# Patient Record
Sex: MALE | Race: BLACK OR AFRICAN AMERICAN | Employment: UNEMPLOYED | URBAN - METROPOLITAN AREA
[De-identification: names, ages, dates, MRNs, and addresses within clinical notes are randomized per-mention and may not be internally consistent; named-entity substitution may affect disease eponyms.]

---

## 2017-04-19 ENCOUNTER — HOSPITAL ENCOUNTER (EMERGENCY)
Age: 52
Discharge: OTHER INSTITUTION | End: 2017-04-19
Attending: EMERGENCY MEDICINE
Payer: SELF-PAY

## 2017-04-19 VITALS
DIASTOLIC BLOOD PRESSURE: 89 MMHG | BODY MASS INDEX: 23.26 KG/M2 | RESPIRATION RATE: 16 BRPM | HEART RATE: 96 BPM | SYSTOLIC BLOOD PRESSURE: 136 MMHG | WEIGHT: 171.7 LBS | OXYGEN SATURATION: 96 % | TEMPERATURE: 98.3 F | HEIGHT: 72 IN

## 2017-04-19 DIAGNOSIS — N19 UREMIA: ICD-10-CM

## 2017-04-19 DIAGNOSIS — N18.6 ESRD (END STAGE RENAL DISEASE) (HCC): Primary | ICD-10-CM

## 2017-04-19 DIAGNOSIS — D64.9 ANEMIA, UNSPECIFIED TYPE: ICD-10-CM

## 2017-04-19 LAB
ALBUMIN SERPL BCP-MCNC: 2.7 G/DL (ref 3.5–5)
ALBUMIN/GLOB SERPL: 0.6 {RATIO} (ref 1.1–2.2)
ALP SERPL-CCNC: 82 U/L (ref 45–117)
ALT SERPL-CCNC: 24 U/L (ref 12–78)
ANION GAP BLD CALC-SCNC: 19 MMOL/L (ref 5–15)
ANION GAP BLD CALC-SCNC: 23 MMOL/L (ref 5–15)
AST SERPL W P-5'-P-CCNC: 24 U/L (ref 15–37)
BASOPHILS # BLD AUTO: 0 K/UL (ref 0–0.1)
BASOPHILS # BLD: 0 % (ref 0–1)
BILIRUB SERPL-MCNC: 0.6 MG/DL (ref 0.2–1)
BUN BLD-MCNC: >140 MG/DL (ref 9–20)
BUN SERPL-MCNC: 163 MG/DL (ref 6–20)
BUN/CREAT SERPL: 8 (ref 12–20)
CA-I BLD-MCNC: 0.93 MMOL/L (ref 1.12–1.32)
CALCIUM SERPL-MCNC: 6.7 MG/DL (ref 8.5–10.1)
CHLORIDE BLD-SCNC: 103 MMOL/L (ref 98–107)
CHLORIDE SERPL-SCNC: 103 MMOL/L (ref 97–108)
CO2 BLD-SCNC: 17 MMOL/L (ref 21–32)
CO2 SERPL-SCNC: 18 MMOL/L (ref 21–32)
CREAT BLD-MCNC: >20 MG/DL (ref 0.6–1.3)
CREAT SERPL-MCNC: 19.69 MG/DL (ref 0.7–1.3)
EOSINOPHIL # BLD: 0.3 K/UL (ref 0–0.4)
EOSINOPHIL NFR BLD: 3 % (ref 0–7)
ERYTHROCYTE [DISTWIDTH] IN BLOOD BY AUTOMATED COUNT: 13.2 % (ref 11.5–14.5)
GLOBULIN SER CALC-MCNC: 4.2 G/DL (ref 2–4)
GLUCOSE BLD-MCNC: 95 MG/DL (ref 65–100)
GLUCOSE SERPL-MCNC: 102 MG/DL (ref 65–100)
HCT VFR BLD AUTO: 23.4 % (ref 36.6–50.3)
HCT VFR BLD CALC: 26 % (ref 36.6–50.3)
HGB BLD-MCNC: 7.7 G/DL (ref 12.1–17)
HGB BLD-MCNC: 8.8 GM/DL (ref 12.1–17)
LIPASE SERPL-CCNC: 202 U/L (ref 73–393)
LYMPHOCYTES # BLD AUTO: 11 % (ref 12–49)
LYMPHOCYTES # BLD: 1.1 K/UL (ref 0.8–3.5)
MCH RBC QN AUTO: 30.7 PG (ref 26–34)
MCHC RBC AUTO-ENTMCNC: 32.9 G/DL (ref 30–36.5)
MCV RBC AUTO: 93.2 FL (ref 80–99)
MONOCYTES # BLD: 0.8 K/UL (ref 0–1)
MONOCYTES NFR BLD AUTO: 8 % (ref 5–13)
NEUTS SEG # BLD: 7.8 K/UL (ref 1.8–8)
NEUTS SEG NFR BLD AUTO: 78 % (ref 32–75)
PLATELET # BLD AUTO: 175 K/UL (ref 150–400)
POTASSIUM BLD-SCNC: 4.4 MMOL/L (ref 3.5–5.1)
POTASSIUM SERPL-SCNC: 4.5 MMOL/L (ref 3.5–5.1)
PROT SERPL-MCNC: 6.9 G/DL (ref 6.4–8.2)
RBC # BLD AUTO: 2.51 M/UL (ref 4.1–5.7)
SERVICE CMNT-IMP: ABNORMAL
SODIUM BLD-SCNC: 138 MMOL/L (ref 136–145)
SODIUM SERPL-SCNC: 140 MMOL/L (ref 136–145)
TROPONIN I SERPL-MCNC: 0.22 NG/ML
WBC # BLD AUTO: 10 K/UL (ref 4.1–11.1)

## 2017-04-19 PROCEDURE — 77030029684 HC NEB SM VOL KT MONA -A

## 2017-04-19 PROCEDURE — 80053 COMPREHEN METABOLIC PANEL: CPT | Performed by: EMERGENCY MEDICINE

## 2017-04-19 PROCEDURE — 84484 ASSAY OF TROPONIN QUANT: CPT | Performed by: EMERGENCY MEDICINE

## 2017-04-19 PROCEDURE — 93005 ELECTROCARDIOGRAM TRACING: CPT

## 2017-04-19 PROCEDURE — 83690 ASSAY OF LIPASE: CPT | Performed by: EMERGENCY MEDICINE

## 2017-04-19 PROCEDURE — 80047 BASIC METABLC PNL IONIZED CA: CPT

## 2017-04-19 PROCEDURE — 36415 COLL VENOUS BLD VENIPUNCTURE: CPT | Performed by: EMERGENCY MEDICINE

## 2017-04-19 PROCEDURE — 96374 THER/PROPH/DIAG INJ IV PUSH: CPT

## 2017-04-19 PROCEDURE — 74011250636 HC RX REV CODE- 250/636: Performed by: EMERGENCY MEDICINE

## 2017-04-19 PROCEDURE — 85025 COMPLETE CBC W/AUTO DIFF WBC: CPT | Performed by: EMERGENCY MEDICINE

## 2017-04-19 PROCEDURE — 99285 EMERGENCY DEPT VISIT HI MDM: CPT

## 2017-04-19 PROCEDURE — 96375 TX/PRO/DX INJ NEW DRUG ADDON: CPT

## 2017-04-19 RX ORDER — BISMUTH SUBSALICYLATE 262 MG
1 TABLET,CHEWABLE ORAL DAILY
COMMUNITY

## 2017-04-19 RX ORDER — MORPHINE SULFATE 2 MG/ML
2 INJECTION, SOLUTION INTRAMUSCULAR; INTRAVENOUS
Status: COMPLETED | OUTPATIENT
Start: 2017-04-19 | End: 2017-04-19

## 2017-04-19 RX ORDER — FUROSEMIDE 80 MG/1
80 TABLET ORAL DAILY
Status: ON HOLD | COMMUNITY
End: 2017-05-09

## 2017-04-19 RX ORDER — PANTOPRAZOLE SODIUM 40 MG/1
40 TABLET, DELAYED RELEASE ORAL DAILY
Status: ON HOLD | COMMUNITY
End: 2021-01-01 | Stop reason: SDUPTHER

## 2017-04-19 RX ORDER — CLONIDINE HYDROCHLORIDE 0.3 MG/1
0.3 TABLET ORAL 2 TIMES DAILY
COMMUNITY
End: 2017-11-28 | Stop reason: SDUPTHER

## 2017-04-19 RX ORDER — ONDANSETRON 2 MG/ML
4 INJECTION INTRAMUSCULAR; INTRAVENOUS
Status: COMPLETED | OUTPATIENT
Start: 2017-04-19 | End: 2017-04-19

## 2017-04-19 RX ORDER — SODIUM CHLORIDE 0.9 % (FLUSH) 0.9 %
5-10 SYRINGE (ML) INJECTION AS NEEDED
Status: DISCONTINUED | OUTPATIENT
Start: 2017-04-19 | End: 2017-04-19 | Stop reason: HOSPADM

## 2017-04-19 RX ORDER — RANITIDINE 150 MG/1
150 TABLET, FILM COATED ORAL 2 TIMES DAILY
Status: ON HOLD | COMMUNITY
End: 2017-05-09

## 2017-04-19 RX ORDER — AMLODIPINE BESYLATE 10 MG/1
10 TABLET ORAL DAILY
COMMUNITY
End: 2017-11-28 | Stop reason: SDUPTHER

## 2017-04-19 RX ORDER — ALLOPURINOL 100 MG/1
TABLET ORAL DAILY
COMMUNITY
End: 2017-11-28 | Stop reason: ALTCHOICE

## 2017-04-19 RX ORDER — FUROSEMIDE 40 MG/1
40 TABLET ORAL
Status: ON HOLD | COMMUNITY
End: 2017-05-09

## 2017-04-19 RX ORDER — HYDRALAZINE HYDROCHLORIDE 50 MG/1
50 TABLET, FILM COATED ORAL 3 TIMES DAILY
COMMUNITY
End: 2017-07-16

## 2017-04-19 RX ORDER — SEVELAMER CARBONATE 800 MG/1
800 TABLET, FILM COATED ORAL 3 TIMES DAILY
COMMUNITY
End: 2017-11-28 | Stop reason: SDUPTHER

## 2017-04-19 RX ORDER — TERAZOSIN 2 MG/1
2 CAPSULE ORAL
Status: ON HOLD | COMMUNITY
End: 2017-05-09

## 2017-04-19 RX ORDER — SODIUM CHLORIDE 0.9 % (FLUSH) 0.9 %
5-10 SYRINGE (ML) INJECTION EVERY 8 HOURS
Status: DISCONTINUED | OUTPATIENT
Start: 2017-04-19 | End: 2017-04-19 | Stop reason: HOSPADM

## 2017-04-19 RX ORDER — GABAPENTIN 100 MG/1
100 CAPSULE ORAL DAILY
COMMUNITY

## 2017-04-19 RX ORDER — PARICALCITOL 1 UG/1
1 CAPSULE, LIQUID FILLED ORAL DAILY
Status: ON HOLD | COMMUNITY
End: 2021-01-01 | Stop reason: SDUPTHER

## 2017-04-19 RX ORDER — PROPRANOLOL HYDROCHLORIDE 20 MG/1
20 TABLET ORAL 3 TIMES DAILY
COMMUNITY
End: 2017-05-13

## 2017-04-19 RX ORDER — SODIUM BICARBONATE 650 MG/1
TABLET ORAL 4 TIMES DAILY
COMMUNITY
End: 2017-05-13

## 2017-04-19 RX ADMIN — Medication 2 MG: at 08:53

## 2017-04-19 RX ADMIN — ONDANSETRON HYDROCHLORIDE 4 MG: 2 INJECTION, SOLUTION INTRAMUSCULAR; INTRAVENOUS at 08:53

## 2017-04-19 NOTE — ED NOTES
Patient requesting something to eat/drink. Nursing staff has not provided any beverages due to patient complaining of coffee ground emesis upon arrival to ED. Nursing and physician have reviewed indications for NPO status with patient.

## 2017-04-19 NOTE — ED PROVIDER NOTES
HPI Comments:   Missy Samuel is a 47 y.o. male with a hx of stage III CKD and HTN presenting to the ED C/O nausea/vomiting which started a few days ago. Associated symptoms include right sided abd pain. Pt reports he was recently released from jail and had a fistula placed for HD before he was released but was not given any nephrology f/u when he left. He is also c/o acute on chronic lower back pain. Patient denies CP or any other symptoms or complaints. There are no other complaints, changes or physical findings at this time. Written by ADEOLA Thomas, as dictated by [employer] Lakesha Reed MD      The history is provided by the patient. No  was used. Past Medical History:   Diagnosis Date    Chronic kidney disease     Hypertension        Past Surgical History:   Procedure Laterality Date    HX VASCULAR ACCESS Left     LUE AV fistula         History reviewed. No pertinent family history. Social History     Social History    Marital status: SINGLE     Spouse name: N/A    Number of children: N/A    Years of education: N/A     Occupational History    Not on file. Social History Main Topics    Smoking status: Current Every Day Smoker     Packs/day: 1.00    Smokeless tobacco: Not on file    Alcohol use 0.0 oz/week     1 - 2 Cans of beer per week    Drug use: Yes     Special: Heroin      Comment: Former    Sexual activity: Not on file     Other Topics Concern    Not on file     Social History Narrative         ALLERGIES: Motrin [ibuprofen] and Tylenol [acetaminophen]    Review of Systems   Constitutional: Negative. Negative for appetite change, chills, fatigue and fever. HENT: Negative. Negative for congestion, rhinorrhea, sinus pressure and sore throat. Eyes: Negative. Respiratory: Negative. Negative for cough, choking, chest tightness, shortness of breath and wheezing. Cardiovascular: Negative.   Negative for chest pain, palpitations and leg swelling. Gastrointestinal: Positive for abdominal pain (right sided), nausea and vomiting. Negative for constipation and diarrhea. Endocrine: Negative. Genitourinary: Negative. Negative for difficulty urinating, dysuria, flank pain and urgency. Musculoskeletal: Positive for back pain (lower). Skin: Negative. Neurological: Negative. Negative for dizziness, speech difficulty, weakness, light-headedness, numbness and headaches. Psychiatric/Behavioral: Negative. All other systems reviewed and are negative.       Vitals:    04/19/17 0830 04/19/17 0835 04/19/17 0835 04/19/17 0850   BP: 163/89  136/89    Pulse: 97  95 96   Resp: 8   16   Temp: 98.2 °F (36.8 °C)  98.3 °F (36.8 °C)    SpO2:  96% 96%    Weight:       Height:                Physical Exam     Physical Examination: General appearance - WDWN, in no apparent distress  Head - NC/AT  Eyes - pupils equal, round  and reactive, extraocular eye movements intact, conj/sclera clear, anicteric  Mouth - mucous membranes moist, pharynx normal without lesions  Nose/Ears - nares clear, Tms & canals clear  Neck - supple, no significant adenopathy, trachea midline, no crepitus, c spine diffusely non-tender, no step offs  Chest - Normal respiratory effort, clear to auscultation bilaterally, no wheezes/rales/rhonchi  Heart - normal rate and regular rhythm, S1 and S2 normal, 2/6 systolic murmur, no gallops or rubs  Abdomen - soft, tender to right abd, nondistended, nabs, no masses, guarding, rebound or rigidity  Neurological - alert, oriented, normal speech, cranial nerves intact, no focal motor findings, motor & sensory diffusely intact, normal gait  Extremities/MS - peripheral pulses normal, no pedal edema, all joints atraumatic, FROM, non-tender, no gross deformities, spine diffusely non-tender, +thrill to LUE fistula  Skin - normal coloration and turgor, no rashes, no lesions or lacerations     MDM  Number of Diagnoses or Management Options  Anemia, unspecified type:   ESRD (end stage renal disease) ([facility] [address]):   Uremia:   Diagnosis management comments: DDx: renal disease, electrolyte abnormality, UTI, chronic pain       Amount and/or Complexity of Data Reviewed  Clinical lab tests: ordered and reviewed  Tests in the medicine section of CPT®: ordered and reviewed  Discuss the patient with other providers: yes (Emergency Medicine)  Independent visualization of images, tracings, or specimens: yes    Patient Progress  Patient progress: stable    Procedures    EKG interpretation: (Preliminary)  7:46 AM  Rhythm: normal sinus rhythm; and regular . Rate (approx.): 92 bpm; Axis: normal; AR interval: normal; QRS interval: normal ; ST/T wave: normal.  Written by Gwendolyn Malone ED Scribe, as dictated by Jaleel Sapp. Jeremias Ronquillo MD     PROGRESS NOTE:   8:46 AM  Pt requested transfer to [facility] for prior relationship. In addition we do not have vascular surgery here and all vascular and nephrology care has been at [facility] prior to today. Written by Mikael Oneill ED Scribe, as dictated by Jaleel Ronquillo MD.     CONSULT NOTE:   8:50 AM  Krystina Ronquillo MD spoke with Rick Vela MD   Specialty: Emergency Medicine  Discussed pt's hx, disposition, and available diagnostic and imaging results over the telephone. Reviewed care plans. Consulting physician agrees with plans as outlined. Dr. Yaquelin Bradford agrees to accept the pt for transfer to [facility]'s ED. Written by Mikael Oneill ED Scribe, as dictated by Jaleel Ronquillo MD.     LABORATORY TESTS:  Recent Results (from the past 12 hour(s))   CBC WITH AUTOMATED DIFF    Collection Time: 04/19/17  8:17 AM   Result Value Ref Range    WBC 10.0 4.1 - 11.1 K/uL    RBC 2.51 (L) 4.10 - 5.70 M/uL    HGB 7.7 (L) 12.1 - 17.0 g/dL    HCT 23.4 (L) 36.6 - 50.3 %    MCV 93.2 80.0 - 99.0 FL    MCH 30.7 26.0 - 34.0 PG    MCHC 32.9 30.0 - 36.5 g/dL    RDW 13.2 11.5 - 14.5 %    PLATELET 558 367 - 009 K/uL    NEUTROPHILS 78 (H) 32 - 75 %    LYMPHOCYTES 11 (L) 12 - 49 %    MONOCYTES 8 5 - 13 %    EOSINOPHILS 3 0 - 7 %    BASOPHILS 0 0 - 1 %    ABS. NEUTROPHILS 7.8 1.8 - 8.0 K/UL    ABS. LYMPHOCYTES 1.1 0.8 - 3.5 K/UL    ABS. MONOCYTES 0.8 0.0 - 1.0 K/UL    ABS. EOSINOPHILS 0.3 0.0 - 0.4 K/UL    ABS. BASOPHILS 0.0 0.0 - 0.1 K/UL   METABOLIC PANEL, COMPREHENSIVE    Collection Time: 04/19/17  8:17 AM   Result Value Ref Range    Sodium 140 136 - 145 mmol/L    Potassium 4.5 3.5 - 5.1 mmol/L    Chloride 103 97 - 108 mmol/L    CO2 18 (L) 21 - 32 mmol/L    Anion gap 19 (H) 5 - 15 mmol/L    Glucose 102 (H) 65 - 100 mg/dL     (H) 6 - 20 MG/DL    Creatinine 19.69 (H) 0.70 - 1.30 MG/DL    BUN/Creatinine ratio 8 (L) 12 - 20      GFR est AA 3 (L) >60 ml/min/1.73m2    GFR est non-AA 3 (L) >60 ml/min/1.73m2    Calcium 6.7 (L) 8.5 - 10.1 MG/DL    Bilirubin, total 0.6 0.2 - 1.0 MG/DL    ALT (SGPT) 24 12 - 78 U/L    AST (SGOT) 24 15 - 37 U/L    Alk.  phosphatase 82 45 - 117 U/L    Protein, total 6.9 6.4 - 8.2 g/dL    Albumin 2.7 (L) 3.5 - 5.0 g/dL    Globulin 4.2 (H) 2.0 - 4.0 g/dL    A-G Ratio 0.6 (L) 1.1 - 2.2     LIPASE    Collection Time: 04/19/17  8:17 AM   Result Value Ref Range    Lipase 202 73 - 393 U/L   TROPONIN I    Collection Time: 04/19/17  8:17 AM   Result Value Ref Range    Troponin-I, Qt. 0.22 (H) <0.05 ng/mL   POC CHEM8    Collection Time: 04/19/17  8:20 AM   Result Value Ref Range    Calcium, ionized (POC) 0.93 (L) 1.12 - 1.32 MMOL/L    Sodium (POC) 138 136 - 145 MMOL/L    Potassium (POC) 4.4 3.5 - 5.1 MMOL/L    Chloride (POC) 103 98 - 107 MMOL/L    CO2 (POC) 17 (L) 21 - 32 MMOL/L    Anion gap (POC) 23 (H) 5 - 15 mmol/L    Glucose (POC) 95 65 - 100 MG/DL    BUN (POC) >140 (H) 9 - 20 MG/DL    Creatinine (POC) >20.0 (H) 0.6 - 1.3 MG/DL    GFR-AA (POC) Cannot be calulated >60 ml/min/1.73m2    GFR, non-AA (POC) Cannot be calulated >60 ml/min/1.73m2    Hemoglobin (POC) 8.8 (L) 12.1 - 17.0 GM/DL    Hematocrit (POC) 26 (L) 36.6 - 50.3 % Comment Comment Not Indicated. MEDICATIONS GIVEN:  Medications   sodium chloride (NS) flush 5-10 mL (not administered)   sodium chloride (NS) flush 5-10 mL (not administered)   morphine injection 2 mg (2 mg IntraVENous Given 4/19/17 0853)   ondansetron (ZOFRAN) injection 4 mg (4 mg IntraVENous Given 4/19/17 0853)       IMPRESSION:  1. ESRD (end stage renal disease) ([facility] [address]    2. Uremia    3. Anemia, unspecified type        PLAN: Transfer to [facility] ED    TRANSFER PROGRESS NOTE:  8:50 AM  Discussed impending transfer with Patient and/or family. Pt and/or family instructed that Pt would be transferred to Smita Cunningham MD [facility] ED). Discussed reasoning for transfer and future treatment plan. Family and Pt understands and agrees with care plan. Written by Jordan Mccann, ED Scribe, as dictated by Ania Galeano [facility]: This note is prepared by Merlin Eaves, acting as Scribe for Ania Jarvis. Simón Galeano, [address] [place] [address]. Simón Galeano MD: The scribe's documentation has been prepared under my direction and personally reviewed by me in its entirety. I confirm that the note above accurately reflects all work, treatment, procedures, and medical decision making performed by me.

## 2017-04-19 NOTE — ED TRIAGE NOTES
Patient recently released from group home on April 10th and presents with multiple complaints. Primary c/o left, lower back pain for about three days. Patient admits chronic pain problems. Takes GBP and Ultram for chronic pain. Also reports RUQ abdominal pain x3 days. Reports nausea/vomiting and describes coffee ground emesis. Denies recent ETOH use. Patient has AV fistula in E that is maturing. Has not started HD yet.

## 2017-04-19 NOTE — ED NOTES
TRANSFER - OUT REPORT:    Verbal report given to Yusef Lira RN on Donne Cockayne  being transferred to [facility] Emergency Department for change in patient condition(transfer to [facility] for higher level of care)       Report consisted of patients Situation, Background, Assessment and   Recommendations(SBAR). Information from the following report(s) SBAR and ED Summary was reviewed with the receiving nurse. Lines:   Peripheral IV 04/19/17 Right Hand (Active)   Site Assessment Clean, dry, & intact 4/19/2017  8:32 AM   Phlebitis Assessment 0 4/19/2017  8:32 AM   Infiltration Assessment 0 4/19/2017  8:32 AM   Dressing Status Clean, dry, & intact 4/19/2017  8:32 AM   Dressing Type Transparent 4/19/2017  8:32 AM   Hub Color/Line Status Blue 4/19/2017  8:32 AM        Opportunity for questions and clarification was provided.       Patient transported with:   Monitor, Gildardo

## 2017-04-20 LAB
ATRIAL RATE: 92 BPM
CALCULATED P AXIS, ECG09: 49 DEGREES
CALCULATED R AXIS, ECG10: -2 DEGREES
CALCULATED T AXIS, ECG11: 51 DEGREES
DIAGNOSIS, 93000: NORMAL
P-R INTERVAL, ECG05: 156 MS
Q-T INTERVAL, ECG07: 368 MS
QRS DURATION, ECG06: 84 MS
QTC CALCULATION (BEZET), ECG08: 455 MS
VENTRICULAR RATE, ECG03: 92 BPM

## 2017-05-06 ENCOUNTER — APPOINTMENT (OUTPATIENT)
Dept: GENERAL RADIOLOGY | Age: 52
End: 2017-05-06
Attending: EMERGENCY MEDICINE
Payer: MEDICAID

## 2017-05-06 ENCOUNTER — HOSPITAL ENCOUNTER (EMERGENCY)
Age: 52
Discharge: HOME | End: 2017-05-06
Attending: EMERGENCY MEDICINE
Payer: MEDICAID

## 2017-05-06 ENCOUNTER — HOSPITAL ENCOUNTER (INPATIENT)
Age: 52
LOS: 7 days | Discharge: HOME | DRG: 133 | End: 2017-05-13
Attending: EMERGENCY MEDICINE | Admitting: HOSPITALIST
Payer: MEDICAID

## 2017-05-06 ENCOUNTER — APPOINTMENT (OUTPATIENT)
Dept: CT IMAGING | Age: 52
DRG: 133 | End: 2017-05-06
Attending: EMERGENCY MEDICINE
Payer: MEDICAID

## 2017-05-06 VITALS
TEMPERATURE: 97.9 F | OXYGEN SATURATION: 87 % | DIASTOLIC BLOOD PRESSURE: 105 MMHG | RESPIRATION RATE: 26 BRPM | SYSTOLIC BLOOD PRESSURE: 180 MMHG | WEIGHT: 185 LBS | BODY MASS INDEX: 25.09 KG/M2 | HEART RATE: 96 BPM

## 2017-05-06 DIAGNOSIS — I21.4 NSTEMI (NON-ST ELEVATED MYOCARDIAL INFARCTION) (HCC): ICD-10-CM

## 2017-05-06 DIAGNOSIS — J81.0 ACUTE PULMONARY EDEMA (HCC): ICD-10-CM

## 2017-05-06 DIAGNOSIS — N18.6 ESRD ON HEMODIALYSIS (HCC): ICD-10-CM

## 2017-05-06 DIAGNOSIS — Z99.2 ESRD ON HEMODIALYSIS (HCC): ICD-10-CM

## 2017-05-06 DIAGNOSIS — J81.0 ACUTE PULMONARY EDEMA (HCC): Primary | ICD-10-CM

## 2017-05-06 DIAGNOSIS — J96.01 ACUTE RESPIRATORY FAILURE WITH HYPOXIA (HCC): Primary | ICD-10-CM

## 2017-05-06 PROBLEM — J81.1 PULMONARY EDEMA: Status: ACTIVE | Noted: 2017-05-06

## 2017-05-06 LAB
ANION GAP BLD CALC-SCNC: 14 MMOL/L (ref 5–15)
APPEARANCE UR: ABNORMAL
APTT PPP: 29.4 SEC (ref 22.1–32.5)
ARTERIAL PATENCY WRIST A: YES
ATRIAL RATE: 92 BPM
BACTERIA URNS QL MICRO: NEGATIVE /HPF
BASE DEFICIT BLDA-SCNC: 0.5 MMOL/L
BASOPHILS # BLD AUTO: 0.1 K/UL (ref 0–0.1)
BASOPHILS # BLD: 1 % (ref 0–1)
BDY SITE: ABNORMAL
BILIRUB UR QL: NEGATIVE
BREATHS.SPONTANEOUS ON VENT: 18
BUN SERPL-MCNC: 26 MG/DL (ref 6–20)
BUN/CREAT SERPL: 4 (ref 12–20)
CALCIUM SERPL-MCNC: 7.7 MG/DL (ref 8.5–10.1)
CALCULATED P AXIS, ECG09: 46 DEGREES
CALCULATED R AXIS, ECG10: 36 DEGREES
CALCULATED T AXIS, ECG11: 73 DEGREES
CHLORIDE SERPL-SCNC: 100 MMOL/L (ref 97–108)
CK MB CFR SERPL CALC: 0.6 % (ref 0–2.5)
CK MB SERPL-MCNC: 2.8 NG/ML (ref 5–25)
CK SERPL-CCNC: 498 U/L (ref 39–308)
CO2 SERPL-SCNC: 24 MMOL/L (ref 21–32)
COLOR UR: ABNORMAL
CREAT SERPL-MCNC: 6.69 MG/DL (ref 0.7–1.3)
DIAGNOSIS, 93000: NORMAL
EOSINOPHIL # BLD: 0.5 K/UL (ref 0–0.4)
EOSINOPHIL NFR BLD: 4 % (ref 0–7)
EPAP/CPAP/PEEP, PAPEEP: 5
EPITH CASTS URNS QL MICRO: ABNORMAL /LPF
ERYTHROCYTE [DISTWIDTH] IN BLOOD BY AUTOMATED COUNT: 16 % (ref 11.5–14.5)
FIO2 ON VENT: 50 %
GAS FLOW.O2 SETTING OXYMISER: 4 L/MIN
GLUCOSE SERPL-MCNC: 179 MG/DL (ref 65–100)
GLUCOSE UR STRIP.AUTO-MCNC: NEGATIVE MG/DL
HCO3 BLDA-SCNC: 24 MMOL/L (ref 22–26)
HCT VFR BLD AUTO: 28.5 % (ref 36.6–50.3)
HGB BLD-MCNC: 8.7 G/DL (ref 12.1–17)
HGB UR QL STRIP: ABNORMAL
INR PPP: 1.1 (ref 0.9–1.1)
IPAP/PIP, IPAPIP: 14
KETONES UR QL STRIP.AUTO: NEGATIVE MG/DL
LEUKOCYTE ESTERASE UR QL STRIP.AUTO: NEGATIVE
LYMPHOCYTES # BLD AUTO: 21 % (ref 12–49)
LYMPHOCYTES # BLD: 2.9 K/UL (ref 0.8–3.5)
MCH RBC QN AUTO: 31.2 PG (ref 26–34)
MCHC RBC AUTO-ENTMCNC: 30.5 G/DL (ref 30–36.5)
MCV RBC AUTO: 102.2 FL (ref 80–99)
MONOCYTES # BLD: 1.5 K/UL (ref 0–1)
MONOCYTES NFR BLD AUTO: 11 % (ref 5–13)
NEUTS SEG # BLD: 8.7 K/UL (ref 1.8–8)
NEUTS SEG NFR BLD AUTO: 63 % (ref 32–75)
NITRITE UR QL STRIP.AUTO: NEGATIVE
P-R INTERVAL, ECG05: 156 MS
PCO2 BLDA: 38 MMHG (ref 35–45)
PH BLDA: 7.41 [PH] (ref 7.35–7.45)
PH UR STRIP: 7.5 [PH] (ref 5–8)
PLATELET # BLD AUTO: 333 K/UL (ref 150–400)
PO2 BLDA: 63 MMHG (ref 80–100)
POTASSIUM SERPL-SCNC: 3.5 MMOL/L (ref 3.5–5.1)
PROT UR STRIP-MCNC: >300 MG/DL
PROTHROMBIN TIME: 11.4 SEC (ref 9–11.1)
Q-T INTERVAL, ECG07: 388 MS
QRS DURATION, ECG06: 84 MS
QTC CALCULATION (BEZET), ECG08: 479 MS
RBC # BLD AUTO: 2.79 M/UL (ref 4.1–5.7)
RBC #/AREA URNS HPF: ABNORMAL /HPF (ref 0–5)
SAO2 % BLD: 93 % (ref 92–97)
SAO2% DEVICE SAO2% SENSOR NAME: ABNORMAL
SODIUM SERPL-SCNC: 138 MMOL/L (ref 136–145)
SP GR UR REFRACTOMETRY: 1.01 (ref 1–1.03)
SPECIMEN SITE: ABNORMAL
SPERM URNS QL MICRO: PRESENT
THERAPEUTIC RANGE,PTTT: NORMAL SECS (ref 58–77)
TROPONIN I SERPL-MCNC: 1.04 NG/ML
TROPONIN I SERPL-MCNC: 2.93 NG/ML
TROPONIN I SERPL-MCNC: 5.53 NG/ML
UA: UC IF INDICATED,UAUC: ABNORMAL
UROBILINOGEN UR QL STRIP.AUTO: 0.2 EU/DL (ref 0.2–1)
VENTRICULAR RATE, ECG03: 92 BPM
WBC # BLD AUTO: 13.8 K/UL (ref 4.1–11.1)
WBC URNS QL MICRO: ABNORMAL /HPF (ref 0–4)

## 2017-05-06 PROCEDURE — 81001 URINALYSIS AUTO W/SCOPE: CPT | Performed by: HOSPITALIST

## 2017-05-06 PROCEDURE — 74011250636 HC RX REV CODE- 250/636: Performed by: HOSPITALIST

## 2017-05-06 PROCEDURE — 84484 ASSAY OF TROPONIN QUANT: CPT | Performed by: EMERGENCY MEDICINE

## 2017-05-06 PROCEDURE — 93005 ELECTROCARDIOGRAM TRACING: CPT

## 2017-05-06 PROCEDURE — 36415 COLL VENOUS BLD VENIPUNCTURE: CPT | Performed by: EMERGENCY MEDICINE

## 2017-05-06 PROCEDURE — 74011250637 HC RX REV CODE- 250/637: Performed by: EMERGENCY MEDICINE

## 2017-05-06 PROCEDURE — 74011636320 HC RX REV CODE- 636/320: Performed by: EMERGENCY MEDICINE

## 2017-05-06 PROCEDURE — 87040 BLOOD CULTURE FOR BACTERIA: CPT | Performed by: INTERNAL MEDICINE

## 2017-05-06 PROCEDURE — 71010 XR CHEST PORT: CPT

## 2017-05-06 PROCEDURE — 87086 URINE CULTURE/COLONY COUNT: CPT | Performed by: HOSPITALIST

## 2017-05-06 PROCEDURE — 90935 HEMODIALYSIS ONE EVALUATION: CPT

## 2017-05-06 PROCEDURE — 82550 ASSAY OF CK (CPK): CPT | Performed by: EMERGENCY MEDICINE

## 2017-05-06 PROCEDURE — 82803 BLOOD GASES ANY COMBINATION: CPT | Performed by: EMERGENCY MEDICINE

## 2017-05-06 PROCEDURE — 71275 CT ANGIOGRAPHY CHEST: CPT

## 2017-05-06 PROCEDURE — 74011000250 HC RX REV CODE- 250: Performed by: EMERGENCY MEDICINE

## 2017-05-06 PROCEDURE — 74011250637 HC RX REV CODE- 250/637: Performed by: HOSPITALIST

## 2017-05-06 PROCEDURE — 85025 COMPLETE CBC W/AUTO DIFF WBC: CPT | Performed by: EMERGENCY MEDICINE

## 2017-05-06 PROCEDURE — 99285 EMERGENCY DEPT VISIT HI MDM: CPT

## 2017-05-06 PROCEDURE — 94640 AIRWAY INHALATION TREATMENT: CPT

## 2017-05-06 PROCEDURE — 36415 COLL VENOUS BLD VENIPUNCTURE: CPT | Performed by: HOSPITALIST

## 2017-05-06 PROCEDURE — 77030012879 HC MSK CPAP FLL FAC PHIL -B

## 2017-05-06 PROCEDURE — 65660000000 HC RM CCU STEPDOWN

## 2017-05-06 PROCEDURE — 80048 BASIC METABOLIC PNL TOTAL CA: CPT | Performed by: EMERGENCY MEDICINE

## 2017-05-06 PROCEDURE — 96374 THER/PROPH/DIAG INJ IV PUSH: CPT

## 2017-05-06 PROCEDURE — 77030013140 HC MSK NEB VYRM -A

## 2017-05-06 PROCEDURE — 94761 N-INVAS EAR/PLS OXIMETRY MLT: CPT

## 2017-05-06 PROCEDURE — 94660 CPAP INITIATION&MGMT: CPT

## 2017-05-06 PROCEDURE — 36600 WITHDRAWAL OF ARTERIAL BLOOD: CPT | Performed by: EMERGENCY MEDICINE

## 2017-05-06 PROCEDURE — 87340 HEPATITIS B SURFACE AG IA: CPT | Performed by: INTERNAL MEDICINE

## 2017-05-06 PROCEDURE — 74011250636 HC RX REV CODE- 250/636: Performed by: EMERGENCY MEDICINE

## 2017-05-06 PROCEDURE — 85610 PROTHROMBIN TIME: CPT | Performed by: EMERGENCY MEDICINE

## 2017-05-06 PROCEDURE — 77030029684 HC NEB SM VOL KT MONA -A

## 2017-05-06 PROCEDURE — 85730 THROMBOPLASTIN TIME PARTIAL: CPT | Performed by: EMERGENCY MEDICINE

## 2017-05-06 PROCEDURE — 74011250636 HC RX REV CODE- 250/636: Performed by: INTERNAL MEDICINE

## 2017-05-06 PROCEDURE — 5A1D60Z PERFORMANCE OF URINARY FILTRATION, MULTIPLE: ICD-10-PCS | Performed by: HOSPITALIST

## 2017-05-06 RX ORDER — DOXERCALCIFEROL 0.5 UG/1
0.5 CAPSULE ORAL DAILY
Status: DISCONTINUED | OUTPATIENT
Start: 2017-05-07 | End: 2017-05-13 | Stop reason: HOSPADM

## 2017-05-06 RX ORDER — FUROSEMIDE 40 MG/1
40 TABLET ORAL
Status: DISCONTINUED | OUTPATIENT
Start: 2017-05-08 | End: 2017-05-06

## 2017-05-06 RX ORDER — MORPHINE SULFATE 4 MG/ML
3 INJECTION, SOLUTION INTRAMUSCULAR; INTRAVENOUS
Status: DISCONTINUED | OUTPATIENT
Start: 2017-05-06 | End: 2017-05-07

## 2017-05-06 RX ORDER — GABAPENTIN 100 MG/1
100 CAPSULE ORAL 2 TIMES DAILY
Status: DISCONTINUED | OUTPATIENT
Start: 2017-05-06 | End: 2017-05-13 | Stop reason: HOSPADM

## 2017-05-06 RX ORDER — ONDANSETRON 2 MG/ML
4 INJECTION INTRAMUSCULAR; INTRAVENOUS
Status: DISCONTINUED | OUTPATIENT
Start: 2017-05-06 | End: 2017-05-13 | Stop reason: HOSPADM

## 2017-05-06 RX ORDER — SEVELAMER HYDROCHLORIDE 800 MG/1
800 TABLET, FILM COATED ORAL 3 TIMES DAILY
Status: DISCONTINUED | OUTPATIENT
Start: 2017-05-06 | End: 2017-05-13 | Stop reason: HOSPADM

## 2017-05-06 RX ORDER — SODIUM CHLORIDE 0.9 % (FLUSH) 0.9 %
10 SYRINGE (ML) INJECTION
Status: COMPLETED | OUTPATIENT
Start: 2017-05-06 | End: 2017-05-06

## 2017-05-06 RX ORDER — CLONIDINE HYDROCHLORIDE 0.1 MG/1
0.3 TABLET ORAL 2 TIMES DAILY
Status: DISCONTINUED | OUTPATIENT
Start: 2017-05-06 | End: 2017-05-13 | Stop reason: HOSPADM

## 2017-05-06 RX ORDER — SODIUM CHLORIDE 0.9 % (FLUSH) 0.9 %
5-10 SYRINGE (ML) INJECTION EVERY 8 HOURS
Status: DISCONTINUED | OUTPATIENT
Start: 2017-05-06 | End: 2017-05-13 | Stop reason: HOSPADM

## 2017-05-06 RX ORDER — SODIUM CHLORIDE 9 MG/ML
50 INJECTION, SOLUTION INTRAVENOUS
Status: COMPLETED | OUTPATIENT
Start: 2017-05-06 | End: 2017-05-06

## 2017-05-06 RX ORDER — PANTOPRAZOLE SODIUM 40 MG/1
40 TABLET, DELAYED RELEASE ORAL DAILY
Status: DISCONTINUED | OUTPATIENT
Start: 2017-05-06 | End: 2017-05-13 | Stop reason: HOSPADM

## 2017-05-06 RX ORDER — FUROSEMIDE 80 MG/1
80 TABLET ORAL DAILY
Status: DISCONTINUED | OUTPATIENT
Start: 2017-05-06 | End: 2017-05-13 | Stop reason: HOSPADM

## 2017-05-06 RX ORDER — HYDRALAZINE HYDROCHLORIDE 20 MG/ML
10 INJECTION INTRAMUSCULAR; INTRAVENOUS
Status: DISCONTINUED | OUTPATIENT
Start: 2017-05-06 | End: 2017-05-06

## 2017-05-06 RX ORDER — ALBUTEROL SULFATE 0.83 MG/ML
2.5 SOLUTION RESPIRATORY (INHALATION)
Status: COMPLETED | OUTPATIENT
Start: 2017-05-06 | End: 2017-05-06

## 2017-05-06 RX ORDER — THERA TABS 400 MCG
1 TAB ORAL DAILY
Status: DISCONTINUED | OUTPATIENT
Start: 2017-05-06 | End: 2017-05-13 | Stop reason: HOSPADM

## 2017-05-06 RX ORDER — MORPHINE SULFATE 2 MG/ML
1 INJECTION, SOLUTION INTRAMUSCULAR; INTRAVENOUS ONCE
Status: COMPLETED | OUTPATIENT
Start: 2017-05-07 | End: 2017-05-06

## 2017-05-06 RX ORDER — HEPARIN SODIUM 5000 [USP'U]/ML
5000 INJECTION, SOLUTION INTRAVENOUS; SUBCUTANEOUS EVERY 8 HOURS
Status: DISCONTINUED | OUTPATIENT
Start: 2017-05-06 | End: 2017-05-13 | Stop reason: HOSPADM

## 2017-05-06 RX ORDER — SODIUM BICARBONATE 650 MG/1
650 TABLET ORAL 3 TIMES DAILY
Status: DISCONTINUED | OUTPATIENT
Start: 2017-05-06 | End: 2017-05-06

## 2017-05-06 RX ORDER — HYDRALAZINE HYDROCHLORIDE 20 MG/ML
20 INJECTION INTRAMUSCULAR; INTRAVENOUS
Status: DISCONTINUED | OUTPATIENT
Start: 2017-05-06 | End: 2017-05-13 | Stop reason: HOSPADM

## 2017-05-06 RX ORDER — DEXAMETHASONE SODIUM PHOSPHATE 10 MG/ML
20 INJECTION INTRAMUSCULAR; INTRAVENOUS ONCE
Status: DISCONTINUED | OUTPATIENT
Start: 2017-05-06 | End: 2017-05-06

## 2017-05-06 RX ORDER — AMLODIPINE BESYLATE 5 MG/1
10 TABLET ORAL DAILY
Status: DISCONTINUED | OUTPATIENT
Start: 2017-05-06 | End: 2017-05-13 | Stop reason: HOSPADM

## 2017-05-06 RX ORDER — IBUPROFEN 200 MG
1 TABLET ORAL EVERY 24 HOURS
Status: DISCONTINUED | OUTPATIENT
Start: 2017-05-06 | End: 2017-05-13 | Stop reason: HOSPADM

## 2017-05-06 RX ORDER — FUROSEMIDE 10 MG/ML
80 INJECTION INTRAMUSCULAR; INTRAVENOUS ONCE
Status: COMPLETED | OUTPATIENT
Start: 2017-05-06 | End: 2017-05-06

## 2017-05-06 RX ORDER — PROPRANOLOL HYDROCHLORIDE 10 MG/1
20 TABLET ORAL 3 TIMES DAILY
Status: DISCONTINUED | OUTPATIENT
Start: 2017-05-06 | End: 2017-05-11

## 2017-05-06 RX ORDER — HYDRALAZINE HYDROCHLORIDE 50 MG/1
50 TABLET, FILM COATED ORAL 3 TIMES DAILY
Status: DISCONTINUED | OUTPATIENT
Start: 2017-05-06 | End: 2017-05-13 | Stop reason: HOSPADM

## 2017-05-06 RX ORDER — SODIUM CHLORIDE 0.9 % (FLUSH) 0.9 %
5-10 SYRINGE (ML) INJECTION AS NEEDED
Status: DISCONTINUED | OUTPATIENT
Start: 2017-05-06 | End: 2017-05-13 | Stop reason: HOSPADM

## 2017-05-06 RX ORDER — TRAMADOL HYDROCHLORIDE 50 MG/1
50 TABLET ORAL
Status: DISCONTINUED | OUTPATIENT
Start: 2017-05-06 | End: 2017-05-12

## 2017-05-06 RX ORDER — ALLOPURINOL 100 MG/1
100 TABLET ORAL DAILY
Status: DISCONTINUED | OUTPATIENT
Start: 2017-05-06 | End: 2017-05-13 | Stop reason: HOSPADM

## 2017-05-06 RX ORDER — B-COMPLEX WITH VITAMIN C
1 TABLET ORAL DAILY
Status: DISCONTINUED | OUTPATIENT
Start: 2017-05-06 | End: 2017-05-13 | Stop reason: HOSPADM

## 2017-05-06 RX ORDER — DEXAMETHASONE SODIUM PHOSPHATE 100 MG/10ML
20 INJECTION INTRAMUSCULAR; INTRAVENOUS
Status: COMPLETED | OUTPATIENT
Start: 2017-05-06 | End: 2017-05-06

## 2017-05-06 RX ORDER — TERAZOSIN 1 MG/1
2 CAPSULE ORAL
Status: DISCONTINUED | OUTPATIENT
Start: 2017-05-06 | End: 2017-05-13 | Stop reason: HOSPADM

## 2017-05-06 RX ORDER — IPRATROPIUM BROMIDE AND ALBUTEROL SULFATE 2.5; .5 MG/3ML; MG/3ML
3 SOLUTION RESPIRATORY (INHALATION)
Status: DISCONTINUED | OUTPATIENT
Start: 2017-05-06 | End: 2017-05-06 | Stop reason: HOSPADM

## 2017-05-06 RX ADMIN — PANTOPRAZOLE SODIUM 40 MG: 40 TABLET, DELAYED RELEASE ORAL at 12:13

## 2017-05-06 RX ADMIN — RENAGEL 800 MG: 800 TABLET ORAL at 21:05

## 2017-05-06 RX ADMIN — HYDRALAZINE HYDROCHLORIDE 50 MG: 50 TABLET ORAL at 12:12

## 2017-05-06 RX ADMIN — PROPRANOLOL HYDROCHLORIDE 20 MG: 10 TABLET ORAL at 21:05

## 2017-05-06 RX ADMIN — TERAZOSIN HYDROCHLORIDE 2 MG: 1 CAPSULE ORAL at 21:05

## 2017-05-06 RX ADMIN — Medication 10 ML: at 21:05

## 2017-05-06 RX ADMIN — GABAPENTIN 100 MG: 100 CAPSULE ORAL at 12:13

## 2017-05-06 RX ADMIN — CLONIDINE HYDROCHLORIDE 0.3 MG: 0.1 TABLET ORAL at 12:14

## 2017-05-06 RX ADMIN — MORPHINE SULFATE 3 MG: 4 INJECTION, SOLUTION INTRAMUSCULAR; INTRAVENOUS at 12:03

## 2017-05-06 RX ADMIN — HYDRALAZINE HYDROCHLORIDE 50 MG: 50 TABLET ORAL at 21:05

## 2017-05-06 RX ADMIN — SODIUM CHLORIDE 50 ML/HR: 900 INJECTION, SOLUTION INTRAVENOUS at 04:06

## 2017-05-06 RX ADMIN — HYDRALAZINE HYDROCHLORIDE 10 MG: 20 INJECTION INTRAMUSCULAR; INTRAVENOUS at 07:28

## 2017-05-06 RX ADMIN — FUROSEMIDE 80 MG: 10 INJECTION, SOLUTION INTRAMUSCULAR; INTRAVENOUS at 05:33

## 2017-05-06 RX ADMIN — HEPARIN SODIUM 5000 UNITS: 5000 INJECTION, SOLUTION INTRAVENOUS; SUBCUTANEOUS at 16:56

## 2017-05-06 RX ADMIN — RENAGEL 800 MG: 800 TABLET ORAL at 16:57

## 2017-05-06 RX ADMIN — Medication 1 MG: at 23:59

## 2017-05-06 RX ADMIN — PROPRANOLOL HYDROCHLORIDE 20 MG: 10 TABLET ORAL at 16:56

## 2017-05-06 RX ADMIN — MORPHINE SULFATE 3 MG: 4 INJECTION, SOLUTION INTRAMUSCULAR; INTRAVENOUS at 19:50

## 2017-05-06 RX ADMIN — IOPAMIDOL 100 ML: 755 INJECTION, SOLUTION INTRAVENOUS at 04:06

## 2017-05-06 RX ADMIN — AMLODIPINE BESYLATE 10 MG: 5 TABLET ORAL at 12:13

## 2017-05-06 RX ADMIN — TRAMADOL HYDROCHLORIDE 50 MG: 50 TABLET, FILM COATED ORAL at 23:10

## 2017-05-06 RX ADMIN — FUROSEMIDE 80 MG: 80 TABLET ORAL at 12:13

## 2017-05-06 RX ADMIN — HYDRALAZINE HYDROCHLORIDE 50 MG: 50 TABLET ORAL at 16:57

## 2017-05-06 RX ADMIN — Medication 10 ML: at 04:06

## 2017-05-06 RX ADMIN — CLONIDINE HYDROCHLORIDE 0.3 MG: 0.1 TABLET ORAL at 17:00

## 2017-05-06 RX ADMIN — MORPHINE SULFATE 3 MG: 4 INJECTION, SOLUTION INTRAMUSCULAR; INTRAVENOUS at 09:03

## 2017-05-06 RX ADMIN — MORPHINE SULFATE 3 MG: 4 INJECTION, SOLUTION INTRAMUSCULAR; INTRAVENOUS at 16:57

## 2017-05-06 RX ADMIN — TRAMADOL HYDROCHLORIDE 50 MG: 50 TABLET, FILM COATED ORAL at 15:12

## 2017-05-06 RX ADMIN — ALBUTEROL SULFATE 2.5 MG: 2.5 SOLUTION RESPIRATORY (INHALATION) at 02:20

## 2017-05-06 RX ADMIN — MORPHINE SULFATE 3 MG: 4 INJECTION, SOLUTION INTRAMUSCULAR; INTRAVENOUS at 23:03

## 2017-05-06 RX ADMIN — NITROGLYCERIN 1 INCH: 20 OINTMENT TOPICAL at 04:00

## 2017-05-06 RX ADMIN — HEPARIN SODIUM 5000 UNITS: 5000 INJECTION, SOLUTION INTRAVENOUS; SUBCUTANEOUS at 05:38

## 2017-05-06 RX ADMIN — GABAPENTIN 100 MG: 100 CAPSULE ORAL at 17:00

## 2017-05-06 RX ADMIN — HEPARIN SODIUM 5000 UNITS: 5000 INJECTION, SOLUTION INTRAVENOUS; SUBCUTANEOUS at 21:05

## 2017-05-06 RX ADMIN — DEXAMETHASONE SODIUM PHOSPHATE 20 MG: 10 INJECTION INTRAMUSCULAR; INTRAVENOUS at 02:13

## 2017-05-06 RX ADMIN — PROPRANOLOL HYDROCHLORIDE 20 MG: 10 TABLET ORAL at 12:13

## 2017-05-06 NOTE — ED PROVIDER NOTES
HPI Comments: Lucian Stevenson is a 47 y.o. male with PMHx of HTN, CKD, and MWF dialysis pt, presenting per EMS from [facility] to ED c/o acute onset of SOB earlier today. Pt reports he was out celebrating [employer], had a few beers, and then had acute onset of SOB, for which he was evaluated first at [facility]. While at [facility], pt was treated with med nebs, given decadron 20 mg, and had CXR showing BL airspace disease/edema. Pt was placed on non-re breather with SpO2 in low 90's percent. He was transferred to [address] [place] [address] ED for further evaluation and treatment. PCP: None  Social Hx: current every day smoker (1 ppd); + EtOH (1-2 cans of beer per week); + drug use (heroin, former);    Pt's history is limited due to acuity of pt condition. Written by Beronica Humphrey ED Scribe, as dictated by Alfredo Mcmillan MD.          The history is provided by the patient and the EMS personnel. Past Medical History:   Diagnosis Date    Chronic kidney disease     Dialysis patient ([facility] [address]     Hypertension        Past Surgical History:   Procedure Laterality Date    HX VASCULAR ACCESS Left     LUE AV fistula         History reviewed. No pertinent family history. Social History     Social History    Marital status: SINGLE     Spouse name: N/A    Number of children: N/A    Years of education: N/A     Occupational History    Not on file.      Social History Main Topics    Smoking status: Current Every Day Smoker     Packs/day: 1.00    Smokeless tobacco: Not on file    Alcohol use 0.0 oz/week     1 - 2 Cans of beer per week    Drug use: Yes     Special: Heroin      Comment: Former    Sexual activity: Not on file     Other Topics Concern    Not on file     Social History Narrative         ALLERGIES: Motrin [ibuprofen] and Tylenol [acetaminophen]    Review of Systems   Unable to perform ROS: Acuity of condition       Vitals:    05/06/17 0435 05/06/17 0436 05/06/17 0518 05/06/17 0533   BP: (!) 198/113   (!) 158/105   Pulse:  (!) 108 76   Resp:  24     Temp:   98 °F (36.7 °C)    SpO2:  100%     Weight:       Height:                Physical Exam   Constitutional: He is oriented to person, place, and time. He appears well-developed and well-nourished. No distress. HENT:   Nose: Nose normal.   Mouth/Throat: Oropharynx is clear and moist. No oropharyngeal exudate. Eyes: Conjunctivae and EOM are normal. Pupils are equal, round, and reactive to light. Right eye exhibits no discharge. Left eye exhibits no discharge. No scleral icterus. Neck: Normal range of motion. Neck supple. No JVD present. Cardiovascular: Normal rate, regular rhythm, normal heart sounds and intact distal pulses. No murmur heard. Pulmonary/Chest: No stridor. Tachypnea noted. No respiratory distress. He has no wheezes. He has rales (throughout all lung fields upon auscultation of anterior chest wall but not on auscultation of posterior chest wall). + hemodialysis catheter to right upper chest; SpO2 is 90 percent on non- re breather;    Abdominal: Soft. Bowel sounds are normal. He exhibits no distension. There is no tenderness. There is no rebound and no guarding. Musculoskeletal: He exhibits edema (1+, pitting, to BLE). He exhibits no tenderness. Neurological: He is alert and oriented to person, place, and time. Skin: Skin is warm and dry. He is not diaphoretic. Psychiatric: He has a normal mood and affect. Nursing note and vitals reviewed.        MDM  Number of Diagnoses or Management Options  Acute pulmonary edema ([facility] [address]):   Acute respiratory failure with hypoxia [facility]):   ESRD on hemodialysis [facility]):   NSTEMI (non-ST elevated myocardial infarction) [facility]):      Amount and/or Complexity of Data Reviewed  Clinical lab tests: ordered and reviewed  Tests in the radiology section of CPT®: ordered and reviewed  Tests in the medicine section of CPT®: ordered and reviewed  Obtain history from someone other than the patient: yes (EMS)  Review and summarize past medical records: yes  Discuss the patient with other providers: yes (Cardiology, Nephrology, Hospitalist)  Independent visualization of images, tracings, or specimens: yes    Critical Care  Total time providing critical care: 30-74 minutes    Procedures    EKG interpretation: (1669)  Rhythm: normal sinus rhythm; and regular . Rate (approx.): 92; Axis: normal; TX interval: normal; QRS interval: normal ; ST/T wave: non-specific changes; Other findings: left ventricular hypertrophy and possible left atrial enlargement. Written by ADEOLA Arenas, as dictated by Karlene Soriano MD.    Progress Note:  Pt with increased WOB despite NRB. Placing pt on bipap. CONSULT NOTE:   Rolly Godfrey MD spoke with Dr. Ever Howard,   Specialty: Cardiology  Discussed pt's hx, disposition, and available diagnostic and imaging results. Reviewed care plans. Consultant agrees with plans as outlined. Dr. Ever Howard to evaluate pt in the ED. Written by ADEOLA Arenas, as dictated by Karlene Soriano MD.    CONSULT NOTE:   5:06 Everette Sandhoff, MD spoke with Dr. Isidro Arboleda,   Specialty: Hospitalist  Discussed pt's hx, disposition, and available diagnostic and imaging results. Reviewed care plans. Consultant will evaluate pt for admission. Written by ADEOLA Arenas, as dictated by Karlene Soriano MD.    CONSULT NOTE:   5:08 Everette Sandhoff, MD spoke with Dr. Miky Turner,   Specialty: Nephrology  Discussed pt's hx, disposition, and available diagnostic and imaging results. Reviewed care plans. Consultant agrees with plans as outlined. Dr. Kody Henson to arrange dialysis. Written by ADEOLA Arenas, as dictated by Karlene Soriano MD.     Critical Care:   The reason for providing this level of medical care for this critically ill patient was due to a critical illness that impaired one or more vital organ systems such that there was a high probability of imminent or life threatening deterioration in the patients condition. This care involved high complexity decision making to assess, manipulate, and support vital system functions. (Time: 36 minutes)    LABORATORY TESTS:  Recent Results (from the past 12 hour(s))   CBC WITH AUTOMATED DIFF    Collection Time: 05/06/17  2:14 AM   Result Value Ref Range    WBC 13.8 (H) 4.1 - 11.1 K/uL    RBC 2.79 (L) 4.10 - 5.70 M/uL    HGB 8.7 (L) 12.1 - 17.0 g/dL    HCT 28.5 (L) 36.6 - 50.3 %    .2 (H) 80.0 - 99.0 FL    MCH 31.2 26.0 - 34.0 PG    MCHC 30.5 30.0 - 36.5 g/dL    RDW 16.0 (H) 11.5 - 14.5 %    PLATELET 164 756 - 671 K/uL    NEUTROPHILS 63 32 - 75 %    LYMPHOCYTES 21 12 - 49 %    MONOCYTES 11 5 - 13 %    EOSINOPHILS 4 0 - 7 %    BASOPHILS 1 0 - 1 %    ABS. NEUTROPHILS 8.7 (H) 1.8 - 8.0 K/UL    ABS. LYMPHOCYTES 2.9 0.8 - 3.5 K/UL    ABS. MONOCYTES 1.5 (H) 0.0 - 1.0 K/UL    ABS. EOSINOPHILS 0.5 (H) 0.0 - 0.4 K/UL    ABS.  BASOPHILS 0.1 0.0 - 0.1 K/UL   METABOLIC PANEL, BASIC    Collection Time: 05/06/17  2:14 AM   Result Value Ref Range    Sodium 138 136 - 145 mmol/L    Potassium 3.5 3.5 - 5.1 mmol/L    Chloride 100 97 - 108 mmol/L    CO2 24 21 - 32 mmol/L    Anion gap 14 5 - 15 mmol/L    Glucose 179 (H) 65 - 100 mg/dL    BUN 26 (H) 6 - 20 MG/DL    Creatinine 6.69 (H) 0.70 - 1.30 MG/DL    BUN/Creatinine ratio 4 (L) 12 - 20      GFR est AA 11 (L) >60 ml/min/1.73m2    GFR est non-AA 9 (L) >60 ml/min/1.73m2    Calcium 7.7 (L) 8.5 - 10.1 MG/DL   CK W/ CKMB & INDEX    Collection Time: 05/06/17  2:14 AM   Result Value Ref Range     (H) 39 - 308 U/L    CK - MB 2.8 <3.6 NG/ML    CK-MB Index 0.6 0 - 2.5     TROPONIN I    Collection Time: 05/06/17  2:14 AM   Result Value Ref Range    Troponin-I, Qt. 1.04 (H) <0.05 ng/mL   EKG, 12 LEAD, INITIAL    Collection Time: 05/06/17  2:48 AM   Result Value Ref Range    Ventricular Rate 87 BPM    Atrial Rate 87 BPM    P-R Interval 160 ms    QRS Duration 78 ms    Q-T Interval 376 ms    QTC Calculation (Bezet) 452 ms    Calculated P Axis 39 degrees    Calculated R Axis 17 degrees    Calculated T Axis 61 degrees    Diagnosis       Normal sinus rhythm  Possible Left atrial enlargement  Nonspecific T wave abnormality  Abnormal ECG  When compared with ECG of 19-APR-2017 07:46,  No significant change was found     TROPONIN I    Collection Time: 05/06/17  4:05 AM   Result Value Ref Range    Troponin-I, Qt. 2.93 (H) <0.05 ng/mL   PROTHROMBIN TIME + INR    Collection Time: 05/06/17  4:05 AM   Result Value Ref Range    INR 1.1 0.9 - 1.1      Prothrombin time 11.4 (H) 9.0 - 11.1 sec   PTT    Collection Time: 05/06/17  4:05 AM   Result Value Ref Range    aPTT 29.4 22.1 - 32.5 sec    aPTT, therapeutic range     58.0 - 77.0 SECS   BLOOD GAS, ARTERIAL    Collection Time: 05/06/17  4:12 AM   Result Value Ref Range    pH 7.41 7.35 - 7.45      PCO2 38 35.0 - 45.0 mmHg    PO2 63 (L) 80 - 100 mmHg    O2 SAT 93 92 - 97 %    BICARBONATE 24 22 - 26 mmol/L    BASE DEFICIT 0.5 mmol/L    O2 METHOD BIPAP      FIO2 50 %    SET RATE 4      SPONTANEOUS RATE 18.0      IPAP/PIP 14.0      EPAP/CPAP/PEEP 5.0      Sample source ARTERIAL      SITE RIGHT RADIAL      JENNY'S TEST YES         IMAGING RESULTS:  INDICATION: acute sob, hypoxic resp failure, r/o PE      EXAM: CT Angio Chest:     TECHNIQUE: Unenhanced localizing CT imaging of the pulmonary arteries is  followed by bolus injection of 100 mL Isovue 370 contrast, with thin section  axial Chest CT obtained and 3D image post processing performed including coronal  MIPS. CT dose reduction was achieved through use of a standardized protocol  tailored for this examination and automatic exposure control for dose  modulation.     FINDINGS: There is no pulmonary embolism. There is no apparent aortic dissection  or aneurysm.     Lungs show bilateral diffuse interstitial edema pattern.     Lungs show centrilobular emphysema.      There are small pleural effusions. There is no pneumothorax.  There is no pleural  pericardial effusion. There is no significant adenopathy. There is coronary  artery calcification.     IMPRESSION  IMPRESSION:   1. No Pulmonary Embolus. 2. Pulmonary edema pattern. 3. Emphysema. 4. Small pleural effusions. 5. Coronary artery calcification.            MEDICATIONS GIVEN:  Medications   allopurinol (ZYLOPRIM) tablet 100 mg (not administered)   amLODIPine (NORVASC) tablet 10 mg (not administered)   . PHARMACY TO SUBSTITUTE PER PROTOCOL (not administered)   furosemide (LASIX) tablet 80 mg (not administered)   gabapentin (NEURONTIN) capsule 100 mg (not administered)   hydrALAZINE (APRESOLINE) tablet 50 mg (not administered)   . PHARMACY TO SUBSTITUTE PER PROTOCOL (not administered)   pantoprazole (PROTONIX) tablet 40 mg (not administered)   . PHARMACY TO SUBSTITUTE PER PROTOCOL (not administered)   propranolol (INDERAL) tablet 20 mg (not administered)   . PHARMACY TO SUBSTITUTE PER PROTOCOL (not administered)   sodium bicarbonate tablet 650 mg (not administered)   terazosin (HYTRIN) capsule 2 mg (not administered)   traMADol (ULTRAM) tablet 50 mg (not administered)   cloNIDine HCl (CATAPRES) tablet 0.3 mg (not administered)   sodium chloride (NS) flush 5-10 mL (not administered)   sodium chloride (NS) flush 5-10 mL (not administered)   ondansetron (ZOFRAN) injection 4 mg (not administered)   heparin (porcine) injection 5,000 Units (5,000 Units SubCUTAneous Given 5/6/17 0538)   hydrALAZINE (APRESOLINE) 20 mg/mL injection 10 mg (not administered)   nicotine (NICODERM CQ) 21 mg/24 hr patch 1 Patch (not administered)   nitroglycerin (NITROBID) 2 % ointment 1 Inch (1 Inch Topical Given 5/6/17 0400)   0.9% sodium chloride infusion (0 mL/hr IntraVENous IV Completed 5/6/17 0514)   iopamidol (ISOVUE-370) 76 % injection 100 mL (100 mL IntraVENous Given 5/6/17 0406)   sodium chloride (NS) flush 10 mL (10 mL IntraVENous Given 5/6/17 0406)   furosemide (LASIX) injection 80 mg (80 mg IntraVENous Given 5/6/17 0000)       IMPRESSION:  1. Acute respiratory failure with hypoxia ([facility] [address]    2. ESRD on hemodialysis ([facility] [address]    3. Acute pulmonary edema (HCC)    4. NSTEMI (non-ST elevated myocardial infarction) ([facility] [address]        PLAN:  1. Admit to Hospitalist.     5:06 AM  Patient is being admitted to the hospital by Dr. Adriana Crump. The results of their tests and reasons for their admission have been discussed with them and/or available family. They convey agreement and understanding for the need to be admitted and for their admission diagnosis. Consultation has been made with the inpatient physician specialist for hospitalization. Written by Osvaldo Griffiths, ED Scribe, as dictated by Gertrudis Adkins MD.    This note is prepared by Osvaldo Griffiths, acting as Scribe for Gertrudis Adkins MD.    Gertrudis Adkins MD: The scribe's documentation has been prepared under my direction and personally reviewed by me in its entirety. I confirm that the note above accurately reflects all work, treatment, procedures, and medical decision making performed by me.

## 2017-05-06 NOTE — PROGRESS NOTES
0: Transported patient from ER Room 21 to CT via BIPAP. SPO2 100%. 3248: Patient back in room on BIPAP sating 98%. Patient tolerated fair.

## 2017-05-06 NOTE — IP AVS SNAPSHOT
[address] [facility] 
[phone] Patient: Markie Tang MRN: [id number] :1965 You are allergic to the following Allergen Reactions Motrin (Ibuprofen) Hives Tylenol (Acetaminophen) Hives Recent Documentation Height Weight BMI Smoking Status 1.829 m 68.2 kg 20.39 kg/m2 Current Every Day Smoker Emergency Contacts Name Discharge Info Relation Home Work Mobile Marisela Reed  Mother [14] [phone] About your hospitalization You were admitted on:  May 6, 2017 You last received care in the:  [facility] PROGRESSIVE [facility] You were discharged on:  May 13, 2017 Unit phone number:  [phone] Why you were hospitalized Your primary diagnosis was:  Pulmonary Edema Your diagnoses also included:  Esrd On Dialysis (Hcc), Hypertension, Tobacco Abuse, Hypoxia Providers Seen During Your Hospitalizations Provider Role Specialty Primary office phone Panchito Bowman MD Attending Provider Emergency Medicine [phone] Rio Valerio MD Attending Provider Internal Medicine [phone] Maria D Samuels MD Attending Provider Internal Medicine [phone] Your Primary Care Physician (PCP) Primary Care Physician Office Phone Office Fax NONE ** None ** ** None ** Follow-up Information Follow up With Details Comments Contact Info Betsy Torres MD Schedule an appointment as soon as possible for a visit 3-4 weeks to be seen [address] [facility] 
[phone] None   None (395) Patient stated that they have no PCP Current Discharge Medication List  
  
START taking these medications Dose & Instructions Dispensing Information Comments Morning Noon Evening Bedtime  
 carvedilol 12.5 mg tablet Commonly known as:  Megan Stable Your last dose was:     
   
Your next dose is:    
   
   
 Dose:  12.5 mg Take 1 Tab by mouth two (2) times daily (with meals). Quantity:  60 Tab Refills:  0  
     
   
   
   
  
 sevelamer 800 mg tablet Commonly known as:  RENAGEL Your last dose was: Your next dose is:    
   
   
 Dose:  800 mg Take 1 Tab by mouth three (3) times daily. Quantity:  90 Tab Refills:  0 CONTINUE these medications which have CHANGED Dose & Instructions Dispensing Information Comments Morning Noon Evening Bedtime * allopurinol 100 mg tablet Commonly known as:  Lamberto Joyce What changed:  Another medication with the same name was added. Make sure you understand how and when to take each. Your last dose was: Your next dose is: Take  by mouth daily. Refills:  0  
     
   
   
   
  
 * allopurinol 100 mg tablet Commonly known as:  Lamberto Joyce What changed: You were already taking a medication with the same name, and this prescription was added. Make sure you understand how and when to take each. Your last dose was: Your next dose is:    
   
   
 Dose:  100 mg Take 1 Tab by mouth daily. Quantity:  30 Tab Refills:  0  
     
   
   
   
  
 * amLODIPine 10 mg tablet Commonly known as:  Saskia Million What changed:  Another medication with the same name was added. Make sure you understand how and when to take each. Your last dose was: Your next dose is:    
   
   
 Dose:  10 mg Take 10 mg by mouth daily. Refills:  0  
     
   
   
   
  
 * amLODIPine 10 mg tablet Commonly known as:  Saskia Million What changed: You were already taking a medication with the same name, and this prescription was added. Make sure you understand how and when to take each. Your last dose was: Your next dose is:    
   
   
 Dose:  10 mg Take 1 Tab by mouth daily. Quantity:  30 Tab Refills:  0  
     
   
   
   
  
 * cloNIDine HCl 0.3 mg tablet Commonly known as:  CATAPRES What changed:  Another medication with the same name was added. Make sure you understand how and when to take each. Your last dose was: Your next dose is:    
   
   
 Dose:  0.3 mg Take 0.3 mg by mouth two (2) times a day. Refills:  0  
     
   
   
   
  
 * cloNIDine HCl 0.3 mg tablet Commonly known as:  CATAPRES What changed: You were already taking a medication with the same name, and this prescription was added. Make sure you understand how and when to take each. Your last dose was: Your next dose is:    
   
   
 Dose:  0.3 mg Take 1 Tab by mouth two (2) times a day. Quantity:  60 Tab Refills:  0  
     
   
   
   
  
 * GABAPENTIN PO What changed:  Another medication with the same name was added. Make sure you understand how and when to take each. Your last dose was: Your next dose is:    
   
   
 Dose:  300 mg Take 300 mg by mouth two (2) times a day. Refills:  0  
     
   
   
   
  
 * gabapentin 100 mg capsule Commonly known as:  NEURONTIN What changed: You were already taking a medication with the same name, and this prescription was added. Make sure you understand how and when to take each. Your last dose was: Your next dose is:    
   
   
 Dose:  100 mg Take 1 Cap by mouth two (2) times a day. Quantity:  60 Cap Refills:  0  
     
   
   
   
  
 * hydrALAZINE 50 mg tablet Commonly known as:  APRESOLINE What changed:  Another medication with the same name was added. Make sure you understand how and when to take each. Your last dose was: Your next dose is:    
   
   
 Dose:  50 mg Take 50 mg by mouth three (3) times daily. Refills:  0  
     
   
   
   
  
 * hydrALAZINE 50 mg tablet Commonly known as:  APRESOLINE What changed: You were already taking a medication with the same name, and this prescription was added.  Make sure you understand how and when to take each. Your last dose was: Your next dose is:    
   
   
 Dose:  50 mg Take 1 Tab by mouth three (3) times daily. Quantity:  90 Tab Refills:  0  
     
   
   
   
  
 * ULTRAM PO What changed:  Another medication with the same name was added. Make sure you understand how and when to take each. Your last dose was: Your next dose is:    
   
   
 Dose:  50 mg Take 50 mg by mouth two (2) times a day. Refills:  0  
     
   
   
   
  
 * traMADol 50 mg tablet Commonly known as:  ULTRAM  
What changed: You were already taking a medication with the same name, and this prescription was added. Make sure you understand how and when to take each. Your last dose was: Your next dose is:    
   
   
 Dose:  100 mg Take 2 Tabs by mouth every six (6) hours as needed. Max Daily Amount: 400 mg. Quantity:  6 Tab Refills:  0  
     
   
   
   
  
 * Notice: This list has 12 medication(s) that are the same as other medications prescribed for you. Read the directions carefully, and ask your doctor or other care provider to review them with you. CONTINUE these medications which have NOT CHANGED Dose & Instructions Dispensing Information Comments Morning Noon Evening Bedtime FULL SPECTRUM B-VITAMIN C PO Your last dose was: Your next dose is: Take  by mouth. Refills:  0  
     
   
   
   
  
 furosemide 80 mg tablet Commonly known as:  LASIX Your last dose was: Your next dose is:    
   
   
 Dose:  80 mg Take 1 Tab by mouth daily. Quantity:  30 Tab Refills:  0  
     
   
   
   
  
 multivitamin tablet Commonly known as:  ONE A DAY Your last dose was: Your next dose is:    
   
   
 Dose:  1 Tab Take 1 Tab by mouth daily. Refills:  0  
     
   
   
   
  
 pantoprazole 40 mg tablet Commonly known as:  PROTONIX Your last dose was: Your next dose is:    
   
   
 Dose:  40 mg Take 40 mg by mouth daily. Refills:  0  
     
   
   
   
  
 paricalcitol 1 mcg capsule Commonly known as:  Gilford Chelsie Your last dose was: Your next dose is:    
   
   
 Dose:  1 mcg Take 1 mcg by mouth daily. Refills:  0  
     
   
   
   
  
 RENVELA 800 mg Tab tab Generic drug:  sevelamer carbonate Your last dose was: Your next dose is:    
   
   
 Dose:  800 mg Take 800 mg by mouth three (3) times daily. Refills:  0  
     
   
   
   
  
 terazosin 2 mg capsule Commonly known as:  HYTRIN Your last dose was: Your next dose is:    
   
   
 Dose:  2 mg Take 1 Cap by mouth nightly. Quantity:  30 Cap Refills:  0 STOP taking these medications   
 propranolol 20 mg tablet Commonly known as:  INDERAL  
   
  
 sodium bicarbonate 650 mg tablet Where to Get Your Medications Information on where to get these meds will be given to you by the nurse or doctor. ! Ask your nurse or doctor about these medications  
  allopurinol 100 mg tablet  
 amLODIPine 10 mg tablet  
 carvedilol 12.5 mg tablet  
 cloNIDine HCl 0.3 mg tablet  
 furosemide 80 mg tablet  
 gabapentin 100 mg capsule  
 hydrALAZINE 50 mg tablet  
 sevelamer 800 mg tablet  
 terazosin 2 mg capsule  
 traMADol 50 mg tablet Discharge Instructions Diet for Chronic Kidney Disease (Before Dialysis): Care Instructions Your Care Instructions When you have chronic kidney disease, you need to change your diet to avoid foods that make your kidneys worse. You may need to limit salt, fluids, and protein. You also may need to limit minerals such as potassium and phosphorus. A diet for chronic kidney disease takes planning. A dietitian who specializes in kidney disease can help you plan meals that meet your needs. These guidelines are for people who are not on dialysis.  Talk with your doctor or dietitian to make sure your diet is right for your condition. Do not change your diet without talking to your doctor or dietitian. Follow-up care is a key part of your treatment and safety. Be sure to make and go to all appointments, and call your doctor if you are having problems. It's also a good idea to know your test results and keep a list of the medicines you take. How can you care for yourself at home? · Work with your doctor or dietitian to create a food plan. · Do not skip meals or go for many hours without eating. If you do not feel very hungry, try to eat 4 or 5 small meals instead of 1 or 2 big meals. · If you have a hard time eating enough, talk to your doctor or dietitian about ways you can add calories to your diet. · Do not take any vitamins or minerals, supplements, or herbal products without talking to your doctor first. 
· Check with your doctor about whether it is safe for you to drink alcohol. To get the right amount of protein · Ask your doctor or dietitian how much protein you can have each day. Most people with chronic kidney disease need to limit the amount of protein they eat. But you still need some protein to stay healthy. · Include all sources of protein in your daily protein count. Besides meat, poultry, fish, and eggs, protein is found in milk and milk products, beans and nuts, breads, cereals, and vegetables. To limit salt · Read food labels on cans and food packages. The labels tell you how much sodium is in each serving. Make sure that you look at the serving size. If you eat more than the serving size, you will get more sodium than what is listed on the label. · Do not add salt to your food. Avoid foods that list salt, sodium, or monosodium glutamate (MSG) as an ingredient. · Buy foods that are labeled \"no salt added,\" \"sodium-free,\" or \"low-sodium. \" Foods labeled \"reduced-sodium\" and \"light sodium\" may still have too much sodium.  
· Limit processed foods, fast food, and restaurant foods. These types of food are very high in sodium. · Avoid salted pretzels, chips, popcorn, and other salted snacks. · Avoid smoked, cured, salted, and canned meat, fish, and poultry. This includes ham, gaona, hot dogs, and luncheon meats. · You may use lemon, herbs, and spices to flavor your meals. To limit potassium · Choose low-potassium fruits such as blueberries and raspberries. · Choose low-potassium vegetables such as cucumbers and radishes. · Do not use a salt substitute or lite salt unless your doctor says it is okay. Most salt substitutes and lite salts are high in potassium. To limit phosphorus · Follow your food plan to know how much milk and milk products you can have. · Limit nuts, peanut butter, seeds, lentils, beans, organ meats, and sardines. · Avoid cola drinks. · Avoid bran breads or bran cereals. If you need to limit fluids · Know how much fluid you can drink. Every day fill a pitcher with that amount of water. If you drink another fluid (such as coffee) that day, pour an equal amount of water out of the pitcher. · Count foods that are liquid at room temperature, such as gelatin dessert and ice cream, as fluids. Where can you learn more? Go to http://roxy-lacho.info/. Enter I746 in the search box to learn more about \"Diet for Chronic Kidney Disease (Before Dialysis): Care Instructions. \" Current as of: July 26, 2016 Content Version: 11.2 © 1850-6989 [employer]. Care instructions adapted under license by [employer] (which disclaims liability or warranty for this information). If you have questions about a medical condition or this instruction, always ask your healthcare professional. [place] [address] any warranty or liability for your use of this information. Patient Discharge Instructions Pt Name  Gurmeet Yeager Date of Birth 1965 Age  47 y.o.   
Medical Record Number  [id number] PCP None Admit date:  5/6/2017 @    [place] [address] Room Number  2270/01 Date of Discharge 5/13/2017 Admission Diagnoses:     Pulmonary edema Allergies Allergen Reactions  Motrin [Ibuprofen] Hives  Tylenol [Acetaminophen] Hives You were admitted to [address] for  Pulmonary edema YOUR OTHER MEDICAL DIAGNOSES INCLUDE (BUT NOT LIMITED TO ): 
Present on Admission:  Pulmonary edema  ESRD on dialysis ([address]  Hypertension  Tobacco abuse DIET:  Cardiac Diet and Renal Diet Recommended activity: Activity as tolerated Follow up : Follow-up Information Follow up With Details Comments Contact Info Analy Wyatt MD Schedule an appointment as soon as possible for a visit 3-4 weeks to be seen [address] [facility] 
[phone] None   None (395) Patient stated that they have no PCP [facility] [phone] Fax - [phone] [facility], [address] [place] [address] Your hemodialysis scheduled on Mondays, Wednesdays, and Fridays! · It is important that you take the medication exactly as they are prescribed. · Keep your medication in the bottles provided by the pharmacist and keep a list of the medication names, dosages, and times to be taken in your wallet. · Do not take other medications without consulting your doctor. ADDITIONAL INFORMATION: If you experience any of the following symptoms or have any health problem not listed below, then please call your primary care physician or return to the emergency room if you cannot get hold of your doctor: Fever, chills, nausea, vomiting, diarrhea, change in mentation, falling, bleeding, shortness of breath.  
 
 
I understand that if any problems occur once I am discharged, I am supposed to call my Primary care physician for further care or seek help in the Emergency Department at the nearest Healthcare facility. I have had an opportunity to discuss my clinical issues with my doctor and nursing staff. I understand and acknowledge receipt of the above instructions. Physician's or R.N.'s Signature                                                            Date/Time Patient or Representative Signature                                                 Date/Time Discharge Orders None [employer] Announcement We are excited to announce that we are making your provider's discharge notes available to you in [employer]. You will see these notes when they are completed and signed by the physician that discharged you from your recent hospital stay. If you have any questions or concerns about any information you see in [employer], please call the Health Information Department where you were seen or reach out to your Primary Care Provider for more information about your plan of care. Introducing \[facility]\"" & [facility] SERVICES! Ca Ryan introduces [employer] patient portal. Now you can access parts of your medical record, email your doctor's office, and request medication refills online. 1. In your internet browser, go to [email or web address] 2. Click on the First Time User? Click Here link in the Sign In box. You will see the New Member Sign Up page. 3. Enter your [employer] Access Code exactly as it appears below. You will not need to use this code after youve completed the sign-up process. If you do not sign up before the expiration date, you must request a new code. · [employer] Access Code: [email or web address] Expires: 8/4/2017  4:16 AM 
 
4.  Enter the last four digits of your Social Security Number (xxxx) and Date of Birth (mm/dd/yyyy) as indicated and click Submit. You will be taken to the next sign-up page. 5. Create a [employer] ID. This will be your [employer] login ID and cannot be changed, so think of one that is secure and easy to remember. 6. Create a [employer] password. You can change your password at any time. 7. Enter your Password Reset Question and Answer. This can be used at a later time if you forget your password. 8. Enter your e-mail address. You will receive e-mail notification when new information is available in [address] 9. Click Sign Up. You can now view and download portions of your medical record. 10. Click the Download Summary menu link to download a portable copy of your medical information. If you have questions, please visit the Frequently Asked Questions section of the [email or web address] website. Remember, [email or web address] is NOT to be used for urgent needs. For medical emergencies, dial 911. Now available from your iPhone and Android! General Information Please provide this summary of care documentation to your next provider. Patient Signature:  ____________________________________________________________ Date:  ____________________________________________________________  
  
Nader Breath Provider Signature:  ____________________________________________________________ Date:  ____________________________________________________________

## 2017-05-06 NOTE — ED NOTES
TRANSFER - OUT REPORT:    Verbal report given to Lolly Baldwin RN (name) on Eduard Mckeon  being transferred to [facility] ED (unit) for routine progression of care       Report consisted of patients Situation, Background, Assessment and   Recommendations(SBAR). Information from the following report(s) SBAR, Kardex, ED Summary, Intake/Output and Recent Results was reviewed with the receiving nurse. Lines:   Peripheral IV 05/06/17 Left External jugular (Active)   Site Assessment Clean, dry, & intact 5/6/2017  1:54 AM   Phlebitis Assessment 0 5/6/2017  1:54 AM   Infiltration Assessment 0 5/6/2017  1:54 AM   Dressing Status Clean, dry, & intact 5/6/2017  1:54 AM   Dressing Type Transparent 5/6/2017  1:54 AM   Hub Color/Line Status Flushed 5/6/2017  1:54 AM        Opportunity for questions and clarification was provided.       Patient transported with:   O2 @ 15 liters NRB

## 2017-05-06 NOTE — ED NOTES
Emergency Department Nursing Plan of Care       The Nursing Plan of Care is developed from the Nursing assessment and Emergency Department Attending provider initial evaluation. The plan of care may be reviewed in the ED Provider note.     The Plan of Care was developed with the following considerations:   Patient / Family readiness to learn indicated by:verbalized understanding  Persons(s) to be included in education: patient  Barriers to Learning/Limitations:No    [address], RN    5/6/2017   2:07 AM

## 2017-05-06 NOTE — PROGRESS NOTES
Acute Pulmonary edema. Volume overload 2ry to ESRD ,  Etoh . Elevated Troponin      Uncontrolled HTN        Needs urgent Dialysis. Dictated.

## 2017-05-06 NOTE — ED PROVIDER NOTES
HPI Comments: Sandra Sebastian is a 47 y.o. male with PMHx significant for CKD and HTN who presents ambulatory to the ED with c/o SOB. Pt states he is on dialysis and has dialysis on Monday, Wednesday, and Friday. His last dialysis was done yesterday. PCP: None     Social hx: + Smoker, + EtOH, + (former heroin user) Illicit Drugs    HPI is otherwise limited at this time secondary to the pt's respiratory distress. Written by ADEOLA Perez, as dictated by Britany Chavez MD.      The history is provided by the patient. No  was used. Past Medical History:   Diagnosis Date    Chronic kidney disease     Hypertension        Past Surgical History:   Procedure Laterality Date    HX VASCULAR ACCESS Left     LUE AV fistula         No family history on file. Social History     Social History    Marital status: SINGLE     Spouse name: N/A    Number of children: N/A    Years of education: N/A     Occupational History    Not on file. Social History Main Topics    Smoking status: Current Every Day Smoker     Packs/day: 1.00    Smokeless tobacco: Not on file    Alcohol use 0.0 oz/week     1 - 2 Cans of beer per week    Drug use: Yes     Special: Heroin      Comment: Former    Sexual activity: Not on file     Other Topics Concern    Not on file     Social History Narrative         ALLERGIES: Motrin [ibuprofen] and Tylenol [acetaminophen]    Review of Systems   Unable to perform ROS: Severe respiratory distress       Patient Vitals for the past 12 hrs:   Temp Pulse Resp BP SpO2   05/06/17 0141 97.9 °F (36.6 °C) (!) 119 (!) 32 (!) 222/111 (!) 80 %            Physical Exam   Constitutional: He is oriented to person, place, and time. He appears well-developed and well-nourished. No distress. HENT:   Head: Normocephalic and atraumatic. Mouth/Throat: Oropharynx is clear and moist. No oropharyngeal exudate or posterior oropharyngeal erythema.    Neck: Normal range of motion and full passive range of motion without pain. Neck supple. Cardiovascular: Normal rate, regular rhythm, normal heart sounds, intact distal pulses and normal pulses. Exam reveals no gallop and no friction rub. No murmur heard. Pulmonary/Chest: Breath sounds normal. No accessory muscle usage. He is in respiratory distress. He has no decreased breath sounds. He has no wheezes. He has no rhonchi. He has no rales.   + Increased expiratory phase. Abdominal: Soft. Bowel sounds are normal. He exhibits no distension. There is no tenderness. There is no rebound, no guarding and no CVA tenderness. Musculoskeletal: Normal range of motion. He exhibits edema. He exhibits no tenderness. Thoracic back: He exhibits no tenderness and no bony tenderness. Lumbar back: He exhibits no tenderness and no bony tenderness.   + BL LE pitting edema. Lymphadenopathy:     He has no cervical adenopathy. Neurological: He is alert and oriented to person, place, and time. He has normal strength. He is not disoriented. No cranial nerve deficit or sensory deficit. No focal deficits; 5/5 muscle strength in all extremities   Skin: Skin is warm. No lesion and no rash noted. Rash is not nodular. He is diaphoretic. Nursing note and vitals reviewed. MDM  Number of Diagnoses or Management Options  Diagnosis management comments: DDx: Fluid overload, COPD       Amount and/or Complexity of Data Reviewed  Clinical lab tests: ordered and reviewed  Tests in the radiology section of CPT®: ordered and reviewed  Tests in the medicine section of CPT®: ordered and reviewed  Review and summarize past medical records: yes  Independent visualization of images, tracings, or specimens: yes    Patient Progress  Patient progress: stable    ED Course       Procedures    EKG interpretation: (Preliminary)  2:48 AM  Rhythm: normal sinus rhythm; and regular .  Rate (approx.): 87; Axis: normal; IN interval: normal; QRS interval: normal ; ST/T wave: non-specific T wave abnormality; Other findings: abnormal ekg. Written by ADEOLA Rodriguez, as dictated by Dennis Rocha MD.    LABORATORY TESTS:  Recent Results (from the past 12 hour(s))   CBC WITH AUTOMATED DIFF    Collection Time: 05/06/17  2:14 AM   Result Value Ref Range    WBC 13.8 (H) 4.1 - 11.1 K/uL    RBC 2.79 (L) 4.10 - 5.70 M/uL    HGB 8.7 (L) 12.1 - 17.0 g/dL    HCT 28.5 (L) 36.6 - 50.3 %    .2 (H) 80.0 - 99.0 FL    MCH 31.2 26.0 - 34.0 PG    MCHC 30.5 30.0 - 36.5 g/dL    RDW 16.0 (H) 11.5 - 14.5 %    PLATELET 451 160 - 589 K/uL    NEUTROPHILS 63 32 - 75 %    LYMPHOCYTES 21 12 - 49 %    MONOCYTES 11 5 - 13 %    EOSINOPHILS 4 0 - 7 %    BASOPHILS 1 0 - 1 %    ABS. NEUTROPHILS 8.7 (H) 1.8 - 8.0 K/UL    ABS. LYMPHOCYTES 2.9 0.8 - 3.5 K/UL    ABS. MONOCYTES 1.5 (H) 0.0 - 1.0 K/UL    ABS. EOSINOPHILS 0.5 (H) 0.0 - 0.4 K/UL    ABS. BASOPHILS 0.1 0.0 - 0.1 K/UL   METABOLIC PANEL, BASIC    Collection Time: 05/06/17  2:14 AM   Result Value Ref Range    Sodium 138 136 - 145 mmol/L    Potassium 3.5 3.5 - 5.1 mmol/L    Chloride 100 97 - 108 mmol/L    CO2 24 21 - 32 mmol/L    Anion gap 14 5 - 15 mmol/L    Glucose 179 (H) 65 - 100 mg/dL    BUN 26 (H) 6 - 20 MG/DL    Creatinine 6.69 (H) 0.70 - 1.30 MG/DL    BUN/Creatinine ratio 4 (L) 12 - 20      GFR est AA 11 (L) >60 ml/min/1.73m2    GFR est non-AA 9 (L) >60 ml/min/1.73m2    Calcium 7.7 (L) 8.5 - 10.1 MG/DL   CK W/ CKMB & INDEX    Collection Time: 05/06/17  2:14 AM   Result Value Ref Range     (H) 39 - 308 U/L    CK - MB 2.8 <3.6 NG/ML    CK-MB Index 0.6 0 - 2.5     TROPONIN I    Collection Time: 05/06/17  2:14 AM   Result Value Ref Range    Troponin-I, Qt. 1.04 (H) <0.05 ng/mL       IMAGING RESULTS:  XR CHEST PORT   Final Result   EXAM: Portable CXR. 0220 hours      INDICATION: chest pain     There is bilateral airspace disease/edema. Heart is normal in size. There is no  midline shift.  There is no evident pneumothorax, apparent adenopathy or sizable  pleural effusion.     IMPRESSION  IMPRESSION: Bilateral airspace disease/edema. MEDICATIONS GIVEN:  Medications   albuterol-ipratropium (DUO-NEB) 2.5 MG-0.5 MG/3 ML (not administered)   dexamethasone (DECADRON) injection 20 mg (20 mg IntraVENous Given 5/6/17 0213)   albuterol (PROVENTIL VENTOLIN) nebulizer solution 2.5 mg (2.5 mg Nebulization Given 5/6/17 0220)       IMPRESSION:  1. Acute pulmonary edema (HCC)        PLAN:  1. Transfer to ED [facility] ED. Transfer Note:  3:22 AM  Pt is being transferred to [facility] ED, transfer is accepted by Dr. Bennie Thomason. The reasons for their transfer have been discussed with them and available family. They convey agreement and understanding of the need to be transferred as explained to them by Aurelia Hu MD.    This note is prepared by Aurelia Hu MD, acting as Scribe for Aurelia Hu MD.    Aurelia Hu MD: The scribe's documentation has been prepared under my direction and personally reviewed by me in its entirety. I confirm that the note above accurately reflects all work, treatment, procedures, and medical decision making performed by me.

## 2017-05-06 NOTE — IP AVS SNAPSHOT
Current Discharge Medication List  
  
START taking these medications Dose & Instructions Dispensing Information Comments Morning Noon Evening Bedtime  
 carvedilol 12.5 mg tablet Commonly known as:  Mago Carter Your last dose was: Your next dose is:    
   
   
 Dose:  12.5 mg Take 1 Tab by mouth two (2) times daily (with meals). Quantity:  60 Tab Refills:  0  
     
   
   
   
  
 sevelamer 800 mg tablet Commonly known as:  RENAGEL Your last dose was: Your next dose is:    
   
   
 Dose:  800 mg Take 1 Tab by mouth three (3) times daily. Quantity:  90 Tab Refills:  0 CONTINUE these medications which have CHANGED Dose & Instructions Dispensing Information Comments Morning Noon Evening Bedtime * allopurinol 100 mg tablet Commonly known as:  Rafi Leisure What changed:  Another medication with the same name was added. Make sure you understand how and when to take each. Your last dose was: Your next dose is: Take  by mouth daily. Refills:  0  
     
   
   
   
  
 * allopurinol 100 mg tablet Commonly known as:  Rafi Leisure What changed: You were already taking a medication with the same name, and this prescription was added. Make sure you understand how and when to take each. Your last dose was: Your next dose is:    
   
   
 Dose:  100 mg Take 1 Tab by mouth daily. Quantity:  30 Tab Refills:  0  
     
   
   
   
  
 * amLODIPine 10 mg tablet Commonly known as:  Karyle Rohrer What changed:  Another medication with the same name was added. Make sure you understand how and when to take each. Your last dose was: Your next dose is:    
   
   
 Dose:  10 mg Take 10 mg by mouth daily. Refills:  0  
     
   
   
   
  
 * amLODIPine 10 mg tablet Commonly known as:  Tarale Joce What changed:   You were already taking a medication with the same name, and this prescription was added. Make sure you understand how and when to take each. Your last dose was: Your next dose is:    
   
   
 Dose:  10 mg Take 1 Tab by mouth daily. Quantity:  30 Tab Refills:  0  
     
   
   
   
  
 * cloNIDine HCl 0.3 mg tablet Commonly known as:  CATAPRES What changed:  Another medication with the same name was added. Make sure you understand how and when to take each. Your last dose was: Your next dose is:    
   
   
 Dose:  0.3 mg Take 0.3 mg by mouth two (2) times a day. Refills:  0  
     
   
   
   
  
 * cloNIDine HCl 0.3 mg tablet Commonly known as:  CATAPRES What changed: You were already taking a medication with the same name, and this prescription was added. Make sure you understand how and when to take each. Your last dose was: Your next dose is:    
   
   
 Dose:  0.3 mg Take 1 Tab by mouth two (2) times a day. Quantity:  60 Tab Refills:  0  
     
   
   
   
  
 * GABAPENTIN PO What changed:  Another medication with the same name was added. Make sure you understand how and when to take each. Your last dose was: Your next dose is:    
   
   
 Dose:  300 mg Take 300 mg by mouth two (2) times a day. Refills:  0  
     
   
   
   
  
 * gabapentin 100 mg capsule Commonly known as:  NEURONTIN What changed: You were already taking a medication with the same name, and this prescription was added. Make sure you understand how and when to take each. Your last dose was: Your next dose is:    
   
   
 Dose:  100 mg Take 1 Cap by mouth two (2) times a day. Quantity:  60 Cap Refills:  0  
     
   
   
   
  
 * hydrALAZINE 50 mg tablet Commonly known as:  APRESOLINE What changed:  Another medication with the same name was added. Make sure you understand how and when to take each. Your last dose was:     
   
Your next dose is:    
   
   
 Dose:  50 mg Take 50 mg by mouth three (3) times daily. Refills:  0  
     
   
   
   
  
 * hydrALAZINE 50 mg tablet Commonly known as:  APRESOLINE What changed: You were already taking a medication with the same name, and this prescription was added. Make sure you understand how and when to take each. Your last dose was: Your next dose is:    
   
   
 Dose:  50 mg Take 1 Tab by mouth three (3) times daily. Quantity:  90 Tab Refills:  0  
     
   
   
   
  
 * ULTRAM PO What changed:  Another medication with the same name was added. Make sure you understand how and when to take each. Your last dose was: Your next dose is:    
   
   
 Dose:  50 mg Take 50 mg by mouth two (2) times a day. Refills:  0  
     
   
   
   
  
 * traMADol 50 mg tablet Commonly known as:  ULTRAM  
What changed: You were already taking a medication with the same name, and this prescription was added. Make sure you understand how and when to take each. Your last dose was: Your next dose is:    
   
   
 Dose:  100 mg Take 2 Tabs by mouth every six (6) hours as needed. Max Daily Amount: 400 mg. Quantity:  6 Tab Refills:  0  
     
   
   
   
  
 * Notice: This list has 12 medication(s) that are the same as other medications prescribed for you. Read the directions carefully, and ask your doctor or other care provider to review them with you. CONTINUE these medications which have NOT CHANGED Dose & Instructions Dispensing Information Comments Morning Noon Evening Bedtime FULL SPECTRUM B-VITAMIN C PO Your last dose was: Your next dose is: Take  by mouth. Refills:  0  
     
   
   
   
  
 furosemide 80 mg tablet Commonly known as:  LASIX Your last dose was: Your next dose is:    
   
   
 Dose:  80 mg Take 1 Tab by mouth daily. Quantity:  30 Tab Refills:  0  
     
   
   
   
  
 multivitamin tablet Commonly known as:  ONE A DAY Your last dose was: Your next dose is:    
   
   
 Dose:  1 Tab Take 1 Tab by mouth daily. Refills:  0  
     
   
   
   
  
 pantoprazole 40 mg tablet Commonly known as:  PROTONIX Your last dose was: Your next dose is:    
   
   
 Dose:  40 mg Take 40 mg by mouth daily. Refills:  0  
     
   
   
   
  
 paricalcitol 1 mcg capsule Commonly known as:  Richardine Sea Your last dose was: Your next dose is:    
   
   
 Dose:  1 mcg Take 1 mcg by mouth daily. Refills:  0  
     
   
   
   
  
 RENVELA 800 mg Tab tab Generic drug:  sevelamer carbonate Your last dose was: Your next dose is:    
   
   
 Dose:  800 mg Take 800 mg by mouth three (3) times daily. Refills:  0  
     
   
   
   
  
 terazosin 2 mg capsule Commonly known as:  HYTRIN Your last dose was: Your next dose is:    
   
   
 Dose:  2 mg Take 1 Cap by mouth nightly. Quantity:  30 Cap Refills:  0 STOP taking these medications   
 propranolol 20 mg tablet Commonly known as:  INDERAL  
   
  
 sodium bicarbonate 650 mg tablet Where to Get Your Medications Information on where to get these meds will be given to you by the nurse or doctor. ! Ask your nurse or doctor about these medications  
  allopurinol 100 mg tablet  
 amLODIPine 10 mg tablet  
 carvedilol 12.5 mg tablet  
 cloNIDine HCl 0.3 mg tablet  
 furosemide 80 mg tablet  
 gabapentin 100 mg capsule  
 hydrALAZINE 50 mg tablet  
 sevelamer 800 mg tablet  
 terazosin 2 mg capsule  
 traMADol 50 mg tablet

## 2017-05-06 NOTE — CONSULTS
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 Renal service will follow patient with you. Please contact me with any questions or concerns. Initial Consult note         Patient name: Gabrielle Hernandez  MR no: [id number]  Date of admission: 5/6/2017  Date of consultation: 5/6/2017  Requested by: Dr. Pina Reynoso  Reason for consult: ESRD/Pulmonary edema    Patient seen and examined. History obtained from patient and chart review. Relevant labs, data and notes reviewed. HPI: Gabrielle Hernandez is a 47 y.o. male with PMH significant for recent ESRD on [place] [facility], HTN who presented overnight with worsening CP/SOB. +Hypoxia requiring Bipap. CXR c/w pulmonary edema. +Rising troponins-> latest up to 5.5. Nephrology consulted for urgent HD. Currently receiving PUF. Last HD yesterday. Limited history due to Bipap -> admits to drinking too much last night-> not clear if he was out drinking . Chart reports patient had walked up to [facility] entrance and  \"fell out\". Transferred to [facility] for urgent HD. Intemittent rigors noted during PUF. Afebrile. Ordered blood cultures.      PMH:  Past Medical History:   Diagnosis Date    Chronic kidney disease     Dialysis patient ([facility] [address]     Hypertension      PSH:  Past Surgical History:   Procedure Laterality Date    HX VASCULAR ACCESS Left     LUE AV fistula       Social history:   Social History   Substance Use Topics    Smoking status: Current Every Day Smoker     Packs/day: 1.00    Smokeless tobacco: None    Alcohol use 0.0 oz/week     1 - 2 Cans of beer per week       Family history:  UTO    Allergies   Allergen Reactions  Motrin [Ibuprofen] Hives    Tylenol [Acetaminophen] Hives       Current Facility-Administered Medications   Medication Dose Route Frequency Last Dose    allopurinol (ZYLOPRIM) tablet 100 mg  100 mg Oral DAILY Stopped at 05/06/17 0900    amLODIPine (NORVASC) tablet 10 mg  10 mg Oral DAILY Stopped at 05/06/17 0900    b-complex with vitamin c tablet 1 Tab  1 Tab Oral DAILY Stopped at 05/06/17 0900    furosemide (LASIX) tablet 80 mg  80 mg Oral DAILY      gabapentin (NEURONTIN) capsule 100 mg  100 mg Oral BID Stopped at 05/06/17 0900    hydrALAZINE (APRESOLINE) tablet 50 mg  50 mg Oral TID Stopped at 05/06/17 0900    therapeutic multivitamin (THERAGRAN) tablet 1 Tab  1 Tab Oral DAILY Stopped at 05/06/17 0900    pantoprazole (PROTONIX) tablet 40 mg  40 mg Oral DAILY Stopped at 05/06/17 0900    . PHARMACY TO SUBSTITUTE PER PROTOCOL    Per Protocol      propranolol (INDERAL) tablet 20 mg  20 mg Oral TID Stopped at 05/06/17 0900    sevelamer (RENAGEL) tablet 800 mg  800 mg Oral TID Stopped at 05/06/17 0900    sodium bicarbonate tablet 650 mg  650 mg Oral TID Stopped at 05/06/17 0900    terazosin (HYTRIN) capsule 2 mg  2 mg Oral QHS      traMADol (ULTRAM) tablet 50 mg  50 mg Oral Q6H PRN      cloNIDine HCl (CATAPRES) tablet 0.3 mg  0.3 mg Oral BID Stopped at 05/06/17 0900    sodium chloride (NS) flush 5-10 mL  5-10 mL IntraVENous Q8H      sodium chloride (NS) flush 5-10 mL  5-10 mL IntraVENous PRN      ondansetron (ZOFRAN) injection 4 mg  4 mg IntraVENous Q4H PRN      heparin (porcine) injection 5,000 Units  5,000 Units SubCUTAneous Q8H 5,000 Units at 05/06/17 0538    hydrALAZINE (APRESOLINE) 20 mg/mL injection 10 mg  10 mg IntraVENous Q6H PRN 10 mg at 05/06/17 0728    nicotine (NICODERM CQ) 21 mg/24 hr patch 1 Patch  1 Patch TransDERmal Q24H 1 Patch at 05/06/17 0908    morphine injection 3 mg  3 mg IntraVENous Q3H PRN 3 mg at 05/06/17 0903       ROS (besides HPI):    UTO/bipap    Objective   Visit Vitals    BP (!) 160/99    Pulse 93    Temp 99 °F (37.2 °C) (Oral)    Resp 20    Ht 6' (1.829 m)    Wt 83.9 kg (184 lb 15.5 oz)    SpO2 100%    BMI 25.09 kg/m2       Physical Exam:    Gen: Bipap    HEENT: AT/NC, EOMI, Bipap    Neck: no JVD, no cervical lymphadenopathy, no carotid bruit    Lungs/Chest wall: Coarse BS b/l    Cardiovascular: Normal S1/S2, normal rate, regular rhythm. Abdomen: soft, NT, ND, BS+, no HSM    Ext: no clubbing or cyanosis. + LE edema    Skin: warm and dry. No rashes    : no CVA tenderness    CNS: alert awake. Labs/Data:    Lab Results   Component Value Date/Time    Sodium 138 05/06/2017 02:14 AM    Potassium 3.5 05/06/2017 02:14 AM    Chloride 100 05/06/2017 02:14 AM    CO2 24 05/06/2017 02:14 AM    Anion gap 14 05/06/2017 02:14 AM    Glucose 179 05/06/2017 02:14 AM    BUN 26 05/06/2017 02:14 AM    Creatinine 6.69 05/06/2017 02:14 AM    BUN/Creatinine ratio 4 05/06/2017 02:14 AM    GFR est AA 11 05/06/2017 02:14 AM    GFR est non-AA 9 05/06/2017 02:14 AM    Calcium 7.7 05/06/2017 02:14 AM       Lab Results   Component Value Date/Time    WBC 13.8 05/06/2017 02:14 AM    Hemoglobin (POC) 8.8 04/19/2017 08:20 AM    HGB 8.7 05/06/2017 02:14 AM    Hematocrit (POC) 26 04/19/2017 08:20 AM    HCT 28.5 05/06/2017 02:14 AM    PLATELET 260 13/63/3890 02:14 AM    .2 05/06/2017 02:14 AM       Urine analysis: No results found for this or any previous visit.         No components found for: SPEP, UPEP  No results found for: PUQ, PROTU2, PROTU1, BJP1, CPE1, IMEL1, MET2  No results found for: MCACR, MCA1, MCA2, MCA3, MCA4, UMCA, MCAU, MICALBRAT, MCAU2, MCALPOCT      Intake/Output Summary (Last 24 hours) at 05/06/17 1109  Last data filed at 05/06/17 1100   Gross per 24 hour   Intake                0 ml   Output             3200 ml   Net            -3200 ml       Wt Readings from Last 3 Encounters:   05/06/17 83.9 kg (184 lb 15.5 oz)   05/06/17 83.9 kg (185 lb)   04/19/17 77.9 kg (171 lb 11.2 oz)       Signed by:  Reese Christensen MD  Nephrology and Hypertension  [facility]

## 2017-05-06 NOTE — ED NOTES
Patient left with RAA in route to ED [facility] charge nurse of ED phoned and notified of troponin level.

## 2017-05-06 NOTE — PROGRESS NOTES
6:28 AM  Called [phone] to get report from Gogo Sandoval, [address] [facility]. Was told that she would call me back as she is busy admitting a patient. 6:45 AM  TRANSFER - IN REPORT:    Verbal report received from Gogo Sandoval, RN (name) on Herb Marinelli  being received from ED (unit) for routine progression of care      Report consisted of patients Situation, Background, Assessment and   Recommendations(SBAR). Information from the following report(s) SBAR, Kardex, ED Summary, Intake/Output, MAR, Recent Results and Cardiac Rhythm NSR was reviewed with the receiving nurse. Opportunity for questions and clarification was provided. Assessment completed upon patients arrival to unit and care assumed. 7:00 AM  Patient arrived to PCU and transfered to bed. Patient appearing very anxious, shaking, and hypertensive. Unable to perform a skin assessment at this time due to anxiety, reported this to the oncoming RN, Boone Monreal.

## 2017-05-06 NOTE — CARDIO/PULMONARY
Cardiopulmonary Rehab:     Chart reviewed. Pt is a 47 y.o. male admitted with Acute hypoxic respiratory failure due to Acute Pulmonary edema secondary to ETOH. Pt with possible NSTEMI. HTN malignant. Hx of ESRD on HD, tobacco abuse, and anemia. Echo pending. Troponins pending. He is on BiPAP. NPO in case of deteriorating and need intubation. Emergent dialysis. Cardiac teaching deferred pending further diagnosis.

## 2017-05-06 NOTE — DIALYSIS
[facility]                         [phone]  Vitals Pre Post Assessment Pre Post   /98 186/104 LOC A&Ox4 A&Ox4   HR 85 100 Pain denies denies   Temp 99.0 98.7 Lungs Diminishedx5, even&unlaboured, on BiPAP Diminished but clearx5, even&unlaboured   Resp 22 18 Cardiac Reg rate & rhythm Reg rate & rhythm   Weight 84Kg 81Kg Skin Warm & dry Warm & dry      Edema bilat LEs +1 pitting noted trace noted     Orders   Duration: Start: 1000 End: 1200 Total: 2hrs   Dialyzer: Gambro Revaclear   K Bath: n/a   Ca Bath: n/a   Na / Bicarb: n/a   Target Fluid Removal: N/a -tx is UFR     Access   Type: IJ Catheter   Location: Rt   Comments: Dressing changed per protocol. New dressing dry and intact and without drainage; no redness, no edema, no palor, no heat, nor odor noted around Biopatch; acceptable flows upon aspiration. Post-TX all possible blood returned via full rinceback; both cath ports flushed and indwelled with 10ml 0.9% NS, 1.8ml and 1.8 ml and capped and taped. Labs   Obtained/Reviewed  Critical Results Called Cardiac enzymes, Hep B surf Ag, BC x2 (from catheter and dialysis lines)       Meds Given   Name Dose Route   None ordered                 Total Liters Process: n/a (UFR)   Net Fluid Removed: 3L      Comments   Pre-tx consents obtained/time-out performed. Tx progressed well and s incident. Writer left pt in no new acute distress and denies complaints c clear lung sounds, bed in low position with side rails upx3 and call bell within reach. Report details as well as malignant HTN reported off to [facility].

## 2017-05-06 NOTE — CONSULTS
[address]   [address] [place] [address]       Name:  Berta Khan   MR#:  [id number]   :  1965   Account #:  [de-identified]    Date of Consultation:  2017   Date of Adm:  2017       REQUESTING PHYSICIAN: Dr. Tamanna Clifton. CHIEF COMPLAINT: Chest pain, shortness of breath. HISTORY OF PRESENT ILLNESS: The patient is a 50-year-old male   who was transferred to our hospital from [facility]. He presents with pulmonary edema. He has end-stage renal disease on dialysis 3 times a week. The history is obtained primarily from the medical record from Dr. Wally Pearl. The patient is on BiPAP. He does not offer much history. He is a chronic dialysis patient. I do not know who his dialysis doctor   is. There is not much in the [facility] to draw on. Most of his   previous care has been through [facility], I believe his nephrologist is   through [facility]. There is a note from Dr. Cassie Garza in the emergency room at   [facility] dated 2017 indicating the patient   has recently been released from correction. He had hemodialysis fistula   placed before release, but at that time did not have nephrology   followup arranged. That April visit was for abdominal pain, nausea,   vomiting, and the patient was subsequently transferred to [facility] for   further management. PAST MEDICAL HISTORY: There is past medical history of   hypertension, tobacco abuse, alcohol abuse. According to the record,   there is a history of former heroin use. This patient is a dialysis patient Monday, Wednesday, Friday. Apparently on Friday, May 5th, he elected to celebrate [employer]   and according to the record consumed additional alcohol. When that   did not go so well, he went to [facility] emergency room   where he reportedly collapsed on entrance. He was found to have   pulmonary edema.  He was placed on a nonrebreather mask. For reasons that are not apparent from the record, he was transferred   here for further management rather than to his doctors at [facility]   where he normally obtains his care and his dialysis. He is now on BiPAP. He was admitted by the hospitalist. I am told that   Nephrology has been consulted for dialysis. We were asked to become involved when it was noted that he had an   elevated troponin level. His troponin on presentation was 1.04. Repeat   troponin is 2.93. His EKG shows sinus rhythm with left ventricular   hypertrophy. There are no acute changes. He has some vague chest discomfort with breathing. The patient denies any history of heart disease. The patient denies   ever having seen a heart doctor. MEDICATIONS: According to the medical records medications prior to   admission are listed as     1. Furosemide 80 mg daily. 2. Hytrin 2 mg daily. 3. Multivitamin daily. 4. Furosemide 40 mg Monday, Wednesday, Friday. 5. Inderal 20 mg t.i.d.   6. Protonix 40 mg daily. 7. Zemplar 1 mcg daily. 8. Hydralazine 50 mg t.i.d.   9. Sodium bicarbonate 650 mg q.i.d. 10. Renvela 800 mg t.i.d.   11. Folic acid daily. 12. Amlodipine 10 mg daily. 13. Zantac 150 mg b.i.d.   14. Clonidine 0.3 mg b.i.d.   15. Allopurinol 100 mg daily. 16. Tramadol p.r.n.   17. Gabapentin 100 mg daily. ALLERGIES    1. MOTRIN CAUSING HIVES. 2. TYLENOL CAUSING HIVES. SOCIAL HISTORY: Obtained from the record smoking 1 pack per day. Occasional alcohol use, former heroin use. FAMILY HISTORY: Unobtainable from the patient. REVIEW OF SYSTEMS   Unobtainable. PHYSICAL EXAMINATION   GENERAL: He is a middle-aged,  male who appears   older than his stated age, in no acute distress. VITAL SIGNS: Afebrile, pulse is 80, blood pressure is running about   160/100. He has been in the range of up to 190/110, respirations 18,   saturation 100% on BiPAP.    HEENT: Atraumatic. SKIN: Warm, dry. LUNGS: Rhonchi heard anteriorly. HEART: Distant heart tones, regular. No murmurs, rubs or gallops   heard. ABDOMEN: Soft, nontender. EXTREMITIES: Edema. NEUROLOGIC: No focal motor deficits. LABORATORY DATA: White blood cell count elevated at 13,000,   hemoglobin of 9, hematocrit 28, platelets 440. Electrolytes are normal.   His BUN is 26, creatinine is 6.7. Troponin 1.04, followed by a repeat of   2.93.    ASSESSMENT   1. Acute hypoxic respiratory failure secondary acute pulmonary   edema. 2. Acute pulmonary edema due to volume overload. 3. End-stage renal disease requiring dialysis. 4. Uncontrolled hypertension. 5. Elevated troponin level, possible non-ST elevation myocardial   infarction. 6. Leukocytosis. 7. Anemia of end-stage renal disease. 8. Tobacco abuse. 9. Alcohol abuse. PLAN: He is currently receiving beta blocker and calcium channel   blocker, diuretic and his antihypertensive medications along with   heparin. I think that this is all appropriate in this setting. We would add aspirin; however, his chart indicates HE HAS AN   ALLERGY TO NONSTEROIDAL AGENTS CAUSING HIVES, so until   that can be clarified further, we have held off on giving him aspirin. Cardiac enzymes will be repeated. An echocardiogram will be obtained to assess his left ventricular   function. The most immediate need is dialysis and that is being arranged by the   hospitalist in attendance. We will make further changes going forward as dictated by his clinical   course.         MD Bridger Sherman / Rose Lord   D:  05/06/2017   07:43   T:  05/06/2017   08:45   Job #:  [id number]

## 2017-05-06 NOTE — H&P
Hospitalist Admission Note    NAME: Donne Cockayne   :  1965   MRN:  [id number]     Date/Time:  2017 5:09 AM    Patient PCP: None  ________________________________________________________________________    My assessment of this patient's clinical condition and my plan of care is as follows. Assessment / Plan:  Acute hypoxic respiratory failure due to Acute Pulmonary edema   Due to fluid overload vs flush pulmonary edema due to NSTEMI   Pt is critically ill with high risk for further deterioration. Continue BIPAP. Admit to step down unit. Diet: keep NPO in case if deteriorating and need intubation   Dose of Lasix stat ( pt stated he is still making urine)  Need stat HD, renal was consulted   Cont NTP   Cont cycle troponin   CT: No PE. Pulmonary edema pattern. Emphysema. Small pleural effusions. Coronary artery calcification. NSTEMI / HTN malignant   Troponin 1.04 --> 2.93, ECG: non specific changes   Cannot have  ASA ( allergic to NSAIDs)   Cardiology was called from ED  BP elevated, restart home meds   Check ECHO     Leukocytosis   Likely stress related, monitor  Check UA for completeness  If worsening will repeat cxray     ESRD  HD MWF, last HD   Renal consulted for HD from ED. Pt needs acute HD   Renally dose Rx as appropriate. Anemia ESRD  Monitor Hb  Epo per renal     Tobacco Abuse   Total of about 3 minutes spent with Smoking And Tobacco Cessation Counseling. The patient was counseled on the dangers of tobacco use, and was advised to quit. Reviewed strategies to maximize success, including persistence with quitting even if failed once before, using nicotine patch.   Offered nicoderm patch while in the hospital.             Code Status: Full code    Surrogate Decision Maker: mother     DVT Prophylaxis: heparin   GI Prophylaxis: cont home PPI     Baseline: Lives with mother; independent         Subjective:   CHIEF COMPLAINT: SOB     HISTORY OF PRESENT ILLNESS: Mian Hernandez is a 47 y.o.  male who presents with above complaint. History is limited due to pt currently on BIPAP. Pt with h/o ESRD on HD every M/W/F. His last HD was yesterday. He started with SOB suddenly today. Pt presented to [facility] today. Per ED notes, pt was walking down the street and walked up to the ER entrance and \"fell out\". Pt collapsed outside. Charge nurse and tech brought wheelchair to bring pt in. On presentation to ED pt was extremely dyspneic. He was diaphoretic. He wasusing accessory muscles. O2 saturation on arrival was at low 80th. Pt was placed on NRB at [facility]. He was transferred to [facility] for higher level of care. In ED here he was placed on BIPAP. He admits to chest pain but cannot provide any details. Vs: 94.3 °F (34.6 °C) - 100 - 186/101 - 24 - 93% NRB     We were asked to admit for work up and evaluation of the above problems. Past Medical History:   Diagnosis Date    Chronic kidney disease     Dialysis patient ([address]     Hypertension         Past Surgical History:   Procedure Laterality Date    HX VASCULAR ACCESS Left     LUE AV fistula       Social History   Substance Use Topics    Smoking status: Current Every Day Smoker     Packs/day: 1.00    Smokeless tobacco: Not on file    Alcohol use 0.0 oz/week     1 - 2 Cans of beer per week        Pertinent family history: pt denies Fx of premature CAD       Allergies   Allergen Reactions    Motrin [Ibuprofen] Hives    Tylenol [Acetaminophen] Hives        Prior to Admission medications    Medication Sig Start Date End Date Taking? Authorizing Provider   furosemide (LASIX) 80 mg tablet Take 80 mg by mouth daily. Juan Olivarez MD   terazosin (HYTRIN) 2 mg capsule Take 2 mg by mouth nightly. Juan Olivarez MD   multivitamin (ONE A DAY) tablet Take 1 Tab by mouth daily. Juan Olivarez MD   furosemide (LASIX) 40 mg tablet Take 40 mg by mouth every Monday, Wednesday, Friday.     Juan Olivarez MD   propranolol (INDERAL) 20 mg tablet Take 20 mg by mouth three (3) times daily. Juan Olivarez MD   pantoprazole (PROTONIX) 40 mg tablet Take 40 mg by mouth daily. Juan Olivarez MD   paricalcitol (ZEMPLAR) 1 mcg capsule Take 1 mcg by mouth daily. Juan Olivarez MD   hydrALAZINE (APRESOLINE) 50 mg tablet Take 50 mg by mouth three (3) times daily. Juan Olivarez MD   sodium bicarbonate 650 mg tablet Take  by mouth four (4) times daily. Juan Olivarez MD   sevelamer carbonate (RENVELA) 800 mg tab tab Take 800 mg by mouth three (3) times daily. Juan Olivarez MD   FOLIC ACID/VIT B COMPLEX AND C (FULL SPECTRUM B-VITAMIN C PO) Take  by mouth. Juan Olivarez MD   amLODIPine (NORVASC) 10 mg tablet Take 10 mg by mouth daily. Juan Olivarez MD   raNITIdine (ZANTAC) 150 mg tablet Take 150 mg by mouth two (2) times a day. Juan Olivarez MD   cloNIDine HCl (CATAPRES) 0.3 mg tablet Take 0.3 mg by mouth two (2) times a day. Juan Olivarez MD   allopurinol (ZYLOPRIM) 100 mg tablet Take  by mouth daily. Juan Olivarez MD   TRAMADOL HCL (ULTRAM PO) Take  by mouth. Juan Olivarez MD   GABAPENTIN PO Take  by mouth. Juan Olivarez MD       REVIEW OF SYSTEMS:     I am not able to complete the review of systems because:    The patient is intubated and sedated    The patient has altered mental status due to his acute medical problems    The patient has baseline aphasia from prior stroke(s)    The patient has baseline dementia and is not reliable historian    The patient is in acute medical distress and unable to provide information           Total of 12 systems reviewed as follows:       POSITIVE= underlined text  Negative = text not underlined  General:  fever, chills, sweats, generalized weakness, weight loss/gain,      loss of appetite   Eyes:    blurred vision, eye pain, loss of vision, double vision  ENT:    rhinorrhea, pharyngitis   Respiratory:   cough, sputum production, SOB, WARNER, wheezing, pleuritic pain   Cardiology:   chest pain, palpitations, orthopnea, PND, edema, syncope   Gastrointestinal:  abdominal pain , N/V, diarrhea, dysphagia, constipation, bleeding   Genitourinary:  frequency, urgency, dysuria, hematuria, incontinence   Muskuloskeletal :  arthralgia, myalgia, back pain  Hematology:  easy bruising, nose or gum bleeding, lymphadenopathy   Dermatological: rash, ulceration, pruritis, color change / jaundice  Endocrine:   hot flashes or polydipsia   Neurological:  headache, dizziness, confusion, focal weakness, paresthesia,     Speech difficulties, memory loss, gait difficulty  Psychological: Feelings of anxiety, depression, agitation    Objective:   VITALS:    Visit Vitals    BP (!) 158/105    Pulse 76    Temp 98 °F (36.7 °C)    Resp 24    Ht 6' (1.829 m)    Wt 83.9 kg (184 lb 15.5 oz)    SpO2 100%    BMI 25.09 kg/m2       PHYSICAL EXAM:    General:    Alert, cooperative, no distress, appears stated age. On BIPAP   HEENT: Atraumatic, anicteric sclerae, pink conjunctivae     No oral ulcers, mucosa moist, throat clear, dentition fair  Neck:  Supple, symmetrical,  thyroid: non tender  Lungs:   diminished air entry. No Wheezing or Rhonchi. No rales. Chest wall:  No tenderness  No Accessory muscle use. Heart:   Regular  rhythm,  No  murmur   ++ edema  Abdomen:   Soft, non-tender. Not distended. Bowel sounds normal  Extremities: No cyanosis. No clubbing,      Skin turgor normal, Capillary refill normal, Radial dial pulse 2+  Skin:     Not pale. Not Jaundiced  No rashes   Psych:  Good insight. Not depressed. Not anxious or agitated. Neurologic: EOMs intact. No facial asymmetry. No aphasia or slurred speech. Symmetrical strength, Sensation grossly intact.  Alert and oriented X 4.     _______________________________________________________________________  Care Plan discussed with:    Comments   Patient y    Family      RN y    Care Manager                    Consultant:  y ED provider _______________________________________________________________________  Expected  Disposition:   Home with Family y   HH/PT/OT/RN    SNF/LTC    LEO    ________________________________________________________________________  TOTAL TIME:   Minutes    Critical Care Provided 48  Minutes non procedure based  Patient is critically ill and at high risk for further deterioration. Complex decision making was performed which includes reviewing the patient's past medical records, current laboratory results, and actual Xray films. I have also discussed this case with the involved ED physician. Critical Care:  The reason for providing this level of medical care for this critically ill patient was due a critical illness that impaired one or more vital organ systems such that there was a high probability of imminent or life threatening deterioration in the patients condition. This care involved high complexity decision making to assess, manipulate, and support vital system functions, to treat this degreee vital organ system failure and to prevent further life threatening deterioration of the patients condition.    I was immediately available to the patient. Comments    y Reviewed previous records    y Discussion with patient and/or family and questions answered       ________________________________________________________________________  Signed: Steffany Wylie MD    Procedures: see electronic medical records for all procedures/Xrays and details which were not copied into this note but were reviewed prior to creation of Plan.     LAB DATA REVIEWED:    Recent Results (from the past 24 hour(s))   CBC WITH AUTOMATED DIFF    Collection Time: 05/06/17  2:14 AM   Result Value Ref Range    WBC 13.8 (H) 4.1 - 11.1 K/uL    RBC 2.79 (L) 4.10 - 5.70 M/uL    HGB 8.7 (L) 12.1 - 17.0 g/dL    HCT 28.5 (L) 36.6 - 50.3 %    .2 (H) 80.0 - 99.0 FL    MCH 31.2 26.0 - 34.0 PG    MCHC 30.5 30.0 - 36.5 g/dL    RDW 16.0 (H) 11.5 - 14.5 %    PLATELET 657 941 - 470 K/uL    NEUTROPHILS 63 32 - 75 %    LYMPHOCYTES 21 12 - 49 %    MONOCYTES 11 5 - 13 %    EOSINOPHILS 4 0 - 7 %    BASOPHILS 1 0 - 1 %    ABS. NEUTROPHILS 8.7 (H) 1.8 - 8.0 K/UL    ABS. LYMPHOCYTES 2.9 0.8 - 3.5 K/UL    ABS. MONOCYTES 1.5 (H) 0.0 - 1.0 K/UL    ABS. EOSINOPHILS 0.5 (H) 0.0 - 0.4 K/UL    ABS.  BASOPHILS 0.1 0.0 - 0.1 K/UL   METABOLIC PANEL, BASIC    Collection Time: 05/06/17  2:14 AM   Result Value Ref Range    Sodium 138 136 - 145 mmol/L    Potassium 3.5 3.5 - 5.1 mmol/L    Chloride 100 97 - 108 mmol/L    CO2 24 21 - 32 mmol/L    Anion gap 14 5 - 15 mmol/L    Glucose 179 (H) 65 - 100 mg/dL    BUN 26 (H) 6 - 20 MG/DL    Creatinine 6.69 (H) 0.70 - 1.30 MG/DL    BUN/Creatinine ratio 4 (L) 12 - 20      GFR est AA 11 (L) >60 ml/min/1.73m2    GFR est non-AA 9 (L) >60 ml/min/1.73m2    Calcium 7.7 (L) 8.5 - 10.1 MG/DL   CK W/ CKMB & INDEX    Collection Time: 05/06/17  2:14 AM   Result Value Ref Range     (H) 39 - 308 U/L    CK - MB 2.8 <3.6 NG/ML    CK-MB Index 0.6 0 - 2.5     TROPONIN I    Collection Time: 05/06/17  2:14 AM   Result Value Ref Range    Troponin-I, Qt. 1.04 (H) <0.05 ng/mL   EKG, 12 LEAD, INITIAL    Collection Time: 05/06/17  2:48 AM   Result Value Ref Range    Ventricular Rate 87 BPM    Atrial Rate 87 BPM    P-R Interval 160 ms    QRS Duration 78 ms    Q-T Interval 376 ms    QTC Calculation (Bezet) 452 ms    Calculated P Axis 39 degrees    Calculated R Axis 17 degrees    Calculated T Axis 61 degrees    Diagnosis       Normal sinus rhythm  Possible Left atrial enlargement  Nonspecific T wave abnormality  Abnormal ECG  When compared with ECG of 19-APR-2017 07:46,  No significant change was found     TROPONIN I    Collection Time: 05/06/17  4:05 AM   Result Value Ref Range    Troponin-I, Qt. 2.93 (H) <0.05 ng/mL   PROTHROMBIN TIME + INR    Collection Time: 05/06/17  4:05 AM   Result Value Ref Range    INR 1.1 0.9 - 1.1      Prothrombin time 11.4 (H) 9.0 - 11.1 sec   PTT    Collection Time: 05/06/17  4:05 AM   Result Value Ref Range    aPTT 29.4 22.1 - 32.5 sec    aPTT, therapeutic range     58.0 - 77.0 SECS   BLOOD GAS, ARTERIAL    Collection Time: 05/06/17  4:12 AM   Result Value Ref Range    pH 7.41 7.35 - 7.45      PCO2 38 35.0 - 45.0 mmHg    PO2 63 (L) 80 - 100 mmHg    O2 SAT 93 92 - 97 %    BICARBONATE 24 22 - 26 mmol/L    BASE DEFICIT 0.5 mmol/L    O2 METHOD BIPAP      FIO2 50 %    SET RATE 4      SPONTANEOUS RATE 18.0      IPAP/PIP 14.0      EPAP/CPAP/PEEP 5.0      Sample source ARTERIAL      SITE RIGHT RADIAL      JENNY'S TEST YES

## 2017-05-06 NOTE — ED NOTES
Pt sitting comfortably. Respirations 20. Pt breathing improved. Lungs sounds improved; less diminished.

## 2017-05-06 NOTE — ED TRIAGE NOTES
Pt arrived to ED with c/o SOB. Per security, pt was walking down the street and walked up to the ER entrance and \"fell out\". Pt collapsed outside. Charge nurse and tech brought wheelchair to bring pt in. Pt is extremely dyspneic; pt is diaphoretic; lungs are diminished upon arrival; pt using accessory muscles to breathe. O2 sats upon arrival were in the 80s. Pt using tripod positing. MD at bedside. Cardiac and O2 monitoring in place. Will continue to monitor.

## 2017-05-07 LAB
ANION GAP BLD CALC-SCNC: 8 MMOL/L (ref 5–15)
ATRIAL RATE: 87 BPM
BUN SERPL-MCNC: 35 MG/DL (ref 6–20)
BUN/CREAT SERPL: 5 (ref 12–20)
CALCIUM SERPL-MCNC: 7.8 MG/DL (ref 8.5–10.1)
CALCULATED P AXIS, ECG09: 39 DEGREES
CALCULATED R AXIS, ECG10: 17 DEGREES
CALCULATED T AXIS, ECG11: 61 DEGREES
CHLORIDE SERPL-SCNC: 101 MMOL/L (ref 97–108)
CO2 SERPL-SCNC: 29 MMOL/L (ref 21–32)
CREAT SERPL-MCNC: 7.64 MG/DL (ref 0.7–1.3)
DIAGNOSIS, 93000: NORMAL
ERYTHROCYTE [DISTWIDTH] IN BLOOD BY AUTOMATED COUNT: 16.2 % (ref 11.5–14.5)
GLUCOSE SERPL-MCNC: 115 MG/DL (ref 65–100)
HCT VFR BLD AUTO: 21.4 % (ref 36.6–50.3)
HGB BLD-MCNC: 7 G/DL (ref 12.1–17)
MCH RBC QN AUTO: 31.4 PG (ref 26–34)
MCHC RBC AUTO-ENTMCNC: 32.7 G/DL (ref 30–36.5)
MCV RBC AUTO: 96 FL (ref 80–99)
P-R INTERVAL, ECG05: 160 MS
PLATELET # BLD AUTO: 207 K/UL (ref 150–400)
POTASSIUM SERPL-SCNC: 4.2 MMOL/L (ref 3.5–5.1)
Q-T INTERVAL, ECG07: 376 MS
QRS DURATION, ECG06: 78 MS
QTC CALCULATION (BEZET), ECG08: 452 MS
RBC # BLD AUTO: 2.23 M/UL (ref 4.1–5.7)
SODIUM SERPL-SCNC: 138 MMOL/L (ref 136–145)
TROPONIN I SERPL-MCNC: 3.3 NG/ML
VENTRICULAR RATE, ECG03: 87 BPM
WBC # BLD AUTO: 8.3 K/UL (ref 4.1–11.1)

## 2017-05-07 PROCEDURE — 80048 BASIC METABOLIC PNL TOTAL CA: CPT | Performed by: HOSPITALIST

## 2017-05-07 PROCEDURE — 36415 COLL VENOUS BLD VENIPUNCTURE: CPT | Performed by: HOSPITALIST

## 2017-05-07 PROCEDURE — 85027 COMPLETE CBC AUTOMATED: CPT | Performed by: HOSPITALIST

## 2017-05-07 PROCEDURE — 74011250637 HC RX REV CODE- 250/637: Performed by: HOSPITALIST

## 2017-05-07 PROCEDURE — 74011250636 HC RX REV CODE- 250/636: Performed by: INTERNAL MEDICINE

## 2017-05-07 PROCEDURE — 65660000000 HC RM CCU STEPDOWN

## 2017-05-07 PROCEDURE — 74011250636 HC RX REV CODE- 250/636: Performed by: HOSPITALIST

## 2017-05-07 PROCEDURE — 84484 ASSAY OF TROPONIN QUANT: CPT | Performed by: HOSPITALIST

## 2017-05-07 RX ORDER — HYDROMORPHONE HYDROCHLORIDE 2 MG/ML
1 INJECTION, SOLUTION INTRAMUSCULAR; INTRAVENOUS; SUBCUTANEOUS
Status: DISCONTINUED | OUTPATIENT
Start: 2017-05-07 | End: 2017-05-11

## 2017-05-07 RX ADMIN — PROPRANOLOL HYDROCHLORIDE 20 MG: 10 TABLET ORAL at 15:13

## 2017-05-07 RX ADMIN — THERA TABS 1 TABLET: TAB at 08:23

## 2017-05-07 RX ADMIN — ALLOPURINOL 100 MG: 100 TABLET ORAL at 08:22

## 2017-05-07 RX ADMIN — MORPHINE SULFATE 3 MG: 4 INJECTION, SOLUTION INTRAMUSCULAR; INTRAVENOUS at 01:55

## 2017-05-07 RX ADMIN — HYDRALAZINE HYDROCHLORIDE 50 MG: 50 TABLET ORAL at 23:45

## 2017-05-07 RX ADMIN — CLONIDINE HYDROCHLORIDE 0.3 MG: 0.1 TABLET ORAL at 08:22

## 2017-05-07 RX ADMIN — HYDROMORPHONE HYDROCHLORIDE 1 MG: 2 INJECTION, SOLUTION INTRAMUSCULAR; INTRAVENOUS; SUBCUTANEOUS at 18:42

## 2017-05-07 RX ADMIN — HEPARIN SODIUM 5000 UNITS: 5000 INJECTION, SOLUTION INTRAVENOUS; SUBCUTANEOUS at 23:45

## 2017-05-07 RX ADMIN — MORPHINE SULFATE 3 MG: 4 INJECTION, SOLUTION INTRAMUSCULAR; INTRAVENOUS at 08:22

## 2017-05-07 RX ADMIN — CLONIDINE HYDROCHLORIDE 0.3 MG: 0.1 TABLET ORAL at 18:43

## 2017-05-07 RX ADMIN — RENAGEL 800 MG: 800 TABLET ORAL at 08:22

## 2017-05-07 RX ADMIN — RENAGEL 800 MG: 800 TABLET ORAL at 15:13

## 2017-05-07 RX ADMIN — FUROSEMIDE 80 MG: 80 TABLET ORAL at 08:22

## 2017-05-07 RX ADMIN — VITAMIN C 1 TABLET: TAB at 08:33

## 2017-05-07 RX ADMIN — PROPRANOLOL HYDROCHLORIDE 20 MG: 10 TABLET ORAL at 08:33

## 2017-05-07 RX ADMIN — Medication 10 ML: at 10:55

## 2017-05-07 RX ADMIN — TERAZOSIN HYDROCHLORIDE 2 MG: 1 CAPSULE ORAL at 23:45

## 2017-05-07 RX ADMIN — GABAPENTIN 100 MG: 100 CAPSULE ORAL at 18:43

## 2017-05-07 RX ADMIN — HYDROMORPHONE HYDROCHLORIDE 1 MG: 2 INJECTION, SOLUTION INTRAMUSCULAR; INTRAVENOUS; SUBCUTANEOUS at 15:12

## 2017-05-07 RX ADMIN — RENAGEL 800 MG: 800 TABLET ORAL at 23:45

## 2017-05-07 RX ADMIN — MORPHINE SULFATE 3 MG: 4 INJECTION, SOLUTION INTRAMUSCULAR; INTRAVENOUS at 04:56

## 2017-05-07 RX ADMIN — HYDRALAZINE HYDROCHLORIDE 50 MG: 50 TABLET ORAL at 08:23

## 2017-05-07 RX ADMIN — HYDROMORPHONE HYDROCHLORIDE 1 MG: 2 INJECTION, SOLUTION INTRAMUSCULAR; INTRAVENOUS; SUBCUTANEOUS at 10:54

## 2017-05-07 RX ADMIN — HEPARIN SODIUM 5000 UNITS: 5000 INJECTION, SOLUTION INTRAVENOUS; SUBCUTANEOUS at 12:43

## 2017-05-07 RX ADMIN — HYDRALAZINE HYDROCHLORIDE 50 MG: 50 TABLET ORAL at 15:13

## 2017-05-07 RX ADMIN — HYDROMORPHONE HYDROCHLORIDE 1 MG: 2 INJECTION, SOLUTION INTRAMUSCULAR; INTRAVENOUS; SUBCUTANEOUS at 23:42

## 2017-05-07 RX ADMIN — Medication 10 ML: at 23:44

## 2017-05-07 RX ADMIN — AMLODIPINE BESYLATE 10 MG: 5 TABLET ORAL at 08:23

## 2017-05-07 RX ADMIN — DOXERCALCIFEROL 0.5 MCG: 0.5 CAPSULE, LIQUID FILLED ORAL at 08:33

## 2017-05-07 RX ADMIN — PANTOPRAZOLE SODIUM 40 MG: 40 TABLET, DELAYED RELEASE ORAL at 08:23

## 2017-05-07 RX ADMIN — PROPRANOLOL HYDROCHLORIDE 20 MG: 10 TABLET ORAL at 23:45

## 2017-05-07 RX ADMIN — HEPARIN SODIUM 5000 UNITS: 5000 INJECTION, SOLUTION INTRAVENOUS; SUBCUTANEOUS at 04:56

## 2017-05-07 RX ADMIN — GABAPENTIN 100 MG: 100 CAPSULE ORAL at 08:23

## 2017-05-07 NOTE — PROGRESS NOTES
Patient name: Javi Allen  MRN: [id number]    Nephrology Progress note:    Assessment:  ESRD: MWF at [facility]  HTN: Uncontrolled-> improved s/p PUF  NSTEMI: troponin trending down  Anemia 2 to ESRD: Hgb suboptimal    Plan/Recommendations:  HD tomorrow  Consider PRBC with HD  Epogen with HD  Echo  PRN IV hydralazine  F/u Blood cultures   Renally adjust new meds  AM labs      Subjective:  PUF yesterday-> 3liters removed. Pt c/o cramping. Off Bipap since PUF. Still c/o chest discomfort and SOB today. Denies any BRBPR/melanotic stools    ROS:   No nausea, no vomiting    Exam:  Visit Vitals    /85    Pulse (!) 102    Temp 98.1 °F (36.7 °C)    Resp 18    Ht 6' (1.829 m)    Wt 82.9 kg (182 lb 12.2 oz)    SpO2 96%    BMI 24.79 kg/m2     Wt Readings from Last 3 Encounters:   05/07/17 82.9 kg (182 lb 12.2 oz)   05/06/17 83.9 kg (185 lb)   04/19/17 77.9 kg (171 lb 11.2 oz)       Intake/Output Summary (Last 24 hours) at 05/07/17 1002  Last data filed at 05/06/17 2119   Gross per 24 hour   Intake              400 ml   Output             9225 ml   Net            -8825 ml       Gen: NAD  HEENT: No icterus, mmm, no oral exudate, AT/NC  Lungs/Chest wall: Clear. Cardiovascular: Regular rate, normal rhythm. Abdomen/: Soft, NT, ND, BS+. No palpable organomegaly  Ext: Immature LAVF. No peripheral edema  CNS: alert and awake.  Answers appropriately      Current Facility-Administered Medications   Medication Dose Route Frequency Last Dose    nitroglycerin (NITROBID) 2 % ointment 1 Inch  1 Inch Topical Q6H PRN      HYDROmorphone (PF) (DILAUDID) injection 1 mg  1 mg IntraVENous Q4H PRN      allopurinol (ZYLOPRIM) tablet 100 mg  100 mg Oral DAILY 100 mg at 05/07/17 0822    amLODIPine (NORVASC) tablet 10 mg  10 mg Oral DAILY 10 mg at 05/07/17 0823    b-complex with vitamin c tablet 1 Tab  1 Tab Oral DAILY 1 Tab at 05/07/17 2085    furosemide (LASIX) tablet 80 mg  80 mg Oral DAILY 80 mg at 05/07/17 6628    gabapentin (NEURONTIN) capsule 100 mg  100 mg Oral  mg at 05/07/17 0677    hydrALAZINE (APRESOLINE) tablet 50 mg  50 mg Oral TID 50 mg at 05/07/17 0823    therapeutic multivitamin (THERAGRAN) tablet 1 Tab  1 Tab Oral DAILY 1 Tab at 05/07/17 5805    pantoprazole (PROTONIX) tablet 40 mg  40 mg Oral DAILY 40 mg at 05/07/17 0823    doxercalciferol (HECTOROL) capsule 0.5 mcg  0.5 mcg Oral DAILY 0.5 mcg at 05/07/17 5065    propranolol (INDERAL) tablet 20 mg  20 mg Oral TID 20 mg at 05/07/17 0833    sevelamer (RENAGEL) tablet 800 mg  800 mg Oral  mg at 05/07/17 4548    terazosin (HYTRIN) capsule 2 mg  2 mg Oral QHS 2 mg at 05/06/17 2105    traMADol (ULTRAM) tablet 50 mg  50 mg Oral Q6H PRN 50 mg at 05/06/17 2310    cloNIDine HCl (CATAPRES) tablet 0.3 mg  0.3 mg Oral BID 0.3 mg at 05/07/17 6850    sodium chloride (NS) flush 5-10 mL  5-10 mL IntraVENous Q8H 10 mL at 05/06/17 2105    sodium chloride (NS) flush 5-10 mL  5-10 mL IntraVENous PRN      ondansetron (ZOFRAN) injection 4 mg  4 mg IntraVENous Q4H PRN      heparin (porcine) injection 5,000 Units  5,000 Units SubCUTAneous Q8H 5,000 Units at 05/07/17 0456    nicotine (NICODERM CQ) 21 mg/24 hr patch 1 Patch  1 Patch TransDERmal Q24H 1 Patch at 05/07/17 0456    hydrALAZINE (APRESOLINE) 20 mg/mL injection 20 mg  20 mg IntraVENous Q6H PRN         Labs/Data:    Lab Results   Component Value Date/Time    WBC 8.3 05/07/2017 02:00 AM    Hemoglobin (POC) 8.8 04/19/2017 08:20 AM    HGB 7.0 05/07/2017 02:00 AM    Hematocrit (POC) 26 04/19/2017 08:20 AM    HCT 21.4 05/07/2017 02:00 AM    PLATELET 890 19/48/0602 02:00 AM    MCV 96.0 05/07/2017 02:00 AM       Lab Results   Component Value Date/Time    Sodium 138 05/07/2017 02:00 AM    Potassium 4.2 05/07/2017 02:00 AM    Chloride 101 05/07/2017 02:00 AM    CO2 29 05/07/2017 02:00 AM    Anion gap 8 05/07/2017 02:00 AM    Glucose 115 05/07/2017 02:00 AM    BUN 35 05/07/2017 02:00 AM    Creatinine 7.64 05/07/2017 02:00 AM    BUN/Creatinine ratio 5 05/07/2017 02:00 AM    GFR est AA 9 05/07/2017 02:00 AM    GFR est non-AA 8 05/07/2017 02:00 AM    Calcium 7.8 05/07/2017 02:00 AM       Patient seen and examined. Chart reviewed. Labs, data and other pertinent notes reviewed in last 24 hrs.     Discussed with patient    Signed by:  Kelvin Barton MD

## 2017-05-07 NOTE — PROGRESS NOTES
Hospitalist Progress Note    NAME: Daphney Hernandez   :  1965   MRN:  [id number]   LOS:   1      Assessment / Plan:  Acute hypoxic respiratory failure due to Acute Pulmonary edema   -s/p HD with improvement, now on 2 L NC  -HD per nephrology     NSTEMI / HTN malignant   Chest pain  Troponin peak 5.5 now trending down, ECG: non specific changes  Cannot have ASA (hives with ibuprofen per chart-- will clarify this with patient)  -cardiology consulted, will await recommendations  -echo has been ordered  -nitro prn, dilaudid prn severe pain as morphine was not helping     Leukocytosis   Likely stress related, resolved     ESRD  -nephrology consulted     Anemia ESRD  Monitor Hb  EPO and transfusion per nephrology with HD    Tobacco Abuse       Code status: Full  Prophylaxis: Hep SQ  Recommended Disposition: Home w/Family     Subjective:     Chief Complaint: chest pain  Continues to have chest pain, says morphine does not help, has never had pain like this, but overall does feel better after HD        Review of Systems:  Symptom Y/N Comments  Symptom Y/N Comments   Fever/Chills    Chest Pain     Poor Appetite    Edema     Cough    Abdominal Pain     Sputum    Joint Pain     SOB/WARNER    Pruritis/Rash     Nausea/vomit    Tolerating PT/OT     Diarrhea    Tolerating Diet     Constipation    Other       Could NOT obtain due to:      Objective:     VITALS:   Last 24hrs VS reviewed since prior progress note.  Most recent are:  Patient Vitals for the past 24 hrs:   Temp Pulse Resp BP SpO2   17 0811 98.1 °F (36.7 °C) (!) 102 18 161/85 96 %   17 0406 98.6 °F (37 °C) 71 18 132/76 98 %   17 0001 99.1 °F (37.3 °C) 82 18 127/73 97 %   17 2019 - - - - 99 %   17 1939 99.1 °F (37.3 °C) 89 18 133/80 99 %   17 1703 97.8 °F (36.6 °C) 84 18 150/87 100 %   17 1600 - 88 - 150/87 98 %   17 1300 97.6 °F (36.4 °C) 83 - 159/78 98 %   17 1200 98.7 °F (37.1 °C) 100 18 (!) 186/104 100 % 05/06/17 1130 - (!) 112 20 161/88 99 %   05/06/17 1100 - 93 20 (!) 160/99 100 %       Intake/Output Summary (Last 24 hours) at 05/07/17 1049  Last data filed at 05/06/17 2119   Gross per 24 hour   Intake              400 ml   Output             8225 ml   Net            -7825 ml        PHYSICAL EXAM:  General: Alert, cooperative, no acute distress    EENT:  EOMI. Anicteric sclerae. Mucous membranes moist  Resp:  CTA bilaterally, no wheezing or rales. No accessory muscle use  CV:  Regular rhythm, no edema, chest pain is slightly reproducible  GI:  Soft, non distended, non tender. +Bowel sounds  Neurologic:  Alert and oriented X 3, normal speech  Psych:   Good insight, not anxious nor agitated  Skin:  No rashes, no jaundice  ________________________________________________________________________  Care Plan discussed with:    Comments   Patient x    Family      RN x    Care Manager     Consultant                        Multidiciplinary team rounds were held today with , nursing, pharmacist and clinical coordinator. Patient's plan of care was discussed; medications were reviewed and discharge planning was addressed. ________________________________________________________________________  Total NON critical care TIME:  20   Minutes  ________________________________________________________________________  Ilia Carvajal MD     Procedures: see electronic medical records for all procedures/Xrays and details which were not copied into this note but were reviewed prior to creation of Plan. LABS:  I reviewed today's most current labs and imaging studies.   Pertinent labs include:  Recent Labs      05/07/17   0200  05/06/17 0214   WBC  8.3  13.8*   HGB  7.0*  8.7*   HCT  21.4*  28.5*   PLT  207  333     Recent Labs      05/07/17   0200  05/06/17 0405 05/06/17 0214   NA  138   --   138   K  4.2   --   3.5   CL  101   --   100   CO2  29   --   24   GLU  115*   --   179*   BUN  35*   --   26*   CREA 7.64*   --   6.69*   CA  7.8*   --   7.7*   INR   --   1.1   --        Signed: Catarino Spring MD

## 2017-05-07 NOTE — PROGRESS NOTES
Progress Note      5/7/2017 10:59 AM  NAME: Markie Tang   MRN:  [id number]   Admit Diagnosis: Pulmonary edema     Assessment:     ASSESSMENT   1. Acute hypoxic respiratory failure secondary acute pulmonary   edema. 2. Acute pulmonary edema due to volume overload. 3. End-stage renal disease requiring dialysis. 4. Uncontrolled hypertension. 5. Elevated troponin level, possible non-ST elevation myocardial   infarction. 6. Leukocytosis. 7. Anemia of end-stage renal disease. 8. Tobacco abuse. 9. Alcohol abuse. Significant volume off yesterday. Doing much better. He is on room air. Echo pending. Plan:     Needs to be up and moving. [x]        High complexity decision making was performed    Subjective: Markie Tang denies chest pain, dyspnea. Discussed with RN events overnight. Patient Active Problem List   Diagnosis Code    Pulmonary edema J81.1       Review of Systems:    Symptom Y/N Comments  Symptom Y/N Comments   Fever/Chills N   Chest Pain N    Poor Appetite N   Edema N    Cough N   Abdominal Pain N    Sputum N   Joint Pain N    SOB/WARNER N   Pruritis/Rash N    Nausea/vomit N   Tolerating PT/OT Y    Diarrhea N   Tolerating Diet Y    Constipation N   Other       Could NOT obtain due to:      Objective:      Physical Exam:    Last 24hrs VS reviewed since prior progress note. Most recent are:    Visit Vitals    /85    Pulse (!) 102    Temp 98.1 °F (36.7 °C)    Resp 18    Ht 6' (1.829 m)    Wt 82.9 kg (182 lb 12.2 oz)    SpO2 96%    BMI 24.79 kg/m2       Intake/Output Summary (Last 24 hours) at 05/07/17 1059  Last data filed at 05/06/17 2119   Gross per 24 hour   Intake              400 ml   Output             8225 ml   Net            -7825 ml        General Appearance: Well developed, well nourished, alert & oriented x 3,    no acute distress. Ears/Nose/Mouth/Throat: Hearing grossly normal.  Neck: Supple.   Chest: Lungs clear to auscultation bilaterally. Cardiovascular: Regular rate and rhythm, S1S2 normal, no murmur. Abdomen: Soft, non-tender, bowel sounds are active. Extremities: No edema bilaterally. Skin: Warm and dry.     PMH/SH reviewed - no change compared to H&P    Data Review    Telemetry: normal sinus rhythm     Lab Data Personally Reviewed:    Recent Labs      05/07/17   0200  05/06/17   0214   WBC  8.3  13.8*   HGB  7.0*  8.7*   HCT  21.4*  28.5*   PLT  207  333   LABRCNT(INR:3,PTP:3,APTT:3,)  Recent Labs      05/07/17   0200  05/06/17   0214   NA  138  138   K  4.2  3.5   CL  101  100   CO2  29  24   BUN  35*  26*   CREA  7.64*  6.69*   GLU  115*  179*   CA  7.8*  7.7*   LABRCNT(CPK:3,CpKMB:3,ckndx:3,troiq:3)No results found for: CHOL, CHOLX, CHLST, CHOLV, HDL, LDL, DLDL, LDLC, DLDLP, TGL, TGLX, TRIGL, TRIGP, CHHD, CHHDXLABRCNT(sgot:3,gpt:3,ap:3,tbiL:3,TP:3,ALB:3,GLOB:3,ggt:3,aml:3,amyp:3,lpse:3,hlpse:3)  Recent Labs      05/06/17   0412   PH  7.41   PCO2  38   PO2  63*     No results found for: CHOL, CHOLX, CHLST, CHOLV, HDL, LDL, DLDL, LDLC, DLDLP, TGL, TGLX, TRIGL, TRIGP, CHHD, CHHDXMEDTABLEHoward Quiñonez MD  Recent Labs      05/06/17   0412   PH  7.41   PCO2  38   PO2  63*       Medications Personally Reviewed:    Current Facility-Administered Medications   Medication Dose Route Frequency    nitroglycerin (NITROBID) 2 % ointment 1 Inch  1 Inch Topical Q6H PRN    HYDROmorphone (PF) (DILAUDID) injection 1 mg  1 mg IntraVENous Q4H PRN    [START ON 5/8/2017] epoetin cristian (EPOGEN;PROCRIT) injection 10,000 Units  10,000 Units SubCUTAneous DIALYSIS MON, WED & FRI    allopurinol (ZYLOPRIM) tablet 100 mg  100 mg Oral DAILY    amLODIPine (NORVASC) tablet 10 mg  10 mg Oral DAILY    b-complex with vitamin c tablet 1 Tab  1 Tab Oral DAILY    furosemide (LASIX) tablet 80 mg  80 mg Oral DAILY    gabapentin (NEURONTIN) capsule 100 mg  100 mg Oral BID    hydrALAZINE (APRESOLINE) tablet 50 mg  50 mg Oral TID    therapeutic multivitamin (THERAGRAN) tablet 1 Tab  1 Tab Oral DAILY    pantoprazole (PROTONIX) tablet 40 mg  40 mg Oral DAILY    doxercalciferol (HECTOROL) capsule 0.5 mcg  0.5 mcg Oral DAILY    propranolol (INDERAL) tablet 20 mg  20 mg Oral TID    sevelamer (RENAGEL) tablet 800 mg  800 mg Oral TID    terazosin (HYTRIN) capsule 2 mg  2 mg Oral QHS    traMADol (ULTRAM) tablet 50 mg  50 mg Oral Q6H PRN    cloNIDine HCl (CATAPRES) tablet 0.3 mg  0.3 mg Oral BID    sodium chloride (NS) flush 5-10 mL  5-10 mL IntraVENous Q8H    sodium chloride (NS) flush 5-10 mL  5-10 mL IntraVENous PRN    ondansetron (ZOFRAN) injection 4 mg  4 mg IntraVENous Q4H PRN    heparin (porcine) injection 5,000 Units  5,000 Units SubCUTAneous Q8H    nicotine (NICODERM CQ) 21 mg/24 hr patch 1 Patch  1 Patch TransDERmal Q24H    hydrALAZINE (APRESOLINE) 20 mg/mL injection 20 mg  20 mg IntraVENous Q6H PRN         Ai Ramos MD

## 2017-05-08 LAB
ALBUMIN SERPL BCP-MCNC: 2 G/DL (ref 3.5–5)
ALBUMIN/GLOB SERPL: 0.5 {RATIO} (ref 1.1–2.2)
ALP SERPL-CCNC: 96 U/L (ref 45–117)
ALT SERPL-CCNC: 19 U/L (ref 12–78)
ANION GAP BLD CALC-SCNC: 11 MMOL/L (ref 5–15)
AST SERPL W P-5'-P-CCNC: 19 U/L (ref 15–37)
BACTERIA SPEC CULT: NORMAL
BILIRUB SERPL-MCNC: 0.3 MG/DL (ref 0.2–1)
BUN SERPL-MCNC: 53 MG/DL (ref 6–20)
BUN/CREAT SERPL: 5 (ref 12–20)
CALCIUM SERPL-MCNC: 7.9 MG/DL (ref 8.5–10.1)
CC UR VC: NORMAL
CHLORIDE SERPL-SCNC: 100 MMOL/L (ref 97–108)
CO2 SERPL-SCNC: 26 MMOL/L (ref 21–32)
CREAT SERPL-MCNC: 10.5 MG/DL (ref 0.7–1.3)
ERYTHROCYTE [DISTWIDTH] IN BLOOD BY AUTOMATED COUNT: 16.3 % (ref 11.5–14.5)
GLOBULIN SER CALC-MCNC: 3.9 G/DL (ref 2–4)
GLUCOSE SERPL-MCNC: 111 MG/DL (ref 65–100)
HBV SURFACE AG SER QL: 0.21 INDEX
HBV SURFACE AG SER QL: NEGATIVE
HCT VFR BLD AUTO: 23.7 % (ref 36.6–50.3)
HGB BLD-MCNC: 7.7 G/DL (ref 12.1–17)
MAGNESIUM SERPL-MCNC: 1.9 MG/DL (ref 1.6–2.4)
MCH RBC QN AUTO: 31.3 PG (ref 26–34)
MCHC RBC AUTO-ENTMCNC: 32.5 G/DL (ref 30–36.5)
MCV RBC AUTO: 96.3 FL (ref 80–99)
PHOSPHATE SERPL-MCNC: 4.4 MG/DL (ref 2.6–4.7)
PLATELET # BLD AUTO: 216 K/UL (ref 150–400)
POTASSIUM SERPL-SCNC: 4.3 MMOL/L (ref 3.5–5.1)
PROT SERPL-MCNC: 5.9 G/DL (ref 6.4–8.2)
RBC # BLD AUTO: 2.46 M/UL (ref 4.1–5.7)
SERVICE CMNT-IMP: NORMAL
SODIUM SERPL-SCNC: 137 MMOL/L (ref 136–145)
WBC # BLD AUTO: 7.9 K/UL (ref 4.1–11.1)

## 2017-05-08 PROCEDURE — 83735 ASSAY OF MAGNESIUM: CPT | Performed by: INTERNAL MEDICINE

## 2017-05-08 PROCEDURE — 74011250637 HC RX REV CODE- 250/637: Performed by: INTERNAL MEDICINE

## 2017-05-08 PROCEDURE — 65660000000 HC RM CCU STEPDOWN

## 2017-05-08 PROCEDURE — 80053 COMPREHEN METABOLIC PANEL: CPT | Performed by: INTERNAL MEDICINE

## 2017-05-08 PROCEDURE — 93306 TTE W/DOPPLER COMPLETE: CPT

## 2017-05-08 PROCEDURE — 74011250637 HC RX REV CODE- 250/637: Performed by: HOSPITALIST

## 2017-05-08 PROCEDURE — 74011250636 HC RX REV CODE- 250/636: Performed by: INTERNAL MEDICINE

## 2017-05-08 PROCEDURE — 85027 COMPLETE CBC AUTOMATED: CPT | Performed by: INTERNAL MEDICINE

## 2017-05-08 PROCEDURE — 74011250636 HC RX REV CODE- 250/636: Performed by: HOSPITALIST

## 2017-05-08 PROCEDURE — 36415 COLL VENOUS BLD VENIPUNCTURE: CPT | Performed by: INTERNAL MEDICINE

## 2017-05-08 PROCEDURE — 84100 ASSAY OF PHOSPHORUS: CPT | Performed by: INTERNAL MEDICINE

## 2017-05-08 RX ORDER — CALCIUM CARBONATE 200(500)MG
200 TABLET,CHEWABLE ORAL
Status: DISCONTINUED | OUTPATIENT
Start: 2017-05-08 | End: 2017-05-13 | Stop reason: HOSPADM

## 2017-05-08 RX ADMIN — GABAPENTIN 100 MG: 100 CAPSULE ORAL at 17:30

## 2017-05-08 RX ADMIN — Medication 10 ML: at 23:07

## 2017-05-08 RX ADMIN — HYDRALAZINE HYDROCHLORIDE 50 MG: 50 TABLET ORAL at 17:30

## 2017-05-08 RX ADMIN — HEPARIN SODIUM 5000 UNITS: 5000 INJECTION, SOLUTION INTRAVENOUS; SUBCUTANEOUS at 13:36

## 2017-05-08 RX ADMIN — PROPRANOLOL HYDROCHLORIDE 20 MG: 10 TABLET ORAL at 17:30

## 2017-05-08 RX ADMIN — HYDRALAZINE HYDROCHLORIDE 50 MG: 50 TABLET ORAL at 23:04

## 2017-05-08 RX ADMIN — TERAZOSIN HYDROCHLORIDE 2 MG: 1 CAPSULE ORAL at 23:04

## 2017-05-08 RX ADMIN — AMLODIPINE BESYLATE 10 MG: 5 TABLET ORAL at 13:37

## 2017-05-08 RX ADMIN — RENAGEL 800 MG: 800 TABLET ORAL at 17:30

## 2017-05-08 RX ADMIN — Medication 10 ML: at 07:07

## 2017-05-08 RX ADMIN — HYDROMORPHONE HYDROCHLORIDE 1 MG: 2 INJECTION, SOLUTION INTRAMUSCULAR; INTRAVENOUS; SUBCUTANEOUS at 23:03

## 2017-05-08 RX ADMIN — GABAPENTIN 100 MG: 100 CAPSULE ORAL at 08:22

## 2017-05-08 RX ADMIN — FUROSEMIDE 80 MG: 80 TABLET ORAL at 13:37

## 2017-05-08 RX ADMIN — HEPARIN SODIUM 5000 UNITS: 5000 INJECTION, SOLUTION INTRAVENOUS; SUBCUTANEOUS at 07:05

## 2017-05-08 RX ADMIN — PANTOPRAZOLE SODIUM 40 MG: 40 TABLET, DELAYED RELEASE ORAL at 08:22

## 2017-05-08 RX ADMIN — RENAGEL 800 MG: 800 TABLET ORAL at 08:22

## 2017-05-08 RX ADMIN — HYDROMORPHONE HYDROCHLORIDE 1 MG: 2 INJECTION, SOLUTION INTRAMUSCULAR; INTRAVENOUS; SUBCUTANEOUS at 03:53

## 2017-05-08 RX ADMIN — Medication 10 ML: at 13:37

## 2017-05-08 RX ADMIN — CLONIDINE HYDROCHLORIDE 0.3 MG: 0.1 TABLET ORAL at 13:36

## 2017-05-08 RX ADMIN — PROPRANOLOL HYDROCHLORIDE 20 MG: 10 TABLET ORAL at 23:05

## 2017-05-08 RX ADMIN — ERYTHROPOIETIN 10000 UNITS: 10000 INJECTION, SOLUTION INTRAVENOUS; SUBCUTANEOUS at 23:05

## 2017-05-08 RX ADMIN — RENAGEL 800 MG: 800 TABLET ORAL at 23:04

## 2017-05-08 RX ADMIN — THERA TABS 1 TABLET: TAB at 08:22

## 2017-05-08 RX ADMIN — Medication 10 ML: at 08:25

## 2017-05-08 RX ADMIN — CLONIDINE HYDROCHLORIDE 0.3 MG: 0.1 TABLET ORAL at 23:04

## 2017-05-08 RX ADMIN — DOXERCALCIFEROL 0.5 MCG: 0.5 CAPSULE, LIQUID FILLED ORAL at 13:37

## 2017-05-08 RX ADMIN — HEPARIN SODIUM 5000 UNITS: 5000 INJECTION, SOLUTION INTRAVENOUS; SUBCUTANEOUS at 23:05

## 2017-05-08 RX ADMIN — HYDROMORPHONE HYDROCHLORIDE 1 MG: 2 INJECTION, SOLUTION INTRAMUSCULAR; INTRAVENOUS; SUBCUTANEOUS at 08:22

## 2017-05-08 RX ADMIN — HYDROMORPHONE HYDROCHLORIDE 1 MG: 2 INJECTION, SOLUTION INTRAMUSCULAR; INTRAVENOUS; SUBCUTANEOUS at 13:36

## 2017-05-08 RX ADMIN — HYDROMORPHONE HYDROCHLORIDE 1 MG: 2 INJECTION, SOLUTION INTRAMUSCULAR; INTRAVENOUS; SUBCUTANEOUS at 17:35

## 2017-05-08 RX ADMIN — CALCIUM CARBONATE (ANTACID) CHEW TAB 500 MG 200 MG: 500 CHEW TAB at 14:56

## 2017-05-08 RX ADMIN — ALLOPURINOL 100 MG: 100 TABLET ORAL at 08:22

## 2017-05-08 RX ADMIN — VITAMIN C 1 TABLET: TAB at 13:37

## 2017-05-08 NOTE — PROGRESS NOTES
Patient name: Shaunna Vargas  MRN: [id number]    Nephrology Progress note:    Assessment:  ESRD: MWF at [facility]  HTN: Uncontrolled-> improved s/p PUF  NSTEMI: troponin trending down  Anemia 2 to ESRD: Hgb suboptimal    Plan/Recommendations:  HD today- using R TDC; 400 QB, 3K, 2.5 Ca. UF of 3 Kg; tolerating so far  Epogen with HD  PRN IV hydralazine  F/u Blood cultures -negative so far    Subjective:  Seen on HD. Feels better. Tolerating HD. No acute c/o    ROS:   No nausea, no vomiting    Exam:  Visit Vitals    BP (!) 164/91    Pulse 79    Temp 98.4 °F (36.9 °C)    Resp 18    Ht 6' (1.829 m)    Wt 74.6 kg (164 lb 6.4 oz)    SpO2 95%    BMI 22.3 kg/m2     Wt Readings from Last 3 Encounters:   05/08/17 74.6 kg (164 lb 6.4 oz)   05/06/17 83.9 kg (185 lb)   04/19/17 77.9 kg (171 lb 11.2 oz)       Intake/Output Summary (Last 24 hours) at 05/08/17 1059  Last data filed at 05/08/17 0600   Gross per 24 hour   Intake              720 ml   Output              750 ml   Net              -30 ml       Gen: NAD  HEENT: No icterus, mmm, no oral exudate, AT/NC  Lungs/Chest wall: Clear. R TDC intact  Cardiovascular: Regular rate, normal rhythm. Ext: Immature LAVF. No peripheral edema  CNS: resting.      Current Facility-Administered Medications   Medication Dose Route Frequency Last Dose    nitroglycerin (NITROBID) 2 % ointment 1 Inch  1 Inch Topical Q6H PRN      HYDROmorphone (PF) (DILAUDID) injection 1 mg  1 mg IntraVENous Q4H PRN 1 mg at 05/08/17 0822    epoetin cristian (EPOGEN;PROCRIT) injection 10,000 Units  10,000 Units SubCUTAneous DIALYSIS MON, WED & FRI      allopurinol (ZYLOPRIM) tablet 100 mg  100 mg Oral DAILY 100 mg at 05/08/17 0822    amLODIPine (NORVASC) tablet 10 mg  10 mg Oral DAILY 10 mg at 05/07/17 0823    b-complex with vitamin c tablet 1 Tab  1 Tab Oral DAILY 1 Tab at 05/07/17 4645    furosemide (LASIX) tablet 80 mg  80 mg Oral DAILY 80 mg at 05/07/17 9364    gabapentin (NEURONTIN) capsule 100 mg  100 mg Oral  mg at 05/08/17 8987    hydrALAZINE (APRESOLINE) tablet 50 mg  50 mg Oral TID 50 mg at 05/07/17 2345    therapeutic multivitamin (THERAGRAN) tablet 1 Tab  1 Tab Oral DAILY 1 Tab at 05/08/17 1040    pantoprazole (PROTONIX) tablet 40 mg  40 mg Oral DAILY 40 mg at 05/08/17 8449    doxercalciferol (HECTOROL) capsule 0.5 mcg  0.5 mcg Oral DAILY 0.5 mcg at 05/07/17 2631    propranolol (INDERAL) tablet 20 mg  20 mg Oral TID 20 mg at 05/07/17 2345    sevelamer (RENAGEL) tablet 800 mg  800 mg Oral  mg at 05/08/17 3489    terazosin (HYTRIN) capsule 2 mg  2 mg Oral QHS 2 mg at 05/07/17 2345    traMADol (ULTRAM) tablet 50 mg  50 mg Oral Q6H PRN 50 mg at 05/06/17 2310    cloNIDine HCl (CATAPRES) tablet 0.3 mg  0.3 mg Oral BID 0.3 mg at 05/07/17 1843    sodium chloride (NS) flush 5-10 mL  5-10 mL IntraVENous Q8H 10 mL at 05/08/17 0707    sodium chloride (NS) flush 5-10 mL  5-10 mL IntraVENous PRN 10 mL at 05/08/17 0825    ondansetron (ZOFRAN) injection 4 mg  4 mg IntraVENous Q4H PRN      heparin (porcine) injection 5,000 Units  5,000 Units SubCUTAneous Q8H 5,000 Units at 05/08/17 0705    nicotine (NICODERM CQ) 21 mg/24 hr patch 1 Patch  1 Patch TransDERmal Q24H 1 Patch at 05/08/17 0703    hydrALAZINE (APRESOLINE) 20 mg/mL injection 20 mg  20 mg IntraVENous Q6H PRN         Labs/Data:    Lab Results   Component Value Date/Time    WBC 7.9 05/08/2017 02:47 AM    Hemoglobin (POC) 8.8 04/19/2017 08:20 AM    HGB 7.7 05/08/2017 02:47 AM    Hematocrit (POC) 26 04/19/2017 08:20 AM    HCT 23.7 05/08/2017 02:47 AM    PLATELET 158 54/24/4570 02:47 AM    MCV 96.3 05/08/2017 02:47 AM       Lab Results   Component Value Date/Time    Sodium 137 05/08/2017 02:47 AM    Potassium 4.3 05/08/2017 02:47 AM    Chloride 100 05/08/2017 02:47 AM    CO2 26 05/08/2017 02:47 AM    Anion gap 11 05/08/2017 02:47 AM    Glucose 111 05/08/2017 02:47 AM    BUN 53 05/08/2017 02:47 AM    Creatinine 10.50 05/08/2017 02:47 AM    BUN/Creatinine ratio 5 05/08/2017 02:47 AM    GFR est AA 6 05/08/2017 02:47 AM    GFR est non-AA 5 05/08/2017 02:47 AM    Calcium 7.9 05/08/2017 02:47 AM       Patient seen and examined. Chart reviewed. Labs, data and other pertinent notes reviewed in last 24 hrs.     Discussed with patient/HD RN

## 2017-05-08 NOTE — PROGRESS NOTES
Bedside and Verbal shift change report given to [facility] (oncoming nurse) by Denisse Hancock (offgoing nurse). Report included the following information SBAR, Kardex, Intake/Output, MAR and Recent Results.

## 2017-05-08 NOTE — PROGRESS NOTES
PCU SHIFT NURSING NOTE      Bedside and Verbal shift change report given to Wesley Renee RN (oncoming nurse) by Delisa Lamb RN (offgoing nurse). Report included the following information SBAR, Kardex, Intake/Output, MAR, Recent Results and Cardiac Rhythm NSR-ST. Shift Summary: Pt had a lot of pain that prevented him from resting well. Admission Date 5/6/2017   Admission Diagnosis Pulmonary edema   Consults IP CONSULT TO CARDIOLOGY  IP CONSULT TO NEPHROLOGY        Consults   []PT   []OT   []Speech   []Case Management      [] Palliative      Cardiac Monitoring Order   [x]Yes   []No     IV drips   []Yes    Drip:                            Dose:  Drip:                            Dose:  Drip:                            Dose:   [x]No     GI Prophylaxis   []Yes   []No         DVT Prophylaxis   SCDs:             Jc stockings:         [x] Medication   []Contraindicated   []None      Activity Level Activity Level: Bed Rest     Activity Assistance: Partial (one person)   Purposeful Rounding every 1-2 hour? [x]Yes   Ronquillo Score  Total Score: 3   Bed Alarm (If score 3 or >)   []Yes   [] Refused (See signed refusal form in chart)   Jovan Score  Jovan Score: 18   Jovan Score (if score 14 or less)   []PMT consult   []Wound Care consult      []Specialty bed   [] Nutrition consult          Needs prior to discharge:   Home O2 required:    []Yes   []No    If yes, how much O2 required?     Other:    Last Bowel Movement: Last Bowel Movement Date: 05/06/17      Influenza Vaccine Received Flu Vaccine for Current Season (usually Sept-March): Not Flu Season        Pneumonia Vaccine           Diet Active Orders   Diet    DIET CARDIAC Regular      LDAs               Peripheral IV 05/06/17 Left External jugular (Active)   Site Assessment Clean, dry, & intact 5/7/2017 10:00 PM   Phlebitis Assessment 0 5/7/2017 10:00 PM   Infiltration Assessment 0 5/7/2017 10:00 PM   Dressing Status Clean, dry, & intact 5/7/2017 10:00 PM   Dressing Type Tape;Transparent 5/7/2017 10:00 PM   Hub Color/Line Status Blue;Capped 5/7/2017 10:00 PM                      Urinary Catheter      Intake & Output   Date 05/07/17 0700 - 05/08/17 0659 05/08/17 0700 - 05/09/17 0659   Shift 7719-6302 6583-5670 24 Hour Total 1165-1855 9055-5129 24 Hour Total   I  N  T  A  K  E   P.O. 960  960         P. O. 960  960       Shift Total  (mL/kg) 960  (11.6)  960  (11.6)      O  U  T  P  U  T   Urine  (mL/kg/hr) 400  (0.4) 350  (0.4) 750  (0.4)         Urine Voided 400 350 750       Shift Total  (mL/kg) 400  (4.8) 350  (4.2) 750  (9)       -350 210      Weight (kg) 82.9 82.9 82.9 74.6 74.6 74.6         Readmission Risk Assessment Tool Score Low Risk            10       Total Score        4 More than 1 Admission in calendar year    4 Patient Insurance is Medicare, Medicaid or Self Pay    2 Charlson Comorbidity Score        Criteria that do not apply:    Relationship with PCP    Patient Living Status    Patient Length of Stay > 5       Expected Length of Stay - - -   Actual Length of Stay 2

## 2017-05-08 NOTE — PROGRESS NOTES
Progress Note      5/8/2017 10:59 AM  NAME: Skylar Yepez   MRN:  [id number]   Admit Diagnosis: Pulmonary edema     Assessment:     ASSESSMENT   1. Acute hypoxic respiratory failure secondary acute pulmonary   edema. Improved p Dialysis. 2. Acute pulmonary edema due to volume overload. Improved p Dialysis. 3. End-stage renal disease requiring dialysis. 4. Uncontrolled hypertension. 5. Elevated troponin level, possible non-ST elevation myocardial   infarction. 6. Leukocytosis. 7. Anemia of end-stage renal disease. 8. Tobacco abuse. 9. Alcohol abuse. Significant volume off yesterday. Doing much better. He is on room air. Echo pending. Plan:     Dialysis today. Echo pending. Plan for f/u with his doctors at [address] [place] [address]. [x]        High complexity decision making was performed    Subjective: Skylar Yepez denies chest pain, dyspnea. Discussed with RN events overnight. Patient Active Problem List   Diagnosis Code    Pulmonary edema J81.1       Review of Systems:    Symptom Y/N Comments  Symptom Y/N Comments   Fever/Chills N   Chest Pain N    Poor Appetite N   Edema N    Cough N   Abdominal Pain N    Sputum N   Joint Pain N    SOB/WARNER N   Pruritis/Rash N    Nausea/vomit N   Tolerating PT/OT Y    Diarrhea N   Tolerating Diet Y    Constipation N   Other       Could NOT obtain due to:      Objective:      Physical Exam:    Last 24hrs VS reviewed since prior progress note.  Most recent are:    Visit Vitals    /87 (BP 1 Location: Right arm, BP Patient Position: At rest)    Pulse 71    Temp 98.3 °F (36.8 °C)    Resp 18    Ht 6' (1.829 m)    Wt 74.6 kg (164 lb 6.4 oz)    SpO2 98%    BMI 22.3 kg/m2       Intake/Output Summary (Last 24 hours) at 05/08/17 1436  Last data filed at 05/08/17 0600   Gross per 24 hour   Intake              480 ml   Output              750 ml   Net             -270 ml        General Appearance: Well developed, well nourished, alert & oriented x 3,    no acute distress. Ears/Nose/Mouth/Throat: Hearing grossly normal.  Neck: Supple. Chest: Lungs clear to auscultation bilaterally. Cardiovascular: Regular rate and rhythm, S1S2 normal, no murmur. Abdomen: Soft, non-tender, bowel sounds are active. Extremities: No edema bilaterally. Skin: Warm and dry.     PMH/SH reviewed - no change compared to H&P    Data Review    Telemetry: normal sinus rhythm     Lab Data Personally Reviewed:    Recent Labs      05/08/17   0247  05/07/17   0200   WBC  7.9  8.3   HGB  7.7*  7.0*   HCT  23.7*  21.4*   PLT  216  207   LABRCNT(INR:3,PTP:3,APTT:3,)  Recent Labs      05/08/17 0247 05/07/17   0200  05/06/17   0214   NA  137  138  138   K  4.3  4.2  3.5   CL  100  101  100   CO2  26  29  24   BUN  53*  35*  26*   CREA  10.50*  7.64*  6.69*   GLU  111*  115*  179*   CA  7.9*  7.8*  7.7*   MG  1.9   --    --    LABRCNT(CPK:3,CpKMB:3,ckndx:3,troiq:3)No results found for: CHOL, CHOLX, CHLST, CHOLV, HDL, LDL, DLDL, LDLC, DLDLP, TGL, TGLX, TRIGL, TRIGP, CHHD, CHHDXLABRCNT(sgot:3,gpt:3,ap:3,tbiL:3,TP:3,ALB:3,GLOB:3,ggt:3,aml:3,amyp:3,lpse:3,hlpse:3)  Recent Labs      05/06/17   0412   PH  7.41   PCO2  38   PO2  63*     No results found for: CHOL, CHOLX, CHLST, CHOLV, HDL, LDL, DLDL, LDLC, DLDLP, TGL, TGLX, TRIGL, TRIGP, CHHD, CHHDXMEDTABLEHoward Henderson MD  Recent Labs      05/06/17   0412   PH  7.41   PCO2  38   PO2  63*       Medications Personally Reviewed:    Current Facility-Administered Medications   Medication Dose Route Frequency    nitroglycerin (NITROBID) 2 % ointment 1 Inch  1 Inch Topical Q6H PRN    HYDROmorphone (PF) (DILAUDID) injection 1 mg  1 mg IntraVENous Q4H PRN    epoetin cristian (EPOGEN;PROCRIT) injection 10,000 Units  10,000 Units SubCUTAneous DIALYSIS MON, WED & FRI    allopurinol (ZYLOPRIM) tablet 100 mg  100 mg Oral DAILY    amLODIPine (NORVASC) tablet 10 mg  10 mg Oral DAILY    b-complex with vitamin c tablet 1 Tab  1 Tab Oral DAILY    furosemide (LASIX) tablet 80 mg  80 mg Oral DAILY    gabapentin (NEURONTIN) capsule 100 mg  100 mg Oral BID    hydrALAZINE (APRESOLINE) tablet 50 mg  50 mg Oral TID    therapeutic multivitamin (THERAGRAN) tablet 1 Tab  1 Tab Oral DAILY    pantoprazole (PROTONIX) tablet 40 mg  40 mg Oral DAILY    doxercalciferol (HECTOROL) capsule 0.5 mcg  0.5 mcg Oral DAILY    propranolol (INDERAL) tablet 20 mg  20 mg Oral TID    sevelamer (RENAGEL) tablet 800 mg  800 mg Oral TID    terazosin (HYTRIN) capsule 2 mg  2 mg Oral QHS    traMADol (ULTRAM) tablet 50 mg  50 mg Oral Q6H PRN    cloNIDine HCl (CATAPRES) tablet 0.3 mg  0.3 mg Oral BID    sodium chloride (NS) flush 5-10 mL  5-10 mL IntraVENous Q8H    sodium chloride (NS) flush 5-10 mL  5-10 mL IntraVENous PRN    ondansetron (ZOFRAN) injection 4 mg  4 mg IntraVENous Q4H PRN    heparin (porcine) injection 5,000 Units  5,000 Units SubCUTAneous Q8H    nicotine (NICODERM CQ) 21 mg/24 hr patch 1 Patch  1 Patch TransDERmal Q24H    hydrALAZINE (APRESOLINE) 20 mg/mL injection 20 mg  20 mg IntraVENous Q6H PRN         Franky Santiago MD

## 2017-05-08 NOTE — CARDIO/PULMONARY
C/P rehab note- chart reviewed for in basket referral. Adm with acute onset of SOB earlier today. HX includes HTN,CKD MWF dialysis. Current everyday smoker. Echo pending. Was on Bipap,now on NC O2 2lpm.    Will follow for appropriate teaching.

## 2017-05-08 NOTE — CDMP QUERY
The diagnosis of malignant HTN has been documented for this patient. In ICD-10, malignant HTN is an unspecified term. Based on your medical judgement, could your documentation be further specified as:     =>Hypertensive urgency  =>Hypertensive crisis  =>Hypertensive emergency  =>Pulmonary hypertension  =>Hypertensive heart disease with ESRD    =>Other Explanation of clinical findings  =>Unable to Determine (no explanation of clinical findings)     The medical record reflects the following clinical findings, treatment, and risk factors:     Risk Factors: 94.-186/101-24-93% on NRB on admit., repeat /105, pmh: ESRD, HTN  Tobacco/ETOH abuse  Clinical Indicators: CXR shows acute pulmonary edema, Trop maxed-5.3  Treatment: on HD 3x/wk required urgent ultrafiltration dialysis, Lasix, ICU, bipap, cardiac/renal consults ordered, hypoxia     Please clarify and document your clinical opinion in the progress notes and discharge summary including the definitive and/or presumptive diagnosis, (suspected or probable), related to the above clinical findings. Please include clinical findings supporting your diagnosis. REFERENCE:  -----------------------------------------------  Hypertensive Urgency: SBP > 180 or DBP > 120 in the absence of progressive target organ dysfunction     Hypertensive Crisis: BP elevates rapidly and severely enough to potentially cause organ damage (e.g. severe HA, SOB, nosebleed, severe anxiety, nausea/vomiting, etc.)    Hypertensive Emergency: SBP >180 or DBP > 120 with associated organ dysfunction (e.g. CVA, LOC, memory loss, MI, CLAYTON, aortic dissection, angina, pulmonary edema, encephalopathy, retinopathy, HELLP, etc.). Thank you!   Zhen Beck, [facility], . [address]

## 2017-05-08 NOTE — PROGRESS NOTES
Hospitalist Progress Note    NAME: Susan Siddiqui   :  1965   MRN:  [id number]   LOS:   2      Assessment / Plan:  Acute hypoxic respiratory failure due to Acute Pulmonary edema   -s/p HD with improvement, now on 2 L NC  -plan for HD again today     NSTEMI / Hypertensive urgency  Chest pain, SOB - ongoing  Troponin peak 5.5 now trending down, no significant EKG changes  -still think he is overloaded and symptoms would benefit from more fluid removal  -cannot have ASA (hives with ibuprofen per patient- patient has never has aspirin -- may need ?desensitization if aspirin needed)  -cardiology consulted  -echo has been ordered  -nitro prn, dilaudid prn severe pain as morphine was not helping     Leukocytosis   Likely stress related, resolved     ESRD  -nephrology consulted     Anemia ESRD  Monitor Hb  EPO and transfusion per nephrology with HD    Tobacco Abuse       Code status: Full  Prophylaxis: Hep SQ  Recommended Disposition: Home w/Family     Subjective:     Chief Complaint: chest pain  Continues to feel short of breath especially with exertion, dilaudid helpign with chest pain    Review of Systems:  Symptom Y/N Comments  Symptom Y/N Comments   Fever/Chills    Chest Pain     Poor Appetite    Edema     Cough    Abdominal Pain     Sputum    Joint Pain     SOB/WARNER    Pruritis/Rash     Nausea/vomit    Tolerating PT/OT     Diarrhea    Tolerating Diet     Constipation    Other       Could NOT obtain due to:      Objective:     VITALS:   Last 24hrs VS reviewed since prior progress note.  Most recent are:  Patient Vitals for the past 24 hrs:   Temp Pulse Resp BP SpO2   17 0819 98.4 °F (36.9 °C) 79 18 (!) 164/91 95 %   17 0400 98.2 °F (36.8 °C) 71 - 141/83 95 %   17 0000 98.2 °F (36.8 °C) 71 18 142/87 96 %   17 -  - 128/78 96 %   17 1843 - 76 16 158/86 97 %   17 1517 98.8 °F (37.1 °C) 77 16 120/67 98 %   17 1245 98 °F (36.7 °C) 77 16 148/87 96 %       Intake/Output Summary (Last 24 hours) at 05/08/17 1102  Last data filed at 05/08/17 0600   Gross per 24 hour   Intake              720 ml   Output              750 ml   Net              -30 ml        PHYSICAL EXAM:  General: Alert, cooperative, no acute distress    EENT:  EOMI. Anicteric sclerae. Mucous membranes moist  Resp:  CTA bilaterally, no wheezing or rales. No accessory muscle use  CV:  Regular rhythm, no edema, chest pain is slightly reproducible  GI:  Soft, non distended, non tender. +Bowel sounds  Neurologic:  Alert and oriented X 3, normal speech  Psych:   Good insight, not anxious nor agitated  Skin:  No rashes, no jaundice  ________________________________________________________________________  Care Plan discussed with:    Comments   Patient x    Family      RN     Care Manager     Consultant                        Multidiciplinary team rounds were held today with , nursing, pharmacist and clinical coordinator. Patient's plan of care was discussed; medications were reviewed and discharge planning was addressed. ________________________________________________________________________  Total NON critical care TIME:  20   Minutes  ________________________________________________________________________  Roselyn Rosenberg MD     Procedures: see electronic medical records for all procedures/Xrays and details which were not copied into this note but were reviewed prior to creation of Plan. LABS:  I reviewed today's most current labs and imaging studies.   Pertinent labs include:  Recent Labs      05/08/17   0247  05/07/17   0200  05/06/17   0214   WBC  7.9  8.3  13.8*   HGB  7.7*  7.0*  8.7*   HCT  23.7*  21.4*  28.5*   PLT  216  207  333     Recent Labs      05/08/17   0247  05/07/17   0200  05/06/17   0405  05/06/17   0214   NA  137  138   --   138   K  4.3  4.2   --   3.5   CL  100  101   --   100   CO2  26  29   --   24   GLU  111*  115*   --   179*   BUN  53*  35*   --   26*   CREA  10.50*  7.64* --   6.69*   CA  7.9*  7.8*   --   7.7*   MG  1.9   --    --    --    PHOS  4.4   --    --    --    ALB  2.0*   --    --    --    TBILI  0.3   --    --    --    SGOT  19   --    --    --    ALT  19   --    --    --    INR   --    --   1.1   --        Signed: Kasi Swann MD

## 2017-05-09 PROCEDURE — 74011250637 HC RX REV CODE- 250/637: Performed by: INTERNAL MEDICINE

## 2017-05-09 PROCEDURE — 74011250636 HC RX REV CODE- 250/636: Performed by: HOSPITALIST

## 2017-05-09 PROCEDURE — 65660000000 HC RM CCU STEPDOWN

## 2017-05-09 PROCEDURE — 74011250637 HC RX REV CODE- 250/637: Performed by: HOSPITALIST

## 2017-05-09 PROCEDURE — 74011250636 HC RX REV CODE- 250/636: Performed by: INTERNAL MEDICINE

## 2017-05-09 RX ORDER — HYDROMORPHONE HYDROCHLORIDE 2 MG/ML
1 INJECTION, SOLUTION INTRAMUSCULAR; INTRAVENOUS; SUBCUTANEOUS
Status: DISCONTINUED | OUTPATIENT
Start: 2017-05-09 | End: 2017-05-11

## 2017-05-09 RX ADMIN — HYDROMORPHONE HYDROCHLORIDE 1 MG: 2 INJECTION INTRAMUSCULAR; INTRAVENOUS; SUBCUTANEOUS at 09:21

## 2017-05-09 RX ADMIN — HEPARIN SODIUM 5000 UNITS: 5000 INJECTION, SOLUTION INTRAVENOUS; SUBCUTANEOUS at 06:38

## 2017-05-09 RX ADMIN — CLONIDINE HYDROCHLORIDE 0.3 MG: 0.1 TABLET ORAL at 21:42

## 2017-05-09 RX ADMIN — ALLOPURINOL 100 MG: 100 TABLET ORAL at 08:32

## 2017-05-09 RX ADMIN — HYDROMORPHONE HYDROCHLORIDE 1 MG: 2 INJECTION INTRAMUSCULAR; INTRAVENOUS; SUBCUTANEOUS at 17:41

## 2017-05-09 RX ADMIN — HYDRALAZINE HYDROCHLORIDE 50 MG: 50 TABLET ORAL at 21:43

## 2017-05-09 RX ADMIN — GABAPENTIN 100 MG: 100 CAPSULE ORAL at 17:41

## 2017-05-09 RX ADMIN — AMLODIPINE BESYLATE 10 MG: 5 TABLET ORAL at 08:32

## 2017-05-09 RX ADMIN — RENAGEL 800 MG: 800 TABLET ORAL at 08:32

## 2017-05-09 RX ADMIN — RENAGEL 800 MG: 800 TABLET ORAL at 16:03

## 2017-05-09 RX ADMIN — RENAGEL 800 MG: 800 TABLET ORAL at 21:43

## 2017-05-09 RX ADMIN — CLONIDINE HYDROCHLORIDE 0.3 MG: 0.1 TABLET ORAL at 08:33

## 2017-05-09 RX ADMIN — HYDRALAZINE HYDROCHLORIDE 50 MG: 50 TABLET ORAL at 16:04

## 2017-05-09 RX ADMIN — PANTOPRAZOLE SODIUM 40 MG: 40 TABLET, DELAYED RELEASE ORAL at 08:32

## 2017-05-09 RX ADMIN — HYDRALAZINE HYDROCHLORIDE 50 MG: 50 TABLET ORAL at 08:32

## 2017-05-09 RX ADMIN — PROPRANOLOL HYDROCHLORIDE 20 MG: 10 TABLET ORAL at 16:03

## 2017-05-09 RX ADMIN — Medication 10 ML: at 21:44

## 2017-05-09 RX ADMIN — HYDROMORPHONE HYDROCHLORIDE 1 MG: 2 INJECTION INTRAMUSCULAR; INTRAVENOUS; SUBCUTANEOUS at 13:25

## 2017-05-09 RX ADMIN — TERAZOSIN HYDROCHLORIDE 2 MG: 1 CAPSULE ORAL at 21:43

## 2017-05-09 RX ADMIN — PROPRANOLOL HYDROCHLORIDE 20 MG: 10 TABLET ORAL at 21:43

## 2017-05-09 RX ADMIN — HEPARIN SODIUM 5000 UNITS: 5000 INJECTION, SOLUTION INTRAVENOUS; SUBCUTANEOUS at 21:43

## 2017-05-09 RX ADMIN — DOXERCALCIFEROL 0.5 MCG: 0.5 CAPSULE, LIQUID FILLED ORAL at 10:18

## 2017-05-09 RX ADMIN — VITAMIN C 1 TABLET: TAB at 09:21

## 2017-05-09 RX ADMIN — CALCIUM CARBONATE (ANTACID) CHEW TAB 500 MG 200 MG: 500 CHEW TAB at 16:03

## 2017-05-09 RX ADMIN — HYDROMORPHONE HYDROCHLORIDE 1 MG: 2 INJECTION INTRAMUSCULAR; INTRAVENOUS; SUBCUTANEOUS at 05:08

## 2017-05-09 RX ADMIN — FUROSEMIDE 80 MG: 80 TABLET ORAL at 08:32

## 2017-05-09 RX ADMIN — CALCIUM CARBONATE (ANTACID) CHEW TAB 500 MG 200 MG: 500 CHEW TAB at 22:59

## 2017-05-09 RX ADMIN — THERA TABS 1 TABLET: TAB at 08:32

## 2017-05-09 RX ADMIN — HYDROMORPHONE HYDROCHLORIDE 1 MG: 2 INJECTION INTRAMUSCULAR; INTRAVENOUS; SUBCUTANEOUS at 21:43

## 2017-05-09 RX ADMIN — HEPARIN SODIUM 5000 UNITS: 5000 INJECTION, SOLUTION INTRAVENOUS; SUBCUTANEOUS at 13:25

## 2017-05-09 RX ADMIN — PROPRANOLOL HYDROCHLORIDE 20 MG: 10 TABLET ORAL at 08:32

## 2017-05-09 RX ADMIN — Medication 10 ML: at 06:00

## 2017-05-09 RX ADMIN — GABAPENTIN 100 MG: 100 CAPSULE ORAL at 08:32

## 2017-05-09 NOTE — PROGRESS NOTES
Cm met with patient for initial assessment. Pt name and  confirmed as patient identifiers. Demographics confirmed. Emergency contact updated. Emergency contact should be  [facility] [phone]. Patient has asked that [place] [place] be removed. Patient admitted 2017 for Acute respiratory failure, Nstemi. Patient lives with parents. HD MWF at [facility]. Patient states he has been receiving dialysis for approximately 2 months. Patient reports being independent with ADL's. Transportation to/from appointments if provided by patient's stepfather. [facility]    Patient does not have a PCP at this time.      [address] [facility]  Ext [phone]

## 2017-05-09 NOTE — PROGRESS NOTES
[facility]  [phone]  Fax - [phone] PADMINI Capellan, [address]    Confirmed with Shonna Sorensen RN - MWF HD 1st shift  Confirmed with TOO Jenkins Form 27/28 (Medicare Entitlement Form) completed 4/19/2017. Turn around time is 4 months from date submitted for Medicare assignment. If patient did not pay into the system, he should qualify for Medicaid. [facility] at [facility] (Disability Assistance) Gilbert Covarrubias will follow patient for disability application. Patient is a new dialysis patient at [facility]. CM will provide [facility] application to patient and inform [facility].     Lennie Lynn RN CM  Ext [phone]

## 2017-05-09 NOTE — PROGRESS NOTES
Hospitalist Progress Note    NAME: Jessica Puentes   :  1965   MRN:  [id number]   LOS:   3      Assessment / Plan:  Acute hypoxic respiratory failure   due to Acute Pulmonary Edema in settings of ESRD and NSTEMI  S/p Daily HD  Appreciate nephrology help    CP/NSTEMI POA  No plan for intervention per cardiology due to ASA allergy  Cardiology recommended him to followup his cardiology as [facility] as an OP  CTA negative for PE  2D echo with normal EF    Hypertensive Urgency POA  Due to volume overload  BP better with HD    Leukocytosis   Likely stress related, resolved     Anemia ESRD  Monitor Hb  EPO and transfusion per nephrology with HD    Tobacco Abuse     Code status: Full  Prophylaxis: Hep SQ  Recommended Disposition: Home w/Family     Subjective: Pt seen and examined at bedside. Continues to have chest pain. NAD. Overnight events d/w RN     Chief Complaint: f/u \"chest pain\"      Review of Systems:  Symptom Y/N Comments  Symptom Y/N Comments   Fever/Chills n   Chest Pain y    Poor Appetite    Edema     Cough n   Abdominal Pain n    Sputum    Joint Pain     SOB/WARNER n   Pruritis/Rash     Nausea/vomit    Tolerating PT/OT     Diarrhea    Tolerating Diet y    Constipation    Other       Could NOT obtain due to:      Objective:     VITALS:   Last 24hrs VS reviewed since prior progress note.  Most recent are:  Patient Vitals for the past 24 hrs:   Temp Pulse Resp BP SpO2   17 1225 98.5 °F (36.9 °C) 72 16 120/66 94 %   17 0715 98.3 °F (36.8 °C) 73 16 (!) 146/92 96 %   17 0307 98.7 °F (37.1 °C) 90 18 148/86 97 %   17 2303 99 °F (37.2 °C) 92 20 148/82 95 %   17 1920 99.2 °F (37.3 °C) 85 20 150/78 97 %   17 1610 98.5 °F (36.9 °C) 92 20 (!) 175/91 99 %       Intake/Output Summary (Last 24 hours) at 17 1412  Last data filed at 17 0644   Gross per 24 hour   Intake                0 ml   Output              250 ml   Net             -250 ml        PHYSICAL EXAM:  General: Alert, cooperative, no acute distress    EENT:  EOMI. Anicteric sclerae. Mucous membranes moist  Resp:  CTA bilaterally, no wheezing or rales. No accessory muscle use  CV:  Regular rhythm, no edema, chest pain is slightly reproducible  GI:  Soft, non distended, non tender. +Bowel sounds  Neurologic:  Alert and oriented X 3, normal speech  Psych:   Good insight, not anxious nor agitated  Skin:  No rashes, no jaundice  ________________________________________________________________________  Care Plan discussed with:    Comments   Patient x    Family      RN x    Care Manager     Consultant                        Multidiciplinary team rounds were held today with , nursing, pharmacist and clinical coordinator. Patient's plan of care was discussed; medications were reviewed and discharge planning was addressed. ________________________________________________________________________  Total NON critical care TIME:  35  Minutes    Total CRITICAL CARE TIME Spent: Minutes non procedure based         Comments   >50% of visit spent in counseling and coordination of care x Chart review    ________________________________________________________________________    ________________________________________________________________________  Fritz Stockton MD     Procedures: see electronic medical records for all procedures/Xrays and details which were not copied into this note but were reviewed prior to creation of Plan. LABS:  I reviewed today's most current labs and imaging studies.   Pertinent labs include:  Recent Labs      05/08/17 0247 05/07/17   0200   WBC  7.9  8.3   HGB  7.7*  7.0*   HCT  23.7*  21.4*   PLT  216  207     Recent Labs      05/08/17 0247 05/07/17   0200   NA  137  138   K  4.3  4.2   CL  100  101   CO2  26  29   GLU  111*  115*   BUN  53*  35*   CREA  10.50*  7.64*   CA  7.9*  7.8*   MG  1.9   --    PHOS  4.4   --    ALB  2.0*   --    TBILI  0.3   --    SGOT  19   --    ALT  19   -- Signed: Barry Mustafa MD

## 2017-05-09 NOTE — PROGRESS NOTES
Patient name: Missy Samuel  MRN: [id number]    Nephrology Progress note:    Assessment:  ESRD: MWF at [facility]  HTN: Uncontrolled-> improved s/p PUF  NSTEMI: troponin trending down  Anemia 2 to ESRD: Hgb suboptimal  Fluid overload- resolved    Plan/Recommendations:  Next HD tomm if pt still here  Epogen  BP improved with UF    Subjective:  Feels OK. Could not sleep well last night    ROS:   No nausea, no vomiting, cp   Improved sob    Exam:  Visit Vitals    BP (!) 146/92 (BP 1 Location: Right arm, BP Patient Position: At rest)    Pulse 73    Temp 98.3 °F (36.8 °C)    Resp 16    Ht 6' (1.829 m)    Wt 74.6 kg (164 lb 6.4 oz)    SpO2 96%    BMI 22.3 kg/m2     Wt Readings from Last 3 Encounters:   05/08/17 74.6 kg (164 lb 6.4 oz)   05/06/17 83.9 kg (185 lb)   04/19/17 77.9 kg (171 lb 11.2 oz)       Intake/Output Summary (Last 24 hours) at 05/09/17 0937  Last data filed at 05/09/17 0644   Gross per 24 hour   Intake                0 ml   Output              250 ml   Net             -250 ml       Gen: NAD  HEENT: No icterus, mmm, no oral exudate, AT/NC  Lungs/Chest wall: Clear. R TDC intact  Cardiovascular: Regular rate, normal rhythm. Ext: Immature LAVF.  No peripheral edema  CNS: alert awake    Current Facility-Administered Medications   Medication Dose Route Frequency Last Dose    HYDROmorphone (DILAUDID) injection 1 mg  1 mg IntraVENous Q4H PRN 1 mg at 05/09/17 0921    calcium carbonate (TUMS) chewable tablet 200 mg [elemental]  200 mg Oral TID  mg at 05/08/17 4462    nitroglycerin (NITROBID) 2 % ointment 1 Inch  1 Inch Topical Q6H PRN      HYDROmorphone (PF) (DILAUDID) injection 1 mg  1 mg IntraVENous Q4H PRN 1 mg at 05/08/17 2303    epoetin cristian (EPOGEN;PROCRIT) injection 10,000 Units  10,000 Units SubCUTAneous DIALYSIS MON, WED & FRI 10,000 Units at 05/08/17 2305    allopurinol (ZYLOPRIM) tablet 100 mg  100 mg Oral DAILY 100 mg at 05/09/17 0832    amLODIPine (NORVASC) tablet 10 mg  10 mg Oral DAILY 10 mg at 05/09/17 0832    b-complex with vitamin c tablet 1 Tab  1 Tab Oral DAILY 1 Tab at 05/09/17 0885    furosemide (LASIX) tablet 80 mg  80 mg Oral DAILY 80 mg at 05/09/17 0832    gabapentin (NEURONTIN) capsule 100 mg  100 mg Oral  mg at 05/09/17 9313    hydrALAZINE (APRESOLINE) tablet 50 mg  50 mg Oral TID 50 mg at 05/09/17 0832    therapeutic multivitamin (THERAGRAN) tablet 1 Tab  1 Tab Oral DAILY 1 Tab at 05/09/17 0204    pantoprazole (PROTONIX) tablet 40 mg  40 mg Oral DAILY 40 mg at 05/09/17 6679    doxercalciferol (HECTOROL) capsule 0.5 mcg  0.5 mcg Oral DAILY 0.5 mcg at 05/08/17 1337    propranolol (INDERAL) tablet 20 mg  20 mg Oral TID 20 mg at 05/09/17 0832    sevelamer (RENAGEL) tablet 800 mg  800 mg Oral  mg at 05/09/17 8337    terazosin (HYTRIN) capsule 2 mg  2 mg Oral QHS 2 mg at 05/08/17 2304    traMADol (ULTRAM) tablet 50 mg  50 mg Oral Q6H PRN 50 mg at 05/06/17 2310    cloNIDine HCl (CATAPRES) tablet 0.3 mg  0.3 mg Oral BID 0.3 mg at 05/09/17 8281    sodium chloride (NS) flush 5-10 mL  5-10 mL IntraVENous Q8H 10 mL at 05/09/17 0600    sodium chloride (NS) flush 5-10 mL  5-10 mL IntraVENous PRN 10 mL at 05/08/17 0825    ondansetron (ZOFRAN) injection 4 mg  4 mg IntraVENous Q4H PRN      heparin (porcine) injection 5,000 Units  5,000 Units SubCUTAneous Q8H 5,000 Units at 05/09/17 0638    nicotine (NICODERM CQ) 21 mg/24 hr patch 1 Patch  1 Patch TransDERmal Q24H 1 Patch at 05/09/17 0507    hydrALAZINE (APRESOLINE) 20 mg/mL injection 20 mg  20 mg IntraVENous Q6H PRN         Labs/Data:    Lab Results   Component Value Date/Time    WBC 7.9 05/08/2017 02:47 AM    Hemoglobin (POC) 8.8 04/19/2017 08:20 AM    HGB 7.7 05/08/2017 02:47 AM    Hematocrit (POC) 26 04/19/2017 08:20 AM    HCT 23.7 05/08/2017 02:47 AM    PLATELET 372 92/89/1792 02:47 AM    MCV 96.3 05/08/2017 02:47 AM       Lab Results   Component Value Date/Time    Sodium 137 05/08/2017 02:47 AM    Potassium 4.3 05/08/2017 02:47 AM    Chloride 100 05/08/2017 02:47 AM    CO2 26 05/08/2017 02:47 AM    Anion gap 11 05/08/2017 02:47 AM    Glucose 111 05/08/2017 02:47 AM    BUN 53 05/08/2017 02:47 AM    Creatinine 10.50 05/08/2017 02:47 AM    BUN/Creatinine ratio 5 05/08/2017 02:47 AM    GFR est AA 6 05/08/2017 02:47 AM    GFR est non-AA 5 05/08/2017 02:47 AM    Calcium 7.9 05/08/2017 02:47 AM       Patient seen and examined. Chart reviewed. Labs, data and other pertinent notes reviewed in last 24 hrs.     Discussed with patient/RN

## 2017-05-09 NOTE — PROGRESS NOTES
Pharmacy Medication Reconciliation     The patient was interviewed regarding current PTA medication list, use and drug allergies;  no one was present in room and obtained permission from patient to discuss drug regimen with visitor(s) present. The patient was questioned regarding use of any other inhalers, topical products, over the counter medications, herbal medications, vitamin products or ophthalmic/nasal/otic medication use. Recommendations/Findings: The following amendments were made to the patient's active medication list on file at [facility]:   1) Additions: none    2) Deletions: terazosin, ranitidine, furosemide 40 and 80 mg    3) Changes: gabapentin, tramadol    Per patient, he was released from assisted on April 10th and he was given a small amount of medication and he run out of Allopurinol, amlodipine, clonidine, hydralazine and tramadol. He asked if we can give him a prescription upon discharge and informed him to discuss that with the hospitalist.     Clarified PTA med list with the patient, who was not 100% sure about his PTA med list.    PTA medication list was corrected to the following:       Prior to Admission Medications   Prescriptions Last Dose Informant Patient Reported? Taking? FOLIC ACID/VIT B COMPLEX AND C (FULL SPECTRUM B-VITAMIN C PO)   Yes No   Sig: Take  by mouth. GABAPENTIN PO   Yes No   Sig: Take 300 mg by mouth two (2) times a day. TRAMADOL HCL (ULTRAM PO)   Yes No   Sig: Take 50 mg by mouth two (2) times a day. allopurinol (ZYLOPRIM) 100 mg tablet   Yes No   Sig: Take  by mouth daily. amLODIPine (NORVASC) 10 mg tablet   Yes No   Sig: Take 10 mg by mouth daily. cloNIDine HCl (CATAPRES) 0.3 mg tablet   Yes No   Sig: Take 0.3 mg by mouth two (2) times a day. hydrALAZINE (APRESOLINE) 50 mg tablet   Yes No   Sig: Take 50 mg by mouth three (3) times daily. multivitamin (ONE A DAY) tablet   Yes No   Sig: Take 1 Tab by mouth daily.    pantoprazole (PROTONIX) 40 mg tablet Yes No   Sig: Take 40 mg by mouth daily. paricalcitol (ZEMPLAR) 1 mcg capsule   Yes No   Sig: Take 1 mcg by mouth daily. propranolol (INDERAL) 20 mg tablet   Yes No   Sig: Take 20 mg by mouth three (3) times daily. sevelamer carbonate (RENVELA) 800 mg tab tab   Yes No   Sig: Take 800 mg by mouth three (3) times daily. sodium bicarbonate 650 mg tablet   Yes No   Sig: Take  by mouth four (4) times daily.       Facility-Administered Medications: None          Thank you,  Parry Dance, PHARMD

## 2017-05-09 NOTE — PROGRESS NOTES
Progress Note      5/9/2017 10:59 AM  NAME: Mayank Curry   MRN:  [id number]   Admit Diagnosis: Pulmonary edema     Assessment:     ASSESSMENT   1. Acute hypoxic respiratory failure secondary acute pulmonary   edema. Improved p Dialysis. 2. Acute pulmonary edema due to volume overload. Improved p Dialysis. 3. End-stage renal disease requiring dialysis. 4. Uncontrolled hypertension. 5. Elevated troponin level, possible non-ST elevation myocardial   infarction. 6. Leukocytosis. 7. Anemia of end-stage renal disease. 8. Tobacco abuse. 9. Alcohol abuse. Significant volume off yesterday. Doing much better. He is on room air. Echo -  Normal LV fx. Normal heart by echo. Plan:       Ok to discharge from my standpoint. Plan for f/u with his doctors at [facility]. [x]        High complexity decision making was performed    Subjective: Mayank Curry denies chest pain, dyspnea. Discussed with RN events overnight. Patient Active Problem List   Diagnosis Code    Pulmonary edema J81.1       Review of Systems:    Symptom Y/N Comments  Symptom Y/N Comments   Fever/Chills N   Chest Pain N    Poor Appetite N   Edema N    Cough N   Abdominal Pain N    Sputum N   Joint Pain N    SOB/WARNER N   Pruritis/Rash N    Nausea/vomit N   Tolerating PT/OT Y    Diarrhea N   Tolerating Diet Y    Constipation N   Other       Could NOT obtain due to:      Objective:      Physical Exam:    Last 24hrs VS reviewed since prior progress note.  Most recent are:    Visit Vitals    /66    Pulse 72    Temp 98.5 °F (36.9 °C)    Resp 16    Ht 6' (1.829 m)    Wt 74.6 kg (164 lb 6.4 oz)    SpO2 94%    BMI 22.3 kg/m2       Intake/Output Summary (Last 24 hours) at 05/09/17 1235  Last data filed at 05/09/17 0644   Gross per 24 hour   Intake                0 ml   Output              250 ml   Net             -250 ml        General Appearance: Well developed, well nourished, alert & oriented x 3,    no acute distress. Ears/Nose/Mouth/Throat: Hearing grossly normal.  Neck: Supple. Chest: Lungs clear to auscultation bilaterally. Cardiovascular: Regular rate and rhythm, S1S2 normal, no murmur. Abdomen: Soft, non-tender, bowel sounds are active. Extremities: No edema bilaterally. Skin: Warm and dry. PMH/SH reviewed - no change compared to H&P    Data Review    Telemetry: normal sinus rhythm     Lab Data Personally Reviewed:    Recent Labs      05/08/17   0247  05/07/17   0200   WBC  7.9  8.3   HGB  7.7*  7.0*   HCT  23.7*  21.4*   PLT  216  207   LABRCNT(INR:3,PTP:3,APTT:3,)  Recent Labs      05/08/17 0247 05/07/17   0200   NA  137  138   K  4.3  4.2   CL  100  101   CO2  26  29   BUN  53*  35*   CREA  10.50*  7.64*   GLU  111*  115*   CA  7.9*  7.8*   MG  1.9   --    LABRCNT(CPK:3,CpKMB:3,ckndx:3,troiq:3)No results found for: CHOL, CHOLX, CHLST, CHOLV, HDL, LDL, DLDL, LDLC, DLDLP, TGL, TGLX, TRIGL, TRIGP, CHHD, CHHDXLABRCNT(sgot:3,gpt:3,ap:3,tbiL:3,TP:3,ALB:3,GLOB:3,ggt:3,aml:3,amyp:3,lpse:3,hlpse:3)  No results for input(s): PH, PCO2, PO2 in the last 72 hours. No results found for: CHOL, CHOLX, CHLST, CHOLV, HDL, LDL, DLDL, LDLC, DLDLP, TGL, TGLX, TRIGL, TRIGP, CHHD, CHHDXMEDTABLETimlorraine Jones MD  No results for input(s): PH, PCO2, PO2 in the last 72 hours.     Medications Personally Reviewed:    Current Facility-Administered Medications   Medication Dose Route Frequency    HYDROmorphone (DILAUDID) injection 1 mg  1 mg IntraVENous Q4H PRN    calcium carbonate (TUMS) chewable tablet 200 mg [elemental]  200 mg Oral TID PRN    nitroglycerin (NITROBID) 2 % ointment 1 Inch  1 Inch Topical Q6H PRN    HYDROmorphone (PF) (DILAUDID) injection 1 mg  1 mg IntraVENous Q4H PRN    epoetin cristian (EPOGEN;PROCRIT) injection 10,000 Units  10,000 Units SubCUTAneous DIALYSIS MON, WED & FRI    allopurinol (ZYLOPRIM) tablet 100 mg  100 mg Oral DAILY    amLODIPine (NORVASC) tablet 10 mg  10 mg Oral DAILY    b-complex with vitamin c tablet 1 Tab  1 Tab Oral DAILY    furosemide (LASIX) tablet 80 mg  80 mg Oral DAILY    gabapentin (NEURONTIN) capsule 100 mg  100 mg Oral BID    hydrALAZINE (APRESOLINE) tablet 50 mg  50 mg Oral TID    therapeutic multivitamin (THERAGRAN) tablet 1 Tab  1 Tab Oral DAILY    pantoprazole (PROTONIX) tablet 40 mg  40 mg Oral DAILY    doxercalciferol (HECTOROL) capsule 0.5 mcg  0.5 mcg Oral DAILY    propranolol (INDERAL) tablet 20 mg  20 mg Oral TID    sevelamer (RENAGEL) tablet 800 mg  800 mg Oral TID    terazosin (HYTRIN) capsule 2 mg  2 mg Oral QHS    traMADol (ULTRAM) tablet 50 mg  50 mg Oral Q6H PRN    cloNIDine HCl (CATAPRES) tablet 0.3 mg  0.3 mg Oral BID    sodium chloride (NS) flush 5-10 mL  5-10 mL IntraVENous Q8H    sodium chloride (NS) flush 5-10 mL  5-10 mL IntraVENous PRN    ondansetron (ZOFRAN) injection 4 mg  4 mg IntraVENous Q4H PRN    heparin (porcine) injection 5,000 Units  5,000 Units SubCUTAneous Q8H    nicotine (NICODERM CQ) 21 mg/24 hr patch 1 Patch  1 Patch TransDERmal Q24H    hydrALAZINE (APRESOLINE) 20 mg/mL injection 20 mg  20 mg IntraVENous Q6H PRN         Segundo Kimble MD

## 2017-05-09 NOTE — PROGRESS NOTES
[address] [place] [address] Aid application given to patient.     2830 Dayna Solano RN CM  Ext [phone]

## 2017-05-10 ENCOUNTER — APPOINTMENT (OUTPATIENT)
Dept: NUCLEAR MEDICINE | Age: 52
DRG: 133 | End: 2017-05-10
Attending: INTERNAL MEDICINE
Payer: MEDICAID

## 2017-05-10 LAB
ANION GAP BLD CALC-SCNC: 13 MMOL/L (ref 5–15)
ATRIAL RATE: 84 BPM
BASOPHILS # BLD AUTO: 0 K/UL (ref 0–0.1)
BASOPHILS # BLD: 1 % (ref 0–1)
BUN SERPL-MCNC: 51 MG/DL (ref 6–20)
BUN/CREAT SERPL: 5 (ref 12–20)
CALCIUM SERPL-MCNC: 7.9 MG/DL (ref 8.5–10.1)
CALCULATED P AXIS, ECG09: 52 DEGREES
CALCULATED R AXIS, ECG10: 11 DEGREES
CALCULATED T AXIS, ECG11: 122 DEGREES
CHLORIDE SERPL-SCNC: 99 MMOL/L (ref 97–108)
CK MB CFR SERPL CALC: 1 % (ref 0–2.5)
CK MB SERPL-MCNC: 1.3 NG/ML (ref 5–25)
CK SERPL-CCNC: 126 U/L (ref 39–308)
CO2 SERPL-SCNC: 24 MMOL/L (ref 21–32)
CREAT SERPL-MCNC: 10.7 MG/DL (ref 0.7–1.3)
DIAGNOSIS, 93000: NORMAL
EOSINOPHIL # BLD: 0.3 K/UL (ref 0–0.4)
EOSINOPHIL NFR BLD: 5 % (ref 0–7)
ERYTHROCYTE [DISTWIDTH] IN BLOOD BY AUTOMATED COUNT: 15.5 % (ref 11.5–14.5)
GLUCOSE SERPL-MCNC: 97 MG/DL (ref 65–100)
HCT VFR BLD AUTO: 24.5 % (ref 36.6–50.3)
HGB BLD-MCNC: 8.2 G/DL (ref 12.1–17)
LYMPHOCYTES # BLD AUTO: 25 % (ref 12–49)
LYMPHOCYTES # BLD: 1.6 K/UL (ref 0.8–3.5)
MCH RBC QN AUTO: 32.4 PG (ref 26–34)
MCHC RBC AUTO-ENTMCNC: 33.5 G/DL (ref 30–36.5)
MCV RBC AUTO: 96.8 FL (ref 80–99)
MONOCYTES # BLD: 0.6 K/UL (ref 0–1)
MONOCYTES NFR BLD AUTO: 9 % (ref 5–13)
NEUTS SEG # BLD: 4 K/UL (ref 1.8–8)
NEUTS SEG NFR BLD AUTO: 60 % (ref 32–75)
P-R INTERVAL, ECG05: 180 MS
PLATELET # BLD AUTO: 227 K/UL (ref 150–400)
POTASSIUM SERPL-SCNC: 4.1 MMOL/L (ref 3.5–5.1)
Q-T INTERVAL, ECG07: 408 MS
QRS DURATION, ECG06: 84 MS
QTC CALCULATION (BEZET), ECG08: 482 MS
RBC # BLD AUTO: 2.53 M/UL (ref 4.1–5.7)
SODIUM SERPL-SCNC: 136 MMOL/L (ref 136–145)
TROPONIN I SERPL-MCNC: 1.38 NG/ML
VENTRICULAR RATE, ECG03: 84 BPM
WBC # BLD AUTO: 6.6 K/UL (ref 4.1–11.1)

## 2017-05-10 PROCEDURE — 93005 ELECTROCARDIOGRAM TRACING: CPT

## 2017-05-10 PROCEDURE — 36415 COLL VENOUS BLD VENIPUNCTURE: CPT | Performed by: INTERNAL MEDICINE

## 2017-05-10 PROCEDURE — 84484 ASSAY OF TROPONIN QUANT: CPT | Performed by: INTERNAL MEDICINE

## 2017-05-10 PROCEDURE — A9500 TC99M SESTAMIBI: HCPCS

## 2017-05-10 PROCEDURE — 65660000000 HC RM CCU STEPDOWN

## 2017-05-10 PROCEDURE — 74011250636 HC RX REV CODE- 250/636: Performed by: INTERNAL MEDICINE

## 2017-05-10 PROCEDURE — 74011250636 HC RX REV CODE- 250/636: Performed by: HOSPITALIST

## 2017-05-10 PROCEDURE — 93017 CV STRESS TEST TRACING ONLY: CPT

## 2017-05-10 PROCEDURE — 90935 HEMODIALYSIS ONE EVALUATION: CPT

## 2017-05-10 PROCEDURE — 74011250636 HC RX REV CODE- 250/636

## 2017-05-10 PROCEDURE — 74011250637 HC RX REV CODE- 250/637: Performed by: HOSPITALIST

## 2017-05-10 PROCEDURE — 85025 COMPLETE CBC W/AUTO DIFF WBC: CPT | Performed by: INTERNAL MEDICINE

## 2017-05-10 PROCEDURE — 82550 ASSAY OF CK (CPK): CPT | Performed by: INTERNAL MEDICINE

## 2017-05-10 PROCEDURE — 80048 BASIC METABOLIC PNL TOTAL CA: CPT | Performed by: INTERNAL MEDICINE

## 2017-05-10 RX ORDER — AMINOPHYLLINE 25 MG/ML
125 INJECTION, SOLUTION INTRAVENOUS ONCE
Status: COMPLETED | OUTPATIENT
Start: 2017-05-10 | End: 2017-05-10

## 2017-05-10 RX ORDER — SODIUM CHLORIDE 0.9 % (FLUSH) 0.9 %
SYRINGE (ML) INJECTION
Status: COMPLETED
Start: 2017-05-10 | End: 2017-05-10

## 2017-05-10 RX ORDER — ALBUMIN HUMAN 250 G/1000ML
12.5 SOLUTION INTRAVENOUS
Status: DISCONTINUED | OUTPATIENT
Start: 2017-05-10 | End: 2017-05-13 | Stop reason: HOSPADM

## 2017-05-10 RX ADMIN — HYDROMORPHONE HYDROCHLORIDE 1 MG: 2 INJECTION INTRAMUSCULAR; INTRAVENOUS; SUBCUTANEOUS at 04:37

## 2017-05-10 RX ADMIN — FUROSEMIDE 80 MG: 80 TABLET ORAL at 10:25

## 2017-05-10 RX ADMIN — HYDRALAZINE HYDROCHLORIDE 50 MG: 50 TABLET ORAL at 10:25

## 2017-05-10 RX ADMIN — ERYTHROPOIETIN 10000 UNITS: 10000 INJECTION, SOLUTION INTRAVENOUS; SUBCUTANEOUS at 10:29

## 2017-05-10 RX ADMIN — PROPRANOLOL HYDROCHLORIDE 20 MG: 10 TABLET ORAL at 10:25

## 2017-05-10 RX ADMIN — HEPARIN SODIUM 5000 UNITS: 5000 INJECTION, SOLUTION INTRAVENOUS; SUBCUTANEOUS at 22:18

## 2017-05-10 RX ADMIN — HYDROMORPHONE HYDROCHLORIDE 1 MG: 2 INJECTION INTRAMUSCULAR; INTRAVENOUS; SUBCUTANEOUS at 22:13

## 2017-05-10 RX ADMIN — HEPARIN SODIUM 5000 UNITS: 5000 INJECTION, SOLUTION INTRAVENOUS; SUBCUTANEOUS at 05:33

## 2017-05-10 RX ADMIN — HYDRALAZINE HYDROCHLORIDE 50 MG: 50 TABLET ORAL at 22:20

## 2017-05-10 RX ADMIN — Medication 10 ML: at 18:18

## 2017-05-10 RX ADMIN — Medication 10 ML: at 22:22

## 2017-05-10 RX ADMIN — ERYTHROPOIETIN 10000 UNITS: 10000 INJECTION, SOLUTION INTRAVENOUS; SUBCUTANEOUS at 22:31

## 2017-05-10 RX ADMIN — TERAZOSIN HYDROCHLORIDE 2 MG: 1 CAPSULE ORAL at 22:22

## 2017-05-10 RX ADMIN — CLONIDINE HYDROCHLORIDE 0.3 MG: 0.1 TABLET ORAL at 10:25

## 2017-05-10 RX ADMIN — RENAGEL 800 MG: 800 TABLET ORAL at 15:36

## 2017-05-10 RX ADMIN — Medication 10 ML: at 14:42

## 2017-05-10 RX ADMIN — ALLOPURINOL 100 MG: 100 TABLET ORAL at 10:25

## 2017-05-10 RX ADMIN — RENAGEL 800 MG: 800 TABLET ORAL at 10:26

## 2017-05-10 RX ADMIN — GABAPENTIN 100 MG: 100 CAPSULE ORAL at 18:05

## 2017-05-10 RX ADMIN — PROPRANOLOL HYDROCHLORIDE 20 MG: 10 TABLET ORAL at 22:20

## 2017-05-10 RX ADMIN — PANTOPRAZOLE SODIUM 40 MG: 40 TABLET, DELAYED RELEASE ORAL at 10:26

## 2017-05-10 RX ADMIN — RENAGEL 800 MG: 800 TABLET ORAL at 22:21

## 2017-05-10 RX ADMIN — GABAPENTIN 100 MG: 100 CAPSULE ORAL at 10:26

## 2017-05-10 RX ADMIN — THERA TABS 1 TABLET: TAB at 10:25

## 2017-05-10 RX ADMIN — AMINOPHYLLINE 125 MG: 25 INJECTION, SOLUTION INTRAVENOUS at 13:21

## 2017-05-10 RX ADMIN — AMLODIPINE BESYLATE 10 MG: 5 TABLET ORAL at 10:25

## 2017-05-10 RX ADMIN — VITAMIN C 1 TABLET: TAB at 11:41

## 2017-05-10 RX ADMIN — PROPRANOLOL HYDROCHLORIDE 20 MG: 10 TABLET ORAL at 15:36

## 2017-05-10 RX ADMIN — HYDROMORPHONE HYDROCHLORIDE 1 MG: 2 INJECTION INTRAMUSCULAR; INTRAVENOUS; SUBCUTANEOUS at 18:15

## 2017-05-10 RX ADMIN — HEPARIN SODIUM 5000 UNITS: 5000 INJECTION, SOLUTION INTRAVENOUS; SUBCUTANEOUS at 14:41

## 2017-05-10 RX ADMIN — HYDROMORPHONE HYDROCHLORIDE 1 MG: 2 INJECTION INTRAMUSCULAR; INTRAVENOUS; SUBCUTANEOUS at 14:42

## 2017-05-10 RX ADMIN — Medication 10 ML: at 13:07

## 2017-05-10 RX ADMIN — HYDRALAZINE HYDROCHLORIDE 50 MG: 50 TABLET ORAL at 15:36

## 2017-05-10 RX ADMIN — HYDROMORPHONE HYDROCHLORIDE 1 MG: 2 INJECTION INTRAMUSCULAR; INTRAVENOUS; SUBCUTANEOUS at 10:27

## 2017-05-10 RX ADMIN — DOXERCALCIFEROL 0.5 MCG: 0.5 CAPSULE, LIQUID FILLED ORAL at 11:41

## 2017-05-10 RX ADMIN — CLONIDINE HYDROCHLORIDE 0.3 MG: 0.1 TABLET ORAL at 22:17

## 2017-05-10 RX ADMIN — REGADENOSON 0.4 MG: 0.08 INJECTION, SOLUTION INTRAVENOUS at 13:07

## 2017-05-10 NOTE — ROUTINE PROCESS
Bedside, Verbal and Written shift change report given to Jose Alfredo Gonzalez RN  (oncoming nurse) by Berta Jiang RN  (offgoing nurse). Report included the following information SBAR, Kardex, MAR and Recent Results.

## 2017-05-10 NOTE — PROCEDURES
[facility]  [phone]    Vitals   Pre   Post   Assessment   Pre   Post     Temp  Temp: 98.6 °F (37 °C) (05/10/17 0713)  98.6 LOC  Alert and oriented x4 same   HR   Pulse (Heart Rate): 96 (05/10/17 0713) 99 Lungs   Clear on room air  same   B/P   BP: 158/84 (05/10/17 0713) 162/88 Cardiac   wnl  NSR on telemetry  same   Resp   Resp Rate: 20 (05/10/17 0713) 18 Skin   intact  same   Pain level  pt states he has constant chest tightness 8/10 but showing no signs of pain at this time Same. Primary nurse aware Edema  None noted     same   Orders:    Duration:   Start:    4693 End:   1005 Total:   3hrs   Dialyzer:   Dialyzer/Set Up Inspection: Kayy Portillo (05/10/17 0713)   Teresa Garcia Bath:   Dialysate K (mEq/L): 3 (05/10/17 0713)   Ca Bath:   Dialysate CA (mEq/L): 2.5 (05/10/17 0713)   Na/Bicarb:   Dialysate NA (mEq/L): 140 (05/10/17 0713)   Target Fluid Removal:   Goal/Amount of Fluid to Remove (mL): 3000 mL (05/10/17 0713)   Access     Type & Location:   Rt subclavian CVC, ports cleaned with alcavis and blood aspirated without any issues. Lt arm fistula present but not in use, had issues cannulating lt arm fistula.  Pt states he had access placed over a year ago   Labs     Obtained/Reviewed   Critical Results Called     HGB   Date Value Ref Range Status   05/10/2017 8.2 (L) 12.1 - 17.0 g/dL Final     K-    Potassium   Date Value Ref Range Status   05/10/2017 4.1 3.5 - 5.1 mmol/L Final     Ca-   Calcium   Date Value Ref Range Status   05/10/2017 7.9 (L) 8.5 - 10.1 MG/DL Final     Bun-   BUN   Date Value Ref Range Status   05/10/2017 51 (H) 6 - 20 MG/DL Final     Creat-   Creatinine   Date Value Ref Range Status   05/10/2017 10.70 (H) 0.70 - 1.30 MG/DL Final        Medications/ Blood Products Given     Name   Dose   Route and Time     Epogen [id number] Med not available to give so primary nurse reminded to give             Blood Volume Processed (BVP):   73.4 Net Fluid   Removed:  3.2kg   Comments Time Out Done: 0700  Primary Nurse Rpt Pre:Kamini  Primary Nurse Rpt Post:Bianka  Pt Education:pain medication during treatment  Care Plan:acute renal failure  Tx Summary:  2058 treatment started  1005Dialysis completed and blood rinsed back. New caps placed on each port and  report given to nurse. Nurse also reminded to given epogen dose since medication was not available to give during treatment  Admiting Diagnosis: Respiratory failure, NSTEMI  Pt's previous clinic- [facility]  Consent signed - Informed Consent Verified: Yes (05/10/17 2574)  [facility] Consent - on file  Hepatitis Status- Neg as of 5/6/17  Machine #- Machine Number: [id number] (05/10/17 3799)  Telemetry status-NSR  Pre-dialysis wt. - Pre-Dialysis Weight: 73 kg (160 lb 15 oz) (05/10/17 8655)

## 2017-05-10 NOTE — PROGRESS NOTES
NUC MED:  Cardiac study in progress. Pt for stress images at 2:45pm. Please check with Physician regarding pt diet.

## 2017-05-10 NOTE — PROGRESS NOTES
Progress Note      5/10/2017 10:59 AM  NAME: Sandra Sebastian   MRN:  [id number]   Admit Diagnosis: Pulmonary edema     Assessment:     ASSESSMENT   1. Acute hypoxic respiratory failure secondary acute pulmonary   edema. Improved p Dialysis. 2. Acute pulmonary edema due to volume overload. Improved p Dialysis. 3. End-stage renal disease requiring dialysis. 4. Uncontrolled hypertension. 5. Elevated troponin level, possible non-ST elevation myocardial   infarction. 6. Leukocytosis. 7. Anemia of end-stage renal disease. 8. Tobacco abuse. 9. Alcohol abuse. Significant volume off yesterday. Doing much better. He is on room air. Echo -  Normal LV fx. Normal heart by echo. 5/10  Had dialysis this AM.   Notes chest pain present since admission. Anterior chest .   Continuous for last 4 days. Not affected by position . Not affected by activity. Present at rest and when sitting. Patient has not been walking in the rosario for some reason. Hx of heroin use in past.  Not sure of current status. No drug screen on admission. Elevated Troponin on admission with acute volume overload and acute respiratory distress due to volume. Improved with dialysis. Was on BIPAP on admission, due to severe respiratory distress. Normal / vigorous LV fx. No hx of heart disease. Plan: Will do stress nuclear to assess for ischemia    Plan for f/u with his doctors at [address] [place] [address]. [x]        High complexity decision making was performed    Subjective: Sandra Sebastian denies chest pain, dyspnea. Discussed with RN events overnight.      Patient Active Problem List   Diagnosis Code    Pulmonary edema J81.1       Review of Systems:    Symptom Y/N Comments  Symptom Y/N Comments   Fever/Chills N   Chest Pain N    Poor Appetite N   Edema N    Cough N   Abdominal Pain N    Sputum N   Joint Pain N    SOB/WARNER N   Pruritis/Rash N    Nausea/vomit N   Tolerating PT/OT Y Diarrhea N   Tolerating Diet Y    Constipation N   Other       Could NOT obtain due to:      Objective:      Physical Exam:    Last 24hrs VS reviewed since prior progress note. Most recent are:    Visit Vitals    /88    Pulse 89    Temp 98.6 °F (37 °C)    Resp 20    Ht 6' (1.829 m)    Wt 73 kg (160 lb 15 oz)    SpO2 97%    BMI 21.83 kg/m2       Intake/Output Summary (Last 24 hours) at 05/10/17 1027  Last data filed at 05/10/17 1009   Gross per 24 hour   Intake                0 ml   Output             3650 ml   Net            -3650 ml        General Appearance: Well developed, well nourished, alert & oriented x 3,    no acute distress. Ears/Nose/Mouth/Throat: Hearing grossly normal.  Neck: Supple. Chest: Lungs clear to auscultation bilaterally. Cardiovascular: Regular rate and rhythm, S1S2 normal, no murmur. Abdomen: Soft, non-tender, bowel sounds are active. Extremities: No edema bilaterally. Skin: Warm and dry. PMH/SH reviewed - no change compared to H&P    Data Review    Telemetry: normal sinus rhythm     Lab Data Personally Reviewed:    Recent Labs      05/10/17   0431  05/08/17   0247   WBC  6.6  7.9   HGB  8.2*  7.7*   HCT  24.5*  23.7*   PLT  227  216   LABRCNT(INR:3,PTP:3,APTT:3,)  Recent Labs      05/10/17   0431  05/08/17   0247   NA  136  137   K  4.1  4.3   CL  99  100   CO2  24  26   BUN  51*  53*   CREA  10.70*  10.50*   GLU  97  111*   CA  7.9*  7.9*   MG   --   1.9   LABRCNT(CPK:3,CpKMB:3,ckndx:3,troiq:3)No results found for: CHOL, CHOLX, CHLST, CHOLV, HDL, LDL, DLDL, LDLC, DLDLP, TGL, TGLX, TRIGL, TRIGP, CHHD, CHHDXLABRCNT(sgot:3,gpt:3,ap:3,tbiL:3,TP:3,ALB:3,GLOB:3,ggt:3,aml:3,amyp:3,lpse:3,hlpse:3)  No results for input(s): PH, PCO2, PO2 in the last 72 hours.   No results found for: CHOL, CHOLX, CHLST, CHOLV, HDL, LDL, DLDL, LDLC, DLDLP, TGL, TGLX, TRIGL, TRIGP, CHHD, CHHDXMEDTABLETimothy Mishel Redd MD  No results for input(s): PH, PCO2, PO2 in the last 72 hours.    Medications Personally Reviewed:    Current Facility-Administered Medications   Medication Dose Route Frequency    albumin human 25% (BUMINATE) solution 12.5 g  12.5 g IntraVENous DIALYSIS PRN    HYDROmorphone (DILAUDID) injection 1 mg  1 mg IntraVENous Q4H PRN    calcium carbonate (TUMS) chewable tablet 200 mg [elemental]  200 mg Oral TID PRN    nitroglycerin (NITROBID) 2 % ointment 1 Inch  1 Inch Topical Q6H PRN    HYDROmorphone (PF) (DILAUDID) injection 1 mg  1 mg IntraVENous Q4H PRN    epoetin cristian (EPOGEN;PROCRIT) injection 10,000 Units  10,000 Units SubCUTAneous DIALYSIS MON, WED & FRI    allopurinol (ZYLOPRIM) tablet 100 mg  100 mg Oral DAILY    amLODIPine (NORVASC) tablet 10 mg  10 mg Oral DAILY    b-complex with vitamin c tablet 1 Tab  1 Tab Oral DAILY    furosemide (LASIX) tablet 80 mg  80 mg Oral DAILY    gabapentin (NEURONTIN) capsule 100 mg  100 mg Oral BID    hydrALAZINE (APRESOLINE) tablet 50 mg  50 mg Oral TID    therapeutic multivitamin (THERAGRAN) tablet 1 Tab  1 Tab Oral DAILY    pantoprazole (PROTONIX) tablet 40 mg  40 mg Oral DAILY    doxercalciferol (HECTOROL) capsule 0.5 mcg  0.5 mcg Oral DAILY    propranolol (INDERAL) tablet 20 mg  20 mg Oral TID    sevelamer (RENAGEL) tablet 800 mg  800 mg Oral TID    terazosin (HYTRIN) capsule 2 mg  2 mg Oral QHS    traMADol (ULTRAM) tablet 50 mg  50 mg Oral Q6H PRN    cloNIDine HCl (CATAPRES) tablet 0.3 mg  0.3 mg Oral BID    sodium chloride (NS) flush 5-10 mL  5-10 mL IntraVENous Q8H    sodium chloride (NS) flush 5-10 mL  5-10 mL IntraVENous PRN    ondansetron (ZOFRAN) injection 4 mg  4 mg IntraVENous Q4H PRN    heparin (porcine) injection 5,000 Units  5,000 Units SubCUTAneous Q8H    nicotine (NICODERM CQ) 21 mg/24 hr patch 1 Patch  1 Patch TransDERmal Q24H    hydrALAZINE (APRESOLINE) 20 mg/mL injection 20 mg  20 mg IntraVENous Q6H PRN         Piter Stoll MD

## 2017-05-10 NOTE — PROGRESS NOTES
Patient name: Jessica Puentes  MRN: [id number]    Nephrology Progress note:    Assessment:  ESRD: MWF at [facility]; seen on HD. Tolerating well. Revaclear dialyzer, 400 QB, using R TDC  3K/2.5 Ca. UF of 3 to 3.2 Kg as tolerated  HTN: Uncontrolled-> improved overall  NSTEMI: troponin trending down; for stress today  Anemia 2 to ESRD: Hgb suboptimal  Fluid overload- resolved; he is off O2; he reports sob, but clear lungs, no O2 and no distress  Doing around 3 Kg UF today with HD    Plan/Recommendations:  HD today as above. Next on Friday if pt still here  Ct current BP meds  Ct Renagel    Subjective:  Seen on HD. Tolerating well. \"I don't have transportation so I can't go home\"  Asking HD RN to give fluid flushes every so often on HD- but then c/o sob  Not on O2. No distress    ROS:   No nausea, no vomiting, cp   +sob    Exam:  Visit Vitals    /88    Pulse 89    Temp 98.6 °F (37 °C)    Resp 20    Ht 6' (1.829 m)    Wt 73 kg (160 lb 15 oz)    SpO2 97%    BMI 21.83 kg/m2     Wt Readings from Last 3 Encounters:   05/10/17 73 kg (160 lb 15 oz)   05/06/17 83.9 kg (185 lb)   04/19/17 77.9 kg (171 lb 11.2 oz)       Intake/Output Summary (Last 24 hours) at 05/10/17 1113  Last data filed at 05/10/17 1009   Gross per 24 hour   Intake                0 ml   Output             3650 ml   Net            -3650 ml       Gen: NAD  Lungs/Chest wall: Clear. R TDC intact  Cardiovascular: Regular rate, normal rhythm. Ext: Immature LAVF.  No peripheral edema  CNS: alert awake    Current Facility-Administered Medications   Medication Dose Route Frequency Last Dose    albumin human 25% (BUMINATE) solution 12.5 g  12.5 g IntraVENous DIALYSIS PRN      HYDROmorphone (DILAUDID) injection 1 mg  1 mg IntraVENous Q4H PRN 1 mg at 05/10/17 1027    calcium carbonate (TUMS) chewable tablet 200 mg [elemental]  200 mg Oral TID  mg at 05/09/17 2259    nitroglycerin (NITROBID) 2 % ointment 1 Inch  1 Inch Topical Q6H PRN      HYDROmorphone (PF) (DILAUDID) injection 1 mg  1 mg IntraVENous Q4H PRN 1 mg at 05/08/17 2303    epoetin cristian (EPOGEN;PROCRIT) injection 10,000 Units  10,000 Units SubCUTAneous DIALYSIS MON, WED & FRI 10,000 Units at 05/10/17 1029    allopurinol (ZYLOPRIM) tablet 100 mg  100 mg Oral DAILY 100 mg at 05/10/17 1025    amLODIPine (NORVASC) tablet 10 mg  10 mg Oral DAILY 10 mg at 05/10/17 1025    b-complex with vitamin c tablet 1 Tab  1 Tab Oral DAILY 1 Tab at 05/09/17 0921    furosemide (LASIX) tablet 80 mg  80 mg Oral DAILY 80 mg at 05/10/17 1025    gabapentin (NEURONTIN) capsule 100 mg  100 mg Oral  mg at 05/10/17 1026    hydrALAZINE (APRESOLINE) tablet 50 mg  50 mg Oral TID 50 mg at 05/10/17 1025    therapeutic multivitamin (THERAGRAN) tablet 1 Tab  1 Tab Oral DAILY 1 Tab at 05/10/17 1025    pantoprazole (PROTONIX) tablet 40 mg  40 mg Oral DAILY 40 mg at 05/10/17 1026    doxercalciferol (HECTOROL) capsule 0.5 mcg  0.5 mcg Oral DAILY 0.5 mcg at 05/09/17 1018    propranolol (INDERAL) tablet 20 mg  20 mg Oral TID 20 mg at 05/10/17 1025    sevelamer (RENAGEL) tablet 800 mg  800 mg Oral  mg at 05/10/17 1026    terazosin (HYTRIN) capsule 2 mg  2 mg Oral QHS 2 mg at 05/09/17 2143    traMADol (ULTRAM) tablet 50 mg  50 mg Oral Q6H PRN 50 mg at 05/06/17 2310    cloNIDine HCl (CATAPRES) tablet 0.3 mg  0.3 mg Oral BID 0.3 mg at 05/10/17 1025    sodium chloride (NS) flush 5-10 mL  5-10 mL IntraVENous Q8H 10 mL at 05/09/17 2144    sodium chloride (NS) flush 5-10 mL 5-10 mL IntraVENous PRN 10 mL at 05/08/17 0825    ondansetron (ZOFRAN) injection 4 mg  4 mg IntraVENous Q4H PRN      heparin (porcine) injection 5,000 Units  5,000 Units SubCUTAneous Q8H 5,000 Units at 05/10/17 0533    nicotine (NICODERM CQ) 21 mg/24 hr patch 1 Patch  1 Patch TransDERmal Q24H 1 Patch at 05/10/17 0534    hydrALAZINE (APRESOLINE) 20 mg/mL injection 20 mg  20 mg IntraVENous Q6H PRN         Labs/Data:    Lab Results   Component Value Date/Time    WBC 6.6 05/10/2017 04:31 AM    Hemoglobin (POC) 8.8 04/19/2017 08:20 AM    HGB 8.2 05/10/2017 04:31 AM    Hematocrit (POC) 26 04/19/2017 08:20 AM    HCT 24.5 05/10/2017 04:31 AM    PLATELET 667 95/53/6582 04:31 AM    MCV 96.8 05/10/2017 04:31 AM       Lab Results   Component Value Date/Time    Sodium 136 05/10/2017 04:31 AM    Potassium 4.1 05/10/2017 04:31 AM    Chloride 99 05/10/2017 04:31 AM    CO2 24 05/10/2017 04:31 AM    Anion gap 13 05/10/2017 04:31 AM    Glucose 97 05/10/2017 04:31 AM    BUN 51 05/10/2017 04:31 AM    Creatinine 10.70 05/10/2017 04:31 AM    BUN/Creatinine ratio 5 05/10/2017 04:31 AM    GFR est AA 6 05/10/2017 04:31 AM    GFR est non-AA 5 05/10/2017 04:31 AM    Calcium 7.9 05/10/2017 04:31 AM       Patient seen and examined. Chart reviewed. Labs, data and other pertinent notes reviewed in last 24 hrs.     Discussed with patient/HD RN

## 2017-05-10 NOTE — PROGRESS NOTES
Hospitalist Progress Note    NAME: Mayank Curry   :  1965   MRN:  [id number]   LOS:   4      Assessment / Plan:  Acute hypoxic respiratory failure   due to Acute Pulmonary Edema in settings of ESRD and NSTEMI  S/p Daily HD, now back to his regular schedule  Off O2  Appreciate nephrology help    CP/NSTEMI POA - Troponin Peaked to 5.53  Pt continue to have chest pains despite fluid removal. I discussed the issue with cardiology Dr Lanny Tejeda who will assess the situation  Not on ASA at this time due to NSAID allergy  CTA negative for PE  2D echo with normal EF    Hypertensive Urgency POA  Due to volume overload  BP better with HD    Leukocytosis   Likely stress related, resolved     Anemia ESRD  Monitor Hb  EPO and transfusion per nephrology with HD    Tobacco Abuse     Code status: Full  Prophylaxis: Hep SQ  Recommended Disposition: Home w/Family     Subjective: Pt seen and examined at bedside. Continues to have chest pain. NAD. Overnight events d/w RN     Chief Complaint: f/u \"chest pain\"      Review of Systems:  Symptom Y/N Comments  Symptom Y/N Comments   Fever/Chills n   Chest Pain y    Poor Appetite    Edema     Cough n   Abdominal Pain n    Sputum    Joint Pain     SOB/WARNER n   Pruritis/Rash     Nausea/vomit    Tolerating PT/OT     Diarrhea    Tolerating Diet y    Constipation    Other       Could NOT obtain due to:      Objective:     VITALS:   Last 24hrs VS reviewed since prior progress note.  Most recent are:  Patient Vitals for the past 24 hrs:   Temp Pulse Resp BP SpO2   05/10/17 0830 - 88 - (!) 161/94 -   05/10/17 0800 - 77 - 140/74 -   05/10/17 0730 - 82 20 144/85 -   05/10/17 0713 98.6 °F (37 °C) 96 20 158/84 -   05/10/17 0432 98.1 °F (36.7 °C) 81 20 146/86 97 %   17 2331 99 °F (37.2 °C) 88 20 156/90 92 %   17 2114 98.6 °F (37 °C) 86 20 149/85 95 %   17 1547 98.1 °F (36.7 °C) 80 18 122/71 97 %   17 1225 98.5 °F (36.9 °C) 72 16 120/66 94 %       Intake/Output Summary (Last 24 hours) at 05/10/17 2621  Last data filed at 05/09/17 1534   Gross per 24 hour   Intake                0 ml   Output              450 ml   Net             -450 ml        PHYSICAL EXAM:  General: Alert, cooperative, no acute distress    EENT:  EOMI. Anicteric sclerae. Mucous membranes moist  Resp:  CTA bilaterally, no wheezing or rales. No accessory muscle use  CV:  Regular rhythm, no edema, chest pain is slightly reproducible  GI:  Soft, non distended, non tender. +Bowel sounds  Neurologic:  Alert and oriented X 3, normal speech  Psych:   Good insight, not anxious nor agitated  Skin:  No rashes, no jaundice  ________________________________________________________________________  Care Plan discussed with:    Comments   Patient x    Family      RN x    Care Manager     Consultant  x Cardiology Dr Virgene Kehr team rounds were held today with , nursing, pharmacist and clinical coordinator. Patient's plan of care was discussed; medications were reviewed and discharge planning was addressed. ________________________________________________________________________  Total NON critical care TIME:  35  Minutes    Total CRITICAL CARE TIME Spent: Minutes non procedure based         Comments   >50% of visit spent in counseling and coordination of care x Discussion with cardiology   ________________________________________________________________________    ________________________________________________________________________  Benji Kelley MD     Procedures: see electronic medical records for all procedures/Xrays and details which were not copied into this note but were reviewed prior to creation of Plan. LABS:  I reviewed today's most current labs and imaging studies.   Pertinent labs include:  Recent Labs      05/10/17   0431  05/08/17   0247   WBC  6.6  7.9   HGB  8.2*  7.7*   HCT  24.5*  23.7*   PLT  227  216     Recent Labs      05/10/17   0431  05/08/17   0247 NA  136  137   K  4.1  4.3   CL  99  100   CO2  24  26   GLU  97  111*   BUN  51*  53*   CREA  10.70*  10.50*   CA  7.9*  7.9*   MG   --   1.9   PHOS   --   4.4   ALB   --   2.0*   TBILI   --   0.3   SGOT   --   19   ALT   --   19       Signed: Barry Mustafa MD

## 2017-05-11 ENCOUNTER — APPOINTMENT (OUTPATIENT)
Dept: GENERAL RADIOLOGY | Age: 52
DRG: 133 | End: 2017-05-11
Attending: INTERNAL MEDICINE
Payer: MEDICAID

## 2017-05-11 LAB
AMPHET UR QL SCN: NEGATIVE
ATRIAL RATE: 85 BPM
ATTENDING PHYSICIAN, CST07: NORMAL
BARBITURATES UR QL SCN: NEGATIVE
BENZODIAZ UR QL: NEGATIVE
CALCULATED P AXIS, ECG09: 44 DEGREES
CALCULATED R AXIS, ECG10: 9 DEGREES
CALCULATED T AXIS, ECG11: 65 DEGREES
CANNABINOIDS UR QL SCN: NEGATIVE
COCAINE UR QL SCN: NEGATIVE
DIAGNOSIS, 93000: NORMAL
DIAGNOSIS, 93000: NORMAL
DRUG SCRN COMMENT,DRGCM: ABNORMAL
DUKE TM SCORE RESULT, CST14: NORMAL
DUKE TREADMILL SCORE, CST13: 5456
ECG INTERP BEFORE EX, CST11: NORMAL
ECG INTERP DURING EX, CST12: NORMAL
FUNCTIONAL CAPACITY, CST17: NORMAL
KNOWN CARDIAC CONDITION, CST08: NORMAL
MAX. DIASTOLIC BP, CST04: 96 MMHG
MAX. HEART RATE, CST05: 96 BPM
MAX. SYSTOLIC BP, CST03: 172 MMHG
METHADONE UR QL: NEGATIVE
OPIATES UR QL: POSITIVE
OVERALL BP RESPONSE TO EXERCISE, CST16: NORMAL
OVERALL HR RESPONSE TO EXERCISE, CST15: NORMAL
P-R INTERVAL, ECG05: 166 MS
PCP UR QL: NEGATIVE
PEAK EX METS, CST10: 1 METS
PROTOCOL NAME, CST01: NORMAL
Q-T INTERVAL, ECG07: 378 MS
QRS DURATION, ECG06: 84 MS
QTC CALCULATION (BEZET), ECG08: 449 MS
TEST INDICATION, CST09: NORMAL
VENTRICULAR RATE, ECG03: 85 BPM

## 2017-05-11 PROCEDURE — 80307 DRUG TEST PRSMV CHEM ANLYZR: CPT | Performed by: INTERNAL MEDICINE

## 2017-05-11 PROCEDURE — 90935 HEMODIALYSIS ONE EVALUATION: CPT

## 2017-05-11 PROCEDURE — 74011250636 HC RX REV CODE- 250/636: Performed by: HOSPITALIST

## 2017-05-11 PROCEDURE — 74011250637 HC RX REV CODE- 250/637: Performed by: HOSPITALIST

## 2017-05-11 PROCEDURE — 71010 XR CHEST PORT: CPT

## 2017-05-11 PROCEDURE — 74011250636 HC RX REV CODE- 250/636: Performed by: INTERNAL MEDICINE

## 2017-05-11 PROCEDURE — 65660000000 HC RM CCU STEPDOWN

## 2017-05-11 PROCEDURE — 74011250637 HC RX REV CODE- 250/637: Performed by: INTERNAL MEDICINE

## 2017-05-11 PROCEDURE — 93005 ELECTROCARDIOGRAM TRACING: CPT

## 2017-05-11 RX ORDER — HYDROMORPHONE HYDROCHLORIDE 1 MG/ML
1 INJECTION, SOLUTION INTRAMUSCULAR; INTRAVENOUS; SUBCUTANEOUS
Status: DISCONTINUED | OUTPATIENT
Start: 2017-05-11 | End: 2017-05-12

## 2017-05-11 RX ORDER — METOPROLOL TARTRATE 50 MG/1
50 TABLET ORAL 2 TIMES DAILY
Status: DISCONTINUED | OUTPATIENT
Start: 2017-05-11 | End: 2017-05-11

## 2017-05-11 RX ORDER — CARVEDILOL 12.5 MG/1
12.5 TABLET ORAL 2 TIMES DAILY WITH MEALS
Status: DISCONTINUED | OUTPATIENT
Start: 2017-05-11 | End: 2017-05-13 | Stop reason: HOSPADM

## 2017-05-11 RX ADMIN — DOXERCALCIFEROL 0.5 MCG: 0.5 CAPSULE, LIQUID FILLED ORAL at 12:52

## 2017-05-11 RX ADMIN — FUROSEMIDE 80 MG: 80 TABLET ORAL at 12:45

## 2017-05-11 RX ADMIN — VITAMIN C 1 TABLET: TAB at 12:52

## 2017-05-11 RX ADMIN — HYDROMORPHONE HYDROCHLORIDE 1 MG: 1 INJECTION, SOLUTION INTRAMUSCULAR; INTRAVENOUS; SUBCUTANEOUS at 17:14

## 2017-05-11 RX ADMIN — RENAGEL 800 MG: 800 TABLET ORAL at 15:33

## 2017-05-11 RX ADMIN — GABAPENTIN 100 MG: 100 CAPSULE ORAL at 17:14

## 2017-05-11 RX ADMIN — HYDROMORPHONE HYDROCHLORIDE 1 MG: 2 INJECTION INTRAMUSCULAR; INTRAVENOUS; SUBCUTANEOUS at 02:51

## 2017-05-11 RX ADMIN — CLONIDINE HYDROCHLORIDE 0.3 MG: 0.1 TABLET ORAL at 21:19

## 2017-05-11 RX ADMIN — TERAZOSIN HYDROCHLORIDE 2 MG: 1 CAPSULE ORAL at 21:20

## 2017-05-11 RX ADMIN — Medication 10 ML: at 02:51

## 2017-05-11 RX ADMIN — HYDRALAZINE HYDROCHLORIDE 50 MG: 50 TABLET ORAL at 21:20

## 2017-05-11 RX ADMIN — GABAPENTIN 100 MG: 100 CAPSULE ORAL at 12:45

## 2017-05-11 RX ADMIN — RENAGEL 800 MG: 800 TABLET ORAL at 21:20

## 2017-05-11 RX ADMIN — CLONIDINE HYDROCHLORIDE 0.3 MG: 0.1 TABLET ORAL at 12:45

## 2017-05-11 RX ADMIN — Medication 10 ML: at 21:20

## 2017-05-11 RX ADMIN — THERA TABS 1 TABLET: TAB at 12:45

## 2017-05-11 RX ADMIN — HEPARIN SODIUM 5000 UNITS: 5000 INJECTION, SOLUTION INTRAVENOUS; SUBCUTANEOUS at 21:20

## 2017-05-11 RX ADMIN — Medication 10 ML: at 06:01

## 2017-05-11 RX ADMIN — HEPARIN SODIUM 5000 UNITS: 5000 INJECTION, SOLUTION INTRAVENOUS; SUBCUTANEOUS at 05:57

## 2017-05-11 RX ADMIN — HYDRALAZINE HYDROCHLORIDE 50 MG: 50 TABLET ORAL at 12:45

## 2017-05-11 RX ADMIN — HYDROMORPHONE HYDROCHLORIDE 1 MG: 1 INJECTION, SOLUTION INTRAMUSCULAR; INTRAVENOUS; SUBCUTANEOUS at 12:45

## 2017-05-11 RX ADMIN — CARVEDILOL 12.5 MG: 12.5 TABLET, FILM COATED ORAL at 17:14

## 2017-05-11 RX ADMIN — HYDROMORPHONE HYDROCHLORIDE 1 MG: 1 INJECTION, SOLUTION INTRAMUSCULAR; INTRAVENOUS; SUBCUTANEOUS at 06:21

## 2017-05-11 RX ADMIN — NITROGLYCERIN 1 INCH: 20 OINTMENT TOPICAL at 06:57

## 2017-05-11 RX ADMIN — ALLOPURINOL 100 MG: 100 TABLET ORAL at 12:45

## 2017-05-11 RX ADMIN — HEPARIN SODIUM 5000 UNITS: 5000 INJECTION, SOLUTION INTRAVENOUS; SUBCUTANEOUS at 12:55

## 2017-05-11 RX ADMIN — AMLODIPINE BESYLATE 10 MG: 5 TABLET ORAL at 12:45

## 2017-05-11 RX ADMIN — TRAMADOL HYDROCHLORIDE 50 MG: 50 TABLET, FILM COATED ORAL at 22:36

## 2017-05-11 RX ADMIN — HYDROMORPHONE HYDROCHLORIDE 1 MG: 1 INJECTION, SOLUTION INTRAMUSCULAR; INTRAVENOUS; SUBCUTANEOUS at 21:20

## 2017-05-11 RX ADMIN — Medication 10 ML: at 15:34

## 2017-05-11 RX ADMIN — PANTOPRAZOLE SODIUM 40 MG: 40 TABLET, DELAYED RELEASE ORAL at 12:45

## 2017-05-11 NOTE — PROGRESS NOTES
Dr Dario ornelas, returned call at 0710 informed of patients current VS, c/o SOB and diaphoretic state. Stat portable chest xray ordered, radiology notified. Instructed to notify Nephrologist Dr Tae Simmons at this time, answering service notified Dr Tae ornelas. Pt informed that Dr Dario Argueta aware.

## 2017-05-11 NOTE — PROCEDURES
[facility] Acutes  [phone]    Vitals   Pre   Post   Assessment   Pre   Post     Temp  Temp: 97 °F (36.1 °C) (05/11/17 1003)  96.8, axilla LOC  A&OX4 A&Ox4   HR   Pulse (Heart Rate): 85 (05/11/17 1003) 71 Lungs   Dim bases clear   B/P   BP: (!) 175/98 (05/11/17 1003) 134/78 Cardiac   Tele, NSR- SB same   Resp   Resp Rate: 21 (05/11/17 1003) 16 Skin   Multiple tattoos; CDI same   Pain level  Pain Intensity 1: 0 (05/11/17 0733) 0 Edema  none     none   Orders:    Duration:   Start:    1003 End:    4771 Total:   2hr   Dialyzer:   Dialyzer/Set Up Inspection: Revaclear (05/11/17 1003)   K Bath:   Dialysate K (mEq/L):  (N/A) (05/11/17 1003)   Ca Bath:   Dialysate CA (mEq/L):  (N/A) (05/11/17 1003)   Na/Bicarb:   Dialysate NA (mEq/L):  (N/A) (05/11/17 1003)   Target Fluid Removal:   Goal/Amount of Fluid to Remove (mL): 3000 mL (05/11/17 1003)   Access     Type & Location:   RIJ permcath: No s/s of infection. Dressing CDI. Both lumens flush/ aspirate well.  Running well at  (Pt worried about clotting)   Labs     Obtained/Reviewed   Critical Results Called   Date when labs were drawn-  Hgb-    HGB   Date Value Ref Range Status   05/10/2017 8.2 (L) 12.1 - 17.0 g/dL Final     K-    Potassium   Date Value Ref Range Status   05/10/2017 4.1 3.5 - 5.1 mmol/L Final     Ca-   Calcium   Date Value Ref Range Status   05/10/2017 7.9 (L) 8.5 - 10.1 MG/DL Final     Bun-   BUN   Date Value Ref Range Status   05/10/2017 51 (H) 6 - 20 MG/DL Final     Creat-   Creatinine   Date Value Ref Range Status   05/10/2017 10.70 (H) 0.70 - 1.30 MG/DL Final        Medications/ Blood Products Given     Name   Dose   Route and Time     none                Blood Volume Processed (BVP):    0, UF only Net Fluid   Removed:  2600ml   Comments   Time Out Done: 1000  Primary Nurse Rpt Pre: Esha Thomas RN  Primary Nurse Rpt Post: Esha Thomas RN  Pt Education: S/S of removing fluid too quickly  Care Plan: ongoing  Tx Summary:  Pt arrived to HD suite A&Ox4 with Non-rebreather & request we \"hurry up & remove fluid\". Pt connected to telemetry & BP monitoring. Pt states he clots machine easily & would like us to add flushes his goal.  1003: Both lumens of permcath disinfected with Alcavis per policy. Each lumen aspirated for blood return and flushed with Normal Saline per policy. Dialysis initiated. VSS with HTN. 1030: 100ml NS lfush given for clot prevention. VSS.   1100: 100ml NS lfush given for clot prevention. VSS.   1125: Pt c/o nausea. UF off & goal decreased. No BP drop, Pt laid back in bed. Pt removed O2 mask. Still sat in high 90's.  1130: Pt feeling better. UF back on at lower goal. Pt resting quielty with O2 back on.   1205: Tx ended. VSS. Central line catheter flushed with normal saline per policy. Ports disinfected with Alcavis per policy and lines disconnected and capped using aseptic technique. See LDA docflowsheet for dressing information. SBAR given to Rohit Mario RN including VS & procedure summary. Admiting Diagnosis: Respiratory Distress/ N-STEMI  Pt's previous clinic- [facility]  Consent signed - Informed Consent Verified: Yes (05/11/17 1003)  [facility] Consent - yes  Hepatitis Status- HBsAg Neg 5/6/17  Machine #- Machine Number: [id number] (05/11/17 1003)  Telemetry status- Tele, ST- NSR- SB  Pre-dialysis wt. - Pre-Dialysis Weight: 73 kg (160 lb 15 oz) (05/10/17 0715)

## 2017-05-11 NOTE — PROGRESS NOTES
Hospitalist Progress Note    NAME: Mirella Michaels   :  1965   MRN:  [id number]   LOS:   5      Assessment / Plan:  Acute hypoxic respiratory failure   due to Acute Pulmonary Edema in settings of ESRD   Now back on Ventimask   CXR today again with Pulmonary edema  I spoke to Dr Janett Dover to get him dialysed  Unusual why he went into flash pulmonary edema again despite being in the hospital and getting frequent HD (yesterday pulled out again 3L)  Check UDS  Nephrology and cardiology is on the case  Will repeat CXR in couple of day    CP/elevated Troponin - Troponin Peaked to 5.53  Cardiology is on the case  Have continuous chest pain since admission, ? Musculoskeletal   Stress test negative  Suspect Fluid Overload with ESRD per cardiology  2D echo with normal ED  CTA negative for PE  Unable to give ASA due to NSAID allergy  Check UDS as above    Hypertensive Urgency POA  Due to volume overload  BP better with HD    Leukocytosis   Likely stress related, resolved     Anemia ESRD  Monitor Hb  EPO and transfusion per nephrology with HD    Tobacco Abuse     Code status: Full  Prophylaxis: Hep SQ  Recommended Disposition: Home w/Family     Subjective: Pt seen and examined at bedside. Short of breath. Respiratory distress. Overnight events d/w RN     Chief Complaint: f/u \"chest pain\"      Review of Systems:  Symptom Y/N Comments  Symptom Y/N Comments   Fever/Chills n   Chest Pain y    Poor Appetite    Edema     Cough n   Abdominal Pain n    Sputum    Joint Pain     SOB/WARNER y   Pruritis/Rash     Nausea/vomit    Tolerating PT/OT     Diarrhea    Tolerating Diet y    Constipation    Other       Could NOT obtain due to:      Objective:     VITALS:   Last 24hrs VS reviewed since prior progress note.  Most recent are:  Patient Vitals for the past 24 hrs:   Temp Pulse Resp BP SpO2   17 0733 - (!) 106 20 (!) 193/110 96 %   17 0730 - (!) 108 - (!) 193/110 97 %   17 0719 - (!) 101 - - 95 %   17 0715 - (!) 105 - (!) 222/132 95 %   05/11/17 0700 - (!) 113 - (!) 237/128 100 %   05/11/17 0657 - (!) 111 - (!) 216/123 -   05/11/17 0656 - (!) 107 - (!) 216/123 100 %   05/11/17 0650 - (!) 104 - (!) 217/116 94 %   05/11/17 0627 - 92 - - 90 %   05/11/17 0558 98.2 °F (36.8 °C) 78 20 (!) 153/92 95 %   05/10/17 2258 98.9 °F (37.2 °C) 81 20 (!) 151/91 97 %   05/10/17 2222 - 86 - 145/82 -   05/10/17 2220 - 86 - 145/82 -   05/10/17 2217 - 86 - 145/82 -   05/10/17 2037 98.2 °F (36.8 °C) 78 16 152/86 100 %   05/10/17 1651 98 °F (36.7 °C) 79 16 130/72 99 %   05/10/17 1530 97.9 °F (36.6 °C) 88 16 147/78 100 %   05/10/17 1141 98.3 °F (36.8 °C) 78 16 145/80 96 %   05/10/17 1009 98.6 °F (37 °C) 89 20 162/88 -   05/10/17 0930 - 85 20 (!) 173/92 -   05/10/17 0900 - 80 - 160/84 -   05/10/17 0830 - 88 - (!) 161/94 -       Intake/Output Summary (Last 24 hours) at 05/11/17 0815  Last data filed at 05/10/17 2300   Gross per 24 hour   Intake              480 ml   Output             3200 ml   Net            -2720 ml        PHYSICAL EXAM:  General: Alert, cooperative, respiratory distress    EENT:  EOMI. Anicteric sclerae. Mucous membranes moist  Resp:  B/l rales, accessory muscle use  CV:  Regular rhythm, no edema, chest pain is slightly reproducible  GI:  Soft, non distended, non tender. +Bowel sounds  Neurologic:  Alert and oriented X 3, normal speech  Psych:   Good insight, not anxious nor agitated  Skin:  No rashes, no jaundice  ________________________________________________________________________  Care Plan discussed with:    Comments   Patient x    Family      RN x    Care Manager     Consultant  x Nephrology Dr Cliff Lutz team rounds were held today with , nursing, pharmacist and clinical coordinator. Patient's plan of care was discussed; medications were reviewed and discharge planning was addressed. ________________________________________________________________________  Total NON critical care TIME:  35  Minutes    Total CRITICAL CARE TIME Spent: Minutes non procedure based         Comments   >50% of visit spent in counseling and coordination of care x Discussion with cardiology   ________________________________________________________________________    ________________________________________________________________________  Gil Boeck, MD     Procedures: see electronic medical records for all procedures/Xrays and details which were not copied into this note but were reviewed prior to creation of Plan. LABS:  I reviewed today's most current labs and imaging studies.   Pertinent labs include:  Recent Labs      05/10/17   0431   WBC  6.6   HGB  8.2*   HCT  24.5*   PLT  227     Recent Labs      05/10/17   0431   NA  136   K  4.1   CL  99   CO2  24   GLU  97   BUN  51*   CREA  10.70*   CA  7.9*       Signed: Gil Boeck, MD

## 2017-05-11 NOTE — PROGRESS NOTES
Patient name: Soraya Benavides  MRN: [id number]    Nephrology Progress note:    Assessment:  ESRD: MWF at [facility]; seen during UF. Using R TDC, 450 QB to prevent clotting and UF of 3 Kg. Tolerating well. HTN: Uncontrolled  NSTEMI: troponin trending down  Anemia 2 to ESRD: Hgb suboptimal  Fluid overload- resolved    Pt got sob/hypoxic this am. Repeat CXR showed pulm edema. ?reason for recurrence of pulm edema. His BP shot up too. He had HD yesterday. Was off of O2 with clear lungs and we got 3 Kg UF. His Echo shows nl LV fxn and his stress test neg for ischemia. I arranged emergent Pure UF for volume removal.   Would be better off of Coreg than Metoprolol. Plan/Recommendations:  UF today as above.  Should help with work of breathing  HD tomm per schedule and pull more UF- 3-4 Kg as shady  D/C Metoprolol  Start Coreg 12.5 mg BID with hold parameters  Ct EPO  Ct Renagel    Subjective:  Got SOB this am. Needing O2  Arranged and seen during UF    ROS:   No nausea, no vomiting, cp   +sob    Exam:  Visit Vitals    /78    Pulse 71    Temp 97 °F (36.1 °C) (Oral)    Resp 16    Ht 6' (1.829 m)    Wt 73 kg (160 lb 15 oz)    SpO2 99%    BMI 21.83 kg/m2     Wt Readings from Last 3 Encounters:   05/11/17 73 kg (160 lb 15 oz)   05/06/17 83.9 kg (185 lb)   04/19/17 77.9 kg (171 lb 11.2 oz)       Intake/Output Summary (Last 24 hours) at 05/11/17 1223  Last data filed at 05/11/17 1205   Gross per 24 hour   Intake              480 ml   Output             2600 ml   Net            -2120 ml Gen: on NRB mask during UF. Not tachypneic  Lungs/Chest wall: dec BS. R TDC intact  Cardiovascular: Regular rate, normal rhythm. Ext: Immature LAVF.  No peripheral edema  CNS: alert awake    Current Facility-Administered Medications   Medication Dose Route Frequency Last Dose    HYDROmorphone (PF) (DILAUDID) injection 1 mg  1 mg IntraVENous Q4H PRN 1 mg at 05/11/17 0621    carvedilol (COREG) tablet 12.5 mg  12.5 mg Oral BID WITH MEALS      albumin human 25% (BUMINATE) solution 12.5 g  12.5 g IntraVENous DIALYSIS PRN      calcium carbonate (TUMS) chewable tablet 200 mg [elemental]  200 mg Oral TID  mg at 05/09/17 2259    nitroglycerin (NITROBID) 2 % ointment 1 Inch  1 Inch Topical Q6H PRN 1 Inch at 05/11/17 0657    epoetin cristian (EPOGEN;PROCRIT) injection 10,000 Units  10,000 Units SubCUTAneous DIALYSIS MON, WED & FRI 10,000 Units at 05/10/17 2231    allopurinol (ZYLOPRIM) tablet 100 mg  100 mg Oral DAILY 100 mg at 05/10/17 1025    amLODIPine (NORVASC) tablet 10 mg  10 mg Oral DAILY 10 mg at 05/10/17 1025    b-complex with vitamin c tablet 1 Tab  1 Tab Oral DAILY 1 Tab at 05/10/17 1141    furosemide (LASIX) tablet 80 mg  80 mg Oral DAILY 80 mg at 05/10/17 1025    gabapentin (NEURONTIN) capsule 100 mg  100 mg Oral  mg at 05/10/17 1805    hydrALAZINE (APRESOLINE) tablet 50 mg  50 mg Oral TID 50 mg at 05/10/17 2220    therapeutic multivitamin (THERAGRAN) tablet 1 Tab  1 Tab Oral DAILY 1 Tab at 05/10/17 1025    pantoprazole (PROTONIX) tablet 40 mg  40 mg Oral DAILY 40 mg at 05/10/17 1026    doxercalciferol (HECTOROL) capsule 0.5 mcg  0.5 mcg Oral DAILY 0.5 mcg at 05/10/17 1141    sevelamer (RENAGEL) tablet 800 mg  800 mg Oral  mg at 05/10/17 2221    terazosin (HYTRIN) capsule 2 mg  2 mg Oral QHS 2 mg at 05/10/17 2222    traMADol (ULTRAM) tablet 50 mg  50 mg Oral Q6H PRN 50 mg at 05/06/17 2310    cloNIDine HCl (CATAPRES) tablet 0.3 mg  0.3 mg Oral BID 0.3 mg at 05/10/17 2217  sodium chloride (NS) flush 5-10 mL  5-10 mL IntraVENous Q8H 10 mL at 05/11/17 0601    sodium chloride (NS) flush 5-10 mL  5-10 mL IntraVENous PRN 10 mL at 05/11/17 0251    ondansetron (ZOFRAN) injection 4 mg  4 mg IntraVENous Q4H PRN      heparin (porcine) injection 5,000 Units  5,000 Units SubCUTAneous Q8H 5,000 Units at 05/11/17 0557    nicotine (NICODERM CQ) 21 mg/24 hr patch 1 Patch  1 Patch TransDERmal Q24H 1 Patch at 05/11/17 0556    hydrALAZINE (APRESOLINE) 20 mg/mL injection 20 mg  20 mg IntraVENous Q6H PRN         Labs/Data:    Lab Results   Component Value Date/Time    WBC 6.6 05/10/2017 04:31 AM    Hemoglobin (POC) 8.8 04/19/2017 08:20 AM    HGB 8.2 05/10/2017 04:31 AM    Hematocrit (POC) 26 04/19/2017 08:20 AM    HCT 24.5 05/10/2017 04:31 AM    PLATELET 959 64/94/4422 04:31 AM    MCV 96.8 05/10/2017 04:31 AM       Lab Results   Component Value Date/Time    Sodium 136 05/10/2017 04:31 AM    Potassium 4.1 05/10/2017 04:31 AM    Chloride 99 05/10/2017 04:31 AM    CO2 24 05/10/2017 04:31 AM    Anion gap 13 05/10/2017 04:31 AM    Glucose 97 05/10/2017 04:31 AM    BUN 51 05/10/2017 04:31 AM    Creatinine 10.70 05/10/2017 04:31 AM    BUN/Creatinine ratio 5 05/10/2017 04:31 AM    GFR est AA 6 05/10/2017 04:31 AM    GFR est non-AA 5 05/10/2017 04:31 AM    Calcium 7.9 05/10/2017 04:31 AM       Patient seen and examined. Chart reviewed. Labs, data and other pertinent notes reviewed in last 24 hrs.     Discussed with patient/HD RN, Floor RN and Dr. Erickson Jones

## 2017-05-11 NOTE — PROGRESS NOTES
Progress Note      5/11/2017 10:59 AM  NAME: Mirella Michaels   MRN:  [id number]   Admit Diagnosis: Pulmonary edema     Assessment:     ASSESSMENT   1. Acute hypoxic respiratory failure secondary acute pulmonary   edema. Improved p Dialysis. 2. Acute pulmonary edema due to volume overload. Improved p Dialysis. 3. End-stage renal disease requiring dialysis. 4. Uncontrolled hypertension. 5. Elevated troponin level, possible non-ST elevation myocardial   infarction. 6. Leukocytosis. 7. Anemia of end-stage renal disease. 8. Tobacco abuse. 9. Alcohol abuse. Significant volume off yesterday. Doing much better. He is on room air. Echo -  Normal LV fx. Normal heart by echo. 5/10  Had dialysis this AM.   Notes chest pain present since admission. Anterior chest .   Continuous for last 4 days. Not affected by position . Not affected by activity. Present at rest and when sitting. Patient has not been walking in the rosario for some reason. Hx of heroin use in past.  Not sure of current status. No drug screen on admission. Elevated Troponin on admission with acute volume overload and acute respiratory distress due to volume. Improved with dialysis. Was on BIPAP on admission, due to severe respiratory distress. Normal / vigorous LV fx. No hx of heart disease. 5/11 stress nuclear normal.   No evidence of ischemia or infarction. This was an Adenosine test.   Pt refused to try to walk . But no perfusion abnormalities seen    Became acutely SOB overnight with significant increase in BP and now with recurrent Pulmonary edema. Pt feels he needs dialysis to remove fluid . He is on O2  Mask. O2 Sat is 97% at this time. He is in NSR   Will repeat EKG         Plan:           Plan for f/u with his doctors at [address] [place] [address]. [x]        High complexity decision making was performed    Subjective: [address] with chest pain.    Acutely SOB this AM  Discussed with RN events overnight. Patient Active Problem List   Diagnosis Code    Pulmonary edema J81.1       Review of Systems:    Symptom Y/N Comments  Symptom Y/N Comments   Fever/Chills N   Chest Pain N    Poor Appetite N   Edema N    Cough N   Abdominal Pain N    Sputum N   Joint Pain N    SOB/WARNER N   Pruritis/Rash N    Nausea/vomit N   Tolerating PT/OT Y    Diarrhea N   Tolerating Diet Y    Constipation N   Other       Could NOT obtain due to:      Objective:      Physical Exam:    Last 24hrs VS reviewed since prior progress note. Most recent are:    Visit Vitals    BP (!) 193/110 (BP 1 Location: Right arm, BP Patient Position: At rest)    Pulse (!) 106    Temp 98.2 °F (36.8 °C)    Resp 20    Ht 6' (1.829 m)    Wt 73 kg (160 lb 15 oz)    SpO2 96%    BMI 21.83 kg/m2       Intake/Output Summary (Last 24 hours) at 05/11/17 0810  Last data filed at 05/10/17 2300   Gross per 24 hour   Intake              480 ml   Output             3200 ml   Net            -2720 ml        General Appearance: Well developed, well nourished, alert & oriented x 3,    no acute distress. Ears/Nose/Mouth/Throat: Hearing grossly normal.  Neck: Supple. Chest: Lungs rales. Cardiovascular: Regular rate and rhythm, S1S2 normal, no murmur. Abdomen: Soft, non-tender, bowel sounds are active. Extremities: No edema bilaterally. Skin: Warm and dry.     PMH/SH reviewed - no change compared to H&P    Data Review    Telemetry: normal sinus rhythm     Lab Data Personally Reviewed:    Recent Labs      05/10/17   0431   WBC  6.6   HGB  8.2*   HCT  24.5*   PLT  227   LABRCNT(INR:3,PTP:3,APTT:3,)  Recent Labs      05/10/17   0431   NA  136   K  4.1   CL  99   CO2  24   BUN  51*   CREA  10.70*   GLU  97   CA  7.9*   LABRCNT(CPK:3,CpKMB:3,ckndx:3,troiq:3)No results found for: CHOL, CHOLX, CHLST, CHOLV, HDL, LDL, DLDL, LDLC, DLDLP, TGL, TGLX, TRIGL, TRIGP, CHHD, CHHDXLABRCNT(sgot:3,gpt:3,ap:3,tbiL:3,TP:3,ALB:3,GLOB:3,ggt:3,aml:3,amyp:3,lpse:3,hlpse:3)  No results for input(s): PH, PCO2, PO2 in the last 72 hours. No results found for: CHOL, CHOLX, CHLST, CHOLV, HDL, LDL, DLDL, LDLC, DLDLP, TGL, TGLX, TRIGL, TRIGP, CHHD, CHHDXMEDTABLETimothy Jw Jones MD  No results for input(s): PH, PCO2, PO2 in the last 72 hours.     Medications Personally Reviewed:    Current Facility-Administered Medications   Medication Dose Route Frequency    HYDROmorphone (PF) (DILAUDID) injection 1 mg  1 mg IntraVENous Q4H PRN    albumin human 25% (BUMINATE) solution 12.5 g  12.5 g IntraVENous DIALYSIS PRN    calcium carbonate (TUMS) chewable tablet 200 mg [elemental]  200 mg Oral TID PRN    nitroglycerin (NITROBID) 2 % ointment 1 Inch  1 Inch Topical Q6H PRN    epoetin cristian (EPOGEN;PROCRIT) injection 10,000 Units  10,000 Units SubCUTAneous DIALYSIS MON, WED & FRI    allopurinol (ZYLOPRIM) tablet 100 mg  100 mg Oral DAILY    amLODIPine (NORVASC) tablet 10 mg  10 mg Oral DAILY    b-complex with vitamin c tablet 1 Tab  1 Tab Oral DAILY    furosemide (LASIX) tablet 80 mg  80 mg Oral DAILY    gabapentin (NEURONTIN) capsule 100 mg  100 mg Oral BID    hydrALAZINE (APRESOLINE) tablet 50 mg  50 mg Oral TID    therapeutic multivitamin (THERAGRAN) tablet 1 Tab  1 Tab Oral DAILY    pantoprazole (PROTONIX) tablet 40 mg  40 mg Oral DAILY    doxercalciferol (HECTOROL) capsule 0.5 mcg  0.5 mcg Oral DAILY    propranolol (INDERAL) tablet 20 mg  20 mg Oral TID    sevelamer (RENAGEL) tablet 800 mg  800 mg Oral TID    terazosin (HYTRIN) capsule 2 mg  2 mg Oral QHS    traMADol (ULTRAM) tablet 50 mg  50 mg Oral Q6H PRN    cloNIDine HCl (CATAPRES) tablet 0.3 mg  0.3 mg Oral BID    sodium chloride (NS) flush 5-10 mL  5-10 mL IntraVENous Q8H    sodium chloride (NS) flush 5-10 mL  5-10 mL IntraVENous PRN    ondansetron (ZOFRAN) injection 4 mg  4 mg IntraVENous Q4H PRN    heparin (porcine) injection 5,000 Units  5,000 Units SubCUTAneous Q8H    nicotine (NICODERM CQ) 21 mg/24 hr patch 1 Patch  1 Patch TransDERmal Q24H    hydrALAZINE (APRESOLINE) 20 mg/mL injection 20 mg  20 mg IntraVENous Q6H PRN         Tessa Weaver MD

## 2017-05-11 NOTE — PROGRESS NOTES
Patient remains with c/o severe SOB, diaphoretic, shaking head. States \"I need dialysis please\" patient placed on 100%NRB mask at this time with sats up to 100%. This explained to patient. 1\" nitropaste applied to left scapula for chest discomfort/SOB.

## 2017-05-11 NOTE — PROGRESS NOTES
Bedside and Verbal shift change report given to Iain Alvarez RN (oncoming nurse) by Hari Goetz RN (offgoing nurse).  Report included the following information SBAR, Kardex, Intake/Output, MAR, Recent Results and Cardiac Rhythm SR.

## 2017-05-11 NOTE — PROGRESS NOTES
Patient calling out states \"I feel like I cant breathe, like I felt when I came in here. I need to see somebody I need dialysis\" Patient placed on 4lpm NC sp02 up to 96-97%, RR 30-40's, patient sitting up in tripod position. Dilaudid 1mg IV given for persistent chest discomfort. Lungs clear with expiratory wheezing in upper airway.

## 2017-05-11 NOTE — PROGRESS NOTES
Dr Dayday Batres returned page, updated on patient condition, current VS, meds given and pending stat cxry ordered by Dr Kenisha Fulton. No new orders at this time will evaluate need for dialysis based on chest xray results. Patient informed of Dr Breezy Gambino notification and plan of care.

## 2017-05-11 NOTE — PROGRESS NOTES
Nutrition Services      Nutrition Screen:  Wt Readings from Last 10 Encounters:   05/11/17 73 kg (160 lb 15 oz)   05/06/17 83.9 kg (185 lb)   04/19/17 77.9 kg (171 lb 11.2 oz)   02/20/13 77.1 kg (170 lb)   01/09/13 74.8 kg (165 lb)   12/25/12 74.8 kg (165 lb)   08/27/12 64.4 kg (142 lb)   07/01/12 72.1 kg (159 lb)   06/13/12 72.6 kg (160 lb)   03/15/11 81.6 kg (180 lb)     Body mass index is 21.83 kg/(m^2). Supplements:                        _____ ordered ______  declined. __ __  Pt is nutritionally stable at this time, will rescreen in 7 days. _x __    Pt is at nutritional risk and will be rescreened in 3-5 days. __ __  Pt is at moderate or high nutritional risk, will refer to RD for assessment.        Tom Frye  Dietetic Technician, Registered

## 2017-05-11 NOTE — PROGRESS NOTES
PCU SHIFT NURSING NOTE      Bedside and Verbal shift change report given to Atif Solorio (oncoming nurse) by Naun Arora (offgoing nurse). Report included the following information SBAR, Kardex, ED Summary, Procedure Summary, Intake/Output, MAR, Accordion, Recent Results and Cardiac Rhythm ST. Shift Summary:   0830 Patient refused all morning medications until after filtration completed. 0900 after stat chest xray it showed more flash pulmonary edema and Dr. Jesu Norwood notified Dr. Jones Escobar who ordered QUIQ to ultrafiltrate patient again this am. He had dialysis yesterday. Admission Date 5/6/2017   Admission Diagnosis Pulmonary edema   Consults IP CONSULT TO CARDIOLOGY  IP CONSULT TO NEPHROLOGY        Consults   []PT   []OT   []Speech   []Case Management      [] Palliative      Cardiac Monitoring Order   [x]Yes   []No     IV drips   []Yes    Drip:                            Dose:  Drip:                            Dose:  Drip:                            Dose:   [x]No     GI Prophylaxis   []Yes   [x]No         DVT Prophylaxis   SCDs:             Jc stockings:         [x] Medication   []Contraindicated   []None      Activity Level Activity Level: Up ad brandy     Activity Assistance: No assistance needed   Purposeful Rounding every 1-2 hour? [x]Yes   Ronquillo Score  Total Score: 1   Bed Alarm (If score 3 or >)   []Yes   [] Refused (See signed refusal form in chart)   Jovan Score  Jovan Score: 22   Jovan Score (if score 14 or less)   []PMT consult   []Wound Care consult      []Specialty bed   [] Nutrition consult          Needs prior to discharge:   Home O2 required:    []Yes   [x]No    If yes, how much O2 required?     Other:    Last Bowel Movement: Last Bowel Movement Date: 05/06/17      Influenza Vaccine Received Flu Vaccine for Current Season (usually Sept-March): Not Flu Season        Pneumonia Vaccine           Diet Active Orders   Diet    DIET CARDIAC Regular      LDAs               Peripheral IV 05/06/17 Left External jugular (Active)   Site Assessment Clean, dry, & intact 5/11/2017  7:35 AM   Phlebitis Assessment 0 5/11/2017  7:35 AM   Infiltration Assessment 0 5/11/2017  7:35 AM   Dressing Status Clean, dry, & intact 5/11/2017  7:35 AM   Dressing Type Tape;Transparent 5/11/2017  7:35 AM   Hub Color/Line Status Green 5/11/2017  7:35 AM        Hemodialysis Catheter (Active)   Central Line Being Utilized Yes 5/11/2017  7:35 AM   Criteria for Appropriate Use Dialysis/apheresis 5/11/2017  7:35 AM   Date Accessed  05/10/17 5/10/2017  4:00 PM   Access Time  0718 5/10/2017  7:30 AM   Site Assessment Clean, dry, & intact 5/11/2017  7:35 AM   Date of Last Dressing Change 05/06/17 5/10/2017 10:58 PM   Dressing Status Clean, dry, & intact 5/11/2017  7:35 AM   Dressing Type Transparent 5/11/2017  7:35 AM   Proximal Hub Color/Line Status Blue;Capped 5/10/2017 10:58 PM   Distal Hub Color/Line Status Blue;Capped 5/10/2017  4:00 PM                  Urinary Catheter      Intake & Output   Date 05/10/17 0700 - 05/11/17 0659 05/11/17 0700 - 05/12/17 0659   Shift 2854-4026 4696-1436 24 Hour Total 2825-5744 3777-4147 24 Hour Total   I  N  T  A  K  E   P.O. 240 240 480         P. O. 240 240 480       Shift Total  (mL/kg) 240  (3.3) 240  (3.3) 480  (6.6)      O  U  T  P  U  T   Dialysis 3200  3200         NET Fluid Removed (mL) 3200  3200       Shift Total  (mL/kg) 3200  (43.8)  3200  (43.8)      NET -2960 240 -2720      Weight (kg) 73 73 73 73 73 73         Readmission Risk Assessment Tool Score Medium Risk            13       Total Score        3 Patient Length of Stay > 5    4 More than 1 Admission in calendar year    4 Patient Insurance is Medicare, Medicaid or Self Pay    2 Charlson Comorbidity Score        Criteria that do not apply:    Relationship with PCP    Patient Living Status       Expected Length of Stay 4d 12h   Actual Length of Stay 5

## 2017-05-11 NOTE — CARDIO/PULMONARY
C/P rehab note- chart reviewed for in basket referral. Adm with acute onset of SOB earlier today.      HX includes HTN,CKD MWF dialysis. Current everyday smoker. LVEF from Nuc stress 44% Per cardiology -Acute hypoxic respiratory failure secondary acute pulmonary   edema. Improved p Dialysis. Normal Echo,NL EF. Currently on nonrebreather mask due to SOB overnight. Will continue to follow.

## 2017-05-12 LAB
ANION GAP BLD CALC-SCNC: 14 MMOL/L (ref 5–15)
BACTERIA SPEC CULT: NORMAL
BACTERIA SPEC CULT: NORMAL
BASOPHILS # BLD AUTO: 0 K/UL (ref 0–0.1)
BASOPHILS # BLD: 1 % (ref 0–1)
BUN SERPL-MCNC: 54 MG/DL (ref 6–20)
BUN/CREAT SERPL: 5 (ref 12–20)
CALCIUM SERPL-MCNC: 8.9 MG/DL (ref 8.5–10.1)
CHLORIDE SERPL-SCNC: 103 MMOL/L (ref 97–108)
CO2 SERPL-SCNC: 20 MMOL/L (ref 21–32)
CREAT SERPL-MCNC: 10.9 MG/DL (ref 0.7–1.3)
EOSINOPHIL # BLD: 0.2 K/UL (ref 0–0.4)
EOSINOPHIL NFR BLD: 2 % (ref 0–7)
ERYTHROCYTE [DISTWIDTH] IN BLOOD BY AUTOMATED COUNT: 15.6 % (ref 11.5–14.5)
GLUCOSE SERPL-MCNC: 87 MG/DL (ref 65–100)
HCT VFR BLD AUTO: 24.6 % (ref 36.6–50.3)
HGB BLD-MCNC: 8.4 G/DL (ref 12.1–17)
LYMPHOCYTES # BLD AUTO: 22 % (ref 12–49)
LYMPHOCYTES # BLD: 2 K/UL (ref 0.8–3.5)
MCH RBC QN AUTO: 32.6 PG (ref 26–34)
MCHC RBC AUTO-ENTMCNC: 34.1 G/DL (ref 30–36.5)
MCV RBC AUTO: 95.3 FL (ref 80–99)
MONOCYTES # BLD: 0.9 K/UL (ref 0–1)
MONOCYTES NFR BLD AUTO: 10 % (ref 5–13)
NEUTS SEG # BLD: 5.8 K/UL (ref 1.8–8)
NEUTS SEG NFR BLD AUTO: 65 % (ref 32–75)
PLATELET # BLD AUTO: 250 K/UL (ref 150–400)
POTASSIUM SERPL-SCNC: 4.4 MMOL/L (ref 3.5–5.1)
RBC # BLD AUTO: 2.58 M/UL (ref 4.1–5.7)
SERVICE CMNT-IMP: NORMAL
SERVICE CMNT-IMP: NORMAL
SODIUM SERPL-SCNC: 137 MMOL/L (ref 136–145)
WBC # BLD AUTO: 8.9 K/UL (ref 4.1–11.1)

## 2017-05-12 PROCEDURE — 90935 HEMODIALYSIS ONE EVALUATION: CPT

## 2017-05-12 PROCEDURE — 36415 COLL VENOUS BLD VENIPUNCTURE: CPT | Performed by: INTERNAL MEDICINE

## 2017-05-12 PROCEDURE — 74011250637 HC RX REV CODE- 250/637: Performed by: INTERNAL MEDICINE

## 2017-05-12 PROCEDURE — 74011250637 HC RX REV CODE- 250/637: Performed by: HOSPITALIST

## 2017-05-12 PROCEDURE — 74011250636 HC RX REV CODE- 250/636: Performed by: INTERNAL MEDICINE

## 2017-05-12 PROCEDURE — 85025 COMPLETE CBC W/AUTO DIFF WBC: CPT | Performed by: INTERNAL MEDICINE

## 2017-05-12 PROCEDURE — 80048 BASIC METABOLIC PNL TOTAL CA: CPT | Performed by: INTERNAL MEDICINE

## 2017-05-12 PROCEDURE — 65660000000 HC RM CCU STEPDOWN

## 2017-05-12 PROCEDURE — 74011250636 HC RX REV CODE- 250/636: Performed by: HOSPITALIST

## 2017-05-12 PROCEDURE — 74011250637 HC RX REV CODE- 250/637: Performed by: EMERGENCY MEDICINE

## 2017-05-12 RX ORDER — TRAMADOL HYDROCHLORIDE 50 MG/1
100 TABLET ORAL
Status: DISCONTINUED | OUTPATIENT
Start: 2017-05-12 | End: 2017-05-13 | Stop reason: HOSPADM

## 2017-05-12 RX ADMIN — HYDRALAZINE HYDROCHLORIDE 50 MG: 50 TABLET ORAL at 23:54

## 2017-05-12 RX ADMIN — CARVEDILOL 12.5 MG: 12.5 TABLET, FILM COATED ORAL at 17:21

## 2017-05-12 RX ADMIN — HEPARIN SODIUM 5000 UNITS: 5000 INJECTION, SOLUTION INTRAVENOUS; SUBCUTANEOUS at 23:55

## 2017-05-12 RX ADMIN — HEPARIN SODIUM 5000 UNITS: 5000 INJECTION, SOLUTION INTRAVENOUS; SUBCUTANEOUS at 13:27

## 2017-05-12 RX ADMIN — Medication 10 ML: at 23:55

## 2017-05-12 RX ADMIN — THERA TABS 1 TABLET: TAB at 12:02

## 2017-05-12 RX ADMIN — HYDROMORPHONE HYDROCHLORIDE 1 MG: 1 INJECTION, SOLUTION INTRAMUSCULAR; INTRAVENOUS; SUBCUTANEOUS at 15:40

## 2017-05-12 RX ADMIN — Medication 10 ML: at 13:28

## 2017-05-12 RX ADMIN — ALLOPURINOL 100 MG: 100 TABLET ORAL at 12:02

## 2017-05-12 RX ADMIN — RENAGEL 800 MG: 800 TABLET ORAL at 23:54

## 2017-05-12 RX ADMIN — Medication 10 ML: at 01:27

## 2017-05-12 RX ADMIN — HYDROMORPHONE HYDROCHLORIDE 1 MG: 1 INJECTION, SOLUTION INTRAMUSCULAR; INTRAVENOUS; SUBCUTANEOUS at 11:57

## 2017-05-12 RX ADMIN — HYDROMORPHONE HYDROCHLORIDE 1 MG: 1 INJECTION, SOLUTION INTRAMUSCULAR; INTRAVENOUS; SUBCUTANEOUS at 06:07

## 2017-05-12 RX ADMIN — GABAPENTIN 100 MG: 100 CAPSULE ORAL at 12:02

## 2017-05-12 RX ADMIN — HYDROMORPHONE HYDROCHLORIDE 1 MG: 1 INJECTION, SOLUTION INTRAMUSCULAR; INTRAVENOUS; SUBCUTANEOUS at 01:27

## 2017-05-12 RX ADMIN — RENAGEL 800 MG: 800 TABLET ORAL at 17:21

## 2017-05-12 RX ADMIN — PANTOPRAZOLE SODIUM 40 MG: 40 TABLET, DELAYED RELEASE ORAL at 12:02

## 2017-05-12 RX ADMIN — DOXERCALCIFEROL 0.5 MCG: 0.5 CAPSULE, LIQUID FILLED ORAL at 12:22

## 2017-05-12 RX ADMIN — CARVEDILOL 12.5 MG: 12.5 TABLET, FILM COATED ORAL at 12:02

## 2017-05-12 RX ADMIN — FUROSEMIDE 80 MG: 80 TABLET ORAL at 12:02

## 2017-05-12 RX ADMIN — TERAZOSIN HYDROCHLORIDE 2 MG: 1 CAPSULE ORAL at 23:54

## 2017-05-12 RX ADMIN — HEPARIN SODIUM 5000 UNITS: 5000 INJECTION, SOLUTION INTRAVENOUS; SUBCUTANEOUS at 05:59

## 2017-05-12 RX ADMIN — CLONIDINE HYDROCHLORIDE 0.3 MG: 0.1 TABLET ORAL at 17:26

## 2017-05-12 RX ADMIN — GABAPENTIN 100 MG: 100 CAPSULE ORAL at 17:21

## 2017-05-12 RX ADMIN — VITAMIN C 1 TABLET: TAB at 12:22

## 2017-05-12 RX ADMIN — TRAMADOL HYDROCHLORIDE 50 MG: 50 TABLET, FILM COATED ORAL at 13:27

## 2017-05-12 RX ADMIN — TRAMADOL HYDROCHLORIDE 100 MG: 50 TABLET, FILM COATED ORAL at 23:54

## 2017-05-12 RX ADMIN — RENAGEL 800 MG: 800 TABLET ORAL at 12:22

## 2017-05-12 RX ADMIN — Medication 10 ML: at 05:59

## 2017-05-12 RX ADMIN — HYDRALAZINE HYDROCHLORIDE 50 MG: 50 TABLET ORAL at 15:50

## 2017-05-12 NOTE — PROGRESS NOTES
Bedside report received from  [facility] (offgoing nurse). Assumed care of patient. No immediate concerns, patient resting with call bell within reach.

## 2017-05-12 NOTE — PROGRESS NOTES
Progress Note      5/12/2017 10:59 AM  NAME: Sandra Sebastian   MRN:  [id number]   Admit Diagnosis: Pulmonary edema     Assessment:     ASSESSMENT   1. Acute hypoxic respiratory failure secondary acute pulmonary   edema. Improved p Dialysis. 2. Acute pulmonary edema due to volume overload. Improved p Dialysis. 3. End-stage renal disease requiring dialysis. 4. Uncontrolled hypertension. 5. Elevated troponin level, possible non-ST elevation myocardial   infarction. 6. Leukocytosis. 7. Anemia of end-stage renal disease. 8. Tobacco abuse. 9. Alcohol abuse. Significant volume off yesterday. Doing much better. He is on room air. Echo -  Normal LV fx. Normal heart by echo. Normal Nuclear scan of heart , with Adenosine . 5/10  Had dialysis this AM.   Notes chest pain present since admission. Anterior chest .   Continuous for last 4 days. Not affected by position . Not affected by activity. Present at rest and when sitting. Patient has not been walking in the [place] for some reason. Hx of heroin use in past.  Not sure of current status. No drug screen on admission. Elevated Troponin on admission with acute volume overload and acute respiratory distress due to volume. Improved with dialysis. Was on BIPAP on admission, due to severe respiratory distress. Normal / vigorous LV fx. No hx of heart disease. 5/11 stress nuclear normal.   No evidence of ischemia or infarction. This was an Adenosine test.   Pt refused to try to walk . But no perfusion abnormalities seen    Became acutely SOB overnight with significant increase in BP and now with recurrent Pulmonary edema. Pt feels he needs dialysis to remove fluid . He is on O2  Mask. O2 Sat is 97% at this time. He is in NSR   Will repeat EKG     5/12   Had UF yesterday and dialysis today. Breathing is much better. Pulmonary edema seems resolved. No other cardiac intervention planned.    He needs to be up walking and plan for discharge. Plan:     Walk pt in the [place]. Dilaudid should be discontinued by Hospitalist .   He doesn't have any sort of  Issue that should cause him pain that would need Dilaudid. He's been getting that 4 times a day. Plan for f/u with his doctors at [facility]. We will be available over weekend if needed. Call if needed. [x]        High complexity decision making was performed    Subjective: [address] with chest pain. Acutely SOB this AM  Discussed with RN events overnight. Patient Active Problem List   Diagnosis Code    Pulmonary edema J81.1       Review of Systems:    Symptom Y/N Comments  Symptom Y/N Comments   Fever/Chills N   Chest Pain N    Poor Appetite N   Edema N    Cough N   Abdominal Pain N    Sputum N   Joint Pain N    SOB/WARNER N   Pruritis/Rash N    Nausea/vomit N   Tolerating PT/OT Y    Diarrhea N   Tolerating Diet Y    Constipation N   Other       Could NOT obtain due to:      Objective:      Physical Exam:    Last 24hrs VS reviewed since prior progress note. Most recent are:    Visit Vitals    /78 (BP 1 Location: Right arm, BP Patient Position: At rest)    Pulse 87    Temp 98 °F (36.7 °C)    Resp 16    Ht 6' (1.829 m)    Wt 68.2 kg (150 lb 5.7 oz)    SpO2 97%    BMI 20.39 kg/m2       Intake/Output Summary (Last 24 hours) at 05/12/17 1625  Last data filed at 05/12/17 1422   Gross per 24 hour   Intake              600 ml   Output             3700 ml   Net            -3100 ml        General Appearance: Well developed, well nourished, alert & oriented x 3,    no acute distress. Ears/Nose/Mouth/Throat: Hearing grossly normal.  Neck: Supple. Chest: Lungs rales. Cardiovascular: Regular rate and rhythm, S1S2 normal, no murmur. Abdomen: Soft, non-tender, bowel sounds are active. Extremities: No edema bilaterally. Skin: Warm and dry.     PMH/SH reviewed - no change compared to H&P    Data Review    Telemetry: normal sinus rhythm     Lab Data Personally Reviewed:    Recent Labs      05/12/17   0558  05/10/17   0431   WBC  8.9  6.6   HGB  8.4*  8.2*   HCT  24.6*  24.5*   PLT  250  227   LABRCNT(INR:3,PTP:3,APTT:3,)  Recent Labs      05/12/17   0558  05/10/17   0431   NA  137  136   K  4.4  4.1   CL  103  99   CO2  20*  24   BUN  54*  51*   CREA  10.90*  10.70*   GLU  87  97   CA  8.9  7.9*   LABRCNT(CPK:3,CpKMB:3,ckndx:3,troiq:3)No results found for: CHOL, CHOLX, CHLST, CHOLV, HDL, LDL, DLDL, LDLC, DLDLP, TGL, TGLX, TRIGL, TRIGP, CHHD, CHHDXLABRCNT(sgot:3,gpt:3,ap:3,tbiL:3,TP:3,ALB:3,GLOB:3,ggt:3,aml:3,amyp:3,lpse:3,hlpse:3)  No results for input(s): PH, PCO2, PO2 in the last 72 hours. No results found for: CHOL, CHOLX, CHLST, CHOLV, HDL, LDL, DLDL, LDLC, DLDLP, TGL, TGLX, TRIGL, TRIGP, CHHD, CHHDXMEDTABLETimothy Daniel Acosta MD  No results for input(s): PH, PCO2, PO2 in the last 72 hours.     Medications Personally Reviewed:    Current Facility-Administered Medications   Medication Dose Route Frequency    HYDROmorphone (PF) (DILAUDID) injection 1 mg  1 mg IntraVENous Q4H PRN    carvedilol (COREG) tablet 12.5 mg  12.5 mg Oral BID WITH MEALS    albumin human 25% (BUMINATE) solution 12.5 g  12.5 g IntraVENous DIALYSIS PRN    calcium carbonate (TUMS) chewable tablet 200 mg [elemental]  200 mg Oral TID PRN    nitroglycerin (NITROBID) 2 % ointment 1 Inch  1 Inch Topical Q6H PRN    epoetin cristian (EPOGEN;PROCRIT) injection 10,000 Units  10,000 Units SubCUTAneous DIALYSIS MON, WED & FRI    allopurinol (ZYLOPRIM) tablet 100 mg  100 mg Oral DAILY    amLODIPine (NORVASC) tablet 10 mg  10 mg Oral DAILY    b-complex with vitamin c tablet 1 Tab  1 Tab Oral DAILY    furosemide (LASIX) tablet 80 mg  80 mg Oral DAILY    gabapentin (NEURONTIN) capsule 100 mg  100 mg Oral BID    hydrALAZINE (APRESOLINE) tablet 50 mg  50 mg Oral TID    therapeutic multivitamin (THERAGRAN) tablet 1 Tab  1 Tab Oral DAILY    pantoprazole (PROTONIX) tablet 40 mg  40 mg Oral DAILY    doxercalciferol (HECTOROL) capsule 0.5 mcg  0.5 mcg Oral DAILY    sevelamer (RENAGEL) tablet 800 mg  800 mg Oral TID    terazosin (HYTRIN) capsule 2 mg  2 mg Oral QHS    traMADol (ULTRAM) tablet 50 mg  50 mg Oral Q6H PRN    cloNIDine HCl (CATAPRES) tablet 0.3 mg  0.3 mg Oral BID    sodium chloride (NS) flush 5-10 mL  5-10 mL IntraVENous Q8H    sodium chloride (NS) flush 5-10 mL  5-10 mL IntraVENous PRN    ondansetron (ZOFRAN) injection 4 mg  4 mg IntraVENous Q4H PRN    heparin (porcine) injection 5,000 Units  5,000 Units SubCUTAneous Q8H    nicotine (NICODERM CQ) 21 mg/24 hr patch 1 Patch  1 Patch TransDERmal Q24H    hydrALAZINE (APRESOLINE) 20 mg/mL injection 20 mg  20 mg IntraVENous Q6H PRN         Sandra Back MD

## 2017-05-12 NOTE — PROGRESS NOTES
6:32 PM TRANSFER - IN REPORT:    Verbal report received from [facility] (name) on [address]  being received from PCU(unit) for routine progression of care      Report consisted of patients Situation, Background, Assessment and   Recommendations(SBAR). Information from the following report(s) SBAR, Kardex, Intake/Output, MAR, Recent Results and Cardiac Rhythm NSR was reviewed with the receiving nurse. Opportunity for questions and clarification was provided. Assessment completed upon patients arrival to unit and care assumed.

## 2017-05-12 NOTE — PROGRESS NOTES
Problem: Patient Education: Go to Patient Education Activity  Goal: Patient/Family Education  Outcome: Progressing Towards Goal  Call bell within reach, pt educated on and verbalized understanding of calling before getting OOB. Patient specific fall prevention measures in place as documented on Fall Flowsheet. Problem: Pressure Injury - Risk of  Goal: *Prevention of pressure ulcer  Outcome: Progressing Towards Goal  Pressure ulcer prevention measures in place as documented on Jovan Flow sheet.

## 2017-05-12 NOTE — PROGRESS NOTES
Bedside and Verbal shift change report given to Virginia Kraft (oncoming nurse) by POLLO Burnette RN (offgoing nurse). Report given with SBAR, Kardex, Intake/Output, MAR and Recent Results.

## 2017-05-12 NOTE — PROCEDURES
[facility]  [phone]    Vitals   Pre   Post   Assessment   Pre   Post     Temp  Temp: 98 °F (36.7 °C) (05/12/17 0815)  98 LOC  Alert and oriented x4 same   HR   Pulse (Heart Rate): 79 (05/12/17 0815) 92 Lungs   Diminished on room air  same   B/P   BP: (!) 160/98 (05/12/17 0815) 129/72 Cardiac   wnl  NSR  same   Resp   Resp Rate: 18 (05/12/17 0815) 18 Skin   Intact    same   Pain level  Pt denies any pain at this time same Edema  None noted     same   Orders:    Duration:   Start:    0815 End:    1118 Total:   3hrs   Dialyzer:   Dialyzer/Set Up Inspection: Ginger Curry (05/12/17 0815)   K Bath:   Dialysate K (mEq/L): 3 (05/12/17 0815)   Ca Bath:   Dialysate CA (mEq/L): 2.5 (05/12/17 0815)   Na/Bicarb:   Dialysate NA (mEq/L): 140 (05/12/17 0815)   Target Fluid Removal:   Goal/Amount of Fluid to Remove (mL): 4000 mL (05/12/17 0815)   Access     Type & Location:   Rt CVC present, ports cleaned with alcavis and blood aspirated without any issues.  LT Arm fistula present, good thrill and bruit noted but patient states that other facility had issues using it   Labs     Obtained/Reviewed   Critical Results Called     HGB   Date Value Ref Range Status   05/12/2017 8.4 (L) 12.1 - 17.0 g/dL Final     K-    Potassium   Date Value Ref Range Status   05/12/2017 4.4 3.5 - 5.1 mmol/L Final     Ca-   Calcium   Date Value Ref Range Status   05/12/2017 8.9 8.5 - 10.1 MG/DL Final     Bun-   BUN   Date Value Ref Range Status   05/12/2017 54 (H) 6 - 20 MG/DL Final     Creat-   Creatinine   Date Value Ref Range Status   05/12/2017 10.90 (H) 0.70 - 1.30 MG/DL Final        Medications/ Blood Products Given     Name   Dose   Route and Time     Epogen 29681w Primary nurse reminded to give medication today             Blood Volume Processed (BVP):    68.7 Net Fluid   Removed:  3.7kg   Comments   Time Out Done: 0800  Primary Nurse Rpt Pre: Jasmina Velarde   Primary Nurse Rpt Post: Jasmina Velarde  Pt Education:weekly dressing changes due to the use of bio-patch disk over CVC  Care Plan: chronic renal failure  Tx Summary:  0815 Dialysis started. Per patient request, will give saline flushes q 30 minutes throughout treatment  1025 Patient complainig of mild cramping in hands. UF decreased at this time  1118 Dialysis completed and blood rinsed back. New caps placed on each port and report given to nurse. Pt stable  Admiting Diagnosis:NSTEMI, Respiratory distress  Pt's previous clinic-[facility]  Consent signed - Informed Consent Verified: Yes (05/12/17 0815)  [facility] Consent - on file  Hepatitis Status- Neg as of 5/6/2017  Machine #- Machine Number: [id number] (05/12/17 0815)  Telemetry status-NSR  Pre-dialysis wt. - Pre-Dialysis Weight: 68.2 kg (150 lb 5.7 oz) (05/12/17 0815)

## 2017-05-12 NOTE — PROGRESS NOTES
Bedside shift change report given to Marene Lombard, RN (oncoming nurse). Report included pertinent events from today's shift as well as the following information SBAR, Kardex, Intake/Output, MAR, Recent Results and Cardiac Rhythm NSR. No immediate concerns. Pt resting in position of comfort with call bell within reach.

## 2017-05-12 NOTE — PROGRESS NOTES
Patient name: Markie Tang  MRN: [id number]    Nephrology Progress note:    Assessment:  ESRD: [place] [facility]; seen during HD. Using R TDC, 400 Qb, 3k, 2.5 Ca. UF of 4 Kg- tolerating well  HTN: Uncontrolled>>much better  NSTEMI: troponin trending down; Echo with nl LVSF and Stress test neg for ischemia  Anemia 2 to ESRD: Hgb suboptimal  Fluid overload- resolved    We did extra UF yesterday with 3 Kg UF and HD today with 4 Kg UF. He looks comfortable and in no distress. Reluctant to go home each time we talk about. \"I want CXR tomm and then I will see how I am doing Monday\". BP is better with more UF/Coreg. ?why he had pulm edema yesterday with nl LVSF. ? drinking more liquids    Plan/Recommendations:  Next HD on Monday, if pt still here. If CXR is improving and stable oxygenation, he should be Ok for d/c from renal standpoint over the weekend  Ct current BP meds  Ct EPO  Ct Renagel    Subjective:  Seen on HD.  \"I can't go home\"  Looks comfortable    ROS:   No nausea, no vomiting, cp   +sob-but not on O2    Exam:  Visit Vitals    /72    Pulse 90    Temp 98 °F (36.7 °C)    Resp 18    Ht 6' (1.829 m)    Wt 68.2 kg (150 lb 5.7 oz)    SpO2 94%    BMI 20.39 kg/m2     Wt Readings from Last 3 Encounters:   05/12/17 68.2 kg (150 lb 5.7 oz)   05/06/17 83.9 kg (185 lb)   04/19/17 77.9 kg (171 lb 11.2 oz)       Intake/Output Summary (Last 24 hours) at 05/12/17 1146  Last data filed at 05/12/17 1118   Gross per 24 hour   Intake                0 ml   Output             6300 ml   Net -6300 ml       Gen: NAD. No distress  Lungs/Chest wall: dec BS. R TDC intact  Cardiovascular: Regular rate, normal rhythm. Ext: Immature LAVF.  No peripheral edema  CNS: alert awake    Current Facility-Administered Medications   Medication Dose Route Frequency Last Dose    HYDROmorphone (PF) (DILAUDID) injection 1 mg  1 mg IntraVENous Q4H PRN 1 mg at 05/12/17 0607    carvedilol (COREG) tablet 12.5 mg  12.5 mg Oral BID WITH MEALS 12.5 mg at 05/11/17 1714    albumin human 25% (BUMINATE) solution 12.5 g  12.5 g IntraVENous DIALYSIS PRN      calcium carbonate (TUMS) chewable tablet 200 mg [elemental]  200 mg Oral TID  mg at 05/09/17 2259    nitroglycerin (NITROBID) 2 % ointment 1 Inch  1 Inch Topical Q6H PRN 1 Inch at 05/11/17 0657    epoetin cristian (EPOGEN;PROCRIT) injection 10,000 Units  10,000 Units SubCUTAneous DIALYSIS MON, WED & FRI 10,000 Units at 05/10/17 2231    allopurinol (ZYLOPRIM) tablet 100 mg  100 mg Oral DAILY 100 mg at 05/11/17 1245    amLODIPine (NORVASC) tablet 10 mg  10 mg Oral DAILY Stopped at 05/12/17 0900    b-complex with vitamin c tablet 1 Tab  1 Tab Oral DAILY 1 Tab at 05/11/17 1252    furosemide (LASIX) tablet 80 mg  80 mg Oral DAILY 80 mg at 05/11/17 1245    gabapentin (NEURONTIN) capsule 100 mg  100 mg Oral  mg at 05/11/17 1714    hydrALAZINE (APRESOLINE) tablet 50 mg  50 mg Oral TID Stopped at 05/12/17 0900    therapeutic multivitamin (THERAGRAN) tablet 1 Tab  1 Tab Oral DAILY 1 Tab at 05/11/17 1245    pantoprazole (PROTONIX) tablet 40 mg  40 mg Oral DAILY 40 mg at 05/11/17 1245    doxercalciferol (HECTOROL) capsule 0.5 mcg  0.5 mcg Oral DAILY 0.5 mcg at 05/11/17 1252    sevelamer (RENAGEL) tablet 800 mg  800 mg Oral  mg at 05/11/17 2120    terazosin (HYTRIN) capsule 2 mg  2 mg Oral QHS 2 mg at 05/11/17 2120    traMADol (ULTRAM) tablet 50 mg  50 mg Oral Q6H PRN 50 mg at 05/11/17 2236    cloNIDine HCl (CATAPRES) tablet 0.3 mg  0.3 mg Oral BID Stopped at 05/12/17 0900    sodium chloride (NS) flush 5-10 mL  5-10 mL IntraVENous Q8H 10 mL at 05/12/17 0559    sodium chloride (NS) flush 5-10 mL  5-10 mL IntraVENous PRN 10 mL at 05/12/17 0127    ondansetron (ZOFRAN) injection 4 mg  4 mg IntraVENous Q4H PRN      heparin (porcine) injection 5,000 Units  5,000 Units SubCUTAneous Q8H 5,000 Units at 05/12/17 0559    nicotine (NICODERM CQ) 21 mg/24 hr patch 1 Patch  1 Patch TransDERmal Q24H 1 Patch at 05/12/17 0559    hydrALAZINE (APRESOLINE) 20 mg/mL injection 20 mg  20 mg IntraVENous Q6H PRN         Labs/Data:    Lab Results   Component Value Date/Time    WBC 8.9 05/12/2017 05:58 AM    Hemoglobin (POC) 8.8 04/19/2017 08:20 AM    HGB 8.4 05/12/2017 05:58 AM    Hematocrit (POC) 26 04/19/2017 08:20 AM    HCT 24.6 05/12/2017 05:58 AM    PLATELET 974 38/27/4874 05:58 AM    MCV 95.3 05/12/2017 05:58 AM       Lab Results   Component Value Date/Time    Sodium 137 05/12/2017 05:58 AM    Potassium 4.4 05/12/2017 05:58 AM    Chloride 103 05/12/2017 05:58 AM    CO2 20 05/12/2017 05:58 AM    Anion gap 14 05/12/2017 05:58 AM    Glucose 87 05/12/2017 05:58 AM    BUN 54 05/12/2017 05:58 AM    Creatinine 10.90 05/12/2017 05:58 AM    BUN/Creatinine ratio 5 05/12/2017 05:58 AM    GFR est AA 6 05/12/2017 05:58 AM    GFR est non-AA 5 05/12/2017 05:58 AM    Calcium 8.9 05/12/2017 05:58 AM       Patient seen and examined. Chart reviewed. Labs, data and other pertinent notes reviewed in last 24 hrs.     Discussed with pt and Dr. Rob Carlos

## 2017-05-13 ENCOUNTER — APPOINTMENT (OUTPATIENT)
Dept: GENERAL RADIOLOGY | Age: 52
DRG: 133 | End: 2017-05-13
Attending: INTERNAL MEDICINE
Payer: MEDICAID

## 2017-05-13 VITALS
DIASTOLIC BLOOD PRESSURE: 73 MMHG | WEIGHT: 150.35 LBS | OXYGEN SATURATION: 98 % | BODY MASS INDEX: 20.36 KG/M2 | TEMPERATURE: 98.7 F | HEIGHT: 72 IN | SYSTOLIC BLOOD PRESSURE: 141 MMHG | RESPIRATION RATE: 16 BRPM | HEART RATE: 87 BPM

## 2017-05-13 PROBLEM — Z99.2 ESRD ON DIALYSIS (HCC): Status: ACTIVE | Noted: 2017-05-13

## 2017-05-13 PROBLEM — I10 HYPERTENSION: Status: ACTIVE | Noted: 2017-05-13

## 2017-05-13 PROBLEM — Z72.0 TOBACCO ABUSE: Status: ACTIVE | Noted: 2017-05-13

## 2017-05-13 PROBLEM — N18.6 ESRD ON DIALYSIS (HCC): Status: ACTIVE | Noted: 2017-05-13

## 2017-05-13 PROBLEM — R09.02 HYPOXIA: Status: ACTIVE | Noted: 2017-05-13

## 2017-05-13 PROCEDURE — 74011250637 HC RX REV CODE- 250/637: Performed by: INTERNAL MEDICINE

## 2017-05-13 PROCEDURE — 71010 XR CHEST PORT: CPT

## 2017-05-13 PROCEDURE — 74011250636 HC RX REV CODE- 250/636: Performed by: HOSPITALIST

## 2017-05-13 PROCEDURE — 74011250637 HC RX REV CODE- 250/637: Performed by: HOSPITALIST

## 2017-05-13 PROCEDURE — 74011250637 HC RX REV CODE- 250/637: Performed by: EMERGENCY MEDICINE

## 2017-05-13 RX ORDER — GABAPENTIN 100 MG/1
100 CAPSULE ORAL 2 TIMES DAILY
Qty: 60 CAP | Refills: 0 | Status: SHIPPED | OUTPATIENT
Start: 2017-05-13 | End: 2017-11-28 | Stop reason: SDUPTHER

## 2017-05-13 RX ORDER — HYDRALAZINE HYDROCHLORIDE 50 MG/1
50 TABLET, FILM COATED ORAL 3 TIMES DAILY
Qty: 90 TAB | Refills: 0 | Status: SHIPPED | OUTPATIENT
Start: 2017-05-13 | End: 2017-11-28 | Stop reason: ALTCHOICE

## 2017-05-13 RX ORDER — SEVELAMER HYDROCHLORIDE 800 MG/1
800 TABLET, FILM COATED ORAL 3 TIMES DAILY
Qty: 90 TAB | Refills: 0 | Status: SHIPPED | OUTPATIENT
Start: 2017-05-13 | End: 2017-07-16

## 2017-05-13 RX ORDER — CARVEDILOL 12.5 MG/1
12.5 TABLET ORAL 2 TIMES DAILY WITH MEALS
Qty: 60 TAB | Refills: 0 | Status: SHIPPED | OUTPATIENT
Start: 2017-05-13 | End: 2017-11-28 | Stop reason: ALTCHOICE

## 2017-05-13 RX ORDER — CLONIDINE HYDROCHLORIDE 0.3 MG/1
0.3 TABLET ORAL 2 TIMES DAILY
Qty: 60 TAB | Refills: 0 | Status: SHIPPED | OUTPATIENT
Start: 2017-05-13 | End: 2020-01-01

## 2017-05-13 RX ORDER — TRAMADOL HYDROCHLORIDE 50 MG/1
100 TABLET ORAL
Qty: 6 TAB | Refills: 0 | Status: SHIPPED | OUTPATIENT
Start: 2017-05-13 | End: 2017-07-16

## 2017-05-13 RX ORDER — TERAZOSIN 2 MG/1
2 CAPSULE ORAL
Qty: 30 CAP | Refills: 0 | Status: SHIPPED | OUTPATIENT
Start: 2017-05-13 | End: 2017-11-28 | Stop reason: ALTCHOICE

## 2017-05-13 RX ORDER — ALLOPURINOL 100 MG/1
100 TABLET ORAL DAILY
Qty: 30 TAB | Refills: 0 | Status: SHIPPED | OUTPATIENT
Start: 2017-05-13 | End: 2017-11-28 | Stop reason: ALTCHOICE

## 2017-05-13 RX ORDER — AMLODIPINE BESYLATE 10 MG/1
10 TABLET ORAL DAILY
Qty: 30 TAB | Refills: 0 | Status: SHIPPED | OUTPATIENT
Start: 2017-05-13 | End: 2020-01-01

## 2017-05-13 RX ORDER — FUROSEMIDE 80 MG/1
80 TABLET ORAL DAILY
Qty: 30 TAB | Refills: 0 | Status: SHIPPED | OUTPATIENT
Start: 2017-05-13 | End: 2017-11-28 | Stop reason: ALTCHOICE

## 2017-05-13 RX ADMIN — CARVEDILOL 12.5 MG: 12.5 TABLET, FILM COATED ORAL at 10:59

## 2017-05-13 RX ADMIN — HYDRALAZINE HYDROCHLORIDE 50 MG: 50 TABLET ORAL at 10:59

## 2017-05-13 RX ADMIN — ALLOPURINOL 100 MG: 100 TABLET ORAL at 10:59

## 2017-05-13 RX ADMIN — TRAMADOL HYDROCHLORIDE 100 MG: 50 TABLET, FILM COATED ORAL at 10:59

## 2017-05-13 RX ADMIN — AMLODIPINE BESYLATE 10 MG: 5 TABLET ORAL at 10:59

## 2017-05-13 RX ADMIN — Medication 10 ML: at 05:25

## 2017-05-13 RX ADMIN — RENAGEL 800 MG: 800 TABLET ORAL at 10:58

## 2017-05-13 RX ADMIN — CLONIDINE HYDROCHLORIDE 0.3 MG: 0.1 TABLET ORAL at 10:59

## 2017-05-13 RX ADMIN — PANTOPRAZOLE SODIUM 40 MG: 40 TABLET, DELAYED RELEASE ORAL at 10:59

## 2017-05-13 RX ADMIN — THERA TABS 1 TABLET: TAB at 10:58

## 2017-05-13 RX ADMIN — FUROSEMIDE 80 MG: 80 TABLET ORAL at 10:59

## 2017-05-13 RX ADMIN — HEPARIN SODIUM 5000 UNITS: 5000 INJECTION, SOLUTION INTRAVENOUS; SUBCUTANEOUS at 05:25

## 2017-05-13 RX ADMIN — GABAPENTIN 100 MG: 100 CAPSULE ORAL at 10:59

## 2017-05-13 RX ADMIN — TRAMADOL HYDROCHLORIDE 100 MG: 50 TABLET, FILM COATED ORAL at 05:24

## 2017-05-13 NOTE — PROGRESS NOTES
Spiritual Care Assessment/Progress Notes    Gurmeet Yeager [id number]  [id number]    1965  47 y.o.  male    Patient Telephone Number: [phone] (home)   Mu-ism Affiliation: No Orthodox   Language: English   Extended Emergency Contact Information  Primary Emergency Contact: [address] Phone: [phone]  Relation: Mother   Patient Active Problem List    Diagnosis Date Noted    ESRD on dialysis ([facility] [address] 05/13/2017    Hypertension 05/13/2017    Tobacco abuse 05/13/2017    Hypoxia 05/13/2017    Pulmonary edema 05/06/2017        Date: 5/13/2017       Level of Mu-ism/Spiritual Activity:  []         Involved in chelita tradition/spiritual practice    []         Not involved in chelita tradition/spiritual practice  []         Spiritually oriented    []         Claims no spiritual orientation    []         seeking spiritual identity  []         Feels alienated from Spiritism practice/tradition  []         Feels angry about Spiritism practice/tradition  [x]         Spirituality/Spiritism tradition is not a resource for coping at this time.   []         Not able to assess due to medical condition    Services Provided Today:  []         crisis intervention    []         reading Scriptures  []         spiritual assessment    []         prayer  []         empathic listening/emotional support  []         rites and rituals (cite in comments)  []         life review     []         Spiritism support  []         theological development   []         advocacy  []         ethical dialog     []         blessing  []         bereavement support    []         support to family  []         anticipatory grief support   []         help with AMD  []         spiritual guidance    []         meditation      Spiritual Care Needs  []         Emotional Support  []         Spiritual/Mu-ism Care  []         Loss/Adjustment  []         Advocacy/Referral                /Ethics  [x]         No needs expressed at               this time  []         Other: (note in               comments)  Spiritual Care Plan  []         Follow up visits with               pt/family  []         Provide materials  []         Schedule sacraments  []         Contact Community               Clergy  [x]         Follow up as needed  []         Other: (note in               comments)     Comments: Initial spiritual assessment in Card Pull  Patient did not have any spiritual care concerns at time of visit. Ian Richard of  Availability. Andrew Chinchilla M.S., M.Div.   [facility] ([id number])

## 2017-05-13 NOTE — PROGRESS NOTES
Beside report received from [facility], pt off tele/no IV access as pt states he's leaving AMA once he has a ride. Pt has been refusing treatment (medications, weights etc)    8:34 AM  CXR improved, pt to be discharged, pt states his mother will be off of work and able to transport ~ 11AM.    11:03 AM  Pt requesting to take AM meds now. Aureliano Gordon for writer to assess BP. States he needs new Rx for everything \"I have no meds at home\" and requesting 100mg tramadol at d/c - will notify provider    2:25 PM  Mother at bedside- Discharge brochure provided; Reviewed DC instructions with patient. Opportunity for questions and clarifications was provided; verbalized understanding. Follow-up appointments reviewed/written for patient. MAR reviewed with patient to clarify medications given during hospital stay, today. Pt stable and ambulatory for discharge; mother/brother to provide transportation to home. Room checked and all belongings sent with patient, including cell phone.

## 2017-05-13 NOTE — PROGRESS NOTES
Pharmacist Discharge Medication Reconciliation    Significant PMH:   Past Medical History:   Diagnosis Date    Chronic kidney disease     Dialysis patient ([facility] [address]     Hypertension      Chief Complaint for this Admission:   Chief Complaint   Patient presents with    Respiratory Distress     Allergies: Motrin [ibuprofen] and Tylenol [acetaminophen]    Discharge Medications:   Current Discharge Medication List        START taking these medications    Details   carvedilol (COREG) 12.5 mg tablet Take 1 Tab by mouth two (2) times daily (with meals). Qty: 60 Tab, Refills: 0      !! gabapentin (NEURONTIN) 100 mg capsule Take 1 Cap by mouth two (2) times a day. Qty: 60 Cap, Refills: 0      sevelamer (RENAGEL) 800 mg tablet Take 1 Tab by mouth three (3) times daily. Qty: 90 Tab, Refills: 0       !! - Potential duplicate medications found. Please discuss with provider. CONTINUE these medications which have CHANGED    Details   furosemide (LASIX) 80 mg tablet Take 1 Tab by mouth daily. Qty: 30 Tab, Refills: 0      terazosin (HYTRIN) 2 mg capsule Take 1 Cap by mouth nightly. Qty: 30 Cap, Refills: 0      !! allopurinol (ZYLOPRIM) 100 mg tablet Take 1 Tab by mouth daily. Qty: 30 Tab, Refills: 0      !! amLODIPine (NORVASC) 10 mg tablet Take 1 Tab by mouth daily. Qty: 30 Tab, Refills: 0      !! cloNIDine HCl (CATAPRES) 0.3 mg tablet Take 1 Tab by mouth two (2) times a day. Qty: 60 Tab, Refills: 0      !! hydrALAZINE (APRESOLINE) 50 mg tablet Take 1 Tab by mouth three (3) times daily. Qty: 90 Tab, Refills: 0      !! traMADol (ULTRAM) 50 mg tablet Take 2 Tabs by mouth every six (6) hours as needed. Max Daily Amount: 400 mg. Qty: 6 Tab, Refills: 0       !! - Potential duplicate medications found. Please discuss with provider. CONTINUE these medications which have NOT CHANGED    Details   multivitamin (ONE A DAY) tablet Take 1 Tab by mouth daily.       pantoprazole (PROTONIX) 40 mg tablet Take 40 mg by mouth daily.      paricalcitol (ZEMPLAR) 1 mcg capsule Take 1 mcg by mouth daily. !! hydrALAZINE (APRESOLINE) 50 mg tablet Take 50 mg by mouth three (3) times daily. sevelamer carbonate (RENVELA) 800 mg tab tab Take 800 mg by mouth three (3) times daily. FOLIC ACID/VIT B COMPLEX AND C (FULL SPECTRUM B-VITAMIN C PO) Take  by mouth. !! amLODIPine (NORVASC) 10 mg tablet Take 10 mg by mouth daily. !! cloNIDine HCl (CATAPRES) 0.3 mg tablet Take 0.3 mg by mouth two (2) times a day. !! allopurinol (ZYLOPRIM) 100 mg tablet Take  by mouth daily. !! TRAMADOL HCL (ULTRAM PO) Take 50 mg by mouth two (2) times a day. !! GABAPENTIN PO Take 300 mg by mouth two (2) times a day. !! - Potential duplicate medications found. Please discuss with provider. STOP taking these medications       propranolol (INDERAL) 20 mg tablet Comments:   Reason for Stopping:         sodium bicarbonate 650 mg tablet Comments:   Reason for Stopping:               The patient's chart, MAR and AVS were reviewed by Lalo Deleon PHARMD.  Admission medication reconciliation was completed by pharmacy.      Discharging Provider: None  Thank You,     Lalo Deleon, PACHECOD

## 2017-05-13 NOTE — ROUTINE PROCESS
Bedside and Verbal shift change report given to Kennedy Baca RN  (oncoming nurse) by Sadi Wade RN (offgoing nurse). Report included the following information SBAR, Kardex, Intake/Output, MAR, Recent Results and Cardiac Rhythm NSR\  Sadi Wade RN stayed until 2130 because patient told her he was leaving AMA and family was coming to . Sadi Wade notified RN Supervisor Osbaldo. Who came and spoke with the patient. Patient said his mother was coming to pick him up and he refused to sign AMA paperwork. Sadi Wade removed the patients IV and took him off telelmetry per Directions from Vipin 'SO' . Patient's family members never came, so Sadi Wade RN left at 2130 and handed patient over to me. At the present time , I am not certain that his mother is coming to get him, but that is what I was told. Patient is refusing all meds and IVs  because he maintains \"that his ride is coming and he is leaving. \"   I will advise Kadeem Leahy if patient does not leave the hospital.  RN Supervisor advised by [employer] that patient's family is not coming to pick him up until tomorrow and he is refusing to be hooked up to telemetry monitor. I did manage to get a blood pressure taken.

## 2017-05-13 NOTE — DISCHARGE INSTRUCTIONS
Diet for Chronic Kidney Disease (Before Dialysis): Care Instructions  Your Care Instructions  When you have chronic kidney disease, you need to change your diet to avoid foods that make your kidneys worse. You may need to limit salt, fluids, and protein. You also may need to limit minerals such as potassium and phosphorus. A diet for chronic kidney disease takes planning. A dietitian who specializes in kidney disease can help you plan meals that meet your needs. These guidelines are for people who are not on dialysis. Talk with your doctor or dietitian to make sure your diet is right for your condition. Do not change your diet without talking to your doctor or dietitian. Follow-up care is a key part of your treatment and safety. Be sure to make and go to all appointments, and call your doctor if you are having problems. It's also a good idea to know your test results and keep a list of the medicines you take. How can you care for yourself at home? · Work with your doctor or dietitian to create a food plan. · Do not skip meals or go for many hours without eating. If you do not feel very hungry, try to eat 4 or 5 small meals instead of 1 or 2 big meals. · If you have a hard time eating enough, talk to your doctor or dietitian about ways you can add calories to your diet. · Do not take any vitamins or minerals, supplements, or herbal products without talking to your doctor first.  · Check with your doctor about whether it is safe for you to drink alcohol. To get the right amount of protein  · Ask your doctor or dietitian how much protein you can have each day. Most people with chronic kidney disease need to limit the amount of protein they eat. But you still need some protein to stay healthy. · Include all sources of protein in your daily protein count. Besides meat, poultry, fish, and eggs, protein is found in milk and milk products, beans and nuts, breads, cereals, and vegetables.   To limit salt  · Read food labels on cans and food packages. The labels tell you how much sodium is in each serving. Make sure that you look at the serving size. If you eat more than the serving size, you will get more sodium than what is listed on the label. · Do not add salt to your food. Avoid foods that list salt, sodium, or monosodium glutamate (MSG) as an ingredient. · Buy foods that are labeled \"no salt added,\" \"sodium-free,\" or \"low-sodium. \" Foods labeled \"reduced-sodium\" and \"light sodium\" may still have too much sodium. · Limit processed foods, fast food, and restaurant foods. These types of food are very high in sodium. · Avoid salted pretzels, chips, popcorn, and other salted snacks. · Avoid smoked, cured, salted, and canned meat, fish, and poultry. This includes ham, gaona, hot dogs, and luncheon meats. · You may use lemon, herbs, and spices to flavor your meals. To limit potassium  · Choose low-potassium fruits such as blueberries and raspberries. · Choose low-potassium vegetables such as cucumbers and radishes. · Do not use a salt substitute or lite salt unless your doctor says it is okay. Most salt substitutes and lite salts are high in potassium. To limit phosphorus  · Follow your food plan to know how much milk and milk products you can have. · Limit nuts, peanut butter, seeds, lentils, beans, organ meats, and sardines. · Avoid cola drinks. · Avoid bran breads or bran cereals. If you need to limit fluids  · Know how much fluid you can drink. Every day fill a pitcher with that amount of water. If you drink another fluid (such as coffee) that day, pour an equal amount of water out of the pitcher. · Count foods that are liquid at room temperature, such as gelatin dessert and ice cream, as fluids. Where can you learn more? Go to http://roxy-lacho.info/. Enter Q035 in the search box to learn more about \"Diet for Chronic Kidney Disease (Before Dialysis): Care Instructions. \"  Current as of: July 26, 2016  Content Version: 11.2  © 3474-6483 [employer]. Care instructions adapted under license by [employer] (which disclaims liability or warranty for this information). If you have questions about a medical condition or this instruction, always ask your healthcare professional. [employer] any warranty or liability for your use of this information. Patient Discharge Instructions     Pt Name  Gabrielle Hernandez   Date of Birth 1965   Age  47 y.o. Medical Record Number  [id number]   PCP None    Admit date:  5/6/2017 @    [address] [place] [address]    Room Number  2270/01   Date of Discharge 5/13/2017     Admission Diagnoses:     Pulmonary edema          Allergies   Allergen Reactions    Motrin [Ibuprofen] Hives    Tylenol [Acetaminophen] Hives        You were admitted to [address] [place] [address] for  Pulmonary edema    YOUR OTHER MEDICAL DIAGNOSES INCLUDE (BUT NOT LIMITED TO ):  Present on Admission:   Pulmonary edema   ESRD on dialysis ([address]   Hypertension   Tobacco abuse      DIET:  Cardiac Diet and Renal Diet   Recommended activity: Activity as tolerated  Follow up : Follow-up Information     Follow up With Details Comments Contact Info    Wilmer Marquez MD Schedule an appointment as soon as possible for a visit 3-4 weeks to be seen [address] [facility] [address] [phone]      None   None (395) Patient stated that they have no PCP          [facility]  [phone]  Fax - [phone] [address]    Your hemodialysis scheduled on Mondays, Wednesdays, and Fridays! · It is important that you take the medication exactly as they are prescribed. · Keep your medication in the bottles provided by the pharmacist and keep a list of the medication names, dosages, and times to be taken in your wallet. · Do not take other medications without consulting your doctor. ADDITIONAL INFORMATION: If you experience any of the following symptoms or have any health problem not listed below, then please call your primary care physician or return to the emergency room if you cannot get hold of your doctor: Fever, chills, nausea, vomiting, diarrhea, change in mentation, falling, bleeding, shortness of breath. I understand that if any problems occur once I am discharged, I am supposed to call my Primary care physician for further care or seek help in the Emergency Department at the nearest Healthcare facility. I have had an opportunity to discuss my clinical issues with my doctor and nursing staff. I understand and acknowledge receipt of the above instructions.                                                                                                                                            Physician's or R.N.'s Signature                                                            Date/Time                                                                                                                                              Patient or Representative Signature                                                 Date/Time

## 2017-07-16 ENCOUNTER — HOSPITAL ENCOUNTER (EMERGENCY)
Age: 52
Discharge: OTHER INSTITUTION | End: 2017-07-16
Attending: EMERGENCY MEDICINE
Payer: MEDICAID

## 2017-07-16 ENCOUNTER — APPOINTMENT (OUTPATIENT)
Dept: GENERAL RADIOLOGY | Age: 52
End: 2017-07-16
Attending: EMERGENCY MEDICINE
Payer: MEDICAID

## 2017-07-16 VITALS
BODY MASS INDEX: 23.03 KG/M2 | SYSTOLIC BLOOD PRESSURE: 178 MMHG | WEIGHT: 170 LBS | RESPIRATION RATE: 16 BRPM | HEIGHT: 72 IN | OXYGEN SATURATION: 96 % | DIASTOLIC BLOOD PRESSURE: 91 MMHG | HEART RATE: 92 BPM | TEMPERATURE: 98.2 F

## 2017-07-16 DIAGNOSIS — Z99.2 ESRD ON DIALYSIS (HCC): ICD-10-CM

## 2017-07-16 DIAGNOSIS — S82.841A BIMALLEOLAR FRACTURE, RIGHT, CLOSED, INITIAL ENCOUNTER: Primary | ICD-10-CM

## 2017-07-16 DIAGNOSIS — N18.6 ESRD ON DIALYSIS (HCC): ICD-10-CM

## 2017-07-16 DIAGNOSIS — S82.152A: ICD-10-CM

## 2017-07-16 PROCEDURE — 99283 EMERGENCY DEPT VISIT LOW MDM: CPT

## 2017-07-16 PROCEDURE — 73610 X-RAY EXAM OF ANKLE: CPT

## 2017-07-16 PROCEDURE — 73562 X-RAY EXAM OF KNEE 3: CPT

## 2017-07-16 PROCEDURE — 74011250636 HC RX REV CODE- 250/636: Performed by: EMERGENCY MEDICINE

## 2017-07-16 PROCEDURE — 96372 THER/PROPH/DIAG INJ SC/IM: CPT

## 2017-07-16 RX ORDER — MORPHINE SULFATE 2 MG/ML
2 INJECTION, SOLUTION INTRAMUSCULAR; INTRAVENOUS
Status: DISCONTINUED | OUTPATIENT
Start: 2017-07-16 | End: 2017-07-16

## 2017-07-16 RX ORDER — MORPHINE SULFATE 2 MG/ML
4 INJECTION, SOLUTION INTRAMUSCULAR; INTRAVENOUS
Status: COMPLETED | OUTPATIENT
Start: 2017-07-16 | End: 2017-07-16

## 2017-07-16 RX ORDER — TRAMADOL HYDROCHLORIDE 50 MG/1
50 TABLET ORAL
Status: DISCONTINUED | OUTPATIENT
Start: 2017-07-16 | End: 2017-07-17 | Stop reason: HOSPADM

## 2017-07-16 RX ORDER — MORPHINE SULFATE 4 MG/ML
4 INJECTION, SOLUTION INTRAMUSCULAR; INTRAVENOUS ONCE
Status: COMPLETED | OUTPATIENT
Start: 2017-07-16 | End: 2017-07-16

## 2017-07-16 RX ADMIN — Medication 4 MG: at 22:03

## 2017-07-16 RX ADMIN — MORPHINE SULFATE 4 MG: 4 INJECTION, SOLUTION INTRAMUSCULAR; INTRAVENOUS at 20:47

## 2017-07-16 NOTE — ED PROVIDER NOTES
HPI Comments: Blanca Box, 47 y.o. male, with PMHx of CKD, HTN presents ambulatory to [facility] ED with cc of right ankle and left knee pain with associated swelling. Pt states that he was climbing a fence and fell off approximately 1 hour PTA. He notes that he \"landed funny\" on his left knee and right ankle and heard a \"snap\" upon landing. Pt denies any other pain or injuries. He remarks that he is on dialysis MWF and denies missing any recent sessions. Pt arrived to the ED via car with a friend. Pt requests \"something for the pain\". He denies any other sx. PCP: None    Social history significant for: + Tobacco, + EtOH, + Illicit Drug Use    There are no other complaints, changes, or physical findings at this time. The history is provided by the patient. No  was used. Past Medical History:   Diagnosis Date    Chronic kidney disease     Dialysis patient ([facility]     Hypertension        Past Surgical History:   Procedure Laterality Date    HX VASCULAR ACCESS Left     LUE AV fistula         No family history on file. Social History     Social History    Marital status: SINGLE     Spouse name: N/A    Number of children: N/A    Years of education: N/A     Occupational History    Not on file. Social History Main Topics    Smoking status: Current Every Day Smoker     Packs/day: 1.00    Smokeless tobacco: Never Used    Alcohol use 0.0 oz/week     1 - 2 Cans of beer per week    Drug use: Yes     Special: Heroin      Comment: Former    Sexual activity: Not on file     Other Topics Concern    Not on file     Social History Narrative         ALLERGIES: Motrin [ibuprofen] and Tylenol [acetaminophen]    Review of Systems     Constitutional: No fever or chills. No night sweats or weight loss. HENT: no congestion or sore throat. Eyes: no discharge or visual changes  Respiratory: no cough or sputum production. No SOB. Cardiovascular: no chest pain or dyspnea on exertion. GI: no abdominal pain, no N/V/D. Endocrine: no polyuria, polydipsia, or polyphagia. : no dysuria or frequency. Musculoskeletal: + left knee and right ankle pain with swelling   Skin: no rash or new lesions  Allergy/immunology: no immunocompromise or disabling allergies. Neurological: no headache or seizures or syncope. Heme: no easy bruising or adenopathy. Psychological: no hallucinations, behavioral problems or insomnia. Patient Vitals for the past 12 hrs:   Temp Pulse Resp BP SpO2   07/16/17 1911 98.9 °F (37.2 °C) (!) 114 16 100/58 96 %       Physical Exam   Constitutional: well nourished, no diaphoresis, in pain but no acute distress otherwise. HENT: NC/AT. MM moist.   Eyes: no discharge, conjunctiva clear. Neck: supple, no adenopathy. No tracheal deviation. Cardiovascular: RRR, no murmurs, rubs or gallops. Pulmonary: NRD, breath sounds normal. No wheezes, rales or rhonchi   Abdominal: soft, NT, ND, bs normal.  : genitalia normal, no lesions, no discharge. Musculoskeletal: + swelling to medial aspect of left knee with tenderness to palpation and little discoloration; + swelling and tenderness to palpation to lateral malleolar area of right ankle  Neurological: A&Ox3  Skin: warm, no rash  Psychological: mood and affect normal, thought content normal.    MDM  Number of Diagnoses or Management Options  Diagnosis management comments: DDx: left knee sprain, right knee sprain/strain/possible fracture.         Amount and/or Complexity of Data Reviewed  Tests in the radiology section of CPT®: ordered and reviewed  Review and summarize past medical records: yes  Independent visualization of images, tracings, or specimens: yes    Patient Progress  Patient progress: stable    ED Course       Procedures      IMAGING RESULTS:  XR ANKLE RT MIN 3 V   Final Result   EXAM:  XR ANKLE RT MIN 3 V     INDICATION: Right ankle pain after jumping a fence.     COMPARISON: None.     FINDINGS: Three views of the right ankle demonstrate acute medial and posterior  malleolar fractures with the greatest amount of impaction posterior laterally  and extension of one fracture line along the long axis, bisecting the distal  tibia. Fibular fracture is not seen. There is a small joint effusion and medial  soft tissue swelling. In addition, there is a lobulated appearance of the  Achilles tendon. Diffuse osteopenia is seen.     IMPRESSION  IMPRESSION:      Bimalleolar acute right ankle fracture with the greatest amount of impaction of  the posterior lateral tibia.     Bilobulated right Achilles appearance. Please correlate with physical exam.   XR KNEE LT 3 V   Final Result   EXAM:  XR KNEE LT 3 V     INDICATION:   left knee pain after jumping a fence     COMPARISON: None.     FINDINGS: Three views of the left knee demonstrate an acute essentially  nondisplaced medial tibial plateau fracture with moderate lipohemarthrosis     IMPRESSION  IMPRESSION: Nondisplaced left medial tibial plateau fracture with  lipohemarthrosis       MEDICATIONS GIVEN:  Medications   traMADol (ULTRAM) tablet 50 mg (50 mg Oral Refused 7/16/17 1932)   morphine injection 4 mg (4 mg IntraMUSCular Given 7/16/17 2047)       IMPRESSION:  1. Bimalleolar fracture, right, closed, initial encounter    2. ESRD on dialysis ([facility] [address] [facility]    3. Closed fracture of left tibial tuberosity, initial encounter        PLAN:  1. Transfer to [address] [place] [address] Surgery for evaluation. Transfer Note:  Pt is being transferred to [facility] ED for admission. Pt has been accepted by Dr. Jarod Hollis in the ED. Written by Lori Matamoros, ED Scribe, as dictated by Jeison Inman MD.     This note is prepared by Lori Matamoros, acting as a Scribe for Jeison Inman MD.    Jeison Inman MD: The scribe's documentation has been prepared under my direction and personally reviewed by me in its entirety.  I confirm that the notes above accurately reflects all work, treatment, procedures, and medical decision making performed by me.

## 2017-07-16 NOTE — ED TRIAGE NOTES
Pt reports he has dialysis, last visit was Friday.  Pt requesting pain medication, denies taking meds PTA, Pt noted to be nodding off to sleep during triage

## 2017-07-17 NOTE — ED NOTES
Patient has been instructed that they have been given Moriphine which contains opioids, benzodiazepines, or other sedating drugs. Patient is aware that they  will need to refrain from driving or operating heavy machinery after taking this medication. Patient also instructed that they need to avoid drinking alcohol and using other products containing opioids, benzodiazepines, or other sedating drugs. Patient verbalized understanding.

## 2017-07-17 NOTE — ED NOTES
TRANSFER - OUT REPORT:    Verbal report given to Lillian Morejon RN (name) on Luly Moore  being transferred to [address] [place] [address] (unit) for urgent transfer       Report consisted of patients Situation, Background, Assessment and   Recommendations(SBAR). Information from the following report(s) SBAR, Kardex and ED Summary was reviewed with the receiving nurse. Lines:       Opportunity for questions and clarification was provided.       Patient transported with:  RAA and personal belongings

## 2017-11-28 ENCOUNTER — OFFICE VISIT (OUTPATIENT)
Dept: FAMILY MEDICINE CLINIC | Age: 52
End: 2017-11-28

## 2017-11-28 VITALS
BODY MASS INDEX: 21.39 KG/M2 | RESPIRATION RATE: 16 BRPM | OXYGEN SATURATION: 100 % | HEART RATE: 98 BPM | WEIGHT: 144.4 LBS | DIASTOLIC BLOOD PRESSURE: 58 MMHG | HEIGHT: 69 IN | TEMPERATURE: 96.7 F | SYSTOLIC BLOOD PRESSURE: 95 MMHG

## 2017-11-28 DIAGNOSIS — Z72.0 TOBACCO ABUSE: ICD-10-CM

## 2017-11-28 DIAGNOSIS — N18.6 ESRD ON DIALYSIS (HCC): Primary | ICD-10-CM

## 2017-11-28 DIAGNOSIS — Z99.2 ESRD ON DIALYSIS (HCC): Primary | ICD-10-CM

## 2017-11-28 DIAGNOSIS — M25.571 CHRONIC PAIN OF RIGHT ANKLE: ICD-10-CM

## 2017-11-28 DIAGNOSIS — N18.6 ANEMIA IN CHRONIC KIDNEY DISEASE, ON CHRONIC DIALYSIS (HCC): ICD-10-CM

## 2017-11-28 DIAGNOSIS — F41.9 ANXIETY: ICD-10-CM

## 2017-11-28 DIAGNOSIS — G89.29 CHRONIC PAIN OF RIGHT ANKLE: ICD-10-CM

## 2017-11-28 DIAGNOSIS — I10 ESSENTIAL HYPERTENSION: ICD-10-CM

## 2017-11-28 DIAGNOSIS — Z99.2 ANEMIA IN CHRONIC KIDNEY DISEASE, ON CHRONIC DIALYSIS (HCC): ICD-10-CM

## 2017-11-28 DIAGNOSIS — D63.1 ANEMIA IN CHRONIC KIDNEY DISEASE, ON CHRONIC DIALYSIS (HCC): ICD-10-CM

## 2017-11-28 RX ORDER — LISINOPRIL 2.5 MG/1
2.5 TABLET ORAL DAILY
COMMUNITY
End: 2021-01-01

## 2017-11-28 RX ORDER — ASPIRIN 81 MG/1
TABLET ORAL DAILY
COMMUNITY
End: 2017-11-28 | Stop reason: SDUPTHER

## 2017-11-28 RX ORDER — ASPIRIN 81 MG/1
81 TABLET ORAL DAILY
Qty: 90 TAB | Refills: 0 | Status: SHIPPED | OUTPATIENT
Start: 2017-11-28

## 2017-11-28 RX ORDER — ATORVASTATIN CALCIUM 40 MG/1
40 TABLET, FILM COATED ORAL
Status: ON HOLD | COMMUNITY
End: 2021-01-01 | Stop reason: SDUPTHER

## 2017-11-28 RX ORDER — PROMETHAZINE HYDROCHLORIDE 25 MG/1
25 TABLET ORAL
COMMUNITY
End: 2021-01-01

## 2017-11-28 RX ORDER — ISOSORBIDE MONONITRATE 60 MG/1
60 TABLET, EXTENDED RELEASE ORAL
Status: ON HOLD | COMMUNITY
End: 2020-01-01 | Stop reason: SDUPTHER

## 2017-11-28 RX ORDER — SENNOSIDES 8.6 MG/1
1 TABLET ORAL DAILY
COMMUNITY
End: 2021-01-01

## 2017-11-28 RX ORDER — SEVELAMER HYDROCHLORIDE 400 MG/1
800 TABLET, FILM COATED ORAL
Status: ON HOLD | COMMUNITY
End: 2021-01-01 | Stop reason: SDUPTHER

## 2017-11-28 RX ORDER — LIDOCAINE 50 MG/G
PATCH TOPICAL
Qty: 60 EACH | Refills: 0 | Status: SHIPPED | OUTPATIENT
Start: 2017-11-28

## 2017-11-28 RX ORDER — CITALOPRAM 20 MG/1
TABLET, FILM COATED ORAL
Qty: 60 TAB | Refills: 1 | Status: SHIPPED | OUTPATIENT
Start: 2017-11-28 | End: 2018-05-02 | Stop reason: SDUPTHER

## 2017-11-28 NOTE — PROGRESS NOTES
Chief Complaint   Patient presents with   [address] [place] [address]     New patient   1. Have you been to the ER, urgent care clinic since your last visit? Hospitalized since your last visit? Yes When: 3 weeks ago [facility]    2. Have you seen or consulted any other health care providers outside of the [address] [place] [address] since your last visit? Include any pap smears or colon screening.  Yes Where: Nephrologist [facility] last seen 2 weeks ago  Discuss anxiety and right ankle pain  Room 4

## 2017-11-28 NOTE — MR AVS SNAPSHOT
Visit Information Date & Time Provider Department Dept. Phone Encounter #  
 11/28/2017  2:40 PM Azeem Lay MD [facility] at [address] [id number] Follow-up Instructions Return in about 4 weeks (around 12/26/2017), or if symptoms worsen or fail to improve. Upcoming Health Maintenance Date Due FOBT Q 1 YEAR AGE 50-75 3/3/2016 Pneumococcal 19-64 Highest Risk (2 of 3 - PCV13) 11/28/2018 DTaP/Tdap/Td series (2 - Td) 11/28/2027 Allergies as of 11/28/2017  Review Complete On: 11/28/2017 By: Azeem Lay MD  
  
 Severity Noted Reaction Type Reactions Motrin [Ibuprofen]  03/15/2011   Topical Hives 7/16/17 Pt denies allergy Tylenol [Acetaminophen]  03/15/2011   Topical Hives 7/16/17 Pt denies allergy Current Immunizations  Never Reviewed Name Date  
 TD Vaccine 1/9/2013 12:56 PM, 7/1/2012  6:33 PM  
  
 Not reviewed this visit You Were Diagnosed With   
  
 Codes Comments ESRD on dialysis [facility])    -  Primary ICD-10-CM: N18.6, Z99.2 ICD-9-CM: 585.6, V45.11 Essential hypertension     ICD-10-CM: I10 
ICD-9-CM: 401.9 Tobacco abuse     ICD-10-CM: Z72.0 ICD-9-CM: 305.1 Anemia in chronic kidney disease, on chronic dialysis (HCC)     ICD-10-CM: N18.6, D63.1, Z99.2 ICD-9-CM: 285.21, 585.6, V45.11 Anxiety     ICD-10-CM: F41.9 ICD-9-CM: 300.00 Chronic pain of right ankle     ICD-10-CM: M25.571, G89.29 ICD-9-CM: 719.47, 338.29 Vitals BP Pulse Temp Resp Height(growth percentile) Weight(growth percentile) 95/58 (BP 1 Location: Right arm, BP Patient Position: Sitting) 98 96.7 °F (35.9 °C) (Oral) 16 5' 9\" (1.753 m) 144 lb 6.4 oz (65.5 kg) SpO2 BMI Smoking Status 100% 21.32 kg/m2 Current Every Day Smoker Vitals History BMI and BSA Data Body Mass Index Body Surface Area  
 21.32 kg/m 2 1.79 m 2 Preferred Pharmacy Pharmacy Name Phone  [facility]ACY #[id number] - [place], [address] [place] [address] AT [address] [facility] [phone] Your Updated Medication List  
  
   
This list is accurate as of: 11/28/17  3:49 PM.  Always use your most recent med list. amLODIPine 10 mg tablet Commonly known as:  Carbajal Fanti Take 1 Tab by mouth daily. aspirin delayed-release 81 mg tablet Take 1 Tab by mouth daily. atorvastatin 40 mg tablet Commonly known as:  LIPITOR Take  by mouth daily. citalopram 20 mg tablet Commonly known as:  Deeann Meigs Take 1/2 tab by mouth daily x 1 week and then increase to 1 tab daily x 1 week and then increase to 2 tabs daily  
  
 cloNIDine HCl 0.3 mg tablet Commonly known as:  CATAPRES Take 1 Tab by mouth two (2) times a day. FULL SPECTRUM B-VITAMIN C PO Take  by mouth. GABAPENTIN PO Take 300 mg by mouth two (2) times a day. isosorbide mononitrate ER 60 mg CR tablet Commonly known as:  IMDUR Take  by mouth every morning. lidocaine 5 % Commonly known as:  Hermelindo Carlos Apply patch to the affected area for 12 hours a day and remove for 12 hours a day. lisinopril 2.5 mg tablet Commonly known as:  Sara Mendoza Take  by mouth daily. multivitamin tablet Commonly known as:  ONE A DAY Take 1 Tab by mouth daily. pantoprazole 40 mg tablet Commonly known as:  PROTONIX Take 40 mg by mouth daily. paricalcitol 1 mcg capsule Commonly known as:  Karina Newcomer Take 1 mcg by mouth daily. promethazine 25 mg tablet Commonly known as:  PHENERGAN Take 25 mg by mouth every six (6) hours as needed for Nausea. RENAGEL 400 mg tablet Generic drug:  sevelamer Take 800 mg by mouth three (3) times daily (with meals). Senna 8.6 mg tablet Generic drug:  senna Take 1 Tab by mouth daily. Prescriptions Sent to Pharmacy Refills  
 citalopram (CELEXA) 20 mg tablet 1  Sig: Take 1/2 tab by mouth daily x 1 week and then increase to 1 tab daily x 1 week and then increase to 2 tabs daily Class: Normal  
 Pharmacy: [facility]/pharmacy #[id number] AT [address] [place] [address] Ph #: [phone]  
 lidocaine (LIDODERM) 5 % 0 Sig: Apply patch to the affected area for 12 hours a day and remove for 12 hours a day. Class: Normal  
 Pharmacy: [facility]/pharmacy #[id number] AT [address] [place] [address] Ph #: [phone]  
 aspirin delayed-release 81 mg tablet 0 Sig: Take 1 Tab by mouth daily. Class: Normal  
 Pharmacy: [facility]/pharmacy #[id number] AT [address] [place] [address] Ph #: [phone] Route: Oral  
  
We Performed the Following REFERRAL TO PSYCHIATRY [REF91 Custom] Comments:  
 Please evaluate patient for: anxiety Follow-up Instructions Return in about 4 weeks (around 12/26/2017), or if symptoms worsen or fail to improve. Referral Information Referral ID Referred By Referred To  
  
 [id number] Darrion Ross Not Available Visits Status Start Date End Date 1 New Request 11/28/17 11/28/18 If your referral has a status of pending review or denied, additional information will be sent to support the outcome of this decision. [facility]! [facility] introduces [employer] patient portal. Now you can access parts of your medical record, email your doctor's office, and request medication refills online. 1. In your internet browser, go to [email or web address]. [email or web address] 2. Click on the First Time User? Click Here link in the Sign In box. You will see the New Member Sign Up page. 3. Enter your [employer] Access Code exactly as it appears below. You will not need to use this code after youve completed the sign-up process. If you do not sign up before the expiration date, you must request a new code. · [employer] Access Code: [email or web address] Expires: 12/2/2017  4:08 PM 
 
4.  Enter the last four digits of your Social Security Number (xxxx) and Date of Birth (mm/dd/yyyy) as indicated and click Submit. You will be taken to the next sign-up page. 5. Create a [employer] ID. This will be your [employer] login ID and cannot be changed, so think of one that is secure and easy to remember. 6. Create a [employer] password. You can change your password at any time. 7. Enter your Password Reset Question and Answer. This can be used at a later time if you forget your password. 8. Enter your e-mail address. You will receive e-mail notification when new information is available in [address] 9. Click Sign Up. You can now view and download portions of your medical record. 10. Click the Download Summary menu link to download a portable copy of your medical information. If you have questions, please visit the Frequently Asked Questions section of the [employer] website. Remember, [employer] is NOT to be used for urgent needs. For medical emergencies, dial 911. Now available from your iPhone and Android! Please provide this summary of care documentation to your next provider. Your primary care clinician is listed as NONE. If you have any questions after today's visit, please call [phone].

## 2017-11-28 NOTE — PROGRESS NOTES
Subjective:     Chief Complaint   Patient presents with   [facility] patient      He  is a 47 y.o. male who presents for evaluation of:    New pt to est care. Prev at [facility] and then seeing Dr. Suzy Baez. Prev incarcerated. Prev Heroine user. PMHx sig for ESRD on HD and HTN. Does not know who his kidney doctor is.  + Tobacco, + EtOH, + Illicit drug use     pulled and appears to be on Suboxone but says he is not on this though his last rx was filled 7 days ago? ?. Has numerous prescribers on his list which looks to be from a Suboxone clinic. Would like to discuss anxiety and ankle pain today. Anxiety  Anxiety started a few years ago while he was in care home. Was taking Xanax in past.  Thinks he may have tried Zoloft. Ongoing symptoms include: palpitations, sweating, chest pain, shortness of breath, racing thoughts. Patient denies: suicidal ideation, homocidal ideation. Reported side effects from the treatment: none. ROS  Gen - no fever/chills  Resp - no dyspnea or cough  CV - no chest pain or WARNER  Msk - has chronic ankle pain after fracture. Was seeing Ortho. Notes from ER in 7/2017 show that he was climbing a fence and fell off and landed funny. X rays showed: Bimalleolar acute right ankle fracture with the greatest amount of impaction of the posterior lateral tibia and nondisplaced left medial tibial plateau fracture with Lipohemarthrosis. Says he was getting Percocet 10/325 and would like more of this today. Allergies on chart to Motrin and Tylenol. Bag of meds includes gabapentin 300 mg TID. Rest per HPI    Past Medical History:   Diagnosis Date    Chronic kidney disease     Dialysis patient ([facility] [address]     Hypertension      Past Surgical History:   Procedure Laterality Date    HX VASCULAR ACCESS Left     LUE AV fistula     Current Outpatient Prescriptions on File Prior to Visit   Medication Sig Dispense Refill    amLODIPine (NORVASC) 10 mg tablet Take 1 Tab by mouth daily. 30 Tab 0    cloNIDine HCl (CATAPRES) 0.3 mg tablet Take 1 Tab by mouth two (2) times a day. 60 Tab 0    pantoprazole (PROTONIX) 40 mg tablet Take 40 mg by mouth daily.  GABAPENTIN PO Take 300 mg by mouth two (2) times a day.  multivitamin (ONE A DAY) tablet Take 1 Tab by mouth daily.  paricalcitol (ZEMPLAR) 1 mcg capsule Take 1 mcg by mouth daily.  FOLIC ACID/VIT B COMPLEX AND C (FULL SPECTRUM B-VITAMIN C PO) Take  by mouth. No current facility-administered medications on file prior to visit. Objective:     Vitals:    11/28/17 1505   BP: 95/58   Pulse: 98   Resp: 16   Temp: 96.7 °F (35.9 °C)   TempSrc: Oral   SpO2: 100%   Weight: 144 lb 6.4 oz (65.5 kg)   Height: 5' 9\" (1.753 m)     Physical Examination:  General appearance - alert, well appearing, and in no distress  Eyes -sclera anicteric  Neck - supple, no significant adenopathy, no thyromegaly  Chest - clear to auscultation, no wheezes, rales or rhonchi, symmetric air entry  Heart - normal rate, regular rhythm, normal S1, S2, no murmurs, rubs, clicks or gallops  Neurological - alert, oriented, no focal findings or movement disorder noted  Extr - no edema, + Fistula on L arm intact  Psych - poor insight, tangential    Assessment/ Plan:   Diagnoses and all orders for this visit:    1. ESRD on dialysis [facility]) - per Renal, would like to request notes, but pt does not know who he sees    2. Essential hypertension - controlled    3. Tobacco abuse - encouraged cessation    4. Anemia in chronic kidney disease, on chronic dialysis ([facility] [address] - listed on chart with last Hgb 8.4    5. Anxiety - mild sx on hx and will not start BZDs. Ct to ask what other medications we could try after listing off SSRIs and SNRIs though he is not sure if he used any of this. Advised to try SSRIs first.  Thinks he may have tried these and would like Xanax. Sending to Psych given overall hx, Suboxone use, and poor insight. Would consider Buspar at next appt.   - REFERRAL TO PSYCHIATRY  -     citalopram (CELEXA) 20 mg tablet; Take 1/2 tab by mouth daily x 1 week and then increase to 1 tab daily x 1 week and then increase to 2 tabs daily    6. Chronic pain of right ankle - will not order Percocet. Starting Lidoderm patches for area of pain. I do not believe he has a true allergy to APAP or Motrin. Would rec he f/u with Ortho and go through PT as appropriate. He is very high risk on Opioid Risk Tool. Could try a round of steroids at the next appt if he follows up  -     lidocaine (LIDODERM) 5 %; Apply patch to the affected area for 12 hours a day and remove for 12 hours a day. Other orders  -     aspirin delayed-release 81 mg tablet; Take 1 Tab by mouth daily. Asked pt to sign release of information for Dr. Lay Whitney, prev PCP to review records. I spent > 50% of the 30 min visit counseling and educating about anxiety and pain medications and risk of abuse. Also discussed extensively need for a relationship of trust between doctor and patient to actually treat underlying medical problems. I have discussed the diagnosis with the patient and the intended plan as seen in the above orders. The patient has received an after-visit summary and questions were answered concerning future plans. I have discussed medication side effects and warnings with the patient as well. The patient verbalizes understanding and agreement with the plan. Follow-up Disposition:  Return in about 4 weeks (around 12/26/2017), or if symptoms worsen or fail to improve.

## 2018-02-05 ENCOUNTER — TELEPHONE (OUTPATIENT)
Dept: FAMILY MEDICINE CLINIC | Age: 53
End: 2018-02-05

## 2018-02-05 NOTE — TELEPHONE ENCOUNTER
----- Message from Neel Tineo sent at 2/5/2018 12:17 PM EST -----  Regarding: Dr. Nicole Story  Pt missed a call from the practice and stated that she would like for another attempt to be made. Her best contact number is [phone].

## 2018-07-18 ENCOUNTER — HOSPITAL ENCOUNTER (EMERGENCY)
Age: 53
Discharge: HOME | End: 2018-07-18
Attending: EMERGENCY MEDICINE
Payer: COMMERCIAL

## 2018-07-18 VITALS
BODY MASS INDEX: 26.51 KG/M2 | SYSTOLIC BLOOD PRESSURE: 122 MMHG | TEMPERATURE: 97.9 F | DIASTOLIC BLOOD PRESSURE: 66 MMHG | RESPIRATION RATE: 14 BRPM | HEART RATE: 64 BPM | WEIGHT: 168.87 LBS | OXYGEN SATURATION: 100 % | HEIGHT: 67 IN

## 2018-07-18 DIAGNOSIS — K64.8 INTERNAL HEMORRHOID: ICD-10-CM

## 2018-07-18 DIAGNOSIS — R10.9 LEFT FLANK PAIN: Primary | ICD-10-CM

## 2018-07-18 LAB
ALBUMIN SERPL-MCNC: 3.5 G/DL (ref 3.5–5)
ALBUMIN/GLOB SERPL: 0.9 {RATIO} (ref 1.1–2.2)
ALP SERPL-CCNC: 52 U/L (ref 45–117)
ALT SERPL-CCNC: 30 U/L (ref 12–78)
ANION GAP SERPL CALC-SCNC: 5 MMOL/L (ref 5–15)
APPEARANCE UR: CLEAR
AST SERPL-CCNC: 26 U/L (ref 15–37)
BACTERIA URNS QL MICRO: NEGATIVE /HPF
BASOPHILS # BLD: 0 K/UL (ref 0–0.1)
BASOPHILS NFR BLD: 1 % (ref 0–1)
BILIRUB SERPL-MCNC: 0.6 MG/DL (ref 0.2–1)
BILIRUB UR QL: NEGATIVE
BUN SERPL-MCNC: 16 MG/DL (ref 6–20)
BUN/CREAT SERPL: 15 (ref 12–20)
CALCIUM SERPL-MCNC: 9 MG/DL (ref 8.5–10.1)
CHLORIDE SERPL-SCNC: 104 MMOL/L (ref 97–108)
CO2 SERPL-SCNC: 29 MMOL/L (ref 21–32)
COLOR UR: ABNORMAL
CREAT SERPL-MCNC: 1.06 MG/DL (ref 0.7–1.3)
DIFFERENTIAL METHOD BLD: NORMAL
EOSINOPHIL # BLD: 0.2 K/UL (ref 0–0.4)
EOSINOPHIL NFR BLD: 4 % (ref 0–7)
EPITH CASTS URNS QL MICRO: ABNORMAL /LPF
ERYTHROCYTE [DISTWIDTH] IN BLOOD BY AUTOMATED COUNT: 13.6 % (ref 11.5–14.5)
GLOBULIN SER CALC-MCNC: 4.1 G/DL (ref 2–4)
GLUCOSE SERPL-MCNC: 83 MG/DL (ref 65–100)
GLUCOSE UR STRIP.AUTO-MCNC: NEGATIVE MG/DL
HCT VFR BLD AUTO: 42 % (ref 36.6–50.3)
HEMOCCULT STL QL: NEGATIVE
HGB BLD-MCNC: 14 G/DL (ref 12.1–17)
HGB UR QL STRIP: NEGATIVE
HYALINE CASTS URNS QL MICRO: ABNORMAL /LPF (ref 0–5)
IMM GRANULOCYTES # BLD: 0 K/UL (ref 0–0.04)
IMM GRANULOCYTES NFR BLD AUTO: 0 % (ref 0–0.5)
KETONES UR QL STRIP.AUTO: NEGATIVE MG/DL
LEUKOCYTE ESTERASE UR QL STRIP.AUTO: ABNORMAL
LYMPHOCYTES # BLD: 1.6 K/UL (ref 0.8–3.5)
LYMPHOCYTES NFR BLD: 37 % (ref 12–49)
MCH RBC QN AUTO: 29.6 PG (ref 26–34)
MCHC RBC AUTO-ENTMCNC: 33.3 G/DL (ref 30–36.5)
MCV RBC AUTO: 88.8 FL (ref 80–99)
MONOCYTES # BLD: 0.5 K/UL (ref 0–1)
MONOCYTES NFR BLD: 12 % (ref 5–13)
NEUTS SEG # BLD: 2 K/UL (ref 1.8–8)
NEUTS SEG NFR BLD: 47 % (ref 32–75)
NITRITE UR QL STRIP.AUTO: NEGATIVE
NRBC # BLD: 0 K/UL (ref 0–0.01)
NRBC BLD-RTO: 0 PER 100 WBC
PH UR STRIP: 6 [PH] (ref 5–8)
PLATELET # BLD AUTO: 194 K/UL (ref 150–400)
PMV BLD AUTO: 10.6 FL (ref 8.9–12.9)
POTASSIUM SERPL-SCNC: 4 MMOL/L (ref 3.5–5.1)
PROT SERPL-MCNC: 7.6 G/DL (ref 6.4–8.2)
PROT UR STRIP-MCNC: NEGATIVE MG/DL
RBC # BLD AUTO: 4.73 M/UL (ref 4.1–5.7)
RBC #/AREA URNS HPF: ABNORMAL /HPF (ref 0–5)
SODIUM SERPL-SCNC: 138 MMOL/L (ref 136–145)
SP GR UR REFRACTOMETRY: 1.01 (ref 1–1.03)
UA: UC IF INDICATED,UAUC: ABNORMAL
UROBILINOGEN UR QL STRIP.AUTO: 0.2 EU/DL (ref 0.2–1)
WBC # BLD AUTO: 4.3 K/UL (ref 4.1–11.1)
WBC URNS QL MICRO: ABNORMAL /HPF (ref 0–4)

## 2018-07-18 PROCEDURE — 36415 COLL VENOUS BLD VENIPUNCTURE: CPT | Performed by: EMERGENCY MEDICINE

## 2018-07-18 PROCEDURE — 99282 EMERGENCY DEPT VISIT SF MDM: CPT

## 2018-07-18 PROCEDURE — 85025 COMPLETE CBC W/AUTO DIFF WBC: CPT | Performed by: EMERGENCY MEDICINE

## 2018-07-18 PROCEDURE — 80053 COMPREHEN METABOLIC PANEL: CPT | Performed by: EMERGENCY MEDICINE

## 2018-07-18 PROCEDURE — 82272 OCCULT BLD FECES 1-3 TESTS: CPT

## 2018-07-18 PROCEDURE — 81001 URINALYSIS AUTO W/SCOPE: CPT | Performed by: EMERGENCY MEDICINE

## 2018-07-18 RX ORDER — CYCLOBENZAPRINE HCL 5 MG
5 TABLET ORAL
Qty: 15 TAB | Refills: 0 | OUTPATIENT
Start: 2018-07-18 | End: 2022-09-28

## 2018-07-18 NOTE — ED NOTES
Received patient to exam room. Pt in bed in position of comfort and call bell within reach. Pt came in for evaluation of blood in stool--recently constipated and had a hard time moving his yest evening. Pt also recently noted some blood in his urine. . Pt also says that recently he had some left flank pain that radiated to his back but that has resolved.

## 2018-07-18 NOTE — DISCHARGE INSTRUCTIONS
Hemorrhoids: Care Instructions  Your Care Instructions    Hemorrhoids are enlarged veins that develop in the anal canal. Bleeding during bowel movements, itching, swelling, and rectal pain are the most common symptoms. They can be uncomfortable at times, but hemorrhoids rarely are a serious problem. You can treat most hemorrhoids with simple changes to your diet and bowel habits. These changes include eating more fiber and not straining to pass stools. Most hemorrhoids do not need surgery or other treatment unless they are very large and painful or bleed a lot. Follow-up care is a key part of your treatment and safety. Be sure to make and go to all appointments, and call your doctor if you are having problems. It's also a good idea to know your test results and keep a list of the medicines you take. How can you care for yourself at home? · Sit in a few inches of warm water (sitz bath) 3 times a day and after bowel movements. The warm water helps with pain and itching. · Put ice on your anal area several times a day for 10 minutes at a time. Put a thin cloth between the ice and your skin. Follow this by placing a warm, wet towel on the area for another 10 to 20 minutes. · Take pain medicines exactly as directed. ¨ If the doctor gave you a prescription medicine for pain, take it as prescribed. ¨ If you are not taking a prescription pain medicine, ask your doctor if you can take an over-the-counter medicine. · Keep the anal area clean, but be gentle. Use water and a fragrance-free soap, such as [place], or use baby wipes or medicated pads, such as Tucks. · Wear cotton underwear and loose clothing to decrease moisture in the anal area. · Eat more fiber. Include foods such as whole-grain breads and cereals, raw vegetables, raw and dried fruits, and beans. · Drink plenty of fluids, enough so that your urine is light yellow or clear like water.  If you have kidney, heart, or liver disease and have to limit fluids, talk with your doctor before you increase the amount of fluids you drink. · Use a stool softener that contains bran or psyllium. You can save money by buying bran or psyllium (available in bulk at most health food stores) and sprinkling it on foods or stirring it into fruit juice. Or you can use a product such as Metamucil or Hydrocil. · Practice healthy bowel habits. ¨ Go to the bathroom as soon as you have the urge. ¨ Avoid straining to pass stools. Relax and give yourself time to let things happen naturally. ¨ Do not hold your breath while passing stools. ¨ Do not read while sitting on the toilet. Get off the toilet as soon as you have finished. · Take your medicines exactly as prescribed. Call your doctor if you think you are having a problem with your medicine. When should you call for help? Call 911 anytime you think you may need emergency care. For example, call if:    · You pass maroon or very bloody stools.    Call your doctor now or seek immediate medical care if:    · You have increased pain.     · You have increased bleeding.    Watch closely for changes in your health, and be sure to contact your doctor if:    · Your symptoms have not improved after 3 or 4 days. Where can you learn more? Go to http://kandice-ana.info/. Enter F228 in the search box to learn more about \"Hemorrhoids: Care Instructions. \"  Current as of: May 12, 2017  Content Version: 11.7  © 5355-0544 [employer]. Care instructions adapted under license by [employer] (which disclaims liability or warranty for this information). If you have questions about a medical condition or this instruction, always ask your healthcare professional. [place] [address] any warranty or liability for your use of this information.

## 2018-07-18 NOTE — ED PROVIDER NOTES
EMERGENCY DEPARTMENT HISTORY AND PHYSICAL EXAM    Date: 7/18/2018  Patient Name: Deni Guzman    History of Presenting Illness     Chief Complaint   Patient presents with    Rectal Bleeding     Ambulatory w/ c/o bright red rectal bleeding with BM yesterday with some clots, history of hemorrhoids.  Blood in Urine     x2 weeks ago    Flank Pain     x1.5 weeks ago with swelling to L flank         History Provided By: Patient    Chief Complaint: rectal bleeding  Duration: 2 Days  Timing:  Acute  Location: rectum  Quality: n/a  Severity: Moderate  Modifying Factors: none  Associated Symptoms: rectal bleeding after BM, hematuria, left flank pain      HPI: Clive Liang is a 47 y.o. male with a PMH of Bleeding ulcers who presents to the ER c/o rectal bleeding. Patient states he was having difficulty with a bowel movement last night, when he noted some blood in the toilet. Patient reports a hx of hemorrhoids in the past, and was unsure if this caused his bleeding. He also reports a few episodes of hematuria about 1-2 weeks prior after taking various supplements while working out. He has had some associated left flank pain and swelling that has improved since then. He denied any hx of stones in the past.  He denied any recent fevers, chills, SOB, chest pain, N/V and has no other symptoms or complaints. Patient denied any concern for STD exposure. PCP: Catracho Chowdhury MD        Past History     Past Medical History:  Past Medical History:   Diagnosis Date    Gastrointestinal disorder     bleeding ulcers       Past Surgical History:  History reviewed. No pertinent surgical history. Family History:  History reviewed. No pertinent family history.     Social History:  Social History   Substance Use Topics    Smoking status: Never Smoker    Smokeless tobacco: Never Used    Alcohol use No       Allergies:  No Known Allergies      Review of Systems   Review of Systems   Constitutional: Negative for chills, fatigue and fever. HENT: Negative. Negative for sore throat. Eyes: Negative. Respiratory: Negative for cough and shortness of breath. Cardiovascular: Negative for chest pain and palpitations. Gastrointestinal: Positive for anal bleeding and constipation. Negative for abdominal pain, blood in stool, diarrhea, nausea and vomiting. Left flank pain   Genitourinary: Positive for dysuria and hematuria. Musculoskeletal: Negative. Skin: Negative. Neurological: Negative for dizziness, weakness, light-headedness and headaches. Psychiatric/Behavioral: Negative. All other systems reviewed and are negative. Physical Exam     Vitals:    07/18/18 1121   BP: 122/66   Pulse: 64   Resp: 14   Temp: 97.9 °F (36.6 °C)   SpO2: 100%   Weight: 76.6 kg (168 lb 14 oz)   Height: 5' 7\" (1.702 m)     Physical Exam   Constitutional: He is oriented to person, place, and time. He appears well-developed and well-nourished. No distress. HENT:   Head: Normocephalic and atraumatic. Mouth/Throat: Oropharynx is clear and moist.   Eyes: Conjunctivae are normal. No scleral icterus. Neck: Neck supple. No JVD present. No tracheal deviation present. Cardiovascular: Normal rate, regular rhythm and normal heart sounds. Pulmonary/Chest: Effort normal and breath sounds normal. No respiratory distress. He has no wheezes. He has no rales. He exhibits no tenderness. Abdominal: Soft. Normal appearance and bowel sounds are normal. He exhibits no distension. There is no tenderness. There is no rebound, no guarding and no CVA tenderness. Genitourinary: Rectal exam shows external hemorrhoid and internal hemorrhoid. Rectal exam shows anal tone normal.   Genitourinary Comments: No obvious gross blood noted on rectal exam   Musculoskeletal: Normal range of motion. Neurological: He is alert and oriented to person, place, and time. He has normal strength. Gait normal. GCS eye subscore is 4.  GCS verbal subscore is 5. GCS motor subscore is 6. Skin: Skin is warm and dry. He is not diaphoretic. Psychiatric: He has a normal mood and affect. Nursing note and vitals reviewed. Diagnostic Study Results     Labs -     Recent Results (from the past 12 hour(s))   URINALYSIS W/ REFLEX CULTURE    Collection Time: 07/18/18 11:30 AM   Result Value Ref Range    Color YELLOW/STRAW      Appearance CLEAR CLEAR      Specific gravity 1.015 1.003 - 1.030      pH (UA) 6.0 5.0 - 8.0      Protein NEGATIVE  NEG mg/dL    Glucose NEGATIVE  NEG mg/dL    Ketone NEGATIVE  NEG mg/dL    Bilirubin NEGATIVE  NEG      Blood NEGATIVE  NEG      Urobilinogen 0.2 0.2 - 1.0 EU/dL    Nitrites NEGATIVE  NEG      Leukocyte Esterase TRACE (A) NEG      WBC 0-4 0 - 4 /hpf    RBC 0-5 0 - 5 /hpf    Epithelial cells FEW FEW /lpf    Bacteria NEGATIVE  NEG /hpf    UA:UC IF INDICATED CULTURE NOT INDICATED BY UA RESULT CNI      Hyaline cast 0-2 0 - 5 /lpf   METABOLIC PANEL, COMPREHENSIVE    Collection Time: 07/18/18 11:37 AM   Result Value Ref Range    Sodium 138 136 - 145 mmol/L    Potassium 4.0 3.5 - 5.1 mmol/L    Chloride 104 97 - 108 mmol/L    CO2 29 21 - 32 mmol/L    Anion gap 5 5 - 15 mmol/L    Glucose 83 65 - 100 mg/dL    BUN 16 6 - 20 MG/DL    Creatinine 1.06 0.70 - 1.30 MG/DL    BUN/Creatinine ratio 15 12 - 20      GFR est AA >60 >60 ml/min/1.73m2    GFR est non-AA >60 >60 ml/min/1.73m2    Calcium 9.0 8.5 - 10.1 MG/DL    Bilirubin, total 0.6 0.2 - 1.0 MG/DL    ALT (SGPT) 30 12 - 78 U/L    AST (SGOT) 26 15 - 37 U/L    Alk.  phosphatase 52 45 - 117 U/L    Protein, total 7.6 6.4 - 8.2 g/dL    Albumin 3.5 3.5 - 5.0 g/dL    Globulin 4.1 (H) 2.0 - 4.0 g/dL    A-G Ratio 0.9 (L) 1.1 - 2.2     CBC WITH AUTOMATED DIFF    Collection Time: 07/18/18 11:37 AM   Result Value Ref Range    WBC 4.3 4.1 - 11.1 K/uL    RBC 4.73 4.10 - 5.70 M/uL    HGB 14.0 12.1 - 17.0 g/dL    HCT 42.0 36.6 - 50.3 %    MCV 88.8 80.0 - 99.0 FL    MCH 29.6 26.0 - 34.0 PG    MCHC 33.3 30.0 - 36.5 g/dL    RDW 13.6 11.5 - 14.5 %    PLATELET 113 258 - 415 K/uL    MPV 10.6 8.9 - 12.9 FL    NRBC 0.0 0  WBC    ABSOLUTE NRBC 0.00 0.00 - 0.01 K/uL    NEUTROPHILS 47 32 - 75 %    LYMPHOCYTES 37 12 - 49 %    MONOCYTES 12 5 - 13 %    EOSINOPHILS 4 0 - 7 %    BASOPHILS 1 0 - 1 %    IMMATURE GRANULOCYTES 0 0.0 - 0.5 %    ABS. NEUTROPHILS 2.0 1.8 - 8.0 K/UL    ABS. LYMPHOCYTES 1.6 0.8 - 3.5 K/UL    ABS. MONOCYTES 0.5 0.0 - 1.0 K/UL    ABS. EOSINOPHILS 0.2 0.0 - 0.4 K/UL    ABS. BASOPHILS 0.0 0.0 - 0.1 K/UL    ABS. IMM. GRANS. 0.0 0.00 - 0.04 K/UL    DF AUTOMATED     OCCULT BLOOD, STOOL    Collection Time: 07/18/18 12:17 PM   Result Value Ref Range    Occult blood, stool NEGATIVE  NEG         Radiologic Studies -   No orders to display     CT Results  (Last 48 hours)    None        CXR Results  (Last 48 hours)    None            Medical Decision Making   I am the first provider for this patient. I reviewed the vital signs, available nursing notes, past medical history, past surgical history, family history and social history. Vital Signs-Reviewed the patient's vital signs. Records Reviewed: Nursing Notes, Old Medical Records, Previous Radiology Studies and Previous Laboratory Studies    ED Course:   12:04 PM  48 y/o male c/o bleeding from rectum after painful bowel movement last night; also reports of intermittent hematuria 1 week prior with associated intermittent left flank pain. Well appearing on exam; no gross blood on fecal occult exam with normal tone. Small external hemorrhoid noted with several internal noted on exam.  Will plan on labs, ua and will reeval.  Charline Michel PA-C     12:57 PM  Labs, ua, fecal negative. Discussed all results with pt. Left flank pain consistent with muscular strain. No indication for further imaging at this time. Advised pt to increase water and fiber intake to encourage more regular BM. All questions answered and patient in agreement with plan of care. Will plan for discharge. Zeeshan Carnes PA-C      Disposition:  Discharged    DISCHARGE NOTE:       Care plan outlined and precautions discussed. Patient has no new complaints, changes, or physical findings. Results of labs, ua, fecal were reviewed with the patient. All medications were reviewed with the patient; will d/c home with flexeril. All of pt's questions and concerns were addressed. Patient was instructed and agrees to follow up with pcp, as well as to return to the ED upon further deterioration. Patient is ready to go home. Follow-up Information     Follow up With Details Comments Contact Info    [facility] EMERGENCY DEPT  If symptoms worsen [address] Janice Rogers MD Call in 3 days As needed for ER follow up [address]  [phone]            Current Discharge Medication List      START taking these medications    Details   cyclobenzaprine (FLEXERIL) 5 mg tablet Take 1 Tab by mouth three (3) times daily as needed for Muscle Spasm(s). Qty: 15 Tab, Refills: 0             Provider Notes (Medical Decision Making):     Procedures:  Procedures        Diagnosis     Clinical Impression:   1. Left flank pain    2.  Internal hemorrhoid

## 2019-04-22 NOTE — PROGRESS NOTES
Hospitalist Progress Note    NAME: Donne Cockayne   :  1965   MRN:  [id number]   LOS:   6      Assessment / Plan:  Acute hypoxic respiratory failure   due to Acute Pulmonary Edema in settings of ESRD   Recurrent pulmonary edema even in the hospital  Due to non compliant with fluid intake (he admitted drinking plenty of fluid despite recurrent counseling)  Pt kept requesting to keep him in hospital as long it can be, concern self inducing recurrent pulmonary edema with excessive PO intake  Will place strict fluid restriction  Repeat CXR tomorrow  Nephrology is on the case  D/c IV pain meds as possible drug seeking behavior    CP/elevated Troponin - Troponin Peaked to 5.53  Cardiology is on the case  Have continuous chest pain since admission, ? Musculoskeletal   Stress test negative  Suspect Fluid Overload with ESRD per cardiology  2D echo with normal ED  CTA negative for PE  Unable to give ASA due to NSAID allergy  Pain management with tramadol only    Hypertensive Urgency POA  Due to volume overload  BP better with HD    Leukocytosis   Likely stress related, resolved     Anemia ESRD  Monitor Hb  EPO and transfusion per nephrology with HD    Tobacco Abuse     Code status: Full  Prophylaxis: Hep SQ  Recommended Disposition: Home w/Family     Subjective: Pt seen and examined at bedside. Short of breath. Respiratory distress. Overnight events d/w RN     Chief Complaint: f/u \"chest pain\"      Review of Systems:  Symptom Y/N Comments  Symptom Y/N Comments   Fever/Chills n   Chest Pain y    Poor Appetite    Edema     Cough n   Abdominal Pain n    Sputum    Joint Pain     SOB/WARNER y   Pruritis/Rash     Nausea/vomit    Tolerating PT/OT     Diarrhea    Tolerating Diet y    Constipation    Other       Could NOT obtain due to:      Objective:     VITALS:   Last 24hrs VS reviewed since prior progress note.  Most recent are:  Patient Vitals for the past 24 hrs:   Temp Pulse Resp BP SpO2   17 1201 98 °F (36.7 °C) 87 16 139/78 97 %   05/12/17 1157 - 86 - - -   05/12/17 1118 98 °F (36.7 °C) 90 18 129/72 95 %   05/12/17 1100 - 94 18 122/79 -   05/12/17 1030 - 91 20 127/79 -   05/12/17 1000 - 89 20 124/79 -   05/12/17 0930 - 84 18 (!) 150/91 -   05/12/17 0900 - 93 18 173/85 -   05/12/17 0830 - 87 18 159/85 -   05/12/17 0815 98 °F (36.7 °C) 79 18 (!) 160/98 -   05/12/17 0736 98.4 °F (36.9 °C) 79 17 132/82 94 %   05/12/17 0551 98.7 °F (37.1 °C) 82 16 162/82 -   05/12/17 0020 98.7 °F (37.1 °C) 77 18 143/72 96 %   05/11/17 2021 99.2 °F (37.3 °C) 75 16 121/59 94 %       Intake/Output Summary (Last 24 hours) at 05/12/17 1627  Last data filed at 05/12/17 1422   Gross per 24 hour   Intake              600 ml   Output             3700 ml   Net            -3100 ml        PHYSICAL EXAM:  General: Alert, cooperative, respiratory distress    EENT:  EOMI. Anicteric sclerae. Mucous membranes moist  Resp:  B/l rales, accessory muscle use  CV:  Regular rhythm, no edema, chest pain is slightly reproducible  GI:  Soft, non distended, non tender. +Bowel sounds  Neurologic:  Alert and oriented X 3, normal speech  Psych:   Good insight, not anxious nor agitated  Skin:  No rashes, no jaundice  ________________________________________________________________________  Care Plan discussed with:    Comments   Patient x    Family      RN x    Care Manager     Consultant  x Nephrology Dr Van Colin team rounds were held today with , nursing, pharmacist and clinical coordinator. Patient's plan of care was discussed; medications were reviewed and discharge planning was addressed.      ________________________________________________________________________  Total NON critical care TIME:  25  Minutes    Total CRITICAL CARE TIME Spent: Minutes non procedure based         Comments   >50% of visit spent in counseling and coordination of care ________________________________________________________________________    ________________________________________________________________________  Dorie Hdez MD     Procedures: see electronic medical records for all procedures/Xrays and details which were not copied into this note but were reviewed prior to creation of Plan. LABS:  I reviewed today's most current labs and imaging studies.   Pertinent labs include:  Recent Labs      1758  05/10/17   0431   WBC  8.9  6.6   HGB  8.4*  8.2*   HCT  24.6*  24.5*   PLT  250  227     Recent Labs      1758  05/10/17   0431   NA  137  136   K  4.4  4.1   CL  103  99   CO2  20*  24   GLU  87  97   BUN  54*  51*   CREA  10.90*  10.70*   CA  8.9  7.9*       Signed: Dorie Hdez MD No

## 2020-01-01 ENCOUNTER — APPOINTMENT (OUTPATIENT)
Dept: GENERAL RADIOLOGY | Age: 55
DRG: 425 | End: 2020-01-01
Attending: INTERNAL MEDICINE
Payer: MEDICAID

## 2020-01-01 ENCOUNTER — HOSPITAL ENCOUNTER (INPATIENT)
Age: 55
LOS: 2 days | Discharge: HOME | DRG: 425 | End: 2020-11-15
Attending: STUDENT IN AN ORGANIZED HEALTH CARE EDUCATION/TRAINING PROGRAM | Admitting: INTERNAL MEDICINE
Payer: MEDICAID

## 2020-01-01 ENCOUNTER — PATIENT OUTREACH (OUTPATIENT)
Dept: CASE MANAGEMENT | Age: 55
End: 2020-01-01

## 2020-01-01 ENCOUNTER — APPOINTMENT (OUTPATIENT)
Dept: ULTRASOUND IMAGING | Age: 55
End: 2020-01-01
Attending: EMERGENCY MEDICINE
Payer: MEDICAID

## 2020-01-01 ENCOUNTER — APPOINTMENT (OUTPATIENT)
Dept: CT IMAGING | Age: 55
End: 2020-01-01
Attending: EMERGENCY MEDICINE
Payer: MEDICAID

## 2020-01-01 ENCOUNTER — HOSPITAL ENCOUNTER (EMERGENCY)
Age: 55
Discharge: HOME | End: 2020-10-06
Attending: EMERGENCY MEDICINE
Payer: MEDICAID

## 2020-01-01 ENCOUNTER — APPOINTMENT (OUTPATIENT)
Dept: GENERAL RADIOLOGY | Age: 55
End: 2020-01-01
Attending: EMERGENCY MEDICINE
Payer: MEDICAID

## 2020-01-01 ENCOUNTER — APPOINTMENT (OUTPATIENT)
Dept: CT IMAGING | Age: 55
DRG: 425 | End: 2020-01-01
Attending: INTERNAL MEDICINE
Payer: MEDICAID

## 2020-01-01 VITALS
DIASTOLIC BLOOD PRESSURE: 78 MMHG | BODY MASS INDEX: 22.28 KG/M2 | HEART RATE: 70 BPM | TEMPERATURE: 98.5 F | WEIGHT: 164.24 LBS | SYSTOLIC BLOOD PRESSURE: 130 MMHG | OXYGEN SATURATION: 100 % | RESPIRATION RATE: 18 BRPM

## 2020-01-01 VITALS
WEIGHT: 142.2 LBS | DIASTOLIC BLOOD PRESSURE: 87 MMHG | HEART RATE: 85 BPM | BODY MASS INDEX: 19.29 KG/M2 | TEMPERATURE: 98.2 F | SYSTOLIC BLOOD PRESSURE: 154 MMHG | OXYGEN SATURATION: 95 % | RESPIRATION RATE: 18 BRPM

## 2020-01-01 DIAGNOSIS — E87.5 ACUTE HYPERKALEMIA: ICD-10-CM

## 2020-01-01 DIAGNOSIS — R11.2 NAUSEA AND VOMITING, INTRACTABILITY OF VOMITING NOT SPECIFIED, UNSPECIFIED VOMITING TYPE: Primary | ICD-10-CM

## 2020-01-01 DIAGNOSIS — I82.499 DEEP VEIN THROMBOSIS (DVT) OF OTHER VEIN OF LOWER EXTREMITY, UNSPECIFIED CHRONICITY, UNSPECIFIED LATERALITY (HCC): Primary | ICD-10-CM

## 2020-01-01 DIAGNOSIS — Z72.0 TOBACCO USE: ICD-10-CM

## 2020-01-01 LAB
ALBUMIN SERPL-MCNC: 2.8 G/DL (ref 3.5–5)
ALBUMIN SERPL-MCNC: 2.9 G/DL (ref 3.5–5)
ALBUMIN SERPL-MCNC: 3.6 G/DL (ref 3.5–5)
ALBUMIN/GLOB SERPL: 0.6 {RATIO} (ref 1.1–2.2)
ALBUMIN/GLOB SERPL: 0.7 {RATIO} (ref 1.1–2.2)
ALBUMIN/GLOB SERPL: 0.7 {RATIO} (ref 1.1–2.2)
ALP SERPL-CCNC: 113 U/L (ref 45–117)
ALP SERPL-CCNC: 130 U/L (ref 45–117)
ALP SERPL-CCNC: 99 U/L (ref 45–117)
ALT SERPL-CCNC: 26 U/L (ref 12–78)
ALT SERPL-CCNC: 47 U/L (ref 12–78)
ALT SERPL-CCNC: 57 U/L (ref 12–78)
ANION GAP SERPL CALC-SCNC: 10 MMOL/L (ref 5–15)
ANION GAP SERPL CALC-SCNC: 12 MMOL/L (ref 5–15)
ANION GAP SERPL CALC-SCNC: 17 MMOL/L (ref 5–15)
ANION GAP SERPL CALC-SCNC: 9 MMOL/L (ref 5–15)
AST SERPL-CCNC: 42 U/L (ref 15–37)
AST SERPL-CCNC: 63 U/L (ref 15–37)
AST SERPL-CCNC: 82 U/L (ref 15–37)
ATRIAL RATE: 80 BPM
ATRIAL RATE: 90 BPM
ATRIAL RATE: 98 BPM
BACTERIA SPEC CULT: NORMAL
BACTERIA SPEC CULT: NORMAL
BASOPHILS # BLD: 0 K/UL (ref 0–0.1)
BASOPHILS NFR BLD: 0 % (ref 0–1)
BILIRUB SERPL-MCNC: 0.6 MG/DL (ref 0.2–1)
BILIRUB SERPL-MCNC: 1 MG/DL (ref 0.2–1)
BILIRUB SERPL-MCNC: 1.1 MG/DL (ref 0.2–1)
BNP SERPL-MCNC: 4645 PG/ML
BUN SERPL-MCNC: 26 MG/DL (ref 6–20)
BUN SERPL-MCNC: 46 MG/DL (ref 6–20)
BUN SERPL-MCNC: 66 MG/DL (ref 6–20)
BUN SERPL-MCNC: 97 MG/DL (ref 6–20)
BUN/CREAT SERPL: 5 (ref 12–20)
BUN/CREAT SERPL: 6 (ref 12–20)
BUN/CREAT SERPL: 6 (ref 12–20)
BUN/CREAT SERPL: 8 (ref 12–20)
CALCIUM SERPL-MCNC: 10 MG/DL (ref 8.5–10.1)
CALCIUM SERPL-MCNC: 10.5 MG/DL (ref 8.5–10.1)
CALCIUM SERPL-MCNC: 9.7 MG/DL (ref 8.5–10.1)
CALCIUM SERPL-MCNC: 9.8 MG/DL (ref 8.5–10.1)
CALCULATED P AXIS, ECG09: 30 DEGREES
CALCULATED P AXIS, ECG09: 34 DEGREES
CALCULATED P AXIS, ECG09: 65 DEGREES
CALCULATED R AXIS, ECG10: -22 DEGREES
CALCULATED R AXIS, ECG10: 4 DEGREES
CALCULATED R AXIS, ECG10: 42 DEGREES
CALCULATED T AXIS, ECG11: 33 DEGREES
CALCULATED T AXIS, ECG11: 53 DEGREES
CALCULATED T AXIS, ECG11: 73 DEGREES
CHLORIDE SERPL-SCNC: 86 MMOL/L (ref 97–108)
CHLORIDE SERPL-SCNC: 94 MMOL/L (ref 97–108)
CHLORIDE SERPL-SCNC: 96 MMOL/L (ref 97–108)
CHLORIDE SERPL-SCNC: 98 MMOL/L (ref 97–108)
CK MB CFR SERPL CALC: 1.6 % (ref 0–2.5)
CK MB SERPL-MCNC: 1.9 NG/ML (ref 5–25)
CK SERPL-CCNC: 117 U/L (ref 39–308)
CO2 SERPL-SCNC: 25 MMOL/L (ref 21–32)
CO2 SERPL-SCNC: 25 MMOL/L (ref 21–32)
CO2 SERPL-SCNC: 28 MMOL/L (ref 21–32)
CO2 SERPL-SCNC: 28 MMOL/L (ref 21–32)
CREAT SERPL-MCNC: 12.2 MG/DL (ref 0.7–1.3)
CREAT SERPL-MCNC: 12.2 MG/DL (ref 0.7–1.3)
CREAT SERPL-MCNC: 4.11 MG/DL (ref 0.7–1.3)
CREAT SERPL-MCNC: 7.82 MG/DL (ref 0.7–1.3)
DIAGNOSIS, 93000: NORMAL
DIFFERENTIAL METHOD BLD: ABNORMAL
EOSINOPHIL # BLD: 0 K/UL (ref 0–0.4)
EOSINOPHIL # BLD: 0 K/UL (ref 0–0.4)
EOSINOPHIL # BLD: 0.2 K/UL (ref 0–0.4)
EOSINOPHIL NFR BLD: 0 % (ref 0–7)
EOSINOPHIL NFR BLD: 0 % (ref 0–7)
EOSINOPHIL NFR BLD: 1 % (ref 0–7)
ERYTHROCYTE [DISTWIDTH] IN BLOOD BY AUTOMATED COUNT: 15.2 % (ref 11.5–14.5)
ERYTHROCYTE [DISTWIDTH] IN BLOOD BY AUTOMATED COUNT: 15.6 % (ref 11.5–14.5)
ERYTHROCYTE [DISTWIDTH] IN BLOOD BY AUTOMATED COUNT: 15.7 % (ref 11.5–14.5)
GLOBULIN SER CALC-MCNC: 4.2 G/DL (ref 2–4)
GLOBULIN SER CALC-MCNC: 5 G/DL (ref 2–4)
GLOBULIN SER CALC-MCNC: 5.4 G/DL (ref 2–4)
GLUCOSE BLD STRIP.AUTO-MCNC: 185 MG/DL (ref 65–100)
GLUCOSE BLD STRIP.AUTO-MCNC: 73 MG/DL (ref 65–100)
GLUCOSE SERPL-MCNC: 42 MG/DL (ref 65–100)
GLUCOSE SERPL-MCNC: 62 MG/DL (ref 65–100)
GLUCOSE SERPL-MCNC: 69 MG/DL (ref 65–100)
GLUCOSE SERPL-MCNC: 89 MG/DL (ref 65–100)
HCT VFR BLD AUTO: 31.4 % (ref 36.6–50.3)
HCT VFR BLD AUTO: 37.6 % (ref 36.6–50.3)
HCT VFR BLD AUTO: 44.7 % (ref 36.6–50.3)
HEALTH STATUS, XMCV2T: NORMAL
HGB BLD-MCNC: 10.4 G/DL (ref 12.1–17)
HGB BLD-MCNC: 12.3 G/DL (ref 12.1–17)
HGB BLD-MCNC: 14.5 G/DL (ref 12.1–17)
IMM GRANULOCYTES # BLD AUTO: 0 K/UL (ref 0–0.04)
IMM GRANULOCYTES # BLD AUTO: 0.1 K/UL (ref 0–0.04)
IMM GRANULOCYTES # BLD AUTO: 0.1 K/UL (ref 0–0.04)
IMM GRANULOCYTES NFR BLD AUTO: 0 % (ref 0–0.5)
IMM GRANULOCYTES NFR BLD AUTO: 1 % (ref 0–0.5)
IMM GRANULOCYTES NFR BLD AUTO: 1 % (ref 0–0.5)
LACTATE BLD-SCNC: 1.35 MMOL/L (ref 0.4–2)
LACTATE SERPL-SCNC: 1.1 MMOL/L (ref 0.4–2)
LIPASE SERPL-CCNC: 371 U/L (ref 73–393)
LYMPHOCYTES # BLD: 0.8 K/UL (ref 0.8–3.5)
LYMPHOCYTES # BLD: 1.3 K/UL (ref 0.8–3.5)
LYMPHOCYTES # BLD: 1.3 K/UL (ref 0.8–3.5)
LYMPHOCYTES NFR BLD: 11 % (ref 12–49)
LYMPHOCYTES NFR BLD: 13 % (ref 12–49)
LYMPHOCYTES NFR BLD: 6 % (ref 12–49)
MAGNESIUM SERPL-MCNC: 2.1 MG/DL (ref 1.6–2.4)
MAGNESIUM SERPL-MCNC: 2.1 MG/DL (ref 1.6–2.4)
MCH RBC QN AUTO: 31.9 PG (ref 26–34)
MCH RBC QN AUTO: 33 PG (ref 26–34)
MCH RBC QN AUTO: 33.7 PG (ref 26–34)
MCHC RBC AUTO-ENTMCNC: 32.4 G/DL (ref 30–36.5)
MCHC RBC AUTO-ENTMCNC: 32.7 G/DL (ref 30–36.5)
MCHC RBC AUTO-ENTMCNC: 33.1 G/DL (ref 30–36.5)
MCV RBC AUTO: 100.8 FL (ref 80–99)
MCV RBC AUTO: 101.6 FL (ref 80–99)
MCV RBC AUTO: 98.5 FL (ref 80–99)
MONOCYTES # BLD: 1.4 K/UL (ref 0–1)
MONOCYTES # BLD: 1.6 K/UL (ref 0–1)
MONOCYTES # BLD: 2 K/UL (ref 0–1)
MONOCYTES NFR BLD: 12 % (ref 5–13)
MONOCYTES NFR BLD: 14 % (ref 5–13)
MONOCYTES NFR BLD: 16 % (ref 5–13)
NEUTS SEG # BLD: 11.2 K/UL (ref 1.8–8)
NEUTS SEG # BLD: 6.9 K/UL (ref 1.8–8)
NEUTS SEG # BLD: 8.8 K/UL (ref 1.8–8)
NEUTS SEG NFR BLD: 70 % (ref 32–75)
NEUTS SEG NFR BLD: 76 % (ref 32–75)
NEUTS SEG NFR BLD: 79 % (ref 32–75)
NRBC # BLD: 0 K/UL (ref 0–0.01)
NRBC # BLD: 0.07 K/UL (ref 0–0.01)
NRBC # BLD: 0.28 K/UL (ref 0–0.01)
NRBC BLD-RTO: 0 PER 100 WBC
NRBC BLD-RTO: 0.7 PER 100 WBC
NRBC BLD-RTO: 2 PER 100 WBC
P-R INTERVAL, ECG05: 170 MS
P-R INTERVAL, ECG05: 192 MS
P-R INTERVAL, ECG05: 216 MS
PHOSPHATE SERPL-MCNC: 4.6 MG/DL (ref 2.6–4.7)
PLATELET # BLD AUTO: 133 K/UL (ref 150–400)
PLATELET # BLD AUTO: 184 K/UL (ref 150–400)
PLATELET # BLD AUTO: 194 K/UL (ref 150–400)
PMV BLD AUTO: 11.8 FL (ref 8.9–12.9)
PMV BLD AUTO: 12.1 FL (ref 8.9–12.9)
PMV BLD AUTO: 12.1 FL (ref 8.9–12.9)
POTASSIUM SERPL-SCNC: 3.5 MMOL/L (ref 3.5–5.1)
POTASSIUM SERPL-SCNC: 4.5 MMOL/L (ref 3.5–5.1)
POTASSIUM SERPL-SCNC: 5.9 MMOL/L (ref 3.5–5.1)
POTASSIUM SERPL-SCNC: 6.2 MMOL/L (ref 3.5–5.1)
POTASSIUM SERPL-SCNC: 8 MMOL/L (ref 3.5–5.1)
PROT SERPL-MCNC: 7 G/DL (ref 6.4–8.2)
PROT SERPL-MCNC: 7.9 G/DL (ref 6.4–8.2)
PROT SERPL-MCNC: 9 G/DL (ref 6.4–8.2)
Q-T INTERVAL, ECG07: 352 MS
Q-T INTERVAL, ECG07: 370 MS
Q-T INTERVAL, ECG07: 382 MS
QRS DURATION, ECG06: 132 MS
QRS DURATION, ECG06: 92 MS
QRS DURATION, ECG06: 94 MS
QTC CALCULATION (BEZET), ECG08: 440 MS
QTC CALCULATION (BEZET), ECG08: 449 MS
QTC CALCULATION (BEZET), ECG08: 452 MS
RBC # BLD AUTO: 3.09 M/UL (ref 4.1–5.7)
RBC # BLD AUTO: 3.73 M/UL (ref 4.1–5.7)
RBC # BLD AUTO: 4.54 M/UL (ref 4.1–5.7)
SARS-COV-2, COV2NT: NOT DETECTED
SERVICE CMNT-IMP: ABNORMAL
SERVICE CMNT-IMP: NORMAL
SODIUM SERPL-SCNC: 128 MMOL/L (ref 136–145)
SODIUM SERPL-SCNC: 131 MMOL/L (ref 136–145)
SODIUM SERPL-SCNC: 134 MMOL/L (ref 136–145)
SODIUM SERPL-SCNC: 135 MMOL/L (ref 136–145)
SOURCE, COVRS: NORMAL
SPECIMEN SOURCE, FCOV2M: NORMAL
SPECIMEN TYPE, XMCV1T: NORMAL
TROPONIN I SERPL-MCNC: <0.05 NG/ML
VENTRICULAR RATE, ECG03: 80 BPM
VENTRICULAR RATE, ECG03: 90 BPM
VENTRICULAR RATE, ECG03: 98 BPM
WBC # BLD AUTO: 10 K/UL (ref 4.1–11.1)
WBC # BLD AUTO: 11.8 K/UL (ref 4.1–11.1)
WBC # BLD AUTO: 14.1 K/UL (ref 4.1–11.1)

## 2020-01-01 PROCEDURE — 96374 THER/PROPH/DIAG INJ IV PUSH: CPT

## 2020-01-01 PROCEDURE — 87040 BLOOD CULTURE FOR BACTERIA: CPT

## 2020-01-01 PROCEDURE — 74011000250 HC RX REV CODE- 250: Performed by: INTERNAL MEDICINE

## 2020-01-01 PROCEDURE — 87635 SARS-COV-2 COVID-19 AMP PRB: CPT

## 2020-01-01 PROCEDURE — 85025 COMPLETE CBC W/AUTO DIFF WBC: CPT

## 2020-01-01 PROCEDURE — 94644 CONT INHLJ TX 1ST HOUR: CPT

## 2020-01-01 PROCEDURE — 82550 ASSAY OF CK (CPK): CPT

## 2020-01-01 PROCEDURE — 74011250636 HC RX REV CODE- 250/636: Performed by: INTERNAL MEDICINE

## 2020-01-01 PROCEDURE — 74011000636 HC RX REV CODE- 636: Performed by: EMERGENCY MEDICINE

## 2020-01-01 PROCEDURE — 82962 GLUCOSE BLOOD TEST: CPT

## 2020-01-01 PROCEDURE — 99285 EMERGENCY DEPT VISIT HI MDM: CPT

## 2020-01-01 PROCEDURE — 74011250637 HC RX REV CODE- 250/637: Performed by: NURSE PRACTITIONER

## 2020-01-01 PROCEDURE — 74011250637 HC RX REV CODE- 250/637: Performed by: INTERNAL MEDICINE

## 2020-01-01 PROCEDURE — 80053 COMPREHEN METABOLIC PANEL: CPT

## 2020-01-01 PROCEDURE — 36415 COLL VENOUS BLD VENIPUNCTURE: CPT

## 2020-01-01 PROCEDURE — 97165 OT EVAL LOW COMPLEX 30 MIN: CPT | Performed by: OCCUPATIONAL THERAPIST

## 2020-01-01 PROCEDURE — 83605 ASSAY OF LACTIC ACID: CPT

## 2020-01-01 PROCEDURE — 77030029684 HC NEB SM VOL KT MONA -A

## 2020-01-01 PROCEDURE — 5A1D70Z PERFORMANCE OF URINARY FILTRATION, INTERMITTENT, LESS THAN 6 HOURS PER DAY: ICD-10-PCS | Performed by: INTERNAL MEDICINE

## 2020-01-01 PROCEDURE — 71046 X-RAY EXAM CHEST 2 VIEWS: CPT

## 2020-01-01 PROCEDURE — 96376 TX/PRO/DX INJ SAME DRUG ADON: CPT

## 2020-01-01 PROCEDURE — 83735 ASSAY OF MAGNESIUM: CPT

## 2020-01-01 PROCEDURE — 71045 X-RAY EXAM CHEST 1 VIEW: CPT

## 2020-01-01 PROCEDURE — 93005 ELECTROCARDIOGRAM TRACING: CPT

## 2020-01-01 PROCEDURE — 90935 HEMODIALYSIS ONE EVALUATION: CPT

## 2020-01-01 PROCEDURE — 83880 ASSAY OF NATRIURETIC PEPTIDE: CPT

## 2020-01-01 PROCEDURE — 65660000000 HC RM CCU STEPDOWN

## 2020-01-01 PROCEDURE — 96375 TX/PRO/DX INJ NEW DRUG ADDON: CPT

## 2020-01-01 PROCEDURE — 94761 N-INVAS EAR/PLS OXIMETRY MLT: CPT

## 2020-01-01 PROCEDURE — 83690 ASSAY OF LIPASE: CPT

## 2020-01-01 PROCEDURE — 74011000250 HC RX REV CODE- 250: Performed by: STUDENT IN AN ORGANIZED HEALTH CARE EDUCATION/TRAINING PROGRAM

## 2020-01-01 PROCEDURE — 93971 EXTREMITY STUDY: CPT

## 2020-01-01 PROCEDURE — 73610 X-RAY EXAM OF ANKLE: CPT

## 2020-01-01 PROCEDURE — 84484 ASSAY OF TROPONIN QUANT: CPT

## 2020-01-01 PROCEDURE — 73590 X-RAY EXAM OF LOWER LEG: CPT

## 2020-01-01 PROCEDURE — 84132 ASSAY OF SERUM POTASSIUM: CPT

## 2020-01-01 PROCEDURE — 74011250636 HC RX REV CODE- 250/636: Performed by: EMERGENCY MEDICINE

## 2020-01-01 PROCEDURE — 74011636637 HC RX REV CODE- 636/637: Performed by: STUDENT IN AN ORGANIZED HEALTH CARE EDUCATION/TRAINING PROGRAM

## 2020-01-01 PROCEDURE — 2709999900 HC NON-CHARGEABLE SUPPLY

## 2020-01-01 PROCEDURE — 74011000250 HC RX REV CODE- 250

## 2020-01-01 PROCEDURE — 97161 PT EVAL LOW COMPLEX 20 MIN: CPT | Performed by: PHYSICAL THERAPIST

## 2020-01-01 PROCEDURE — 73701 CT LOWER EXTREMITY W/DYE: CPT

## 2020-01-01 PROCEDURE — 84100 ASSAY OF PHOSPHORUS: CPT

## 2020-01-01 PROCEDURE — 74011250636 HC RX REV CODE- 250/636: Performed by: STUDENT IN AN ORGANIZED HEALTH CARE EDUCATION/TRAINING PROGRAM

## 2020-01-01 PROCEDURE — 74176 CT ABD & PELVIS W/O CONTRAST: CPT

## 2020-01-01 PROCEDURE — 74011250637 HC RX REV CODE- 250/637: Performed by: EMERGENCY MEDICINE

## 2020-01-01 RX ORDER — SODIUM CHLORIDE 0.9 % (FLUSH) 0.9 %
10 SYRINGE (ML) INJECTION
Status: ACTIVE | OUTPATIENT
Start: 2020-01-01 | End: 2020-01-01

## 2020-01-01 RX ORDER — SODIUM CHLORIDE 0.9 % (FLUSH) 0.9 %
5-40 SYRINGE (ML) INJECTION AS NEEDED
Status: DISCONTINUED | OUTPATIENT
Start: 2020-01-01 | End: 2020-01-01 | Stop reason: HOSPADM

## 2020-01-01 RX ORDER — AMLODIPINE BESYLATE 5 MG/1
10 TABLET ORAL DAILY
Status: DISCONTINUED | OUTPATIENT
Start: 2020-01-01 | End: 2020-01-01 | Stop reason: HOSPADM

## 2020-01-01 RX ORDER — BISACODYL 5 MG
5 TABLET, DELAYED RELEASE (ENTERIC COATED) ORAL DAILY PRN
Status: DISCONTINUED | OUTPATIENT
Start: 2020-01-01 | End: 2020-01-01 | Stop reason: HOSPADM

## 2020-01-01 RX ORDER — MORPHINE SULFATE 2 MG/ML
1 INJECTION, SOLUTION INTRAMUSCULAR; INTRAVENOUS
Status: COMPLETED | OUTPATIENT
Start: 2020-01-01 | End: 2020-01-01

## 2020-01-01 RX ORDER — OMEPRAZOLE 40 MG/1
40 CAPSULE, DELAYED RELEASE ORAL DAILY
COMMUNITY
End: 2021-01-01

## 2020-01-01 RX ORDER — BARIUM SULFATE 20 MG/ML
900 SUSPENSION ORAL
Status: ACTIVE | OUTPATIENT
Start: 2020-01-01 | End: 2020-01-01

## 2020-01-01 RX ORDER — ISOSORBIDE MONONITRATE 30 MG/1
60 TABLET, EXTENDED RELEASE ORAL
Status: DISCONTINUED | OUTPATIENT
Start: 2020-01-01 | End: 2020-01-01 | Stop reason: HOSPADM

## 2020-01-01 RX ORDER — ASPIRIN 81 MG/1
81 TABLET ORAL DAILY
Status: DISCONTINUED | OUTPATIENT
Start: 2020-01-01 | End: 2020-01-01 | Stop reason: HOSPADM

## 2020-01-01 RX ORDER — ALBUMIN HUMAN 250 G/1000ML
25 SOLUTION INTRAVENOUS AS NEEDED
Status: DISCONTINUED | OUTPATIENT
Start: 2020-01-01 | End: 2020-01-01 | Stop reason: HOSPADM

## 2020-01-01 RX ORDER — SODIUM BICARBONATE 1 MEQ/ML
50 SYRINGE (ML) INTRAVENOUS ONCE
Status: COMPLETED | OUTPATIENT
Start: 2020-01-01 | End: 2020-01-01

## 2020-01-01 RX ORDER — LIDOCAINE 4 G/100G
1 PATCH TOPICAL
Status: DISCONTINUED | OUTPATIENT
Start: 2020-01-01 | End: 2020-01-01 | Stop reason: HOSPADM

## 2020-01-01 RX ORDER — OXYCODONE HYDROCHLORIDE 5 MG/1
5 TABLET ORAL
Status: COMPLETED | OUTPATIENT
Start: 2020-01-01 | End: 2020-01-01

## 2020-01-01 RX ORDER — CALCIUM GLUCONATE 94 MG/ML
1 INJECTION, SOLUTION INTRAVENOUS
Status: COMPLETED | OUTPATIENT
Start: 2020-01-01 | End: 2020-01-01

## 2020-01-01 RX ORDER — ISOSORBIDE MONONITRATE 30 MG/1
30 TABLET, EXTENDED RELEASE ORAL
Qty: 30 TAB | Refills: 0 | Status: ON HOLD | OUTPATIENT
Start: 2020-01-01 | End: 2021-01-01 | Stop reason: SDUPTHER

## 2020-01-01 RX ORDER — MORPHINE SULFATE 2 MG/ML
2 INJECTION, SOLUTION INTRAMUSCULAR; INTRAVENOUS
Status: COMPLETED | OUTPATIENT
Start: 2020-01-01 | End: 2020-01-01

## 2020-01-01 RX ORDER — MIDODRINE HYDROCHLORIDE 5 MG/1
5 TABLET ORAL
Qty: 90 TAB | Refills: 0 | Status: SHIPPED | OUTPATIENT
Start: 2020-01-01 | End: 2020-01-01

## 2020-01-01 RX ORDER — SODIUM CHLORIDE 0.9 % (FLUSH) 0.9 %
5-40 SYRINGE (ML) INJECTION EVERY 8 HOURS
Status: DISCONTINUED | OUTPATIENT
Start: 2020-01-01 | End: 2020-01-01 | Stop reason: HOSPADM

## 2020-01-01 RX ORDER — MIDODRINE HYDROCHLORIDE 5 MG/1
10 TABLET ORAL
Status: DISCONTINUED | OUTPATIENT
Start: 2020-01-01 | End: 2020-01-01 | Stop reason: HOSPADM

## 2020-01-01 RX ORDER — ONDANSETRON 2 MG/ML
4 INJECTION INTRAMUSCULAR; INTRAVENOUS
Status: COMPLETED | OUTPATIENT
Start: 2020-01-01 | End: 2020-01-01

## 2020-01-01 RX ORDER — SODIUM CHLORIDE 0.9 % (FLUSH) 0.9 %
10 SYRINGE (ML) INJECTION
Status: COMPLETED | OUTPATIENT
Start: 2020-01-01 | End: 2020-01-01

## 2020-01-01 RX ORDER — IBUPROFEN 200 MG
1 TABLET ORAL DAILY
Status: DISCONTINUED | OUTPATIENT
Start: 2020-01-01 | End: 2020-01-01 | Stop reason: HOSPADM

## 2020-01-01 RX ORDER — ONDANSETRON 2 MG/ML
4 INJECTION INTRAMUSCULAR; INTRAVENOUS
Status: DISCONTINUED | OUTPATIENT
Start: 2020-01-01 | End: 2020-01-01 | Stop reason: HOSPADM

## 2020-01-01 RX ORDER — ALBUTEROL SULFATE 0.83 MG/ML
10 SOLUTION RESPIRATORY (INHALATION)
Status: COMPLETED | OUTPATIENT
Start: 2020-01-01 | End: 2020-01-01

## 2020-01-01 RX ORDER — CLONIDINE HYDROCHLORIDE 0.1 MG/1
0.3 TABLET ORAL 2 TIMES DAILY
Status: DISCONTINUED | OUTPATIENT
Start: 2020-01-01 | End: 2020-01-01

## 2020-01-01 RX ORDER — POLYETHYLENE GLYCOL 3350 17 G/17G
17 POWDER, FOR SOLUTION ORAL
Status: DISCONTINUED | OUTPATIENT
Start: 2020-01-01 | End: 2020-01-01 | Stop reason: HOSPADM

## 2020-01-01 RX ORDER — PANTOPRAZOLE SODIUM 40 MG/1
40 TABLET, DELAYED RELEASE ORAL DAILY
Status: DISCONTINUED | OUTPATIENT
Start: 2020-01-01 | End: 2020-01-01 | Stop reason: HOSPADM

## 2020-01-01 RX ORDER — LIDOCAINE 4 G/100G
1 PATCH TOPICAL EVERY 24 HOURS
Status: DISCONTINUED | OUTPATIENT
Start: 2020-01-01 | End: 2020-01-01 | Stop reason: HOSPADM

## 2020-01-01 RX ORDER — MIDODRINE HYDROCHLORIDE 5 MG/1
15 TABLET ORAL
Status: DISCONTINUED | OUTPATIENT
Start: 2020-01-01 | End: 2020-01-01

## 2020-01-01 RX ORDER — PROMETHAZINE HYDROCHLORIDE 25 MG/1
12.5 TABLET ORAL
Status: DISCONTINUED | OUTPATIENT
Start: 2020-01-01 | End: 2020-01-01 | Stop reason: HOSPADM

## 2020-01-01 RX ORDER — DEXTROSE 50 % IN WATER (D50W) INTRAVENOUS SYRINGE
25 ONCE
Status: COMPLETED | OUTPATIENT
Start: 2020-01-01 | End: 2020-01-01

## 2020-01-01 RX ORDER — ALBUTEROL SULFATE 0.83 MG/ML
SOLUTION RESPIRATORY (INHALATION)
Status: DISPENSED
Start: 2020-01-01 | End: 2020-01-01

## 2020-01-01 RX ORDER — MORPHINE SULFATE 2 MG/ML
4 INJECTION, SOLUTION INTRAMUSCULAR; INTRAVENOUS
Status: COMPLETED | OUTPATIENT
Start: 2020-01-01 | End: 2020-01-01

## 2020-01-01 RX ORDER — ATORVASTATIN CALCIUM 40 MG/1
40 TABLET, FILM COATED ORAL
Status: DISCONTINUED | OUTPATIENT
Start: 2020-01-01 | End: 2020-01-01 | Stop reason: HOSPADM

## 2020-01-01 RX ORDER — MORPHINE SULFATE 2 MG/ML
2 INJECTION, SOLUTION INTRAMUSCULAR; INTRAVENOUS
Status: DISCONTINUED | OUTPATIENT
Start: 2020-01-01 | End: 2020-01-01

## 2020-01-01 RX ORDER — DEXTROSE 50 % IN WATER (D50W) INTRAVENOUS SYRINGE
Status: COMPLETED
Start: 2020-01-01 | End: 2020-01-01

## 2020-01-01 RX ADMIN — ISOSORBIDE MONONITRATE 60 MG: 30 TABLET, EXTENDED RELEASE ORAL at 09:10

## 2020-01-01 RX ADMIN — IOPAMIDOL 100 ML: 755 INJECTION, SOLUTION INTRAVENOUS at 13:21

## 2020-01-01 RX ADMIN — Medication 10 ML: at 05:05

## 2020-01-01 RX ADMIN — MIDODRINE HYDROCHLORIDE 10 MG: 5 TABLET ORAL at 10:14

## 2020-01-01 RX ADMIN — Medication 10 ML: at 21:33

## 2020-01-01 RX ADMIN — DEXTROSE MONOHYDRATE 25 G: 25 INJECTION, SOLUTION INTRAVENOUS at 10:59

## 2020-01-01 RX ADMIN — MIDODRINE HYDROCHLORIDE 10 MG: 5 TABLET ORAL at 16:46

## 2020-01-01 RX ADMIN — ASPIRIN 81 MG: 81 TABLET, COATED ORAL at 09:10

## 2020-01-01 RX ADMIN — MORPHINE SULFATE 2 MG: 2 INJECTION, SOLUTION INTRAMUSCULAR; INTRAVENOUS at 21:30

## 2020-01-01 RX ADMIN — MORPHINE SULFATE 2 MG: 2 INJECTION, SOLUTION INTRAMUSCULAR; INTRAVENOUS at 06:24

## 2020-01-01 RX ADMIN — MORPHINE SULFATE 4 MG: 2 INJECTION, SOLUTION INTRAMUSCULAR; INTRAVENOUS at 09:02

## 2020-01-01 RX ADMIN — DEXTROSE MONOHYDRATE 25 G: 25 INJECTION, SOLUTION INTRAVENOUS at 02:49

## 2020-01-01 RX ADMIN — ALBUTEROL SULFATE 10 MG: 2.5 SOLUTION RESPIRATORY (INHALATION) at 11:22

## 2020-01-01 RX ADMIN — Medication 10 ML: at 09:17

## 2020-01-01 RX ADMIN — FAMOTIDINE 20 MG: 10 INJECTION, SOLUTION INTRAVENOUS at 09:06

## 2020-01-01 RX ADMIN — Medication 10 ML: at 16:46

## 2020-01-01 RX ADMIN — SODIUM BICARBONATE 50 MEQ: 84 INJECTION, SOLUTION INTRAVENOUS at 10:59

## 2020-01-01 RX ADMIN — MORPHINE SULFATE 2 MG: 2 INJECTION, SOLUTION INTRAMUSCULAR; INTRAVENOUS at 12:58

## 2020-01-01 RX ADMIN — MORPHINE SULFATE 1 MG: 2 INJECTION, SOLUTION INTRAMUSCULAR; INTRAVENOUS at 22:14

## 2020-01-01 RX ADMIN — ASPIRIN 81 MG: 81 TABLET, COATED ORAL at 10:14

## 2020-01-01 RX ADMIN — Medication 10 ML: at 06:28

## 2020-01-01 RX ADMIN — MORPHINE SULFATE 1 MG: 2 INJECTION, SOLUTION INTRAMUSCULAR; INTRAVENOUS at 11:57

## 2020-01-01 RX ADMIN — SODIUM CHLORIDE 500 ML: 900 INJECTION, SOLUTION INTRAVENOUS at 09:06

## 2020-01-01 RX ADMIN — ATORVASTATIN CALCIUM 40 MG: 40 TABLET, FILM COATED ORAL at 21:30

## 2020-01-01 RX ADMIN — ONDANSETRON 4 MG: 2 INJECTION INTRAMUSCULAR; INTRAVENOUS at 09:06

## 2020-01-01 RX ADMIN — APIXABAN 5 MG: 5 TABLET, FILM COATED ORAL at 18:26

## 2020-01-01 RX ADMIN — HUMAN INSULIN 5 UNITS: 100 INJECTION, SOLUTION SUBCUTANEOUS at 10:56

## 2020-01-01 RX ADMIN — Medication 10 ML: at 11:58

## 2020-01-01 RX ADMIN — MORPHINE SULFATE 1 MG: 2 INJECTION, SOLUTION INTRAMUSCULAR; INTRAVENOUS at 16:47

## 2020-01-01 RX ADMIN — Medication 10 ML: at 21:15

## 2020-01-01 RX ADMIN — CALCIUM GLUCONATE 1 G: 98 INJECTION, SOLUTION INTRAVENOUS at 10:49

## 2020-01-01 RX ADMIN — ATORVASTATIN CALCIUM 40 MG: 40 TABLET, FILM COATED ORAL at 21:13

## 2020-01-01 RX ADMIN — APIXABAN 5 MG: 5 TABLET, FILM COATED ORAL at 10:15

## 2020-01-01 RX ADMIN — MORPHINE SULFATE 2 MG: 2 INJECTION, SOLUTION INTRAMUSCULAR; INTRAVENOUS at 01:30

## 2020-01-01 RX ADMIN — MORPHINE SULFATE 2 MG: 2 INJECTION, SOLUTION INTRAMUSCULAR; INTRAVENOUS at 09:14

## 2020-01-01 RX ADMIN — PROMETHAZINE HYDROCHLORIDE 12.5 MG: 25 TABLET ORAL at 18:18

## 2020-01-01 RX ADMIN — MORPHINE SULFATE 1 MG: 2 INJECTION, SOLUTION INTRAMUSCULAR; INTRAVENOUS at 03:27

## 2020-01-01 RX ADMIN — MIDODRINE HYDROCHLORIDE 10 MG: 5 TABLET ORAL at 14:38

## 2020-01-01 RX ADMIN — PANTOPRAZOLE SODIUM 40 MG: 40 TABLET, DELAYED RELEASE ORAL at 09:09

## 2020-01-01 RX ADMIN — OXYCODONE 5 MG: 5 TABLET ORAL at 22:59

## 2020-01-01 RX ADMIN — Medication 10 ML: at 13:21

## 2020-01-01 RX ADMIN — PANTOPRAZOLE SODIUM 40 MG: 40 TABLET, DELAYED RELEASE ORAL at 10:14

## 2020-01-17 ENCOUNTER — APPOINTMENT (OUTPATIENT)
Dept: CT IMAGING | Age: 55
End: 2020-01-17
Attending: NURSE PRACTITIONER
Payer: MEDICAID

## 2020-01-17 ENCOUNTER — HOSPITAL ENCOUNTER (EMERGENCY)
Age: 55
Discharge: HOME | End: 2020-01-17
Attending: EMERGENCY MEDICINE
Payer: MEDICAID

## 2020-01-17 VITALS
HEART RATE: 78 BPM | SYSTOLIC BLOOD PRESSURE: 120 MMHG | BODY MASS INDEX: 21.67 KG/M2 | TEMPERATURE: 98.3 F | HEIGHT: 72 IN | RESPIRATION RATE: 20 BRPM | DIASTOLIC BLOOD PRESSURE: 78 MMHG | OXYGEN SATURATION: 99 % | WEIGHT: 160 LBS
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
## 2020-01-17 DIAGNOSIS — S01.80XA AVULSION OF SKIN OF FACE, INITIAL ENCOUNTER: ICD-10-CM

## 2020-01-17 DIAGNOSIS — N18.6 ESRD (END STAGE RENAL DISEASE) ON DIALYSIS (HCC): ICD-10-CM

## 2020-01-17 DIAGNOSIS — Z99.2 ESRD (END STAGE RENAL DISEASE) ON DIALYSIS (HCC): ICD-10-CM

## 2020-01-17 PROCEDURE — 71046 X-RAY EXAM CHEST 2 VIEWS: CPT

## 2020-01-17 PROCEDURE — 70450 CT HEAD/BRAIN W/O DYE: CPT

## 2020-01-17 PROCEDURE — 74011250636 HC RX REV CODE- 250/636: Performed by: NURSE PRACTITIONER

## 2020-01-17 PROCEDURE — 90715 TDAP VACCINE 7 YRS/> IM: CPT | Performed by: NURSE PRACTITIONER

## 2020-01-17 PROCEDURE — 90471 IMMUNIZATION ADMIN: CPT

## 2020-01-17 PROCEDURE — 74011000250 HC RX REV CODE- 250: Performed by: NURSE PRACTITIONER

## 2020-01-17 PROCEDURE — 99284 EMERGENCY DEPT VISIT MOD MDM: CPT

## 2020-01-17 RX ADMIN — TETANUS TOXOID, REDUCED DIPHTHERIA TOXOID AND ACELLULAR PERTUSSIS VACCINE, ADSORBED 0.5 ML: 5; 2.5; 8; 8; 2.5 SUSPENSION INTRAMUSCULAR at 19:15

## 2020-01-17 RX ADMIN — BACITRACIN ZINC, NEOMYCIN SULFATE, POLYMYXIN B SULFATE 1 PACKET: 3.5; 5000; 4 OINTMENT TOPICAL at 21:10

## 2020-01-17 NOTE — ED NOTES
Emergency Department Nursing Plan of Care The Nursing Plan of Care is developed from the Nursing assessment and Emergency Department Attending provider initial evaluation. The plan of care may be reviewed in the ED Provider note. The Plan of Care was developed with the following considerations:  
Patient / Family readiness to learn indicated by:verbalized understanding Persons(s) to be included in education: patient Barriers to Learning/Limitations:No 
 
Signed Agustín Car RN   
1/17/2020   6:40 PM

## 2020-01-17 NOTE — ED NOTES
Dialysis patient last went to dialysis Wednesday. Missed today because of MD appointment. Reports falling today when legs gave out. Patient seen up and walking around in room previously without any issues. Patient is drowsy. Denies any current ETOH or drug use. Reports he is taking methadone.

## 2020-01-18 NOTE — ED NOTES
Discharge Instructions Reviewed with patient per this nurse. Discharge instructions given to patient per this nurse. Patient able to return verbalize discharge instructions. Paper copy of discharge instructions given. No RX given. Patient condition stable, Respiratory status WNL, Neurostatus intact. Ambultory out of er, to home with self. [facility]

## 2020-01-18 NOTE — DISCHARGE INSTRUCTIONS
Patient Education   Patient Education        Kidney Dialysis: Care Instructions  Your Care Instructions    Dialysis is a process that filters wastes from the blood when your kidneys can no longer do the job. It is not a cure, but it can help you live longer and feel better. It is a lifesaving treatment when you have kidney failure. Normal kidneys work 24 hours a day to clean wastes from your blood. Your kidneys are not able to do this job, so a process called dialysis will do some of the work for your kidneys. You and your doctor will decide which type of dialysis you should have. Peritoneal dialysis uses the lining of your belly (peritoneum) to filter your blood. You can do it at home, on a daily basis. Hemodialysis uses a man-made filter called a dialyzer to clean your blood. Most people need to go to a hospital or clinic 3 days a week for several hours each time. Sometimes hemodialysis can be done at home. It is normal to have questions about your treatment, and you have a right to know what is happening to you. Learning about dialysis can help you take an active role in your treatment. Dialysis does not cure kidney disease, but it can help you live longer and feel better. You will need to follow your diet and treatment schedule carefully. Follow-up care is a key part of your treatment and safety. Be sure to make and go to all appointments, and call your doctor if you are having problems. It's also a good idea to know your test results and keep a list of the medicines you take. What do you need to know about peritoneal dialysis? Peritoneal dialysis uses the lining of your belly (or peritoneal membrane) to filter your blood. Before you can begin peritoneal dialysis, your doctor will need to place a thin tube called a catheter in your belly. This is the dialysis access. · Peritoneal dialysis can be done at home or in any clean place. You may be able to do it while you sleep. · You can do it by yourself.  You do not have to rely on help from others. · You can do it at the times you choose as long as you do the right number of treatments. · It has to be done every day of the week. · Some people find it hard to do all the required steps. · It increases your chance for a serious infection of the lining of the belly (peritoneum). What do you need to know about hemodialysis? Hemodialysis uses a man-made membrane called a dialyzer to clean your blood. You are connected to the dialyzer by tubes attached to your blood vessels. Before you start hemodialysis, your doctor will create a site where the blood can flow in and out of your body during your dialysis sessions. This site is called the vascular access. It may be a fistula, made by connecting an artery and a vein. Or it may be a graft, which is a tube implanted under your skin. · Hemodialysis is done mainly by trained health workers who can watch for any problems. · It allows you to be in contact with other people having dialysis. This can help provide emotional support. · You can schedule your treatments in the evenings so you can keep working. · You may be able to do home hemodialysis, which gives you more control over your schedule. · It usually needs to be done on a set schedule 3 times a week. · It can cause side effects. The most common side effects are low blood pressure and muscle cramps. These can often be treated easily. · It requires needle sticks during every treatment, which bothers some people. Others get used to it and even do the needle sticks themselves. How can you care for yourself at home? · Be sure to have all of your dialysis sessions. Do not try to shorten or skip your sessions. You have a better chance of a longer and healthier life by getting your full treatment. · Your doctor or health care team will show you the steps you need to go through each day before, during, and after dialysis. Be sure to follow these steps.  If you do not understand a step, talk to your team.  · Your doctor and dietitian will help you design menus that follow your diet. Be sure to follow your diet guidelines. ? You will need to limit fluids and certain foods that contain salt (sodium), potassium, and phosphorus. ? You may need to follow a heart-healthy diet to keep the fat (cholesterol) in your blood under control. ? You may need higher levels of protein in your diet. · Your doctor may recommend certain vitamins. But do not take any other medicine, including over-the-counter medicines, vitamins, and herbal products, without talking to your doctor first.  · Do not smoke. Smoking raises your risk of many health problems, including more kidney damage. If you need help quitting, talk to your doctor about stop-smoking programs and medicines. These can increase your chances of quitting for good. · Do not take ibuprofen (Advil, Motrin), naproxen (Aleve), or similar medicines, unless your doctor tells you to. These medicines may make kidney problems worse. When should you call for help? Call your doctor now or seek immediate medical care if:    · You have a fever.     · You are dizzy or lightheaded, or you feel like you may faint.     · You are confused or cannot think clearly.     · You have new or worse nausea or vomiting.     · You have new or more blood in your urine.     · You have new swelling.    Watch closely for changes in your health, and be sure to contact your doctor if:    · You do not get better as expected. Where can you learn more? Go to http://roxy-lacho.info/. Enter C048 in the search box to learn more about \"Kidney Dialysis: Care Instructions. \"  Current as of: October 31, 2018  Content Version: 12.2  © 0755-5039 Healthwise, Incorporated. Care instructions adapted under license by [employer] (which disclaims liability or warranty for this information).  If you have questions about a medical condition or this instruction, always ask your healthcare professional. [address] any warranty or liability for your use of this information. Preventing Falls: Care Instructions  Your Care Instructions    Getting around your home safely can be a challenge if you have injuries or health problems that make it easy for you to fall. Loose rugs and furniture in walkways are among the dangers for many older people who have problems walking or who have poor eyesight. People who have conditions such as arthritis, osteoporosis, or dementia also have to be careful not to fall. You can make your home safer with a few simple measures. Follow-up care is a key part of your treatment and safety. Be sure to make and go to all appointments, and call your doctor if you are having problems. It's also a good idea to know your test results and keep a list of the medicines you take. How can you care for yourself at home? Taking care of yourself  · You may get dizzy if you do not drink enough water. To prevent dehydration, drink plenty of fluids, enough so that your urine is light yellow or clear like water. Choose water and other caffeine-free clear liquids. If you have kidney, heart, or liver disease and have to limit fluids, talk with your doctor before you increase the amount of fluids you drink. · Exercise regularly to improve your strength, muscle tone, and balance. Walk if you can. Swimming may be a good choice if you cannot walk easily. · Have your vision and hearing checked each year or any time you notice a change. If you have trouble seeing and hearing, you might not be able to avoid objects and could lose your balance. · Know the side effects of the medicines you take. Ask your doctor or pharmacist whether the medicines you take can affect your balance. Sleeping pills or sedatives can affect your balance. · Limit the amount of alcohol you drink. Alcohol can impair your balance and other senses.   · Ask your doctor whether calluses or corns on your feet need to be removed. If you wear loose-fitting shoes because of calluses or corns, you can lose your balance and fall. · Talk to your doctor if you have numbness in your feet. Preventing falls at home  · Remove raised doorway thresholds, throw rugs, and clutter. Repair loose carpet or raised areas in the floor. · Move furniture and electrical cords to keep them out of walking paths. · Use nonskid floor wax, and wipe up spills right away, especially on ceramic tile floors. · If you use a walker or cane, put rubber tips on it. If you use crutches, clean the bottoms of them regularly with an abrasive pad, such as steel wool. · Keep your house well lit, especially [place], and outside walkways. Use night-lights in areas such as hallways and bathrooms. Add extra light switches or use remote switches (such as switches that go on or off when you clap your hands) to make it easier to turn lights on if you have to get up during the night. · Install sturdy handrails on stairways. · Move items in your cabinets so that the things you use a lot are on the lower shelves (about waist level). · Keep a cordless phone and a flashlight with new batteries by your bed. If possible, put a phone in each of the main rooms of your house, or carry a cell phone in case you fall and cannot reach a phone. Or, you can wear a device around your neck or wrist. You push a button that sends a signal for help. · Wear low-heeled shoes that fit well and give your feet good support. Use footwear with nonskid soles. Check the heels and soles of your shoes for wear. Repair or replace worn heels or soles. · Do not wear socks without shoes on wood floors. · Walk on the grass when the sidewalks are slippery. If you live in an area that gets snow and ice in the winter, sprinkle salt on slippery steps and sidewalks.   Preventing falls in the bath  · Install grab bars and nonskid mats inside and outside your shower or tub and near the toilet and sinks. · Use shower chairs and bath benches. · Use a hand-held shower head that will allow you to sit while showering. · Get into a tub or shower by putting the weaker leg in first. Get out of a tub or shower with your strong side first.  · Repair loose toilet seats and consider installing a raised toilet seat to make getting on and off the toilet easier. · Keep your bathroom door unlocked while you are in the shower. Where can you learn more? Go to http://roxy-lacho.info/. Enter 0476 [id number] 69 71 in the search box to learn more about \"Preventing Falls: Care Instructions. \"  Current as of: March 16, 2018  Content Version: 11.8  © 9644-1253 Healthwise, Incorporated. Care instructions adapted under license by [employer] (which disclaims liability or warranty for this information). If you have questions about a medical condition or this instruction, always ask your healthcare professional. [address] any warranty or liability for your use of this information.

## 2020-01-18 NOTE — ED NOTES
Patient declines to have lab work done. States just had blood drawn Wednesday at dialysis, provider notified.

## 2020-01-19 NOTE — ED PROVIDER NOTES
EMERGENCY DEPARTMENT HISTORY AND PHYSICAL EXAM 
 
Date: 1/17/2020 Patient Name: Franck Castillo History of Presenting Illness Chief Complaint Patient presents with  Laceration  
  to left cheek, pt states \"my legs gave out man\", pt noted to be falling asleep during triage, pt reports he is on methadone, denies drug use today History Provided By: Patient Chief Complaint: face pain Duration:  just PTA Timing:  Acute Location: left side of face Quality: Naresh Pi Severity: 10 out of 10 Modifying Factors: none Associated Symptoms: denies any other associated signs or symptoms HPI: Franck Castillo is a 49 y.o. male with a PMH of ESRD who presents with facial pain onset just prior to arrival.  Patient states he was walking and fell to the ground and hit his head. Patient denies loss conscious. Patient states his legs just gave out. Patient states he was due for dialysis today but did not go. PCP: None Current Outpatient Medications Medication Sig Dispense Refill  citalopram (CELEXA) 40 mg tablet Take 1 Tab by mouth daily. 30 Tab 1  
 sevelamer (RENAGEL) 400 mg tablet Take 800 mg by mouth three (3) times daily (with meals).  lisinopril (PRINIVIL, ZESTRIL) 2.5 mg tablet Take  by mouth daily.  isosorbide mononitrate ER (IMDUR) 60 mg CR tablet Take  by mouth every morning.  promethazine (PHENERGAN) 25 mg tablet Take 25 mg by mouth every six (6) hours as needed for Nausea.  atorvastatin (LIPITOR) 40 mg tablet Take  by mouth daily.  senna (SENNA) 8.6 mg tablet Take 1 Tab by mouth daily.  lidocaine (LIDODERM) 5 % Apply patch to the affected area for 12 hours a day and remove for 12 hours a day. 60 Each 0  
 aspirin delayed-release 81 mg tablet Take 1 Tab by mouth daily. 90 Tab 0  
 amLODIPine (NORVASC) 10 mg tablet Take 1 Tab by mouth daily. 30 Tab 0  cloNIDine HCl (CATAPRES) 0.3 mg tablet Take 1 Tab by mouth two (2) times a day. 60 Tab 0  multivitamin (ONE A DAY) tablet Take 1 Tab by mouth daily.  pantoprazole (PROTONIX) 40 mg tablet Take 40 mg by mouth daily.  paricalcitol (ZEMPLAR) 1 mcg capsule Take 1 mcg by mouth daily.  FOLIC ACID/VIT B COMPLEX AND C (FULL SPECTRUM B-VITAMIN C PO) Take  by mouth.  GABAPENTIN PO Take 300 mg by mouth two (2) times a day. Past History Past Medical History: 
Past Medical History:  
Diagnosis Date  Chronic kidney disease  Dialysis patient [facility])  Hypertension Past Surgical History: 
Past Surgical History:  
Procedure Laterality Date  HX VASCULAR ACCESS Left LUE AV fistula Family History: 
Family History Problem Relation Age of Onset  No Known Problems Mother  No Known Problems Father  No Known Problems Sister  No Known Problems Brother  No Known Problems Sister  No Known Problems Sister  No Known Problems Brother Social History: 
Social History Tobacco Use  Smoking status: Current Every Day Smoker Packs/day: 0.25  Smokeless tobacco: Never Used Substance Use Topics  Alcohol use: Yes  Drug use: Not Currently Types: Heroin Comment: Former Allergies: Allergies Allergen Reactions  Motrin [Ibuprofen] Hives 7/16/17 Pt denies allergy  Tylenol [Acetaminophen] Hives 7/16/17 Pt denies allergy Review of Systems Review of Systems Constitutional: Negative for chills, fatigue and fever. HENT: Negative for congestion and sore throat. Face pain Respiratory: Negative for cough. Cardiovascular: Negative for chest pain. Gastrointestinal: Negative for abdominal pain. Genitourinary: Negative for dysuria. Musculoskeletal: Negative for arthralgias, back pain, myalgias, neck pain and neck stiffness. Skin: Positive for wound. Negative for rash. Neurological: Negative for dizziness, weakness, numbness and headaches.   
All other systems reviewed and are negative. Physical Exam  
 
Vitals:  
 01/17/20 1752 01/17/20 2113 BP: 125/67 120/78 Pulse: 80 78 Resp: 15 20 Temp: 98.3 °F (36.8 °C) SpO2: 97% 99% Weight: 72.6 kg (160 lb) Height: 6' (1.829 m) Physical Exam 
Vitals signs and nursing note reviewed. Constitutional:   
   Appearance: He is well-developed. HENT:  
   Head: Normocephalic and atraumatic. Right Ear: External ear normal.  
Eyes:  
   General:     
   Right eye: No discharge. Left eye: No discharge. Conjunctiva/sclera: Conjunctivae normal.  
Neck: Musculoskeletal: Normal range of motion and neck supple. Cardiovascular:  
   Rate and Rhythm: Normal rate and regular rhythm. Heart sounds: Normal heart sounds. Pulmonary:  
   Effort: Pulmonary effort is normal. No respiratory distress. Breath sounds: Normal breath sounds. No wheezing. Abdominal:  
   General: Bowel sounds are normal.  
   Palpations: Abdomen is soft. Tenderness: There is no tenderness. Musculoskeletal: Normal range of motion. Lymphadenopathy:  
   Cervical: No cervical adenopathy. Skin: 
   General: Skin is warm and dry. Neurological:  
   Mental Status: He is alert and oriented to person, place, and time. Cranial Nerves: No cranial nerve deficit. Psychiatric:     
   Behavior: Behavior normal.     
   Thought Content: Thought content normal.     
   Judgment: Judgment normal.  
 
 
 
 
Diagnostic Study Results Labs - No results found for this or any previous visit (from the past 12 hour(s)). Radiologic Studies -  
CT HEAD WO CONT Final Result IMPRESSION:   
1. No evidence of acute intracranial abnormality by this modality. XR CHEST PA LAT Final Result IMPRESSION:  
1. Chronic appearing lung changes without definite acute process. 2. Atherosclerosis. CT Results  (Last 48 hours) 01/17/20 1907  CT HEAD WO CONT Final result  Impression:  IMPRESSION: 1. No evidence of acute intracranial abnormality by this modality. Narrative:  EXAM:  CT HEAD WO CONT INDICATION:   fall to ground Additional history: Patient obtunded COMPARISON: None. .  
TECHNIQUE:   
Unenhanced CT of the head was performed using 5 mm images. Coronal and sagittal  
reformats were produced. Brain and bone windows were generated. CT dose reduction was achieved through use of a standardized protocol tailored  
for this examination and automatic exposure control for dose modulation. Kaiden Reasoner FINDINGS:  
The ventricles and sulci are normal in size, shape and configuration and  
midline. There is no significant white matter disease. There is no intracranial  
hemorrhage, extra-axial collection, mass, mass effect or midline shift. The  
basilar cisterns are open. No acute infarct is identified. Intracranial  
atherosclerosis. The bone windows demonstrate no abnormalities. The visualized portions of the  
paranasal sinuses and mastoid air cells are clear. .  
  
  
 
CXR Results  (Last 48 hours) 01/17/20 1907  XR CHEST PA LAT Final result Impression:  IMPRESSION:  
1. Chronic appearing lung changes without definite acute process. 2. Atherosclerosis. Narrative:  INDICATION: . pneumonia Additional history: Chest pain. COMPARISON: Previous chest xray, 5/13/2017 and 5/11/2017. Kaiden Reasoner FINDINGS: PA and lateral view of the chest.   
.  
Lines/tubes/surgical: None. Heart/mediastinum: Calcifications in the aortic arch. Lungs/pleura: Chronic appearing lung changes. Linear opacity in the lateral  
aspect of the right midlung suggesting scarring/atelectasis. No visualized  
pleural effusion or pneumothorax. Additional Comments: None. .  
  
  
 
 
 
Medical Decision Making I am the first provider for this patient.  
 
I reviewed the vital signs, available nursing notes, past medical history, past surgical history, family history and social history. Vital Signs-Reviewed the patient's vital signs. Records Reviewed: Nursing Notes Disposition: 
Home Patient insisting on stitches be placed for skin avulsion. Patient advised he does not have a laceration but a minor skin avulsion. Patient became loud wanted to leave the ER. Discharge instructions were given CT negative DISCHARGE NOTE:  
 
 
  Care plan outlined and precautions discussed. Patient has no new complaints, changes, or physical findings. Results of CT 
 were reviewed with the patient. All medications were reviewed with the patient; will d/c home. All of pt's questions and concerns were addressed. Patient was instructed and agrees to follow up with PCP, as well as to return to the ED upon further deterioration. Patient is ready to go home. Follow-up Information Follow up With Specialties Details Why Contact [facility]  In 1 week  [address] [place] [address], [address] Romayne Glee [facility] 
[phone] Discharge Medication List as of 1/17/2020  8:41 PM  
  
 
 
Provider Notes (Medical Decision Making): DDX right laceration versus skin avulsion closed head injury subarachnoid hemorrhage Procedures: 
Procedures Please note that this dictation was completed with Dragon, computer voice recognition software. Quite often unanticipated grammatical, syntax, homophones, and other interpretive errors are inadvertently transcribed by the computer software. Please disregard these errors. Additionally, please excuse any errors that have escaped final proofreading. Diagnosis Clinical Impression: 1. Fall, initial encounter 2. Avulsion of skin of face, initial encounter 3. ESRD (end stage renal disease) on dialysis ([facility] [address]

## 2020-01-21 ENCOUNTER — APPOINTMENT (OUTPATIENT)
Dept: GENERAL RADIOLOGY | Age: 55
End: 2020-01-21
Attending: PHYSICIAN ASSISTANT
Payer: MEDICAID

## 2020-01-21 ENCOUNTER — HOSPITAL ENCOUNTER (EMERGENCY)
Age: 55
Discharge: HOME | End: 2020-01-21
Attending: EMERGENCY MEDICINE
Payer: MEDICAID

## 2020-01-21 VITALS
TEMPERATURE: 98.5 F | OXYGEN SATURATION: 100 % | BODY MASS INDEX: 22.35 KG/M2 | RESPIRATION RATE: 20 BRPM | SYSTOLIC BLOOD PRESSURE: 151 MMHG | HEIGHT: 72 IN | DIASTOLIC BLOOD PRESSURE: 87 MMHG | WEIGHT: 165 LBS | HEART RATE: 97 BPM

## 2020-01-21 DIAGNOSIS — S59.901A INJURY OF RIGHT ELBOW, INITIAL ENCOUNTER: Primary | ICD-10-CM

## 2020-01-21 PROCEDURE — 99283 EMERGENCY DEPT VISIT LOW MDM: CPT

## 2020-01-21 PROCEDURE — 73130 X-RAY EXAM OF HAND: CPT

## 2020-01-21 PROCEDURE — 74011250637 HC RX REV CODE- 250/637: Performed by: PHYSICIAN ASSISTANT

## 2020-01-21 PROCEDURE — 73080 X-RAY EXAM OF ELBOW: CPT

## 2020-01-21 RX ORDER — SENNOSIDES 25 MG/1
TABLET, FILM COATED ORAL
Qty: 15 G | Refills: 0 | Status: SHIPPED | OUTPATIENT
Start: 2020-01-21 | End: 2021-01-01

## 2020-01-21 RX ORDER — TRAMADOL HYDROCHLORIDE 50 MG/1
50 TABLET ORAL
Status: COMPLETED | OUTPATIENT
Start: 2020-01-21 | End: 2020-01-21

## 2020-01-21 RX ADMIN — TRAMADOL HYDROCHLORIDE 50 MG: 50 TABLET ORAL at 22:34
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
## 2020-01-22 NOTE — DISCHARGE INSTRUCTIONS
Patient Education   Patient Education           Learning About RICE (Rest, Ice, Compression, and Elevation)  What is RICE? RICE is a way to care for an injury. RICE helps relieve pain and swelling. It may also help with healing and flexibility. RICE stands for:  · Rest and protect the injured or sore area. · Ice or a cold pack used as soon as possible. · Compression, or wrapping the injured or sore area with an elastic bandage. · Elevation (propping up) the injured or sore area. How do you do RICE? You can use RICE for home treatment when you have general aches and pains or after an injury or surgery. Rest  · Do not put weight on the injury for at least 24 to 48 hours. · Use crutches for a badly sprained knee or ankle. · Support a sprained wrist, elbow, or shoulder with a sling. Ice  · Put ice or a cold pack on the injury right away to reduce pain and swelling. Frozen vegetables will also work as an ice pack. Put a thin cloth between the ice or cold pack and your skin. The cloth protects the injured area from getting too cold. · Use ice for 10 to 15 minutes at a time for the first 48 to 72 hours. Compression  · Use compression for sprains, strains, and surgeries of the arms and legs. · Wrap the injured area with an elastic bandage or compression sleeve to reduce swelling. · Don't wrap it too tightly. If the area below it feels numb, tingles, or feels cool, loosen the wrap. Elevation  · Use elevation for areas of the body that can be propped up, such as arms and legs. · Prop up the injured area on pillows whenever you use ice. Keep it propped up anytime you sit or lie down. · Try to keep the injured area at or above the level of your heart. This will help reduce swelling and bruising. Where can you learn more? Go to http://roxy-lacho.info/. Enter X126 in the search box to learn more about \"Learning About RICE (Rest, Ice, Compression, and Elevation). \"  Current as of: June 26, 2019  Content Version: 12.2  © 4413-8747 [employer]. Care instructions adapted under license by [employer] (which disclaims liability or warranty for this information). If you have questions about a medical condition or this instruction, always ask your healthcare professional. [address] any warranty or liability for your use of this information.

## 2020-01-22 NOTE — ED NOTES
Pt states \"I need to go to another hospital.\" Pt grunting and frustrated after told he was being medicated with Tramadol and asked how many mg. He states \"Ha, that's it? \".
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
## 2020-10-05 NOTE — ED NOTES
Pt arrives via EMS reporting lower left leg swelling that he noticed yesterday. Pt states it is very painful and makes walking difficult. Pt went to his scheduled dialysis this am and was denied on account of fever. Pt is currently 99.4. Dialysis called EMS to bring pt to ED. Pt denies SOB, N/V, chest pain.

## 2020-10-05 NOTE — ED NOTES
Bedside shift change report given to Getachew Hammond (oncoming nurse) by Charlotte Nelson (offgoing nurse). Report included the following information SBAR, ED Summary, Procedure Summary and Recent Results.

## 2020-10-05 NOTE — DISCHARGE INSTRUCTIONS
Patient Education        Learning About Deep Vein Thrombosis  What is a deep vein thrombosis (DVT)? A deep vein thrombosis (DVT) is a blood clot (thrombus) in a deep vein, usually in the legs. A DVT can be dangerous because it can break loose and travel through the bloodstream to the lungs. There it can block blood flow in the lungs (pulmonary embolism). This can be life-threatening. What are the symptoms? Deep vein thrombosis often doesn't cause symptoms. Or it may cause only minor ones. When symptoms happen, they include:  · Swelling in the affected area. · Redness and warmth in the affected area. · Pain or tenderness. You may have pain only when you touch the affected area or when you stand or walk. Sometimes a pulmonary embolism is the first sign that you have DVT. If your doctor thinks you may have DVT, you will probably have an ultrasound test. You may have other tests as well. What causes deep vein thrombosis (DVT)? Causes of a blood clot in a deep vein (DVT) include:  · Slowed blood flow. This can happen when you're not active for long periods of time. For example, clots can form if you are paralyzed, are confined to bed, or must sit while on a long flight or car trip. · Abnormal clotting problems that make the blood clot too easily or too quickly. For example, some people have blood that clots too easily, a problem that may run in families. · Surgery or an injury to the blood vessels. Blood is more likely to clot in veins shortly after they are injured. · Cancer. How can you prevent DVT? · Exercise your lower leg muscles to help blood flow in your legs. Point your toes up toward your head so the calves of your legs are stretched, then relax and repeat. This is a good exercise to do when you are sitting for long periods of time. · Get out of bed as soon as you can after an illness or surgery. If you need to stay in bed, do the leg exercise noted above every hour when you are awake.   · Use special stockings called compression stockings. These stockings are tight at the feet with a gradually looser fit on the leg. Many doctors recommend that you wear compression stockings during a journey longer than 8 hours. · Take breaks when you are on long trips. Stop the car and walk around. On long airplane flights, walk up and down the aisle hourly, and flex and point your feet every 20 minutes while sitting. · Take blood-thinning medicines after some types of surgery if your doctor recommends it. Blood thinners also may be used if you are likely to develop clots. How is DVT treated? Treatment for DVT usually involves taking blood thinners. These medicines are given through a vein (intravenously, or IV) or as a pill. Talk with your doctor about which medicine is right for you. Your doctor also may suggest that you prop up or elevate your leg when possible, take walks, and wear compression stockings. These measures may help reduce the pain and swelling that can happen with DVT. Follow-up care is a key part of your treatment and safety. Be sure to make and go to all appointments, and call your doctor if you are having problems. It's also a good idea to know your test results and keep a list of the medicines you take. Where can you learn more? Go to http://www.gray.com/  Enter X941 in the search box to learn more about \"Learning About Deep Vein Thrombosis. \"  Current as of: March 4, 2020               Content Version: 12.6  © 4280-2096 [employer]. Care instructions adapted under license by [employer] (which disclaims liability or warranty for this information). If you have questions about a medical condition or this instruction, always ask your healthcare professional. [place] [address] any warranty or liability for your use of this information.          Patient Education        Hyperkalemia: Care Instructions  Your Care Instructions Hyperkalemia is too much potassium in the blood. Potassium helps keep the right mix of fluids in your body. It also helps your nerves and muscles work as they should. And it keeps your heartbeat in a normal rhythm. Some things can raise potassium levels. These include some health problems, medicines, and kidney problems. (Normally, your kidneys remove extra potassium.)  Too much potassium can cause nausea. It also can cause a heartbeat that isn't normal. But you may not have any symptoms. Too much potassium can be dangerous. That's why it's important to treat it. If you are taking any of the medicines that can raise your levels, your doctor will ask you to stop. You may get medicines to lower your levels. And you may have to limit or not eat foods that have a lot of potassium. Follow-up care is a key part of your treatment and safety. Be sure to make and go to all appointments, and call your doctor if you are having problems. It's also a good idea to know your test results and keep a list of the medicines you take. How can you care for yourself at home? · Take your medicines exactly as prescribed. Call your doctor if you think you are having a problem with your medicine. · Stop taking certain medicines if your doctor asks you to. They may be causing your high potassium levels. If you have concerns about stopping medicine, talk with your doctor. · If you have kidney, heart, or liver disease and have to limit fluids, talk with your doctor before you increase the amount of fluids you drink. If the doctor says it's okay, drink plenty of fluids. This means drinking enough so that your urine is light yellow or clear like water. · Avoid strenuous exercise until your doctor tells you it is okay. · Potassium is in many foods, including vegetables, fruits, and milk products. Foods high in potassium include bananas, cantaloupe, broccoli, milk, potatoes, and tomatoes.   · Low potassium foods include blueberries, raspberries, cucumber, white or brown rice, spaghetti, and macaroni. · Do not use a salt substitute without talking to your doctor first. Most of these are very high in potassium. · Be sure to tell your doctor about any prescription, over-the-counter, or herbal medicines you take. Some of these can raise potassium. When should you call for help? Call 911 anytime you think you may need emergency care. For example, call if:    · You passed out (lost consciousness).     · You have an unusual heartbeat. Your heart may beat fast or skip beats. Call your doctor now or seek immediate medical care if:    · You have muscle aches.     · You feel very weak. Watch closely for changes in your health, and be sure to contact your doctor if:    · You do not get better as expected. Where can you learn more? Go to http://www.gray.com/  Enter G087 in the search box to learn more about \"Hyperkalemia: Care Instructions. \"  Current as of: April 15, 2020               Content Version: 12.6  © 5816-7651 [employer]. Care instructions adapted under license by [employer] (which disclaims liability or warranty for this information). If you have questions about a medical condition or this instruction, always ask your healthcare professional. [address] any warranty or liability for your use of this information. Patient Education        Learning About Benefits From Quitting Smoking  How does quitting smoking make you healthier? If you're thinking about quitting smoking, you may have a few reasons to be smoke-free. Your health may be one of them. · When you quit smoking, you lower your risks for cancer, lung disease, heart attack, stroke, blood vessel disease, and blindness from macular degeneration. · When you're smoke-free, you get sick less often, and you heal faster. You are less likely to get colds, flu, bronchitis, and pneumonia.   · As a nonsmoker, you may find that your mood is better and you are less stressed. When and how will you feel healthier? Quitting has real health benefits that start from day 1 of being smoke-free. And the longer you stay smoke-free, the healthier you get and the better you feel. The first hours  · After just 20 minutes, your blood pressure and heart rate go down. That means there's less stress on your heart and blood vessels. · Within 12 hours, the level of carbon monoxide in your blood drops back to normal. That makes room for more oxygen. With more oxygen in your body, you may notice that you have more energy than when you smoked. After 2 weeks  · Your lungs start to work better. · Your risk of heart attack starts to drop. After 1 month  · When your lungs are clear, you cough less and breathe deeper, so it's easier to be active. · Your sense of taste and smell return. That means you can enjoy food more than you have since you started smoking. Over the years  · Over the years, your risks of heart disease, heart attack, and stroke are lower. · After 10 years, your risk of dying from lung cancer is cut by about half. And your risk for many other types of cancer is lower too. How would quitting help others in your life? When you quit smoking, you improve the health of everyone who now breathes in your smoke. · Their heart, lung, and cancer risks drop, much like yours. · They are sick less. For babies and small children, living smoke-free means they're less likely to have ear infections, pneumonia, and bronchitis. · If you're a woman who is or will be pregnant someday, quitting smoking means a healthier . · Children who are close to you are less likely to become adult smokers. Where can you learn more? Go to http://www.gray.com/  Enter O319 in the search box to learn more about \"Learning About Benefits From Quitting Smoking. \"  Current as of:  2020               Content Version: 12.6  © 3435-3933 [employer]. Care instructions adapted under license by [employer] (which disclaims liability or warranty for this information). If you have questions about a medical condition or this instruction, always ask your healthcare professional. [address] any warranty or liability for your use of this information.
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
## 2020-10-05 NOTE — PROGRESS NOTES
Pharmacy Clarification of Prior to Admission Medication Regimen The patient was not interviewed regarding clarification of the prior to admission medication regimen. Patient was not questioned regarding use of any other inhalers, topical products, over the counter medications, herbal medications, vitamin products or ophthalmic/nasal/otic medication use. Information Obtained From: query, [facility] 
 
Pertinent Pharmacy Findings: patient very sleepy, called emerg contact, were unable to help with medications. Updated patients preferred outpatient pharmacy to: [facility] 
 
 
PTA medication list was corrected to the following:  
 
Prior to Admission Medications Prescriptions Last Dose Informant Taking? amLODIPine (NORVASC) 10 mg tablet  Other Yes Sig: Take 1 Tab by mouth daily. aspirin delayed-release 81 mg tablet  Other Yes Sig: Take 1 Tab by mouth daily. atorvastatin (LIPITOR) 40 mg tablet  Other Yes Sig: Take 40 mg by mouth nightly. citalopram (CELEXA) 40 mg tablet Unknown at Unknown time  No  
Sig: Take 1 Tab by mouth daily. cloNIDine HCl (CATAPRES) 0.3 mg tablet  Other Yes Sig: Take 1 Tab by mouth two (2) times a day.  
gabapentin (NEURONTIN) 300 mg capsule  Other Yes Sig: Take 300 mg by mouth two (2) times a day. isosorbide mononitrate ER (IMDUR) 60 mg CR tablet  Other Yes Sig: Take 60 mg by mouth every morning. lidocaine (LIDODERM) 5 % Unknown at Unknown time  No  
Sig: Apply patch to the affected area for 12 hours a day and remove for 12 hours a day. lidocaine 5 % topical cream Unknown at Unknown time  No  
Sig: Apply  to affected area two (2) times daily as needed for Itching. lisinopril (PRINIVIL, ZESTRIL) 2.5 mg tablet Unknown at Unknown time  No  
Sig: Take 2.5 mg by mouth daily. multivitamin (ONE A DAY) tablet Unknown at Unknown time  No  
Sig: Take 1 Tab by mouth daily. pantoprazole (PROTONIX) 40 mg tablet  Other Yes Sig: Take 40 mg by mouth daily.   
paricalcitol (ZEMPLAR) 1 mcg capsule Unknown at Unknown time  No  
Sig: Take 1 mcg by mouth daily. promethazine (PHENERGAN) 25 mg tablet Unknown at Unknown time  No  
Sig: Take 25 mg by mouth every six (6) hours as needed for Nausea. senna (SENNA) 8.6 mg tablet Unknown at Unknown time  No  
Sig: Take 1 Tab by mouth daily. sevelamer (RENAGEL) 400 mg tablet Unknown at Unknown time  No  
Sig: Take 800 mg by mouth three (3) times daily (with meals). Facility-Administered Medications: None Thank you, 
Klveer Catalan CPHT Medication History Pharmacy Technician

## 2020-10-05 NOTE — PROCEDURES
[facility]  [phone] Vitals   Pre   Post   Assessment   Pre   Post    
Temp  99.4  98.5 LOC  A&Ox4 Same HR   Pulse (Heart Rate): 70 (10/05/20 1940) 70 Lungs   Clear Same B/P   BP: 137/82 (10/05/20 1940) 130/78 Cardiac   Regular Same Resp   Resp Rate: 18 (10/05/20 1940) 18 Skin   Intact Same Pain level  0 0 Edema  Generalized Same Orders: Duration:   Start:    1940 End:    2250 Total:   3.10 Dialyzer:   Dialyzer/Set Up Inspection: Loki Chanda (10/05/20 1940) K Bath:   Dialysate K (mEq/L): 2 (10/05/20 1940) Ca Bath:   Dialysate CA (mEq/L): 2.5 (10/05/20 1940) Na/Bicarb:   Dialysate NA (mEq/L): 138 (10/05/20 1940) Target Fluid Removal:   Goal/Amount of Fluid to Remove (mL): 3000 mL (10/05/20 1940) Access Type & Location:   GLORIA AVF: skin CDI. No s/s of infection. + B/T. No issues with cannulation or hemostasis. Running well at . Labs Obtained/Reviewed Critical Results Called   Date when labs were drawn- 
Hgb-   
HGB Date Value Ref Range Status 10/05/2020 10.4 (L) 12.1 - 17.0 g/dL Final  
 
K-   
Potassium Date Value Ref Range Status 10/05/2020 6.2 (H) 3.5 - 5.1 mmol/L Final  
 
Ca-  
Calcium Date Value Ref Range Status 10/05/2020 10.0 8.5 - 10.1 MG/DL Final  
 
Bun-  
BUN Date Value Ref Range Status 10/05/2020 66 (H) 6 - 20 MG/DL Final  
 
Creat-  
Creatinine Date Value Ref Range Status 10/05/2020 12.20 (H) 0.70 - 1.30 MG/DL Final  
 
  
Medications/ Blood Products Given Name   Dose   Route and Time None Blood Volume Processed (BVP):    73.8L Net Fluid Removed:  3979 ml Comments Time Out Done: 0 Primary Nurse Rpt Pre: Shara Benites RN 
Primary Nurse Rpt Post: Steven Tracy RN 
Pt Education: Procedural 
Care Plan: DC after HD Tx Summary: 
 
Pt arrived to HD suite A&Ox4. Consent signed & on file. SBAR received from Primary RN.  
1940: Pt cannulated with 88K needles per policy & without issue. VSS. Dialysis Tx initiated. 2250: Tx ended 20 minutes early. Pt started to clot the circuit. VSS. All possible blood returned to patient. Hemostasis achieved without issue. Bed locked and in the lowest position, call bell and belongings in reach. SBAR given to Primary, RN. Patient is stable at time of my departure. All Dialysis related medications have been reviewed. Admiting Diagnosis: ESRD Pt's previous clinic- [employer] Consent signed - Informed Consent Verified: Yes (10/05/20 1940) Hepatitis Status- Neg 9/9/20 Immune 8/12/20 Machine #- Machine Number: [id number] (10/05/20 1940) Telemetry status- None

## 2020-10-05 NOTE — CONSULTS
Patient name: Dylan Burnette MRN: [id number] [facility] Initial Consult Note [id number] DOS: 10/5/2020 Requested by: Dr. Dorys Carmen Reason: Evaluation and management of ESRD/High K Source: pt (minimal) and chart review Assessment: 
ESRD (MWF; [facility]) HTN Hyperkalemia Edema- negative doppler for DVT Anemia of ESRD 
SHPT Fever- as outpt; 99.4 here in ED. Covid rule out. CXR negative for acute process. Hyponatremia- mild Plan/Recommendations: 
HD today per schedule. 2K bath UF of 3 Kg as tolerated If stable post HD, plan is for d/c If admitted, next HD will be Wed HPI: Dylan Burnette is a 48 y.o. male with PMH significant for ESRD, HTN, CAD>>presented to his outpt HD unit for routine Rx He was send to ER because of fever He c/o of leg edema. Doppler negative for DVT. Admission labs notable for high K of 5.9 He was drowsy and difficult to arouse. Sonal Win asleep during my interview. Reports last HD on Friday as outpt and denied missing HD 
 
PMH: 
 
Past Medical History:  
Diagnosis Date  Chronic kidney disease  Dialysis patient [facility])  Hypertension PSH: 
Past Surgical History:  
Procedure Laterality Date  HX VASCULAR ACCESS Left LUE AV fistula Allergies Allergen Reactions  Motrin [Ibuprofen] Hives 17 Pt denies allergy  Tylenol [Acetaminophen] Hives 17 Pt denies allergy Prior to Admission Medications Prescriptions Last Dose Informant Patient Reported? Taking?   
amLODIPine (NORVASC) 10 mg tablet   No No  
Sig: Take 1 Tab by mouth daily. aspirin delayed-release 81 mg tablet   No No  
Sig: Take 1 Tab by mouth daily. atorvastatin (LIPITOR) 40 mg tablet   Yes No  
Sig: Take 40 mg by mouth nightly. citalopram (CELEXA) 40 mg tablet   No No  
Sig: Take 1 Tab by mouth daily. cloNIDine HCl (CATAPRES) 0.3 mg tablet   No No  
Sig: Take 1 Tab by mouth two (2) times a day.  
gabapentin (NEURONTIN) 300 mg capsule   Yes No  
Sig: Take 300 mg by mouth two (2) times a day. isosorbide mononitrate ER (IMDUR) 60 mg CR tablet   Yes No  
Sig: Take 60 mg by mouth every morning. lidocaine (LIDODERM) 5 %   No No  
Sig: Apply patch to the affected area for 12 hours a day and remove for 12 hours a day. lidocaine 5 % topical cream   No No  
Sig: Apply  to affected area two (2) times daily as needed for Itching. lisinopril (PRINIVIL, ZESTRIL) 2.5 mg tablet   Yes No  
Sig: Take 2.5 mg by mouth daily. multivitamin (ONE A DAY) tablet   Yes No  
Sig: Take 1 Tab by mouth daily. pantoprazole (PROTONIX) 40 mg tablet   Yes No  
Sig: Take 40 mg by mouth daily. paricalcitol (ZEMPLAR) 1 mcg capsule   Yes No  
Sig: Take 1 mcg by mouth daily. promethazine (PHENERGAN) 25 mg tablet   Yes No  
Sig: Take 25 mg by mouth every six (6) hours as needed for Nausea. senna (SENNA) 8.6 mg tablet   Yes No  
Sig: Take 1 Tab by mouth daily. sevelamer (RENAGEL) 400 mg tablet   Yes No  
Sig: Take 800 mg by mouth three (3) times daily (with meals). Facility-Administered Medications: None Family History Problem Relation Age of Onset  No Known Problems Mother  No Known Problems Father  No Known Problems Sister  No Known Problems Brother  No Known Problems Sister  No Known Problems Sister  No Known Problems Brother Review of Systems: as noted in HPI; rest deferred. As pt drowsy and unable to converse Physical Exam: 
Visit Vitals BP (!) 140/76 Pulse 80 Temp 99.4 °F (37.4 °C) Resp 14 Wt 74.5 kg (164 lb 3.9 oz) SpO2 97% BMI 22.28 kg/m² WB/WN in NAD 
AT/NC, no icterus Clear RRR, no murmur, no rub Soft, NT, BS+ 
+leg edema L AVF patent with T/B+ Drowsy, arousable but falls back asleep easily Labs/Data:  
Lab Results Component Value Date/Time WBC 11.8 (H) 10/05/2020 08:58 AM  
 Hemoglobin (POC) 8.8 (L) 04/19/2017 08:20 AM  
 HGB 10.4 (L) 10/05/2020 08:58 AM  
 Hematocrit (POC) 26 (L) 04/19/2017 08:20 AM  
 HCT 31.4 (L) 10/05/2020 08:58 AM  
 PLATELET 586 48/45/5446 08:58 AM  
 .6 (H) 10/05/2020 08:58 AM  
 
 
Lab Results Component Value Date/Time Sodium 131 (L) 10/05/2020 08:58 AM  
 Potassium 6.2 (H) 10/05/2020 01:02 PM  
 Chloride 96 (L) 10/05/2020 08:58 AM  
 CO2 25 10/05/2020 08:58 AM  
 Anion gap 10 10/05/2020 08:58 AM  
 Glucose 89 10/05/2020 08:58 AM  
 BUN 66 (H) 10/05/2020 08:58 AM  
 Creatinine 12.20 (H) 10/05/2020 08:58 AM  
 BUN/Creatinine ratio 5 (L) 10/05/2020 08:58 AM  
 GFR est AA 5 (L) 10/05/2020 08:58 AM  
 GFR est non-AA 4 (L) 10/05/2020 08:58 AM  
 Calcium 10.0 10/05/2020 08:58 AM  
 
 
No intake or output data in the 24 hours ending 10/05/20 1416 Wt Readings from Last 3 Encounters:  
10/05/20 74.5 kg (164 lb 3.9 oz) 01/21/20 74.8 kg (165 lb)  
01/17/20 72.6 kg (160 lb) Patient seen and examined. Chart reviewed. Labs, data and other pertinent notes reviewed from admit. Discussed with pt and Dr. Sanjana Fiore. Norberto Garcia [id number] notified. HD orders written Signed by: 
Pamela Brooke MD 
Nephrology and HTN

## 2020-10-05 NOTE — ED PROVIDER NOTES
EMERGENCY DEPARTMENT HISTORY AND PHYSICAL EXAM 
 
 
Date: 10/5/2020 Patient Name: Xiomara Lynn History of Presenting Illness Chief Complaint Patient presents with  
 Other  
  leg swelling  Fever History Provided By: Patient HPI: Xiomara Lynn, 48 y.o. male with PMHx as noted below presents the emergency department with complaints of leg pain. Patient notes constant, severe lower leg pain that began 2 days ago. Patient states that it came on spontaneously and has been getting progressively worsening. He denies any trauma to the area. Patient notes the pain is worse with movement and weightbearing. Otherwise the pain does not radiate. Patient is denying any fevers, chills or systemic symptoms. Denies any trauma to the area, chest pain, shortness of breath. Patient presented to dialysis today and was told that he had an elevated temperature and needed to come to the emergency department for evaluation. PCP: None Current Outpatient Medications Medication Sig Dispense Refill  apixaban (Eliquis) 2.5 mg tablet Take 1 Tab by mouth two (2) times a day for 30 days. 60 Tab 0  
 isosorbide mononitrate ER (IMDUR) 60 mg CR tablet Take 60 mg by mouth every morning.  atorvastatin (LIPITOR) 40 mg tablet Take 40 mg by mouth nightly.  aspirin delayed-release 81 mg tablet Take 1 Tab by mouth daily. 90 Tab 0  
 amLODIPine (NORVASC) 10 mg tablet Take 1 Tab by mouth daily. 30 Tab 0  cloNIDine HCl (CATAPRES) 0.3 mg tablet Take 1 Tab by mouth two (2) times a day. 60 Tab 0  
 pantoprazole (PROTONIX) 40 mg tablet Take 40 mg by mouth daily.  gabapentin (NEURONTIN) 300 mg capsule Take 300 mg by mouth two (2) times a day.  lidocaine 5 % topical cream Apply  to affected area two (2) times daily as needed for Itching. 15 g 0  
 citalopram (CELEXA) 40 mg tablet Take 1 Tab by mouth daily.  30 Tab 1  
 sevelamer (RENAGEL) 400 mg tablet Take 800 mg by mouth three (3) times daily (with meals).  lisinopril (PRINIVIL, ZESTRIL) 2.5 mg tablet Take 2.5 mg by mouth daily.  promethazine (PHENERGAN) 25 mg tablet Take 25 mg by mouth every six (6) hours as needed for Nausea.  senna (SENNA) 8.6 mg tablet Take 1 Tab by mouth daily.  lidocaine (LIDODERM) 5 % Apply patch to the affected area for 12 hours a day and remove for 12 hours a day. 60 Each 0  
 multivitamin (ONE A DAY) tablet Take 1 Tab by mouth daily.  paricalcitol (ZEMPLAR) 1 mcg capsule Take 1 mcg by mouth daily. Past History Past Medical History: 
Past Medical History:  
Diagnosis Date  Chronic kidney disease  Dialysis patient [facility])  Hypertension Past Surgical History: 
Past Surgical History:  
Procedure Laterality Date  HX VASCULAR ACCESS Left LUE AV fistula Family History: 
Family History Problem Relation Age of Onset  No Known Problems Mother  No Known Problems Father  No Known Problems Sister  No Known Problems Brother  No Known Problems Sister  No Known Problems Sister  No Known Problems Brother Social History: 
Social History Tobacco Use  Smoking status: Current Every Day Smoker Packs/day: 0.25  Smokeless tobacco: Never Used Substance Use Topics  Alcohol use: Yes  Drug use: Not Currently Types: Heroin Comment: Former Allergies: Allergies Allergen Reactions  Motrin [Ibuprofen] Hives 7/16/17 Pt denies allergy  Tylenol [Acetaminophen] Hives 7/16/17 Pt denies allergy Review of Systems Review of Systems Constitutional: Negative for fever, chills, and fatigue. HENT: Negative for congestion, sore throat, rhinorrhea, sneezing and neck stiffness Eyes: Negative for discharge and redness. Respiratory: Negative for  shortness of breath, wheezing Cardiovascular: Negative for chest pain, palpitations Gastrointestinal: Negative for nausea, vomiting, abdominal pain, constipation, diarrhea and blood in stool. Genitourinary: Negative for dysuria, hematuria, flank pain, decreased urine volume, discharge, Musculoskeletal: Negative for myalgias. Positive leg pain Skin: Negative for rash or lesions . Neurological: Negative weakness, light-headedness, numbness and headaches. Physical Exam  
Physical Exam 
 
GENERAL: alert and oriented, no acute distress EYES: PEERL, No injection, discharge or icterus. ENT: Mucous membranes pink and moist. 
NECK: Supple LUNGS: Airway patent. Non-labored respirations. Breath sounds clear with good air entry bilaterally. HEART: Regular rate and rhythm. No peripheral edema ABDOMEN: Non-distended and non-tender, without guarding or rebound. SKIN:  warm, dry MSK/EXTREMITIES: The left leg is tender over the calf and Achilles tendon. There is no appreciable edema, warmth or erythema. He has strong palpable distal pulses. Would not cooperate for [place] testing NEUROLOGICAL: Alert, oriented Diagnostic Study Results Labs - Reviewed Radiologic Studies -  
CT LOW EXT LT W CONT Final Result IMPRESSION:   
  
1. Diffuse mild nonspecific subcutaneous edema in the visualized left lower leg. 2. Thrombus in a gastrocnemius vein at the level of the midcalf. 3. Metallic shrapnel in the left lower extremity. XR ANKLE LT MIN 3 V Final Result IMPRESSION: No acute on abnormality. Prominent soft tissue swelling XR CHEST PORT Final Result IMPRESSION: No acute cardiopulmonary abnormality demonstrated. XR TIB/FIB LT Final Result IMPRESSION: No acute bone abnormality. CT Results  (Last 48 hours) 10/05/20 1321  CT LOW EXT LT W CONT Final result Impression:  IMPRESSION:   
   
1. Diffuse mild nonspecific subcutaneous edema in the visualized left lower leg. 2. Thrombus in a gastrocnemius vein at the level of the midcalf. 3. Metallic shrapnel in the left lower extremity. Narrative:  EXAM: CT LOW EXT LT W CONT INDICATION: Left lower leg swelling. Evaluate for infection. COMPARISON: Radiographs 10/5/2020. Venous duplex 10/5/2020 CONTRAST: 100 mL of Isovue-370. TECHNIQUE: Helical CT of the left lower extremity from the knee to the midfoot  
during uneventful rapid bolus intravenous contrast administration. Coronal and Sagittal reformats were generated. Images reviewed in soft tissue and bone  
windows. CT dose reduction was achieved through the use of a standardized  
protocol tailored for this examination and automatic exposure control for dose  
modulation. FINDINGS: Bones: Normal bone mineralization. No fracture, dislocation, or  
periosteal reaction. Joint fluid: None. Articulations: Mild osteoarthritis is present in the left knee. Tendons: No full-thickness tendon tear. Muscles: No intramuscular hematoma. No focal atrophy. Soft tissue mass: No mass. Metallic shrapnel is seen in the left lower  
extremity. There is diffuse mild subcutaneous edema surrounding the visualized  
lower leg and over the dorsum of the foot. No evidence of a focal drainable  
fluid collection. No evidence of subcutaneous emphysema. The arteries and veins  
are well-opacified. There is thrombus in a gastrocnemius vein in the posterior  
left lower leg on axial series 301 image 368. This is also well seen on coronal  
series 303 image 50 CXR Results  (Last 48 hours) 10/05/20 0947  XR CHEST PORT Final result Impression:  IMPRESSION: No acute cardiopulmonary abnormality demonstrated. Narrative:  INDICATION:  meets SIRS criteria COMPARISON:  1/17/20 FINDINGS:   
A portable upright AP radiograph of the chest was obtained at 0921 hours. Less than optimal depth of inspiration mildly accentuating heart size and  
pulmonary markings.   
The heart size and pulmonary vascularity are within normal limits. No focal infiltrates or areas of consolidation demonstrated. The osseous and soft tissues are unremarkable. Medical Decision Making Michelle Matta MD am the first provider for this patient and am the attending of record for this patient encounter. I reviewed the vital signs, available nursing notes, past medical history, past surgical history, family history and social history. Vital Signs-Reviewed the patient's vital signs. Pulse Oximetry Analysis - 97% on RA 
 
EKG interpretation: (Preliminary) Rhythm: normal sinus rhythm; and regular . Rate (approx.): 80;  P wave: normal; QRS interval: normal ; ST/T wave T inversions in the anterior leads. ;  was interpreted by Sonam Trotter MD,ED Provider. Records Reviewed: Nursing Notes and Old Medical Records Provider Notes (Medical Decision Making): On presentation, the patient is well appearing, in no acute distress with normal vital signs. Based on my history and exam the differential diagnosis for this patient includes sepsis, pelvic, DVT, Achilles tendon rupture, arterial occlusion, necrotizing fasciitis, cellulitis, abscess. Duplex obtained showed no DVT however given the patient's significant pain I obtained a CT scan which showed some nonspecific edema but otherwise no definitive signs of necrotizing soft tissue infection. It did however note a clot in the gastrocnemius vein which is where the patient is having pain so feel this is a reasonable explanation for his symptoms. I did consult with vascular surgery who evaluated the patient as well and recommended course of anticoagulation with Eliquis. Will discharge on Eliquis and have instructed to follow-up with vascular surgery in 1 month for reevaluation.   Patient did miss his dialysis session today due to a reported fever, although has been afebrile here and due to his hyperkalemia we did make arrangements to have the patient dialyzed in the emergency department this evening. He will have to coordinate with his dialysis center as to where he should go for future dialysis sessions pending his Covid19 testing. ED Course:  
Initial assessment performed. The patients presenting problems have been discussed, and they are in agreement with the care plan formulated and outlined with them. I have encouraged them to ask questions as they arise throughout their visit. Tobacco Cessation Counseling I spent 4  minutes discussing the medical risks of prolonged smoking habits and advised the patient of the benefits of the cessation of smoking, providing specific suggestions on how to quit. Abelardo Trejo MD  
 
 SIGN OUT: 
Patient's presentation, labs/imaging and plan of care was reviewed with Ke Del Cid MD as part of sign out. They will reevaluate patient after dialysis as part of the plan discussed with the patient. Dr. Roopa Jay  assistance in completion of this plan is greatly appreciated but it should be noted that I will be the provider of record for this patient. Abelardo Trejo MD 
 
 
 
ED Course as of Oct 06 2311 Mon Oct 05, 2020  
2331 -------------------------Signout---------------------------- I took over care of this patient at 2300 Begin documentation Chela Matt MD 
 
 
Patient presenting today with leg pain, ultimately needed to have a dialysis session here prior to his discharge. The patient has been short dialysis, feels well, unremarkable vital signs, and ultimately is discharged with return precautions. 
 
---------------------------------------------------------------- 
 
  
 [NW] ED Course User Index 
[NW] Chaim Riojas MD  
 
 
PROGRESS Yari Jimenez  results have been reviewed with him. He has been counseled regarding his diagnosis. He verbally conveys understanding and agreement of the signs, symptoms, diagnosis, treatment and prognosis and additionally agrees to follow up as recommended. Tonye Cogan   He also agrees with the care-plan and conveys that all of his questions have been answered. I have also put together some discharge instructions for him that include: 1) educational information regarding their diagnosis, 2) how to care for their diagnosis at home, as well a 3) list of reasons why they would want to return to the ED prior to their follow-up appointment, should their condition change. Disposition: 
home PLAN: 
1. Discharge Medication List as of 10/5/2020 11:31 PM  
  
START taking these medications Details  
apixaban (Eliquis) 2.5 mg tablet Take 1 Tab by mouth two (2) times a day for 30 days. , Normal, Disp-60 Tab,R-0 CONTINUE these medications which have NOT CHANGED Details  
isosorbide mononitrate ER (IMDUR) 60 mg CR tablet Take 60 mg by mouth every morning., Historical Med  
  
atorvastatin (LIPITOR) 40 mg tablet Take 40 mg by mouth nightly., Historical Med  
  
aspirin delayed-release 81 mg tablet Take 1 Tab by mouth daily. , Normal, Disp-90 Tab,R-0  
  
amLODIPine (NORVASC) 10 mg tablet Take 1 Tab by mouth daily. , Print, Disp-30 Tab,R-0  
  
cloNIDine HCl (CATAPRES) 0.3 mg tablet Take 1 Tab by mouth two (2) times a day., Print, Disp-60 Tab,R-0  
  
pantoprazole (PROTONIX) 40 mg tablet Take 40 mg by mouth daily. , Historical Med  
  
gabapentin (NEURONTIN) 300 mg capsule Take 300 mg by mouth two (2) times a day., Historical Med  
  
lidocaine 5 % topical cream Apply  to affected area two (2) times daily as needed for Itching., Normal, Disp-15 g, R-0  
  
citalopram (CELEXA) 40 mg tablet Take 1 Tab by mouth daily. , Normal, Disp-30 Tab, R-1  
  
sevelamer (RENAGEL) 400 mg tablet Take 800 mg by mouth three (3) times daily (with meals). , Historical Med  
  
lisinopril (PRINIVIL, ZESTRIL) 2.5 mg tablet Take 2.5 mg by mouth daily. , Historical Med  
  
promethazine (PHENERGAN) 25 mg tablet Take 25 mg by mouth every six (6) hours as needed for Nausea., Historical Med  
  
senna (SENNA) 8.6 mg tablet Take 1 Tab by mouth daily. , Historical Med  
  
lidocaine (LIDODERM) 5 % Apply patch to the affected area for 12 hours a day and remove for 12 hours a day., Normal, Disp-60 Each, R-0  
  
multivitamin (ONE A DAY) tablet Take 1 Tab by mouth daily. , Historical Med  
  
paricalcitol (ZEMPLAR) 1 mcg capsule Take 1 mcg by mouth daily. , Historical Med 2. Follow-up Information Follow up With Specialties Details Why Contact Info [facility] EMERGENCY DEPT Emergency Medicine  If symptoms worsen [address] [place] [address] [address] [facility] [address] [place] 
[phone] Follow-up with Dr. More Yeung in one month Return to ED if worse Diagnosis Clinical Impression: 1. Deep vein thrombosis (DVT) of other vein of lower extremity, unspecified chronicity, unspecified laterality ([facility] [address] 2. Acute hyperkalemia 3. Tobacco use Please note that this dictation was completed with Dragon, computer voice recognition software. Quite often unanticipated grammatical, syntax, homophones, and other interpretive errors are inadvertently transcribed by the computer software. Please disregard these errors. Additionally, please excuse any errors that have escaped final proofreading.

## 2020-10-06 NOTE — ED NOTES
HD finished; bedside report given by HD nurse to this nurse. Was able to removed 2675 ml of fluid. Patient tolerated HD well.

## 2020-10-06 NOTE — PROGRESS NOTES
Patient contacted regarding COVID-19  risk. Discussed COVID-19 related testing which was pending at this time. Test results were pending. Patient informed of results, if available? yes. Outreach made within 2 business days of discharge: Yes Care Transition Nurse/ Ambulatory Care Manager/ LPN Care Coordinator contacted the patient by telephone to perform post discharge assessment. Verified name and  with patient as identifiers. Provided introduction to self, and explanation of the CTN/ACM/LPN role, and reason for call due to risk factors for infection and/or exposure to COVID-19. Symptoms reviewed with patient who verbalized the following symptoms: no new symptoms, no worsening symptoms and states he has no symptoms and didn't yesterday. Due to no new or worsening symptoms encounter was not routed to provider for escalation. Discussed follow-up appointments. If no appointment was previously scheduled, appointment scheduling offered: declined [address] [place] [address] follow up appointment(s): No future appointments. Non-[facility] follow up appointment(s): declined Advance Care Planning:  
Does patient have an Advance Directive: currently not on file; patient declined education Patient has following risk factors of: chronic kidney disease. CTN/ACM/LPN reviewed discharge instructions, medical action plan and red flags such as increased shortness of breath, increasing fever and signs of decompensation with patient who verbalized understanding. Discussed exposure protocols and quarantine with CDC Guidelines What to do if you are sick with coronavirus disease .  Patient was given an opportunity for questions and concerns. The patient agrees to contact the [facility] exposure line [phone], [facility]  ([phone]) and PCP office for questions related to their healthcare. CTN/ACM provided contact information for future needs.  
 
Reviewed and educated patient on any new and changed medications related to discharge diagnosis. Patient/family/caregiver given information for [employer] and agrees to enroll no Plan for follow-up call in 5-7 days based on severity of symptoms and risk factors. Patient at grocery store during call. Discussed need for quarantine. Patient states he is not worried and does not have COVID, then disconnected call.

## 2020-10-07 NOTE — PROGRESS NOTES
CM received telephone call from ED Charge Nurse asking for assistance with patient, discharged yesterday with a new Eliquis prescription and not being able to afford the medication. Pt currently at dialysis and dialysis nurse had called about concern and requesting help. CM to assist by providing Eliquis 30 day free trial medication card and information on contacting pharmaceutical company to complete financial assistance application. Pt currently at dialysis and will come to [facility] to  card today. Vazquez Moment Rozina Zabala Care Manager - [facility] ACP Facilitator Zone Phone: [phone]

## 2020-10-08 NOTE — PROGRESS NOTES
CM was able to call to [facility] to give them information for 30 day free trial of Eliquis. Mr. Tay Cheney contacted and states he will  medication today. My Whittaker Reusing Care Manager - [id number] [facility] ACP Facilitator Zone Phone: [phone]

## 2020-10-20 NOTE — PROGRESS NOTES
Patient resolved from Transition of Care episode on 10/20/20. ACM/CTN was unsuccessful at contacting this patient today. Patient/family was provided the following resources and education related to COVID-19 during the initial call:  
           
          Signs, symptoms and red flags related to COVID-19 CDC exposure and quarantine guidelines [facility] exposure contact - [phone] Contact for their local Department of Health Patient has not had any additional ED or hospital visits. No further outreach scheduled with this CTN/ACM. Episode of Care resolved. Patient has this CTN/ACM contact information if future needs arise.

## 2020-11-13 PROBLEM — E87.5 HYPERKALEMIA: Status: ACTIVE | Noted: 2020-01-01

## 2020-11-13 NOTE — PROGRESS NOTES
TRANSFER - IN REPORT: 
 
Verbal report received from Renae Caldera RN (name) on Anai Velazco  being received from ED (unit) for routine progression of care Report consisted of patients Situation, Background, Assessment and  
Recommendations(SBAR). Information from the following report(s) SBAR, Kardex, ED Summary, Intake/Output, MAR, Recent Results and Cardiac Rhythm NSR was reviewed with the receiving nurse. Opportunity for questions and clarification was provided. Assessment completed upon patients arrival to unit and care assumed. Patient is currently at dialysis suite.

## 2020-11-13 NOTE — ED PROVIDER NOTES
EMERGENCY DEPARTMENT HISTORY AND PHYSICAL EXAM 
 
 
Date: 11/13/2020 Patient Name: Mendel Moulding History of Presenting Illness No chief complaint on file. HPI: Mendel Moulding, 48 y.o. male presents to the ED with cc of nausea and vomiting. He reports symptoms for the past 2 days, associated with generalized weakness. He reports about 5-6 episodes of nonbloody emesis as well as watery diarrhea. He denies any abdominal pain currently, no fevers. He states that this all started after he ate some macaroni and cheese. Does make a little bit of urine, denies any dysuria or hematuria. Has a history of ESRD on dialysis, he gets dialysis Monday Wednesday and Friday, had a full course of dialysis on Wednesday. No shortness of breath, no chest pain, no edema. There are no other complaints, changes, or physical findings at this time. PCP: None No current facility-administered medications on file prior to encounter. Current Outpatient Medications on File Prior to Encounter Medication Sig Dispense Refill  lidocaine 5 % topical cream Apply  to affected area two (2) times daily as needed for Itching. 15 g 0  
 citalopram (CELEXA) 40 mg tablet Take 1 Tab by mouth daily. 30 Tab 1  
 sevelamer (RENAGEL) 400 mg tablet Take 800 mg by mouth three (3) times daily (with meals).  lisinopril (PRINIVIL, ZESTRIL) 2.5 mg tablet Take 2.5 mg by mouth daily.  isosorbide mononitrate ER (IMDUR) 60 mg CR tablet Take 60 mg by mouth every morning.  promethazine (PHENERGAN) 25 mg tablet Take 25 mg by mouth every six (6) hours as needed for Nausea.  atorvastatin (LIPITOR) 40 mg tablet Take 40 mg by mouth nightly.  senna (SENNA) 8.6 mg tablet Take 1 Tab by mouth daily.  lidocaine (LIDODERM) 5 % Apply patch to the affected area for 12 hours a day and remove for 12 hours a day. 60 Each 0  
 aspirin delayed-release 81 mg tablet Take 1 Tab by mouth daily.  90 Tab 0  
 amLODIPine (NORVASC) 10 mg tablet Take 1 Tab by mouth daily. 30 Tab 0  cloNIDine HCl (CATAPRES) 0.3 mg tablet Take 1 Tab by mouth two (2) times a day. 60 Tab 0  
 multivitamin (ONE A DAY) tablet Take 1 Tab by mouth daily.  pantoprazole (PROTONIX) 40 mg tablet Take 40 mg by mouth daily.  paricalcitol (ZEMPLAR) 1 mcg capsule Take 1 mcg by mouth daily.  gabapentin (NEURONTIN) 300 mg capsule Take 300 mg by mouth two (2) times a day. Past History Past Medical History: 
Past Medical History:  
Diagnosis Date  Chronic kidney disease  Dialysis patient [facility])  Hypertension Past Surgical History: 
Past Surgical History:  
Procedure Laterality Date  HX VASCULAR ACCESS Left LUE AV fistula Family History: 
Family History Problem Relation Age of Onset  No Known Problems Mother  No Known Problems Father  No Known Problems Sister  No Known Problems Brother  No Known Problems Sister  No Known Problems Sister  No Known Problems Brother Social History: 
Social History Tobacco Use  Smoking status: Current Every Day Smoker Packs/day: 0.25  Smokeless tobacco: Never Used Substance Use Topics  Alcohol use: Yes  Drug use: Not Currently Types: Heroin Comment: Former Allergies: Allergies Allergen Reactions  Motrin [Ibuprofen] Hives 7/16/17 Pt denies allergy  Tylenol [Acetaminophen] Hives 7/16/17 Pt denies allergy Review of Systems  
no fever No eye pain No ear pain 
no shortness of breath 
no chest pain 
no abdominal pain 
no dysuria 
no leg pain No rash No lymphadenopathy No weight loss Physical Exam  
Physical Exam 
Constitutional:   
   Appearance: Normal appearance. HENT:  
   Head: Normocephalic. Nose: Nose normal.  
   Mouth/Throat:  
   Mouth: Mucous membranes are moist.  
Eyes:  
   Extraocular Movements: Extraocular movements intact. Neck:    Musculoskeletal: Neck supple. Cardiovascular:  
   Rate and Rhythm: Regular rhythm. Tachycardia present. Pulmonary:  
   Effort: Pulmonary effort is normal.  
   Breath sounds: Normal breath sounds. Abdominal:  
   Palpations: Abdomen is soft. Tenderness: There is no abdominal tenderness. Musculoskeletal:     
   General: No swelling or tenderness. Comments: Fistula left upper extremity with palpable thrill Skin: 
   General: Skin is warm and dry. Neurological:  
   General: No focal deficit present. Mental Status: He is alert and oriented to person, place, and time. Psychiatric:     
   Mood and Affect: Mood normal.  
 
 
 
Diagnostic Study Results Labs - No results found for this or any previous visit (from the past 24 hour(s)). Radiologic Studies - No orders to display CT Results  (Last 48 hours) None CXR Results  (Last 48 hours) None Medical Decision Making I am the first provider for this patient. I reviewed the vital signs, available nursing notes, past medical history, past surgical history, family history and social history. Vital Signs-Reviewed the patient's vital signs. No data found. Provider Notes (Medical Decision Making):  
60-year-old male presenting with vomiting and diarrhea. Symptoms are concerning for gastroenteritis, gastritis, viral syndrome. His abdominal exam is benign, he denies any abdominal pain. Concern is well for electrolyte/metabolic abnormalities in the setting of poor p.o. as well as his ESRD. No signs of volume overload, I suspect dehydration in the setting of his copious vomiting. Small fluid bolus will be given, laboratory work obtained, Zofran and Pepcid IV. ED Course:  
 
Initial assessment performed. The patients presenting problems have been discussed, and they are in agreement with the care plan formulated and outlined with them. I have encouraged them to ask questions as they arise throughout their visit. CBC shows leukocytosis of 14, negative for anemia. Basic metabolic panel shows potassium elevated 8. EKG shows peaked T waves and QRS slightly widened. Nephrology is consulted. He is given calcium, bicarb, insulin, glucose. On reevaluation, he continues to report nausea and vomiting. He will be taken to dialysis and admitted to medicine. Critical Care Time:  
 
 
 
Disposition: 
Admit PLAN: 
1. Current Discharge Medication List  
  
 
2. Follow-up Information None Return to ED if worse Diagnosis Clinical Impression: Acute nausea and vomiting, acute hyperkalemia history of ESRD

## 2020-11-13 NOTE — H&P
Hospitalist Admission Note NAME:  Sariah Dyson :   1965 MRN:   [id number] Date of admit: 2020 PCP: None Assessment/Plan:  
 
Severe hyperkalemia K 8.0 with mild peak T waves POA Hyponatremia sodium 128 POA End-stage renal disease on hemodialysis  POA Compliant with dialysis per his report, last dialysis 2 days ago without problems Now with 2 days of nausea vomiting diarrhea On arrival in ED found to have a K of 8.0 with Given IV calcium, insulin glucose If delay in dialysis will mid to ICU until dialysis can be done Dialysis done soon, will transfer to telemetry postdialysis Discussed with Dr. George Junior Serial labs SIRS POA white blood cell count 14.1, heart rate 101 Nausea vomiting with diarrhea x2 days POA Mildly abnormal LFTs POA Shortness of breath POA currently on oxygen Etiology of underlying illness unclear Main concern ED is treating the hyperkalemia Sounds mainly GI with nausea vomiting and diarrhea Will check a chest x-ray with the SOB post HD Check CT scan abdomen pelvis post dialysis Stool studies for WBC, PCR, C. Difficile Check blood cultures and lactate Serial labs Hold antibiotics for now PPI 
PRN nausea meds Essential hypertension POA Continue Norvasc, clonidine, Isordil Will discuss with renal whether to continue lisinopril with the hyperkalemia Also takes midodrine Calf DVT 10/5/2020 POA Seen in ED 10/5/2020 for left lower extremity pain CT LLE withThrombus in a gastrocnemius vein at the level of the midcalf. Started on Eliquis at that time Normal treatment course would be 3 months Given the patient's current clinical presentation, I have a high level of concern for decompensation if discharged from the emergency department. My assessment of this patient's clinical condition and my plan of care is as noted above. DVT prophylaxis with eliquis Code status: Full code NOK: 
 
History CHIEF COMPLAINT: \"I have been throwing up, having diarrhea and feeling short of breath the past 3 days\" HISTORY OF PRESENT ILLNESS: 
 
50-year-old male End renal disease on hemodialysis every Monday Wednesday Friday Essential hypertension Recently diagnosed with a left calf DVT 10/5/2020 in the emergency room after presenting with left leg pain CT with Thrombus in a gastrocnemius vein at the level of the midcalf. Started on Eliquis 2.5 mg twice a day at that visit Went to dialysis on Wednesday, no reported problems Last 2 days began to feel ill Current episodes of nausea vomiting without blood or coffee grounds Several episodes of watery diarrhea No abdominal pain or chest pain Increased shortness of breath with exertion, mild cough No reported fevers or chills, headache, sore throat Because of the symptoms he came into the emergency room Emergency room Tachycardic, white blood cell count 14.1 Potassium 8.0 with questionable mild peaked T waves Sodium 128 Received IV calcium, IV insulin and glucose as unclear when dialysis would start Out going for dialysis We were called to admit the patient for work-up Past Medical History:  
Diagnosis Date  Chronic kidney disease  Dialysis patient [facility])  Hypertension Past Surgical History:  
Procedure Laterality Date  HX VASCULAR ACCESS Left LUE AV fistula Social History Tobacco Use  Smoking status: Current Every Day Smoker Packs/day: 0.25  Smokeless tobacco: Never Used Substance Use Topics  Alcohol use: Yes Family History Problem Relation Age of Onset  No Known Problems Mother  No Known Problems Father  No Known Problems Sister  No Known Problems Brother  No Known Problems Sister  No Known Problems Sister  No Known Problems Brother Allergies Allergen Reactions  Motrin [Ibuprofen] Hives 7/16/17 Pt denies allergy  Tylenol [Acetaminophen] Hives 7/16/17 Pt denies allergy Prior to Admission medications Medication Sig Start Date End Date Taking? Authorizing Provider  
lidocaine 5 % topical cream Apply  to affected area two (2) times daily as needed for Itching. 1/21/20   Gisella Pascal PA-C  
citalopram (CELEXA) 40 mg tablet Take 1 Tab by mouth daily. 5/2/18   Jose Nowak MD  
sevelamer (RENAGEL) 400 mg tablet Take 800 mg by mouth three (3) times daily (with meals). Provider, Historical  
lisinopril (PRINIVIL, ZESTRIL) 2.5 mg tablet Take 2.5 mg by mouth daily. Provider, Historical  
isosorbide mononitrate ER (IMDUR) 60 mg CR tablet Take 60 mg by mouth every morning. Provider, Historical  
promethazine (PHENERGAN) 25 mg tablet Take 25 mg by mouth every six (6) hours as needed for Nausea. Provider, Historical  
atorvastatin (LIPITOR) 40 mg tablet Take 40 mg by mouth nightly. Provider, Historical  
senna (SENNA) 8.6 mg tablet Take 1 Tab by mouth daily. Provider, Historical  
lidocaine (LIDODERM) 5 % Apply patch to the affected area for 12 hours a day and remove for 12 hours a day. 11/28/17   Jose Nowak MD  
aspirin delayed-release 81 mg tablet Take 1 Tab by mouth daily. 11/28/17   Jose Nowak MD  
amLODIPine (NORVASC) 10 mg tablet Take 1 Tab by mouth daily. 5/13/17   Azul Jaimes NP  
cloNIDine HCl (CATAPRES) 0.3 mg tablet Take 1 Tab by mouth two (2) times a day. 5/13/17   Azul Jaimes NP  
multivitamin (ONE A DAY) tablet Take 1 Tab by mouth daily. Juan Olivarez MD  
pantoprazole (PROTONIX) 40 mg tablet Take 40 mg by mouth daily. Juan Olivarez MD  
paricalcitol (ZEMPLAR) 1 mcg capsule Take 1 mcg by mouth daily. Juan Olivarez MD  
gabapentin (NEURONTIN) 300 mg capsule Take 300 mg by mouth two (2) times a day. Juan Olivarez MD  
 
 
Review of symptoms: 
   POSITIVE= Bold  Negative = not bold General:  fever, chills, sweats, generalized weakness, weight loss 
   loss of appetite Eyes:    blurred vision, eye pain, double vision ENT:    Coryza, sore throat, trouble swallowing Respiratory:   cough, sputum, SOB Cardiology:   chest pain, orthopnea, PND, edema Gastrointestinal:  abdominal pain , N/V, diarrhea, constipation, melena or BRBPR Genitourinary:  Urgency, dysuria, hematuria Muskuloskeletal :  Joint redness, swelling or acute joint pain, myalgias Hematology:  easy bruising, nose or gum bleeding Dermatological: rash, ulceration Endocrine:   Polyuria or polydipsia, heat or hold intolerance Neurological:  Headache, focal motor or sensory changes Speech difficulties, memory loss Psychological: depression, agitation Objective: VITALS:   
Patient Vitals for the past 24 hrs: 
 Temp Pulse Resp BP SpO2  
20 1130  (!) 101  (!) 140/71 97 % 20 1122     96 % 20 1114  98  122/65   
20 1100  93   95 % 20 1030     96 % 20 1000     97 % 20 0930     95 % 20 0900     91 % 20 0849     95 % 20 0846    127/70 95 % 20 0844 98.3 °F (36.8 °C) 98 14 127/70 96 % 20 0830     96 % Temp (24hrs), Av.3 °F (36.8 °C), Min:98.3 °F (36.8 °C), Max:98.3 °F (36.8 °C) O2 Device: Room air Wt Readings from Last 12 Encounters:  
10/05/20 74.5 kg (164 lb 3.9 oz) 20 74.8 kg (165 lb)  
20 72.6 kg (160 lb)  
17 65.5 kg (144 lb 6.4 oz) 17 77.1 kg (170 lb) 17 68.2 kg (150 lb 5.7 oz) 17 83.9 kg (185 lb) 17 77.9 kg (171 lb 11.2 oz) 13 74.8 kg (165 lb) 12 74.8 kg (165 lb)  
12 64.4 kg (142 lb) 12 72.1 kg (159 lb) PHYSICAL EXAM:  
General:    Lethargic but arousable, cooperative in no distress HEENT: Normocephalic, atraumatic    PERRL, Sclera no icterus Nasal mucosa without masses or discharge  Hearing intact to voice Oropharynx without erythema or exudate  No thrush  Dry MM Neck:  No meningismus, trachea midline, no carotid bruits Thyroid not enlarged, no nodules or tenderness Lungs:   Few crackles at bases bilaterally. No wheezing No accessory muscle use or retractions. Heart:   Regular rate and rhythm,  no murmur or gallop. No LE edema Abdomen:   Soft, non-tender. Not distended. Bowel sounds normal.  
  No masses, No Hepatosplenomegaly, No Rebound or guarding Lymph nodes: No cervical or inguinal RODRIGUE Musculoskeletal:  No Joint swelling, erythema, warmth. No Cyanosis or clubbing Skin:      No rashes Not Jaundiced   No nodules or thickening Neurologic: Lethargic but arousable, follows commands Cranial nerves 2 to 12 intact Symmetric motor strength bilaterally LAB DATA REVIEWED:   
Recent Results (from the past 12 hour(s)) METABOLIC PANEL, COMPREHENSIVE Collection Time: 11/13/20  9:05 AM  
Result Value Ref Range Sodium 128 (L) 136 - 145 mmol/L Potassium 8.0 (HH) 3.5 - 5.1 mmol/L Chloride 86 (L) 97 - 108 mmol/L  
 CO2 25 21 - 32 mmol/L Anion gap 17 (H) 5 - 15 mmol/L Glucose 69 65 - 100 mg/dL BUN 97 (H) 6 - 20 MG/DL Creatinine 12.20 (H) 0.70 - 1.30 MG/DL  
 BUN/Creatinine ratio 8 (L) 12 - 20 GFR est AA 5 (L) >60 ml/min/1.73m2 GFR est non-AA 4 (L) >60 ml/min/1.73m2 Calcium 10.5 (H) 8.5 - 10.1 MG/DL Bilirubin, total 1.1 (H) 0.2 - 1.0 MG/DL  
 ALT (SGPT) 57 12 - 78 U/L  
 AST (SGOT) 82 (H) 15 - 37 U/L Alk. phosphatase 130 (H) 45 - 117 U/L Protein, total 9.0 (H) 6.4 - 8.2 g/dL Albumin 3.6 3.5 - 5.0 g/dL Globulin 5.4 (H) 2.0 - 4.0 g/dL A-G Ratio 0.7 (L) 1.1 - 2.2 MAGNESIUM Collection Time: 11/13/20  9:05 AM  
Result Value Ref Range Magnesium 2.1 1.6 - 2.4 mg/dL LIPASE Collection Time: 11/13/20  9:05 AM  
Result Value Ref Range Lipase 371 73 - 393 U/L  
CBC WITH AUTOMATED DIFF Collection Time: 11/13/20  9:05 AM  
Result Value Ref Range  WBC 14.1 (H) 4.1 - 11.1 K/uL  
 RBC 4.54 4.10 - 5.70 M/uL HGB 14.5 12.1 - 17.0 g/dL HCT 44.7 36.6 - 50.3 % MCV 98.5 80.0 - 99.0 FL  
 MCH 31.9 26.0 - 34.0 PG  
 MCHC 32.4 30.0 - 36.5 g/dL  
 RDW 15.6 (H) 11.5 - 14.5 % PLATELET 673 886 - 338 K/uL MPV 12.1 8.9 - 12.9 FL  
 NRBC 2.0 (H) 0  WBC ABSOLUTE NRBC 0.28 (H) 0.00 - 0.01 K/uL NEUTROPHILS 79 (H) 32 - 75 % LYMPHOCYTES 6 (L) 12 - 49 % MONOCYTES 14 (H) 5 - 13 % EOSINOPHILS 0 0 - 7 % BASOPHILS 0 0 - 1 % IMMATURE GRANULOCYTES 1 (H) 0.0 - 0.5 % ABS. NEUTROPHILS 11.2 (H) 1.8 - 8.0 K/UL  
 ABS. LYMPHOCYTES 0.8 0.8 - 3.5 K/UL  
 ABS. MONOCYTES 2.0 (H) 0.0 - 1.0 K/UL  
 ABS. EOSINOPHILS 0.0 0.0 - 0.4 K/UL  
 ABS. BASOPHILS 0.0 0.0 - 0.1 K/UL  
 ABS. IMM. GRANS. 0.1 (H) 0.00 - 0.04 K/UL  
 DF AUTOMATED    
 
 
EKG as read by me shows normal sinus rhythm rate 98 normal axis, no hypertrophy Levels were normal, no ischemic ST-T wave changes Mild peaked T waves V4 through V6 
 
CXR read by radiology and reviewed by myself results: pending CT scan read by by radiology pending I saw the patient personally, took a history and did a complete physical exam at the bedside. I performed complex decision making in coming up with a diagnostic and treatment plan for the patient. I reviewed the patient's past medical records, current laboratory and radiology results, and actual Xray films/EKG. I have also discussed this case with the involved ED physician. Care Plan discussed with: 
  Patient, Dr Fernando German, ED Doc Risk of deterioration:  High Total Time Coordinating Admission:  65   minutes   Total Critical Care Time:    
 
 
Nidhi Cruz MD

## 2020-11-13 NOTE — PROGRESS NOTES
Occupational Therapy OT consult received, chart reviewed. Patient is currently off the floor and unavailable for OT evaluation. Will defer for now and follow up tomorrow.

## 2020-11-13 NOTE — CONSULTS
[facility] COnsult Note NAME: Anai Velazco :  1965 MRN:  [id number] Date/Time: 2020 Risk of deterioration: medium Assessment:    Plan: 
ESRD with acute hyperkalemia-8 N/V/Diarrhea HTN 
SIRS Recent DVT/clot-see ct Emergent HD on 2 k bath-bmp after hd today--seen on hd, bp stable Lab in am, check on for repeat hd sat Dialyzes at  [facility] unit Diffuse mild nonspecific subcutaneous edema in the visualized left lower leg. 2. Thrombus in a gastrocnemius vein at the level of the midcalf. 3. Metallic shrapnel in the left lower extremity. Ct abd/pel (p) Asked to see for emergent HD. Pt went to his unit today and bc he felt sick was sent to the ED> 
K 8, sodium 128-ekg tachy without sig T wave height. Tolerated HD well without specific problems Subjective: Chief Complaint:  I was having diarrhea Review of Systems: (+) n/v/diarrhea (-) cp/sob  (-) real urine output. Objective: VITALS:  
Last 24hrs VS reviewed since prior progress note. Most recent are: 
Visit Vitals BP (P) 115/82 Pulse (!) (P) 111 Temp 98 °F (36.7 °C) (Oral) Resp (P) 18 SpO2 94% SpO2 Readings from Last 6 Encounters:  
20 94% 10/05/20 100% 20 100% 20 99% 17 100% 17 96% No intake or output data in the 24 hours ending 20 1622 Telemetry Reviewed PHYSICAL EXAM: 
 
General   well developed, thin appears stated age, in no acute distress EENT  Normocephalic, Atraumatic,  sclera clear, nares clear, pharynx normal 
Respiratory   Clear To Auscultation bilaterally Cardiology  Regular Rate and Rythmn tachy Abdominal  Soft, non-tender, non-distended, positive bowel sounds Extremities  No clubbing, cyanosis, or edema. Pulses intact. (L) AVF with t/b Skin-tattoos multiple on body/forehead Lab Data Reviewed: (see below) Medications Reviewed: (see below) PMH/SH reviewed - no change compared to H&P 
 ___________________________________________________ Attending Physician: Peter Amezcua MD  
 
____________________________________________________ MEDICATIONS: 
Current Facility-Administered Medications Medication Dose Route Frequency  albuterol (PROVENTIL VENTOLIN) 2.5 mg /3 mL (0.083 %) nebulizer solution  albuterol (PROVENTIL VENTOLIN) 2.5 mg /3 mL (0.083 %) nebulizer solution  sodium chloride (NS) flush 5-40 mL  5-40 mL IntraVENous Q8H  
 sodium chloride (NS) flush 5-40 mL  5-40 mL IntraVENous PRN  polyethylene glycol (MIRALAX) packet 17 g  17 g Oral DAILY PRN  
 bisacodyL (DULCOLAX) tablet 5 mg  5 mg Oral DAILY PRN  promethazine (PHENERGAN) tablet 12.5 mg  12.5 mg Oral Q6H PRN Or  
 ondansetron (ZOFRAN) injection 4 mg  4 mg IntraVENous Q6H PRN  
 iopamidoL (ISOVUE-370) 76 % injection 100 mL  100 mL IntraVENous RAD ONCE  
 sodium chloride (NS) flush 10 mL  10 mL IntraVENous RAD ONCE  
 barium sulfate (READICAT) 2.1 % (w/v), 2.0 % (w/w) oral suspension 900 mL  900 mL Oral RAD ONCE  
 albumin human 25% (BUMINATE) solution 25 g  25 g IntraVENous PRN  
 [START ON 11/14/2020] aspirin delayed-release tablet 81 mg  81 mg Oral DAILY  [START ON 11/14/2020] amLODIPine (NORVASC) tablet 10 mg  10 mg Oral DAILY  atorvastatin (LIPITOR) tablet 40 mg  40 mg Oral QHS  [START ON 11/14/2020] pantoprazole (PROTONIX) tablet 40 mg  40 mg Oral DAILY  [START ON 11/14/2020] isosorbide mononitrate ER (IMDUR) tablet 60 mg  60 mg Oral 7am  
  
 
LABS: 
Recent Labs 11/13/20 
2568 WBC 14.1* HGB 14.5 HCT 44.7  Recent Labs 11/13/20 
8955 * K 8.0*  
CL 86* CO2 25 BUN 97* CREA 12.20* GLU 69  
CA 10.5* MG 2.1 Recent Labs 11/13/20 
1846 ALT 57 * TBILI 1.1* TP 9.0* ALB 3.6 GLOB 5.4* LPSE 371 No results for input(s): INR, PTP, APTT, INREXT in the last 72 hours.   
No results for input(s): FE, TIBC, PSAT, FERR in the last 72 hours. No results for input(s): PH, PCO2, PO2 in the last 72 hours. No results for input(s): CPK, CKNDX, TROIQ in the last 72 hours. No lab exists for component: CPKMB Lab Results Component Value Date/Time  Glucose (POC) 95 04/19/2017 08:20 AM

## 2020-11-13 NOTE — ED NOTES
Pt arrives via squad reporting N/V/D for the last 2 days. Pt is M/W/F dialysis Pt ad did not present today for treatment. Pt is afebrile.

## 2020-11-13 NOTE — ED NOTES
TRANSFER - OUT REPORT: 
 
Verbal report given to Ramila RN(name) on Aaron Drivers  being transferred to Dialysis(unit) for ordered procedure Report consisted of patients Situation, Background, Assessment and  
Recommendations(SBAR). Information from the following report(s) SBAR, ED Summary, [facility] and Recent Results was reviewed with the receiving nurse. Lines:  
Peripheral IV 11/13/20 Right Antecubital (Active) Opportunity for questions and clarification was provided. Patient transported with: 
 [employer]

## 2020-11-13 NOTE — PROCEDURES
[facility] Dialysis Team [place]  [phone] Vitals   Pre   Post   Assessment   Pre   Post    
Temp  Temp: 98 °F (36.7 °C) (11/13/20 1234)  97.6 LOC  AxOx4 AxOx4 HR   Pulse (Heart Rate): 97 (11/13/20 1234) 51 Lungs   CTA josafat Even and unlabored B/P   BP: (!) 140/75 (11/13/20 1234) 99/63 Cardiac   Irregular/tachy Irregular/tachy Resp   Resp Rate: 18 (11/13/20 1234) 18 Skin   intact intact Pain level  Pain Intensity 1: 0 (11/13/20 0844) 0 Edema  No edema No edema Orders: Duration:   Start:    1234 End:    1542 Total:   3 hrs Dialyzer:   Dialyzer/Set Up Inspection: Maria D Coronado (11/13/20 1234) K Bath:   Dialysate K (mEq/L): 2 (11/13/20 1234) Ca Bath:   Dialysate CA (mEq/L): 2.5 (11/13/20 1234) Na/Bicarb:   Dialysate NA (mEq/L): 138 (11/13/20 1234) Target Fluid Removal:   Goal/Amount of Fluid to Remove (mL): 0 mL (11/13/20 1234) Access Type & Location:   Left upper arm AV Fistula without evidence of warmth, redness, or drainage. +thrill/+bruit. Each access site disinfected for 60 seconds per site with alcohol swabs per P&P. Cannulated with 15G needles x2 and secured with paper tape. +aspiration/+flushed. Labs Obtained/Reviewed Critical Results Called   Date when labs were drawn- 
Hgb-   
HGB Date Value Ref Range Status 11/13/2020 14.5 12.1 - 17.0 g/dL Final  
 
K-   
Potassium Date Value Ref Range Status 11/13/2020 8.0 (HH) 3.5 - 5.1 mmol/L Final  
  Comment: RESULTS VERIFIED, PHONED TO AND READ BACK BY 
REBECA RN AT 1027/VJA Ca-  
Calcium Date Value Ref Range Status 11/13/2020 10.5 (H) 8.5 - 10.1 MG/DL Final  
 
Bun-  
BUN Date Value Ref Range Status 11/13/2020 97 (H) 6 - 20 MG/DL Final  
 
Creat-  
Creatinine Date Value Ref Range Status 11/13/2020 12.20 (H) 0.70 - 1.30 MG/DL Final  
 
  
Medications/ Blood Products Given Name   Dose   Route and Time    
none Blood Volume Processed (BVP):   65.4 L Net Fluid Removed:  0 mL Comments Time Out Done: 1230 Primary Nurse Rpt Pre: Parth Lopez RN 
Primary Nurse Rpt Post: Lesli Valentine RN 
Pt Education: infection control / procedural 
Care Plan: continue current HD plan of care Tx Summary: 
0299 HD treatment initiated per physician order. 200 Dr Geeta Swan at bedside. No UF with HD. 
1542 HD treatment completed per physician order. All possible blood returned to patient. Hemostasis achieved in <10 minutes. Access site dressings clean, dry, and intact. +thrill. Admiting Diagnosis: Hyperkalemia / NVD Pt's previous clinic- [facility] Consent signed - Informed Consent Verified: Yes (11/13/20 1234) Madiha Consent - obtained Hepatitis Status- 8/12/20 neg/imm (clinic) Machine #- Machine Number: [id number] (11/13/20 1234) Telemetry status- remote Pre-dialysis wt. -

## 2020-11-14 NOTE — PROGRESS NOTES
End of Shift Note Bedside shift change report given to Dwain Parker (oncoming nurse) by Gareth Persaud RN (offgoing nurse). Report included the following information SBAR Shift worked:  7p-7a Shift summary and any significant changes:  
 Glucose 42, 1 amp D50 given with rebound to 185. Concerns for physician to address: patient c/o abd pain frequently requesting the \"Good shit\". Patient falling asleep while calling/requesting  pain medication. Patient states he is not sleeping due to pain but is snoring when checked on by the nurse. Patient calling continuously asking for food while complaining of abdominal  pain and nausea. Zone phone for oncoming shift:     
 
Activity: 
Activity Level: Up with Assistance Number times ambulated in hallways past shift: 0 Number of times OOB to chair past shift: 0 Cardiac:  
Cardiac Monitoring: Yes     
Cardiac Rhythm: Sinus tachycardia Access:  
Current line(s): PIV Genitourinary:  
Urinary status: voiding, oliguric Respiratory:  
O2 Device: Room air Chronic home O2 use?: NO Incentive spirometer at bedside: NO 
  
GI: 
Last Bowel Movement Date: 11/11/20 Current diet:  DIET RENAL Regular Passing flatus: YES Tolerating current diet: YES 
% Diet Eaten: 100 % Pain Management:  
Patient states pain is manageable on current regimen: YES Skin: 
Jovan Score: 19 Interventions: float heels and increase time out of bed Patient Safety: 
Fall Score: Total Score: 3 Interventions: bed/chair alarm, gripper socks, pt to call before getting OOB and stay with me (per policy) High Fall Risk: Yes Length of Stay: 
Expected LOS: - - - Actual LOS: 0 Gareth Persaud RN

## 2020-11-14 NOTE — PROGRESS NOTES
Received notification from bedside RN about patient with regards to: patient requesting Nicotine patch (current smoker) VS: /87, HR 97, RR 18, O2 sat 97% on RA, temp 98.8 Intervention given: Nicotine patch 21 mg  daily with first dose now ordered

## 2020-11-14 NOTE — PROGRESS NOTES
PHYSICAL THERAPY EVALUATION/DISCHARGE Patient: Jeff Baker [de-identified]48 y.o. male) Date: 11/14/2020 Primary Diagnosis: Hyperkalemia [E87.5] ASSESSMENT Based on the objective data described below, the patient presents with independence with overall mobility. Gait is slow demonstrating increased forward flexion, increased trunk sway and mild path deviations but no overt LOB noted. Patient reports he is at his baseline and is not interested in any further therapy. Patient is somewhat impulsive and very irritated regarding bed alarm policy. When patient returning to bed small square medicine bottle with red/orange colored liquid noted under amilcar-patient immediately grabbed bottle and placed in shopping bag- nursing notified. Although patient would benefit from OP PT to improve overall dynamic standing balance, but is not interested in further therapy. Will sign off. Ronold Kanner Functional Outcome Measure: The patient scored 100/100 on the Bathel outcome measure which is indicative of independence. Patient does however demonstrate impaired higher level dynamic standing balance but no LOB noted. Further skilled acute physical therapy is not indicated at this time. PLAN : 
Recommendation for discharge: (in order for the patient to meet his/her long term goals) None- patient is not interested in further therapy This discharge recommendation: 
Has not yet been discussed the attending provider and/or case management IF patient discharges home will need the following DME: none SUBJECTIVE:  
Patient stated I don't need this. I'm fine.  OBJECTIVE DATA SUMMARY:  
HISTORY:   
Past Medical History:  
Diagnosis Date  Chronic kidney disease  Dialysis patient [facility])  Hypertension Past Surgical History:  
Procedure Laterality Date  HX VASCULAR ACCESS Left LUE AV fistula Prior level of function: patient reports complete independence with all mobility and ADLs Home Situation Home Environment: Private residence One/Two Story Residence: One story Living Alone: No 
Support Systems: Family member(s) Patient Expects to be Discharged to[de-identified] Private residence Current DME Used/Available at Home: None EXAMINATION/PRESENTATION/DECISION MAKING:  
Critical Behavior: 
Neurologic State: Alert Orientation Level: Oriented X4 Cognition: Appropriate for age attention/concentration, Follows commands, Impulsive Safety/Judgement: Awareness of environment Hearing: Auditory Auditory Impairment: None Range Of Motion: 
AROM: Within functional limits Strength:   
Strength: Within functional limits Tone & Sensation:  
Tone: Normal 
  
  
  
  
Sensation: Intact Coordination: 
Coordination: Within functional limits Vision:  
Acuity: Within Defined Limits Functional Mobility: 
Bed Mobility: 
Rolling: Independent Supine to Sit: Independent Scooting: Independent Transfers: 
Sit to Stand: Independent Stand to Sit: Independent Bed to Chair: Independent Balance:  
Sitting: Intact Standing: Intact Ambulation/Gait Training: 
Distance (ft): 125 Feet (ft) Assistive Device: (none) Ambulation - Level of Assistance: Independent Gait Abnormalities: Decreased step clearance;Trunk sway increased; Path deviations(forward flexed posture) Base of Support: Widened Speed/Ligia: Pace decreased (<100 feet/min); Shuffled; Slow Step Length: Left shortened;Right shortened Gait is slow demonstrating increased forward flexion, increased trunk sway and mild path deviations but no overt LOB noted. Functional Measure: 
Barthel Index: 
 
Bathin Bladder: 10 Bowels: 10 
Groomin Dressing: 10 Feeding: 10 Mobility: 15 
Stairs: 10 Toilet Use: 10 Transfer (Bed to Chair and Back): 15 Total: 100/100 The Barthel ADL Index: Guidelines 1.  The index should be used as a record of what a patient does, not as a record of what a patient could do. 2. The main aim is to establish degree of independence from any help, physical or verbal, however minor and for whatever reason. 3. The need for supervision renders the patient not independent. 4. A patient's performance should be established using the best available evidence. Asking the patient, friends/relatives and nurses are the usual sources, but direct observation and common sense are also important. However direct testing is not needed. 5. Usually the patient's performance over the preceding 24-48 hours is important, but occasionally longer periods will be relevant. 6. Middle categories imply that the patient supplies over 50 per cent of the effort. 7. Use of aids to be independent is allowed. [facility]., Barthel, D.W. (1253). Functional evaluation: the Barthel Index. [address] [facility] (14)2. Agata Plata jeny KENNETH Goodwin, Catie Mullins., Faizan Dubois., [place], [address] [facility] (1999). Measuring the change indisability after inpatient rehabilitation; comparison of the responsiveness of the Barthel Index and Functional [place] Measure. Journal of Neurology, Neurosurgery, and Psychiatry, 66(4), 874-700. Jessi Harris, N.J.A, Sammi Cramer,  BRODERICK.KERI, & Gildardo Ayoub M.A. (2004.) Assessment of post-stroke quality of life in cost-effectiveness studies: The usefulness of the Barthel Index and the EuroQoL-5D. [facility], 13, 185-07 Activity Tolerance:  
Fair After treatment patient left in no apparent distress:  
Supine in bed, Call bell within reach and Bed / chair alarm activated COMMUNICATION/EDUCATION:  
The patients plan of care was discussed with: Occupational therapist and Registered nurse. Fall prevention education was provided and the patient/caregiver indicated understanding. Thank you for this referral. 
Luz Elena Meraz, PT Time Calculation: 9 mins

## 2020-11-14 NOTE — PROGRESS NOTES
OCCUPATIONAL THERAPY EVALUATION/DISCHARGE Patient: Yolette Vega [de-identified]48 y.o. male) Date: 11/14/2020 Primary Diagnosis: Hyperkalemia [E87.5] Precautions: Falls ASSESSMENT Based on the objective data described below, the patient presents with adequate strength, balance and activity tolerance for the performance of ADLs and functional mobility. He is somewhat impulsive during functional mobility, but with no LOB occurring during this evaluation. He is now at his independent baseline for ADLs and functional mobility. No OT needs indicated acutely or after discharge. Functional Outcome Measure: The patient scored 100/100 on the Barthel Index outcome measure which is indicative of a 0% decline in function. PLAN : 
Recommendation for discharge: (in order for the patient to meet his/her long term goals) No skilled occupational therapy/ follow up rehabilitation needs identified at this time. Equipment recommendations for successful discharge: none OBJECTIVE DATA SUMMARY:  
HISTORY:  
Past Medical History:  
Diagnosis Date  Chronic kidney disease  Dialysis patient [facility])  Hypertension Past Surgical History:  
Procedure Laterality Date  HX VASCULAR ACCESS Left LUE AV fistula Prior Level of Function/Environment/Context: Fully independent Expanded or extensive additional review of patient history:  
Home Situation Home Environment: Private residence One/Two Story Residence: One story Living Alone: No 
Support Systems: Family member(s) Patient Expects to be Discharged to[de-identified] Private residence Current DME Used/Available at Home: None Hand dominance: Right EXAMINATION OF PERFORMANCE DEFICITS: 
Cognitive/Behavioral Status: 
Neurologic State: Alert Orientation Level: Oriented X4 Cognition: Appropriate for age attention/concentration; Follows commands; Impulsive Perception: Appears intact Safety/Judgement: Awareness of environment Hearing: Auditory Auditory Impairment: None Vision/Perceptual:   
Acuity: Within Defined Limits Range of Motion: 
AROM: Within functional limits Strength: 
Strength: Within functional limits Coordination: 
Coordination: Within functional limits Tone & Sensation: 
Tone: Normal 
Sensation: Intact Balance: 
Sitting: Intact Standing: Intact Functional Mobility and Transfers for ADLs: 
Bed Mobility: 
Rolling: Independent Supine to Sit: Independent Scooting: Independent Transfers: 
Sit to Stand: Independent Stand to Sit: Independent Bed to Chair: Independent Bathroom Mobility: Independent ADL Assessment: 
Feeding: Independent Oral Facial Hygiene/Grooming: Independent Bathing: Independent Upper Body Dressing: Independent Lower Body Dressing: Independent Toileting: Independent Functional Measure: 
Barthel Index: 
 
Bathin Bladder: 10 Bowels: 10 
Groomin Dressing: 10 Feeding: 10 Mobility: 15 
Stairs: 10 Toilet Use: 10 Transfer (Bed to Chair and Back): 15 Total: 100/100 The Barthel ADL Index: Guidelines 1. The index should be used as a record of what a patient does, not as a record of what a patient could do. 2. The main aim is to establish degree of independence from any help, physical or verbal, however minor and for whatever reason. 3. The need for supervision renders the patient not independent. 4. A patient's performance should be established using the best available evidence. Asking the patient, friends/relatives and nurses are the usual sources, but direct observation and common sense are also important. However direct testing is not needed. 5. Usually the patient's performance over the preceding 24-48 hours is important, but occasionally longer periods will be relevant. 6. Middle categories imply that the patient supplies over 50 per cent of the effort. 7. Use of aids to be independent is allowed. Terri Olivares., Barthel, D.W. (1195). Functional evaluation: the Barthel Index. [address] [facility] (14)2. Keshia Green party jeny KENNETH Goodwin, Les Cnotreras., Grace Teran., [place], [address] (1999). Measuring the change indisability after inpatient rehabilitation; comparison of the responsiveness of the Barthel Index and Functional [place] Measure. Journal of Neurology, Neurosurgery, and Psychiatry, 66(4), 859-218. ANDREA Chisholm, JESSE Chow, & Supa Lawson M.A. (2004.) Assessment of post-stroke quality of life in cost-effectiveness studies: The usefulness of the Barthel Index and the EuroQoL-5D. [facility], 13, 521-17 Pain Rating: 
Abdominal pain Activity Tolerance:  
Good Please refer to the flowsheet for vital signs taken during this treatment. After treatment patient left in no apparent distress:   
Supine in bed and Call bell within reach COMMUNICATION/EDUCATION:  
The patients plan of care was discussed with: Physical Therapist and Registered Nurse. Thank you for this referral. 
ABDOUL Peterson/L Time Calculation: 10 mins

## 2020-11-14 NOTE — PROGRESS NOTES
Problem: Falls - Risk of 
Goal: *Absence of Falls Description: Document Violeta Hancock Fall Risk and appropriate interventions in the flowsheet. Outcome: Progressing Towards Goal 
Note: Fall Risk Interventions: 
  
 
  
 
Medication Interventions: Bed/chair exit alarm, Evaluate medications/consider consulting pharmacy, Patient to call before getting OOB, Teach patient to arise slowly Elimination Interventions: Bed/chair exit alarm, Call light in reach, Elevated toilet seat, Patient to call for help with toileting needs, Stay With Me (per policy), Toilet paper/wipes in reach, Toileting schedule/hourly rounds, Urinal in reach History of Falls Interventions: Bed/chair exit alarm, Consult care management for discharge planning, Door open when patient unattended, Evaluate medications/consider consulting pharmacy, Investigate reason for fall, Room close to nurse's station, Utilize gait belt for transfer/ambulation, Assess for delayed presentation/identification of injury for 48 hrs (comment for end date)

## 2020-11-14 NOTE — PROGRESS NOTES
Hospitalist Progress Note NAME: Otto Mccormick :  1965 MRN:  [id number] Assessment / Plan: 
 
Severe hyperkalemia K 8.0 with mild peak T waves POA Hyponatremia sodium 128 POA End-stage renal disease on hemodialysis  POA Compliant with dialysis per his report, last dialysis 2 days before admission without problems Now with 2 days of nausea vomiting diarrhea on admission On arrival in ED found to have a K of 8.0 with Given IV calcium, insulin glucose S/p emergent HD  Serial labs 
  
SIRS POA white blood cell count 14.1, heart rate 101 Nausea vomiting with diarrhea x2 days POA Mildly abnormal LFTs POA Shortness of breath POA currently on oxygen Etiology of underlying illness unclear Resolved now Had  nausea vomiting and diarrhea on admission reported, resolved . Patient said to me today that he did not have BM since  
chest x-ray, ct abdomen No acute process No BM since  as per patient  
blood cultures pending Serial labs 
lidocaine patch for pain Wean opiods PRN nausea meds 
  
Essential hypertension POA Continue Norvasc Holding clonidine, Isordil 
  
Calf DVT 10/5/2020 POA Seen in ED 10/5/2020 for left lower extremity pain CT LLE withThrombus in a gastrocnemius vein at the level of the midcalf. Started on Eliquis at that time Normal treatment course would be 3 months 18.5 - 24.9 Normal weight / Body mass index is 19.29 kg/m². Code status: Full Prophylaxis: eliquis Recommended Disposition: Home w/Family Subjective: Chief Complaint / Reason for Physician Visit Discussed with RN events overnight. Patient was seen and examined. No acute events overnight. \"having pain sometimes in by belly\" Review of Systems: 
Symptom Y/N Comments  Symptom Y/N Comments Fever/Chills n   Chest Pain n   
Poor Appetite    Edema Cough n   Abdominal Pain n   
Sputum    Joint Pain SOB/WARNER n   Pruritis/Rash Nausea/vomit n Tolerating PT/OT Diarrhea    Tolerating Diet y Constipation    Other Could NOT obtain due to:   
 
 
 
Objective: VITALS:  
Last 24hrs VS reviewed since prior progress note. Most recent are: 
Patient Vitals for the past 24 hrs: 
 Temp Pulse Resp BP SpO2  
11/14/20 1312  (!) 106  101/64   
11/14/20 1259  (!) 108  (!) 84/67   
11/14/20 1150 98.3 °F (36.8 °C) (!) 106 16 98/63 96 % 11/14/20 0909  97  103/72   
11/14/20 0619 98.4 °F (36.9 °C) 92 16 108/71 96 % 11/14/20 0311 98 °F (36.7 °C) 100 18 105/74 96 % 11/14/20 0033 98.3 °F (36.8 °C) 97 18 122/73 98 % 11/13/20 2130  (!) 103  129/79 96 % 11/13/20 2003 98.8 °F (37.1 °C) 97 18 (!) 147/87 97 % 11/13/20 1626  (!) 114   98 % 11/13/20 1625 97.7 °F (36.5 °C) (!) 113 18 128/82 91 % 11/13/20 1624  (!) 112     
11/13/20 1623  (!) 107   94 % 11/13/20 1622  (!) 118     
11/13/20 1621  (!) 112     
11/13/20 1620  (!) 104     
11/13/20 1619  (!) 108     
11/13/20 1618  (!) 105     
11/13/20 1542 97.6 °F (36.4 °C) (!) 51 18 99/63   
11/13/20 1530  (!) 107 18 117/75   
11/13/20 1515  (!) 105 18 106/75   
11/13/20 1500  (!) 102 18 121/85   
11/13/20 1445  (!) 111 18 115/82   
11/13/20 1430  (!) 104 18 112/78   
11/13/20 1415  (!) 107 18 121/86   
11/13/20 1400  (!) 105 18 126/86   
11/13/20 1345  95 18 102/80   
11/13/20 1330  (!) 105 18 (!) 142/92  Intake/Output Summary (Last 24 hours) at 11/14/2020 1317 Last data filed at 11/14/2020 3754 Gross per 24 hour Intake 200 ml Output 0 ml Net 200 ml PHYSICAL EXAM: 
General: WD, WN. Alert, cooperative, no acute distress   
EENT:  EOMI. Anicteric sclerae. MMM Resp:  CTA bilaterally, no wheezing or rales. No accessory muscle use CV:  Regular  rhythm,  No edema GI:  Soft, Non distended, Non tender.  +Bowel sounds Neurologic:  Alert and oriented X 3, normal speech, Psych:   Good insight. Not anxious nor agitated Skin: No rashes. No jaundice Reviewed most current lab test results and cultures  YES Reviewed most current radiology test results   YES Review and summation of old records today    NO Reviewed patient's current orders and MAR    YES 
PMH/SH reviewed - no change compared to H&P 
________________________________________________________________________ Care Plan discussed with: 
  Comments Patient x Family RN x Care Manager Consultant Multidiciplinary team rounds were held today with , nursing, pharmacist and clinical coordinator. Patient's plan of care was discussed; medications were reviewed and discharge planning was addressed. Comments >50% of visit spent in counseling and coordination of care    
________________________________________________________________________ Karly Jacobsen MD  
 
Procedures: see electronic medical records for all procedures/Xrays and details which were not copied into this note but were reviewed prior to creation of Plan. LABS: 
I reviewed today's most current labs and imaging studies. Pertinent labs include: 
Recent Labs 11/14/20 
0106 11/13/20 
9284 WBC 10.0 14.1* HGB 12.3 14.5 HCT 37.6 44.7 * 184 Recent Labs 11/14/20 
0106 11/13/20 
1557 11/13/20 
5197 * 135* 128* K 4.5 3.5 8.0*  
CL 94* 98 86* CO2 28 28 25 GLU 42* 62* 69  
BUN 46* 26* 97* CREA 7.82* 4.11* 12.20* CA 9.8 9.7 10.5* MG 2.1  --  2.1 PHOS 4.6  --   --   
ALB 2.9*  --  3.6 TBILI 1.0  --  1.1* ALT 47  --  57 Signed: Karly Jacobsen MD

## 2020-11-14 NOTE — PROGRESS NOTES
HR elevated. Patient ambulated from stretcher to bed. Charge RN notified. Will continue to monitor HR.

## 2020-11-14 NOTE — ROUTINE PROCESS
Report received from Alexa Trujillo , [address] [place] [address]. SBAR were discussed.  
 
Corrie Tijerina RN

## 2020-11-14 NOTE — PROGRESS NOTES
End of Shift Note Bedside shift change report given to Ankit Oakley (oncoming nurse) by Flor Rosado RN (offgoing nurse). Report included the following information SBAR, Kardex, ED Summary, Intake/Output, MAR, Recent Results and Cardiac Rhythm NSR Shift worked:  [phone] Shift summary and any significant changes:  
  Patient complained of abdominal discomfort and nausea. PO phenergan given. Patient continued to complain of abdominal discomfort. Attending notified. Concerns for physician to address:  CT advised that contrast could not be given unless patient was having dialysis the next day. Primary RN did not recommend that CT wait until Sunday. CT recommended that CT w/contrast be completed on Sunday. Zone phone for oncoming shift:     
 
Activity: 
Activity Level: Up with Assistance Number times ambulated in hallways past shift: 0 Number of times OOB to chair past shift: 0 Cardiac:  
Cardiac Monitoring: Yes     
Cardiac Rhythm: Sinus tachycardia Access:  
Current line(s): PIV and HD access Genitourinary:  
Urinary status: oliguric Respiratory:  
O2 Device: Room air Chronic home O2 use?: NO Incentive spirometer at bedside: NO 
  
GI: 
Last Bowel Movement Date: 11/11/20 Current diet:  DIET RENAL Regular Passing flatus: YES Tolerating current diet: YES 
% Diet Eaten: 100 % Pain Management:  
Patient states pain is manageable on current regimen: NO 
 
Skin: 
Jovan Score: 19 Interventions: increase time out of bed and limit briefs Patient Safety: 
Fall Score: Total Score: 3 Interventions: bed/chair alarm, gripper socks, pt to call before getting OOB and stay with me (per policy) High Fall Risk: Yes Length of Stay: 
Expected LOS: - - - Actual LOS: 0 Flor Roasdo RN 
 
 
 
 
 
 
 
 
 
 
 
 
 
 
 Gilmar Dacosta(Attending)

## 2020-11-15 PROBLEM — E87.5 HYPERKALEMIA: Status: RESOLVED | Noted: 2020-01-01 | Resolved: 2020-01-01

## 2020-11-15 NOTE — PROGRESS NOTES
End of Shift Note Shift worked:  [id number] Shift summary and any significant changes:  
  none Concerns for physician to address:    
Zone phone for oncoming shift:   [phone] Activity: 
Activity Level: Up with Assistance Number times ambulated in hallways past shift: 0 Number of times OOB to chair past shift: 0 Cardiac:  
Cardiac Monitoring: Yes     
Cardiac Rhythm: Normal sinus rhythm Access:  
Current line(s): PIV Genitourinary:  
Urinary status: anuric Respiratory:  
O2 Device: Room air Chronic home O2 use?: NO Incentive spirometer at bedside: NO 
  
GI: 
Last Bowel Movement Date: 11/11/20 Current diet:  DIET RENAL Regular DIET ONE TIME MESSAGE Passing flatus: YES Tolerating current diet: YES 
% Diet Eaten: 100 % Pain Management:  
Patient states pain is manageable on current regimen: YES Skin: 
Jovan Score: 20 Interventions: PT/OT consult Patient Safety: 
Fall Score: Total Score: 3 Interventions: bed/chair alarm and pt to call before getting OOB High Fall Risk: Yes Length of Stay: 
Expected LOS: - - - Actual LOS: 2 
 
 
Catalina Mason RN

## 2020-11-15 NOTE — DISCHARGE SUMMARY
Hospitalist Discharge Summary Patient ID: Dylan Burnette [id number] 
43 y.o. 
1965 
11/13/2020 PCP on record: None Admit date: 11/13/2020 Discharge date and time: 11/15/2020 DISCHARGE DIAGNOSIS: 
 
Severe hyperkalemia K 8.0 with mild peak T waves POA Hyponatremia sodium 128 POA End-stage renal disease on hemodialysis Monday Wednesday Friday POA 
 
  
SIRS POA white blood cell count 14.1, heart rate 101 Nausea vomiting with diarrhea x2 days POA Mildly abnormal LFTs POA Shortness of breath POA 
 
CONSULTATIONS: 
IP CONSULT TO NEPHROLOGY 
IP CONSULT TO HOSPITALIST Excerpted HPI from H&P of Santos Fonseca MD: 
69-year-old male 
  
End renal disease on hemodialysis every Monday Wednesday Friday Essential hypertension 
  
Recently diagnosed with a left calf DVT 10/5/2020 in the emergency room after presenting with left leg pain CT with Thrombus in a gastrocnemius vein at the level of the midcalf. Started on Eliquis 2.5 mg twice a day at that visit 
  
Went to dialysis on Wednesday, no reported problems Last 2 days began to feel ill Current episodes of nausea vomiting without blood or coffee grounds Several episodes of watery diarrhea No abdominal pain or chest pain Increased shortness of breath with exertion, mild cough No reported fevers or chills, headache, sore throat Because of the symptoms he came into the emergency room 
  
Emergency room Tachycardic, white blood cell count 14.1 Potassium 8.0 with questionable mild peaked T waves Sodium 128 Received IV calcium, IV insulin and glucose as unclear when dialysis would start Out going for dialysis We were called to admit the patient for work-up 
 
______________________________________________________________________ DISCHARGE SUMMARY/HOSPITAL COURSE:  for full details see H&P, daily progress notes, labs, consult notes. Severe hyperkalemia K 8.0 with mild peak T waves POA Hyponatremia sodium 128 POA End-stage renal disease on hemodialysis Monday Wednesday Friday POA Compliant with dialysis per his report, last dialysis 2 days before admission without problems Now with 2 days of nausea vomiting diarrhea on admission On arrival in ED found to have a K of 8.0 with Given IV calcium, insulin glucose S/p emergent HD 11/13 
  
SIRS POA white blood cell count 14.1, heart rate 101 Nausea vomiting with diarrhea x2 days POA Mildly abnormal LFTs POA Shortness of breath POA currently off oxygen Etiology of underlying illness unclear Resolved now Had  nausea vomiting and diarrhea on admission reported, resolved . Patient said to me today that he did not have BM since 11/11 
chest x-ray, ct abdomen No acute process No BM since 11/11 as per patient  
blood cultures NGT 
lidocaine patch for pain Off opiods 
  
 
 
_______________________________________________________________________ Patient seen and examined by me on discharge day. Pertinent Findings: 
Gen:    Not in distress Chest: Clear lungs CVS:   Regular rhythm. No edema Abd:  Soft, not distended, not tender Neuro:  Alert,  
_______________________________________________________________________ DISCHARGE MEDICATIONS:  
Current Discharge Medication List  
  
START taking these medications Details  
midodrine (PROAMATINE) 5 mg tablet Take 1 Tab by mouth three (3) times daily (with meals) for 30 days. Qty: 90 Tab, Refills: 0  
  
apixaban (ELIQUIS) 5 mg tablet Take 1 Tab by mouth two (2) times a day. Qty: 30 Tab, Refills: 0  
  
lidocaine 1.8 % ptmd 1 Patch by Apply Externally route daily. Qty: 1 Box, Refills: 1 CONTINUE these medications which have CHANGED Details  
isosorbide mononitrate ER (IMDUR) 30 mg tablet Take 1 Tab by mouth every morning. Qty: 30 Tab, Refills: 0 CONTINUE these medications which have NOT CHANGED Details  
omeprazole (PRILOSEC) 40 mg capsule Take 40 mg by mouth daily.  Patient takes every night Indications: heartburn  
  
lidocaine 5 % topical cream Apply  to affected area two (2) times daily as needed for Itching. Qty: 15 g, Refills: 0  
  
sevelamer (RENAGEL) 400 mg tablet Take 800 mg by mouth three (3) times daily (with meals). promethazine (PHENERGAN) 25 mg tablet Take 25 mg by mouth every six (6) hours as needed for Nausea. atorvastatin (LIPITOR) 40 mg tablet Take 40 mg by mouth nightly. senna (SENNA) 8.6 mg tablet Take 1 Tab by mouth daily. aspirin delayed-release 81 mg tablet Take 1 Tab by mouth daily. Qty: 90 Tab, Refills: 0  
  
multivitamin (ONE A DAY) tablet Take 1 Tab by mouth daily. citalopram (CELEXA) 40 mg tablet Take 1 Tab by mouth daily. Qty: 30 Tab, Refills: 1 Associated Diagnoses: Anxiety  
  
lisinopril (PRINIVIL, ZESTRIL) 2.5 mg tablet Take 2.5 mg by mouth daily. lidocaine (LIDODERM) 5 % Apply patch to the affected area for 12 hours a day and remove for 12 hours a day. Qty: 60 Each, Refills: 0 Associated Diagnoses: Chronic pain of right ankle  
  
pantoprazole (PROTONIX) 40 mg tablet Take 40 mg by mouth daily. paricalcitol (ZEMPLAR) 1 mcg capsule Take 1 mcg by mouth daily. gabapentin (NEURONTIN) 300 mg capsule Take 300 mg by mouth two (2) times a day. STOP taking these medications  
  
 amLODIPine (NORVASC) 10 mg tablet Comments:  
Reason for Stopping:   
   
 cloNIDine HCl (CATAPRES) 0.3 mg tablet Comments:  
Reason for Stopping:   
   
  
 
 
 
Patient Follow Up Instructions: Activity: Activity as tolerated Diet: Resume previous diet Wound Care: None needed Follow-up Information Follow up With Specialties Details Why Contact Info None    None ([phone]) Patient stated that they have no PCP 
  
  
 
________________________________________________________________ Risk of deterioration: Low 
 
Condition at Discharge:  Stable 
__________________________________________________________________ Disposition Home with family, no needs 
 
____________________________________________________________________ Code Status: Full Code 
___________________________________________________________________ Total time in minutes spent coordinating this discharge (includes going over instructions, follow-up, prescriptions, and preparing report for sign off to her PCP) :  >30 minutes Signed: 
Maximus Hay MD

## 2020-11-15 NOTE — PROGRESS NOTES
0700  Received report from Vincent, [facility]. Report included SBAR, Kardex, MAR. Assumed care of patient. DISCHARGE SUMMARY FROM NURSE The patient is stable for discharge. I have reviewed the discharge instructions with the patient. The patient verbalized understanding. All questions were fully answered. The patient verbalized no complaints. Hard scripts and medication handouts were given and reviewed with the patient. Appropriate educational materials and medication side effects teaching were provided also provided. Cardiac monitor and IV line(s) were removed. The following personal items collected during your admission were returned to the patient/family Home medications: NA Dental Appliance: Dental Appliances: None Vision: Visual Aid: Glasses, With patient Hearing Aid:   
Jewelry: Jewelry: None Clothing: Clothing: Pants, Shirt, Belt, Undergarments, Socks, Footwear, Jacket/Coat Other Valuables: Other Valuables: Wallet, Cell Phone, Cigarettes($, ID, CC) Valuables sent to safe:   
 
OR _________________________________________________________________________________________ There were no personal belongings, valuables or home medications left at patient's bedside,  or safe.

## 2020-11-15 NOTE — PROGRESS NOTES
Problem: Breathing Pattern - Ineffective Goal: *Absence of hypoxia Outcome: Resolved/Met Goal: *Use of effective breathing techniques Outcome: Resolved/Met Problem: Patient Education: Go to Patient Education Activity Goal: Patient/Family Education Outcome: Resolved/Met Problem: Risk for Spread of Infection Goal: Prevent transmission of infectious organism to others Description: Prevent the transmission of infectious organisms to other patients, staff members, and visitors. Outcome: Resolved/Met Problem: Patient Education:  Go to Education Activity Goal: Patient/Family Education Outcome: Resolved/Met Problem: Pain Goal: *Control of Pain Outcome: Resolved/Met Goal: *PALLIATIVE CARE:  Alleviation of Pain Outcome: Resolved/Met Problem: Patient Education: Go to Patient Education Activity Goal: Patient/Family Education Outcome: Resolved/Met Problem: Falls - Risk of 
Goal: *Absence of Falls Description: Document Tiffdelia Heather Fall Risk and appropriate interventions in the flowsheet. Outcome: Resolved/Met Note: Fall Risk Interventions: 
Mobility Interventions: Assess mobility with egress test, Bed/chair exit alarm, Patient to call before getting OOB Medication Interventions: Assess postural VS orthostatic hypotension, Bed/chair exit alarm, Evaluate medications/consider consulting pharmacy Elimination Interventions: Bed/chair exit alarm, Call light in reach, Patient to call for help with toileting needs History of Falls Interventions: Evaluate medications/consider consulting pharmacy, Door open when patient unattended, Bed/chair exit alarm Problem: Patient Education: Go to Patient Education Activity Goal: Patient/Family Education Outcome: Resolved/Met

## 2021-01-01 ENCOUNTER — HOSPITAL ENCOUNTER (EMERGENCY)
Age: 56
Discharge: HOME | End: 2021-05-14
Attending: EMERGENCY MEDICINE
Payer: MEDICAID

## 2021-01-01 ENCOUNTER — HOSPITAL ENCOUNTER (INPATIENT)
Age: 56
LOS: 17 days | DRG: 194 | End: 2021-09-02
Attending: EMERGENCY MEDICINE | Admitting: STUDENT IN AN ORGANIZED HEALTH CARE EDUCATION/TRAINING PROGRAM
Payer: MEDICAID

## 2021-01-01 ENCOUNTER — APPOINTMENT (OUTPATIENT)
Dept: CT IMAGING | Age: 56
DRG: 194 | End: 2021-01-01
Attending: EMERGENCY MEDICINE
Payer: MEDICAID

## 2021-01-01 ENCOUNTER — APPOINTMENT (OUTPATIENT)
Dept: GENERAL RADIOLOGY | Age: 56
DRG: 194 | End: 2021-01-01
Attending: INTERNAL MEDICINE
Payer: MEDICAID

## 2021-01-01 ENCOUNTER — APPOINTMENT (OUTPATIENT)
Dept: GENERAL RADIOLOGY | Age: 56
DRG: 194 | End: 2021-01-01
Attending: EMERGENCY MEDICINE
Payer: MEDICAID

## 2021-01-01 ENCOUNTER — APPOINTMENT (OUTPATIENT)
Dept: GENERAL RADIOLOGY | Age: 56
End: 2021-01-01
Attending: EMERGENCY MEDICINE
Payer: MEDICAID

## 2021-01-01 ENCOUNTER — APPOINTMENT (OUTPATIENT)
Dept: NON INVASIVE DIAGNOSTICS | Age: 56
DRG: 194 | End: 2021-01-01
Attending: NURSE PRACTITIONER
Payer: MEDICAID

## 2021-01-01 ENCOUNTER — APPOINTMENT (OUTPATIENT)
Dept: ULTRASOUND IMAGING | Age: 56
End: 2021-01-01
Attending: EMERGENCY MEDICINE
Payer: MEDICAID

## 2021-01-01 ENCOUNTER — APPOINTMENT (OUTPATIENT)
Dept: NON INVASIVE DIAGNOSTICS | Age: 56
DRG: 194 | End: 2021-01-01
Attending: INTERNAL MEDICINE
Payer: MEDICAID

## 2021-01-01 ENCOUNTER — APPOINTMENT (OUTPATIENT)
Dept: ULTRASOUND IMAGING | Age: 56
DRG: 194 | End: 2021-01-01
Attending: INTERNAL MEDICINE
Payer: MEDICAID

## 2021-01-01 ENCOUNTER — HOSPITAL ENCOUNTER (INPATIENT)
Age: 56
LOS: 6 days | Discharge: HOME HEALTH | DRG: 194 | End: 2021-08-14
Attending: EMERGENCY MEDICINE | Admitting: INTERNAL MEDICINE
Payer: MEDICAID

## 2021-01-01 ENCOUNTER — HOSPITAL ENCOUNTER (EMERGENCY)
Age: 56
Discharge: HOME | End: 2021-07-10
Attending: EMERGENCY MEDICINE
Payer: MEDICAID

## 2021-01-01 ENCOUNTER — APPOINTMENT (OUTPATIENT)
Dept: GENERAL RADIOLOGY | Age: 56
DRG: 194 | End: 2021-01-01
Attending: NURSE PRACTITIONER
Payer: MEDICAID

## 2021-01-01 ENCOUNTER — APPOINTMENT (OUTPATIENT)
Dept: GENERAL RADIOLOGY | Age: 56
DRG: 194 | End: 2021-01-01
Attending: STUDENT IN AN ORGANIZED HEALTH CARE EDUCATION/TRAINING PROGRAM
Payer: MEDICAID

## 2021-01-01 ENCOUNTER — APPOINTMENT (OUTPATIENT)
Dept: CT IMAGING | Age: 56
DRG: 194 | End: 2021-01-01
Attending: INTERNAL MEDICINE
Payer: MEDICAID

## 2021-01-01 ENCOUNTER — APPOINTMENT (OUTPATIENT)
Dept: VASCULAR SURGERY | Age: 56
DRG: 194 | End: 2021-01-01
Attending: INTERNAL MEDICINE
Payer: MEDICAID

## 2021-01-01 ENCOUNTER — APPOINTMENT (OUTPATIENT)
Dept: NUCLEAR MEDICINE | Age: 56
DRG: 194 | End: 2021-01-01
Attending: STUDENT IN AN ORGANIZED HEALTH CARE EDUCATION/TRAINING PROGRAM
Payer: MEDICAID

## 2021-01-01 VITALS
TEMPERATURE: 97.8 F | DIASTOLIC BLOOD PRESSURE: 63 MMHG | HEIGHT: 72 IN | RESPIRATION RATE: 16 BRPM | HEART RATE: 92 BPM | OXYGEN SATURATION: 96 % | WEIGHT: 179.68 LBS | SYSTOLIC BLOOD PRESSURE: 110 MMHG | BODY MASS INDEX: 24.34 KG/M2

## 2021-01-01 VITALS
SYSTOLIC BLOOD PRESSURE: 110 MMHG | HEART RATE: 87 BPM | TEMPERATURE: 97.7 F | BODY MASS INDEX: 42.66 KG/M2 | RESPIRATION RATE: 16 BRPM | HEIGHT: 72 IN | DIASTOLIC BLOOD PRESSURE: 65 MMHG | WEIGHT: 315 LBS | OXYGEN SATURATION: 95 %

## 2021-01-01 VITALS
WEIGHT: 179.68 LBS | HEART RATE: 99 BPM | SYSTOLIC BLOOD PRESSURE: 110 MMHG | HEIGHT: 72 IN | DIASTOLIC BLOOD PRESSURE: 68 MMHG | BODY MASS INDEX: 24.34 KG/M2 | RESPIRATION RATE: 18 BRPM | TEMPERATURE: 98.9 F | OXYGEN SATURATION: 90 %

## 2021-01-01 VITALS
HEIGHT: 69 IN | SYSTOLIC BLOOD PRESSURE: 124 MMHG | DIASTOLIC BLOOD PRESSURE: 42 MMHG | OXYGEN SATURATION: 86 % | TEMPERATURE: 97.7 F | BODY MASS INDEX: 32.29 KG/M2 | WEIGHT: 218.03 LBS

## 2021-01-01 DIAGNOSIS — Z99.2 ESRD NEEDING DIALYSIS (HCC): ICD-10-CM

## 2021-01-01 DIAGNOSIS — N18.6 ESRD (END STAGE RENAL DISEASE) ON DIALYSIS (HCC): ICD-10-CM

## 2021-01-01 DIAGNOSIS — S22.42XD CLOSED FRACTURE OF MULTIPLE RIBS OF LEFT SIDE WITH ROUTINE HEALING, SUBSEQUENT ENCOUNTER: ICD-10-CM

## 2021-01-01 DIAGNOSIS — M79.89 RIGHT LEG SWELLING: ICD-10-CM

## 2021-01-01 DIAGNOSIS — R74.01 TRANSAMINITIS: ICD-10-CM

## 2021-01-01 DIAGNOSIS — R53.81 DEBILITY: ICD-10-CM

## 2021-01-01 DIAGNOSIS — N18.6 ESRD (END STAGE RENAL DISEASE) (HCC): ICD-10-CM

## 2021-01-01 DIAGNOSIS — R06.02 SOB (SHORTNESS OF BREATH): ICD-10-CM

## 2021-01-01 DIAGNOSIS — J81.0 ACUTE PULMONARY EDEMA (HCC): ICD-10-CM

## 2021-01-01 DIAGNOSIS — I50.43 ACUTE ON CHRONIC COMBINED SYSTOLIC AND DIASTOLIC CHF (CONGESTIVE HEART FAILURE) (HCC): ICD-10-CM

## 2021-01-01 DIAGNOSIS — G93.40 ENCEPHALOPATHY: ICD-10-CM

## 2021-01-01 DIAGNOSIS — B49 FUNGEMIA: ICD-10-CM

## 2021-01-01 DIAGNOSIS — R41.89 UNRESPONSIVE: ICD-10-CM

## 2021-01-01 DIAGNOSIS — M79.89 LEFT LEG SWELLING: Primary | ICD-10-CM

## 2021-01-01 DIAGNOSIS — B37.9 INFECTION DUE TO CANDIDA GLABRATA: ICD-10-CM

## 2021-01-01 DIAGNOSIS — M25.471 RIGHT ANKLE SWELLING: Primary | ICD-10-CM

## 2021-01-01 DIAGNOSIS — R06.00 DYSPNEA, UNSPECIFIED TYPE: Primary | ICD-10-CM

## 2021-01-01 DIAGNOSIS — N18.6 ESRD NEEDING DIALYSIS (HCC): ICD-10-CM

## 2021-01-01 DIAGNOSIS — Z99.2 ESRD (END STAGE RENAL DISEASE) ON DIALYSIS (HCC): ICD-10-CM

## 2021-01-01 DIAGNOSIS — E87.70 HYPERVOLEMIA, UNSPECIFIED HYPERVOLEMIA TYPE: ICD-10-CM

## 2021-01-01 DIAGNOSIS — B18.2 CHRONIC HEPATITIS C WITHOUT HEPATIC COMA (HCC): ICD-10-CM

## 2021-01-01 DIAGNOSIS — J96.01 ACUTE RESPIRATORY FAILURE WITH HYPOXIA (HCC): ICD-10-CM

## 2021-01-01 DIAGNOSIS — F41.9 ANXIETY: ICD-10-CM

## 2021-01-01 DIAGNOSIS — F19.21 HISTORY OF DRUG DEPENDENCE (HCC): ICD-10-CM

## 2021-01-01 DIAGNOSIS — E16.2 HYPOGLYCEMIA: ICD-10-CM

## 2021-01-01 DIAGNOSIS — D72.825 BANDEMIA: ICD-10-CM

## 2021-01-01 DIAGNOSIS — Z51.5 PALLIATIVE CARE ENCOUNTER: ICD-10-CM

## 2021-01-01 DIAGNOSIS — E87.5 ACUTE HYPERKALEMIA: Primary | ICD-10-CM

## 2021-01-01 DIAGNOSIS — B37.9 CANDIDA GLABRATA INFECTION: ICD-10-CM

## 2021-01-01 DIAGNOSIS — K72.00 ISCHEMIC HEPATITIS: ICD-10-CM

## 2021-01-01 DIAGNOSIS — T50.901A ACCIDENTAL DRUG OVERDOSE, INITIAL ENCOUNTER: ICD-10-CM

## 2021-01-01 DIAGNOSIS — N18.6 ESRD ON DIALYSIS (HCC): ICD-10-CM

## 2021-01-01 DIAGNOSIS — Z72.0 TOBACCO ABUSE: ICD-10-CM

## 2021-01-01 DIAGNOSIS — Z99.2 ESRD ON DIALYSIS (HCC): ICD-10-CM

## 2021-01-01 DIAGNOSIS — K72.01 ACUTE LIVER FAILURE WITH HEPATIC COMA (HCC): ICD-10-CM

## 2021-01-01 DIAGNOSIS — K63.9: ICD-10-CM

## 2021-01-01 DIAGNOSIS — L03.116 CELLULITIS OF LEFT LOWER EXTREMITY: ICD-10-CM

## 2021-01-01 DIAGNOSIS — E87.79 OTHER HYPERVOLEMIA: ICD-10-CM

## 2021-01-01 DIAGNOSIS — I10 ESSENTIAL HYPERTENSION: ICD-10-CM

## 2021-01-01 DIAGNOSIS — Z91.199 NONCOMPLIANCE: ICD-10-CM

## 2021-01-01 LAB
1,3 BETA GLUCAN SER-MCNC: 133 PG/ML
A1AT SERPL-MCNC: 175 MG/DL (ref 101–187)
ABO + RH BLD: NORMAL
ABO + RH BLD: NORMAL
ACETOHEXAMIDE SERPL-MCNC: NEGATIVE UG/ML (ref 20–60)
ACTIN IGG SERPL-ACNC: 24 UNITS (ref 0–19)
ALBUMIN SERPL-MCNC: 0.9 G/DL (ref 3.5–5)
ALBUMIN SERPL-MCNC: 1.1 G/DL (ref 3.5–5)
ALBUMIN SERPL-MCNC: 1.3 G/DL (ref 3.5–5)
ALBUMIN SERPL-MCNC: 1.3 G/DL (ref 3.5–5)
ALBUMIN SERPL-MCNC: 1.6 G/DL (ref 3.5–5)
ALBUMIN SERPL-MCNC: 1.7 G/DL (ref 3.5–5)
ALBUMIN SERPL-MCNC: 1.8 G/DL (ref 3.5–5)
ALBUMIN SERPL-MCNC: 2 G/DL (ref 3.5–5)
ALBUMIN SERPL-MCNC: 2 G/DL (ref 3.5–5)
ALBUMIN SERPL-MCNC: 2.1 G/DL (ref 3.5–5)
ALBUMIN SERPL-MCNC: 2.3 G/DL (ref 3.5–5)
ALBUMIN SERPL-MCNC: 2.4 G/DL (ref 3.5–5)
ALBUMIN SERPL-MCNC: 2.4 G/DL (ref 3.5–5)
ALBUMIN SERPL-MCNC: 2.6 G/DL (ref 3.5–5)
ALBUMIN SERPL-MCNC: 2.6 G/DL (ref 3.5–5)
ALBUMIN SERPL-MCNC: 2.9 G/DL (ref 3.5–5)
ALBUMIN/GLOB SERPL: 0.3 {RATIO} (ref 1.1–2.2)
ALBUMIN/GLOB SERPL: 0.3 {RATIO} (ref 1.1–2.2)
ALBUMIN/GLOB SERPL: 0.4 {RATIO} (ref 1.1–2.2)
ALBUMIN/GLOB SERPL: 0.5 {RATIO} (ref 1.1–2.2)
ALBUMIN/GLOB SERPL: 0.5 {RATIO} (ref 1.1–2.2)
ALBUMIN/GLOB SERPL: 0.6 {RATIO} (ref 1.1–2.2)
ALBUMIN/GLOB SERPL: 0.6 {RATIO} (ref 1.1–2.2)
ALBUMIN/GLOB SERPL: 0.9 {RATIO} (ref 1.1–2.2)
ALBUMIN/GLOB SERPL: 1.1 {RATIO} (ref 1.1–2.2)
ALBUMIN/GLOB SERPL: 1.2 {RATIO} (ref 1.1–2.2)
ALP SERPL-CCNC: 101 U/L (ref 45–117)
ALP SERPL-CCNC: 104 U/L (ref 45–117)
ALP SERPL-CCNC: 109 U/L (ref 45–117)
ALP SERPL-CCNC: 109 U/L (ref 45–117)
ALP SERPL-CCNC: 138 U/L (ref 45–117)
ALP SERPL-CCNC: 140 U/L (ref 45–117)
ALP SERPL-CCNC: 144 U/L (ref 45–117)
ALP SERPL-CCNC: 145 U/L (ref 45–117)
ALP SERPL-CCNC: 146 U/L (ref 45–117)
ALP SERPL-CCNC: 148 U/L (ref 45–117)
ALP SERPL-CCNC: 149 U/L (ref 45–117)
ALP SERPL-CCNC: 156 U/L (ref 45–117)
ALP SERPL-CCNC: 159 U/L (ref 45–117)
ALP SERPL-CCNC: 160 U/L (ref 45–117)
ALP SERPL-CCNC: 166 U/L (ref 45–117)
ALP SERPL-CCNC: 170 U/L (ref 45–117)
ALP SERPL-CCNC: 84 U/L (ref 45–117)
ALP SERPL-CCNC: 88 U/L (ref 45–117)
ALT SERPL-CCNC: 132 U/L (ref 12–78)
ALT SERPL-CCNC: 132 U/L (ref 12–78)
ALT SERPL-CCNC: 1328 U/L (ref 12–78)
ALT SERPL-CCNC: 1858 U/L (ref 12–78)
ALT SERPL-CCNC: 209 U/L (ref 12–78)
ALT SERPL-CCNC: 2222 U/L (ref 12–78)
ALT SERPL-CCNC: 2228 U/L (ref 12–78)
ALT SERPL-CCNC: 2233 U/L (ref 12–78)
ALT SERPL-CCNC: 2390 U/L (ref 12–78)
ALT SERPL-CCNC: 2472 U/L (ref 12–78)
ALT SERPL-CCNC: 26 U/L (ref 12–78)
ALT SERPL-CCNC: 27 U/L (ref 12–78)
ALT SERPL-CCNC: 3083 U/L (ref 12–78)
ALT SERPL-CCNC: 32 U/L (ref 12–78)
ALT SERPL-CCNC: 423 U/L (ref 12–78)
ALT SERPL-CCNC: 630 U/L (ref 12–78)
ALT SERPL-CCNC: 65 U/L (ref 12–78)
ALT SERPL-CCNC: 931 U/L (ref 12–78)
AMMONIA PLAS-SCNC: 10 UMOL/L
AMMONIA PLAS-SCNC: 45 UMOL/L
AMMONIA PLAS-SCNC: 60 UMOL/L
ANA SER QL: NEGATIVE
ANIDULAFUNGIN ISLT MIC: NORMAL
ANION GAP SERPL CALC-SCNC: 10 MMOL/L (ref 5–15)
ANION GAP SERPL CALC-SCNC: 12 MMOL/L (ref 5–15)
ANION GAP SERPL CALC-SCNC: 13 MMOL/L (ref 5–15)
ANION GAP SERPL CALC-SCNC: 14 MMOL/L (ref 5–15)
ANION GAP SERPL CALC-SCNC: 15 MMOL/L (ref 5–15)
ANION GAP SERPL CALC-SCNC: 15 MMOL/L (ref 5–15)
ANION GAP SERPL CALC-SCNC: 16 MMOL/L (ref 5–15)
ANION GAP SERPL CALC-SCNC: 17 MMOL/L (ref 5–15)
ANION GAP SERPL CALC-SCNC: 20 MMOL/L (ref 5–15)
ANION GAP SERPL CALC-SCNC: 21 MMOL/L (ref 5–15)
ANION GAP SERPL CALC-SCNC: 24 MMOL/L (ref 5–15)
ANION GAP SERPL CALC-SCNC: 6 MMOL/L (ref 5–15)
ANION GAP SERPL CALC-SCNC: 7 MMOL/L (ref 5–15)
ANION GAP SERPL CALC-SCNC: 7 MMOL/L (ref 5–15)
ANION GAP SERPL CALC-SCNC: 8 MMOL/L (ref 5–15)
ANION GAP SERPL CALC-SCNC: 8 MMOL/L (ref 5–15)
ANION GAP SERPL CALC-SCNC: 9 MMOL/L (ref 5–15)
ANION GAP SERPL CALC-SCNC: 9 MMOL/L (ref 5–15)
APTT PPP: 40.4 SEC (ref 22.1–31)
ARTERIAL PATENCY WRIST A: ABNORMAL
ARTERIAL PATENCY WRIST A: YES
AST SERPL-CCNC: 1222 U/L (ref 15–37)
AST SERPL-CCNC: 131 U/L (ref 15–37)
AST SERPL-CCNC: 147 U/L (ref 15–37)
AST SERPL-CCNC: 150 U/L (ref 15–37)
AST SERPL-CCNC: 1978 U/L (ref 15–37)
AST SERPL-CCNC: 274 U/L (ref 15–37)
AST SERPL-CCNC: 45 U/L (ref 15–37)
AST SERPL-CCNC: 50 U/L (ref 15–37)
AST SERPL-CCNC: 54 U/L (ref 15–37)
AST SERPL-CCNC: 659 U/L (ref 15–37)
AST SERPL-CCNC: >2000 U/L (ref 15–37)
ATRIAL RATE: 146 BPM
ATRIAL RATE: 77 BPM
ATRIAL RATE: 84 BPM
ATRIAL RATE: 84 BPM
ATRIAL RATE: 94 BPM
ATRIAL RATE: 97 BPM
B-OH-BUTYR SERPL-SCNC: 0.2 MMOL/L
B-OH-BUTYR SERPL-SCNC: 0.24 MMOL/L
BACTERIA SPEC CULT: ABNORMAL
BACTERIA SPEC CULT: NORMAL
BASE DEFICIT BLD-SCNC: 5.9 MMOL/L
BASE DEFICIT BLD-SCNC: 6.3 MMOL/L
BASE DEFICIT BLDA-SCNC: 1.3 MMOL/L
BASE DEFICIT BLDA-SCNC: 10.2 MMOL/L
BASE DEFICIT BLDA-SCNC: 12.5 MMOL/L
BASE DEFICIT BLDA-SCNC: 8.1 MMOL/L
BASE DEFICIT BLDV-SCNC: 11.1 MMOL/L
BASE EXCESS BLDA CALC-SCNC: 3.2 MMOL/L
BASE EXCESS BLDA CALC-SCNC: 3.9 MMOL/L
BASE EXCESS BLDA CALC-SCNC: 5.3 MMOL/L
BASE EXCESS BLDV CALC-SCNC: 2.3 MMOL/L
BASOPHILS # BLD: 0 K/UL (ref 0–0.1)
BASOPHILS NFR BLD: 0 % (ref 0–1)
BDY SITE: ABNORMAL
BILIRUB DIRECT SERPL-MCNC: 1.5 MG/DL (ref 0–0.2)
BILIRUB DIRECT SERPL-MCNC: 1.6 MG/DL (ref 0–0.2)
BILIRUB DIRECT SERPL-MCNC: 10.9 MG/DL (ref 0–0.2)
BILIRUB DIRECT SERPL-MCNC: 17.9 MG/DL (ref 0–0.2)
BILIRUB DIRECT SERPL-MCNC: 18.3 MG/DL (ref 0–0.2)
BILIRUB DIRECT SERPL-MCNC: 2.5 MG/DL (ref 0–0.2)
BILIRUB DIRECT SERPL-MCNC: 22.6 MG/DL (ref 0–0.2)
BILIRUB DIRECT SERPL-MCNC: 24.9 MG/DL (ref 0–0.2)
BILIRUB DIRECT SERPL-MCNC: 25.2 MG/DL (ref 0–0.2)
BILIRUB DIRECT SERPL-MCNC: 4.9 MG/DL (ref 0–0.2)
BILIRUB DIRECT SERPL-MCNC: 6.6 MG/DL (ref 0–0.2)
BILIRUB SERPL-MCNC: 0.5 MG/DL (ref 0.2–1)
BILIRUB SERPL-MCNC: 0.6 MG/DL (ref 0.2–1)
BILIRUB SERPL-MCNC: 0.6 MG/DL (ref 0.2–1)
BILIRUB SERPL-MCNC: 0.9 MG/DL (ref 0.2–1)
BILIRUB SERPL-MCNC: 11.4 MG/DL (ref 0.2–1)
BILIRUB SERPL-MCNC: 16.1 MG/DL (ref 0.2–1)
BILIRUB SERPL-MCNC: 2.2 MG/DL (ref 0.2–1)
BILIRUB SERPL-MCNC: 2.4 MG/DL (ref 0.2–1)
BILIRUB SERPL-MCNC: 22.8 MG/DL (ref 0.2–1)
BILIRUB SERPL-MCNC: 22.9 MG/DL (ref 0.2–1)
BILIRUB SERPL-MCNC: 23.8 MG/DL (ref 0.2–1)
BILIRUB SERPL-MCNC: 3.9 MG/DL (ref 0.2–1)
BILIRUB SERPL-MCNC: 30 MG/DL (ref 0.2–1)
BILIRUB SERPL-MCNC: 31.4 MG/DL (ref 0.2–1)
BILIRUB SERPL-MCNC: 31.7 MG/DL (ref 0.2–1)
BILIRUB SERPL-MCNC: 7 MG/DL (ref 0.2–1)
BILIRUB SERPL-MCNC: 7.1 MG/DL (ref 0.2–1)
BILIRUB SERPL-MCNC: 9.9 MG/DL (ref 0.2–1)
BLD PROD TYP BPU: NORMAL
BLOOD GROUP ANTIBODIES SERPL: NORMAL
BLOOD GROUP ANTIBODIES SERPL: NORMAL
BNP SERPL-MCNC: ABNORMAL PG/ML
BPU ID: NORMAL
BREATHS.SPONTANEOUS ON VENT: 32
BREATHS.SPONTANEOUS ON VENT: 38
BUN SERPL-MCNC: 15 MG/DL (ref 6–20)
BUN SERPL-MCNC: 20 MG/DL (ref 6–20)
BUN SERPL-MCNC: 21 MG/DL (ref 6–20)
BUN SERPL-MCNC: 21 MG/DL (ref 6–20)
BUN SERPL-MCNC: 23 MG/DL (ref 6–20)
BUN SERPL-MCNC: 24 MG/DL (ref 6–20)
BUN SERPL-MCNC: 26 MG/DL (ref 6–20)
BUN SERPL-MCNC: 28 MG/DL (ref 6–20)
BUN SERPL-MCNC: 29 MG/DL (ref 6–20)
BUN SERPL-MCNC: 30 MG/DL (ref 6–20)
BUN SERPL-MCNC: 30 MG/DL (ref 6–20)
BUN SERPL-MCNC: 32 MG/DL (ref 6–20)
BUN SERPL-MCNC: 34 MG/DL (ref 6–20)
BUN SERPL-MCNC: 35 MG/DL (ref 6–20)
BUN SERPL-MCNC: 35 MG/DL (ref 6–20)
BUN SERPL-MCNC: 36 MG/DL (ref 6–20)
BUN SERPL-MCNC: 39 MG/DL (ref 6–20)
BUN SERPL-MCNC: 41 MG/DL (ref 6–20)
BUN SERPL-MCNC: 41 MG/DL (ref 6–20)
BUN SERPL-MCNC: 43 MG/DL (ref 6–20)
BUN SERPL-MCNC: 48 MG/DL (ref 6–20)
BUN SERPL-MCNC: 48 MG/DL (ref 6–20)
BUN SERPL-MCNC: 61 MG/DL (ref 6–20)
BUN SERPL-MCNC: 69 MG/DL (ref 6–20)
BUN SERPL-MCNC: 70 MG/DL (ref 6–20)
BUN SERPL-MCNC: 80 MG/DL (ref 6–20)
BUN SERPL-MCNC: 82 MG/DL (ref 6–20)
BUN SERPL-MCNC: 85 MG/DL (ref 6–20)
BUN/CREAT SERPL: 4 (ref 12–20)
BUN/CREAT SERPL: 4 (ref 12–20)
BUN/CREAT SERPL: 5 (ref 12–20)
BUN/CREAT SERPL: 6 (ref 12–20)
BUN/CREAT SERPL: 7 (ref 12–20)
C PEPTIDE SERPL-MCNC: 6.9 NG/ML (ref 1.1–4.4)
C PEPTIDE SERPL-MCNC: 7.4 NG/ML (ref 1.1–4.4)
CA-I BLD-MCNC: 1.01 MMOL/L (ref 1.12–1.32)
CA-I BLD-MCNC: 1.02 MMOL/L (ref 1.12–1.32)
CALCIUM SERPL-MCNC: 10.2 MG/DL (ref 8.5–10.1)
CALCIUM SERPL-MCNC: 8.1 MG/DL (ref 8.5–10.1)
CALCIUM SERPL-MCNC: 8.4 MG/DL (ref 8.5–10.1)
CALCIUM SERPL-MCNC: 8.5 MG/DL (ref 8.5–10.1)
CALCIUM SERPL-MCNC: 8.6 MG/DL (ref 8.5–10.1)
CALCIUM SERPL-MCNC: 8.7 MG/DL (ref 8.5–10.1)
CALCIUM SERPL-MCNC: 8.8 MG/DL (ref 8.5–10.1)
CALCIUM SERPL-MCNC: 8.9 MG/DL (ref 8.5–10.1)
CALCIUM SERPL-MCNC: 8.9 MG/DL (ref 8.5–10.1)
CALCIUM SERPL-MCNC: 9 MG/DL (ref 8.5–10.1)
CALCIUM SERPL-MCNC: 9 MG/DL (ref 8.5–10.1)
CALCIUM SERPL-MCNC: 9.1 MG/DL (ref 8.5–10.1)
CALCIUM SERPL-MCNC: 9.1 MG/DL (ref 8.5–10.1)
CALCIUM SERPL-MCNC: 9.2 MG/DL (ref 8.5–10.1)
CALCIUM SERPL-MCNC: 9.4 MG/DL (ref 8.5–10.1)
CALCIUM SERPL-MCNC: 9.6 MG/DL (ref 8.5–10.1)
CALCIUM SERPL-MCNC: 9.7 MG/DL (ref 8.5–10.1)
CALCIUM SERPL-MCNC: 9.8 MG/DL (ref 8.5–10.1)
CALCIUM SERPL-MCNC: 9.8 MG/DL (ref 8.5–10.1)
CALCIUM SERPL-MCNC: 9.9 MG/DL (ref 8.5–10.1)
CALCULATED P AXIS, ECG09: -4 DEGREES
CALCULATED P AXIS, ECG09: 17 DEGREES
CALCULATED P AXIS, ECG09: 60 DEGREES
CALCULATED P AXIS, ECG09: 62 DEGREES
CALCULATED P AXIS, ECG09: 64 DEGREES
CALCULATED R AXIS, ECG10: -44 DEGREES
CALCULATED R AXIS, ECG10: -46 DEGREES
CALCULATED R AXIS, ECG10: 115 DEGREES
CALCULATED R AXIS, ECG10: 24 DEGREES
CALCULATED R AXIS, ECG10: 38 DEGREES
CALCULATED R AXIS, ECG10: 40 DEGREES
CALCULATED T AXIS, ECG11: -31 DEGREES
CALCULATED T AXIS, ECG11: 11 DEGREES
CALCULATED T AXIS, ECG11: 21 DEGREES
CALCULATED T AXIS, ECG11: 40 DEGREES
CALCULATED T AXIS, ECG11: 49 DEGREES
CALCULATED T AXIS, ECG11: 76 DEGREES
CHLORIDE BLD-SCNC: 97 MMOL/L (ref 100–108)
CHLORIDE BLD-SCNC: 98 MMOL/L (ref 100–108)
CHLORIDE SERPL-SCNC: 100 MMOL/L (ref 97–108)
CHLORIDE SERPL-SCNC: 100 MMOL/L (ref 97–108)
CHLORIDE SERPL-SCNC: 101 MMOL/L (ref 97–108)
CHLORIDE SERPL-SCNC: 109 MMOL/L (ref 97–108)
CHLORIDE SERPL-SCNC: 82 MMOL/L (ref 97–108)
CHLORIDE SERPL-SCNC: 85 MMOL/L (ref 97–108)
CHLORIDE SERPL-SCNC: 90 MMOL/L (ref 97–108)
CHLORIDE SERPL-SCNC: 92 MMOL/L (ref 97–108)
CHLORIDE SERPL-SCNC: 93 MMOL/L (ref 97–108)
CHLORIDE SERPL-SCNC: 94 MMOL/L (ref 97–108)
CHLORIDE SERPL-SCNC: 95 MMOL/L (ref 97–108)
CHLORIDE SERPL-SCNC: 95 MMOL/L (ref 97–108)
CHLORIDE SERPL-SCNC: 96 MMOL/L (ref 97–108)
CHLORIDE SERPL-SCNC: 97 MMOL/L (ref 97–108)
CHLORIDE SERPL-SCNC: 98 MMOL/L (ref 97–108)
CHLORPROPAMIDE SERPL-MCNC: NEGATIVE UG/ML (ref 75–250)
CK MB CFR SERPL CALC: 1.1 % (ref 0–2.5)
CK MB SERPL-MCNC: 3.6 NG/ML (ref 5–25)
CK SERPL-CCNC: 126 U/L (ref 39–308)
CK SERPL-CCNC: 317 U/L (ref 39–308)
CO2 BLD-SCNC: 21 MMOL/L (ref 19–24)
CO2 BLD-SCNC: 22 MMOL/L (ref 19–24)
CO2 SERPL-SCNC: 12 MMOL/L (ref 21–32)
CO2 SERPL-SCNC: 14 MMOL/L (ref 21–32)
CO2 SERPL-SCNC: 15 MMOL/L (ref 21–32)
CO2 SERPL-SCNC: 18 MMOL/L (ref 21–32)
CO2 SERPL-SCNC: 20 MMOL/L (ref 21–32)
CO2 SERPL-SCNC: 21 MMOL/L (ref 21–32)
CO2 SERPL-SCNC: 22 MMOL/L (ref 21–32)
CO2 SERPL-SCNC: 23 MMOL/L (ref 21–32)
CO2 SERPL-SCNC: 23 MMOL/L (ref 21–32)
CO2 SERPL-SCNC: 24 MMOL/L (ref 21–32)
CO2 SERPL-SCNC: 26 MMOL/L (ref 21–32)
CO2 SERPL-SCNC: 26 MMOL/L (ref 21–32)
CO2 SERPL-SCNC: 27 MMOL/L (ref 21–32)
CO2 SERPL-SCNC: 28 MMOL/L (ref 21–32)
CO2 SERPL-SCNC: 29 MMOL/L (ref 21–32)
CO2 SERPL-SCNC: 30 MMOL/L (ref 21–32)
CO2 SERPL-SCNC: 30 MMOL/L (ref 21–32)
COMMENT, HOLDF: NORMAL
CORTIS 1H P CHAL SERPL-MCNC: 38.3 UG/DL
CORTIS 30M P CHAL SERPL-MCNC: 35.4 UG/DL
CORTIS BS SERPL-MCNC: 22.4 UG/DL
COVID-19 RAPID TEST, COVR: NOT DETECTED
CREAT SERPL-MCNC: 10.1 MG/DL (ref 0.7–1.3)
CREAT SERPL-MCNC: 10.1 MG/DL (ref 0.7–1.3)
CREAT SERPL-MCNC: 11 MG/DL (ref 0.7–1.3)
CREAT SERPL-MCNC: 11.3 MG/DL (ref 0.7–1.3)
CREAT SERPL-MCNC: 11.5 MG/DL (ref 0.7–1.3)
CREAT SERPL-MCNC: 12 MG/DL (ref 0.7–1.3)
CREAT SERPL-MCNC: 3 MG/DL (ref 0.7–1.3)
CREAT SERPL-MCNC: 4 MG/DL (ref 0.7–1.3)
CREAT SERPL-MCNC: 4.01 MG/DL (ref 0.7–1.3)
CREAT SERPL-MCNC: 4.5 MG/DL (ref 0.7–1.3)
CREAT SERPL-MCNC: 4.76 MG/DL (ref 0.7–1.3)
CREAT SERPL-MCNC: 5.09 MG/DL (ref 0.7–1.3)
CREAT SERPL-MCNC: 5.33 MG/DL (ref 0.7–1.3)
CREAT SERPL-MCNC: 5.34 MG/DL (ref 0.7–1.3)
CREAT SERPL-MCNC: 5.4 MG/DL (ref 0.7–1.3)
CREAT SERPL-MCNC: 5.42 MG/DL (ref 0.7–1.3)
CREAT SERPL-MCNC: 5.48 MG/DL (ref 0.7–1.3)
CREAT SERPL-MCNC: 5.53 MG/DL (ref 0.7–1.3)
CREAT SERPL-MCNC: 6.28 MG/DL (ref 0.7–1.3)
CREAT SERPL-MCNC: 6.48 MG/DL (ref 0.7–1.3)
CREAT SERPL-MCNC: 6.61 MG/DL (ref 0.7–1.3)
CREAT SERPL-MCNC: 7.14 MG/DL (ref 0.7–1.3)
CREAT SERPL-MCNC: 7.36 MG/DL (ref 0.7–1.3)
CREAT SERPL-MCNC: 7.5 MG/DL (ref 0.7–1.3)
CREAT SERPL-MCNC: 7.56 MG/DL (ref 0.7–1.3)
CREAT SERPL-MCNC: 7.73 MG/DL (ref 0.7–1.3)
CREAT SERPL-MCNC: 8.47 MG/DL (ref 0.7–1.3)
CREAT SERPL-MCNC: 9.66 MG/DL (ref 0.7–1.3)
CREAT UR-MCNC: 11.7 MG/DL (ref 0.6–1.3)
CREAT UR-MCNC: 12.3 MG/DL (ref 0.6–1.3)
CROSSMATCH RESULT,%XM: NORMAL
D DIMER PPP FEU-MCNC: 1.47 MG/L FEU (ref 0–0.65)
DIAGNOSIS, 93000: NORMAL
DIFFERENTIAL METHOD BLD: ABNORMAL
ECHO AV AREA PEAK VELOCITY: 2.61 CM2
ECHO AV AREA/BSA PEAK VELOCITY: 1.4 CM2/M2
ECHO AV CUSP MM: 2.2 CM
ECHO AV PEAK GRADIENT: 7.38 MMHG
ECHO AV PEAK VELOCITY: 135.62 CM/S
ECHO LA AREA 4C: 20.93 CM2
ECHO LA MAJOR AXIS: 3.71 CM
ECHO LA MINOR AXIS: 1.93 CM
ECHO LA TO AORTIC ROOT RATIO: 1.11
ECHO LA TO AORTIC ROOT RATIO: 1.11
ECHO LA VOL 2C: 49.06 ML (ref 18–58)
ECHO LA VOL 4C: 56.71 ML (ref 18–58)
ECHO LA VOL BP: 63.28 ML (ref 18–58)
ECHO LA VOL/BSA BIPLANE: 32.96 ML/M2 (ref 16–28)
ECHO LA VOLUME INDEX A2C: 25.55 ML/M2 (ref 16–28)
ECHO LA VOLUME INDEX A4C: 29.54 ML/M2 (ref 16–28)
ECHO LV E' LATERAL VELOCITY: 8.64 CM/S
ECHO LV E' SEPTAL VELOCITY: 4.9 CM/S
ECHO LV INTERNAL DIMENSION DIASTOLIC: 4.61 CM (ref 4.2–5.9)
ECHO LV INTERNAL DIMENSION SYSTOLIC: 3.6 CM
ECHO LV IVSD: 1.84 CM (ref 0.6–1)
ECHO LV IVSS: 1.87 CM
ECHO LV MASS 2D: 337.1 G (ref 88–224)
ECHO LV MASS INDEX 2D: 175.6 G/M2 (ref 49–115)
ECHO LV POSTERIOR WALL DIASTOLIC: 1.5 CM (ref 0.6–1)
ECHO LV POSTERIOR WALL SYSTOLIC: 2.12 CM
ECHO LVOT DIAM: 2.13 CM
ECHO LVOT PEAK GRADIENT: 3.94 MMHG
ECHO LVOT PEAK VELOCITY: 99.2 CM/S
ECHO MV A VELOCITY: 107.94 CM/S
ECHO MV E DECELERATION TIME (DT): 263.63 MS
ECHO MV E VELOCITY: 90.93 CM/S
ECHO MV E/A RATIO: 0.84
ECHO MV E/E' LATERAL: 10.52
ECHO MV E/E' RATIO (AVERAGED): 14.54
ECHO MV E/E' SEPTAL: 18.56
ECHO PV MAX VELOCITY: 102.69 CM/S
ECHO PV PEAK INSTANTANEOUS GRADIENT SYSTOLIC: 4.24 MMHG
ECHO RV INTERNAL DIMENSION: 4.58 CM
ECHO TV REGURGITANT MAX VELOCITY: 246.27 CM/S
ECHO TV REGURGITANT MAX VELOCITY: 584.05 CM/S
ECHO TV REGURGITANT PEAK GRADIENT: 24.85 MMHG
EOSINOPHIL # BLD: 0 K/UL (ref 0–0.4)
EOSINOPHIL # BLD: 0.1 K/UL (ref 0–0.4)
EOSINOPHIL # BLD: 0.3 K/UL (ref 0–0.4)
EOSINOPHIL NFR BLD: 0 % (ref 0–7)
EOSINOPHIL NFR BLD: 1 % (ref 0–7)
EOSINOPHIL NFR BLD: 3 % (ref 0–7)
ERYTHROCYTE [DISTWIDTH] IN BLOOD BY AUTOMATED COUNT: 14.5 % (ref 11.5–14.5)
ERYTHROCYTE [DISTWIDTH] IN BLOOD BY AUTOMATED COUNT: 16.2 % (ref 11.5–14.5)
ERYTHROCYTE [DISTWIDTH] IN BLOOD BY AUTOMATED COUNT: 16.6 % (ref 11.5–14.5)
ERYTHROCYTE [DISTWIDTH] IN BLOOD BY AUTOMATED COUNT: 16.6 % (ref 11.5–14.5)
ERYTHROCYTE [DISTWIDTH] IN BLOOD BY AUTOMATED COUNT: 16.8 % (ref 11.5–14.5)
ERYTHROCYTE [DISTWIDTH] IN BLOOD BY AUTOMATED COUNT: 17 % (ref 11.5–14.5)
ERYTHROCYTE [DISTWIDTH] IN BLOOD BY AUTOMATED COUNT: 17.1 % (ref 11.5–14.5)
ERYTHROCYTE [DISTWIDTH] IN BLOOD BY AUTOMATED COUNT: 17.2 % (ref 11.5–14.5)
ERYTHROCYTE [DISTWIDTH] IN BLOOD BY AUTOMATED COUNT: 17.2 % (ref 11.5–14.5)
ERYTHROCYTE [DISTWIDTH] IN BLOOD BY AUTOMATED COUNT: 17.6 % (ref 11.5–14.5)
ERYTHROCYTE [DISTWIDTH] IN BLOOD BY AUTOMATED COUNT: 18.2 % (ref 11.5–14.5)
ERYTHROCYTE [DISTWIDTH] IN BLOOD BY AUTOMATED COUNT: 18.6 % (ref 11.5–14.5)
ERYTHROCYTE [DISTWIDTH] IN BLOOD BY AUTOMATED COUNT: 18.8 % (ref 11.5–14.5)
ERYTHROCYTE [DISTWIDTH] IN BLOOD BY AUTOMATED COUNT: 19.3 % (ref 11.5–14.5)
ERYTHROCYTE [DISTWIDTH] IN BLOOD BY AUTOMATED COUNT: 20.6 % (ref 11.5–14.5)
ERYTHROCYTE [DISTWIDTH] IN BLOOD BY AUTOMATED COUNT: 21.2 % (ref 11.5–14.5)
ERYTHROCYTE [DISTWIDTH] IN BLOOD BY AUTOMATED COUNT: 22.9 % (ref 11.5–14.5)
ERYTHROCYTE [DISTWIDTH] IN BLOOD BY AUTOMATED COUNT: 23.7 % (ref 11.5–14.5)
ERYTHROCYTE [DISTWIDTH] IN BLOOD BY AUTOMATED COUNT: 25 % (ref 11.5–14.5)
ERYTHROCYTE [DISTWIDTH] IN BLOOD BY AUTOMATED COUNT: 25.8 % (ref 11.5–14.5)
ERYTHROCYTE [SEDIMENTATION RATE] IN BLOOD: 127 MM/HR (ref 0–20)
EST. AVERAGE GLUCOSE BLD GHB EST-MCNC: 85 MG/DL
FIO2 ON VENT: 40 %
FLUCONAZOLE: NORMAL
FUNGUS ISLT: NORMAL
GAS FLOW.O2 O2 DELIVERY SYS: 15 L/MIN
GAS FLOW.O2 O2 DELIVERY SYS: 3 L/MIN
GAS FLOW.O2 SETTING OXYMISER: 12 L/MIN
GAS FLOW.O2 SETTING OXYMISER: 12 L/MIN
GAS FLOW.O2 SETTING OXYMISER: 14 L/MIN
GLIMEPIRIDE SERPL-MCNC: NEGATIVE NG/ML (ref 80–250)
GLIPIZIDE SERPL-MCNC: NEGATIVE NG/ML (ref 200–1000)
GLOBULIN SER CALC-MCNC: 2.2 G/DL (ref 2–4)
GLOBULIN SER CALC-MCNC: 2.3 G/DL (ref 2–4)
GLOBULIN SER CALC-MCNC: 2.3 G/DL (ref 2–4)
GLOBULIN SER CALC-MCNC: 2.7 G/DL (ref 2–4)
GLOBULIN SER CALC-MCNC: 2.8 G/DL (ref 2–4)
GLOBULIN SER CALC-MCNC: 3.5 G/DL (ref 2–4)
GLOBULIN SER CALC-MCNC: 3.8 G/DL (ref 2–4)
GLOBULIN SER CALC-MCNC: 3.8 G/DL (ref 2–4)
GLOBULIN SER CALC-MCNC: 3.9 G/DL (ref 2–4)
GLOBULIN SER CALC-MCNC: 4.2 G/DL (ref 2–4)
GLOBULIN SER CALC-MCNC: 4.3 G/DL (ref 2–4)
GLOBULIN SER CALC-MCNC: 4.6 G/DL (ref 2–4)
GLOBULIN SER CALC-MCNC: 4.6 G/DL (ref 2–4)
GLOBULIN SER CALC-MCNC: 5.1 G/DL (ref 2–4)
GLOBULIN SER CALC-MCNC: 5.2 G/DL (ref 2–4)
GLOBULIN SER CALC-MCNC: 5.4 G/DL (ref 2–4)
GLUCOSE BLD STRIP.AUTO-MCNC: 100 MG/DL (ref 65–117)
GLUCOSE BLD STRIP.AUTO-MCNC: 101 MG/DL (ref 65–117)
GLUCOSE BLD STRIP.AUTO-MCNC: 103 MG/DL (ref 65–117)
GLUCOSE BLD STRIP.AUTO-MCNC: 104 MG/DL (ref 65–117)
GLUCOSE BLD STRIP.AUTO-MCNC: 104 MG/DL (ref 65–117)
GLUCOSE BLD STRIP.AUTO-MCNC: 106 MG/DL (ref 65–117)
GLUCOSE BLD STRIP.AUTO-MCNC: 107 MG/DL (ref 65–117)
GLUCOSE BLD STRIP.AUTO-MCNC: 108 MG/DL (ref 65–117)
GLUCOSE BLD STRIP.AUTO-MCNC: 108 MG/DL (ref 65–117)
GLUCOSE BLD STRIP.AUTO-MCNC: 109 MG/DL (ref 65–117)
GLUCOSE BLD STRIP.AUTO-MCNC: 111 MG/DL (ref 65–117)
GLUCOSE BLD STRIP.AUTO-MCNC: 111 MG/DL (ref 65–117)
GLUCOSE BLD STRIP.AUTO-MCNC: 112 MG/DL (ref 65–117)
GLUCOSE BLD STRIP.AUTO-MCNC: 113 MG/DL (ref 65–117)
GLUCOSE BLD STRIP.AUTO-MCNC: 113 MG/DL (ref 65–117)
GLUCOSE BLD STRIP.AUTO-MCNC: 116 MG/DL (ref 65–117)
GLUCOSE BLD STRIP.AUTO-MCNC: 117 MG/DL (ref 65–117)
GLUCOSE BLD STRIP.AUTO-MCNC: 118 MG/DL (ref 65–117)
GLUCOSE BLD STRIP.AUTO-MCNC: 119 MG/DL (ref 65–117)
GLUCOSE BLD STRIP.AUTO-MCNC: 121 MG/DL (ref 65–117)
GLUCOSE BLD STRIP.AUTO-MCNC: 121 MG/DL (ref 65–117)
GLUCOSE BLD STRIP.AUTO-MCNC: 122 MG/DL (ref 65–117)
GLUCOSE BLD STRIP.AUTO-MCNC: 123 MG/DL (ref 65–117)
GLUCOSE BLD STRIP.AUTO-MCNC: 123 MG/DL (ref 65–117)
GLUCOSE BLD STRIP.AUTO-MCNC: 125 MG/DL (ref 65–117)
GLUCOSE BLD STRIP.AUTO-MCNC: 126 MG/DL (ref 65–117)
GLUCOSE BLD STRIP.AUTO-MCNC: 140 MG/DL (ref 65–117)
GLUCOSE BLD STRIP.AUTO-MCNC: 141 MG/DL (ref 65–117)
GLUCOSE BLD STRIP.AUTO-MCNC: 144 MG/DL (ref 65–117)
GLUCOSE BLD STRIP.AUTO-MCNC: 145 MG/DL (ref 65–117)
GLUCOSE BLD STRIP.AUTO-MCNC: 146 MG/DL (ref 65–117)
GLUCOSE BLD STRIP.AUTO-MCNC: 15 MG/DL (ref 65–117)
GLUCOSE BLD STRIP.AUTO-MCNC: 158 MG/DL (ref 65–117)
GLUCOSE BLD STRIP.AUTO-MCNC: 158 MG/DL (ref 65–117)
GLUCOSE BLD STRIP.AUTO-MCNC: 160 MG/DL (ref 65–117)
GLUCOSE BLD STRIP.AUTO-MCNC: 164 MG/DL (ref 65–117)
GLUCOSE BLD STRIP.AUTO-MCNC: 165 MG/DL (ref 74–106)
GLUCOSE BLD STRIP.AUTO-MCNC: 169 MG/DL (ref 65–117)
GLUCOSE BLD STRIP.AUTO-MCNC: 187 MG/DL (ref 65–117)
GLUCOSE BLD STRIP.AUTO-MCNC: 187 MG/DL (ref 65–117)
GLUCOSE BLD STRIP.AUTO-MCNC: 195 MG/DL (ref 65–117)
GLUCOSE BLD STRIP.AUTO-MCNC: 195 MG/DL (ref 65–117)
GLUCOSE BLD STRIP.AUTO-MCNC: 198 MG/DL (ref 65–117)
GLUCOSE BLD STRIP.AUTO-MCNC: 200 MG/DL (ref 65–117)
GLUCOSE BLD STRIP.AUTO-MCNC: 200 MG/DL (ref 65–117)
GLUCOSE BLD STRIP.AUTO-MCNC: 203 MG/DL (ref 65–117)
GLUCOSE BLD STRIP.AUTO-MCNC: 206 MG/DL (ref 65–117)
GLUCOSE BLD STRIP.AUTO-MCNC: 207 MG/DL (ref 65–117)
GLUCOSE BLD STRIP.AUTO-MCNC: 209 MG/DL (ref 65–117)
GLUCOSE BLD STRIP.AUTO-MCNC: 22 MG/DL (ref 65–117)
GLUCOSE BLD STRIP.AUTO-MCNC: 222 MG/DL (ref 65–117)
GLUCOSE BLD STRIP.AUTO-MCNC: 24 MG/DL (ref 65–117)
GLUCOSE BLD STRIP.AUTO-MCNC: 267 MG/DL (ref 65–117)
GLUCOSE BLD STRIP.AUTO-MCNC: 28 MG/DL (ref 65–117)
GLUCOSE BLD STRIP.AUTO-MCNC: 29 MG/DL (ref 65–117)
GLUCOSE BLD STRIP.AUTO-MCNC: 31 MG/DL (ref 65–117)
GLUCOSE BLD STRIP.AUTO-MCNC: 32 MG/DL (ref 65–117)
GLUCOSE BLD STRIP.AUTO-MCNC: 36 MG/DL (ref 65–117)
GLUCOSE BLD STRIP.AUTO-MCNC: 37 MG/DL (ref 74–106)
GLUCOSE BLD STRIP.AUTO-MCNC: 44 MG/DL (ref 65–117)
GLUCOSE BLD STRIP.AUTO-MCNC: 47 MG/DL (ref 65–117)
GLUCOSE BLD STRIP.AUTO-MCNC: 47 MG/DL (ref 65–117)
GLUCOSE BLD STRIP.AUTO-MCNC: 48 MG/DL (ref 65–117)
GLUCOSE BLD STRIP.AUTO-MCNC: 49 MG/DL (ref 65–117)
GLUCOSE BLD STRIP.AUTO-MCNC: 51 MG/DL (ref 65–117)
GLUCOSE BLD STRIP.AUTO-MCNC: 52 MG/DL (ref 65–117)
GLUCOSE BLD STRIP.AUTO-MCNC: 53 MG/DL (ref 65–117)
GLUCOSE BLD STRIP.AUTO-MCNC: 54 MG/DL (ref 65–117)
GLUCOSE BLD STRIP.AUTO-MCNC: 55 MG/DL (ref 65–117)
GLUCOSE BLD STRIP.AUTO-MCNC: 56 MG/DL (ref 65–117)
GLUCOSE BLD STRIP.AUTO-MCNC: 57 MG/DL (ref 65–117)
GLUCOSE BLD STRIP.AUTO-MCNC: 58 MG/DL (ref 65–117)
GLUCOSE BLD STRIP.AUTO-MCNC: 60 MG/DL (ref 65–117)
GLUCOSE BLD STRIP.AUTO-MCNC: 61 MG/DL (ref 65–117)
GLUCOSE BLD STRIP.AUTO-MCNC: 61 MG/DL (ref 65–117)
GLUCOSE BLD STRIP.AUTO-MCNC: 62 MG/DL (ref 65–117)
GLUCOSE BLD STRIP.AUTO-MCNC: 66 MG/DL (ref 65–117)
GLUCOSE BLD STRIP.AUTO-MCNC: 68 MG/DL (ref 65–117)
GLUCOSE BLD STRIP.AUTO-MCNC: 70 MG/DL (ref 65–117)
GLUCOSE BLD STRIP.AUTO-MCNC: 71 MG/DL (ref 65–117)
GLUCOSE BLD STRIP.AUTO-MCNC: 72 MG/DL (ref 65–117)
GLUCOSE BLD STRIP.AUTO-MCNC: 73 MG/DL (ref 65–117)
GLUCOSE BLD STRIP.AUTO-MCNC: 74 MG/DL (ref 65–117)
GLUCOSE BLD STRIP.AUTO-MCNC: 75 MG/DL (ref 65–117)
GLUCOSE BLD STRIP.AUTO-MCNC: 75 MG/DL (ref 65–117)
GLUCOSE BLD STRIP.AUTO-MCNC: 77 MG/DL (ref 65–117)
GLUCOSE BLD STRIP.AUTO-MCNC: 78 MG/DL (ref 65–117)
GLUCOSE BLD STRIP.AUTO-MCNC: 79 MG/DL (ref 65–117)
GLUCOSE BLD STRIP.AUTO-MCNC: 80 MG/DL (ref 65–117)
GLUCOSE BLD STRIP.AUTO-MCNC: 82 MG/DL (ref 65–117)
GLUCOSE BLD STRIP.AUTO-MCNC: 83 MG/DL (ref 65–117)
GLUCOSE BLD STRIP.AUTO-MCNC: 84 MG/DL (ref 65–117)
GLUCOSE BLD STRIP.AUTO-MCNC: 84 MG/DL (ref 65–117)
GLUCOSE BLD STRIP.AUTO-MCNC: 85 MG/DL (ref 65–117)
GLUCOSE BLD STRIP.AUTO-MCNC: 87 MG/DL (ref 65–117)
GLUCOSE BLD STRIP.AUTO-MCNC: 87 MG/DL (ref 65–117)
GLUCOSE BLD STRIP.AUTO-MCNC: 88 MG/DL (ref 65–117)
GLUCOSE BLD STRIP.AUTO-MCNC: 88 MG/DL (ref 65–117)
GLUCOSE BLD STRIP.AUTO-MCNC: 91 MG/DL (ref 65–117)
GLUCOSE BLD STRIP.AUTO-MCNC: 92 MG/DL (ref 65–117)
GLUCOSE BLD STRIP.AUTO-MCNC: 93 MG/DL (ref 65–117)
GLUCOSE BLD STRIP.AUTO-MCNC: 93 MG/DL (ref 65–117)
GLUCOSE BLD STRIP.AUTO-MCNC: 94 MG/DL (ref 65–117)
GLUCOSE BLD STRIP.AUTO-MCNC: 94 MG/DL (ref 65–117)
GLUCOSE BLD STRIP.AUTO-MCNC: 96 MG/DL (ref 65–117)
GLUCOSE BLD STRIP.AUTO-MCNC: 96 MG/DL (ref 65–117)
GLUCOSE BLD STRIP.AUTO-MCNC: 97 MG/DL (ref 65–117)
GLUCOSE BLD STRIP.AUTO-MCNC: 98 MG/DL (ref 65–117)
GLUCOSE BLD STRIP.AUTO-MCNC: NORMAL MG/DL (ref 65–117)
GLUCOSE SERPL-MCNC: 101 MG/DL (ref 65–100)
GLUCOSE SERPL-MCNC: 102 MG/DL (ref 65–100)
GLUCOSE SERPL-MCNC: 108 MG/DL (ref 65–100)
GLUCOSE SERPL-MCNC: 112 MG/DL (ref 65–100)
GLUCOSE SERPL-MCNC: 115 MG/DL (ref 65–100)
GLUCOSE SERPL-MCNC: 115 MG/DL (ref 65–100)
GLUCOSE SERPL-MCNC: 122 MG/DL (ref 65–100)
GLUCOSE SERPL-MCNC: 128 MG/DL (ref 65–100)
GLUCOSE SERPL-MCNC: 141 MG/DL (ref 65–100)
GLUCOSE SERPL-MCNC: 144 MG/DL (ref 65–100)
GLUCOSE SERPL-MCNC: 191 MG/DL (ref 65–100)
GLUCOSE SERPL-MCNC: 209 MG/DL (ref 65–100)
GLUCOSE SERPL-MCNC: 231 MG/DL (ref 65–100)
GLUCOSE SERPL-MCNC: 38 MG/DL (ref 65–100)
GLUCOSE SERPL-MCNC: 39 MG/DL (ref 65–100)
GLUCOSE SERPL-MCNC: 51 MG/DL (ref 65–100)
GLUCOSE SERPL-MCNC: 65 MG/DL (ref 65–100)
GLUCOSE SERPL-MCNC: 78 MG/DL (ref 65–100)
GLUCOSE SERPL-MCNC: 78 MG/DL (ref 65–100)
GLUCOSE SERPL-MCNC: 80 MG/DL (ref 65–100)
GLUCOSE SERPL-MCNC: 81 MG/DL (ref 65–100)
GLUCOSE SERPL-MCNC: 85 MG/DL (ref 65–100)
GLUCOSE SERPL-MCNC: 88 MG/DL (ref 65–100)
GLUCOSE SERPL-MCNC: 88 MG/DL (ref 65–100)
GLUCOSE SERPL-MCNC: 93 MG/DL (ref 65–100)
GLUCOSE SERPL-MCNC: 93 MG/DL (ref 65–100)
GLUCOSE SERPL-MCNC: 98 MG/DL (ref 65–100)
GLUCOSE SERPL-MCNC: 99 MG/DL (ref 65–100)
GLYBURIDE SERPL-MCNC: NEGATIVE NG/ML
GRAM STN SPEC: ABNORMAL
HAV IGM SER QL: NONREACTIVE
HBA1C MFR BLD: 4.6 % (ref 4–5.6)
HBV CORE IGM SER QL: NONREACTIVE
HBV SURFACE AB SER QL: REACTIVE
HBV SURFACE AB SER-ACNC: 469.11 MIU/ML
HBV SURFACE AG SER QL: <0.1 INDEX
HBV SURFACE AG SER QL: <0.1 INDEX
HBV SURFACE AG SER QL: NEGATIVE
HBV SURFACE AG SER QL: NEGATIVE
HCO3 BLDA-SCNC: 10 MMOL/L (ref 22–26)
HCO3 BLDA-SCNC: 14 MMOL/L (ref 22–26)
HCO3 BLDA-SCNC: 17 MMOL/L (ref 22–26)
HCO3 BLDA-SCNC: 20 MMOL/L
HCO3 BLDA-SCNC: 21 MMOL/L
HCO3 BLDA-SCNC: 22 MMOL/L (ref 22–26)
HCO3 BLDA-SCNC: 25 MMOL/L (ref 22–26)
HCO3 BLDA-SCNC: 28 MMOL/L (ref 22–26)
HCO3 BLDA-SCNC: 30 MMOL/L (ref 22–26)
HCO3 BLDV-SCNC: 12 MMOL/L (ref 23–28)
HCO3 BLDV-SCNC: 28 MMOL/L (ref 23–28)
HCT VFR BLD AUTO: 17.9 % (ref 36.6–50.3)
HCT VFR BLD AUTO: 18.8 % (ref 36.6–50.3)
HCT VFR BLD AUTO: 20 % (ref 36.6–50.3)
HCT VFR BLD AUTO: 20.4 % (ref 36.6–50.3)
HCT VFR BLD AUTO: 20.5 % (ref 36.6–50.3)
HCT VFR BLD AUTO: 21.2 % (ref 36.6–50.3)
HCT VFR BLD AUTO: 22.9 % (ref 36.6–50.3)
HCT VFR BLD AUTO: 23.2 % (ref 36.6–50.3)
HCT VFR BLD AUTO: 23.4 % (ref 36.6–50.3)
HCT VFR BLD AUTO: 24.1 % (ref 36.6–50.3)
HCT VFR BLD AUTO: 24.2 % (ref 36.6–50.3)
HCT VFR BLD AUTO: 24.7 % (ref 36.6–50.3)
HCT VFR BLD AUTO: 25 % (ref 36.6–50.3)
HCT VFR BLD AUTO: 25.1 % (ref 36.6–50.3)
HCT VFR BLD AUTO: 25.7 % (ref 36.6–50.3)
HCT VFR BLD AUTO: 26.9 % (ref 36.6–50.3)
HCT VFR BLD AUTO: 27.2 % (ref 36.6–50.3)
HCT VFR BLD AUTO: 27.7 % (ref 36.6–50.3)
HCT VFR BLD AUTO: 28.1 % (ref 36.6–50.3)
HCT VFR BLD AUTO: 28.3 % (ref 36.6–50.3)
HCT VFR BLD AUTO: 29.3 % (ref 36.6–50.3)
HCT VFR BLD AUTO: 29.7 % (ref 36.6–50.3)
HCT VFR BLD AUTO: 31.8 % (ref 36.6–50.3)
HCT VFR BLD AUTO: 34.3 % (ref 36.6–50.3)
HCV AB SER IA-ACNC: >11 INDEX
HCV AB SER IA-ACNC: >11 INDEX
HCV AB SERPL QL IA: REACTIVE
HCV AB SERPL QL IA: REACTIVE
HCV GENOTYPE: NORMAL
HCV GENTYP SERPL NAA+PROBE: NORMAL
HCV RNA SERPL NAA+PROBE-ACNC: NORMAL IU/ML
HCV RNA SERPL NAA+PROBE-LOG IU: 5.35 LOG10 IU/ML
HEMOCCULT STL QL: POSITIVE
HGB BLD-MCNC: 10.2 G/DL (ref 12.1–17)
HGB BLD-MCNC: 10.7 G/DL (ref 12.1–17)
HGB BLD-MCNC: 5.6 G/DL (ref 12.1–17)
HGB BLD-MCNC: 5.9 G/DL (ref 12.1–17)
HGB BLD-MCNC: 6.5 G/DL (ref 12.1–17)
HGB BLD-MCNC: 6.7 G/DL (ref 12.1–17)
HGB BLD-MCNC: 6.8 G/DL (ref 12.1–17)
HGB BLD-MCNC: 7.4 G/DL (ref 12.1–17)
HGB BLD-MCNC: 7.5 G/DL (ref 12.1–17)
HGB BLD-MCNC: 7.5 G/DL (ref 12.1–17)
HGB BLD-MCNC: 7.6 G/DL (ref 12.1–17)
HGB BLD-MCNC: 7.8 G/DL (ref 12.1–17)
HGB BLD-MCNC: 7.9 G/DL (ref 12.1–17)
HGB BLD-MCNC: 8 G/DL (ref 12.1–17)
HGB BLD-MCNC: 8.3 G/DL (ref 12.1–17)
HGB BLD-MCNC: 8.5 G/DL (ref 12.1–17)
HGB BLD-MCNC: 8.6 G/DL (ref 12.1–17)
HGB BLD-MCNC: 8.8 G/DL (ref 12.1–17)
HGB BLD-MCNC: 9 G/DL (ref 12.1–17)
HGB BLD-MCNC: 9 G/DL (ref 12.1–17)
HGB BLD-MCNC: 9.2 G/DL (ref 12.1–17)
HGB BLD-MCNC: 9.7 G/DL (ref 12.1–17)
HISTORY CHECKED?,CKHIST: NORMAL
HIV 1+2 AB+HIV1 P24 AG SERPL QL IA: NONREACTIVE
HIV12 RESULT COMMENT, HHIVC: NORMAL
IGG SERPL-MCNC: 1630 MG/DL (ref 700–1600)
IMM GRANULOCYTES # BLD AUTO: 0 K/UL (ref 0–0.04)
IMM GRANULOCYTES # BLD AUTO: 0.1 K/UL (ref 0–0.04)
IMM GRANULOCYTES NFR BLD AUTO: 0 % (ref 0–0.5)
IMM GRANULOCYTES NFR BLD AUTO: 1 % (ref 0–0.5)
INR PPP: 1.2 (ref 0.9–1.1)
INR PPP: 1.7 (ref 0.9–1.1)
INR PPP: 2 (ref 0.9–1.1)
INR PPP: 2.1 (ref 0.9–1.1)
INR PPP: 2.3 (ref 0.9–1.1)
INR PPP: 2.4 (ref 0.9–1.1)
INR PPP: 3 (ref 0.9–1.1)
INR PPP: 3.7 (ref 0.9–1.1)
INR PPP: 5.7 (ref 0.9–1.1)
INSULIN SERPL-ACNC: 2 UIU/ML (ref 2.6–24.9)
INSULIN SERPL-ACNC: 6.4 UIU/ML (ref 2.6–24.9)
IRON SATN MFR SERPL: ABNORMAL % (ref 20–50)
IRON SERPL-MCNC: 175 UG/DL (ref 35–150)
LACTATE BLD-SCNC: 5.21 MMOL/L (ref 0.4–2)
LACTATE BLD-SCNC: 6.74 MMOL/L (ref 0.4–2)
LACTATE SERPL-SCNC: 10.7 MMOL/L (ref 0.4–2)
LACTATE SERPL-SCNC: 14 MMOL/L (ref 0.4–2)
LACTATE SERPL-SCNC: 15.1 MMOL/L (ref 0.4–2)
LACTATE SERPL-SCNC: 3.3 MMOL/L (ref 0.4–2)
LACTATE SERPL-SCNC: 3.6 MMOL/L (ref 0.4–2)
LACTATE SERPL-SCNC: 3.8 MMOL/L (ref 0.4–2)
LACTATE SERPL-SCNC: 5.9 MMOL/L (ref 0.4–2)
LACTATE SERPL-SCNC: 6 MMOL/L (ref 0.4–2)
LACTATE SERPL-SCNC: 9.2 MMOL/L (ref 0.4–2)
LYMPHOCYTES # BLD: 0.8 K/UL (ref 0.8–3.5)
LYMPHOCYTES # BLD: 1 K/UL (ref 0.8–3.5)
LYMPHOCYTES # BLD: 1.2 K/UL (ref 0.8–3.5)
LYMPHOCYTES # BLD: 1.3 K/UL (ref 0.8–3.5)
LYMPHOCYTES # BLD: 1.4 K/UL (ref 0.8–3.5)
LYMPHOCYTES # BLD: 1.4 K/UL (ref 0.8–3.5)
LYMPHOCYTES # BLD: 2.2 K/UL (ref 0.8–3.5)
LYMPHOCYTES # BLD: 2.4 K/UL (ref 0.8–3.5)
LYMPHOCYTES # BLD: 2.7 K/UL (ref 0.8–3.5)
LYMPHOCYTES # BLD: 3.8 K/UL (ref 0.8–3.5)
LYMPHOCYTES NFR BLD: 11 % (ref 12–49)
LYMPHOCYTES NFR BLD: 11 % (ref 12–49)
LYMPHOCYTES NFR BLD: 14 % (ref 12–49)
LYMPHOCYTES NFR BLD: 17 % (ref 12–49)
LYMPHOCYTES NFR BLD: 17 % (ref 12–49)
LYMPHOCYTES NFR BLD: 20 % (ref 12–49)
LYMPHOCYTES NFR BLD: 21 % (ref 12–49)
LYMPHOCYTES NFR BLD: 23 % (ref 12–49)
LYMPHOCYTES NFR BLD: 24 % (ref 12–49)
LYMPHOCYTES NFR BLD: 29 % (ref 12–49)
LYMPHOCYTES NFR BLD: 7 % (ref 12–49)
LYMPHOCYTES NFR BLD: 9 % (ref 12–49)
MAGNESIUM SERPL-MCNC: 1.8 MG/DL (ref 1.6–2.4)
MAGNESIUM SERPL-MCNC: 1.8 MG/DL (ref 1.6–2.4)
MAGNESIUM SERPL-MCNC: 1.9 MG/DL (ref 1.6–2.4)
MAGNESIUM SERPL-MCNC: 2 MG/DL (ref 1.6–2.4)
MAGNESIUM SERPL-MCNC: 2.1 MG/DL (ref 1.6–2.4)
MAGNESIUM SERPL-MCNC: 2.2 MG/DL (ref 1.6–2.4)
MAGNESIUM SERPL-MCNC: 2.2 MG/DL (ref 1.6–2.4)
MCH RBC QN AUTO: 31.3 PG (ref 26–34)
MCH RBC QN AUTO: 31.6 PG (ref 26–34)
MCH RBC QN AUTO: 31.8 PG (ref 26–34)
MCH RBC QN AUTO: 32 PG (ref 26–34)
MCH RBC QN AUTO: 32.1 PG (ref 26–34)
MCH RBC QN AUTO: 32.2 PG (ref 26–34)
MCH RBC QN AUTO: 32.2 PG (ref 26–34)
MCH RBC QN AUTO: 32.4 PG (ref 26–34)
MCH RBC QN AUTO: 32.6 PG (ref 26–34)
MCH RBC QN AUTO: 32.7 PG (ref 26–34)
MCH RBC QN AUTO: 32.7 PG (ref 26–34)
MCH RBC QN AUTO: 32.8 PG (ref 26–34)
MCH RBC QN AUTO: 32.9 PG (ref 26–34)
MCH RBC QN AUTO: 32.9 PG (ref 26–34)
MCH RBC QN AUTO: 33.2 PG (ref 26–34)
MCH RBC QN AUTO: 33.7 PG (ref 26–34)
MCHC RBC AUTO-ENTMCNC: 29.2 G/DL (ref 30–36.5)
MCHC RBC AUTO-ENTMCNC: 29.3 G/DL (ref 30–36.5)
MCHC RBC AUTO-ENTMCNC: 30.6 G/DL (ref 30–36.5)
MCHC RBC AUTO-ENTMCNC: 30.7 G/DL (ref 30–36.5)
MCHC RBC AUTO-ENTMCNC: 31.1 G/DL (ref 30–36.5)
MCHC RBC AUTO-ENTMCNC: 31.2 G/DL (ref 30–36.5)
MCHC RBC AUTO-ENTMCNC: 31.3 G/DL (ref 30–36.5)
MCHC RBC AUTO-ENTMCNC: 31.4 G/DL (ref 30–36.5)
MCHC RBC AUTO-ENTMCNC: 31.5 G/DL (ref 30–36.5)
MCHC RBC AUTO-ENTMCNC: 31.6 G/DL (ref 30–36.5)
MCHC RBC AUTO-ENTMCNC: 31.6 G/DL (ref 30–36.5)
MCHC RBC AUTO-ENTMCNC: 31.7 G/DL (ref 30–36.5)
MCHC RBC AUTO-ENTMCNC: 31.9 G/DL (ref 30–36.5)
MCHC RBC AUTO-ENTMCNC: 32 G/DL (ref 30–36.5)
MCHC RBC AUTO-ENTMCNC: 32.1 G/DL (ref 30–36.5)
MCHC RBC AUTO-ENTMCNC: 32.2 G/DL (ref 30–36.5)
MCHC RBC AUTO-ENTMCNC: 32.4 G/DL (ref 30–36.5)
MCHC RBC AUTO-ENTMCNC: 32.5 G/DL (ref 30–36.5)
MCHC RBC AUTO-ENTMCNC: 32.7 G/DL (ref 30–36.5)
MCHC RBC AUTO-ENTMCNC: 32.8 G/DL (ref 30–36.5)
MCV RBC AUTO: 100 FL (ref 80–99)
MCV RBC AUTO: 100.5 FL (ref 80–99)
MCV RBC AUTO: 100.7 FL (ref 80–99)
MCV RBC AUTO: 101 FL (ref 80–99)
MCV RBC AUTO: 101.1 FL (ref 80–99)
MCV RBC AUTO: 101.1 FL (ref 80–99)
MCV RBC AUTO: 101.7 FL (ref 80–99)
MCV RBC AUTO: 102 FL (ref 80–99)
MCV RBC AUTO: 102.4 FL (ref 80–99)
MCV RBC AUTO: 102.6 FL (ref 80–99)
MCV RBC AUTO: 102.7 FL (ref 80–99)
MCV RBC AUTO: 103 FL (ref 80–99)
MCV RBC AUTO: 103.3 FL (ref 80–99)
MCV RBC AUTO: 104.6 FL (ref 80–99)
MCV RBC AUTO: 104.9 FL (ref 80–99)
MCV RBC AUTO: 106.2 FL (ref 80–99)
MCV RBC AUTO: 110.1 FL (ref 80–99)
MCV RBC AUTO: 112.7 FL (ref 80–99)
MCV RBC AUTO: 95.5 FL (ref 80–99)
MCV RBC AUTO: 99.6 FL (ref 80–99)
MCV RBC AUTO: 99.6 FL (ref 80–99)
MCV RBC AUTO: ABNORMAL FL (ref 80–99)
METAMYELOCYTES NFR BLD MANUAL: 1 %
MITOCHONDRIA M2 IGG SER-ACNC: <20 UNITS (ref 0–20)
MONOCYTES # BLD: 0.4 K/UL (ref 0–1)
MONOCYTES # BLD: 0.6 K/UL (ref 0–1)
MONOCYTES # BLD: 0.7 K/UL (ref 0–1)
MONOCYTES # BLD: 0.7 K/UL (ref 0–1)
MONOCYTES # BLD: 0.8 K/UL (ref 0–1)
MONOCYTES # BLD: 0.9 K/UL (ref 0–1)
MONOCYTES # BLD: 0.9 K/UL (ref 0–1)
MONOCYTES # BLD: 1 K/UL (ref 0–1)
MONOCYTES # BLD: 1 K/UL (ref 0–1)
MONOCYTES # BLD: 1.1 K/UL (ref 0–1)
MONOCYTES # BLD: 1.2 K/UL (ref 0–1)
MONOCYTES # BLD: 1.3 K/UL (ref 0–1)
MONOCYTES NFR BLD: 10 % (ref 5–13)
MONOCYTES NFR BLD: 10 % (ref 5–13)
MONOCYTES NFR BLD: 11 % (ref 5–13)
MONOCYTES NFR BLD: 4 % (ref 5–13)
MONOCYTES NFR BLD: 5 % (ref 5–13)
MONOCYTES NFR BLD: 6 % (ref 5–13)
MONOCYTES NFR BLD: 8 % (ref 5–13)
MONOCYTES NFR BLD: 9 % (ref 5–13)
MONOCYTES NFR BLD: 9 % (ref 5–13)
NATEGLINIDE SERPL-MCNC: NEGATIVE NG/ML
NEUTS SEG # BLD: 10.7 K/UL (ref 1.8–8)
NEUTS SEG # BLD: 15.6 K/UL (ref 1.8–8)
NEUTS SEG # BLD: 5.5 K/UL (ref 1.8–8)
NEUTS SEG # BLD: 6 K/UL (ref 1.8–8)
NEUTS SEG # BLD: 6.1 K/UL (ref 1.8–8)
NEUTS SEG # BLD: 7.3 K/UL (ref 1.8–8)
NEUTS SEG # BLD: 7.7 K/UL (ref 1.8–8)
NEUTS SEG # BLD: 7.7 K/UL (ref 1.8–8)
NEUTS SEG # BLD: 7.8 K/UL (ref 1.8–8)
NEUTS SEG # BLD: 8 K/UL (ref 1.8–8)
NEUTS SEG # BLD: 9.3 K/UL (ref 1.8–8)
NEUTS SEG # BLD: 9.4 K/UL (ref 1.8–8)
NEUTS SEG NFR BLD: 58 % (ref 32–75)
NEUTS SEG NFR BLD: 64 % (ref 32–75)
NEUTS SEG NFR BLD: 66 % (ref 32–75)
NEUTS SEG NFR BLD: 67 % (ref 32–75)
NEUTS SEG NFR BLD: 69 % (ref 32–75)
NEUTS SEG NFR BLD: 73 % (ref 32–75)
NEUTS SEG NFR BLD: 74 % (ref 32–75)
NEUTS SEG NFR BLD: 77 % (ref 32–75)
NEUTS SEG NFR BLD: 78 % (ref 32–75)
NEUTS SEG NFR BLD: 82 % (ref 32–75)
NEUTS SEG NFR BLD: 84 % (ref 32–75)
NEUTS SEG NFR BLD: 89 % (ref 32–75)
NRBC # BLD: 0 K/UL (ref 0–0.01)
NRBC # BLD: 0.02 K/UL (ref 0–0.01)
NRBC # BLD: 0.03 K/UL (ref 0–0.01)
NRBC # BLD: 0.04 K/UL (ref 0–0.01)
NRBC # BLD: 0.08 K/UL (ref 0–0.01)
NRBC # BLD: 0.14 K/UL (ref 0–0.01)
NRBC # BLD: 0.19 K/UL (ref 0–0.01)
NRBC # BLD: 1.52 K/UL (ref 0–0.01)
NRBC # BLD: 1.65 K/UL (ref 0–0.01)
NRBC # BLD: 11.77 K/UL (ref 0–0.01)
NRBC # BLD: 13.01 K/UL (ref 0–0.01)
NRBC # BLD: 2.92 K/UL (ref 0–0.01)
NRBC # BLD: 2.93 K/UL (ref 0–0.01)
NRBC # BLD: 4.23 K/UL (ref 0–0.01)
NRBC # BLD: 5.72 K/UL (ref 0–0.01)
NRBC # BLD: 6.35 K/UL (ref 0–0.01)
NRBC # BLD: 9.61 K/UL (ref 0–0.01)
NRBC BLD-RTO: 0 PER 100 WBC
NRBC BLD-RTO: 0.2 PER 100 WBC
NRBC BLD-RTO: 0.4 PER 100 WBC
NRBC BLD-RTO: 0.4 PER 100 WBC
NRBC BLD-RTO: 0.9 PER 100 WBC
NRBC BLD-RTO: 1.5 PER 100 WBC
NRBC BLD-RTO: 1.9 PER 100 WBC
NRBC BLD-RTO: 10.4 PER 100 WBC
NRBC BLD-RTO: 12 PER 100 WBC
NRBC BLD-RTO: 16.6 PER 100 WBC
NRBC BLD-RTO: 20.3 PER 100 WBC
NRBC BLD-RTO: 22.8 PER 100 WBC
NRBC BLD-RTO: 30.5 PER 100 WBC
NRBC BLD-RTO: 32.5 PER 100 WBC
NRBC BLD-RTO: 42 PER 100 WBC
NRBC BLD-RTO: 57 PER 100 WBC
NRBC BLD-RTO: 9.4 PER 100 WBC
P-R INTERVAL, ECG05: 136 MS
P-R INTERVAL, ECG05: 156 MS
P-R INTERVAL, ECG05: 158 MS
P-R INTERVAL, ECG05: 166 MS
P-R INTERVAL, ECG05: 168 MS
P-R INTERVAL, ECG05: 202 MS
PATH REV BLD -IMP: NORMAL
PCO2 BLDA: 19 MMHG (ref 35–45)
PCO2 BLDA: 26 MMHG (ref 35–45)
PCO2 BLDA: 30 MMHG (ref 35–45)
PCO2 BLDA: 32 MMHG (ref 35–45)
PCO2 BLDA: 33 MMHG (ref 35–45)
PCO2 BLDA: 37 MMHG (ref 35–45)
PCO2 BLDA: 52 MMHG (ref 35–45)
PCO2 BLDA: <17 MMHG (ref 35–45)
PCO2 BLDV: 23.2 MMHG (ref 41–51)
PCO2 BLDV: 42.3 MMHG (ref 41–51)
PCO2 BLDV: 46.8 MMHG (ref 41–51)
PCO2 BLDV: 47.5 MMHG (ref 41–51)
PEEP RESPIRATORY: 5 CM[H2O]
PH BLDA: 7.33 [PH] (ref 7.35–7.45)
PH BLDA: 7.34 [PH] (ref 7.35–7.45)
PH BLDA: 7.36 [PH] (ref 7.35–7.45)
PH BLDA: 7.38 [PH] (ref 7.35–7.45)
PH BLDA: 7.39 [PH] (ref 7.35–7.45)
PH BLDA: 7.44 [PH] (ref 7.35–7.45)
PH BLDA: 7.51 [PH] (ref 7.35–7.45)
PH BLDA: 7.54 [PH] (ref 7.35–7.45)
PH BLDV: 7.26 [PH] (ref 7.32–7.42)
PH BLDV: 7.29 [PH] (ref 7.32–7.42)
PH BLDV: 7.34 [PH] (ref 7.32–7.42)
PH BLDV: 7.39 [PH] (ref 7.32–7.42)
PHOSPHATE SERPL-MCNC: 4.6 MG/DL (ref 2.6–4.7)
PHOSPHATE SERPL-MCNC: 4.9 MG/DL (ref 2.6–4.7)
PHOSPHATE SERPL-MCNC: 5.1 MG/DL (ref 2.6–4.7)
PHOSPHATE SERPL-MCNC: 5.2 MG/DL (ref 2.6–4.7)
PHOSPHATE SERPL-MCNC: 5.9 MG/DL (ref 2.6–4.7)
PLATELET # BLD AUTO: 101 K/UL (ref 150–400)
PLATELET # BLD AUTO: 102 K/UL (ref 150–400)
PLATELET # BLD AUTO: 103 K/UL (ref 150–400)
PLATELET # BLD AUTO: 108 K/UL (ref 150–400)
PLATELET # BLD AUTO: 119 K/UL (ref 150–400)
PLATELET # BLD AUTO: 125 K/UL (ref 150–400)
PLATELET # BLD AUTO: 16 K/UL (ref 150–400)
PLATELET # BLD AUTO: 175 K/UL (ref 150–400)
PLATELET # BLD AUTO: 176 K/UL (ref 150–400)
PLATELET # BLD AUTO: 180 K/UL (ref 150–400)
PLATELET # BLD AUTO: 190 K/UL (ref 150–400)
PLATELET # BLD AUTO: 191 K/UL (ref 150–400)
PLATELET # BLD AUTO: 192 K/UL (ref 150–400)
PLATELET # BLD AUTO: 207 K/UL (ref 150–400)
PLATELET # BLD AUTO: 227 K/UL (ref 150–400)
PLATELET # BLD AUTO: 23 K/UL (ref 150–400)
PLATELET # BLD AUTO: 28 K/UL (ref 150–400)
PLATELET # BLD AUTO: 30 K/UL (ref 150–400)
PLATELET # BLD AUTO: 57 K/UL (ref 150–400)
PLATELET # BLD AUTO: 81 K/UL (ref 150–400)
PLATELET # BLD AUTO: 93 K/UL (ref 150–400)
PLATELET # BLD AUTO: 98 K/UL (ref 150–400)
PLATELET COMMENTS,PCOM: ABNORMAL
PLEASE NOTE, 550474: NORMAL
PMV BLD AUTO: 10.3 FL (ref 8.9–12.9)
PMV BLD AUTO: 11.1 FL (ref 8.9–12.9)
PMV BLD AUTO: 11.2 FL (ref 8.9–12.9)
PMV BLD AUTO: 11.3 FL (ref 8.9–12.9)
PMV BLD AUTO: 11.4 FL (ref 8.9–12.9)
PMV BLD AUTO: 11.5 FL (ref 8.9–12.9)
PMV BLD AUTO: 11.6 FL (ref 8.9–12.9)
PMV BLD AUTO: 11.7 FL (ref 8.9–12.9)
PMV BLD AUTO: 11.9 FL (ref 8.9–12.9)
PMV BLD AUTO: 12 FL (ref 8.9–12.9)
PMV BLD AUTO: 12.1 FL (ref 8.9–12.9)
PMV BLD AUTO: 12.3 FL (ref 8.9–12.9)
PMV BLD AUTO: 12.5 FL (ref 8.9–12.9)
PMV BLD AUTO: 12.7 FL (ref 8.9–12.9)
PMV BLD AUTO: 13 FL (ref 8.9–12.9)
PO2 BLDA: 126 MMHG (ref 80–100)
PO2 BLDA: 171 MMHG (ref 80–100)
PO2 BLDA: 41 MMHG (ref 80–100)
PO2 BLDA: 433 MMHG (ref 80–100)
PO2 BLDA: 64 MMHG (ref 80–100)
PO2 BLDA: 74 MMHG (ref 80–100)
PO2 BLDA: 84 MMHG (ref 80–100)
PO2 BLDA: 88 MMHG (ref 80–100)
PO2 BLDV: 19 MMHG (ref 25–40)
PO2 BLDV: 21 MMHG (ref 25–40)
PO2 BLDV: 31 MMHG (ref 25–40)
PO2 BLDV: 53 MMHG (ref 25–40)
POTASSIUM BLD-SCNC: 7.4 MMOL/L (ref 3.5–5.5)
POTASSIUM BLD-SCNC: 8 MMOL/L (ref 3.5–5.5)
POTASSIUM SERPL-SCNC: 3.2 MMOL/L (ref 3.5–5.1)
POTASSIUM SERPL-SCNC: 3.2 MMOL/L (ref 3.5–5.1)
POTASSIUM SERPL-SCNC: 3.3 MMOL/L (ref 3.5–5.1)
POTASSIUM SERPL-SCNC: 3.3 MMOL/L (ref 3.5–5.1)
POTASSIUM SERPL-SCNC: 3.4 MMOL/L (ref 3.5–5.1)
POTASSIUM SERPL-SCNC: 3.4 MMOL/L (ref 3.5–5.1)
POTASSIUM SERPL-SCNC: 3.6 MMOL/L (ref 3.5–5.1)
POTASSIUM SERPL-SCNC: 3.6 MMOL/L (ref 3.5–5.1)
POTASSIUM SERPL-SCNC: 3.8 MMOL/L (ref 3.5–5.1)
POTASSIUM SERPL-SCNC: 4 MMOL/L (ref 3.5–5.1)
POTASSIUM SERPL-SCNC: 4.1 MMOL/L (ref 3.5–5.1)
POTASSIUM SERPL-SCNC: 4.1 MMOL/L (ref 3.5–5.1)
POTASSIUM SERPL-SCNC: 4.3 MMOL/L (ref 3.5–5.1)
POTASSIUM SERPL-SCNC: 4.4 MMOL/L (ref 3.5–5.1)
POTASSIUM SERPL-SCNC: 4.5 MMOL/L (ref 3.5–5.1)
POTASSIUM SERPL-SCNC: 4.6 MMOL/L (ref 3.5–5.1)
POTASSIUM SERPL-SCNC: 4.8 MMOL/L (ref 3.5–5.1)
POTASSIUM SERPL-SCNC: 5 MMOL/L (ref 3.5–5.1)
POTASSIUM SERPL-SCNC: 5 MMOL/L (ref 3.5–5.1)
POTASSIUM SERPL-SCNC: 5.2 MMOL/L (ref 3.5–5.1)
POTASSIUM SERPL-SCNC: 6.2 MMOL/L (ref 3.5–5.1)
POTASSIUM SERPL-SCNC: 6.9 MMOL/L (ref 3.5–5.1)
POTASSIUM SERPL-SCNC: 7.1 MMOL/L (ref 3.5–5.1)
PRESSURE SUPPORT SETTING VENT: 5 CM[H2O]
PROCALCITONIN SERPL-MCNC: 18.16 NG/ML
PROCALCITONIN SERPL-MCNC: 19.62 NG/ML
PROCALCITONIN SERPL-MCNC: 24.04 NG/ML
PROCALCITONIN SERPL-MCNC: 34.38 NG/ML
PROCALCITONIN SERPL-MCNC: 43.91 NG/ML
PROINSULIN SERPL-SCNC: 8.5 PMOL/L (ref 0–10)
PROINSULIN SERPL-SCNC: 9.8 PMOL/L (ref 0–10)
PROT SERPL-MCNC: 3.6 G/DL (ref 6.4–8.2)
PROT SERPL-MCNC: 3.9 G/DL (ref 6.4–8.2)
PROT SERPL-MCNC: 4.3 G/DL (ref 6.4–8.2)
PROT SERPL-MCNC: 4.8 G/DL (ref 6.4–8.2)
PROT SERPL-MCNC: 4.8 G/DL (ref 6.4–8.2)
PROT SERPL-MCNC: 4.9 G/DL (ref 6.4–8.2)
PROT SERPL-MCNC: 5.1 G/DL (ref 6.4–8.2)
PROT SERPL-MCNC: 5.5 G/DL (ref 6.4–8.2)
PROT SERPL-MCNC: 5.9 G/DL (ref 6.4–8.2)
PROT SERPL-MCNC: 6.1 G/DL (ref 6.4–8.2)
PROT SERPL-MCNC: 6.6 G/DL (ref 6.4–8.2)
PROT SERPL-MCNC: 7 G/DL (ref 6.4–8.2)
PROT SERPL-MCNC: 7.6 G/DL (ref 6.4–8.2)
PROT SERPL-MCNC: 7.7 G/DL (ref 6.4–8.2)
PROT SERPL-MCNC: 8 G/DL (ref 6.4–8.2)
PROTHROMBIN TIME: 12.2 SEC (ref 9–11.1)
PROTHROMBIN TIME: 17.1 SEC (ref 9–11.1)
PROTHROMBIN TIME: 19.8 SEC (ref 9–11.1)
PROTHROMBIN TIME: 21.1 SEC (ref 9–11.1)
PROTHROMBIN TIME: 22.9 SEC (ref 9–11.1)
PROTHROMBIN TIME: 24.3 SEC (ref 9–11.1)
PROTHROMBIN TIME: 29.5 SEC (ref 9–11.1)
PROTHROMBIN TIME: 36.9 SEC (ref 9–11.1)
PROTHROMBIN TIME: 55.5 SEC (ref 9–11.1)
Q-T INTERVAL, ECG07: 314 MS
Q-T INTERVAL, ECG07: 348 MS
Q-T INTERVAL, ECG07: 436 MS
Q-T INTERVAL, ECG07: 478 MS
Q-T INTERVAL, ECG07: 482 MS
Q-T INTERVAL, ECG07: 516 MS
QRS DURATION, ECG06: 116 MS
QRS DURATION, ECG06: 122 MS
QRS DURATION, ECG06: 134 MS
QRS DURATION, ECG06: 88 MS
QRS DURATION, ECG06: 90 MS
QRS DURATION, ECG06: 90 MS
QTC CALCULATION (BEZET), ECG08: 441 MS
QTC CALCULATION (BEZET), ECG08: 489 MS
QTC CALCULATION (BEZET), ECG08: 515 MS
QTC CALCULATION (BEZET), ECG08: 564 MS
QTC CALCULATION (BEZET), ECG08: 583 MS
QTC CALCULATION (BEZET), ECG08: 602 MS
RBC # BLD AUTO: 1.76 M/UL (ref 4.1–5.7)
RBC # BLD AUTO: 1.8 M/UL (ref 4.1–5.7)
RBC # BLD AUTO: 1.93 M/UL (ref 4.1–5.7)
RBC # BLD AUTO: 1.98 M/UL (ref 4.1–5.7)
RBC # BLD AUTO: 2.03 M/UL (ref 4.1–5.7)
RBC # BLD AUTO: 2.08 M/UL (ref 4.1–5.7)
RBC # BLD AUTO: 2.23 M/UL (ref 4.1–5.7)
RBC # BLD AUTO: 2.28 M/UL (ref 4.1–5.7)
RBC # BLD AUTO: 2.34 M/UL (ref 4.1–5.7)
RBC # BLD AUTO: 2.43 M/UL (ref 4.1–5.7)
RBC # BLD AUTO: 2.43 M/UL (ref 4.1–5.7)
RBC # BLD AUTO: 2.45 M/UL (ref 4.1–5.7)
RBC # BLD AUTO: 2.45 M/UL (ref 4.1–5.7)
RBC # BLD AUTO: 2.66 M/UL (ref 4.1–5.7)
RBC # BLD AUTO: 2.7 M/UL (ref 4.1–5.7)
RBC # BLD AUTO: 2.72 M/UL (ref 4.1–5.7)
RBC # BLD AUTO: 2.74 M/UL (ref 4.1–5.7)
RBC # BLD AUTO: 2.8 M/UL (ref 4.1–5.7)
RBC # BLD AUTO: 2.84 M/UL (ref 4.1–5.7)
RBC # BLD AUTO: 2.97 M/UL (ref 4.1–5.7)
RBC # BLD AUTO: 3.1 M/UL (ref 4.1–5.7)
RBC # BLD AUTO: 3.34 M/UL (ref 4.1–5.7)
RBC MORPH BLD: ABNORMAL
REPAGLINIDE SERPL-MCNC: NEGATIVE NG/ML
SAMPLES BEING HELD,HOLD: NORMAL
SAO2 % BLD: 75 % (ref 92–97)
SAO2 % BLD: 94 % (ref 92–97)
SAO2 % BLD: 96 % (ref 92–97)
SAO2 % BLD: 97 % (ref 92–97)
SAO2 % BLD: 97 % (ref 92–97)
SAO2 % BLD: 98 % (ref 92–97)
SAO2 % BLD: 99 % (ref 92–97)
SAO2 % BLDV: 57 % (ref 65–88)
SAO2 % BLDV: 87 % (ref 65–88)
SAO2% DEVICE SAO2% SENSOR NAME: ABNORMAL
SARS-COV-2, COV2: NORMAL
SERVICE CMNT-IMP: ABNORMAL
SERVICE CMNT-IMP: NORMAL
SODIUM BLD-SCNC: 126 MMOL/L (ref 136–145)
SODIUM BLD-SCNC: 128 MMOL/L (ref 136–145)
SODIUM SERPL-SCNC: 120 MMOL/L (ref 136–145)
SODIUM SERPL-SCNC: 122 MMOL/L (ref 136–145)
SODIUM SERPL-SCNC: 125 MMOL/L (ref 136–145)
SODIUM SERPL-SCNC: 127 MMOL/L (ref 136–145)
SODIUM SERPL-SCNC: 128 MMOL/L (ref 136–145)
SODIUM SERPL-SCNC: 129 MMOL/L (ref 136–145)
SODIUM SERPL-SCNC: 130 MMOL/L (ref 136–145)
SODIUM SERPL-SCNC: 131 MMOL/L (ref 136–145)
SODIUM SERPL-SCNC: 132 MMOL/L (ref 136–145)
SODIUM SERPL-SCNC: 133 MMOL/L (ref 136–145)
SODIUM SERPL-SCNC: 134 MMOL/L (ref 136–145)
SODIUM SERPL-SCNC: 134 MMOL/L (ref 136–145)
SODIUM SERPL-SCNC: 135 MMOL/L (ref 136–145)
SODIUM SERPL-SCNC: 136 MMOL/L (ref 136–145)
SODIUM SERPL-SCNC: 136 MMOL/L (ref 136–145)
SODIUM SERPL-SCNC: 137 MMOL/L (ref 136–145)
SODIUM SERPL-SCNC: 138 MMOL/L (ref 136–145)
SODIUM SERPL-SCNC: 138 MMOL/L (ref 136–145)
SODIUM SERPL-SCNC: 145 MMOL/L (ref 136–145)
SOURCE, COVRS: NORMAL
SOURCE, RSRC81: NORMAL
SP1: ABNORMAL
SP2: ABNORMAL
SP3: ABNORMAL
SPECIMEN EXP DATE BLD: NORMAL
SPECIMEN EXP DATE BLD: NORMAL
SPECIMEN SITE: ABNORMAL
SPECIMEN SOURCE: NORMAL
SPECIMEN SOURCE: NORMAL
SPECIMEN TYPE: ABNORMAL
STATUS OF UNIT,%ST: NORMAL
T4 FREE SERPL-MCNC: 1.9 NG/DL (ref 0.8–1.5)
TEST INFORMATION, 550045: NORMAL
THERAPEUTIC RANGE,PTTT: ABNORMAL SECS (ref 58–77)
TIBC SERPL-MCNC: 150 UG/DL (ref 250–450)
TOLAZAMIDE SERPL-MCNC: NEGATIVE UG/ML
TOLBUTAMIDE SERPL-MCNC: NEGATIVE UG/ML (ref 40–100)
TROPONIN I SERPL-MCNC: 0.05 NG/ML
TROPONIN I SERPL-MCNC: 0.13 NG/ML
TROPONIN I SERPL-MCNC: 0.19 NG/ML
TROPONIN I SERPL-MCNC: 0.47 NG/ML
TROPONIN I SERPL-MCNC: 0.47 NG/ML
TROPONIN I SERPL-MCNC: 0.71 NG/ML
TSH SERPL DL<=0.05 MIU/L-ACNC: 2.07 UIU/ML (ref 0.36–3.74)
UNIT DIVISION, %UDIV: 0
VENTILATION MODE VENT: ABNORMAL
VENTRICULAR RATE, ECG03: 146 BPM
VENTRICULAR RATE, ECG03: 77 BPM
VENTRICULAR RATE, ECG03: 84 BPM
VENTRICULAR RATE, ECG03: 84 BPM
VENTRICULAR RATE, ECG03: 94 BPM
VENTRICULAR RATE, ECG03: 97 BPM
VORICONAZOLE ISLT MIC: NORMAL UG/ML
VT SETTING VENT: 420 ML
VT SETTING VENT: 500 ML
VT SETTING VENT: 500 ML
WBC # BLD AUTO: 10.3 K/UL (ref 4.1–11.1)
WBC # BLD AUTO: 11.1 K/UL (ref 4.1–11.1)
WBC # BLD AUTO: 11.6 K/UL (ref 4.1–11.1)
WBC # BLD AUTO: 11.8 K/UL (ref 4.1–11.1)
WBC # BLD AUTO: 12.9 K/UL (ref 4.1–11.1)
WBC # BLD AUTO: 13.9 K/UL (ref 4.1–11.1)
WBC # BLD AUTO: 15.9 K/UL (ref 4.1–11.1)
WBC # BLD AUTO: 16.1 K/UL (ref 4.1–11.1)
WBC # BLD AUTO: 17.5 K/UL (ref 4.1–11.1)
WBC # BLD AUTO: 20.7 K/UL (ref 4.1–11.1)
WBC # BLD AUTO: 24.4 K/UL (ref 4.1–11.1)
WBC # BLD AUTO: 27.9 K/UL (ref 4.1–11.1)
WBC # BLD AUTO: 28.2 K/UL (ref 4.1–11.1)
WBC # BLD AUTO: 29.6 K/UL (ref 4.1–11.1)
WBC # BLD AUTO: 30.9 K/UL (ref 4.1–11.1)
WBC # BLD AUTO: 7.5 K/UL (ref 4.1–11.1)
WBC # BLD AUTO: 8.3 K/UL (ref 4.1–11.1)
WBC # BLD AUTO: 8.7 K/UL (ref 4.1–11.1)
WBC # BLD AUTO: 8.8 K/UL (ref 4.1–11.1)
WBC # BLD AUTO: 9.5 K/UL (ref 4.1–11.1)
WBC # BLD AUTO: 9.5 K/UL (ref 4.1–11.1)
WBC # BLD AUTO: 9.6 K/UL (ref 4.1–11.1)
WBC MORPH BLD: ABNORMAL
WBC MORPH BLD: ABNORMAL

## 2021-01-01 PROCEDURE — 31500 INSERT EMERGENCY AIRWAY: CPT

## 2021-01-01 PROCEDURE — 97530 THERAPEUTIC ACTIVITIES: CPT

## 2021-01-01 PROCEDURE — 80048 BASIC METABOLIC PNL TOTAL CA: CPT

## 2021-01-01 PROCEDURE — 74011250637 HC RX REV CODE- 250/637: Performed by: NURSE PRACTITIONER

## 2021-01-01 PROCEDURE — 74011250636 HC RX REV CODE- 250/636: Performed by: INTERNAL MEDICINE

## 2021-01-01 PROCEDURE — 2709999900 HC NON-CHARGEABLE SUPPLY

## 2021-01-01 PROCEDURE — 87205 SMEAR GRAM STAIN: CPT

## 2021-01-01 PROCEDURE — 84484 ASSAY OF TROPONIN QUANT: CPT

## 2021-01-01 PROCEDURE — 80076 HEPATIC FUNCTION PANEL: CPT

## 2021-01-01 PROCEDURE — 74011000250 HC RX REV CODE- 250: Performed by: INTERNAL MEDICINE

## 2021-01-01 PROCEDURE — 74011250637 HC RX REV CODE- 250/637: Performed by: HOSPITALIST

## 2021-01-01 PROCEDURE — 85379 FIBRIN DEGRADATION QUANT: CPT

## 2021-01-01 PROCEDURE — 94003 VENT MGMT INPAT SUBQ DAY: CPT

## 2021-01-01 PROCEDURE — 87389 HIV-1 AG W/HIV-1&-2 AB AG IA: CPT

## 2021-01-01 PROCEDURE — 85025 COMPLETE CBC W/AUTO DIFF WBC: CPT

## 2021-01-01 PROCEDURE — 74011250637 HC RX REV CODE- 250/637: Performed by: STUDENT IN AN ORGANIZED HEALTH CARE EDUCATION/TRAINING PROGRAM

## 2021-01-01 PROCEDURE — 82962 GLUCOSE BLOOD TEST: CPT

## 2021-01-01 PROCEDURE — 65660000000 HC RM CCU STEPDOWN

## 2021-01-01 PROCEDURE — 90935 HEMODIALYSIS ONE EVALUATION: CPT

## 2021-01-01 PROCEDURE — 82550 ASSAY OF CK (CPK): CPT

## 2021-01-01 PROCEDURE — 99233 SBSQ HOSP IP/OBS HIGH 50: CPT | Performed by: INTERNAL MEDICINE

## 2021-01-01 PROCEDURE — 74011000258 HC RX REV CODE- 258: Performed by: INTERNAL MEDICINE

## 2021-01-01 PROCEDURE — 36415 COLL VENOUS BLD VENIPUNCTURE: CPT

## 2021-01-01 PROCEDURE — 83605 ASSAY OF LACTIC ACID: CPT

## 2021-01-01 PROCEDURE — 84100 ASSAY OF PHOSPHORUS: CPT

## 2021-01-01 PROCEDURE — 77010033711 HC HIGH FLOW OXYGEN

## 2021-01-01 PROCEDURE — 74011250636 HC RX REV CODE- 250/636: Performed by: NURSE PRACTITIONER

## 2021-01-01 PROCEDURE — 77010033678 HC OXYGEN DAILY

## 2021-01-01 PROCEDURE — 85610 PROTHROMBIN TIME: CPT

## 2021-01-01 PROCEDURE — 74011000250 HC RX REV CODE- 250: Performed by: NURSE PRACTITIONER

## 2021-01-01 PROCEDURE — 74011000250 HC RX REV CODE- 250: Performed by: EMERGENCY MEDICINE

## 2021-01-01 PROCEDURE — 65610000006 HC RM INTENSIVE CARE

## 2021-01-01 PROCEDURE — 99223 1ST HOSP IP/OBS HIGH 75: CPT | Performed by: INTERNAL MEDICINE

## 2021-01-01 PROCEDURE — 36600 WITHDRAWAL OF ARTERIAL BLOOD: CPT

## 2021-01-01 PROCEDURE — 71275 CT ANGIOGRAPHY CHEST: CPT

## 2021-01-01 PROCEDURE — 93005 ELECTROCARDIOGRAM TRACING: CPT

## 2021-01-01 PROCEDURE — 71250 CT THORAX DX C-: CPT

## 2021-01-01 PROCEDURE — 74011250636 HC RX REV CODE- 250/636: Performed by: HOSPITALIST

## 2021-01-01 PROCEDURE — 83036 HEMOGLOBIN GLYCOSYLATED A1C: CPT

## 2021-01-01 PROCEDURE — 99283 EMERGENCY DEPT VISIT LOW MDM: CPT

## 2021-01-01 PROCEDURE — 74011000250 HC RX REV CODE- 250: Performed by: HOSPITALIST

## 2021-01-01 PROCEDURE — 84295 ASSAY OF SERUM SODIUM: CPT

## 2021-01-01 PROCEDURE — 36430 TRANSFUSION BLD/BLD COMPNT: CPT

## 2021-01-01 PROCEDURE — 83880 ASSAY OF NATRIURETIC PEPTIDE: CPT

## 2021-01-01 PROCEDURE — 82803 BLOOD GASES ANY COMBINATION: CPT

## 2021-01-01 PROCEDURE — C9113 INJ PANTOPRAZOLE SODIUM, VIA: HCPCS | Performed by: HOSPITALIST

## 2021-01-01 PROCEDURE — 74011000258 HC RX REV CODE- 258: Performed by: NURSE PRACTITIONER

## 2021-01-01 PROCEDURE — 97165 OT EVAL LOW COMPLEX 30 MIN: CPT

## 2021-01-01 PROCEDURE — 30233N1 TRANSFUSION OF NONAUTOLOGOUS RED BLOOD CELLS INTO PERIPHERAL VEIN, PERCUTANEOUS APPROACH: ICD-10-PCS | Performed by: INTERNAL MEDICINE

## 2021-01-01 PROCEDURE — 83735 ASSAY OF MAGNESIUM: CPT

## 2021-01-01 PROCEDURE — C9113 INJ PANTOPRAZOLE SODIUM, VIA: HCPCS | Performed by: INTERNAL MEDICINE

## 2021-01-01 PROCEDURE — 74011250637 HC RX REV CODE- 250/637: Performed by: INTERNAL MEDICINE

## 2021-01-01 PROCEDURE — 71045 X-RAY EXAM CHEST 1 VIEW: CPT

## 2021-01-01 PROCEDURE — 87186 SC STD MICRODIL/AGAR DIL: CPT

## 2021-01-01 PROCEDURE — 82533 TOTAL CORTISOL: CPT

## 2021-01-01 PROCEDURE — 65270000029 HC RM PRIVATE

## 2021-01-01 PROCEDURE — 5A1D70Z PERFORMANCE OF URINARY FILTRATION, INTERMITTENT, LESS THAN 6 HOURS PER DAY: ICD-10-PCS | Performed by: INTERNAL MEDICINE

## 2021-01-01 PROCEDURE — 94760 N-INVAS EAR/PLS OXIMETRY 1: CPT

## 2021-01-01 PROCEDURE — 84145 PROCALCITONIN (PCT): CPT

## 2021-01-01 PROCEDURE — 85027 COMPLETE CBC AUTOMATED: CPT

## 2021-01-01 PROCEDURE — 74011000258 HC RX REV CODE- 258: Performed by: STUDENT IN AN ORGANIZED HEALTH CARE EDUCATION/TRAINING PROGRAM

## 2021-01-01 PROCEDURE — 83516 IMMUNOASSAY NONANTIBODY: CPT

## 2021-01-01 PROCEDURE — 75810000455 HC PLCMT CENT VENOUS CATH LVL 2 5182

## 2021-01-01 PROCEDURE — 80053 COMPREHEN METABOLIC PANEL: CPT

## 2021-01-01 PROCEDURE — 87340 HEPATITIS B SURFACE AG IA: CPT

## 2021-01-01 PROCEDURE — 86038 ANTINUCLEAR ANTIBODIES: CPT

## 2021-01-01 PROCEDURE — 74011000636 HC RX REV CODE- 636: Performed by: INTERNAL MEDICINE

## 2021-01-01 PROCEDURE — 82553 CREATINE MB FRACTION: CPT

## 2021-01-01 PROCEDURE — 99356 PR PROLONGED SVC I/P OR OBS SETTING 1ST HOUR: CPT | Performed by: INTERNAL MEDICINE

## 2021-01-01 PROCEDURE — 86923 COMPATIBILITY TEST ELECTRIC: CPT

## 2021-01-01 PROCEDURE — 82784 ASSAY IGA/IGD/IGG/IGM EACH: CPT

## 2021-01-01 PROCEDURE — 82248 BILIRUBIN DIRECT: CPT

## 2021-01-01 PROCEDURE — 87040 BLOOD CULTURE FOR BACTERIA: CPT

## 2021-01-01 PROCEDURE — 86803 HEPATITIS C AB TEST: CPT

## 2021-01-01 PROCEDURE — 74011250636 HC RX REV CODE- 250/636: Performed by: STUDENT IN AN ORGANIZED HEALTH CARE EDUCATION/TRAINING PROGRAM

## 2021-01-01 PROCEDURE — 93971 EXTREMITY STUDY: CPT

## 2021-01-01 PROCEDURE — 86706 HEP B SURFACE ANTIBODY: CPT

## 2021-01-01 PROCEDURE — 99232 SBSQ HOSP IP/OBS MODERATE 35: CPT | Performed by: INTERNAL MEDICINE

## 2021-01-01 PROCEDURE — P9073 PLATELETS PHERESIS PATH REDU: HCPCS

## 2021-01-01 PROCEDURE — 76705 ECHO EXAM OF ABDOMEN: CPT

## 2021-01-01 PROCEDURE — 80377 DRUG/SUBSTANCE NOS 7/MORE: CPT

## 2021-01-01 PROCEDURE — P9045 ALBUMIN (HUMAN), 5%, 250 ML: HCPCS | Performed by: INTERNAL MEDICINE

## 2021-01-01 PROCEDURE — 80074 ACUTE HEPATITIS PANEL: CPT

## 2021-01-01 PROCEDURE — 84206 ASSAY OF PROINSULIN: CPT

## 2021-01-01 PROCEDURE — 87106 FUNGI IDENTIFICATION YEAST: CPT

## 2021-01-01 PROCEDURE — 85014 HEMATOCRIT: CPT

## 2021-01-01 PROCEDURE — 77030018798 HC PMP KT ENTRL FED COVD -A

## 2021-01-01 PROCEDURE — 83540 ASSAY OF IRON: CPT

## 2021-01-01 PROCEDURE — 5A1955Z RESPIRATORY VENTILATION, GREATER THAN 96 CONSECUTIVE HOURS: ICD-10-PCS | Performed by: ANESTHESIOLOGY

## 2021-01-01 PROCEDURE — 84439 ASSAY OF FREE THYROXINE: CPT

## 2021-01-01 PROCEDURE — 99222 1ST HOSP IP/OBS MODERATE 55: CPT | Performed by: INTERNAL MEDICINE

## 2021-01-01 PROCEDURE — P9059 PLASMA, FRZ BETWEEN 8-24HOUR: HCPCS

## 2021-01-01 PROCEDURE — 74018 RADEX ABDOMEN 1 VIEW: CPT

## 2021-01-01 PROCEDURE — 97161 PT EVAL LOW COMPLEX 20 MIN: CPT

## 2021-01-01 PROCEDURE — 82272 OCCULT BLD FECES 1-3 TESTS: CPT

## 2021-01-01 PROCEDURE — 97116 GAIT TRAINING THERAPY: CPT | Performed by: PHYSICAL THERAPIST

## 2021-01-01 PROCEDURE — 93306 TTE W/DOPPLER COMPLETE: CPT

## 2021-01-01 PROCEDURE — 99285 EMERGENCY DEPT VISIT HI MDM: CPT

## 2021-01-01 PROCEDURE — 74011636637 HC RX REV CODE- 636/637: Performed by: HOSPITALIST

## 2021-01-01 PROCEDURE — 87522 HEPATITIS C REVRS TRNSCRPJ: CPT

## 2021-01-01 PROCEDURE — 87635 SARS-COV-2 COVID-19 AMP PRB: CPT

## 2021-01-01 PROCEDURE — 74011250636 HC RX REV CODE- 250/636

## 2021-01-01 PROCEDURE — P9047 ALBUMIN (HUMAN), 25%, 50ML: HCPCS | Performed by: INTERNAL MEDICINE

## 2021-01-01 PROCEDURE — 96375 TX/PRO/DX INJ NEW DRUG ADDON: CPT

## 2021-01-01 PROCEDURE — P9035 PLATELET PHERES LEUKOREDUCED: HCPCS

## 2021-01-01 PROCEDURE — 82103 ALPHA-1-ANTITRYPSIN TOTAL: CPT

## 2021-01-01 PROCEDURE — 0BH17EZ INSERTION OF ENDOTRACHEAL AIRWAY INTO TRACHEA, VIA NATURAL OR ARTIFICIAL OPENING: ICD-10-PCS | Performed by: ANESTHESIOLOGY

## 2021-01-01 PROCEDURE — 82010 KETONE BODYS QUAN: CPT

## 2021-01-01 PROCEDURE — 85730 THROMBOPLASTIN TIME PARTIAL: CPT

## 2021-01-01 PROCEDURE — 75810000054 HC ARTHOCENTISIS JOINT

## 2021-01-01 PROCEDURE — P9016 RBC LEUKOCYTES REDUCED: HCPCS

## 2021-01-01 PROCEDURE — 99284 EMERGENCY DEPT VISIT MOD MDM: CPT

## 2021-01-01 PROCEDURE — 97535 SELF CARE MNGMENT TRAINING: CPT

## 2021-01-01 PROCEDURE — 70450 CT HEAD/BRAIN W/O DYE: CPT

## 2021-01-01 PROCEDURE — 77030026918 HC ADMN ST IV BLD BD -A

## 2021-01-01 PROCEDURE — 86850 RBC ANTIBODY SCREEN: CPT

## 2021-01-01 PROCEDURE — 87902 NFCT AGT GNTYP ALYS HEP C: CPT

## 2021-01-01 PROCEDURE — 83525 ASSAY OF INSULIN: CPT

## 2021-01-01 PROCEDURE — 84132 ASSAY OF SERUM POTASSIUM: CPT

## 2021-01-01 PROCEDURE — 94002 VENT MGMT INPAT INIT DAY: CPT

## 2021-01-01 PROCEDURE — 94640 AIRWAY INHALATION TREATMENT: CPT

## 2021-01-01 PROCEDURE — 51798 US URINE CAPACITY MEASURE: CPT

## 2021-01-01 PROCEDURE — 87449 NOS EACH ORGANISM AG IA: CPT

## 2021-01-01 PROCEDURE — 97161 PT EVAL LOW COMPLEX 20 MIN: CPT | Performed by: PHYSICAL THERAPIST

## 2021-01-01 PROCEDURE — 95819 EEG AWAKE AND ASLEEP: CPT | Performed by: PSYCHIATRY & NEUROLOGY

## 2021-01-01 PROCEDURE — 87103 BLOOD FUNGUS CULTURE: CPT

## 2021-01-01 PROCEDURE — 97535 SELF CARE MNGMENT TRAINING: CPT | Performed by: OCCUPATIONAL THERAPIST

## 2021-01-01 PROCEDURE — 74177 CT ABD & PELVIS W/CONTRAST: CPT

## 2021-01-01 PROCEDURE — 77030022017 HC DRSG HEMO QCLOT ZMED -A

## 2021-01-01 PROCEDURE — 97165 OT EVAL LOW COMPLEX 30 MIN: CPT | Performed by: OCCUPATIONAL THERAPIST

## 2021-01-01 PROCEDURE — 74011250637 HC RX REV CODE- 250/637: Performed by: EMERGENCY MEDICINE

## 2021-01-01 PROCEDURE — 74011250636 HC RX REV CODE- 250/636: Performed by: EMERGENCY MEDICINE

## 2021-01-01 PROCEDURE — 84681 ASSAY OF C-PEPTIDE: CPT

## 2021-01-01 PROCEDURE — 02HV33Z INSERTION OF INFUSION DEVICE INTO SUPERIOR VENA CAVA, PERCUTANEOUS APPROACH: ICD-10-PCS | Performed by: ANESTHESIOLOGY

## 2021-01-01 PROCEDURE — 85652 RBC SED RATE AUTOMATED: CPT

## 2021-01-01 PROCEDURE — 74011636637 HC RX REV CODE- 636/637

## 2021-01-01 PROCEDURE — 74011000250 HC RX REV CODE- 250

## 2021-01-01 PROCEDURE — 65670000000 HC RM LEAVE OF ABSENCE

## 2021-01-01 PROCEDURE — 95816 EEG AWAKE AND DROWSY: CPT | Performed by: INTERNAL MEDICINE

## 2021-01-01 PROCEDURE — A9540 TC99M MAA: HCPCS

## 2021-01-01 PROCEDURE — 97116 GAIT TRAINING THERAPY: CPT

## 2021-01-01 PROCEDURE — 77030029684 HC NEB SM VOL KT MONA -A

## 2021-01-01 PROCEDURE — 73610 X-RAY EXAM OF ANKLE: CPT

## 2021-01-01 PROCEDURE — 93990 DOPPLER FLOW TESTING: CPT

## 2021-01-01 PROCEDURE — 87900 PHENOTYPE INFECT AGENT DRUG: CPT

## 2021-01-01 PROCEDURE — 82140 ASSAY OF AMMONIA: CPT

## 2021-01-01 PROCEDURE — 87077 CULTURE AEROBIC IDENTIFY: CPT

## 2021-01-01 PROCEDURE — 74011636637 HC RX REV CODE- 636/637: Performed by: EMERGENCY MEDICINE

## 2021-01-01 PROCEDURE — 96374 THER/PROPH/DIAG INJ IV PUSH: CPT

## 2021-01-01 PROCEDURE — P9100 PATHOGEN TEST FOR PLATELETS: HCPCS

## 2021-01-01 PROCEDURE — 74011000250 HC RX REV CODE- 250: Performed by: STUDENT IN AN ORGANIZED HEALTH CARE EDUCATION/TRAINING PROGRAM

## 2021-01-01 PROCEDURE — 84443 ASSAY THYROID STIM HORMONE: CPT

## 2021-01-01 RX ORDER — OXYCODONE HYDROCHLORIDE 5 MG/1
5 TABLET ORAL
Status: DISCONTINUED | OUTPATIENT
Start: 2021-01-01 | End: 2021-01-01

## 2021-01-01 RX ORDER — OXYCODONE HYDROCHLORIDE 5 MG/1
7.5 TABLET ORAL
Status: DISCONTINUED | OUTPATIENT
Start: 2021-01-01 | End: 2021-01-01

## 2021-01-01 RX ORDER — DEXTROSE 50 % IN WATER (D50W) INTRAVENOUS SYRINGE
25
Status: COMPLETED | OUTPATIENT
Start: 2021-01-01 | End: 2021-01-01

## 2021-01-01 RX ORDER — DOXYCYCLINE HYCLATE 100 MG
100 TABLET ORAL 2 TIMES DAILY
Qty: 14 TABLET | Refills: 0 | Status: SHIPPED | OUTPATIENT
Start: 2021-01-01 | End: 2021-01-01

## 2021-01-01 RX ORDER — METHADONE HYDROCHLORIDE 10 MG/ML
160 CONCENTRATE ORAL DAILY
COMMUNITY

## 2021-01-01 RX ORDER — DEXTROSE 50 % IN WATER (D50W) INTRAVENOUS SYRINGE
12.5-25 AS NEEDED
Status: DISCONTINUED | OUTPATIENT
Start: 2021-01-01 | End: 2021-01-01

## 2021-01-01 RX ORDER — SODIUM CHLORIDE 9 MG/ML
250 INJECTION, SOLUTION INTRAVENOUS AS NEEDED
Status: DISCONTINUED | OUTPATIENT
Start: 2021-01-01 | End: 2021-01-01 | Stop reason: HOSPADM

## 2021-01-01 RX ORDER — LORAZEPAM 2 MG/ML
2 INJECTION INTRAMUSCULAR
Status: DISCONTINUED | OUTPATIENT
Start: 2021-01-01 | End: 2021-01-01 | Stop reason: HOSPADM

## 2021-01-01 RX ORDER — ONDANSETRON 4 MG/1
4 TABLET, ORALLY DISINTEGRATING ORAL
Status: DISCONTINUED | OUTPATIENT
Start: 2021-01-01 | End: 2021-01-01

## 2021-01-01 RX ORDER — ONDANSETRON 2 MG/ML
4 INJECTION INTRAMUSCULAR; INTRAVENOUS
Status: DISCONTINUED | OUTPATIENT
Start: 2021-01-01 | End: 2021-01-01 | Stop reason: HOSPADM

## 2021-01-01 RX ORDER — SODIUM CHLORIDE 9 MG/ML
125 INJECTION, SOLUTION INTRAVENOUS CONTINUOUS
Status: DISCONTINUED | OUTPATIENT
Start: 2021-01-01 | End: 2021-01-01

## 2021-01-01 RX ORDER — PANTOPRAZOLE SODIUM 40 MG/1
40 TABLET, DELAYED RELEASE ORAL DAILY
Status: DISCONTINUED | OUTPATIENT
Start: 2021-01-01 | End: 2021-01-01 | Stop reason: HOSPADM

## 2021-01-01 RX ORDER — ATORVASTATIN CALCIUM 40 MG/1
40 TABLET, FILM COATED ORAL
Qty: 30 TABLET | Refills: 0 | Status: SHIPPED | OUTPATIENT
Start: 2021-01-01 | End: 2021-09-13

## 2021-01-01 RX ORDER — METOPROLOL TARTRATE 25 MG/1
25 TABLET, FILM COATED ORAL EVERY 12 HOURS
Status: DISCONTINUED | OUTPATIENT
Start: 2021-01-01 | End: 2021-01-01 | Stop reason: HOSPADM

## 2021-01-01 RX ORDER — METOPROLOL TARTRATE 25 MG/1
25 TABLET, FILM COATED ORAL EVERY 12 HOURS
Status: DISCONTINUED | OUTPATIENT
Start: 2021-01-01 | End: 2021-01-01

## 2021-01-01 RX ORDER — OXYCODONE HYDROCHLORIDE 5 MG/1
5 TABLET ORAL
Qty: 12 TABLET | Refills: 0 | Status: SHIPPED | OUTPATIENT
Start: 2021-01-01 | End: 2021-01-01

## 2021-01-01 RX ORDER — OXYCODONE HYDROCHLORIDE 5 MG/1
5 TABLET ORAL
Qty: 5 TABLET | Refills: 0 | Status: SHIPPED | OUTPATIENT
Start: 2021-01-01 | End: 2021-01-01

## 2021-01-01 RX ORDER — GLYCOPYRROLATE 0.2 MG/ML
0.2 INJECTION INTRAMUSCULAR; INTRAVENOUS
Status: DISCONTINUED | OUTPATIENT
Start: 2021-01-01 | End: 2021-01-01 | Stop reason: HOSPADM

## 2021-01-01 RX ORDER — PHENYLEPHRINE HYDROCHLORIDE 10 MG/ML
INJECTION INTRAVENOUS
Status: DISPENSED
Start: 2021-01-01 | End: 2021-01-01

## 2021-01-01 RX ORDER — SODIUM BICARBONATE 1 MEQ/ML
50 SYRINGE (ML) INTRAVENOUS ONCE
Status: COMPLETED | OUTPATIENT
Start: 2021-01-01 | End: 2021-01-01

## 2021-01-01 RX ORDER — AMOXICILLIN AND CLAVULANATE POTASSIUM 500; 125 MG/1; MG/1
1 TABLET, FILM COATED ORAL EVERY 24 HOURS
Status: DISCONTINUED | OUTPATIENT
Start: 2021-01-01 | End: 2021-01-01

## 2021-01-01 RX ORDER — DEXTROSE 50 % IN WATER (D50W) INTRAVENOUS SYRINGE
12.5 ONCE
Status: COMPLETED | OUTPATIENT
Start: 2021-01-01 | End: 2021-01-01

## 2021-01-01 RX ORDER — OXYCODONE HYDROCHLORIDE 5 MG/1
5 TABLET ORAL
Status: COMPLETED | OUTPATIENT
Start: 2021-01-01 | End: 2021-01-01

## 2021-01-01 RX ORDER — DEXTROSE 50 % IN WATER (D50W) INTRAVENOUS SYRINGE
Status: COMPLETED
Start: 2021-01-01 | End: 2021-01-01

## 2021-01-01 RX ORDER — DEXTROSE MONOHYDRATE 100 MG/ML
75 INJECTION, SOLUTION INTRAVENOUS CONTINUOUS
Status: DISCONTINUED | OUTPATIENT
Start: 2021-01-01 | End: 2021-01-01

## 2021-01-01 RX ORDER — ISOSORBIDE MONONITRATE 30 MG/1
30 TABLET, EXTENDED RELEASE ORAL
Status: DISCONTINUED | OUTPATIENT
Start: 2021-01-01 | End: 2021-01-01 | Stop reason: HOSPADM

## 2021-01-01 RX ORDER — HYDROMORPHONE HYDROCHLORIDE 1 MG/ML
0.5 INJECTION, SOLUTION INTRAMUSCULAR; INTRAVENOUS; SUBCUTANEOUS ONCE
Status: COMPLETED | OUTPATIENT
Start: 2021-01-01 | End: 2021-01-01

## 2021-01-01 RX ORDER — NALOXONE HYDROCHLORIDE 0.4 MG/ML
INJECTION, SOLUTION INTRAMUSCULAR; INTRAVENOUS; SUBCUTANEOUS
Status: COMPLETED
Start: 2021-01-01 | End: 2021-01-01

## 2021-01-01 RX ORDER — METHADONE HYDROCHLORIDE 10 MG/ML
160 CONCENTRATE ORAL DAILY
Status: DISCONTINUED | OUTPATIENT
Start: 2021-01-01 | End: 2021-01-01

## 2021-01-01 RX ORDER — CALCIUM GLUCONATE 94 MG/ML
1 INJECTION, SOLUTION INTRAVENOUS
Status: COMPLETED | OUTPATIENT
Start: 2021-01-01 | End: 2021-01-01

## 2021-01-01 RX ORDER — GABAPENTIN 300 MG/1
300 CAPSULE ORAL 2 TIMES DAILY
Status: DISCONTINUED | OUTPATIENT
Start: 2021-01-01 | End: 2021-01-01 | Stop reason: HOSPADM

## 2021-01-01 RX ORDER — PROPOFOL 10 MG/ML
INJECTION, EMULSION INTRAVENOUS
Status: DISPENSED
Start: 2021-01-01 | End: 2021-01-01

## 2021-01-01 RX ORDER — MIDODRINE HYDROCHLORIDE 5 MG/1
10 TABLET ORAL ONCE
Status: DISCONTINUED | OUTPATIENT
Start: 2021-01-01 | End: 2021-01-01

## 2021-01-01 RX ORDER — NITROGLYCERIN 20 MG/100ML
0-20 INJECTION INTRAVENOUS
Status: DISCONTINUED | OUTPATIENT
Start: 2021-01-01 | End: 2021-01-01

## 2021-01-01 RX ORDER — ISOSORBIDE MONONITRATE 30 MG/1
30 TABLET, EXTENDED RELEASE ORAL DAILY
Status: DISCONTINUED | OUTPATIENT
Start: 2021-01-01 | End: 2021-01-01

## 2021-01-01 RX ORDER — SODIUM CHLORIDE 9 MG/ML
250 INJECTION, SOLUTION INTRAVENOUS AS NEEDED
Status: DISCONTINUED | OUTPATIENT
Start: 2021-01-01 | End: 2021-01-01 | Stop reason: SDUPTHER

## 2021-01-01 RX ORDER — DEXTROSE 50 % IN WATER (D50W) INTRAVENOUS SYRINGE
12.5-25 AS NEEDED
Status: DISCONTINUED | OUTPATIENT
Start: 2021-01-01 | End: 2021-01-01 | Stop reason: SDUPTHER

## 2021-01-01 RX ORDER — LIDOCAINE HYDROCHLORIDE 20 MG/ML
10 INJECTION, SOLUTION EPIDURAL; INFILTRATION; INTRACAUDAL; PERINEURAL
Status: COMPLETED | OUTPATIENT
Start: 2021-01-01 | End: 2021-01-01

## 2021-01-01 RX ORDER — SODIUM CHLORIDE 9 MG/ML
250 INJECTION, SOLUTION INTRAVENOUS AS NEEDED
Status: DISCONTINUED | OUTPATIENT
Start: 2021-01-01 | End: 2021-01-01

## 2021-01-01 RX ORDER — COSYNTROPIN 0.25 MG/ML
0.25 INJECTION, POWDER, FOR SOLUTION INTRAMUSCULAR; INTRAVENOUS ONCE
Status: COMPLETED | OUTPATIENT
Start: 2021-01-01 | End: 2021-01-01

## 2021-01-01 RX ORDER — OXYCODONE HYDROCHLORIDE 5 MG/1
2.5 TABLET ORAL ONCE
Status: COMPLETED | OUTPATIENT
Start: 2021-01-01 | End: 2021-01-01

## 2021-01-01 RX ORDER — DOXERCALCIFEROL 0.5 UG/1
0.5 CAPSULE ORAL DAILY
Status: DISCONTINUED | OUTPATIENT
Start: 2021-01-01 | End: 2021-01-01

## 2021-01-01 RX ORDER — GUAIFENESIN 100 MG/5ML
81 LIQUID (ML) ORAL DAILY
Status: DISCONTINUED | OUTPATIENT
Start: 2021-01-01 | End: 2021-01-01

## 2021-01-01 RX ORDER — HEPARIN SODIUM 5000 [USP'U]/ML
5000 INJECTION, SOLUTION INTRAVENOUS; SUBCUTANEOUS EVERY 8 HOURS
Status: DISCONTINUED | OUTPATIENT
Start: 2021-01-01 | End: 2021-01-01

## 2021-01-01 RX ORDER — SODIUM CHLORIDE 0.9 % (FLUSH) 0.9 %
5-40 SYRINGE (ML) INJECTION AS NEEDED
Status: DISCONTINUED | OUTPATIENT
Start: 2021-01-01 | End: 2021-01-01 | Stop reason: HOSPADM

## 2021-01-01 RX ORDER — NALOXONE HYDROCHLORIDE 0.4 MG/ML
0.4 INJECTION, SOLUTION INTRAMUSCULAR; INTRAVENOUS; SUBCUTANEOUS
Status: DISCONTINUED | OUTPATIENT
Start: 2021-01-01 | End: 2021-01-01 | Stop reason: HOSPADM

## 2021-01-01 RX ORDER — ALBUMIN HUMAN 250 G/1000ML
12.5 SOLUTION INTRAVENOUS
Status: DISCONTINUED | OUTPATIENT
Start: 2021-01-01 | End: 2021-01-01 | Stop reason: HOSPADM

## 2021-01-01 RX ORDER — MAGNESIUM SULFATE 100 %
4 CRYSTALS MISCELLANEOUS AS NEEDED
Status: DISCONTINUED | OUTPATIENT
Start: 2021-01-01 | End: 2021-01-01

## 2021-01-01 RX ORDER — AMOXICILLIN AND CLAVULANATE POTASSIUM 875; 125 MG/1; MG/1
1 TABLET, FILM COATED ORAL EVERY 12 HOURS
Qty: 10 TABLET | Refills: 0 | Status: SHIPPED | OUTPATIENT
Start: 2021-01-01 | End: 2021-01-01

## 2021-01-01 RX ORDER — MIDAZOLAM HYDROCHLORIDE 1 MG/ML
2 INJECTION, SOLUTION INTRAMUSCULAR; INTRAVENOUS
Status: DISCONTINUED | OUTPATIENT
Start: 2021-01-01 | End: 2021-01-01 | Stop reason: HOSPADM

## 2021-01-01 RX ORDER — CHLORHEXIDINE GLUCONATE 0.12 MG/ML
15 RINSE ORAL EVERY 12 HOURS
Status: DISCONTINUED | OUTPATIENT
Start: 2021-01-01 | End: 2021-01-01 | Stop reason: HOSPADM

## 2021-01-01 RX ORDER — LORAZEPAM 2 MG/ML
1 INJECTION INTRAMUSCULAR
Status: COMPLETED | OUTPATIENT
Start: 2021-01-01 | End: 2021-01-01

## 2021-01-01 RX ORDER — MORPHINE SULFATE 2 MG/ML
2 INJECTION, SOLUTION INTRAMUSCULAR; INTRAVENOUS
Status: DISCONTINUED | OUTPATIENT
Start: 2021-01-01 | End: 2021-01-01 | Stop reason: HOSPADM

## 2021-01-01 RX ORDER — DEXMEDETOMIDINE HYDROCHLORIDE 4 UG/ML
.1-1.5 INJECTION, SOLUTION INTRAVENOUS
Status: DISCONTINUED | OUTPATIENT
Start: 2021-01-01 | End: 2021-01-01 | Stop reason: HOSPADM

## 2021-01-01 RX ORDER — PHYTONADIONE 1 MG/.5ML
10 INJECTION, EMULSION INTRAMUSCULAR; INTRAVENOUS; SUBCUTANEOUS ONCE
Status: DISCONTINUED | OUTPATIENT
Start: 2021-01-01 | End: 2021-01-01

## 2021-01-01 RX ORDER — BALSAM PERU/CASTOR OIL
OINTMENT (GRAM) TOPICAL 2 TIMES DAILY
Status: DISCONTINUED | OUTPATIENT
Start: 2021-01-01 | End: 2021-01-01 | Stop reason: HOSPADM

## 2021-01-01 RX ORDER — PANTOPRAZOLE SODIUM 40 MG/1
40 TABLET, DELAYED RELEASE ORAL DAILY
Qty: 30 TABLET | Refills: 0 | Status: SHIPPED | OUTPATIENT
Start: 2021-01-01 | End: 2021-09-13

## 2021-01-01 RX ORDER — ATORVASTATIN CALCIUM 40 MG/1
40 TABLET, FILM COATED ORAL
Status: DISCONTINUED | OUTPATIENT
Start: 2021-01-01 | End: 2021-01-01 | Stop reason: HOSPADM

## 2021-01-01 RX ORDER — PANTOPRAZOLE SODIUM 40 MG/1
40 TABLET, DELAYED RELEASE ORAL DAILY
Status: DISCONTINUED | OUTPATIENT
Start: 2021-01-01 | End: 2021-01-01

## 2021-01-01 RX ORDER — SEVELAMER HYDROCHLORIDE 400 MG/1
800 TABLET, FILM COATED ORAL
Qty: 180 TABLET | Refills: 0 | Status: SHIPPED | OUTPATIENT
Start: 2021-01-01 | End: 2021-09-13

## 2021-01-01 RX ORDER — DEXTROSE 50 % IN WATER (D50W) INTRAVENOUS SYRINGE
25 ONCE
Status: COMPLETED | OUTPATIENT
Start: 2021-01-01 | End: 2021-01-01

## 2021-01-01 RX ORDER — DEXTROSE MONOHYDRATE 100 MG/ML
50 INJECTION, SOLUTION INTRAVENOUS CONTINUOUS
Status: DISCONTINUED | OUTPATIENT
Start: 2021-01-01 | End: 2021-01-01

## 2021-01-01 RX ORDER — ALBUMIN HUMAN 50 G/1000ML
25 SOLUTION INTRAVENOUS EVERY 6 HOURS
Status: COMPLETED | OUTPATIENT
Start: 2021-01-01 | End: 2021-01-01

## 2021-01-01 RX ORDER — LIDOCAINE 4 G/100G
1 PATCH TOPICAL DAILY
Status: DISCONTINUED | OUTPATIENT
Start: 2021-01-01 | End: 2021-01-01 | Stop reason: HOSPADM

## 2021-01-01 RX ORDER — SODIUM BICARBONATE IN D5W 150/1000ML
PLASTIC BAG, INJECTION (ML) INTRAVENOUS CONTINUOUS
Status: DISCONTINUED | OUTPATIENT
Start: 2021-01-01 | End: 2021-01-01

## 2021-01-01 RX ORDER — SEVELAMER CARBONATE 800 MG/1
800 TABLET, FILM COATED ORAL
Status: DISCONTINUED | OUTPATIENT
Start: 2021-01-01 | End: 2021-01-01

## 2021-01-01 RX ORDER — METOPROLOL TARTRATE 25 MG/1
12.5 TABLET, FILM COATED ORAL EVERY 12 HOURS
Status: DISCONTINUED | OUTPATIENT
Start: 2021-01-01 | End: 2021-01-01

## 2021-01-01 RX ORDER — DILTIAZEM HYDROCHLORIDE 5 MG/ML
0.35 INJECTION INTRAVENOUS
Status: COMPLETED | OUTPATIENT
Start: 2021-01-01 | End: 2021-01-01

## 2021-01-01 RX ORDER — PROPOFOL 10 MG/ML
0-50 VIAL (ML) INTRAVENOUS
Status: DISCONTINUED | OUTPATIENT
Start: 2021-01-01 | End: 2021-01-01

## 2021-01-01 RX ORDER — LEVALBUTEROL INHALATION SOLUTION 1.25 MG/3ML
1.25 SOLUTION RESPIRATORY (INHALATION)
Status: DISCONTINUED | OUTPATIENT
Start: 2021-01-01 | End: 2021-01-01 | Stop reason: HOSPADM

## 2021-01-01 RX ORDER — SODIUM CHLORIDE 0.9 % (FLUSH) 0.9 %
5-40 SYRINGE (ML) INJECTION EVERY 8 HOURS
Status: DISCONTINUED | OUTPATIENT
Start: 2021-01-01 | End: 2021-01-01 | Stop reason: HOSPADM

## 2021-01-01 RX ORDER — DEXTROSE 50 % IN WATER (D50W) INTRAVENOUS SYRINGE
12.5-25 AS NEEDED
Status: DISCONTINUED | OUTPATIENT
Start: 2021-01-01 | End: 2021-01-01 | Stop reason: HOSPADM

## 2021-01-01 RX ORDER — METOPROLOL TARTRATE 5 MG/5ML
1.25 INJECTION INTRAVENOUS ONCE
Status: COMPLETED | OUTPATIENT
Start: 2021-01-01 | End: 2021-01-01

## 2021-01-01 RX ORDER — PARICALCITOL 1 UG/1
1 CAPSULE, LIQUID FILLED ORAL DAILY
Qty: 30 CAPSULE | Refills: 0 | Status: SHIPPED | OUTPATIENT
Start: 2021-01-01 | End: 2021-09-13

## 2021-01-01 RX ORDER — ATORVASTATIN CALCIUM 40 MG/1
40 TABLET, FILM COATED ORAL
Status: DISCONTINUED | OUTPATIENT
Start: 2021-01-01 | End: 2021-01-01

## 2021-01-01 RX ORDER — ASPIRIN 81 MG/1
81 TABLET ORAL DAILY
Status: DISCONTINUED | OUTPATIENT
Start: 2021-01-01 | End: 2021-01-01 | Stop reason: HOSPADM

## 2021-01-01 RX ORDER — CHLORHEXIDINE GLUCONATE 0.12 MG/ML
15 RINSE ORAL EVERY 12 HOURS
Status: DISCONTINUED | OUTPATIENT
Start: 2021-01-01 | End: 2021-01-01

## 2021-01-01 RX ORDER — MIDODRINE HYDROCHLORIDE 5 MG/1
5 TABLET ORAL 3 TIMES DAILY
COMMUNITY
Start: 2021-01-01 | End: 2021-01-01

## 2021-01-01 RX ORDER — CITALOPRAM 20 MG/1
40 TABLET, FILM COATED ORAL DAILY
Status: DISCONTINUED | OUTPATIENT
Start: 2021-01-01 | End: 2021-01-01

## 2021-01-01 RX ORDER — OXYCODONE HYDROCHLORIDE 5 MG/1
7.5 TABLET ORAL
Status: DISCONTINUED | OUTPATIENT
Start: 2021-01-01 | End: 2021-01-01 | Stop reason: HOSPADM

## 2021-01-01 RX ORDER — CITALOPRAM 20 MG/1
40 TABLET, FILM COATED ORAL DAILY
Status: DISCONTINUED | OUTPATIENT
Start: 2021-01-01 | End: 2021-01-01 | Stop reason: HOSPADM

## 2021-01-01 RX ORDER — POLYETHYLENE GLYCOL 3350 17 G/17G
17 POWDER, FOR SOLUTION ORAL DAILY PRN
Status: DISCONTINUED | OUTPATIENT
Start: 2021-01-01 | End: 2021-01-01 | Stop reason: HOSPADM

## 2021-01-01 RX ORDER — METOPROLOL TARTRATE 25 MG/1
25 TABLET, FILM COATED ORAL EVERY 12 HOURS
Qty: 60 TABLET | Refills: 0 | Status: SHIPPED | OUTPATIENT
Start: 2021-01-01 | End: 2021-01-01

## 2021-01-01 RX ORDER — DOXYCYCLINE HYCLATE 100 MG
100 TABLET ORAL
Status: COMPLETED | OUTPATIENT
Start: 2021-01-01 | End: 2021-01-01

## 2021-01-01 RX ORDER — SCOLOPAMINE TRANSDERMAL SYSTEM 1 MG/1
1 PATCH, EXTENDED RELEASE TRANSDERMAL
Status: DISCONTINUED | OUTPATIENT
Start: 2021-01-01 | End: 2021-01-01

## 2021-01-01 RX ORDER — OXYCODONE HYDROCHLORIDE 5 MG/1
2.5 TABLET ORAL
Status: DISCONTINUED | OUTPATIENT
Start: 2021-01-01 | End: 2021-01-01

## 2021-01-01 RX ORDER — FENTANYL CITRATE 50 UG/ML
50 INJECTION, SOLUTION INTRAMUSCULAR; INTRAVENOUS
Status: DISCONTINUED | OUTPATIENT
Start: 2021-01-01 | End: 2021-01-01

## 2021-01-01 RX ORDER — CALCIUM GLUCONATE 20 MG/ML
1 INJECTION, SOLUTION INTRAVENOUS ONCE
Status: COMPLETED | OUTPATIENT
Start: 2021-01-01 | End: 2021-01-01

## 2021-01-01 RX ORDER — GABAPENTIN 300 MG/1
300 CAPSULE ORAL 2 TIMES DAILY
Status: DISCONTINUED | OUTPATIENT
Start: 2021-01-01 | End: 2021-01-01

## 2021-01-01 RX ORDER — FENTANYL CITRATE 50 UG/ML
50 INJECTION, SOLUTION INTRAMUSCULAR; INTRAVENOUS
Status: DISCONTINUED | OUTPATIENT
Start: 2021-01-01 | End: 2021-01-01 | Stop reason: HOSPADM

## 2021-01-01 RX ORDER — ISOSORBIDE MONONITRATE 30 MG/1
30 TABLET, EXTENDED RELEASE ORAL
Qty: 30 TABLET | Refills: 0 | Status: SHIPPED | OUTPATIENT
Start: 2021-01-01 | End: 2021-09-13

## 2021-01-01 RX ORDER — SODIUM CHLORIDE 0.9 % (FLUSH) 0.9 %
5-40 SYRINGE (ML) INJECTION EVERY 8 HOURS
Status: DISCONTINUED | OUTPATIENT
Start: 2021-01-01 | End: 2021-01-01

## 2021-01-01 RX ORDER — NOREPINEPHRINE BITARTRATE/D5W 8 MG/250ML
.5-3 PLASTIC BAG, INJECTION (ML) INTRAVENOUS
Status: DISCONTINUED | OUTPATIENT
Start: 2021-01-01 | End: 2021-01-01

## 2021-01-01 RX ORDER — IBUPROFEN 200 MG
1 TABLET ORAL DAILY
Status: DISCONTINUED | OUTPATIENT
Start: 2021-01-01 | End: 2021-01-01 | Stop reason: HOSPADM

## 2021-01-01 RX ORDER — NALOXONE HYDROCHLORIDE 0.4 MG/ML
0.4 INJECTION, SOLUTION INTRAMUSCULAR; INTRAVENOUS; SUBCUTANEOUS
Status: DISCONTINUED | OUTPATIENT
Start: 2021-01-01 | End: 2021-01-01

## 2021-01-01 RX ORDER — AMOXICILLIN AND CLAVULANATE POTASSIUM 875; 125 MG/1; MG/1
1 TABLET, FILM COATED ORAL EVERY 12 HOURS
Qty: 10 TABLET | Refills: 0 | Status: SHIPPED | OUTPATIENT
Start: 2021-01-01 | End: 2021-01-01 | Stop reason: SDUPTHER

## 2021-01-01 RX ORDER — CITALOPRAM 40 MG/1
40 TABLET, FILM COATED ORAL DAILY
Qty: 30 TABLET | Refills: 0 | Status: SHIPPED | OUTPATIENT
Start: 2021-01-01 | End: 2021-09-13

## 2021-01-01 RX ORDER — ASPIRIN 81 MG/1
81 TABLET ORAL DAILY
Status: DISCONTINUED | OUTPATIENT
Start: 2021-01-01 | End: 2021-01-01

## 2021-01-01 RX ORDER — AMOXICILLIN AND CLAVULANATE POTASSIUM 875; 125 MG/1; MG/1
1 TABLET, FILM COATED ORAL EVERY 12 HOURS
Status: DISCONTINUED | OUTPATIENT
Start: 2021-01-01 | End: 2021-01-01 | Stop reason: DRUGHIGH

## 2021-01-01 RX ORDER — OXYCODONE HYDROCHLORIDE 5 MG/1
5 TABLET ORAL
Status: DISCONTINUED | OUTPATIENT
Start: 2021-01-01 | End: 2021-01-01 | Stop reason: HOSPADM

## 2021-01-01 RX ORDER — CALCIUM GLUCONATE 20 MG/ML
1 INJECTION, SOLUTION INTRAVENOUS ONCE
Status: DISCONTINUED | OUTPATIENT
Start: 2021-01-01 | End: 2021-01-01

## 2021-01-01 RX ORDER — DEXTROSE 50 % IN WATER (D50W) INTRAVENOUS SYRINGE
25 ONCE
Status: DISCONTINUED | OUTPATIENT
Start: 2021-01-01 | End: 2021-01-01

## 2021-01-01 RX ORDER — METOPROLOL TARTRATE 25 MG/1
25 TABLET, FILM COATED ORAL EVERY 12 HOURS
Qty: 60 TABLET | Refills: 0 | Status: SHIPPED | OUTPATIENT
Start: 2021-01-01 | End: 2021-09-13

## 2021-01-01 RX ORDER — ONDANSETRON 2 MG/ML
4 INJECTION INTRAMUSCULAR; INTRAVENOUS
Status: DISCONTINUED | OUTPATIENT
Start: 2021-01-01 | End: 2021-01-01

## 2021-01-01 RX ORDER — VANCOMYCIN 1.75 GRAM/500 ML IN 0.9 % SODIUM CHLORIDE INTRAVENOUS
1750 ONCE
Status: COMPLETED | OUTPATIENT
Start: 2021-01-01 | End: 2021-01-01

## 2021-01-01 RX ORDER — PHYTONADIONE 10 MG/ML
10 INJECTION, EMULSION INTRAMUSCULAR; INTRAVENOUS; SUBCUTANEOUS ONCE
Status: DISCONTINUED | OUTPATIENT
Start: 2021-01-01 | End: 2021-01-01

## 2021-01-01 RX ORDER — SEVELAMER CARBONATE 800 MG/1
800 TABLET, FILM COATED ORAL 3 TIMES DAILY
Status: DISCONTINUED | OUTPATIENT
Start: 2021-01-01 | End: 2021-01-01 | Stop reason: HOSPADM

## 2021-01-01 RX ORDER — IPRATROPIUM BROMIDE AND ALBUTEROL SULFATE 2.5; .5 MG/3ML; MG/3ML
3 SOLUTION RESPIRATORY (INHALATION)
Status: DISCONTINUED | OUTPATIENT
Start: 2021-01-01 | End: 2021-01-01 | Stop reason: HOSPADM

## 2021-01-01 RX ORDER — DEXTROSE MONOHYDRATE 100 MG/ML
50 INJECTION, SOLUTION INTRAVENOUS CONTINUOUS
Status: DISCONTINUED | OUTPATIENT
Start: 2021-01-01 | End: 2021-01-01 | Stop reason: SDUPTHER

## 2021-01-01 RX ORDER — DEXMEDETOMIDINE HYDROCHLORIDE 4 UG/ML
.1-1.5 INJECTION, SOLUTION INTRAVENOUS
Status: DISCONTINUED | OUTPATIENT
Start: 2021-01-01 | End: 2021-01-01

## 2021-01-01 RX ADMIN — APIXABAN 5 MG: 5 TABLET, FILM COATED ORAL at 17:30

## 2021-01-01 RX ADMIN — Medication 5 ML: at 07:33

## 2021-01-01 RX ADMIN — DEXTROSE MONOHYDRATE 50 ML/HR: 100 INJECTION, SOLUTION INTRAVENOUS at 18:03

## 2021-01-01 RX ADMIN — DEXTROSE MONOHYDRATE 12.5 G: 25 INJECTION, SOLUTION INTRAVENOUS at 04:51

## 2021-01-01 RX ADMIN — SODIUM BICARBONATE 50 MEQ: 84 INJECTION, SOLUTION INTRAVENOUS at 04:12

## 2021-01-01 RX ADMIN — MIDAZOLAM HYDROCHLORIDE 2 MG: 1 INJECTION, SOLUTION INTRAMUSCULAR; INTRAVENOUS at 06:03

## 2021-01-01 RX ADMIN — Medication 10 ML: at 10:09

## 2021-01-01 RX ADMIN — Medication 10 ML: at 22:52

## 2021-01-01 RX ADMIN — Medication 1 AMPULE: at 16:29

## 2021-01-01 RX ADMIN — Medication 10 ML: at 22:56

## 2021-01-01 RX ADMIN — MIDAZOLAM HYDROCHLORIDE 2 MG: 1 INJECTION, SOLUTION INTRAMUSCULAR; INTRAVENOUS at 03:51

## 2021-01-01 RX ADMIN — DEXMEDETOMIDINE 0.5 MCG/KG/HR: 100 INJECTION, SOLUTION, CONCENTRATE INTRAVENOUS at 11:42

## 2021-01-01 RX ADMIN — APIXABAN 5 MG: 5 TABLET, FILM COATED ORAL at 18:30

## 2021-01-01 RX ADMIN — ASPIRIN 81 MG: 81 TABLET, CHEWABLE ORAL at 08:35

## 2021-01-01 RX ADMIN — DEXMEDETOMIDINE 1.2 MCG/KG/HR: 100 INJECTION, SOLUTION, CONCENTRATE INTRAVENOUS at 02:13

## 2021-01-01 RX ADMIN — SODIUM CHLORIDE 40 MG: 9 INJECTION, SOLUTION INTRAMUSCULAR; INTRAVENOUS; SUBCUTANEOUS at 08:00

## 2021-01-01 RX ADMIN — Medication 10 ML: at 05:26

## 2021-01-01 RX ADMIN — OXYCODONE HYDROCHLORIDE 5 MG: 5 TABLET ORAL at 20:25

## 2021-01-01 RX ADMIN — SODIUM CHLORIDE 40 MG: 9 INJECTION, SOLUTION INTRAMUSCULAR; INTRAVENOUS; SUBCUTANEOUS at 08:25

## 2021-01-01 RX ADMIN — CASTOR OIL AND BALSAM, PERU: 788; 87 OINTMENT TOPICAL at 18:47

## 2021-01-01 RX ADMIN — SEVELAMER CARBONATE 800 MG: 800 TABLET, FILM COATED ORAL at 09:04

## 2021-01-01 RX ADMIN — SODIUM CHLORIDE 100 MG: 9 INJECTION, SOLUTION INTRAVENOUS at 11:13

## 2021-01-01 RX ADMIN — DEXMEDETOMIDINE 1.2 MCG/KG/HR: 100 INJECTION, SOLUTION, CONCENTRATE INTRAVENOUS at 06:05

## 2021-01-01 RX ADMIN — DEXMEDETOMIDINE 0.4 MCG/KG/HR: 100 INJECTION, SOLUTION, CONCENTRATE INTRAVENOUS at 10:40

## 2021-01-01 RX ADMIN — DEXMEDETOMIDINE 1 MCG/KG/HR: 100 INJECTION, SOLUTION, CONCENTRATE INTRAVENOUS at 08:25

## 2021-01-01 RX ADMIN — OXYCODONE 5 MG: 5 TABLET ORAL at 22:40

## 2021-01-01 RX ADMIN — PIPERACILLIN AND TAZOBACTAM 3.38 G: 3; .375 INJECTION, POWDER, LYOPHILIZED, FOR SOLUTION INTRAVENOUS at 22:38

## 2021-01-01 RX ADMIN — NALOXONE HYDROCHLORIDE 0.4 MG: 0.4 INJECTION, SOLUTION INTRAMUSCULAR; INTRAVENOUS; SUBCUTANEOUS at 16:57

## 2021-01-01 RX ADMIN — HUMAN INSULIN 10 UNITS: 100 INJECTION, SOLUTION SUBCUTANEOUS at 11:19

## 2021-01-01 RX ADMIN — RIFAXIMIN 550 MG: 550 TABLET ORAL at 14:51

## 2021-01-01 RX ADMIN — APIXABAN 5 MG: 5 TABLET, FILM COATED ORAL at 08:16

## 2021-01-01 RX ADMIN — ASPIRIN 81 MG: 81 TABLET, CHEWABLE ORAL at 11:53

## 2021-01-01 RX ADMIN — VASOPRESSIN: 20 INJECTION, SOLUTION INTRAVENOUS at 11:24

## 2021-01-01 RX ADMIN — FENTANYL CITRATE 50 MCG: 50 INJECTION INTRAMUSCULAR; INTRAVENOUS at 17:37

## 2021-01-01 RX ADMIN — SODIUM CHLORIDE 100 MG: 9 INJECTION, SOLUTION INTRAVENOUS at 10:39

## 2021-01-01 RX ADMIN — APIXABAN 5 MG: 5 TABLET, FILM COATED ORAL at 12:19

## 2021-01-01 RX ADMIN — METOPROLOL TARTRATE 25 MG: 25 TABLET, FILM COATED ORAL at 22:10

## 2021-01-01 RX ADMIN — RIFAXIMIN 550 MG: 550 TABLET ORAL at 17:18

## 2021-01-01 RX ADMIN — LACTULOSE 30 ML: 20 SOLUTION ORAL at 18:18

## 2021-01-01 RX ADMIN — Medication 10 ML: at 15:02

## 2021-01-01 RX ADMIN — RIFAXIMIN 550 MG: 550 TABLET ORAL at 18:04

## 2021-01-01 RX ADMIN — DEXMEDETOMIDINE 1.5 MCG/KG/HR: 100 INJECTION, SOLUTION, CONCENTRATE INTRAVENOUS at 20:32

## 2021-01-01 RX ADMIN — Medication 10 ML: at 14:53

## 2021-01-01 RX ADMIN — PIPERACILLIN AND TAZOBACTAM 3.38 G: 3; .375 INJECTION, POWDER, LYOPHILIZED, FOR SOLUTION INTRAVENOUS at 16:09

## 2021-01-01 RX ADMIN — CHLORHEXIDINE GLUCONATE 15 ML: 0.12 RINSE ORAL at 21:15

## 2021-01-01 RX ADMIN — ISOSORBIDE MONONITRATE 30 MG: 30 TABLET, EXTENDED RELEASE ORAL at 09:22

## 2021-01-01 RX ADMIN — DEXTROSE MONOHYDRATE 25 G: 25 INJECTION, SOLUTION INTRAVENOUS at 08:20

## 2021-01-01 RX ADMIN — LACTULOSE 30 ML: 20 SOLUTION ORAL at 03:22

## 2021-01-01 RX ADMIN — PIPERACILLIN AND TAZOBACTAM 3.38 G: 3; .375 INJECTION, POWDER, LYOPHILIZED, FOR SOLUTION INTRAVENOUS at 22:55

## 2021-01-01 RX ADMIN — MINERAL OIL, PETROLATUM: 425; 568 OINTMENT OPHTHALMIC at 08:25

## 2021-01-01 RX ADMIN — AZITHROMYCIN MONOHYDRATE 500 MG: 500 INJECTION, POWDER, LYOPHILIZED, FOR SOLUTION INTRAVENOUS at 10:11

## 2021-01-01 RX ADMIN — METOPROLOL TARTRATE 25 MG: 25 TABLET, FILM COATED ORAL at 09:32

## 2021-01-01 RX ADMIN — Medication 10 ML: at 04:16

## 2021-01-01 RX ADMIN — GABAPENTIN 300 MG: 300 CAPSULE ORAL at 09:32

## 2021-01-01 RX ADMIN — ACETYLCYSTEINE 7640 MG: 200 INJECTION, SOLUTION INTRAVENOUS at 11:30

## 2021-01-01 RX ADMIN — CHLORHEXIDINE GLUCONATE 15 ML: 0.12 RINSE ORAL at 20:18

## 2021-01-01 RX ADMIN — APIXABAN 5 MG: 5 TABLET, FILM COATED ORAL at 18:51

## 2021-01-01 RX ADMIN — Medication 10 ML: at 05:22

## 2021-01-01 RX ADMIN — ISOSORBIDE MONONITRATE 30 MG: 30 TABLET, EXTENDED RELEASE ORAL at 12:17

## 2021-01-01 RX ADMIN — Medication 10 ML: at 18:29

## 2021-01-01 RX ADMIN — ALBUMIN (HUMAN) 25 G: 12.5 INJECTION, SOLUTION INTRAVENOUS at 11:31

## 2021-01-01 RX ADMIN — MIDAZOLAM HYDROCHLORIDE 2 MG: 1 INJECTION, SOLUTION INTRAMUSCULAR; INTRAVENOUS at 23:27

## 2021-01-01 RX ADMIN — CITALOPRAM HYDROBROMIDE 40 MG: 20 TABLET ORAL at 12:19

## 2021-01-01 RX ADMIN — Medication 1 AMPULE: at 20:32

## 2021-01-01 RX ADMIN — Medication 10 ML: at 21:47

## 2021-01-01 RX ADMIN — DEXMEDETOMIDINE 1 MCG/KG/HR: 100 INJECTION, SOLUTION, CONCENTRATE INTRAVENOUS at 17:30

## 2021-01-01 RX ADMIN — CALCIUM GLUCONATE 1000 MG: 20 INJECTION, SOLUTION INTRAVENOUS at 12:15

## 2021-01-01 RX ADMIN — CASTOR OIL AND BALSAM, PERU: 788; 87 OINTMENT TOPICAL at 09:47

## 2021-01-01 RX ADMIN — DOXERCALCIFEROL 0.5 MCG: 0.5 CAPSULE ORAL at 10:22

## 2021-01-01 RX ADMIN — AZITHROMYCIN MONOHYDRATE 500 MG: 500 INJECTION, POWDER, LYOPHILIZED, FOR SOLUTION INTRAVENOUS at 10:07

## 2021-01-01 RX ADMIN — OXYCODONE HYDROCHLORIDE 7.5 MG: 5 TABLET ORAL at 07:54

## 2021-01-01 RX ADMIN — METOPROLOL TARTRATE 25 MG: 25 TABLET, FILM COATED ORAL at 22:38

## 2021-01-01 RX ADMIN — ALBUMIN (HUMAN) 12.5 G: 0.25 INJECTION, SOLUTION INTRAVENOUS at 06:25

## 2021-01-01 RX ADMIN — Medication 10 ML: at 22:51

## 2021-01-01 RX ADMIN — OXYCODONE HYDROCHLORIDE 7.5 MG: 5 TABLET ORAL at 10:07

## 2021-01-01 RX ADMIN — NALOXONE HYDROCHLORIDE 0.4 MG: 0.4 INJECTION, SOLUTION INTRAMUSCULAR; INTRAVENOUS; SUBCUTANEOUS at 16:50

## 2021-01-01 RX ADMIN — Medication 1 AMPULE: at 09:04

## 2021-01-01 RX ADMIN — ISOSORBIDE MONONITRATE 30 MG: 30 TABLET, EXTENDED RELEASE ORAL at 08:26

## 2021-01-01 RX ADMIN — Medication 10 ML: at 06:22

## 2021-01-01 RX ADMIN — RIFAXIMIN 550 MG: 550 TABLET ORAL at 09:44

## 2021-01-01 RX ADMIN — METOPROLOL TARTRATE 12.5 MG: 25 TABLET, FILM COATED ORAL at 22:47

## 2021-01-01 RX ADMIN — METOPROLOL TARTRATE 25 MG: 25 TABLET, FILM COATED ORAL at 22:18

## 2021-01-01 RX ADMIN — MIDAZOLAM HYDROCHLORIDE 2 MG: 1 INJECTION, SOLUTION INTRAMUSCULAR; INTRAVENOUS at 22:13

## 2021-01-01 RX ADMIN — DEXTROSE MONOHYDRATE 25 G: 25 INJECTION, SOLUTION INTRAVENOUS at 04:46

## 2021-01-01 RX ADMIN — Medication 10 ML: at 14:03

## 2021-01-01 RX ADMIN — Medication 40 ML: at 14:02

## 2021-01-01 RX ADMIN — ASPIRIN 81 MG: 81 TABLET, CHEWABLE ORAL at 12:15

## 2021-01-01 RX ADMIN — SODIUM CHLORIDE 40 MG: 9 INJECTION, SOLUTION INTRAMUSCULAR; INTRAVENOUS; SUBCUTANEOUS at 09:01

## 2021-01-01 RX ADMIN — RIFAXIMIN 550 MG: 550 TABLET ORAL at 18:41

## 2021-01-01 RX ADMIN — Medication 10 ML: at 14:57

## 2021-01-01 RX ADMIN — DEXTROSE MONOHYDRATE 25 G: 25 INJECTION, SOLUTION INTRAVENOUS at 11:19

## 2021-01-01 RX ADMIN — DEXTROSE MONOHYDRATE 25 G: 25 INJECTION, SOLUTION INTRAVENOUS at 16:20

## 2021-01-01 RX ADMIN — SEVELAMER CARBONATE 800 MG: 800 TABLET, FILM COATED ORAL at 22:54

## 2021-01-01 RX ADMIN — DEXTROSE MONOHYDRATE 25 G: 25 INJECTION, SOLUTION INTRAVENOUS at 02:07

## 2021-01-01 RX ADMIN — METOPROLOL TARTRATE 25 MG: 25 TABLET, FILM COATED ORAL at 08:26

## 2021-01-01 RX ADMIN — Medication 10 ML: at 13:51

## 2021-01-01 RX ADMIN — METOPROLOL TARTRATE 25 MG: 25 TABLET, FILM COATED ORAL at 08:15

## 2021-01-01 RX ADMIN — GABAPENTIN 300 MG: 300 CAPSULE ORAL at 09:04

## 2021-01-01 RX ADMIN — METOPROLOL TARTRATE 25 MG: 25 TABLET, FILM COATED ORAL at 22:56

## 2021-01-01 RX ADMIN — Medication 10 ML: at 14:38

## 2021-01-01 RX ADMIN — Medication 10 ML: at 06:43

## 2021-01-01 RX ADMIN — DEXTROSE MONOHYDRATE 25 G: 25 INJECTION, SOLUTION INTRAVENOUS at 06:00

## 2021-01-01 RX ADMIN — DEXMEDETOMIDINE 1.5 MCG/KG/HR: 100 INJECTION, SOLUTION, CONCENTRATE INTRAVENOUS at 11:19

## 2021-01-01 RX ADMIN — Medication 10 ML: at 21:20

## 2021-01-01 RX ADMIN — ISOSORBIDE MONONITRATE 30 MG: 30 TABLET, EXTENDED RELEASE ORAL at 08:02

## 2021-01-01 RX ADMIN — Medication 1 AMPULE: at 10:02

## 2021-01-01 RX ADMIN — METOPROLOL TARTRATE 1.25 MG: 5 INJECTION INTRAVENOUS at 21:17

## 2021-01-01 RX ADMIN — RIFAXIMIN 550 MG: 550 TABLET ORAL at 17:47

## 2021-01-01 RX ADMIN — ASPIRIN 81 MG: 81 TABLET, COATED ORAL at 08:59

## 2021-01-01 RX ADMIN — ATORVASTATIN CALCIUM 40 MG: 40 TABLET, FILM COATED ORAL at 22:54

## 2021-01-01 RX ADMIN — METOPROLOL TARTRATE 25 MG: 25 TABLET, FILM COATED ORAL at 09:00

## 2021-01-01 RX ADMIN — ALBUMIN (HUMAN) 25 G: 12.5 INJECTION, SOLUTION INTRAVENOUS at 23:45

## 2021-01-01 RX ADMIN — SODIUM CHLORIDE 40 MG: 9 INJECTION, SOLUTION INTRAMUSCULAR; INTRAVENOUS; SUBCUTANEOUS at 20:17

## 2021-01-01 RX ADMIN — SODIUM CHLORIDE 40 MG: 9 INJECTION, SOLUTION INTRAMUSCULAR; INTRAVENOUS; SUBCUTANEOUS at 09:03

## 2021-01-01 RX ADMIN — ASPIRIN 81 MG: 81 TABLET, COATED ORAL at 12:19

## 2021-01-01 RX ADMIN — OXYCODONE HYDROCHLORIDE 7.5 MG: 5 TABLET ORAL at 14:51

## 2021-01-01 RX ADMIN — DEXTROSE MONOHYDRATE 50 ML/HR: 100 INJECTION, SOLUTION INTRAVENOUS at 02:49

## 2021-01-01 RX ADMIN — CASTOR OIL AND BALSAM, PERU: 788; 87 OINTMENT TOPICAL at 08:25

## 2021-01-01 RX ADMIN — CALCIUM GLUCONATE 1 G: 98 INJECTION, SOLUTION INTRAVENOUS at 04:15

## 2021-01-01 RX ADMIN — METOPROLOL TARTRATE 25 MG: 25 TABLET, FILM COATED ORAL at 09:03

## 2021-01-01 RX ADMIN — Medication 10 ML: at 06:00

## 2021-01-01 RX ADMIN — METOPROLOL TARTRATE 25 MG: 25 TABLET, FILM COATED ORAL at 21:48

## 2021-01-01 RX ADMIN — OXYCODONE HYDROCHLORIDE 7.5 MG: 5 TABLET ORAL at 06:01

## 2021-01-01 RX ADMIN — OXYCODONE HYDROCHLORIDE 7.5 MG: 5 TABLET ORAL at 23:06

## 2021-01-01 RX ADMIN — PHENYLEPHRINE HYDROCHLORIDE 20 MCG/MIN: 10 INJECTION INTRAVENOUS at 09:57

## 2021-01-01 RX ADMIN — LACTULOSE 30 ML: 20 SOLUTION ORAL at 16:43

## 2021-01-01 RX ADMIN — SEVELAMER CARBONATE 800 MG: 800 TABLET, FILM COATED ORAL at 17:09

## 2021-01-01 RX ADMIN — DEXMEDETOMIDINE 1.5 MCG/KG/HR: 100 INJECTION, SOLUTION, CONCENTRATE INTRAVENOUS at 09:42

## 2021-01-01 RX ADMIN — PHENYLEPHRINE HYDROCHLORIDE 80 MCG/MIN: 10 INJECTION INTRAVENOUS at 11:03

## 2021-01-01 RX ADMIN — DOXERCALCIFEROL 0.5 MCG: 0.5 CAPSULE ORAL at 08:21

## 2021-01-01 RX ADMIN — OXYCODONE 5 MG: 5 TABLET ORAL at 12:50

## 2021-01-01 RX ADMIN — DEXMEDETOMIDINE 1.5 MCG/KG/HR: 100 INJECTION, SOLUTION, CONCENTRATE INTRAVENOUS at 16:43

## 2021-01-01 RX ADMIN — DEXMEDETOMIDINE 1.5 MCG/KG/HR: 100 INJECTION, SOLUTION, CONCENTRATE INTRAVENOUS at 20:33

## 2021-01-01 RX ADMIN — CHLORHEXIDINE GLUCONATE 15 ML: 0.12 RINSE ORAL at 08:25

## 2021-01-01 RX ADMIN — GABAPENTIN 300 MG: 300 CAPSULE ORAL at 11:53

## 2021-01-01 RX ADMIN — GABAPENTIN 300 MG: 300 CAPSULE ORAL at 17:09

## 2021-01-01 RX ADMIN — CHLORHEXIDINE GLUCONATE 15 ML: 0.12 RINSE ORAL at 20:16

## 2021-01-01 RX ADMIN — ISOSORBIDE MONONITRATE 30 MG: 30 TABLET, EXTENDED RELEASE ORAL at 06:19

## 2021-01-01 RX ADMIN — METOPROLOL TARTRATE 25 MG: 25 TABLET, FILM COATED ORAL at 08:59

## 2021-01-01 RX ADMIN — GLUCAGON HYDROCHLORIDE 1 MG: 1 INJECTION, POWDER, FOR SOLUTION INTRAMUSCULAR; INTRAVENOUS; SUBCUTANEOUS at 07:20

## 2021-01-01 RX ADMIN — ACETYLCYSTEINE 7640 MG: 200 INJECTION, SOLUTION INTRAVENOUS at 11:05

## 2021-01-01 RX ADMIN — LACTULOSE 30 ML: 20 SOLUTION ORAL at 18:04

## 2021-01-01 RX ADMIN — DEXMEDETOMIDINE 1.5 MCG/KG/HR: 100 INJECTION, SOLUTION, CONCENTRATE INTRAVENOUS at 08:47

## 2021-01-01 RX ADMIN — PIPERACILLIN AND TAZOBACTAM 3.38 G: 3; .375 INJECTION, POWDER, LYOPHILIZED, FOR SOLUTION INTRAVENOUS at 23:44

## 2021-01-01 RX ADMIN — Medication 1 AMPULE: at 09:18

## 2021-01-01 RX ADMIN — CITALOPRAM HYDROBROMIDE 40 MG: 20 TABLET ORAL at 08:15

## 2021-01-01 RX ADMIN — DEXTROSE MONOHYDRATE 50 ML/HR: 100 INJECTION, SOLUTION INTRAVENOUS at 02:51

## 2021-01-01 RX ADMIN — OXYCODONE HYDROCHLORIDE 7.5 MG: 5 TABLET ORAL at 10:32

## 2021-01-01 RX ADMIN — ACETYLCYSTEINE 3820 MG: 200 INJECTION, SOLUTION INTRAVENOUS at 14:24

## 2021-01-01 RX ADMIN — Medication 10 ML: at 06:01

## 2021-01-01 RX ADMIN — METOPROLOL TARTRATE 25 MG: 25 TABLET, FILM COATED ORAL at 08:42

## 2021-01-01 RX ADMIN — ALBUMIN (HUMAN) 25 G: 12.5 INJECTION, SOLUTION INTRAVENOUS at 05:16

## 2021-01-01 RX ADMIN — EPOETIN ALFA-EPBX 8000 UNITS: 4000 INJECTION, SOLUTION INTRAVENOUS; SUBCUTANEOUS at 23:13

## 2021-01-01 RX ADMIN — Medication 1 AMPULE: at 08:15

## 2021-01-01 RX ADMIN — ASPIRIN 81 MG: 81 TABLET, CHEWABLE ORAL at 08:16

## 2021-01-01 RX ADMIN — DEXMEDETOMIDINE 1.5 MCG/KG/HR: 100 INJECTION, SOLUTION, CONCENTRATE INTRAVENOUS at 03:49

## 2021-01-01 RX ADMIN — ALBUMIN (HUMAN) 12.5 G: 0.25 INJECTION, SOLUTION INTRAVENOUS at 06:06

## 2021-01-01 RX ADMIN — ASPIRIN 81 MG: 81 TABLET, COATED ORAL at 09:22

## 2021-01-01 RX ADMIN — Medication 1 AMPULE: at 14:38

## 2021-01-01 RX ADMIN — NOREPINEPHRINE BITARTRATE 8 MCG/MIN: 1 INJECTION, SOLUTION, CONCENTRATE INTRAVENOUS at 12:16

## 2021-01-01 RX ADMIN — DEXTROSE MONOHYDRATE 12.5 G: 25 INJECTION, SOLUTION INTRAVENOUS at 22:49

## 2021-01-01 RX ADMIN — DEXMEDETOMIDINE 1.2 MCG/KG/HR: 100 INJECTION, SOLUTION, CONCENTRATE INTRAVENOUS at 01:14

## 2021-01-01 RX ADMIN — OXYCODONE HYDROCHLORIDE 5 MG: 5 TABLET ORAL at 01:58

## 2021-01-01 RX ADMIN — LACTULOSE 30 ML: 20 SOLUTION ORAL at 17:47

## 2021-01-01 RX ADMIN — DEXMEDETOMIDINE 1.5 MCG/KG/HR: 100 INJECTION, SOLUTION, CONCENTRATE INTRAVENOUS at 23:01

## 2021-01-01 RX ADMIN — DEXTROSE MONOHYDRATE 25 G: 25 INJECTION, SOLUTION INTRAVENOUS at 19:00

## 2021-01-01 RX ADMIN — DEXMEDETOMIDINE 1.5 MCG/KG/HR: 100 INJECTION, SOLUTION, CONCENTRATE INTRAVENOUS at 23:20

## 2021-01-01 RX ADMIN — GABAPENTIN 300 MG: 300 CAPSULE ORAL at 12:19

## 2021-01-01 RX ADMIN — FENTANYL CITRATE 50 MCG: 50 INJECTION INTRAMUSCULAR; INTRAVENOUS at 23:09

## 2021-01-01 RX ADMIN — DEXTROSE MONOHYDRATE 50 ML/HR: 100 INJECTION, SOLUTION INTRAVENOUS at 12:51

## 2021-01-01 RX ADMIN — CHLORHEXIDINE GLUCONATE 15 ML: 0.12 RINSE ORAL at 20:09

## 2021-01-01 RX ADMIN — DEXMEDETOMIDINE 0.8 MCG/KG/HR: 100 INJECTION, SOLUTION, CONCENTRATE INTRAVENOUS at 03:56

## 2021-01-01 RX ADMIN — PIPERACILLIN AND TAZOBACTAM 3.38 G: 3; .375 INJECTION, POWDER, LYOPHILIZED, FOR SOLUTION INTRAVENOUS at 10:31

## 2021-01-01 RX ADMIN — CHLORHEXIDINE GLUCONATE 15 ML: 0.12 RINSE ORAL at 09:02

## 2021-01-01 RX ADMIN — PROPOFOL 35 MCG/KG/MIN: 10 INJECTION, EMULSION INTRAVENOUS at 21:42

## 2021-01-01 RX ADMIN — ATORVASTATIN CALCIUM 40 MG: 40 TABLET, FILM COATED ORAL at 22:08

## 2021-01-01 RX ADMIN — APIXABAN 5 MG: 5 TABLET, FILM COATED ORAL at 08:01

## 2021-01-01 RX ADMIN — NALOXONE HYDROCHLORIDE 0.4 MG: 0.4 INJECTION, SOLUTION INTRAMUSCULAR; INTRAVENOUS; SUBCUTANEOUS at 08:15

## 2021-01-01 RX ADMIN — LACTULOSE 30 ML: 20 SOLUTION ORAL at 16:01

## 2021-01-01 RX ADMIN — GABAPENTIN 300 MG: 300 CAPSULE ORAL at 17:30

## 2021-01-01 RX ADMIN — OXYCODONE HYDROCHLORIDE 7.5 MG: 5 TABLET ORAL at 15:01

## 2021-01-01 RX ADMIN — EPOETIN ALFA-EPBX 10000 UNITS: 10000 INJECTION, SOLUTION INTRAVENOUS; SUBCUTANEOUS at 21:24

## 2021-01-01 RX ADMIN — DEXTROSE MONOHYDRATE 25 G: 25 INJECTION, SOLUTION INTRAVENOUS at 22:33

## 2021-01-01 RX ADMIN — GABAPENTIN 300 MG: 300 CAPSULE ORAL at 08:59

## 2021-01-01 RX ADMIN — DEXTROSE MONOHYDRATE 50 ML/HR: 100 INJECTION, SOLUTION INTRAVENOUS at 21:28

## 2021-01-01 RX ADMIN — SODIUM CHLORIDE 40 MG: 9 INJECTION, SOLUTION INTRAMUSCULAR; INTRAVENOUS; SUBCUTANEOUS at 12:15

## 2021-01-01 RX ADMIN — DEXMEDETOMIDINE 1.5 MCG/KG/HR: 100 INJECTION, SOLUTION, CONCENTRATE INTRAVENOUS at 02:36

## 2021-01-01 RX ADMIN — Medication 10 ML: at 15:29

## 2021-01-01 RX ADMIN — FENTANYL CITRATE 50 MCG: 50 INJECTION INTRAMUSCULAR; INTRAVENOUS at 11:19

## 2021-01-01 RX ADMIN — AZITHROMYCIN MONOHYDRATE 500 MG: 500 INJECTION, POWDER, LYOPHILIZED, FOR SOLUTION INTRAVENOUS at 12:20

## 2021-01-01 RX ADMIN — SODIUM CHLORIDE 10 MG: 900 INJECTION, SOLUTION INTRAVENOUS at 09:38

## 2021-01-01 RX ADMIN — SEVELAMER CARBONATE 800 MG: 800 TABLET, FILM COATED ORAL at 15:24

## 2021-01-01 RX ADMIN — DEXMEDETOMIDINE 1.2 MCG/KG/HR: 100 INJECTION, SOLUTION, CONCENTRATE INTRAVENOUS at 09:38

## 2021-01-01 RX ADMIN — SEVELAMER CARBONATE 800 MG: 800 TABLET, FILM COATED ORAL at 22:43

## 2021-01-01 RX ADMIN — DEXMEDETOMIDINE 1.5 MCG/KG/HR: 100 INJECTION, SOLUTION, CONCENTRATE INTRAVENOUS at 13:04

## 2021-01-01 RX ADMIN — Medication 10 ML: at 08:23

## 2021-01-01 RX ADMIN — GABAPENTIN 300 MG: 300 CAPSULE ORAL at 18:16

## 2021-01-01 RX ADMIN — Medication 10 ML: at 02:57

## 2021-01-01 RX ADMIN — DOXERCALCIFEROL 0.5 MCG: 0.5 CAPSULE ORAL at 09:32

## 2021-01-01 RX ADMIN — PIPERACILLIN AND TAZOBACTAM 3.38 G: 3; .375 INJECTION, POWDER, LYOPHILIZED, FOR SOLUTION INTRAVENOUS at 14:51

## 2021-01-01 RX ADMIN — Medication 10 ML: at 05:24

## 2021-01-01 RX ADMIN — MIDAZOLAM HYDROCHLORIDE 2 MG: 1 INJECTION, SOLUTION INTRAMUSCULAR; INTRAVENOUS at 11:57

## 2021-01-01 RX ADMIN — Medication 1 AMPULE: at 21:21

## 2021-01-01 RX ADMIN — DEXMEDETOMIDINE 1.2 MCG/KG/HR: 100 INJECTION, SOLUTION, CONCENTRATE INTRAVENOUS at 06:31

## 2021-01-01 RX ADMIN — Medication 10 ML: at 22:43

## 2021-01-01 RX ADMIN — ASPIRIN 81 MG: 81 TABLET, COATED ORAL at 08:29

## 2021-01-01 RX ADMIN — CASTOR OIL AND BALSAM, PERU: 788; 87 OINTMENT TOPICAL at 08:00

## 2021-01-01 RX ADMIN — DOXERCALCIFEROL 0.5 MCG: 0.5 CAPSULE ORAL at 09:03

## 2021-01-01 RX ADMIN — DEXMEDETOMIDINE 1.2 MCG/KG/HR: 100 INJECTION, SOLUTION, CONCENTRATE INTRAVENOUS at 11:44

## 2021-01-01 RX ADMIN — ATORVASTATIN CALCIUM 40 MG: 40 TABLET, FILM COATED ORAL at 21:21

## 2021-01-01 RX ADMIN — LACTULOSE 30 ML: 20 SOLUTION ORAL at 11:24

## 2021-01-01 RX ADMIN — LACTULOSE 30 ML: 20 SOLUTION ORAL at 09:01

## 2021-01-01 RX ADMIN — ALBUMIN (HUMAN) 25 G: 12.5 INJECTION, SOLUTION INTRAVENOUS at 05:12

## 2021-01-01 RX ADMIN — PANTOPRAZOLE SODIUM 40 MG: 40 TABLET, DELAYED RELEASE ORAL at 12:19

## 2021-01-01 RX ADMIN — SEVELAMER CARBONATE 800 MG: 800 TABLET, FILM COATED ORAL at 09:32

## 2021-01-01 RX ADMIN — APIXABAN 5 MG: 5 TABLET, FILM COATED ORAL at 17:09

## 2021-01-01 RX ADMIN — Medication 10 ML: at 14:23

## 2021-01-01 RX ADMIN — OXYCODONE HYDROCHLORIDE 7.5 MG: 5 TABLET ORAL at 22:47

## 2021-01-01 RX ADMIN — SODIUM BICARBONATE: 84 INJECTION, SOLUTION INTRAVENOUS at 14:26

## 2021-01-01 RX ADMIN — IPRATROPIUM BROMIDE AND ALBUTEROL SULFATE 3 ML: .5; 3 SOLUTION RESPIRATORY (INHALATION) at 05:41

## 2021-01-01 RX ADMIN — ATORVASTATIN CALCIUM 40 MG: 40 TABLET, FILM COATED ORAL at 22:50

## 2021-01-01 RX ADMIN — APIXABAN 5 MG: 5 TABLET, FILM COATED ORAL at 17:31

## 2021-01-01 RX ADMIN — DOXERCALCIFEROL 0.5 MCG: 0.5 CAPSULE ORAL at 10:01

## 2021-01-01 RX ADMIN — MINERAL OIL, PETROLATUM: 425; 568 OINTMENT OPHTHALMIC at 14:50

## 2021-01-01 RX ADMIN — PIPERACILLIN AND TAZOBACTAM 3.38 G: 3; .375 INJECTION, POWDER, LYOPHILIZED, FOR SOLUTION INTRAVENOUS at 00:52

## 2021-01-01 RX ADMIN — MIDAZOLAM HYDROCHLORIDE 2 MG: 1 INJECTION, SOLUTION INTRAMUSCULAR; INTRAVENOUS at 21:00

## 2021-01-01 RX ADMIN — Medication 10 ML: at 13:05

## 2021-01-01 RX ADMIN — DEXTROSE MONOHYDRATE: 25 INJECTION, SOLUTION INTRAVENOUS at 17:25

## 2021-01-01 RX ADMIN — DEXTROSE MONOHYDRATE 50 ML/HR: 100 INJECTION, SOLUTION INTRAVENOUS at 17:00

## 2021-01-01 RX ADMIN — LACTULOSE 30 ML: 20 SOLUTION ORAL at 14:51

## 2021-01-01 RX ADMIN — Medication 10 ML: at 05:38

## 2021-01-01 RX ADMIN — OXYCODONE HYDROCHLORIDE 7.5 MG: 5 TABLET ORAL at 22:10

## 2021-01-01 RX ADMIN — METOPROLOL TARTRATE 25 MG: 25 TABLET, FILM COATED ORAL at 12:20

## 2021-01-01 RX ADMIN — APIXABAN 5 MG: 5 TABLET, FILM COATED ORAL at 08:29

## 2021-01-01 RX ADMIN — OXYCODONE HYDROCHLORIDE 7.5 MG: 5 TABLET ORAL at 18:57

## 2021-01-01 RX ADMIN — SODIUM CHLORIDE 40 MG: 9 INJECTION, SOLUTION INTRAMUSCULAR; INTRAVENOUS; SUBCUTANEOUS at 08:14

## 2021-01-01 RX ADMIN — MIDAZOLAM HYDROCHLORIDE 2 MG: 1 INJECTION, SOLUTION INTRAMUSCULAR; INTRAVENOUS at 17:16

## 2021-01-01 RX ADMIN — Medication: at 08:48

## 2021-01-01 RX ADMIN — SODIUM CHLORIDE 100 MG: 9 INJECTION, SOLUTION INTRAVENOUS at 14:38

## 2021-01-01 RX ADMIN — LIDOCAINE HYDROCHLORIDE 100 MG: 20 INJECTION, SOLUTION INTRAVENOUS at 20:52

## 2021-01-01 RX ADMIN — PIPERACILLIN AND TAZOBACTAM 3.38 G: 3; .375 INJECTION, POWDER, LYOPHILIZED, FOR SOLUTION INTRAVENOUS at 11:18

## 2021-01-01 RX ADMIN — NOREPINEPHRINE BITARTRATE 11 MCG/MIN: 1 INJECTION, SOLUTION, CONCENTRATE INTRAVENOUS at 16:49

## 2021-01-01 RX ADMIN — DEXTROSE MONOHYDRATE 50 ML/HR: 100 INJECTION, SOLUTION INTRAVENOUS at 06:22

## 2021-01-01 RX ADMIN — LACTULOSE 30 ML: 20 SOLUTION ORAL at 09:04

## 2021-01-01 RX ADMIN — Medication 1 AMPULE: at 21:13

## 2021-01-01 RX ADMIN — ALBUMIN (HUMAN) 25 G: 12.5 INJECTION, SOLUTION INTRAVENOUS at 18:41

## 2021-01-01 RX ADMIN — OXYCODONE HYDROCHLORIDE 7.5 MG: 5 TABLET ORAL at 08:59

## 2021-01-01 RX ADMIN — Medication 10 ML: at 02:01

## 2021-01-01 RX ADMIN — SEVELAMER CARBONATE 800 MG: 800 TABLET, FILM COATED ORAL at 16:22

## 2021-01-01 RX ADMIN — SEVELAMER CARBONATE 800 MG: 800 TABLET, FILM COATED ORAL at 08:58

## 2021-01-01 RX ADMIN — Medication 1 AMPULE: at 09:46

## 2021-01-01 RX ADMIN — Medication 1 AMPULE: at 08:23

## 2021-01-01 RX ADMIN — OXYCODONE HYDROCHLORIDE 7.5 MG: 5 TABLET ORAL at 03:22

## 2021-01-01 RX ADMIN — OXYCODONE HYDROCHLORIDE 7.5 MG: 5 TABLET ORAL at 22:42

## 2021-01-01 RX ADMIN — SEVELAMER CARBONATE 800 MG: 800 TABLET, FILM COATED ORAL at 17:31

## 2021-01-01 RX ADMIN — DOXERCALCIFEROL 0.5 MCG: 0.5 CAPSULE ORAL at 12:15

## 2021-01-01 RX ADMIN — NOREPINEPHRINE BITARTRATE 4 MCG/MIN: 1 INJECTION, SOLUTION, CONCENTRATE INTRAVENOUS at 07:00

## 2021-01-01 RX ADMIN — ASPIRIN 81 MG: 81 TABLET, CHEWABLE ORAL at 09:44

## 2021-01-01 RX ADMIN — SODIUM CHLORIDE 40 MG: 9 INJECTION, SOLUTION INTRAMUSCULAR; INTRAVENOUS; SUBCUTANEOUS at 14:48

## 2021-01-01 RX ADMIN — PIPERACILLIN AND TAZOBACTAM 3.38 G: 3; .375 INJECTION, POWDER, LYOPHILIZED, FOR SOLUTION INTRAVENOUS at 12:03

## 2021-01-01 RX ADMIN — METOPROLOL TARTRATE 25 MG: 25 TABLET, FILM COATED ORAL at 22:43

## 2021-01-01 RX ADMIN — PANTOPRAZOLE SODIUM 40 MG: 40 TABLET, DELAYED RELEASE ORAL at 09:05

## 2021-01-01 RX ADMIN — RIFAXIMIN 550 MG: 550 TABLET ORAL at 08:00

## 2021-01-01 RX ADMIN — PIPERACILLIN AND TAZOBACTAM 3.38 G: 3; .375 INJECTION, POWDER, LYOPHILIZED, FOR SOLUTION INTRAVENOUS at 12:41

## 2021-01-01 RX ADMIN — LACTULOSE 30 ML: 20 SOLUTION ORAL at 00:42

## 2021-01-01 RX ADMIN — CHLORHEXIDINE GLUCONATE 15 ML: 0.12 RINSE ORAL at 08:44

## 2021-01-01 RX ADMIN — Medication 1 AMPULE: at 08:43

## 2021-01-01 RX ADMIN — Medication 10 ML: at 06:06

## 2021-01-01 RX ADMIN — Medication 1 AMPULE: at 22:56

## 2021-01-01 RX ADMIN — ONDANSETRON 4 MG: 2 INJECTION INTRAMUSCULAR; INTRAVENOUS at 00:15

## 2021-01-01 RX ADMIN — NOREPINEPHRINE BITARTRATE 4 MCG/MIN: 1 INJECTION, SOLUTION, CONCENTRATE INTRAVENOUS at 11:44

## 2021-01-01 RX ADMIN — Medication 10 ML: at 15:08

## 2021-01-01 RX ADMIN — PANTOPRAZOLE SODIUM 40 MG: 40 TABLET, DELAYED RELEASE ORAL at 09:22

## 2021-01-01 RX ADMIN — METOPROLOL TARTRATE 25 MG: 25 TABLET, FILM COATED ORAL at 21:34

## 2021-01-01 RX ADMIN — Medication 1 AMPULE: at 20:09

## 2021-01-01 RX ADMIN — GABAPENTIN 300 MG: 300 CAPSULE ORAL at 09:22

## 2021-01-01 RX ADMIN — VASOPRESSIN: 20 INJECTION, SOLUTION INTRAVENOUS at 10:37

## 2021-01-01 RX ADMIN — CITALOPRAM HYDROBROMIDE 40 MG: 20 TABLET ORAL at 08:29

## 2021-01-01 RX ADMIN — Medication 1 AMPULE: at 22:37

## 2021-01-01 RX ADMIN — Medication 10 ML: at 21:40

## 2021-01-01 RX ADMIN — OXYCODONE HYDROCHLORIDE 5 MG: 5 TABLET ORAL at 09:22

## 2021-01-01 RX ADMIN — GABAPENTIN 300 MG: 300 CAPSULE ORAL at 18:30

## 2021-01-01 RX ADMIN — EPOETIN ALFA-EPBX 6000 UNITS: 3000 INJECTION, SOLUTION INTRAVENOUS; SUBCUTANEOUS at 22:55

## 2021-01-01 RX ADMIN — DEXMEDETOMIDINE 1.4 MCG/KG/HR: 100 INJECTION, SOLUTION, CONCENTRATE INTRAVENOUS at 19:27

## 2021-01-01 RX ADMIN — ASPIRIN 81 MG: 81 TABLET, CHEWABLE ORAL at 08:15

## 2021-01-01 RX ADMIN — COSYNTROPIN 0.25 MG: 0.25 INJECTION, POWDER, LYOPHILIZED, FOR SOLUTION INTRAMUSCULAR; INTRAVENOUS at 14:52

## 2021-01-01 RX ADMIN — SODIUM CHLORIDE 40 MG: 9 INJECTION, SOLUTION INTRAMUSCULAR; INTRAVENOUS; SUBCUTANEOUS at 11:09

## 2021-01-01 RX ADMIN — OXYCODONE HYDROCHLORIDE 7.5 MG: 5 TABLET ORAL at 06:19

## 2021-01-01 RX ADMIN — ALBUMIN (HUMAN) 25 G: 12.5 INJECTION, SOLUTION INTRAVENOUS at 18:22

## 2021-01-01 RX ADMIN — Medication 30 ML: at 15:11

## 2021-01-01 RX ADMIN — CHLORHEXIDINE GLUCONATE 15 ML: 0.12 RINSE ORAL at 20:54

## 2021-01-01 RX ADMIN — LACTULOSE 30 ML: 20 SOLUTION ORAL at 22:18

## 2021-01-01 RX ADMIN — GABAPENTIN 300 MG: 300 CAPSULE ORAL at 17:14

## 2021-01-01 RX ADMIN — APIXABAN 5 MG: 5 TABLET, FILM COATED ORAL at 17:35

## 2021-01-01 RX ADMIN — MINERAL OIL, PETROLATUM: 425; 568 OINTMENT OPHTHALMIC at 20:52

## 2021-01-01 RX ADMIN — SODIUM CHLORIDE 10 MG: 900 INJECTION, SOLUTION INTRAVENOUS at 12:09

## 2021-01-01 RX ADMIN — APIXABAN 5 MG: 5 TABLET, FILM COATED ORAL at 18:16

## 2021-01-01 RX ADMIN — SODIUM CHLORIDE 100 MG: 9 INJECTION, SOLUTION INTRAVENOUS at 14:17

## 2021-01-01 RX ADMIN — PIPERACILLIN AND TAZOBACTAM 3.38 G: 3; .375 INJECTION, POWDER, LYOPHILIZED, FOR SOLUTION INTRAVENOUS at 11:09

## 2021-01-01 RX ADMIN — OXYCODONE HYDROCHLORIDE 7.5 MG: 5 TABLET ORAL at 18:14

## 2021-01-01 RX ADMIN — ASPIRIN 81 MG: 81 TABLET, COATED ORAL at 08:01

## 2021-01-01 RX ADMIN — CHLORHEXIDINE GLUCONATE 15 ML: 0.12 RINSE ORAL at 20:47

## 2021-01-01 RX ADMIN — ASPIRIN 81 MG: 81 TABLET, CHEWABLE ORAL at 08:26

## 2021-01-01 RX ADMIN — PIPERACILLIN AND TAZOBACTAM 3.38 G: 3; .375 INJECTION, POWDER, LYOPHILIZED, FOR SOLUTION INTRAVENOUS at 22:49

## 2021-01-01 RX ADMIN — PIPERACILLIN AND TAZOBACTAM 3.38 G: 3; .375 INJECTION, POWDER, LYOPHILIZED, FOR SOLUTION INTRAVENOUS at 23:03

## 2021-01-01 RX ADMIN — METOPROLOL TARTRATE 25 MG: 25 TABLET, FILM COATED ORAL at 21:27

## 2021-01-01 RX ADMIN — PROPOFOL 25 MCG/KG/MIN: 10 INJECTION, EMULSION INTRAVENOUS at 03:06

## 2021-01-01 RX ADMIN — DEXMEDETOMIDINE 0.8 MCG/KG/HR: 100 INJECTION, SOLUTION, CONCENTRATE INTRAVENOUS at 22:46

## 2021-01-01 RX ADMIN — DEXTROSE MONOHYDRATE 25 G: 25 INJECTION, SOLUTION INTRAVENOUS at 22:10

## 2021-01-01 RX ADMIN — RIFAXIMIN 550 MG: 550 TABLET ORAL at 17:30

## 2021-01-01 RX ADMIN — SODIUM CHLORIDE 40 MG: 9 INJECTION, SOLUTION INTRAMUSCULAR; INTRAVENOUS; SUBCUTANEOUS at 09:44

## 2021-01-01 RX ADMIN — SODIUM CHLORIDE 10 MG: 900 INJECTION, SOLUTION INTRAVENOUS at 10:21

## 2021-01-01 RX ADMIN — DEXMEDETOMIDINE 1.2 MCG/KG/HR: 100 INJECTION, SOLUTION, CONCENTRATE INTRAVENOUS at 21:45

## 2021-01-01 RX ADMIN — VANCOMYCIN HYDROCHLORIDE 1750 MG: 10 INJECTION, POWDER, LYOPHILIZED, FOR SOLUTION INTRAVENOUS at 15:19

## 2021-01-01 RX ADMIN — GABAPENTIN 300 MG: 300 CAPSULE ORAL at 08:16

## 2021-01-01 RX ADMIN — DOXERCALCIFEROL 0.5 MCG: 0.5 CAPSULE ORAL at 08:15

## 2021-01-01 RX ADMIN — OXYCODONE HYDROCHLORIDE 2.5 MG: 5 TABLET ORAL at 19:46

## 2021-01-01 RX ADMIN — APIXABAN 5 MG: 5 TABLET, FILM COATED ORAL at 09:04

## 2021-01-01 RX ADMIN — DEXMEDETOMIDINE 1.5 MCG/KG/HR: 100 INJECTION, SOLUTION, CONCENTRATE INTRAVENOUS at 14:25

## 2021-01-01 RX ADMIN — PIPERACILLIN AND TAZOBACTAM 3.38 G: 3; .375 INJECTION, POWDER, LYOPHILIZED, FOR SOLUTION INTRAVENOUS at 23:15

## 2021-01-01 RX ADMIN — HYDROMORPHONE HYDROCHLORIDE 0.5 MG: 1 INJECTION, SOLUTION INTRAMUSCULAR; INTRAVENOUS; SUBCUTANEOUS at 11:48

## 2021-01-01 RX ADMIN — CITALOPRAM HYDROBROMIDE 40 MG: 20 TABLET ORAL at 08:16

## 2021-01-01 RX ADMIN — CITALOPRAM HYDROBROMIDE 40 MG: 20 TABLET ORAL at 09:22

## 2021-01-01 RX ADMIN — APIXABAN 5 MG: 5 TABLET, FILM COATED ORAL at 08:26

## 2021-01-01 RX ADMIN — DEXTROSE MONOHYDRATE 30 ML/HR: 100 INJECTION, SOLUTION INTRAVENOUS at 22:58

## 2021-01-01 RX ADMIN — SODIUM CHLORIDE 100 MG: 9 INJECTION, SOLUTION INTRAVENOUS at 10:58

## 2021-01-01 RX ADMIN — Medication 10 ML: at 18:15

## 2021-01-01 RX ADMIN — OXYCODONE HYDROCHLORIDE 7.5 MG: 5 TABLET ORAL at 04:19

## 2021-01-01 RX ADMIN — Medication 10 ML: at 05:10

## 2021-01-01 RX ADMIN — METOPROLOL TARTRATE 25 MG: 25 TABLET, FILM COATED ORAL at 08:29

## 2021-01-01 RX ADMIN — Medication 10 ML: at 05:13

## 2021-01-01 RX ADMIN — SODIUM CHLORIDE 40 MG: 9 INJECTION, SOLUTION INTRAMUSCULAR; INTRAVENOUS; SUBCUTANEOUS at 09:17

## 2021-01-01 RX ADMIN — ATORVASTATIN CALCIUM 40 MG: 40 TABLET, FILM COATED ORAL at 23:06

## 2021-01-01 RX ADMIN — MIDAZOLAM HYDROCHLORIDE 2 MG: 1 INJECTION, SOLUTION INTRAMUSCULAR; INTRAVENOUS at 00:14

## 2021-01-01 RX ADMIN — Medication 10 ML: at 21:34

## 2021-01-01 RX ADMIN — CHLORHEXIDINE GLUCONATE 15 ML: 0.12 RINSE ORAL at 08:00

## 2021-01-01 RX ADMIN — ISOSORBIDE MONONITRATE 30 MG: 30 TABLET, EXTENDED RELEASE ORAL at 09:03

## 2021-01-01 RX ADMIN — ALBUMIN (HUMAN) 25 G: 12.5 INJECTION, SOLUTION INTRAVENOUS at 23:04

## 2021-01-01 RX ADMIN — OXYCODONE HYDROCHLORIDE 7.5 MG: 5 TABLET ORAL at 02:26

## 2021-01-01 RX ADMIN — PIPERACILLIN AND TAZOBACTAM 3.38 G: 3; .375 INJECTION, POWDER, LYOPHILIZED, FOR SOLUTION INTRAVENOUS at 12:16

## 2021-01-01 RX ADMIN — DEXTROSE MONOHYDRATE 25 G: 25 INJECTION, SOLUTION INTRAVENOUS at 04:39

## 2021-01-01 RX ADMIN — ATORVASTATIN CALCIUM 40 MG: 40 TABLET, FILM COATED ORAL at 21:11

## 2021-01-01 RX ADMIN — SEVELAMER CARBONATE 800 MG: 800 TABLET, FILM COATED ORAL at 17:14

## 2021-01-01 RX ADMIN — PANTOPRAZOLE SODIUM 40 MG: 40 TABLET, DELAYED RELEASE ORAL at 09:32

## 2021-01-01 RX ADMIN — Medication 10 ML: at 12:21

## 2021-01-01 RX ADMIN — ASPIRIN 81 MG: 81 TABLET, CHEWABLE ORAL at 08:23

## 2021-01-01 RX ADMIN — RIFAXIMIN 550 MG: 550 TABLET ORAL at 09:03

## 2021-01-01 RX ADMIN — METOPROLOL TARTRATE 25 MG: 25 TABLET, FILM COATED ORAL at 08:23

## 2021-01-01 RX ADMIN — MINERAL OIL, PETROLATUM: 425; 568 OINTMENT OPHTHALMIC at 20:33

## 2021-01-01 RX ADMIN — AMOXICILLIN AND CLAVULANATE POTASSIUM 1 TABLET: 500; 125 TABLET, FILM COATED ORAL at 17:14

## 2021-01-01 RX ADMIN — AMOXICILLIN AND CLAVULANATE POTASSIUM 1 TABLET: 500; 125 TABLET, FILM COATED ORAL at 18:13

## 2021-01-01 RX ADMIN — CITALOPRAM HYDROBROMIDE 40 MG: 20 TABLET ORAL at 08:02

## 2021-01-01 RX ADMIN — ATORVASTATIN CALCIUM 40 MG: 40 TABLET, FILM COATED ORAL at 22:11

## 2021-01-01 RX ADMIN — CITALOPRAM HYDROBROMIDE 40 MG: 20 TABLET ORAL at 09:17

## 2021-01-01 RX ADMIN — CITALOPRAM HYDROBROMIDE 40 MG: 20 TABLET ORAL at 08:26

## 2021-01-01 RX ADMIN — DEXTROSE MONOHYDRATE 50 ML/HR: 100 INJECTION, SOLUTION INTRAVENOUS at 08:10

## 2021-01-01 RX ADMIN — APIXABAN 5 MG: 5 TABLET, FILM COATED ORAL at 08:59

## 2021-01-01 RX ADMIN — VANCOMYCIN HYDROCHLORIDE 750 MG: 750 INJECTION, POWDER, LYOPHILIZED, FOR SOLUTION INTRAVENOUS at 08:36

## 2021-01-01 RX ADMIN — PROPOFOL 30 MCG/KG/MIN: 10 INJECTION, EMULSION INTRAVENOUS at 09:17

## 2021-01-01 RX ADMIN — EPOETIN ALFA-EPBX 8000 UNITS: 4000 INJECTION, SOLUTION INTRAVENOUS; SUBCUTANEOUS at 22:54

## 2021-01-01 RX ADMIN — Medication 75 MCG/HR: at 11:43

## 2021-01-01 RX ADMIN — OXYCODONE 5 MG: 5 TABLET ORAL at 22:32

## 2021-01-01 RX ADMIN — LACTULOSE 30 ML: 20 SOLUTION ORAL at 08:18

## 2021-01-01 RX ADMIN — Medication 40 ML: at 16:35

## 2021-01-01 RX ADMIN — MIDAZOLAM HYDROCHLORIDE 2 MG: 1 INJECTION, SOLUTION INTRAMUSCULAR; INTRAVENOUS at 08:53

## 2021-01-01 RX ADMIN — DEXTROSE MONOHYDRATE: 25 INJECTION, SOLUTION INTRAVENOUS at 04:03

## 2021-01-01 RX ADMIN — DEXTROSE MONOHYDRATE 50 ML/HR: 100 INJECTION, SOLUTION INTRAVENOUS at 09:32

## 2021-01-01 RX ADMIN — CITALOPRAM HYDROBROMIDE 40 MG: 20 TABLET ORAL at 09:32

## 2021-01-01 RX ADMIN — HUMAN INSULIN 10 UNITS: 100 INJECTION, SOLUTION SUBCUTANEOUS at 04:42

## 2021-01-01 RX ADMIN — ATORVASTATIN CALCIUM 40 MG: 40 TABLET, FILM COATED ORAL at 22:10

## 2021-01-01 RX ADMIN — METOPROLOL TARTRATE 25 MG: 25 TABLET, FILM COATED ORAL at 00:42

## 2021-01-01 RX ADMIN — CITALOPRAM HYDROBROMIDE 40 MG: 20 TABLET ORAL at 09:04

## 2021-01-01 RX ADMIN — Medication 10 ML: at 22:18

## 2021-01-01 RX ADMIN — CHLORHEXIDINE GLUCONATE 15 ML: 0.12 RINSE ORAL at 09:47

## 2021-01-01 RX ADMIN — Medication 10 ML: at 21:25

## 2021-01-01 RX ADMIN — OXYCODONE HYDROCHLORIDE 7.5 MG: 5 TABLET ORAL at 14:13

## 2021-01-01 RX ADMIN — Medication 1 AMPULE: at 08:25

## 2021-01-01 RX ADMIN — CITALOPRAM HYDROBROMIDE 40 MG: 20 TABLET ORAL at 09:03

## 2021-01-01 RX ADMIN — Medication 10 ML: at 21:57

## 2021-01-01 RX ADMIN — SODIUM CHLORIDE 40 MG: 9 INJECTION, SOLUTION INTRAMUSCULAR; INTRAVENOUS; SUBCUTANEOUS at 20:56

## 2021-01-01 RX ADMIN — OXYCODONE HYDROCHLORIDE 7.5 MG: 5 TABLET ORAL at 03:00

## 2021-01-01 RX ADMIN — DOXYCYCLINE HYCLATE 100 MG: 100 TABLET, COATED ORAL at 22:32

## 2021-01-01 RX ADMIN — SEVELAMER CARBONATE 800 MG: 800 TABLET, FILM COATED ORAL at 12:51

## 2021-01-01 RX ADMIN — SEVELAMER CARBONATE 800 MG: 800 TABLET, FILM COATED ORAL at 22:10

## 2021-01-01 RX ADMIN — GABAPENTIN 300 MG: 300 CAPSULE ORAL at 08:02

## 2021-01-01 RX ADMIN — Medication 1 AMPULE: at 20:16

## 2021-01-01 RX ADMIN — ISOSORBIDE MONONITRATE 30 MG: 30 TABLET, EXTENDED RELEASE ORAL at 09:32

## 2021-01-01 RX ADMIN — PANTOPRAZOLE SODIUM 40 MG: 40 TABLET, DELAYED RELEASE ORAL at 08:59

## 2021-01-01 RX ADMIN — RIFAXIMIN 550 MG: 550 TABLET ORAL at 00:42

## 2021-01-01 RX ADMIN — SODIUM CHLORIDE 40 MG: 9 INJECTION, SOLUTION INTRAMUSCULAR; INTRAVENOUS; SUBCUTANEOUS at 20:09

## 2021-01-01 RX ADMIN — CASTOR OIL AND BALSAM, PERU: 788; 87 OINTMENT TOPICAL at 18:41

## 2021-01-01 RX ADMIN — RIFAXIMIN 550 MG: 550 TABLET ORAL at 17:22

## 2021-01-01 RX ADMIN — CHLORHEXIDINE GLUCONATE 15 ML: 0.12 RINSE ORAL at 20:32

## 2021-01-01 RX ADMIN — GLUCAGON HYDROCHLORIDE 1 MG: 1 INJECTION, POWDER, FOR SOLUTION INTRAMUSCULAR; INTRAVENOUS; SUBCUTANEOUS at 07:37

## 2021-01-01 RX ADMIN — GABAPENTIN 300 MG: 300 CAPSULE ORAL at 18:14

## 2021-01-01 RX ADMIN — MIDAZOLAM HYDROCHLORIDE 2 MG: 1 INJECTION, SOLUTION INTRAMUSCULAR; INTRAVENOUS at 01:57

## 2021-01-01 RX ADMIN — SODIUM CHLORIDE 40 MG: 9 INJECTION, SOLUTION INTRAMUSCULAR; INTRAVENOUS; SUBCUTANEOUS at 08:35

## 2021-01-01 RX ADMIN — Medication 10 ML: at 23:17

## 2021-01-01 RX ADMIN — METOPROLOL TARTRATE 12.5 MG: 25 TABLET, FILM COATED ORAL at 08:02

## 2021-01-01 RX ADMIN — SODIUM CHLORIDE 10 MG: 900 INJECTION, SOLUTION INTRAVENOUS at 08:53

## 2021-01-01 RX ADMIN — GABAPENTIN 300 MG: 300 CAPSULE ORAL at 08:29

## 2021-01-01 RX ADMIN — PANTOPRAZOLE SODIUM 40 MG: 40 TABLET, DELAYED RELEASE ORAL at 08:29

## 2021-01-01 RX ADMIN — SODIUM CHLORIDE 40 MG: 9 INJECTION, SOLUTION INTRAMUSCULAR; INTRAVENOUS; SUBCUTANEOUS at 08:15

## 2021-01-01 RX ADMIN — MINERAL OIL, PETROLATUM: 425; 568 OINTMENT OPHTHALMIC at 20:10

## 2021-01-01 RX ADMIN — PIPERACILLIN AND TAZOBACTAM 3.38 G: 3; .375 INJECTION, POWDER, LYOPHILIZED, FOR SOLUTION INTRAVENOUS at 10:58

## 2021-01-01 RX ADMIN — DEXMEDETOMIDINE 0.8 MCG/KG/HR: 100 INJECTION, SOLUTION, CONCENTRATE INTRAVENOUS at 10:38

## 2021-01-01 RX ADMIN — ASPIRIN 81 MG: 81 TABLET, CHEWABLE ORAL at 09:03

## 2021-01-01 RX ADMIN — PIPERACILLIN AND TAZOBACTAM 3.38 G: 3; .375 INJECTION, POWDER, LYOPHILIZED, FOR SOLUTION INTRAVENOUS at 10:21

## 2021-01-01 RX ADMIN — PIPERACILLIN AND TAZOBACTAM 3.38 G: 3; .375 INJECTION, POWDER, LYOPHILIZED, FOR SOLUTION INTRAVENOUS at 22:37

## 2021-01-01 RX ADMIN — LORAZEPAM 1 MG: 2 INJECTION INTRAMUSCULAR; INTRAVENOUS at 03:10

## 2021-01-01 RX ADMIN — IOPAMIDOL 100 ML: 755 INJECTION, SOLUTION INTRAVENOUS at 22:38

## 2021-01-01 RX ADMIN — OXYCODONE HYDROCHLORIDE 7.5 MG: 5 TABLET ORAL at 16:21

## 2021-01-01 RX ADMIN — ASPIRIN 81 MG: 81 TABLET, CHEWABLE ORAL at 08:42

## 2021-01-01 RX ADMIN — SEVELAMER CARBONATE 800 MG: 800 TABLET, FILM COATED ORAL at 08:29

## 2021-01-01 RX ADMIN — ATORVASTATIN CALCIUM 40 MG: 40 TABLET, FILM COATED ORAL at 22:43

## 2021-01-01 RX ADMIN — Medication 1 AMPULE: at 21:14

## 2021-01-01 RX ADMIN — DEXMEDETOMIDINE 1.2 MCG/KG/HR: 100 INJECTION, SOLUTION, CONCENTRATE INTRAVENOUS at 12:56

## 2021-01-01 RX ADMIN — DEXTROSE MONOHYDRATE 25 G: 25 INJECTION, SOLUTION INTRAVENOUS at 23:06

## 2021-01-01 RX ADMIN — OXYCODONE HYDROCHLORIDE 7.5 MG: 5 TABLET ORAL at 14:32

## 2021-01-01 RX ADMIN — CEFTRIAXONE 1 G: 1 INJECTION, POWDER, FOR SOLUTION INTRAMUSCULAR; INTRAVENOUS at 12:20

## 2021-01-01 RX ADMIN — DEXMEDETOMIDINE 0.8 MCG/KG/HR: 100 INJECTION, SOLUTION, CONCENTRATE INTRAVENOUS at 16:37

## 2021-01-01 RX ADMIN — EPOETIN ALFA-EPBX 10000 UNITS: 10000 INJECTION, SOLUTION INTRAVENOUS; SUBCUTANEOUS at 21:16

## 2021-01-01 RX ADMIN — OXYCODONE HYDROCHLORIDE 7.5 MG: 5 TABLET ORAL at 08:00

## 2021-01-01 RX ADMIN — APIXABAN 5 MG: 5 TABLET, FILM COATED ORAL at 08:23

## 2021-01-01 RX ADMIN — DEXMEDETOMIDINE 1 MCG/KG/HR: 100 INJECTION, SOLUTION, CONCENTRATE INTRAVENOUS at 23:39

## 2021-01-01 RX ADMIN — DEXTROSE MONOHYDRATE 12.5 G: 25 INJECTION, SOLUTION INTRAVENOUS at 01:41

## 2021-01-01 RX ADMIN — DEXTROSE MONOHYDRATE: 25 INJECTION, SOLUTION INTRAVENOUS at 06:55

## 2021-01-01 RX ADMIN — CITALOPRAM HYDROBROMIDE 40 MG: 20 TABLET ORAL at 08:59

## 2021-01-01 RX ADMIN — FENTANYL CITRATE 50 MCG: 50 INJECTION INTRAMUSCULAR; INTRAVENOUS at 20:31

## 2021-01-01 RX ADMIN — APIXABAN 5 MG: 5 TABLET, FILM COATED ORAL at 11:53

## 2021-01-01 RX ADMIN — METOPROLOL TARTRATE 25 MG: 25 TABLET, FILM COATED ORAL at 12:15

## 2021-01-01 RX ADMIN — DILTIAZEM HYDROCHLORIDE 28 MG: 5 INJECTION INTRAVENOUS at 03:01

## 2021-01-01 RX ADMIN — Medication 30 ML: at 13:42

## 2021-01-01 RX ADMIN — SEVELAMER CARBONATE 800 MG: 800 TABLET, FILM COATED ORAL at 09:22

## 2021-01-01 RX ADMIN — LACTULOSE 30 ML: 20 SOLUTION ORAL at 08:00

## 2021-01-01 RX ADMIN — DEXMEDETOMIDINE 1.2 MCG/KG/HR: 100 INJECTION, SOLUTION, CONCENTRATE INTRAVENOUS at 16:36

## 2021-01-01 RX ADMIN — DEXTROSE MONOHYDRATE 25 G: 25 INJECTION, SOLUTION INTRAVENOUS at 10:29

## 2021-01-01 RX ADMIN — CASTOR OIL AND BALSAM, PERU: 788; 87 OINTMENT TOPICAL at 20:47

## 2021-01-01 RX ADMIN — DEXTROSE MONOHYDRATE: 25 INJECTION, SOLUTION INTRAVENOUS at 00:14

## 2021-01-01 RX ADMIN — Medication 10 ML: at 06:33

## 2021-01-01 RX ADMIN — CASTOR OIL AND BALSAM, PERU: 788; 87 OINTMENT TOPICAL at 08:03

## 2021-01-01 RX ADMIN — MIDAZOLAM HYDROCHLORIDE 2 MG: 1 INJECTION, SOLUTION INTRAMUSCULAR; INTRAVENOUS at 01:33

## 2021-01-01 RX ADMIN — NITROGLYCERIN 10 MCG/MIN: 20 INJECTION INTRAVENOUS at 02:35

## 2021-01-01 RX ADMIN — Medication 10 ML: at 15:22

## 2021-01-01 RX ADMIN — MIDAZOLAM HYDROCHLORIDE 2 MG: 1 INJECTION, SOLUTION INTRAMUSCULAR; INTRAVENOUS at 22:58

## 2021-01-01 RX ADMIN — Medication 1 AMPULE: at 08:00

## 2021-01-01 RX ADMIN — APIXABAN 5 MG: 5 TABLET, FILM COATED ORAL at 09:17

## 2021-01-01 RX ADMIN — ASPIRIN 81 MG: 81 TABLET, CHEWABLE ORAL at 08:00

## 2021-01-01 RX ADMIN — ACETYLCYSTEINE 7640 MG: 200 INJECTION, SOLUTION INTRAVENOUS at 18:49

## 2021-01-01 RX ADMIN — RIFAXIMIN 550 MG: 550 TABLET ORAL at 08:35

## 2021-01-01 RX ADMIN — Medication 75 MCG/HR: at 07:10

## 2021-01-01 RX ADMIN — Medication 10 ML: at 17:09

## 2021-01-01 RX ADMIN — CHLORHEXIDINE GLUCONATE 15 ML: 0.12 RINSE ORAL at 09:44

## 2021-01-01 RX ADMIN — PIPERACILLIN AND TAZOBACTAM 3.38 G: 3; .375 INJECTION, POWDER, LYOPHILIZED, FOR SOLUTION INTRAVENOUS at 23:37

## 2021-01-01 RX ADMIN — MIDAZOLAM HYDROCHLORIDE 2 MG: 1 INJECTION, SOLUTION INTRAMUSCULAR; INTRAVENOUS at 17:53

## 2021-01-01 RX ADMIN — EPOETIN ALFA-EPBX 6000 UNITS: 3000 INJECTION, SOLUTION INTRAVENOUS; SUBCUTANEOUS at 00:52

## 2021-01-01 RX ADMIN — SEVELAMER CARBONATE 800 MG: 800 TABLET, FILM COATED ORAL at 12:19

## 2021-01-01 RX ADMIN — PHENYLEPHRINE HYDROCHLORIDE 10 MCG/MIN: 10 INJECTION INTRAVENOUS at 12:07

## 2021-01-01 RX ADMIN — MIDAZOLAM HYDROCHLORIDE 2 MG: 1 INJECTION, SOLUTION INTRAMUSCULAR; INTRAVENOUS at 20:01

## 2021-01-01 RX ADMIN — Medication 1 AMPULE: at 20:53

## 2021-01-01 RX ADMIN — DEXMEDETOMIDINE 1.5 MCG/KG/HR: 100 INJECTION, SOLUTION, CONCENTRATE INTRAVENOUS at 05:56

## 2021-01-01 RX ADMIN — SODIUM CHLORIDE 100 MG: 9 INJECTION, SOLUTION INTRAVENOUS at 10:52

## 2021-01-01 RX ADMIN — OXYCODONE HYDROCHLORIDE 7.5 MG: 5 TABLET ORAL at 00:13

## 2021-01-01 RX ADMIN — CHLORHEXIDINE GLUCONATE 15 ML: 0.12 RINSE ORAL at 08:15

## 2021-01-01 RX ADMIN — Medication 1 AMPULE: at 20:18

## 2021-01-01 RX ADMIN — ATORVASTATIN CALCIUM 40 MG: 40 TABLET, FILM COATED ORAL at 21:27

## 2021-01-01 RX ADMIN — CITALOPRAM HYDROBROMIDE 40 MG: 20 TABLET ORAL at 12:15

## 2021-01-01 RX ADMIN — ACETYLCYSTEINE 11440 MG: 200 INJECTION, SOLUTION INTRAVENOUS at 12:47

## 2021-01-01 RX ADMIN — SODIUM CHLORIDE 0.4 MCG/KG/HR: 9 INJECTION, SOLUTION INTRAVENOUS at 09:15

## 2021-01-01 RX ADMIN — SODIUM CHLORIDE 200 MG: 9 INJECTION, SOLUTION INTRAVENOUS at 14:17

## 2021-01-01 RX ADMIN — CEFTRIAXONE 1 G: 1 INJECTION, POWDER, FOR SOLUTION INTRAMUSCULAR; INTRAVENOUS at 09:00

## 2021-01-01 RX ADMIN — ATORVASTATIN CALCIUM 40 MG: 40 TABLET, FILM COATED ORAL at 21:48

## 2021-01-01 RX ADMIN — MIDAZOLAM HYDROCHLORIDE 2 MG: 1 INJECTION, SOLUTION INTRAMUSCULAR; INTRAVENOUS at 06:12

## 2021-01-01 RX ADMIN — DOXERCALCIFEROL 0.5 MCG: 0.5 CAPSULE ORAL at 12:00

## 2021-01-01 RX ADMIN — Medication 10 ML: at 01:48

## 2021-01-01 RX ADMIN — DEXTROSE MONOHYDRATE: 25 INJECTION, SOLUTION INTRAVENOUS at 03:54

## 2021-01-01 RX ADMIN — SEVELAMER CARBONATE 800 MG: 800 TABLET, FILM COATED ORAL at 18:13

## 2021-01-01 RX ADMIN — Medication 1 AMPULE: at 21:33

## 2021-01-01 RX ADMIN — SEVELAMER CARBONATE 800 MG: 800 TABLET, FILM COATED ORAL at 22:49

## 2021-01-01 RX ADMIN — EPOETIN ALFA-EPBX 10000 UNITS: 10000 INJECTION, SOLUTION INTRAVENOUS; SUBCUTANEOUS at 21:38

## 2021-01-01 RX ADMIN — GLYCOPYRROLATE 0.2 MG: 0.2 INJECTION INTRAMUSCULAR; INTRAVENOUS at 20:32

## 2021-01-01 RX ADMIN — ASPIRIN 81 MG: 81 TABLET, CHEWABLE ORAL at 09:00

## 2021-01-01 RX ADMIN — Medication 1 AMPULE: at 08:17

## 2021-01-01 RX ADMIN — DEXMEDETOMIDINE 1.2 MCG/KG/HR: 100 INJECTION, SOLUTION, CONCENTRATE INTRAVENOUS at 21:55

## 2021-01-01 RX ADMIN — PIPERACILLIN AND TAZOBACTAM 3.38 G: 3; .375 INJECTION, POWDER, LYOPHILIZED, FOR SOLUTION INTRAVENOUS at 23:08

## 2021-01-01 RX ADMIN — ASPIRIN 81 MG: 81 TABLET, COATED ORAL at 09:32

## 2021-01-01 RX ADMIN — PIPERACILLIN AND TAZOBACTAM 3.38 G: 3; .375 INJECTION, POWDER, LYOPHILIZED, FOR SOLUTION INTRAVENOUS at 11:28

## 2021-01-01 RX ADMIN — NOREPINEPHRINE BITARTRATE 25 MCG/MIN: 1 INJECTION, SOLUTION, CONCENTRATE INTRAVENOUS at 08:48

## 2021-01-01 RX ADMIN — APIXABAN 5 MG: 5 TABLET, FILM COATED ORAL at 09:22

## 2021-01-01 RX ADMIN — MINERAL OIL, PETROLATUM: 425; 568 OINTMENT OPHTHALMIC at 22:27

## 2021-01-01 RX ADMIN — Medication 16 G: at 07:20

## 2021-01-01 RX ADMIN — PIPERACILLIN AND TAZOBACTAM 3.38 G: 3; .375 INJECTION, POWDER, LYOPHILIZED, FOR SOLUTION INTRAVENOUS at 11:44

## 2021-01-01 RX ADMIN — OXYCODONE HYDROCHLORIDE 7.5 MG: 5 TABLET ORAL at 20:32

## 2021-01-01 RX ADMIN — MINERAL OIL, PETROLATUM: 425; 568 OINTMENT OPHTHALMIC at 08:00

## 2021-01-01 RX ADMIN — PIPERACILLIN AND TAZOBACTAM 3.38 G: 3; .375 INJECTION, POWDER, LYOPHILIZED, FOR SOLUTION INTRAVENOUS at 12:00

## 2021-01-01 RX ADMIN — SODIUM CHLORIDE 200 MG: 9 INJECTION, SOLUTION INTRAVENOUS at 17:56

## 2021-01-01 RX ADMIN — Medication 50 MCG/HR: at 12:37

## 2021-01-01 RX ADMIN — Medication 10 ML: at 21:12

## 2021-01-01 RX ADMIN — DEXTROSE MONOHYDRATE 25 G: 25 INJECTION, SOLUTION INTRAVENOUS at 19:49

## 2021-01-01 RX ADMIN — DEXTROSE MONOHYDRATE 25 G: 25 INJECTION, SOLUTION INTRAVENOUS at 09:04

## 2021-01-01 RX ADMIN — ISOSORBIDE MONONITRATE 30 MG: 30 TABLET, EXTENDED RELEASE ORAL at 08:14

## 2021-01-01 RX ADMIN — FENTANYL CITRATE 50 MCG: 50 INJECTION INTRAMUSCULAR; INTRAVENOUS at 16:43

## 2021-01-01 RX ADMIN — DEXMEDETOMIDINE 1.2 MCG/KG/HR: 100 INJECTION, SOLUTION, CONCENTRATE INTRAVENOUS at 08:12

## 2021-01-01 RX ADMIN — MINERAL OIL, PETROLATUM: 425; 568 OINTMENT OPHTHALMIC at 20:19

## 2021-01-01 RX ADMIN — SODIUM CHLORIDE 40 MG: 9 INJECTION, SOLUTION INTRAMUSCULAR; INTRAVENOUS; SUBCUTANEOUS at 08:23

## 2021-01-01 RX ADMIN — SODIUM CHLORIDE 0.4 MCG/KG/HR: 9 INJECTION, SOLUTION INTRAVENOUS at 13:08

## 2021-01-01 RX ADMIN — EPOETIN ALFA-EPBX 8000 UNITS: 4000 INJECTION, SOLUTION INTRAVENOUS; SUBCUTANEOUS at 01:48

## 2021-01-01 RX ADMIN — Medication 10 ML: at 05:17

## 2021-01-01 RX ADMIN — Medication 1 AMPULE: at 20:47

## 2021-01-01 RX ADMIN — APIXABAN 5 MG: 5 TABLET, FILM COATED ORAL at 17:14

## 2021-01-01 RX ADMIN — OXYCODONE 5 MG: 5 TABLET ORAL at 17:18

## 2021-01-01 RX ADMIN — VASOPRESSIN: 20 INJECTION, SOLUTION INTRAVENOUS at 23:54

## 2021-01-01 RX ADMIN — LACTULOSE 30 ML: 20 SOLUTION ORAL at 18:41

## 2021-01-01 RX ADMIN — PIPERACILLIN AND TAZOBACTAM 3.38 G: 3; .375 INJECTION, POWDER, LYOPHILIZED, FOR SOLUTION INTRAVENOUS at 00:18

## 2021-01-01 RX ADMIN — DEXMEDETOMIDINE 1.5 MCG/KG/HR: 100 INJECTION, SOLUTION, CONCENTRATE INTRAVENOUS at 13:30

## 2021-01-01 RX ADMIN — ISOSORBIDE MONONITRATE 30 MG: 30 TABLET, EXTENDED RELEASE ORAL at 08:59

## 2021-01-01 RX ADMIN — DEXTROSE MONOHYDRATE: 25 INJECTION, SOLUTION INTRAVENOUS at 03:10

## 2021-01-01 RX ADMIN — SEVELAMER CARBONATE 800 MG: 800 TABLET, FILM COATED ORAL at 08:01

## 2021-01-01 RX ADMIN — ALBUMIN (HUMAN) 12.5 G: 0.25 INJECTION, SOLUTION INTRAVENOUS at 10:53

## 2021-01-01 RX ADMIN — DEXMEDETOMIDINE 1.5 MCG/KG/HR: 100 INJECTION, SOLUTION, CONCENTRATE INTRAVENOUS at 00:20

## 2021-01-01 RX ADMIN — PIPERACILLIN AND TAZOBACTAM 3.38 G: 3; .375 INJECTION, POWDER, LYOPHILIZED, FOR SOLUTION INTRAVENOUS at 22:09

## 2021-01-01 RX ADMIN — DEXTROSE MONOHYDRATE 50 ML/HR: 100 INJECTION, SOLUTION INTRAVENOUS at 06:03

## 2021-01-01 RX ADMIN — CASTOR OIL AND BALSAM, PERU: 788; 87 OINTMENT TOPICAL at 17:34

## 2021-01-01 RX ADMIN — Medication 10 ML: at 18:51

## 2021-01-01 RX ADMIN — Medication 10 ML: at 22:11

## 2021-01-01 RX ADMIN — DEXTROSE MONOHYDRATE 50 ML/HR: 100 INJECTION, SOLUTION INTRAVENOUS at 06:56

## 2021-01-01 RX ADMIN — Medication 1 AMPULE: at 08:01

## 2021-01-01 RX ADMIN — DEXMEDETOMIDINE 1 MCG/KG/HR: 100 INJECTION, SOLUTION, CONCENTRATE INTRAVENOUS at 04:12

## 2021-01-01 RX ADMIN — CITALOPRAM HYDROBROMIDE 40 MG: 20 TABLET ORAL at 08:23

## 2021-01-01 RX ADMIN — SODIUM CHLORIDE 125 ML/HR: 9 INJECTION, SOLUTION INTRAVENOUS at 08:03

## 2021-01-01 RX ADMIN — ISOSORBIDE MONONITRATE 30 MG: 30 TABLET, EXTENDED RELEASE ORAL at 08:30

## 2021-01-01 RX ADMIN — APIXABAN 5 MG: 5 TABLET, FILM COATED ORAL at 18:13

## 2021-01-01 RX ADMIN — ASPIRIN 81 MG: 81 TABLET, CHEWABLE ORAL at 09:17

## 2021-01-01 RX ADMIN — CHLORHEXIDINE GLUCONATE 15 ML: 0.12 RINSE ORAL at 21:30

## 2021-01-01 RX ADMIN — ALBUMIN (HUMAN) 25 G: 12.5 INJECTION, SOLUTION INTRAVENOUS at 11:45

## 2021-01-01 RX ADMIN — METHADONE HYDROCHLORIDE 160 MG: 10 CONCENTRATE ORAL at 12:03

## 2021-01-01 RX ADMIN — Medication 10 ML: at 22:34

## 2021-01-01 RX ADMIN — Medication 10 ML: at 21:16

## 2021-01-01 RX ADMIN — PIPERACILLIN AND TAZOBACTAM 3.38 G: 3; .375 INJECTION, POWDER, LYOPHILIZED, FOR SOLUTION INTRAVENOUS at 22:12

## 2021-01-01 RX ADMIN — DEXMEDETOMIDINE 1.3 MCG/KG/HR: 100 INJECTION, SOLUTION, CONCENTRATE INTRAVENOUS at 04:00

## 2021-01-01 RX ADMIN — ASPIRIN 81 MG: 81 TABLET, COATED ORAL at 09:04

## 2021-01-01 RX ADMIN — OXYCODONE HYDROCHLORIDE 5 MG: 5 TABLET ORAL at 19:05

## 2021-01-01 RX ADMIN — SEVELAMER CARBONATE 800 MG: 800 TABLET, FILM COATED ORAL at 23:06

## 2021-01-01 RX ADMIN — METOPROLOL TARTRATE 25 MG: 25 TABLET, FILM COATED ORAL at 22:11

## 2021-01-01 RX ADMIN — RIFAXIMIN 550 MG: 550 TABLET ORAL at 08:42

## 2021-01-01 RX ADMIN — DEXTROSE MONOHYDRATE 12.5 G: 25 INJECTION, SOLUTION INTRAVENOUS at 02:56

## 2021-01-01 RX ADMIN — DEXMEDETOMIDINE 1.5 MCG/KG/HR: 100 INJECTION, SOLUTION, CONCENTRATE INTRAVENOUS at 16:48

## 2021-01-01 RX ADMIN — Medication 10 ML: at 06:20

## 2021-01-01 RX ADMIN — LACTULOSE 30 ML: 20 SOLUTION ORAL at 08:35

## 2021-01-01 RX ADMIN — CASTOR OIL AND BALSAM, PERU: 788; 87 OINTMENT TOPICAL at 20:19

## 2021-01-01 RX ADMIN — DEXMEDETOMIDINE 1.2 MCG/KG/HR: 100 INJECTION, SOLUTION, CONCENTRATE INTRAVENOUS at 17:27

## 2021-01-01 RX ADMIN — MIDAZOLAM HYDROCHLORIDE 2 MG: 1 INJECTION, SOLUTION INTRAMUSCULAR; INTRAVENOUS at 15:46

## 2021-01-01 RX ADMIN — LACTULOSE 30 ML: 20 SOLUTION ORAL at 08:43

## 2021-01-01 RX ADMIN — DEXTROSE MONOHYDRATE 25 G: 25 INJECTION, SOLUTION INTRAVENOUS at 04:11

## 2021-01-01 RX ADMIN — APIXABAN 5 MG: 5 TABLET, FILM COATED ORAL at 17:56

## 2021-01-01 RX ADMIN — PANTOPRAZOLE SODIUM 40 MG: 40 TABLET, DELAYED RELEASE ORAL at 08:02

## 2021-01-01 RX ADMIN — OXYCODONE HYDROCHLORIDE 5 MG: 5 TABLET ORAL at 00:17

## 2021-01-01 RX ADMIN — Medication: at 00:43

## 2021-01-01 RX ADMIN — METOPROLOL TARTRATE 25 MG: 25 TABLET, FILM COATED ORAL at 22:53

## 2021-01-01 RX ADMIN — DEXTROSE MONOHYDRATE 75 ML/HR: 100 INJECTION, SOLUTION INTRAVENOUS at 07:53

## 2021-01-01 RX ADMIN — DEXTROSE MONOHYDRATE: 25 INJECTION, SOLUTION INTRAVENOUS at 08:28

## 2021-01-01 RX ADMIN — PIPERACILLIN AND TAZOBACTAM 3.38 G: 3; .375 INJECTION, POWDER, LYOPHILIZED, FOR SOLUTION INTRAVENOUS at 11:26

## 2021-01-01 RX ADMIN — DEXMEDETOMIDINE 1.4 MCG/KG/HR: 100 INJECTION, SOLUTION, CONCENTRATE INTRAVENOUS at 15:08

## 2021-01-01 RX ADMIN — APIXABAN 5 MG: 5 TABLET, FILM COATED ORAL at 09:32

## 2021-01-01 RX ADMIN — RIFAXIMIN 550 MG: 550 TABLET ORAL at 18:18

## 2021-01-01 RX ADMIN — PIPERACILLIN AND TAZOBACTAM 3.38 G: 3; .375 INJECTION, POWDER, LYOPHILIZED, FOR SOLUTION INTRAVENOUS at 11:19

## 2021-01-01 RX ADMIN — METOPROLOL TARTRATE 25 MG: 25 TABLET, FILM COATED ORAL at 21:21

## 2021-01-01 RX ADMIN — Medication 1 AMPULE: at 09:00

## 2021-01-01 RX ADMIN — DEXTROSE MONOHYDRATE 12.5 G: 25 INJECTION, SOLUTION INTRAVENOUS at 04:20

## 2021-01-01 RX ADMIN — Medication 10 ML: at 22:00

## 2021-01-01 RX ADMIN — SODIUM CHLORIDE 40 MG: 9 INJECTION, SOLUTION INTRAMUSCULAR; INTRAVENOUS; SUBCUTANEOUS at 08:42

## 2021-01-01 RX ADMIN — SODIUM CHLORIDE 100 MG: 9 INJECTION, SOLUTION INTRAVENOUS at 14:13

## 2021-05-14 NOTE — ED NOTES
Assumed care of pt via EMS stretcher. Pt is A&O x 4 and reports CC of leg pain for a week. Pt was suppose to get dialysis this morning but did not receive it due to the leg pain. 7031- Ultrasound at bedside [id number] pt's mother for a ride home. Mom can't do it but is trying to find someone to come get him. Will call back

## 2021-05-14 NOTE — ED PROVIDER NOTES
EMERGENCY DEPARTMENT HISTORY AND PHYSICAL EXAM 
 
 
Date: 5/14/2021 Patient Name: Caitlyn Escoto History of Presenting Illness Chief Complaint Patient presents with  Leg Pain  
  left leg pain with swelling History Provided By: Patient HPI: Caitlyn Escoto, 55 y.o. male with PMHx significant for ESRD on hemodialysis, prior DVT, who presents with a chief complaint of left lower extremity pain and swelling this week. Did not go to dialysis today due to leg pain, but states he has not missed any other sessions. Patient is quite sleepy on my evaluation but states that is because he was \"up all night gambling. \"  He denies any chest pain or shortness of breath. He does have a prescription for Eliquis, unclear if he is compliant. PCP: None There are no other complaints, changes, or physical findings at this time. Current Outpatient Medications Medication Sig Dispense Refill  isosorbide mononitrate ER (IMDUR) 30 mg tablet Take 1 Tab by mouth every morning. 30 Tab 0  
 apixaban (ELIQUIS) 5 mg tablet Take 1 Tab by mouth two (2) times a day. 30 Tab 0  
 lidocaine 1.8 % ptmd 1 Patch by Apply Externally route daily. 1 Box 1  
 omeprazole (PRILOSEC) 40 mg capsule Take 40 mg by mouth daily. Patient takes every night   Indications: heartburn  lidocaine 5 % topical cream Apply  to affected area two (2) times daily as needed for Itching. 15 g 0  
 citalopram (CELEXA) 40 mg tablet Take 1 Tab by mouth daily. 30 Tab 1  
 sevelamer (RENAGEL) 400 mg tablet Take 800 mg by mouth three (3) times daily (with meals).  lisinopril (PRINIVIL, ZESTRIL) 2.5 mg tablet Take 2.5 mg by mouth daily.  promethazine (PHENERGAN) 25 mg tablet Take 25 mg by mouth every six (6) hours as needed for Nausea.  atorvastatin (LIPITOR) 40 mg tablet Take 40 mg by mouth nightly.  senna (SENNA) 8.6 mg tablet Take 1 Tab by mouth daily.     
 lidocaine (LIDODERM) 5 % Apply patch to the affected area for 12 hours a day and remove for 12 hours a day. 60 Each 0  
 aspirin delayed-release 81 mg tablet Take 1 Tab by mouth daily. 90 Tab 0  
 multivitamin (ONE A DAY) tablet Take 1 Tab by mouth daily.  pantoprazole (PROTONIX) 40 mg tablet Take 40 mg by mouth daily.  paricalcitol (ZEMPLAR) 1 mcg capsule Take 1 mcg by mouth daily.  gabapentin (NEURONTIN) 300 mg capsule Take 300 mg by mouth two (2) times a day. Past History Past Medical History: 
Past Medical History:  
Diagnosis Date  Chronic kidney disease  Dialysis patient [facility])  Hypertension Past Surgical History: 
Past Surgical History:  
Procedure Laterality Date  HX VASCULAR ACCESS Left LUE AV fistula Family History: 
Family History Problem Relation Age of Onset  No Known Problems Mother  No Known Problems Father  No Known Problems Sister  No Known Problems Brother  No Known Problems Sister  No Known Problems Sister  No Known Problems Brother Social History: 
Social History Tobacco Use  Smoking status: Current Every Day Smoker Packs/day: 0.25  Smokeless tobacco: Never Used Substance Use Topics  Alcohol use: Yes  Drug use: Not Currently Types: Heroin Comment: Former Allergies: Allergies Allergen Reactions  Motrin [Ibuprofen] Hives 7/16/17 Pt denies allergy  Tylenol [Acetaminophen] Hives 7/16/17 Pt denies allergy Review of Systems Review of Systems Constitutional: Negative for chills and fever. HENT: Negative for congestion, rhinorrhea and sore throat. Respiratory: Negative for cough and shortness of breath. Cardiovascular: Positive for leg swelling. Negative for chest pain. Gastrointestinal: Negative for abdominal pain, nausea and vomiting. Genitourinary: Negative for dysuria and urgency. Musculoskeletal: Positive for myalgias. Skin: Negative for rash.   
Neurological: Negative for dizziness, light-headedness and headaches. All other systems reviewed and are negative. Physical Exam  
Physical Exam 
Vitals signs and nursing note reviewed. Constitutional:   
   General: He is sleeping. He is not in acute distress. Appearance: He is well-developed. HENT:  
   Head: Normocephalic and atraumatic. Eyes:  
   Conjunctiva/sclera: Conjunctivae normal.  
   Pupils: Pupils are equal, round, and reactive to light. Neck: Musculoskeletal: Normal range of motion. Cardiovascular:  
   Rate and Rhythm: Normal rate and regular rhythm. Pulmonary:  
   Effort: Pulmonary effort is normal. No respiratory distress. Breath sounds: Normal breath sounds. No stridor. Abdominal:  
   General: There is no distension. Palpations: Abdomen is soft. Tenderness: There is no abdominal tenderness. Musculoskeletal: Normal range of motion. Comments: Left heart extremity with minimal erythema, calf size greater on left than right, diffusely tender to palpation Fistula in left upper extremity with palpable thrill Skin: 
   General: Skin is warm and dry. Neurological:  
   Mental Status: He is oriented to person, place, and time and easily aroused. Diagnostic Study Results Labs - Recent Results (from the past 12 hour(s)) METABOLIC PANEL, COMPREHENSIVE Collection Time: 05/14/21 10:19 AM  
Result Value Ref Range Sodium 129 (L) 136 - 145 mmol/L Potassium 3.8 3.5 - 5.1 mmol/L Chloride 93 (L) 97 - 108 mmol/L  
 CO2 28 21 - 32 mmol/L Anion gap 8 5 - 15 mmol/L Glucose 85 65 - 100 mg/dL BUN 48 (H) 6 - 20 MG/DL Creatinine 10.10 (H) 0.70 - 1.30 MG/DL  
 BUN/Creatinine ratio 5 (L) 12 - 20 GFR est AA 7 (L) >60 ml/min/1.73m2 GFR est non-AA 5 (L) >60 ml/min/1.73m2 Calcium 9.0 8.5 - 10.1 MG/DL Bilirubin, total 0.9 0.2 - 1.0 MG/DL  
 ALT (SGPT) 26 12 - 78 U/L  
 AST (SGOT) 45 (H) 15 - 37 U/L Alk. phosphatase 101 45 - 117 U/L Protein, total 7.6 6.4 - 8.2 g/dL Albumin 2.4 (L) 3.5 - 5.0 g/dL Globulin 5.2 (H) 2.0 - 4.0 g/dL A-G Ratio 0.5 (L) 1.1 - 2.2    
CBC WITH AUTOMATED DIFF Collection Time: 05/14/21 10:19 AM  
Result Value Ref Range WBC 8.3 4.1 - 11.1 K/uL  
 RBC 2.72 (L) 4.10 - 5.70 M/uL HGB 8.6 (L) 12.1 - 17.0 g/dL HCT 28.1 (L) 36.6 - 50.3 % .3 (H) 80.0 - 99.0 FL  
 MCH 31.6 26.0 - 34.0 PG  
 MCHC 30.6 30.0 - 36.5 g/dL  
 RDW 14.5 11.5 - 14.5 % PLATELET 432 389 - 142 K/uL MPV 10.3 8.9 - 12.9 FL  
 NRBC 0.0 0  WBC ABSOLUTE NRBC 0.00 0.00 - 0.01 K/uL NEUTROPHILS 73 32 - 75 % LYMPHOCYTES 17 12 - 49 % MONOCYTES 8 5 - 13 % EOSINOPHILS 1 0 - 7 % BASOPHILS 0 0 - 1 % IMMATURE GRANULOCYTES 1 (H) 0.0 - 0.5 % ABS. NEUTROPHILS 6.1 1.8 - 8.0 K/UL  
 ABS. LYMPHOCYTES 1.4 0.8 - 3.5 K/UL  
 ABS. MONOCYTES 0.7 0.0 - 1.0 K/UL  
 ABS. EOSINOPHILS 0.1 0.0 - 0.4 K/UL  
 ABS. BASOPHILS 0.0 0.0 - 0.1 K/UL  
 ABS. IMM. GRANS. 0.1 (H) 0.00 - 0.04 K/UL  
 DF AUTOMATED Radiologic Studies - No orders to display No results found. Medical Decision Making I am the first provider for this patient. I reviewed the vital signs, available nursing notes, past medical history, past surgical history, family history and social history. Vital Signs-Reviewed the patient's vital signs. Patient Vitals for the past 12 hrs: 
 Temp Pulse Resp BP SpO2  
05/14/21 0818 97.8 °F (36.6 °C) 92 16 110/63 96 % Pulse Oximetry Analysis - 96% on ra Records Reviewed: Nursing Notes and Old Medical Records Provider Notes (Medical Decision Making):  
Patient presents with a chief complaint of left leg pain and swelling. Did not go to dialysis due to his symptoms. Will check basic lab work to ensure no acute need for dialysis at this time, however patient is not hypoxic on room air. Will check ultrasound left lower extremity to rule out DVT. ED Course:  
Initial assessment performed.  The patients presenting problems have been discussed, and they are in agreement with the care plan formulated and outlined with them. I have encouraged them to ask questions as they arise throughout their visit. ED Course as of May 14 1815 Fri May 14, 2021  
[address] [facility] [address] neg per [facility] ED Course User Index Christine Cross MD  
 
Lab work without hyperkalemia. Ultrasound negative for DVT. Will discharge with instructions to go to dialysis to date. Patient states he is able to do this. We will otherwise have him follow-up with his primary care. Procedures: 
Procedures Critical Care: 
none Disposition: 
Discharge Note: The patient has been re-evaluated and is ready for discharge. Reviewed available results with patient. Counseled patient on diagnosis and care plan. Patient has expressed understanding, and all questions have been answered. Patient agrees with plan and agrees to follow up as recommended, or to return to the ED if their symptoms worsen. Discharge instructions have been provided and explained to the patient, along with reasons to return to the ED. PLAN: 
1. Discharge Medication List as of 5/14/2021 11:02 AM  
  
 
2. Follow-up Information Follow up With Specialties Details Why Contact Info  
 your PCP  Schedule an appointment as soon as possible for a visit [facility] EMERGENCY DEPT Emergency Medicine  As needed, If symptoms worsen [address] [facility] 
[phone] Return to ED if worse Diagnosis Clinical Impression: 1. Left leg swelling Please note that this dictation was completed with Tango, the computer voice recognition software. Quite often unanticipated grammatical, syntax, homophones, and other interpretive errors are inadvertently transcribed by the computer software. Please disregard these errors. Please excuse any errors that have escaped final proofreading

## 2021-07-10 NOTE — ED PROVIDER NOTES
EMERGENCY DEPARTMENT HISTORY AND PHYSICAL EXAM      Date: 7/10/2021  Patient Name: Charmayne Bullock    Please note that this dictation was completed with TruQu, the computer voice recognition software. Quite often unanticipated grammatical, syntax, homophones, and other interpretive errors are inadvertently transcribed by the computer software. Please disregard these errors. Please excuse any errors that have escaped final proofreading. History of Presenting Illness     Chief Complaint   Patient presents with    Ankle swelling     left leg swelling x 1 week. Pt also reports he fell today because his right leg gave out on huim,, no injuries from fall. Pt denies any CP or SOB. History Provided By: Patient     HPI: Charmayne Bullock, 55 y.o. male, with a past medical history significant for end-stage renal disease on dialysis, history of DVT not currently taking any blood thinners presenting the emergency department complaining of left ankle pain and swelling. States is been going on for 1 week. Denies any trauma to the ankle. He fell today because he felt generally weak. Denies missed dialysis. Denies any fever, chills, cough, chest pain, shortness of breath. No other exacerbating or relieving factors or associated symptoms    PCP: None    No current facility-administered medications on file prior to encounter. Current Outpatient Medications on File Prior to Encounter   Medication Sig Dispense Refill    isosorbide mononitrate ER (IMDUR) 30 mg tablet Take 1 Tab by mouth every morning. 30 Tab 0    apixaban (ELIQUIS) 5 mg tablet Take 1 Tab by mouth two (2) times a day. 30 Tab 0    lidocaine 1.8 % ptmd 1 Patch by Apply Externally route daily. 1 Box 1    omeprazole (PRILOSEC) 40 mg capsule Take 40 mg by mouth daily. Patient takes every night   Indications: heartburn      lidocaine 5 % topical cream Apply  to affected area two (2) times daily as needed for Itching.  15 g 0    citalopram (CELEXA) 40 mg tablet Take 1 Tab by mouth daily. 30 Tab 1    sevelamer (RENAGEL) 400 mg tablet Take 800 mg by mouth three (3) times daily (with meals).  lisinopril (PRINIVIL, ZESTRIL) 2.5 mg tablet Take 2.5 mg by mouth daily.  promethazine (PHENERGAN) 25 mg tablet Take 25 mg by mouth every six (6) hours as needed for Nausea.  atorvastatin (LIPITOR) 40 mg tablet Take 40 mg by mouth nightly.  senna (SENNA) 8.6 mg tablet Take 1 Tab by mouth daily.  lidocaine (LIDODERM) 5 % Apply patch to the affected area for 12 hours a day and remove for 12 hours a day. 60 Each 0    aspirin delayed-release 81 mg tablet Take 1 Tab by mouth daily. 90 Tab 0    multivitamin (ONE A DAY) tablet Take 1 Tab by mouth daily.  pantoprazole (PROTONIX) 40 mg tablet Take 40 mg by mouth daily.  paricalcitol (ZEMPLAR) 1 mcg capsule Take 1 mcg by mouth daily.  gabapentin (NEURONTIN) 300 mg capsule Take 300 mg by mouth two (2) times a day. Past History     Past Medical History:  Past Medical History:   Diagnosis Date    Chronic kidney disease     Dialysis patient ([facility] [address]     Hypertension        Past Surgical History:  Past Surgical History:   Procedure Laterality Date    HX VASCULAR ACCESS Left     LUE AV fistula       Family History:  Family History   Problem Relation Age of Onset    No Known Problems Mother     No Known Problems Father     No Known Problems Sister     No Known Problems Brother     No Known Problems Sister     No Known Problems Sister     No Known Problems Brother        Social History:  Social History     Tobacco Use    Smoking status: Current Every Day Smoker     Packs/day: 0.25    Smokeless tobacco: Never Used   Substance Use Topics    Alcohol use: Yes    Drug use: Not Currently     Types: Heroin     Comment: Former       Allergies:   Allergies   Allergen Reactions    Motrin [Ibuprofen] Hives     7/16/17 Pt denies allergy    Tylenol [Acetaminophen] Hives     7/16/17 Pt denies allergy         Review of Systems   Review of Systems   Constitutional: Negative for chills and fever. HENT: Negative for congestion and sore throat. Eyes: Negative for visual disturbance. Respiratory: Negative for cough and shortness of breath. Cardiovascular: Negative for chest pain and leg swelling. Gastrointestinal: Negative for abdominal pain, blood in stool, diarrhea and nausea. Endocrine: Negative for polyuria. Genitourinary: Negative for dysuria and testicular pain. Musculoskeletal: Positive for arthralgias and joint swelling. Negative for myalgias. Skin: Negative for rash. Allergic/Immunologic: Negative for immunocompromised state. Neurological: Negative for weakness and headaches. Hematological: Does not bruise/bleed easily. Psychiatric/Behavioral: Negative for confusion. Physical Exam   Physical Exam  Vitals and nursing note reviewed. Constitutional:       Appearance: He is well-developed. HENT:      Head: Normocephalic and atraumatic. Eyes:      General:         Right eye: No discharge. Left eye: No discharge. Conjunctiva/sclera: Conjunctivae normal.      Pupils: Pupils are equal, round, and reactive to light. Neck:      Trachea: No tracheal deviation. Cardiovascular:      Rate and Rhythm: Normal rate and regular rhythm. Heart sounds: Normal heart sounds. No murmur heard. Comments: Dialysis fistula left arm with palpable thrill  Pulmonary:      Effort: Pulmonary effort is normal. No respiratory distress. Breath sounds: Normal breath sounds. No wheezing or rales. Abdominal:      General: Bowel sounds are normal.      Palpations: Abdomen is soft. Tenderness: There is no abdominal tenderness. There is no guarding or rebound. Musculoskeletal:         General: No tenderness or deformity. Normal range of motion. Cervical back: Normal range of motion and neck supple.       Comments: Palpable pulses in the DP and PT bilaterally. Effusion of the left ankle. Pain with range of motion. Very tender to touch. Skin:     General: Skin is warm and dry. Comments: There is an effusion to the left ankle, there is warmth over the left ankle. No rash. Neurological:      Mental Status: He is alert and oriented to person, place, and time. Psychiatric:         Behavior: Behavior normal.         Diagnostic Study Results     Labs -     Recent Results (from the past 12 hour(s))   EKG, 12 LEAD, INITIAL    Collection Time: 07/10/21  4:41 PM   Result Value Ref Range    Ventricular Rate 97 BPM    Atrial Rate 97 BPM    P-R Interval 158 ms    QRS Duration 90 ms    Q-T Interval 348 ms    QTC Calculation (Bezet) 441 ms    Calculated P Axis 64 degrees    Calculated R Axis 40 degrees    Calculated T Axis 40 degrees    Diagnosis       Normal sinus rhythm  Possible Left atrial enlargement  Nonspecific T wave abnormality  When compared with ECG of 13-NOV-2020 11:50,  No significant change was found     CBC WITH AUTOMATED DIFF    Collection Time: 07/10/21  8:06 PM   Result Value Ref Range    WBC 11.8 (H) 4.1 - 11.1 K/uL    RBC 2.66 (L) 4.10 - 5.70 M/uL    HGB 8.5 (L) 12.1 - 17.0 g/dL    HCT 26.9 (L) 36.6 - 50.3 %    .1 (H) 80.0 - 99.0 FL    MCH 32.0 26.0 - 34.0 PG    MCHC 31.6 30.0 - 36.5 g/dL    RDW 16.6 (H) 11.5 - 14.5 %    PLATELET 740 701 - 015 K/uL    MPV 11.2 8.9 - 12.9 FL    NRBC 0.0 0  WBC    ABSOLUTE NRBC 0.00 0.00 - 0.01 K/uL    NEUTROPHILS 78 (H) 32 - 75 %    LYMPHOCYTES 11 (L) 12 - 49 %    MONOCYTES 9 5 - 13 %    EOSINOPHILS 1 0 - 7 %    BASOPHILS 0 0 - 1 %    IMMATURE GRANULOCYTES 1 (H) 0.0 - 0.5 %    ABS. NEUTROPHILS 9.3 (H) 1.8 - 8.0 K/UL    ABS. LYMPHOCYTES 1.3 0.8 - 3.5 K/UL    ABS. MONOCYTES 1.0 0.0 - 1.0 K/UL    ABS. EOSINOPHILS 0.1 0.0 - 0.4 K/UL    ABS. BASOPHILS 0.0 0.0 - 0.1 K/UL    ABS. IMM.  GRANS. 0.1 (H) 0.00 - 0.04 K/UL    DF AUTOMATED     METABOLIC PANEL, COMPREHENSIVE    Collection Time: 07/10/21  8:06 PM   Result Value Ref Range    Sodium 132 (L) 136 - 145 mmol/L    Potassium 4.0 3.5 - 5.1 mmol/L    Chloride 94 (L) 97 - 108 mmol/L    CO2 29 21 - 32 mmol/L    Anion gap 9 5 - 15 mmol/L    Glucose 81 65 - 100 mg/dL    BUN 35 (H) 6 - 20 MG/DL    Creatinine 8.47 (H) 0.70 - 1.30 MG/DL    BUN/Creatinine ratio 4 (L) 12 - 20      GFR est AA 8 (L) >60 ml/min/1.73m2    GFR est non-AA 7 (L) >60 ml/min/1.73m2    Calcium 9.6 8.5 - 10.1 MG/DL    Bilirubin, total 0.6 0.2 - 1.0 MG/DL    ALT (SGPT) 32 12 - 78 U/L    AST (SGOT) 50 (H) 15 - 37 U/L    Alk. phosphatase 109 45 - 117 U/L    Protein, total 7.7 6.4 - 8.2 g/dL    Albumin 2.3 (L) 3.5 - 5.0 g/dL    Globulin 5.4 (H) 2.0 - 4.0 g/dL    A-G Ratio 0.4 (L) 1.1 - 2.2     PROTHROMBIN TIME + INR    Collection Time: 07/10/21  8:06 PM   Result Value Ref Range    INR 1.2 (H) 0.9 - 1.1      Prothrombin time 12.2 (H) 9.0 - 11.1 sec   SED RATE (ESR)    Collection Time: 07/10/21  8:06 PM   Result Value Ref Range    Sed rate, automated 127 (H) 0 - 20 mm/hr   MAGNESIUM    Collection Time: 07/10/21  8:06 PM   Result Value Ref Range    Magnesium 2.0 1.6 - 2.4 mg/dL   PHOSPHORUS    Collection Time: 07/10/21  8:06 PM   Result Value Ref Range    Phosphorus 4.6 2.6 - 4.7 MG/DL   SAMPLES BEING HELD    Collection Time: 07/10/21  8:06 PM   Result Value Ref Range    SAMPLES BEING HELD  RED, SST     COMMENT        Add-on orders for these samples will be processed based on acceptable specimen integrity and analyte stability, which may vary by analyte. Radiologic Studies -   DUPLEX LOWER EXT VENOUS LEFT   Final Result      XR ANKLE LT MIN 3 V   Final Result   No acute fracture or dislocation. CT Results  (Last 48 hours)    None        CXR Results  (Last 48 hours)    None            Medical Decision Making   I am the first provider for this patient.     I reviewed the vital signs, available nursing notes, past medical history, past surgical history, family history and social history. Vital Signs-Reviewed the patient's vital signs. Patient Vitals for the past 12 hrs:   Temp Pulse Resp BP SpO2   07/10/21 2206     90 %   07/10/21 2200    110/68    07/10/21 1945    (!) 103/59 97 %   07/10/21 1900    (!) 100/57 100 %   07/10/21 1854    104/60 100 %   07/10/21 1626 98.9 °F (37.2 °C) 99 18 (!) 96/46 100 %           Records Reviewed:   Nursing notes, Prior visits         Provider Notes (Medical Decision Making):   Patient here with what appears to be monoarticular arthritis with in the setting of history of end-stage renal disease. Differential includes gout, septic joint, inflammatory arthritis, osteoarthritis, occult fracture, DVT. Will start work-up with x-ray, labs, ultrasound. If this is nonrevealing may need to consider arthrocentesis of the left ankle to rule out a septic joint. ED Course:   Initial assessment performed. The patients presenting problems have been discussed, and they are in agreement with the care plan formulated and outlined with them. I have encouraged them to ask questions as they arise throughout their visit. ED Course as of Jul 11 0301   Sat Jul 10, 2021   2211 Attempted arthrocentesis only blood obtain, no purulent drainage. I was well within the joint space. Patient tolerated well. May be hemarthrosis, but there is no evidence of infection. He may be developing an early cellulitis, but not overtly. May just be edema from prior DVT or chronic venous stasis. Will cover with antibiotics, discharged home. Close return follow-up precautions discussed with patient    [AR]      ED Course User Index  [AR] Zurdo Lynne DO               Critical Care Time:   none    Disposition:    DISCHARGE NOTE  Patients results have been reviewed with them.   Patient and/or family have verbally conveyed their understanding and agreement of the patient's signs, symptoms, diagnosis, treatment and prognosis and additionally agree to follow up as recommended or return to the Emergency Room should their condition change or have any new concerns prior to their follow-up appointment. Patient verbally agrees with the care-plan and verbally conveys that all of their questions have been answered. Discharge instructions have also been provided to the patient with some educational information regarding their diagnosis as well a list of reasons why they would want to return to the ER prior to their follow-up appointment should their condition change. PLAN:  1. Discharge Medication List as of 7/10/2021 10:12 PM      START taking these medications    Details   doxycycline (VIBRA-TABS) 100 mg tablet Take 1 Tablet by mouth two (2) times a day for 7 days. , Normal, Disp-14 Tablet, R-0         CONTINUE these medications which have NOT CHANGED    Details   isosorbide mononitrate ER (IMDUR) 30 mg tablet Take 1 Tab by mouth every morning., Normal, Disp-30 Tab,R-0      apixaban (ELIQUIS) 5 mg tablet Take 1 Tab by mouth two (2) times a day., Normal, Disp-30 Tab,R-0      lidocaine 1.8 % ptmd 1 Patch by Apply Externally route daily. , Normal, Disp-1 Box,R-1      omeprazole (PRILOSEC) 40 mg capsule Take 40 mg by mouth daily. Patient takes every night   Indications: heartburn, Historical Med      lidocaine 5 % topical cream Apply  to affected area two (2) times daily as needed for Itching., Normal, Disp-15 g, R-0      citalopram (CELEXA) 40 mg tablet Take 1 Tab by mouth daily. , Normal, Disp-30 Tab, R-1      sevelamer (RENAGEL) 400 mg tablet Take 800 mg by mouth three (3) times daily (with meals). , Historical Med      lisinopril (PRINIVIL, ZESTRIL) 2.5 mg tablet Take 2.5 mg by mouth daily. , Historical Med      promethazine (PHENERGAN) 25 mg tablet Take 25 mg by mouth every six (6) hours as needed for Nausea., Historical Med      atorvastatin (LIPITOR) 40 mg tablet Take 40 mg by mouth nightly., Historical Med      senna (SENNA) 8.6 mg tablet Take 1 Tab by mouth daily. , Historical Med      lidocaine (LIDODERM) 5 % Apply patch to the affected area for 12 hours a day and remove for 12 hours a day., Normal, Disp-60 Each, R-0      aspirin delayed-release 81 mg tablet Take 1 Tab by mouth daily. , Normal, Disp-90 Tab,R-0      multivitamin (ONE A DAY) tablet Take 1 Tab by mouth daily. , Historical Med      pantoprazole (PROTONIX) 40 mg tablet Take 40 mg by mouth daily. , Historical Med      paricalcitol (ZEMPLAR) 1 mcg capsule Take 1 mcg by mouth daily. , Historical Med      gabapentin (NEURONTIN) 300 mg capsule Take 300 mg by mouth two (2) times a day., Historical Med           2. Follow-up Information     Follow up With Specialties Details Why Contact Info    [facility] EMERGENCY DEPT Emergency Medicine  If symptoms worsen [address] [place] [address]  [address] [facility]  [phone]    Your Primary care doctor              Return to ED if worse     Diagnosis     Clinical Impression:   1. Right ankle swelling    2. Right leg swelling    3. Cellulitis of left lower extremity        Attestations:   This note was completed by Ishan Brock DO

## 2021-07-10 NOTE — ED NOTES
at bedside evaluating pt    Assumed care of pt from triage. Pt is A&O x 4. Pt reports CC of  Left ankle swelling starting about a week ago. Pt reports he has been bear weight mostly on right leg and had a fall today, Pt denies any injury or LOC. Pt report he is a dialysis pt and gets dialysis mon, wed & fri and receive treatment on Friday. Upon arrival to room pt ankle is tender to touch.  Pt is place on the monitor x 2, call bell within reach 3

## 2021-07-11 NOTE — ED NOTES
Pt discharged by MD. Pt provided with discharge instructions Rx and instructions on follow up care.  Pt out of ED by [facility] to home

## 2021-08-08 PROBLEM — R06.02 SOB (SHORTNESS OF BREATH): Status: ACTIVE | Noted: 2021-01-01

## 2021-08-08 PROBLEM — N18.6 ESRD (END STAGE RENAL DISEASE) ON DIALYSIS (HCC): Status: ACTIVE | Noted: 2021-01-01

## 2021-08-08 PROBLEM — Z99.2 ESRD (END STAGE RENAL DISEASE) ON DIALYSIS (HCC): Status: ACTIVE | Noted: 2021-01-01

## 2021-08-09 NOTE — PROCEDURES
Hemodialysis / [facility] / [phone]    Vitals   Pre   Post   Assessment   Pre   Post     Temp  97.8  98.1 LOC  A&O x4 A&O x4   HR   115 100 Lungs   Bi-pap  bi-pap   B/P  148/92 133/92 Cardiac   tachycardia  regular   Resp   32 23 Skin   Dry, warm  dry, warm   Pain level  0 0 Edema  none     none   Orders:    Duration:   Start:    1450 End:    0715 Total:   2.5hrs   Dialyzer:   Dialyzer/Set Up Inspection: Kendall Zacs (08/09/21 0445)   Esau Castrejon Bath:   Dialysate K (mEq/L): 2 (08/09/21 0445)   Ca Bath:   Dialysate CA (mEq/L): 2.5 (08/09/21 0445)   Na/Bicarb:   Dialysate NA (mEq/L): 140. (08/09/21 0445)   Target Fluid Removal:   Goal/Amount of Fluid to Remove (mL): 4000 mL (08/09/21 0445)   Access     Type & Location:   GLORIA AVF: +B&T, no S&S of infection, site cleaned per P&P, cannulated with 15G 1\" needles x2   Labs     Obtained/Reviewed   Critical Results Called   Date when labs were drawn-  Hgb-    HGB   Date Value Ref Range Status   08/08/2021 9.0 (L) 12.1 - 17.0 g/dL Final     K-    Potassium   Date Value Ref Range Status   08/08/2021 5.0 3.5 - 5.1 mmol/L Final     Comment:     Sample is hemolyzed (hemoglobin is  likely >50 mg/dL) and thus the  potassium, AST, ammonia, and  phosphorus results may be adversely  affected. If the clinical situation  warrants, recommend recollection of  a new specimen with attention to  preventing hemolysis.        Ca-   Calcium   Date Value Ref Range Status   08/08/2021 8.5 8.5 - 10.1 MG/DL Final     Bun-   BUN   Date Value Ref Range Status   08/08/2021 80 (H) 6 - 20 MG/DL Final     Creat-   Creatinine   Date Value Ref Range Status   08/08/2021 11.30 (H) 0.70 - 1.30 MG/DL Final        Medications/ Blood Products Given     Name   Dose   Route and Time           Albumin 25% 12.5 g HD 0605        Blood Volume Processed (BVP):    56L Net Fluid   Removed:  3100cc   Comments   Time Out Done: 6379  Primary Nurse Rpt Pre: Pina Freeman, RN  Primary Nurse Rpt Raya Manzano, RN  Pt Education: access care, procedure  Care Plan: continue HD tx as per MD order  Tx Summary: Tolerated tx well, at the end remaining blood in circuit returned with 200 cc NS, cannulas removed x2, hemostasis achieved, dressing applied.   Admiting Diagnosis:  Pt's previous clinic-  Consent signed - Informed Consent Verified: Yes (08/09/21 0445)  Hepatitis Status- pending  Machine #- Machine Number: [id number] (08/09/21 0445)  Telemetry status- monitored at the bedside

## 2021-08-09 NOTE — ED NOTES
Patient is being transferred to [address], Room # 56871 [phone]. Report given to Christine Bryant RN on [facility] for routine progression of care. Report consisted of the following information SBAR, Kardex, ED Summary, Intake/Output, MAR, Recent Results, Med Rec Status and Cardiac Rhythm sinus tach. Patient transferred to receiving unit by: RN (RN or tech name). Outstanding consults needed: Yes    Next labs due: Yes troponin 0930    The following personal items will be sent with the patient during transfer to the floor:     All valuables:    Cardiac monitoring ordered: Yes    The following CURRENT information was reported to the receiving RN:    Code status: Full Code at time of transfer    Last set of vital signs:  Vital Signs  Level of Consciousness: Alert (0) (08/09/21 0315)  Temp: 97.8 °F (36.6 °C) (08/09/21 0445)  Temp Source: Axillary (08/09/21 0315)  Pulse (Heart Rate): (!) 113 (08/09/21 0630)  Resp Rate: (!) 31 (08/09/21 0630)  BP: (!) 131/93 (08/09/21 0630)  MAP (Monitor): 113 (08/09/21 0415)  MAP (Calculated): 106 (08/09/21 0630)  BP 1 Location: Right upper arm (08/08/21 1959)  BP 1 Method: Automatic (08/08/21 1959)  MEWS Score: 6 (08/09/21 0315)         Oxygen Therapy  O2 Sat (%): 98 % (08/09/21 0535)  Pulse via Oximetry: 126 beats per minute (08/09/21 0526)  O2 Device: BIPAP (08/09/21 0526)  O2 Flow Rate (L/min): 3 l/min (08/08/21 2307)  FIO2 (%): 60 % (08/09/21 0637)      Last pain assessment:  Pain 1  Pain Scale 1: Numeric (0 - 10)  Pain Intensity 1: 9  Pain Location 1: Rib cage  Pain Orientation 1: Left  Pain Intervention(s) 1: Therapeutic presence      Wounds: No     Urinary catheter: anuric Dialysis patient  Is there a szymanski order: No     LDAs:       Peripheral IV 08/08/21 Right Arm (Active)   Site Assessment Clean, dry, & intact 08/09/21 0000   Phlebitis Assessment 0 08/09/21 0000   Infiltration Assessment 0 08/09/21 0000   Dressing Status Clean, dry, & intact 08/09/21 0000       Peripheral IV 08/09/21 Anterior;Right Forearm (Active)         Opportunity for questions and clarification was provided.     Keon Thompson RN

## 2021-08-09 NOTE — ED PROVIDER NOTES
EMERGENCY DEPARTMENT HISTORY AND PHYSICAL EXAM      Date: 8/8/2021  Patient Name: Inna Hoyt    History of Presenting Illness     Chief Complaint   Patient presents with    Shortness of Breath     Patient arrives via EMS for shortness of breath, patient is a dialysis patient (MWF) and had a shortened dialysis appoitment last week, has been playing catch up ever since    Fall fell Last Friday and has been having L rib pain since       History Provided By: Patient    HPI: Inna Hoyt, 55 y.o. male  with past Medical history of end-stage renal disease on dialysis presenting today with shortness of breath and chest wall pain. The patient says that he fell off of a wall on Friday and has been having left-sided chest wall pain since that time. He also says that he is above his dry weight and has been feeling increased shortness of breath due to this. He denies any fevers or cough. He notes that he has not missed any dialysis sessions, but he notes he is about 5 kg greater than his normal dry weight. There are no other complaints, changes, or physical findings at this time. PCP: None    No current facility-administered medications on file prior to encounter. Current Outpatient Medications on File Prior to Encounter   Medication Sig Dispense Refill    isosorbide mononitrate ER (IMDUR) 30 mg tablet Take 1 Tab by mouth every morning. 30 Tab 0    apixaban (ELIQUIS) 5 mg tablet Take 1 Tab by mouth two (2) times a day. 30 Tab 0    lidocaine 1.8 % ptmd 1 Patch by Apply Externally route daily. 1 Box 1    omeprazole (PRILOSEC) 40 mg capsule Take 40 mg by mouth daily. Patient takes every night   Indications: heartburn      lidocaine 5 % topical cream Apply  to affected area two (2) times daily as needed for Itching. 15 g 0    citalopram (CELEXA) 40 mg tablet Take 1 Tab by mouth daily. 30 Tab 1    sevelamer (RENAGEL) 400 mg tablet Take 800 mg by mouth three (3) times daily (with meals).       lisinopril (PRINIVIL, ZESTRIL) 2.5 mg tablet Take 2.5 mg by mouth daily.  promethazine (PHENERGAN) 25 mg tablet Take 25 mg by mouth every six (6) hours as needed for Nausea.  atorvastatin (LIPITOR) 40 mg tablet Take 40 mg by mouth nightly.  senna (SENNA) 8.6 mg tablet Take 1 Tab by mouth daily.  lidocaine (LIDODERM) 5 % Apply patch to the affected area for 12 hours a day and remove for 12 hours a day. 60 Each 0    aspirin delayed-release 81 mg tablet Take 1 Tab by mouth daily. 90 Tab 0    multivitamin (ONE A DAY) tablet Take 1 Tab by mouth daily.  pantoprazole (PROTONIX) 40 mg tablet Take 40 mg by mouth daily.  paricalcitol (ZEMPLAR) 1 mcg capsule Take 1 mcg by mouth daily.  gabapentin (NEURONTIN) 300 mg capsule Take 300 mg by mouth two (2) times a day. Past History     Past Medical History:  Past Medical History:   Diagnosis Date    Chronic kidney disease     Dialysis patient ([facility] [address]     Hypertension        Past Surgical History:  Past Surgical History:   Procedure Laterality Date    HX VASCULAR ACCESS Left     LUE AV fistula       Family History:  Family History   Problem Relation Age of Onset    No Known Problems Mother     No Known Problems Father     No Known Problems Sister     No Known Problems Brother     No Known Problems Sister     No Known Problems Sister     No Known Problems Brother        Social History:  Social History     Tobacco Use    Smoking status: Current Every Day Smoker     Packs/day: 0.25    Smokeless tobacco: Never Used   Substance Use Topics    Alcohol use: Yes    Drug use: Not Currently     Types: Heroin     Comment: Former       Allergies:   Allergies   Allergen Reactions    Motrin [Ibuprofen] Hives     7/16/17 Pt denies allergy    Tylenol [Acetaminophen] Hives     7/16/17 Pt denies allergy         Review of Systems   Constitutional: No  fever  Skin: No  rash  HEENT: No  nasal congestion  Resp: No cough  CV: + chest pain  GI: No vomiting  : No dysuria  MSK: No joint pain  Neuro: No numbness  Psych: No anxiety      Physical Exam     Patient Vitals for the past 12 hrs:   Temp Pulse Resp BP SpO2   08/08/21 2345  97 18 (!) 153/85    08/08/21 2330  95 18 (!) 156/85    08/08/21 2315  99 20 (!) 161/90    08/08/21 2307  98 27  92 %   08/08/21 2300  99 27 (!) 163/91    08/08/21 2245  (!) 101 28 (!) 164/96    08/08/21 2235     91 %   08/08/21 2232  100 23  (!) 88 %   08/08/21 2231  99 24  (!) 85 %   08/08/21 2230  100 25     08/08/21 2215  (!) 107 26 (!) 163/98    08/08/21 2145  90 21 (!) 161/96 91 %   08/08/21 2130  97 17 (!) 154/85    08/08/21 2115  97 19 (!) 156/84    08/08/21 2100  92 22 (!) 170/94 91 %   08/08/21 2045  89 18 (!) 167/100    08/08/21 1959 97.8 °F (36.6 °C) 99 24 (!) 153/89 94 %     General: alert, No acute distress  Eyes: EOMI, normal conjunctiva  ENT: moist mucous membranes. Neck: Active, full ROM of neck. Skin: No rashes. no jaundice              Lungs: Equal chest expansion. no respiratory distress. clear to auscultation bilaterally No accessory muscle usage, tender to palpation on the left chest wall without any crepitus or external deformity  Heart: regular rate     no peripheral edema   2+ radial pulses and DPs bilaterally  Abd:  non distended soft, nontender. No rebound tenderness. No guarding  Back: Full ROM  MSK: Full, active ROM in all 4 extremities.    Neuro: Alert and oriented to Person, Place, Time and Situation; normal speech;   Psych: Cooperative with exam; Appropriate mood and affect             Diagnostic Study Results     Labs -     Recent Results (from the past 12 hour(s))   METABOLIC PANEL, COMPREHENSIVE    Collection Time: 08/08/21  8:52 PM   Result Value Ref Range    Sodium 145 136 - 145 mmol/L    Potassium 5.0 3.5 - 5.1 mmol/L    Chloride 109 (H) 97 - 108 mmol/L    CO2 24 21 - 32 mmol/L    Anion gap 12 5 - 15 mmol/L    Glucose 80 65 - 100 mg/dL    BUN 80 (H) 6 - 20 MG/DL Creatinine 11.30 (H) 0.70 - 1.30 MG/DL    BUN/Creatinine ratio 7 (L) 12 - 20      GFR est AA 6 (L) >60 ml/min/1.73m2    GFR est non-AA 5 (L) >60 ml/min/1.73m2    Calcium 8.5 8.5 - 10.1 MG/DL    Bilirubin, total 0.5 0.2 - 1.0 MG/DL    ALT (SGPT) 27 12 - 78 U/L    AST (SGOT) 54 (H) 15 - 37 U/L    Alk. phosphatase 149 (H) 45 - 117 U/L    Protein, total 7.0 6.4 - 8.2 g/dL    Albumin 2.4 (L) 3.5 - 5.0 g/dL    Globulin 4.6 (H) 2.0 - 4.0 g/dL    A-G Ratio 0.5 (L) 1.1 - 2.2     NT-PRO BNP    Collection Time: 08/08/21  8:52 PM   Result Value Ref Range    NT pro-BNP >35,000 (H) <125 PG/ML   TROPONIN I    Collection Time: 08/08/21  8:52 PM   Result Value Ref Range    Troponin-I, Qt. 0.05 (H) <0.05 ng/mL   SARS-COV-2    Collection Time: 08/08/21  9:37 PM   Result Value Ref Range    SARS-CoV-2 Please find results under separate order     CBC WITH AUTOMATED DIFF    Collection Time: 08/08/21  9:37 PM   Result Value Ref Range    WBC 9.5 4.1 - 11.1 K/uL    RBC 2.80 (L) 4.10 - 5.70 M/uL    HGB 9.0 (L) 12.1 - 17.0 g/dL    HCT 29.3 (L) 36.6 - 50.3 %    .6 (H) 80.0 - 99.0 FL    MCH 32.1 26.0 - 34.0 PG    MCHC 30.7 30.0 - 36.5 g/dL    RDW 18.6 (H) 11.5 - 14.5 %    PLATELET 903 244 - 659 K/uL    MPV 11.1 8.9 - 12.9 FL    NRBC 0.0 0  WBC    ABSOLUTE NRBC 0.00 0.00 - 0.01 K/uL    NEUTROPHILS 58 32 - 75 %    LYMPHOCYTES 29 12 - 49 %    MONOCYTES 10 5 - 13 %    EOSINOPHILS 3 0 - 7 %    BASOPHILS 0 0 - 1 %    IMMATURE GRANULOCYTES 0 0.0 - 0.5 %    ABS. NEUTROPHILS 5.5 1.8 - 8.0 K/UL    ABS. LYMPHOCYTES 2.7 0.8 - 3.5 K/UL    ABS. MONOCYTES 0.9 0.0 - 1.0 K/UL    ABS. EOSINOPHILS 0.3 0.0 - 0.4 K/UL    ABS. BASOPHILS 0.0 0.0 - 0.1 K/UL    ABS. IMM.  GRANS. 0.0 0.00 - 0.04 K/UL    DF AUTOMATED     COVID-19 RAPID TEST    Collection Time: 08/08/21  9:37 PM   Result Value Ref Range    Specimen source Nasopharyngeal      COVID-19 rapid test Not detected NOTD         Radiologic Studies -   CT CHEST WO CONT   Final Result   No acute findings suspected. Prominent interstitial disease likely chronic. XR CHEST PORT   Final Result   Mild interstitial edema. CT Results  (Last 48 hours)               08/08/21 2206  CT CHEST WO CONT Final result    Impression:  No acute findings suspected. Prominent interstitial disease likely chronic. Narrative:  INDICATION: Fall, left side chest pain. COMPARISON: None       CONTRAST: None. TECHNIQUE:  5 mm axial images were obtained through the . Coronal and sagittal   reformats were generated. CT dose reduction was achieved through use of a   standardized protocol tailored for this examination and automatic exposure   control for dose modulation. The absence of intravenous contrast reduces the sensitivity for evaluation of   the mediastinum, al, vasculature, and upper abdominal organs. FINDINGS:       CHEST WALL: No mass or axillary lymphadenopathy. THYROID: No nodule. MEDIASTINUM: No mass or lymphadenopathy. AL: No mass or lymphadenopathy. THORACIC AORTA: No aneurysm. MAIN PULMONARY ARTERY: Normal in caliber. TRACHEA/BRONCHI: Patent. ESOPHAGUS: No wall thickening or dilatation. HEART: Normal in size. PLEURA: No effusion or pneumothorax. LUNGS: Prominent interstitial disease likely chronic. INCIDENTALLY IMAGED UPPER ABDOMEN: Atrophic kidneys. BONES: No fracture               CXR Results  (Last 48 hours)               08/08/21 2036  XR CHEST PORT Final result    Impression:  Mild interstitial edema. Narrative:  Clinical indication: Dyspnea. Portable AP semiupright view of the chest obtained, no prior. The a heart size   is normal. There is mild interstitial edema, no focal consolidation. Medical Decision Making   I am the first provider for this patient. I reviewed the vital signs, available nursing notes, past medical history, past surgical history, family history and social history.       Vital Signs-Reviewed the patient's vital signs. Patient Vitals for the past 12 hrs:   Temp Pulse Resp BP SpO2   08/08/21 2345  97 18 (!) 153/85    08/08/21 2330  95 18 (!) 156/85    08/08/21 2315  99 20 (!) 161/90    08/08/21 2307  98 27  92 %   08/08/21 2300  99 27 (!) 163/91    08/08/21 2245  (!) 101 28 (!) 164/96    08/08/21 2235     91 %   08/08/21 2232  100 23  (!) 88 %   08/08/21 2231  99 24  (!) 85 %   08/08/21 2230  100 25     08/08/21 2215  (!) 107 26 (!) 163/98    08/08/21 2145  90 21 (!) 161/96 91 %   08/08/21 2130  97 17 (!) 154/85    08/08/21 2115  97 19 (!) 156/84    08/08/21 2100  92 22 (!) 170/94 91 %   08/08/21 2045  89 18 (!) 167/100    08/08/21 1959 97.8 °F (36.6 °C) 99 24 (!) 153/89 94 %       Pulse Oximetry Analysis - 93% on room air  -  Interpretation: Normal    Provider Notes (Medical Decision Making):     Differential Diagnosis: Pulmonary edema, electrolyte abnormality, pneumonia, pneumothorax, ACS    Initial Plan: Will obtain laboratory studies, EKG, chest x-ray, and reassess. Will obtain CT to evaluate for traumatic injury. Treating with analgesics, will test for Covid as well given the patient's increasing shortness of breath. ED Course:   Initial assessment performed. The patients presenting problems have been discussed, and they are in agreement with the care plan formulated and outlined with them. I have encouraged them to ask questions as they arise throughout their visit. ED Course as of Aug 09 0008   [facility] Aug 08, 2021   2301 CONSULT NOTE:   11:01 PM  I spoke with Dr. Chasity Lake  Specialty: Nephrology  Discussed pt's hx, disposition, and available diagnostic and imaging results. Reviewed care plans. Plan: Will see in AM and arrange for dialysis. [NW]   2480 On my interpretation of the patient's laboratory studies COVID-19 is not detected, metabolic panel shows elevated creatinine 11.38, potassium is normal, troponin is mildly elevated at 0.05, chronic anemia.     [NW] 6458 Patient presenting today with increased weight compared to his dry weight. CT of the chest and chest x-ray show evidence of interstitial edema. The patient has been compliant with dialysis, he is requiring 2 L of nasal cannula to maintain O2 sats. Patient will require admission for dialysis which will be arranged by nephrology in the a.m.    [NW]      ED Course User Index  [NW] Awais Stanton MD       I, Micky Martin MD, am the attending of record for this patient encounter. Disposition: Admitted    Admission Note:  Patient is being admitted to the hospital by Service: Hospitalist.  The results of their tests and reasons for their admission have been discussed with them and available family. They convey agreement and understanding for the need to be admitted and for their admission diagnosis. Diagnosis     Clinical Impression:   1. Dyspnea, unspecified type    2. Other hypervolemia    3. ESRD (end stage renal disease) ([facility] [address]        Attestations:    Micky Martin MD    Please note that this dictation was completed with [employer], the computer voice recognition software. Quite often unanticipated grammatical, syntax, homophones, and other interpretive errors are inadvertently transcribed by the computer software. Please disregard these errors. Please excuse any errors that have escaped final proofreading. Thank you.
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
## 2021-08-09 NOTE — ED NOTES
Received report from dialysis RN. Session lasting 2.5 hrm 3100 mL removed.  Pt received 1 run of albumin d/t brief hypotension, pt recovered

## 2021-08-09 NOTE — PROGRESS NOTES
Hospitalist Progress Note    NAME: Honey Dawson   :  1965   MRN:  [id number]   Room Number:  2275/01  @ [facility]       Interim Hospital Summary: 55 y.o. male whom presented on 2021 with      Assessment / Plan:  Anticipated discharge date : 8/10/2021  Anticipated disposition : Home  Barriers to discharge : Hemodialysis session    Acute hypoxic respiratory failure POA   Fluid overload POA   Acute on chronic CHF POA  Hypertensive emergency  POA   ESRD on HD M,W,F, POA   Elevated Troponin, POA  CT chest WO contrast: No acute findings suspected. Prominent interstitial disease likely chronic  CXR: Mild interstitial edema  Pro-BNP: >35,000  Troponin: 0.05, 0.13  EKG interpreted independently - no acute ST-T changes of ischemia.     - O2 support and neb treatment PRN  - Nephrology following  - cardiology following  - echocardiogram pending  - planned HD this evening as well  - Weaned off nitroglycerin infusion        Recurrent falls POA  - PT/OT    Left Rib pain POA   CXR interpreted independently- did not show fracture. - continue oxycodone PRN    - Lidocaine patch       Hypertension, POA  Hyperlipidemia, POA  - started on Nitro drip in ED    - continue imdur.  DC nitrolglycerin  - Resume home meds    GERD, POA  - continue pantoprazole    Neuropathy, POA  - continue gabapentin    History of DVT  - continue  Apixaban         Tobacco dependence POA   Patient was counseled extensively on the need to abstain from tobacco, its addictive tendencies, its deleterious effects on the lungs, cardiovascular  as well as its financial & social sequelae    Code Status: Full  Surrogate Decision Maker: Nedra Ortega, mother ([phone])     DVT Prophylaxis: Eliquis  GI Prophylaxis: Protonix     Baseline: lives with fried, does not use assistive device             Subjective:     Chief Complaint / Reason for Physician Visit  \"I knew this would happen to me, they should have let me come back Saturday to take more fluid off\". Reports feeling much better. Reports left rib pain Discussed with RN events overnight. Review of Systems:  No fevers, chills, appetite change, cough, sputum production, shortness of breath, dyspnea on exertion, nausea, vomitting, diarrhea, constipation, chest pain, leg edema, abdominal pain, joint pain, rash, itching. Tolerating PT/OT. Tolerating diet. Objective:     VITALS:   Last 24hrs VS reviewed since prior progress note.  Most recent are:  Patient Vitals for the past 24 hrs:   Temp Pulse Resp BP SpO2   08/09/21 0811 97.9 °F (36.6 °C) (!) 109 26 (!) 157/95 100 %   08/09/21 0745  (!) 103 21 (!) 134/92 100 %   08/09/21 0730 98.1 °F (36.7 °C) 100 23 (!) 133/92    08/09/21 0715  (!) 105 25 106/76    08/09/21 0700  (!) 102 27 93/69    08/09/21 0645  (!) 113 30 123/84    08/09/21 0630  (!) 113 (!) 31 (!) 131/93    08/09/21 0615  (!) 106 26 (!) 135/90    08/09/21 0600  (!) 104 28 (!) 88/49    08/09/21 0545  (!) 122 (!) 32 105/77    08/09/21 0535  (!) 126 30 118/77 98 %   08/09/21 0526     100 %   08/09/21 0515  (!) 124 (!) 33 134/82 100 %   08/09/21 0500  (!) 118 30 (!) 147/88 100 %   08/09/21 0445 97.8 °F (36.6 °C) (!) 115 (!) 32 (!) 148/92 100 %   08/09/21 0415  (!) 120 (!) 32 (!) 144/100 100 %   08/09/21 0410  (!) 120 (!) 33  100 %   08/09/21 0405  (!) 119 (!) 33  100 %   08/09/21 0400  (!) 122 (!) 34 (!) 151/96 100 %   08/09/21 0355  (!) 123 (!) 35  100 %   08/09/21 0350  (!) 123 (!) 33  100 %   08/09/21 0345  (!) 124 (!) 32 (!) 147/94 100 %   08/09/21 0340  (!) 124 (!) 31  100 %   08/09/21 0335  (!) 126 (!) 37  100 %   08/09/21 0330  (!) 127 (!) 40 (!) 138/105 100 %   08/09/21 0325  (!) 128 (!) 41  99 %   08/09/21 0320  (!) 130 (!) 40  99 %   08/09/21 0315 97.8 °F (36.6 °C) (!) 135 (!) 39 (!) 153/100 100 %   08/09/21 0301  (!) 152  (!) 192/132    08/09/21 0300  (!) 146 (!) 34 (!) 192/132 94 %   08/09/21 0246     96 %   08/09/21 0245  (!) 148 30 (!) 197/119 98 %   08/09/21 0241  (!) 148  (!) 201/124    08/09/21 0235  (!) 145  (!) 196/126    08/09/21 0230  (!) 121 (!) 35     08/09/21 0215  (!) 108 27     08/09/21 0200  (!) 102 28 (!) 167/90    08/09/21 0145  96 24     08/09/21 0130  96 21  91 %   08/09/21 0115  100 19     08/09/21 0100  97 20 (!) 148/87 93 %   08/09/21 0045  94 19  93 %   08/09/21 0030  95 22 (!) 154/85 93 %   08/09/21 0015  95 19  92 %   08/09/21 0001  97 21 (!) 159/88    08/08/21 2345  97 18 (!) 153/85    08/08/21 2330  95 18 (!) 156/85    08/08/21 2315  99 20 (!) 161/90    08/08/21 2307  98 27  92 %   08/08/21 2300  99 27 (!) 163/91    08/08/21 2245  (!) 101 28 (!) 164/96    08/08/21 2235     91 %   08/08/21 2232  100 23  (!) 88 %   08/08/21 2231  99 24  (!) 85 %   08/08/21 2230  100 25     08/08/21 2215  (!) 107 26 (!) 163/98    08/08/21 2145  90 21 (!) 161/96 91 %   08/08/21 2130  97 17 (!) 154/85    08/08/21 2115  97 19 (!) 156/84    08/08/21 2100  92 22 (!) 170/94 91 %   08/08/21 2045  89 18 (!) 167/100    08/08/21 1959 97.8 °F (36.6 °C) 99 24 (!) 153/89 94 %       Intake/Output Summary (Last 24 hours) at 8/9/2021 3763  Last data filed at 8/9/2021 0715  Gross per 24 hour   Intake    Output 3100 ml   Net -3100 ml        PHYSICAL EXAM:  General: Alert, cooperative, no acute distress    EENT:  EOMI. Anicteric sclerae. MMM  Resp:  CTA bilaterally, no wheezing or rales. No accessory muscle use  CV:  Regular  rhythm,  normal S1/S2, no murmurs rubs gallops, No edema  GI:  Soft, Non distended, Non tender. +Bowel sounds  Neurologic:  Alert and oriented X 3, normal speech,   Psych:   Good insight. Not anxious nor agitated  Skin:  No rashes.   No jaundice    Reviewed most current lab test results and cultures  YES  Reviewed most current radiology test results   YES  Review and summation of old records today    NO  Reviewed patient's current orders and [facility] YES  PMH/SH reviewed - no change compared to H&P  ________________________________________________________________________  Care Plan discussed with:    Comments   Patient x    Family      RN x    Care Manager x    Consultant                       x Multidiciplinary team rounds were held today with , nursing, pharmacist and clinical coordinator. Patient's plan of care was discussed; medications were reviewed and discharge planning was addressed. ________________________________________________________________________  Total NON critical care TIME:  35   Minutes    Total CRITICAL CARE TIME Spent:   Minutes non procedure based      Comments   >50% of visit spent in counseling and coordination of care x    ________________________________________________________________________  Sheree Aschoff, MD     Procedures: see electronic medical records for all procedures/Xrays and details which were not copied into this note but were reviewed prior to creation of Plan. LABS:  I reviewed today's most current labs and imaging studies.   Pertinent labs include:  Recent Labs     08/08/21  2137   WBC 9.5   HGB 9.0*   HCT 29.3*        Recent Labs     08/08/21 2052      K 5.0   *   CO2 24   GLU 80   BUN 80*   CREA 11.30*   CA 8.5   ALB 2.4*   TBILI 0.5   ALT 27       Signed: Sheree Aschoff, MD

## 2021-08-09 NOTE — PROGRESS NOTES
0557: TRANSFER - IN REPORT:    Verbal report received from Crut Cheek (name) on Nanine Dandy  being received from ED (unit) for routine progression of care      Report consisted of patients Situation, Background, Assessment and   Recommendations(SBAR). Information from the following report(s) SBAR, ED Summary, [facility] and Recent Results was reviewed with the receiving nurse. Opportunity for questions and clarification was provided. Assessment completed upon patients arrival to unit and care assumed. 0700: Bedside and Verbal shift change report given to Yadiel Mendozaq. 291 (oncoming nurse) by Ben Whitley (offgoing nurse). Report included the following information SBAR, ED Summary, MAR and Recent Results.

## 2021-08-09 NOTE — CONSULTS
Consultation Note    NAME: Santhosh Ferreira   :  1965   MRN:  [id number]     Date/Time:  2021 10:29 AM    I have been asked to see this patient by Dr. Lashawn Carrington  for advice/opinion re: ESRD. Assessment :    Plan:  [facility] [place]  Anemia  Dyspnea  Volume overload Had urgent HD this AM.  He remains short of breath and says that he still has fluid on. Plan for isolated UF this evening. Start retacrit         Subjective:   CHIEF COMPLAINT:  \"I'm still short of breath! \"    HISTORY OF PRESENT ILLNESS:     Jose Guadalupe Jessica is a 55 y.o.   male who has a history of ESRD admitted with dyspnea. He says that he has missed several of his dialysis sessions as he had transportation issues. He says that he fell on Friday and hurt his ribs on the left. He came to the ER and was felt to be volume overloaded. He was urgently dialyzed and about 3 KG was removed. He says that he is still volume overloaded and wants more dialysis. Past Medical History:   Diagnosis Date    Chronic kidney disease     Dialysis patient ([facility] [address]     Hypertension       Past Surgical History:   Procedure Laterality Date    HX VASCULAR ACCESS Left     LUE AV fistula     Social History     Tobacco Use    Smoking status: Current Every Day Smoker     Packs/day: 0.25    Smokeless tobacco: Never Used   Substance Use Topics    Alcohol use: Yes      Family History   Problem Relation Age of Onset    No Known Problems Mother     No Known Problems Father     No Known Problems Sister     No Known Problems Brother     No Known Problems Sister     No Known Problems Sister     No Known Problems Brother       Allergies   Allergen Reactions    Motrin [Ibuprofen] Hives     17 Pt denies allergy    Tylenol [Acetaminophen] Hives     17 Pt denies allergy      Prior to Admission medications    Medication Sig Start Date End Date Taking?  Authorizing Provider   isosorbide mononitrate ER (IMDUR) 30 mg tablet Take 1 Tab by mouth every morning. 11/15/20   Laura Marie MD   apixaban (ELIQUIS) 5 mg tablet Take 1 Tab by mouth two (2) times a day. 11/15/20   Laura Marie MD   lidocaine 1.8 % ptmd 1 Patch by Apply Externally route daily. 11/15/20   Laura Marie MD   omeprazole (PRILOSEC) 40 mg capsule Take 40 mg by mouth daily. Patient takes every night   Indications: heartburn    Provider, Historical   lidocaine 5 % topical cream Apply  to affected area two (2) times daily as needed for Itching. 1/21/20   Monica Mccartney PA-C   citalopram (CELEXA) 40 mg tablet Take 1 Tab by mouth daily. 5/2/18   Maria Alejandra Pena MD   sevelamer (RENAGEL) 400 mg tablet Take 800 mg by mouth three (3) times daily (with meals). Provider, Historical   lisinopril (PRINIVIL, ZESTRIL) 2.5 mg tablet Take 2.5 mg by mouth daily. Provider, Historical   promethazine (PHENERGAN) 25 mg tablet Take 25 mg by mouth every six (6) hours as needed for Nausea. Provider, Historical   atorvastatin (LIPITOR) 40 mg tablet Take 40 mg by mouth nightly. Provider, Historical   senna (SENNA) 8.6 mg tablet Take 1 Tab by mouth daily. Provider, Historical   lidocaine (LIDODERM) 5 % Apply patch to the affected area for 12 hours a day and remove for 12 hours a day. 11/28/17   Maria Alejandra Pena MD   aspirin delayed-release 81 mg tablet Take 1 Tab by mouth daily. 11/28/17   Maria Alejandra Pena MD   multivitamin (ONE A DAY) tablet Take 1 Tab by mouth daily. Juan Olivarez MD   pantoprazole (PROTONIX) 40 mg tablet Take 40 mg by mouth daily. Juan Olivarez MD   paricalcitol (ZEMPLAR) 1 mcg capsule Take 1 mcg by mouth daily. Juan Olivarez MD   gabapentin (NEURONTIN) 300 mg capsule Take 300 mg by mouth two (2) times a day.     Juan Olivarez MD     REVIEW OF SYSTEMS:     []  Unable to obtain reliable ROS due to  [] mental status  [] sedated   [] intubated   [x] Total of 12 systems reviewed as follows:  Constitutional: negative fever, negative chills, negative weight loss  Eyes: negative visual changes  ENT:   negative sore throat, tongue or lip swelling  Respiratory:  negative cough, pos dyspnea  Cards:  negative for chest pain, palpitations, lower extremity edema  GI:   negative for nausea, vomiting, diarrhea, and abdominal pain  :  negative for frequency, dysuria  Integument:  negative for rash and pruritus  Heme:  negative for easy bruising and gum/nose bleeding  Musculoskel: negative for myalgias,  back pain and muscle weakness  Neuro:  negative for headaches, dizziness, vertigo  Psych:  negative for feelings of anxiety, depression   Travel?: none    Objective:   VITALS:    Visit Vitals  BP (!) 157/95   Pulse (!) 109   Temp 97.9 °F (36.6 °C)   Resp 26   Ht 6' (1.829 m)   Wt 79.5 kg (175 lb 4.3 oz)   SpO2 100%   BMI 23.77 kg/m²     PHYSICAL EXAM:  Gen:  []  WD []  WN  [] cachectic []  thin []  obese []  disheveled             [x]  ill apearing  []   Critical  [x]   Chronic    []  No acute distress    HEENT:   [x] NC/AT/PERRL    [x] pink conjunctivae      [] pale conjunctivae                  PERRL  [] yes  [] no      [] moist mucosa    [] dry mucosa    hearing intact to voice [] yes  [] No                 NECK:   supple [x] yes  [] no        masses [] yes  [x] No               thyroid  []  non tender  []  tender    RESP:   [] CTA bilaterally/no wheezing/rhonchi/rales/crackles    [] rhonchi bilaterally - no dullness  [] wheezing   [] rhonchi   [x] crackles     use of accessory muscles [x] yes [] no    CARD:   [x]  regular rate and rhythm/No murmurs/rubs/gallops    murmur  [] yes ()  [] no      Rubs  [] yes  [] no       Gallops [] yes  [] no    Rate []  regular  []  irregular        carotid bruits  [] Right  []  Left                 LE edema [x] yes  [] no           JVP  []  yes   []  no    ABD:    [x] soft/non distended/non tender/+bowel sounds/no HSM    []  Rigid    tenderness [] yes [] no   Liver enlargement  []  yes []  no                Spleen enlargement  []  yes []  no distended []  yes [] no     bowel sound  [] hypoactive   [] hyperactive    LYMPH:    Neck []  yes [x]  no       Axillae []  yes [x]  no    SKIN:   Rashes []  yes   [x]  no    Ulcers []  yes   [x]  no               [] tight to palpitation    skin turgor []  good  [] poor  [] decreased               Cyanosis/clubbing []  yes []  no    NEUR:   [x] cranial nerves II-XII grossly intact       [] Cranial nerves deficit                 []  facial droop    []  slurred speech   [] aphasic     [] Strength normal     []  weakness  []  LUE  []   RUE/ []  LLE  []   RLE    follows commands  [x]  yes []  no           PSYCH:   insight [] poor [x] good   Alert and Oriented to  [x] person  [x] place  [x]  time                    [] depressed [] anxious [] agitated  [] lethargic [] stuporous  [] sedated     LAB DATA REVIEWED:    Recent Labs     08/08/21 2137   WBC 9.5   HGB 9.0*   HCT 29.3*        Recent Labs     08/08/21 2052      K 5.0   *   CO2 24   BUN 80*   CREA 11.30*   GLU 80   CA 8.5     Recent Labs     08/08/21 2052   ALT 27   *   TBILI 0.5   ALB 2.4*   GLOB 4.6*     No results for input(s): INR, PTP, APTT, INREXT in the last 72 hours. No results for input(s): FE, TIBC, PSAT, FERR in the last 72 hours. No results for input(s): PH, PCO2, PO2 in the last 72 hours. No results for input(s): CPK, CKMB in the last 72 hours.     No lab exists for component: TROPONINI  Lab Results   Component Value Date/Time    Glucose (POC) 185 (H) 11/14/2020 03:13 AM    Glucose (POC) 73 11/13/2020 08:31 PM    Glucose (POC) 95 04/19/2017 08:20 AM       Procedures: see electronic medical records for all procedures/Xrays and details which were not copied into this note but were reviewed prior to creation of Plan.    ________________________________________________________________________       ___________________________________________________  Consulting Physician: Thirza [place], MD

## 2021-08-09 NOTE — PROGRESS NOTES
Physical Therapy:  Orders received, chart reviewed, and discussed patient case with nursing. Patient currently on BiPAP and tachycardic, respiratory status unstable. Nursing and PT determine to hold PT evaluation until patient more stable for mobilization. Deferring today and will continue to follow.      April Jc Poon PT, DPT

## 2021-08-09 NOTE — CONSULTS
Consult    NAME: Manuel Hahn   :  1965   MRN:  [id number]     Date/Time:  2021 9:56 AM    Patient PCP: None  ________________________________________________________________________     Assessment:     1. Acute hypoxic respiratory failure secondary acute pulmonary   edema. 2. Acute pulmonary edema due to volume overload. 3. End-stage renal disease requiring dialysis. 4. Uncontrolled hypertension w/ hypertensive emergency   5. Elevated troponin level, MPI  w/ EF 44% and no ischemia or infarct. Echo  w/ EF 55-60%. infarction. 6. Leukocytosis. 7. Anemia of end-stage renal disease. 8. Tobacco abuse. 9. Alcohol abuse. 10. Hyperlipidemia  11. Remote DVT    Cardiologist:  Joanie        Plan: TnI 0.13    On BiPAP, VERY sleep, hard to stay awake during conversation    Still SOB    Sats 100%    1. Continue to trend troponins  2. Echo ordered by primary  3. Continue eliquis  4. Continue ASA  5. Continue statin  6. Would consider a beta blocker (bystolic) or cardizem for BP/HR control. Tachy at 109 at rest.  7. Continue ISMN, increase as needed for better BP control  8. Volume per renal; getting UF again later    Thank you for this consult and allowing me to take part in this patients care. Please call with questions. [x]        High complexity decision making was performed        Subjective:   CHIEF COMPLAINT: SOB    HISTORY OF PRESENT ILLNESS:     Raymundo Alvarez is a 55 y.o.  male who \"presents with SOB, left rib pain secondary to a fall incident last Friday. As per patient, last dialysis he had was Monday and missed the following schedule secondary to transportation issue. He was last seen in ED 07/10/2021 secondary to bilateral ankle swelling and left leg cellulitis, patient also fell that day with no documented injury. On assessment, patient reports 10/10 pain across his chest and getting more SOB, tachycardic and hypertensive.  Decompensated on NC and had to be placed on BIPAP, started on Nitro drip. Past medical history is significant for ESRD with M,W,F hemodialysis, hypertension, hyperlipidemia, GERD, neuropathy and history of DVT. \"      We were asked to consult for work up and evaluation of the above problems.      Past Medical History:   Diagnosis Date    Chronic kidney disease     Dialysis patient ([address] [address]     Hypertension       Past Surgical History:   Procedure Laterality Date    HX VASCULAR ACCESS Left     LUE AV fistula     Allergies   Allergen Reactions    Motrin [Ibuprofen] Hives     7/16/17 Pt denies allergy    Tylenol [Acetaminophen] Hives     7/16/17 Pt denies allergy      Meds:  See below  Social History     Tobacco Use    Smoking status: Current Every Day Smoker     Packs/day: 0.25    Smokeless tobacco: Never Used   Substance Use Topics    Alcohol use: Yes      Family History   Problem Relation Age of Onset    No Known Problems Mother     No Known Problems Father     No Known Problems Sister     No Known Problems Brother     No Known Problems Sister     No Known Problems Sister     No Known Problems Brother        REVIEW OF SYSTEMS:     []         Unable to obtain  ROS due to ---   [x]         Total of 12 systems reviewed as follows:    Constitutional: negative fever, negative chills, negative weight loss  Eyes:   negative visual changes  ENT:   negative sore throat, tongue or lip swelling  Respiratory:  negative cough, negative dyspnea  Cards:  negative for chest pain, palpitations, lower extremity edema  GI:   negative for nausea, vomiting, diarrhea, and abdominal pain  Genitourinary: negative for frequency, dysuria  Integument:  negative for rash   Hematologic:  negative for easy bruising and gum/nose bleeding  Musculoskel: negative for myalgias,  back pain  Neurological:  negative for headaches, dizziness, vertigo, weakness  Behavl/Psych: negative for feelings of anxiety, depression     Pertinent Positives include :    Objective: Physical Exam:    Last 24hrs VS reviewed since prior progress note. Most recent are:    Visit Vitals  BP (!) 157/95   Pulse (!) 109   Temp 97.9 °F (36.6 °C)   Resp 26   Ht 6' (1.829 m)   Wt 79.5 kg (175 lb 4.3 oz)   SpO2 100%   BMI 23.77 kg/m²       Intake/Output Summary (Last 24 hours) at 8/9/2021 0992  Last data filed at 8/9/2021 0715  Gross per 24 hour   Intake    Output 3100 ml   Net -3100 ml        General Appearance: Well developed, well nourished, alert & oriented x 2,    Very sleepy. Ears/Nose/Mouth/Throat: Pupils equal and round, Hearing grossly normal.  Neck: Supple. JVP within normal limits. Carotids good upstrokes, with no bruit. Chest: Lungs clear to auscultation bilaterally. Cardiovascular: Regular rate and rhythm, S1S2 normal, no murmur, rubs, gallops. Abdomen: Soft, non-tender, bowel sounds are active. No organomegaly. Extremities: No edema bilaterally. Femoral pulses +2, Distal Pulses +1. Skin: Warm and dry. Neuro: CN II-XII grossly intact, Strength and sensation grossly intact. []         Post-cath site without hematoma, bruit, tenderness, or thrill. Distal pulses intact. Data:      Telemetry:  sinus  EKG:  ST, Lt Axis, RSR  []  No new EKG for review. Prior to Admission medications    Medication Sig Start Date End Date Taking? Authorizing Provider   isosorbide mononitrate ER (IMDUR) 30 mg tablet Take 1 Tab by mouth every morning. 11/15/20   Elisha Luis MD   apixaban (ELIQUIS) 5 mg tablet Take 1 Tab by mouth two (2) times a day. 11/15/20   Elisha Luis MD   lidocaine 1.8 % ptmd 1 Patch by Apply Externally route daily. 11/15/20   Elisha Luis MD   omeprazole (PRILOSEC) 40 mg capsule Take 40 mg by mouth daily.  Patient takes every night   Indications: heartburn    Provider, Historical   lidocaine 5 % topical cream Apply  to affected area two (2) times daily as needed for Itching. 1/21/20   Debbie Castano PA-C   citalopram (CELEXA) 40 mg tablet Take 1 Tab by mouth daily. 5/2/18   Emmanuelle Tafoya MD   sevelamer (RENAGEL) 400 mg tablet Take 800 mg by mouth three (3) times daily (with meals). Provider, Historical   lisinopril (PRINIVIL, ZESTRIL) 2.5 mg tablet Take 2.5 mg by mouth daily. Provider, Historical   promethazine (PHENERGAN) 25 mg tablet Take 25 mg by mouth every six (6) hours as needed for Nausea. Provider, Historical   atorvastatin (LIPITOR) 40 mg tablet Take 40 mg by mouth nightly. Provider, Historical   senna (SENNA) 8.6 mg tablet Take 1 Tab by mouth daily. Provider, Historical   lidocaine (LIDODERM) 5 % Apply patch to the affected area for 12 hours a day and remove for 12 hours a day. 11/28/17   Emmanuelle Tafoya MD   aspirin delayed-release 81 mg tablet Take 1 Tab by mouth daily. 11/28/17   Emmanuelle Tafoya MD   multivitamin (ONE A DAY) tablet Take 1 Tab by mouth daily. Juan Olivarez MD   pantoprazole (PROTONIX) 40 mg tablet Take 40 mg by mouth daily. Juan Olivarez MD   paricalcitol (ZEMPLAR) 1 mcg capsule Take 1 mcg by mouth daily. Juan Olivarez MD   gabapentin (NEURONTIN) 300 mg capsule Take 300 mg by mouth two (2) times a day. Juan Olivarez MD       Recent Results (from the past 24 hour(s))   METABOLIC PANEL, COMPREHENSIVE    Collection Time: 08/08/21  8:52 PM   Result Value Ref Range    Sodium 145 136 - 145 mmol/L    Potassium 5.0 3.5 - 5.1 mmol/L    Chloride 109 (H) 97 - 108 mmol/L    CO2 24 21 - 32 mmol/L    Anion gap 12 5 - 15 mmol/L    Glucose 80 65 - 100 mg/dL    BUN 80 (H) 6 - 20 MG/DL    Creatinine 11.30 (H) 0.70 - 1.30 MG/DL    BUN/Creatinine ratio 7 (L) 12 - 20      GFR est AA 6 (L) >60 ml/min/1.73m2    GFR est non-AA 5 (L) >60 ml/min/1.73m2    Calcium 8.5 8.5 - 10.1 MG/DL    Bilirubin, total 0.5 0.2 - 1.0 MG/DL    ALT (SGPT) 27 12 - 78 U/L    AST (SGOT) 54 (H) 15 - 37 U/L    Alk.  phosphatase 149 (H) 45 - 117 U/L    Protein, total 7.0 6.4 - 8.2 g/dL    Albumin 2.4 (L) 3.5 - 5.0 g/dL    Globulin 4.6 (H) 2.0 - 4.0 g/dL A-G Ratio 0.5 (L) 1.1 - 2.2     NT-PRO BNP    Collection Time: 08/08/21  8:52 PM   Result Value Ref Range    NT pro-BNP >35,000 (H) <125 PG/ML   TROPONIN I    Collection Time: 08/08/21  8:52 PM   Result Value Ref Range    Troponin-I, Qt. 0.05 (H) <0.05 ng/mL   SARS-COV-2    Collection Time: 08/08/21  9:37 PM   Result Value Ref Range    SARS-CoV-2 Please find results under separate order     CBC WITH AUTOMATED DIFF    Collection Time: 08/08/21  9:37 PM   Result Value Ref Range    WBC 9.5 4.1 - 11.1 K/uL    RBC 2.80 (L) 4.10 - 5.70 M/uL    HGB 9.0 (L) 12.1 - 17.0 g/dL    HCT 29.3 (L) 36.6 - 50.3 %    .6 (H) 80.0 - 99.0 FL    MCH 32.1 26.0 - 34.0 PG    MCHC 30.7 30.0 - 36.5 g/dL    RDW 18.6 (H) 11.5 - 14.5 %    PLATELET 551 293 - 507 K/uL    MPV 11.1 8.9 - 12.9 FL    NRBC 0.0 0  WBC    ABSOLUTE NRBC 0.00 0.00 - 0.01 K/uL    NEUTROPHILS 58 32 - 75 %    LYMPHOCYTES 29 12 - 49 %    MONOCYTES 10 5 - 13 %    EOSINOPHILS 3 0 - 7 %    BASOPHILS 0 0 - 1 %    IMMATURE GRANULOCYTES 0 0.0 - 0.5 %    ABS. NEUTROPHILS 5.5 1.8 - 8.0 K/UL    ABS. LYMPHOCYTES 2.7 0.8 - 3.5 K/UL    ABS. MONOCYTES 0.9 0.0 - 1.0 K/UL    ABS. EOSINOPHILS 0.3 0.0 - 0.4 K/UL    ABS. BASOPHILS 0.0 0.0 - 0.1 K/UL    ABS. IMM. GRANS. 0.0 0.00 - 0.04 K/UL    DF AUTOMATED     COVID-19 RAPID TEST    Collection Time: 08/08/21  9:37 PM   Result Value Ref Range    Specimen source Nasopharyngeal      COVID-19 rapid test Not detected NOTD     EKG, 12 LEAD, INITIAL    Collection Time: 08/09/21  2:39 AM   Result Value Ref Range    Ventricular Rate 146 BPM    Atrial Rate 146 BPM    P-R Interval 136 ms    QRS Duration 122 ms    Q-T Interval 314 ms    QTC Calculation (Bezet) 489 ms    Calculated R Axis -44 degrees    Calculated T Axis 76 degrees    Diagnosis       Sinus tachycardia  Left axis deviation  RSR' or QR pattern in V1 suggests right ventricular conduction delay  Nonspecific ST abnormality  When compared with ECG of 10-JUL-2021 16:41,  Vent.  rate has increased BY  49 BPM  RSR' pattern in V1 is now present  Confirmed by Emerita Condon ([id number]) on 8/9/2021 8:26:22 AM     TROPONIN I    Collection Time: 08/09/21  3:34 AM   Result Value Ref Range    Troponin-I, Qt. 0.13 (H) <0.05 ng/mL   HEP B SURFACE AG    Collection Time: 08/09/21  5:12 AM   Result Value Ref Range    Hepatitis B surface Ag <0.10 Index    Hep B surface Ag Interp. Negative NEG     HEP B SURFACE AB    Collection Time: 08/09/21  5:12 AM   Result Value Ref Range    Hepatitis B surface Ab 469.11 mIU/mL    Hep B surface Ab Interp.  REACTIVE (A) NR     POC VENOUS BLOOD GAS    Collection Time: 08/09/21  6:03 AM   Result Value Ref Range    pH, venous (POC) 7.39 7.32 - 7.42      pCO2, venous (POC) 46.8 41 - 51 MMHG    pO2, venous (POC) 31 25 - 40 mmHg    HCO3, venous (POC) 28.0 23.0 - 28.0 MMOL/L    sO2, venous (POC) 57.0 (L) 65 - 88 %    Base excess, venous (POC) 2.3 mmol/L    Specimen type (POC) VENOUS BLOOD      Performed by Aye Thompson III DO

## 2021-08-09 NOTE — ED NOTES
Patient became diaphretic and short of breath on 5L, non-re breather placed Respiratory called for Antonieta Hinton MD at Nedra Funez MD notified and to speak with nephrologist

## 2021-08-09 NOTE — H&P
Hospitalist Admission Note    NAME: Kurt Dowell   :  1965   MRN:  [id number]     Date/Time:  2021 11:55 PM    Patient PCP: None  ______________________________________________________________________  Given the patient's current clinical presentation, I have a high level of concern for decompensation if discharged from the emergency department. Complex decision making was performed, which includes reviewing the patient's available past medical records, laboratory results, and x-ray films. Discussed assessment of this patient's clinical condition and plan of care with Dr. Alejandrina Walters which is as follows. Assessment / Plan:    SOB, POA  Fluid overload, POA  ESRD on HD M,W,F, POA   Left Rib pain s/p recent fall, POA  Elevated Troponin, POA  Recurrent fall  Nicotine abuse, POA  CT chest WO contrast: No acute findings suspected. Prominent interstitial disease likely chronic  CXR: Mild interstitial edema  Pro-BNP: >35,000  Troponin: 0.05, 0.13  - O2 support and neb treatment PRN  - Nephrology consultation  - cardiology consultation  - echocardiogram  - planned HD in AM  - PT/OT consult  - smoking cessation education  - Nicotine patch  - resume home meds  - pain management      Hypertensive urgency  Hypertension, POA  Hyperlipidemia, POA  GERD, POA  Neuropathy, POA  History of DVT  - started on Nitro drip in ED  - Resume home meds    Code Status: Full  Surrogate Decision Maker: Connie Rojas, mother ([phone])    DVT Prophylaxis: Eliquis  GI Prophylaxis: Protonix    Baseline: lives with fried, does not use assistive device      Subjective:   CHIEF COMPLAINT: worsening SOB, missed last 2 dialysis schedule    HISTORY OF PRESENT ILLNESS:     Kurt Dowell is a 55 y.o.  male who presents with SOB, left rib pain secondary to a fall incident last Friday.  As per patient, last dialysis he had was Monday and missed the following schedule secondary to transportation issue. He was last seen in ED 07/10/2021 secondary to bilateral ankle swelling and left leg cellulitis, patient also fell that day with no documented injury. On assessment, patient reports 10/10 pain across his chest and getting more SOB, tachycardic and hypertensive. Decompensated on NC and had to be placed on BIPAP, started on Nitro drip. Past medical history is significant for ESRD with M,W,F hemodialysis, hypertension, hyperlipidemia, GERD, neuropathy and history of DVT. We were asked to admit for work up and evaluation of the above problems. Past Medical History:   Diagnosis Date    Chronic kidney disease     [facility]ialysis patient ([facility] [address]     Hypertension         Past Surgical History:   Procedure Laterality Date    HX VASCULAR ACCESS Left     LUE AV fistula       Social History     Tobacco Use    Smoking status: Current Every Day Smoker     Packs/day: 0.25    Smokeless tobacco: Never Used   Substance Use Topics    Alcohol use: Yes        Family History   Problem Relation Age of Onset    No Known Problems Mother     No Known Problems Father     No Known Problems Sister     No Known Problems Brother     No Known Problems Sister     No Known Problems Sister     No Known Problems Brother      Allergies   Allergen Reactions    Motrin [Ibuprofen] Hives     7/16/17 Pt denies allergy    Tylenol [Acetaminophen] Hives     7/16/17 Pt denies allergy        Prior to Admission medications    Medication Sig Start Date End Date Taking? Authorizing Provider   isosorbide mononitrate ER (IMDUR) 30 mg tablet Take 1 Tab by mouth every morning. 11/15/20   Joyce Ferris MD   apixaban (ELIQUIS) 5 mg tablet Take 1 Tab by mouth two (2) times a day. 11/15/20   Joyce Ferris MD   lidocaine 1.8 % ptmd 1 Patch by Apply Externally route daily. 11/15/20   Joyce Ferris MD   omeprazole (PRILOSEC) 40 mg capsule Take 40 mg by mouth daily.  Patient takes every night   Indications: heartburn    Provider, Historical lidocaine 5 % topical cream Apply  to affected area two (2) times daily as needed for Itching. 1/21/20   Blanca Vela PA-C   citalopram (CELEXA) 40 mg tablet Take 1 Tab by mouth daily. 5/2/18   Sushil Chawla MD   sevelamer (RENAGEL) 400 mg tablet Take 800 mg by mouth three (3) times daily (with meals). Provider, Historical   lisinopril (PRINIVIL, ZESTRIL) 2.5 mg tablet Take 2.5 mg by mouth daily. Provider, Historical   promethazine (PHENERGAN) 25 mg tablet Take 25 mg by mouth every six (6) hours as needed for Nausea. Provider, Historical   atorvastatin (LIPITOR) 40 mg tablet Take 40 mg by mouth nightly. Provider, Historical   senna (SENNA) 8.6 mg tablet Take 1 Tab by mouth daily. Provider, Historical   lidocaine (LIDODERM) 5 % Apply patch to the affected area for 12 hours a day and remove for 12 hours a day. 11/28/17   Sushil Chawla MD   aspirin delayed-release 81 mg tablet Take 1 Tab by mouth daily. 11/28/17   Sushil Chawla MD   multivitamin (ONE A DAY) tablet Take 1 Tab by mouth daily. Juan Olivarez MD   pantoprazole (PROTONIX) 40 mg tablet Take 40 mg by mouth daily. Juan Olivarez MD   paricalcitol (ZEMPLAR) 1 mcg capsule Take 1 mcg by mouth daily. Juan Olivarez MD   gabapentin (NEURONTIN) 300 mg capsule Take 300 mg by mouth two (2) times a day. Juan Olivarez MD       REVIEW OF SYSTEMS:     I am not able to complete the review of systems because:    The patient is intubated and sedated    The patient has altered mental status due to his acute medical problems    The patient has baseline aphasia from prior stroke(s)    The patient has baseline dementia and is not reliable historian    The patient is in acute medical distress and unable to provide information           Total of 12 systems reviewed as follows:       POSITIVE= bolded text  Negative = text not bolded  General:  fever, chills, sweats, generalized weakness, weight loss/gain,      loss of appetite   Eyes: blurred vision, eye pain, loss of vision, double vision  ENT:    rhinorrhea, pharyngitis   Respiratory:   cough, sputum production, SOB, WARNER, wheezing, pleuritic pain   Cardiology:   chest pain, palpitations, orthopnea, PND, edema, syncope   Gastrointestinal:  abdominal pain , N/V, diarrhea, dysphagia, constipation, bleeding   Genitourinary:  frequency, urgency, dysuria, hematuria, incontinence   Muskuloskeletal :  arthralgia, myalgia, back pain, left rib pain  Hematology:  easy bruising, nose or gum bleeding, lymphadenopathy   Dermatological: rash, ulceration, pruritis, color change / jaundice  Endocrine:   hot flashes or polydipsia   Neurological:  headache, dizziness, confusion, focal weakness, paresthesia,     Speech difficulties, memory loss, gait difficulty  Psychological: Feelings of anxiety, depression, agitation    Objective:   VITALS:    Visit Vitals  BP (!) 153/85   Pulse 97   Temp 97.8 °F (36.6 °C)   Resp 18   Ht 6' (1.829 m)   Wt 79.5 kg (175 lb 4.3 oz)   SpO2 92%   BMI 23.77 kg/m²       PHYSICAL EXAM:    General:    Alert, cooperative, in some respiratory distress, appears older than stated age. HEENT: Atraumatic, anicteric sclerae, pink conjunctivae     No oral ulcers, mucosa moist, throat clear, dentition fair  Neck:  Supple, symmetrical,  thyroid: non tender  Lungs:   Clear to auscultation bilaterally. No Wheezing or Rhonchi. Scattered rales. Chest wall:  No tenderness  No Accessory muscle use. tenderness across chest  Heart:   Regular  rhythm,  No  murmur   No edema  Abdomen:   Not distended. Bowel sounds normal  Extremities: No cyanosis. No clubbing,  GLORIA AV fistula    Skin turgor normal, Capillary refill normal, Radial dial pulse 2+  Skin:     Not pale. Not Jaundiced  No rashes   Psych:  Good insight. Not depressed. Anxious,  agitated. Neurologic: EOMs intact. No facial asymmetry. No aphasia or slurred speech. Symmetrical strength, Sensation grossly intact. Alert and oriented X 4. _______________________________________________________________________  Care Plan discussed with:    Comments   Patient y    Family      RN y    Care Manager                    Consultant:      _______________________________________________________________________  Expected  Disposition:   Home with Family y   HH/PT/OT/RN    SNF/LTC    LEO    ________________________________________________________________________        Comments    y Reviewed previous records   >50% of visit spent in counseling and coordination of care y Discussion with patient and/or family and questions answered       ________________________________________________________________________  Signed: Blaine Shetty NP    Procedures: see electronic medical records for all procedures/Xrays and details which were not copied into this note but were reviewed prior to creation of Plan. LAB DATA REVIEWED:    Recent Results (from the past 24 hour(s))   METABOLIC PANEL, COMPREHENSIVE    Collection Time: 08/08/21  8:52 PM   Result Value Ref Range    Sodium 145 136 - 145 mmol/L    Potassium 5.0 3.5 - 5.1 mmol/L    Chloride 109 (H) 97 - 108 mmol/L    CO2 24 21 - 32 mmol/L    Anion gap 12 5 - 15 mmol/L    Glucose 80 65 - 100 mg/dL    BUN 80 (H) 6 - 20 MG/DL    Creatinine 11.30 (H) 0.70 - 1.30 MG/DL    BUN/Creatinine ratio 7 (L) 12 - 20      GFR est AA 6 (L) >60 ml/min/1.73m2    GFR est non-AA 5 (L) >60 ml/min/1.73m2    Calcium 8.5 8.5 - 10.1 MG/DL    Bilirubin, total 0.5 0.2 - 1.0 MG/DL    ALT (SGPT) 27 12 - 78 U/L    AST (SGOT) 54 (H) 15 - 37 U/L    Alk.  phosphatase 149 (H) 45 - 117 U/L    Protein, total 7.0 6.4 - 8.2 g/dL    Albumin 2.4 (L) 3.5 - 5.0 g/dL    Globulin 4.6 (H) 2.0 - 4.0 g/dL    A-G Ratio 0.5 (L) 1.1 - 2.2     NT-PRO BNP    Collection Time: 08/08/21  8:52 PM   Result Value Ref Range    NT pro-BNP >35,000 (H) <125 PG/ML   TROPONIN I    Collection Time: 08/08/21  8:52 PM   Result Value Ref Range    Troponin-I, Qt. 0.05 (H) <0.05 ng/mL SARS-COV-2    Collection Time: 08/08/21  9:37 PM   Result Value Ref Range    SARS-CoV-2 Please find results under separate order     CBC WITH AUTOMATED DIFF    Collection Time: 08/08/21  9:37 PM   Result Value Ref Range    WBC 9.5 4.1 - 11.1 K/uL    RBC 2.80 (L) 4.10 - 5.70 M/uL    HGB 9.0 (L) 12.1 - 17.0 g/dL    HCT 29.3 (L) 36.6 - 50.3 %    .6 (H) 80.0 - 99.0 FL    MCH 32.1 26.0 - 34.0 PG    MCHC 30.7 30.0 - 36.5 g/dL    RDW 18.6 (H) 11.5 - 14.5 %    PLATELET 431 486 - 040 K/uL    MPV 11.1 8.9 - 12.9 FL    NRBC 0.0 0  WBC    ABSOLUTE NRBC 0.00 0.00 - 0.01 K/uL    NEUTROPHILS 58 32 - 75 %    LYMPHOCYTES 29 12 - 49 %    MONOCYTES 10 5 - 13 %    EOSINOPHILS 3 0 - 7 %    BASOPHILS 0 0 - 1 %    IMMATURE GRANULOCYTES 0 0.0 - 0.5 %    ABS. NEUTROPHILS 5.5 1.8 - 8.0 K/UL    ABS. LYMPHOCYTES 2.7 0.8 - 3.5 K/UL    ABS. MONOCYTES 0.9 0.0 - 1.0 K/UL    ABS. EOSINOPHILS 0.3 0.0 - 0.4 K/UL    ABS. BASOPHILS 0.0 0.0 - 0.1 K/UL    ABS. IMM.  GRANS. 0.0 0.00 - 0.04 K/UL    DF AUTOMATED     COVID-19 RAPID TEST    Collection Time: 08/08/21  9:37 PM   Result Value Ref Range    Specimen source Nasopharyngeal      COVID-19 rapid test Not detected NOTD

## 2021-08-09 NOTE — PROGRESS NOTES
Received message from patient's nurse stating:  Pt is about to get dialysis and is requesting 7.5 mg of oxycodone but the dialysis nurse is concerned about his blood pressure dropping. Pt says he takes 10mg of midodrine at home with dialysis however it isn't ordered and I don't see it on his home med list. Would it be possible to try the midodrine so that he can get pain medication during his dialysis? Discussion / orders:    I do note on patient's PTA home medication list that patient has been on midodrine 5 mg 3 times daily  Also note by reviewing patient record that he has been very hypertensive. · We will give 1 dose of midodrine 10 mg before dialysis           Please note that this note was dictated using Dragon computer voice recognition software. Quite often unanticipated grammatical, syntax, homophones, and other interpretive errors are inadvertently transcribed by the computer software. Please disregard these errors. Please excuse any errors that have escaped final proofreading.

## 2021-08-10 NOTE — PROGRESS NOTES
Problem: Falls - Risk of  Goal: *Absence of Falls  Description: Document Josué Constantino Fall Risk and appropriate interventions in the flowsheet.   Outcome: Progressing Towards Goal  Note: Fall Risk Interventions:            Medication Interventions: Teach patient to arise slowly    Elimination Interventions: Patient to call for help with toileting needs              Problem: Patient Education: Go to Patient Education Activity  Goal: Patient/Family Education  Outcome: Progressing Towards Goal

## 2021-08-10 NOTE — PROGRESS NOTES
Problem: Self Care Deficits Care Plan (Adult)  Goal: *Acute Goals and Plan of Care (Insert Text)  Description:   FUNCTIONAL STATUS PRIOR TO ADMISSION: Patient was independent and active without use of DME. Has tub-shower combo, no seated surface. HOME SUPPORT: The patient lived with roommate but did not require assist.    Occupational Therapy Goals  Initiated 8/10/2021  1. Patient will perform grooming with independence within 7 day(s). 2.  Patient will perform bathing with independence within 7 day(s). 3.  Patient will perform lower body dressing with independence within 7 day(s). 4.  Patient will perform toilet transfers with independence within 7 day(s). 5.  Patient will perform all aspects of toileting with independence within 7 day(s). Outcome: Not Met    OCCUPATIONAL THERAPY EVALUATION  Patient: Ramírez Carmen (59 y.o. male)  Date: 8/10/2021  Primary Diagnosis: SOB (shortness of breath) [R06.02]  ESRD (end stage renal disease) on dialysis ([facility] [address] [N18.6, Z99.2]        Precautions:        ASSESSMENT  Based on the objective data described below, the patient presents with decreased higher level mobility/balance, decreased higher level activity tolerance and perceived SOB upon exertion (RA 02 >95% throughout OT evaluation), all of which limit pt's ability to complete self-care routine at level congruent with PLOF. Currently, pt is Independent with self-feeding/UB dressing, SBA for standing grooming and LB dressing, Supervision for bathing and CGA for toileting. Pt with mild higher level mobility/balance deficits, with no LOB noted; however, pt noted with mild posterior swaying noted; however, no LOB during bathroom mobility, with DME. Pt noted with good distal LE ADL reaching. Pt educated on pursed lip breathing, with good understanding demonstrated.   Pt benefits from skilled OT to address functional deficits during acute hospitalization, with reporting therapist believing pt would benefit from [facility] upon discharge. Current Level of Function Impacting Discharge (ADLs/self-care): SBA/CGA    Functional Outcome Measure: The patient scored 55/100 on the Barthel Index outcome measure. Other factors to consider for discharge: none     Patient will benefit from skilled therapy intervention to address the above noted impairments. PLAN :  Recommendations and Planned Interventions: self care training, functional mobility training, therapeutic exercise, balance training, therapeutic activities, endurance activities, and patient education    Frequency/Duration: Patient will be followed by occupational therapy 3 times a week to address goals. Recommendation for discharge: (in order for the patient to meet his/her long term goals)  Occupational therapy at least 2 days/week in the home     This discharge recommendation:  Has not yet been discussed the attending provider and/or case management    IF patient discharges home will need the following DME: none       SUBJECTIVE:   Patient stated I don't have any trouble reaching my feet.     OBJECTIVE DATA SUMMARY:   HISTORY:   Past Medical History:   Diagnosis Date    Chronic kidney disease     Dialysis patient ([facility] [address]     Hypertension      Past Surgical History:   Procedure Laterality Date    HX VASCULAR ACCESS Left     LUE AV fistula       Expanded or extensive additional review of patient history:     Home Situation  Home Environment: Private residence  # Steps to Enter:  (elevator)  One/Two Story Residence: One story  Living Alone: Yes (with roommate but often alone)  Support Systems: None  Patient Expects to be Discharged to[de-identified] Unknown  Current DME Used/Available at Home: None  Tub or Shower Type: Tub/Shower combination    Hand dominance: Right    EXAMINATION OF PERFORMANCE DEFICITS:  Cognitive/Behavioral Status:  Neurologic State: Alert  Orientation Level: Oriented X4  Cognition: Decreased attention/concentration; Follows commands  Perception: Appears intact  Perseveration: No perseveration noted  Safety/Judgement: Awareness of environment    Hearing:  WFL    Range of Motion:  AROM: Generally decreased, functional  PROM: Within functional limits                      Strength:  Strength: Generally decreased, functional                Coordination:  Coordination: Generally decreased, functional  Fine Motor Skills-Upper: Left Intact; Right Intact    Gross Motor Skills-Upper: Left Intact; Right Intact    Tone & Sensation:  Tone: Normal  Sensation: Impaired (Left foot)                      Balance:  Sitting: Intact  Standing: Impaired  Standing - Static: Fair  Standing - Dynamic : Fair    Functional Mobility and Transfers for ADLs:  Bed Mobility:  Rolling: Independent  Supine to Sit: Modified independent  Sit to Supine: Supervision  Scooting: Independent    Transfers:  Sit to Stand: Supervision  Stand to Sit: Stand-by assistance  Bathroom Mobility: Contact guard assistance    ADL Assessment:  Feeding: Independent    Oral Facial Hygiene/Grooming: Stand-by assistance    Bathing: Supervision    Upper Body Dressing: Independent    Lower Body Dressing: Stand-by assistance    Toileting: Contact guard assistance    ADL Intervention and task modifications:  Patient instructed and indicated understanding the benefits of maintaining activity tolerance, functional mobility, and independence with self care tasks during acute stay  to ensure safe return home and to baseline. Encouraged patient to increase frequency and duration OOB, be out of bed for all meals, perform daily ADLs (as approved by RN/MD regarding bathing etc), and performing functional mobility to/from bathroom.     Pt educated on safe transfer techniques, with specific emphasis on proper hand placement to push up from seated surface rather than attempt to pull self up, fully positioning self in-front of desired seated location, feeling chair on back of legs and reaching back with 1-2 UE to slowly lower self to seated position. Cognitive Retraining  Safety/Judgement: Awareness of environment     Functional Measure:  Barthel Index:    Bathin  Bladder: 10  Bowels: 10  Groomin  Dressin  Feeding: 10  Mobility: 0  Stairs: 0  Toilet Use: 5  Transfer (Bed to Chair and Back): 10  Total: 55/100        The Barthel ADL Index: Guidelines  1. The index should be used as a record of what a patient does, not as a record of what a patient could do. 2. The main aim is to establish degree of independence from any help, physical or verbal, however minor and for whatever reason. 3. The need for supervision renders the patient not independent. 4. A patient's performance should be established using the best available evidence. Asking the patient, friends/relatives and nurses are the usual sources, but direct observation and common sense are also important. However direct testing is not needed. 5. Usually the patient's performance over the preceding 24-48 hours is important, but occasionally longer periods will be relevant. 6. Middle categories imply that the patient supplies over 50 per cent of the effort. 7. Use of aids to be independent is allowed. Flakito Cardenas., Barthel, D.W. (1291). Functional evaluation: the Barthel Index. [facility] (14)2. Chiki Montenegro, HALIMAF, Yasmine Patterson., Chay Mora., [place], [address] [place] [address] (). Measuring the change indisability after inpatient rehabilitation; comparison of the responsiveness of the Barthel Index and Functional [place] Measure. Journal of Neurology, Neurosurgery, and Psychiatry, 66(4), 338-623. JUAN Her.MARGARITA.JIM, JESSE Chow, & Mayur Hansen M.A. (2004.) Assessment of post-stroke quality of life in cost-effectiveness studies: The usefulness of the Barthel Index and the EuroQoL-5D.  Quality of Life Research, 15, 600-59        Occupational Therapy Evaluation Charge Determination   History Examination Decision-Making   LOW Complexity : Brief history review  LOW Complexity : 1-3 performance deficits relating to physical, cognitive , or psychosocial skils that result in activity limitations and / or participation restrictions  LOW Complexity : No comorbidities that affect functional and no verbal or physical assistance needed to complete eval tasks       Based on the above components, the patient evaluation is determined to be of the following complexity level: LOW   Pain Rating:  No c/o pain, rather fatigue; nursing aware and following    Activity Tolerance:   Fair and requires rest breaks    After treatment patient left in no apparent distress:    Supine in bed, Call bell within reach, and Side rails x 3    COMMUNICATION/EDUCATION:   The patients plan of care was discussed with: Physical therapist and Registered nurse. Home safety education was provided and the patient/caregiver indicated understanding., Patient/family have participated as able in goal setting and plan of care. , and Patient/family agree to work toward stated goals and plan of care. This patients plan of care is appropriate for delegation to [facility].     Thank you for this referral.  Blanka Osuna, OT  Time Calculation: 18 mins

## 2021-08-10 NOTE — PROGRESS NOTES
Problem: Mobility Impaired (Adult and Pediatric)  Goal: *Acute Goals and Plan of Care (Insert Text)  Description: FUNCTIONAL STATUS PRIOR TO ADMISSION: Patient was independent and active without use of DME.    HOME SUPPORT PRIOR TO ADMISSION: The patient lived with roommate but did not require assist.    Physical Therapy Goals  Initiated 8/10/2021  1. Patient will move from supine to sit and sit to supine  in bed with independence within 7 day(s). 2.  Patient will transfer from bed to chair and chair to bed with independence using the least restrictive device within 7 day(s). 3.  Patient will perform sit to stand with independence within 7 day(s). 4.  Patient will ambulate with supervision/set-up for 200 feet with the least restrictive device within 7 day(s). Note:   PHYSICAL THERAPY EVALUATION  Patient: Charlotte Augustine (8 y.o. male)  Date: 8/10/2021  Primary Diagnosis: SOB (shortness of breath) [R06.02]  ESRD (end stage renal disease) [facility] [N18.6, Z99.2]        Precautions: fall       ASSESSMENT  Based on the objective data described below, the patient presents with impaired balance, generalized weakness, decreased activity tolerance, and decreased functional mobility skills. Patient supine upon arrival, talking on phone on room air. Sats 94%. BP in supine 117/75. Able to come to sit with supervision only. Good sitting balance EOB. Able to stand with SBA and stand for BP (109/73). Decreased balance while standing with swaying posteriorly noted. Patient ambulated short distance on room air with CGA. Patient noted to be unsteady although no overt LOB observed. Upon return to EOB sats 90%, dropping to 88% briefly. Sats increase to 92% within 30 seconds. O2 resumed at patient request.  Prior to admission patient was living in apartment with a roommate but was independent with mobility.   Recommend outpatient PT vs. HHPT for continued rehab for balance and general strengthening. Current Level of Function Impacting Discharge (mobility/balance): CGA    Functional Outcome Measure: The patient scored 14/20 on the Elder Mobility Scale outcome measure which is indicative of 30% decline in mobility. Other factors to consider for discharge: living situation     Patient will benefit from skilled therapy intervention to address the above noted impairments. PLAN :  Recommendations and Planned Interventions: bed mobility training, transfer training, gait training, therapeutic exercises, and therapeutic activities      Frequency/Duration: Patient will be followed by physical therapy:  3 times a week to address goals.     Recommendation for discharge: (in order for the patient to meet his/her long term goals)  Outpatient physical therapy follow up recommended for balance/strengthening    This discharge recommendation:  Has been made in collaboration with the attending provider and/or case management    IF patient discharges home will need the following DME: to be determined (TBD)         SUBJECTIVE:   Patient stated I need that oxygen back on.    OBJECTIVE DATA SUMMARY:   HISTORY:    Past Medical History:   Diagnosis Date    Chronic kidney disease     Dialysis patient ([facility] [address]     Hypertension      Past Surgical History:   Procedure Laterality Date    HX VASCULAR ACCESS Left     LUE AV fistula       Personal factors and/or comorbidities impacting plan of care: respiratory status    Home Situation  Home Environment: Private residence  # Steps to Enter:  (elevator)  One/Two Story Residence: One story  Living Alone: Yes (with roommate but often alone)  Support Systems: None  Patient Expects to be Discharged to[de-identified] Unknown  Current DME Used/Available at Home: None  Tub or Shower Type: Tub/Shower combination    EXAMINATION/PRESENTATION/DECISION MAKING:   Critical Behavior:  Neurologic State: Alert  Orientation Level: Oriented X4  Cognition: Decreased attention/concentration, Follows commands  Safety/Judgement: Awareness of environment  Hearing:     Skin:  extremities intact  Edema: none noted  Range Of Motion:  AROM: Generally decreased, functional           PROM: Within functional limits           Strength:    Strength: Generally decreased, functional                    Tone & Sensation:   Tone: Normal              Sensation: Impaired (Left foot)               Coordination:  Coordination: Generally decreased, functional  Vision:      Functional Mobility:  Bed Mobility:  Rolling: Independent  Supine to Sit: Modified independent  Sit to Supine: Minimum assistance  Scooting: Independent  Transfers:  Sit to Stand: Supervision  Stand to Sit: Stand-by assistance                       Balance:   Sitting: Intact  Standing: Impaired  Standing - Static: Fair  Standing - Dynamic : Fair  Ambulation/Gait Training:  Distance (ft): 35 Feet (ft)  Assistive Device: Gait belt  Ambulation - Level of Assistance: Contact guard assistance        Gait Abnormalities: Decreased step clearance  Right Side Weight Bearing: Full  Left Side Weight Bearing: Full        Speed/Ligia: Pace decreased (<100 feet/min)  Step Length: Left shortened;Right shortened                   Functional Measure:    Elder Mobility Scale    14/20         Scores under 10  generally these patients are dependent in mobility maneuvers; require help with  basic ADL, such as transfers, toileting and dressing. Scores between 10  13  generally these patients are borderline in terms of safe mobility and  independence in ADL i.e. they require some help with some mobility maneuvers. Scores over 14  Generally these patients are able to perform mobility maneuvers alone and safely  and are independent in basic ADL.               Physical Therapy Evaluation Charge Determination   History Examination Presentation Decision-Making   MEDIUM  Complexity : 1-2 comorbidities / personal factors will impact the outcome/ POC  MEDIUM Complexity : 3 Standardized tests and measures addressing body structure, function, activity limitation and / or participation in recreation  LOW Complexity : Stable, uncomplicated  LOW Complexity : FOTO score of       Based on the above components, the patient evaluation is determined to be of the following complexity level: LOW     Pain Rating:  None reported    Activity Tolerance:   Fair    After treatment patient left in no apparent distress:   Supine in bed, Call bell within reach, and Side rails x 3    COMMUNICATION/EDUCATION:   The patients plan of care was discussed with: Registered nurse. Fall prevention education was provided and the patient/caregiver indicated understanding. and Patient/family agree to work toward stated goals and plan of care.     Thank you for this referral.  Camron Smith, PT   Time Calculation: 27 mins

## 2021-08-10 NOTE — PROGRESS NOTES
Received message from patient's nurse stating:    Also PT is tachy in the upper 120s. I looked back and he has been in the 120s since about 5pm today, but he has no orders for HR. The dialysis nurse said if he goes above 130 she'll have to stop dialysis. Would it be possible to get some metoprolol on board for his HR? Dialysis started about 30 minutes ago    his last pressure was 149/103         Discussion / orders:    · Metoprolol 1.25 mg IV x1           Please note that this note was dictated using Dragon computer voice recognition software. Quite often unanticipated grammatical, syntax, homophones, and other interpretive errors are inadvertently transcribed by the computer software. Please disregard these errors. Please excuse any errors that have escaped final proofreading.

## 2021-08-10 NOTE — DIALYSIS
[place]emodialysis / [facility] [place] [phone]    Vitals   Pre   Post   Assessment   Pre   Post     Temp  Temp: 99.1 °F (37.3 °C) (08/09/21 1446)  97.6 LOC  AXOX3 AXOX3   HR   Pulse (Heart Rate): (!) 120 (08/09/21 2021) 118 Lungs   Diminished throughout, off BiPap, sat 96% 4L/nc  Diminished, sats 98% on 4L N/C   B/P   BP: (!) 146/94 (08/09/21 2021) 105/78 Cardiac   ST  ST   Resp   Resp Rate: 23 (08/09/21 2021)  Skin   W/D  W/D   Pain level  0/10 at present 8/10 Left rib Edema    None   None   Orders:    Duration:   Start:    2015 End:    2215 Total:   2 hrs   Dialyzer:   Dialyzer/Set Up Inspection: Buddy (08/09/21 2015)   K Bath:   Dialysate K (mEq/L):  (N/A) (08/09/21 2015)   Ca Bath:   Dialysate CA (mEq/L):  (N/A) (08/09/21 2015)   Na/Bicarb:   Dialysate NA (mEq/L):  (N/A) (08/09/21 2015)   Target Fluid Removal:   Goal/Amount of Fluid to Remove (mL): 3500 mL (08/09/21 2015)   Access     Type & Location:   LUE AVF, +T/+B pre/post tx. Fragile areas of fistula noted. Aseptic cannulation using 15G 1\" needles x 2. No difficulties. Labs     Obtained/Reviewed   Critical Results Called   Date when labs were drawn-  Hgb-    HGB   Date Value Ref Range Status   08/08/2021 9.0 (L) 12.1 - 17.0 g/dL Final     K-    Potassium   Date Value Ref Range Status   08/08/2021 5.0 3.5 - 5.1 mmol/L Final     Comment:     Sample is hemolyzed (hemoglobin is  likely >50 mg/dL) and thus the  potassium, AST, ammonia, and  phosphorus results may be adversely  affected. If the clinical situation  warrants, recommend recollection of  a new specimen with attention to  preventing hemolysis.        Ca-   Calcium   Date Value Ref Range Status   08/08/2021 8.5 8.5 - 10.1 MG/DL Final     Bun-   BUN   Date Value Ref Range Status   08/08/2021 80 (H) 6 - 20 MG/DL Final     Creat-   Creatinine   Date Value Ref Range Status   08/08/2021 11.30 (H) 0.70 - 1.30 MG/DL Final        Medications/ Blood Products Given     Name   Dose   Route and Time Blood Volume Processed (BVP):    0 Net Fluid   Removed:  3500 ml   Comments   Time Out Done: 2005  Primary Nurse Rpt Pre:Roxane Ramos RN   Primary Nurse Rpt Post:LUZ Ramos RN  Pt Education:Fluid intake/ management  Care Plan:HD as per Nephrologists MW  Tx Summary:Tolerated and completed isolated UF, tachycardic >120 during tx despite IV Metoprolol given by Primary Nurse. No c/o voiced. All blood rinsed back post tx, needles removed x 2 and hemostasis achieved WNL. Dry dsg applied to sites and taped securely. Pt was left in stable condition. Admiting Diagnosis:  Pt's previous clinic-[facility]  Consent signed - Informed Consent Verified: Yes (08/09/21 2015)  Madiha Consent -   Hepatitis Status- Ag neg 08/09/21 per Corky #- Machine Number: [id number] (08/09/21 2015)  Telemetry status-Bedside/Remote  Pre-dialysis wt. - Pre-Dialysis Weight: 79.4 kg (175 lb 0.7 oz) (08/09/21 2015)

## 2021-08-10 NOTE — PROGRESS NOTES
Hospitalist Progress Note    NAME: Yulia Marino   :  1965   MRN:  [id number]   Room Number:  2275/01  @ [facility]       Interim Hospital Summary: 55 y.o. male whom presented on 2021 with      Assessment / Plan:      Acute hypoxic respiratory failure POA   Fluid overload POA   Acute on chronic CHF POA  Hypertensive emergency  POA   ESRD on HD M,W,F, POA   Elevated Troponin, POA  Left rib fractures POA  CT chest WO contrast: No acute findings suspected. Prominent interstitial disease likely chronic  CXR: Mild interstitial edema  Pro-BNP: >35,000  Troponin: 0.05, 0.13  EKG interpreted independently - no acute ST-T changes of ischemia. D-dimer 1.47, VQ scan done today which shows low probability for PE. Also shows left rib fracture. - O2 support and neb treatment PRN  - Nephrology followingappreciate recommendations. Hemodialysis per nephrology. - cardiology followingappreciate recommendations  - echocardiogram pending  -Today added Imdur. For tachycardia added beta-blocker twice a day. Chest pain secondary to the rib fracture. Recurrent falls POA  - PT/OT      Hypertension, POA  Hyperlipidemia, POA  - started on Nitro drip in ED    - continue imdur.  DC nitrolglycerin  - Resume home meds    GERD, POA  - continue pantoprazole    Neuropathy, POA  - continue gabapentin    History of DVT  - continue  Apixaban         Tobacco dependence POA   Patient was counseled extensively on the need to abstain from tobacco, its addictive tendencies, its deleterious effects on the lungs, cardiovascular  as well as its financial & social sequelae    Code Status: Full  Surrogate Decision Maker: Brenda Durant, mother ([phone])     DVT Prophylaxis: Eliquis  GI Prophylaxis: Protonix     Baseline: lives with fried, does not use assistive device             Subjective:     Chief Complaint / Reason for Physician Visit  Patient seen today, doing fine, complaining of the pain on the left side of the chest.  Did a VQ scan which shows left rib fractures. Objective:     VITALS:   Last 24hrs VS reviewed since prior progress note. Most recent are:  Patient Vitals for the past 24 hrs:   Temp Pulse Resp BP SpO2   08/10/21 1400 98 °F (36.7 °C) (!) 107 19 134/82 98 %   08/10/21 1044 97.5 °F (36.4 °C) (!) 119 21 113/77 97 %   08/10/21 0728 97.8 °F (36.6 °C) (!) 113 13 (!) 150/96 100 %   08/10/21 0619  (!) 112  (!) 145/96    08/10/21 0400  (!) 116      08/10/21 0300  (!) 113 17 124/74 95 %   08/09/21 2300  (!) 124 18 125/81 96 %   08/09/21 2249 97.8 °F (36.6 °C) (!) 125 17 (!) 123/91 97 %   08/09/21 2230 97.6 °F (36.4 °C) (!) 118 24 105/78 96 %   08/09/21 2215  (!) 122  122/81    08/09/21 2200  (!) 122  120/73    08/09/21 2145  (!) 120  137/85    08/09/21 2130  (!) 118  (!) 136/97    08/09/21 2117  (!) 122  (!) 155/102    08/09/21 2115  (!) 124  (!) 155/102    08/09/21 2059  (!) 122  (!) 149/109    08/09/21 2045  (!) 121  (!) 149/103    08/09/21 2030  (!) 123  (!) 152/105    08/09/21 2021 99 °F (37.2 °C) (!) 120 23 (!) 146/94 96 %   08/09/21 2015  (!) 121 24 (!) 149/94 97 %   08/09/21 1943 97.8 °F (36.6 °C) (!) 121 19 (!) 142/96    08/09/21 1701    (!) 157/95        Intake/Output Summary (Last 24 hours) at 8/10/2021 1511  Last data filed at 8/10/2021 1400  Gross per 24 hour   Intake 1000 ml   Output 3500 ml   Net -2500 ml        PHYSICAL EXAM:  General: Alert, cooperative, no acute distress    EENT:  EOMI. Anicteric sclerae. MMM  Resp:  CTA bilaterally, no wheezing or rales. No accessory muscle use  CV:  Regular  rhythm,  normal S1/S2, no murmurs rubs gallops, No edema  GI:  Soft, Non distended, Non tender. +Bowel sounds  Neurologic:  Alert and oriented X 3, normal speech,   Psych:   Good insight. Not anxious nor agitated  Skin:  No rashes.   No jaundice    Reviewed most current lab test results and cultures  YES  Reviewed most current radiology test results YES  Review and summation of old records today    NO  Reviewed patient's current orders and MAR    YES  PMH/SH reviewed - no change compared to H&P  ________________________________________________________________________  Care Plan discussed with:    Comments   Patient x    Family      RN x    Care Manager x    Consultant                        Multidiciplinary team rounds were held today with , nursing, pharmacist and clinical coordinator. Patient's plan of care was discussed; medications were reviewed and discharge planning was addressed. ________________________________________________________________________  Total NON critical care TIME:  35   Minutes    Total CRITICAL CARE TIME Spent:   Minutes non procedure based      Comments   >50% of visit spent in counseling and coordination of care x    ________________________________________________________________________  See Patel MD     Procedures: see electronic medical records for all procedures/Xrays and details which were not copied into this note but were reviewed prior to creation of Plan. LABS:  I reviewed today's most current labs and imaging studies.   Pertinent labs include:  Recent Labs     08/10/21  0625 08/08/21  2137   WBC 12.9* 9.5   HGB 10.2* 9.0*   HCT 31.8* 29.3*    176     Recent Labs     08/10/21  0625 08/08/21  2052    145   K 4.6 5.0   CL 98 109*   CO2 28 24   GLU 98 80   BUN 70* 80*   CREA 10.10* 11.30*   CA 9.8 8.5   MG 2.1  --    PHOS 5.2*  --    ALB  --  2.4*   TBILI  --  0.5   ALT  --  27       Signed: See Patel MD

## 2021-08-10 NOTE — PROGRESS NOTES
1900: Bedside shift change report given to [facility] (oncoming nurse) by Kelly Thompson (offgoing nurse). Report included the following information SBAR, Kardex, Intake/Output, MAR, Recent Results and Cardiac Rhythm Sinus Tach. 1952: Pt is about to get dialysis, however is requesting pain medication. Dialysis nurse is concerned about blood pressure, and pt stated he takes midodrine at home with dialysis. Purveyor messaged and 10mg Midodrine ordered. 2055: Pt is tachycardic in the upper 120s. If HR goes above 130 then dialysis has to be stopped. Purveyor messaged    0700: End of Shift Note    Bedside shift change report given to 1710 Kirk Hicks (oncoming nurse) by Tho Benz RN (offgoing nurse). Report included the following information SBAR, Kardex, Intake/Output, MAR, Recent Results and Cardiac Rhythm Sinus Tach    Shift worked:  [id number]     Shift summary and any significant changes:     Dialysis pulled 3.5L, Midodrine ordered but held, PRN oxycodone given 1x. Metoprolol given 1x for HR. Concerns for physician to address:  Tachycardia     Zone phone for [facility] shift:          Activity:  Activity Level: Bed Rest  Number times ambulated in hallways past shift: 0  Number of times OOB to chair past shift: 0    Cardiac:   Cardiac Monitoring: Yes      Cardiac Rhythm: Sinus Tach    Access:   Current line(s): PIV     Genitourinary:   Urinary status: oliguric    Respiratory:   O2 Device: Nasal cannula  Chronic home O2 use?: NO  Incentive spirometer at bedside: NO     GI:     Current diet:  ADULT DIET Regular; No Salt Added (3-4 gm); Low Potassium (Less than 3000 mg/day); Low Phosphorus (Less than 1000 mg); Less than 60 gm; High Fiber  Passing flatus: YES  Tolerating current diet: YES       Pain Management:   Patient states pain is manageable on current regimen: YES    Skin:  Jovan Score: 19  Interventions: float heels, increase time out of bed and nutritional support     Patient Safety:  Fall Score:  Total Score: 2  Interventions: bed/chair alarm, assistive device (walker, cane, etc), gripper socks and pt to call before getting OOB       Length of Stay:  Expected LOS: 4d 0h  Actual LOS: 2      Kennedy Briseno RN

## 2021-08-10 NOTE — PROGRESS NOTES
Physical Therapy  PT evaluation completed; full note to follow. Patient presenting with generalized weakness and impaired balance. Unsure of any support of patient at home (per patient, none). Recommend increased supervision at home and HHPT vs. Outpatient PT for balance, general strengthening.

## 2021-08-10 NOTE — PROGRESS NOTES
Progress Note      8/10/2021 7:24 AM  NAME: Inna Hoyt   MRN:  [id number]   Admit Diagnosis: SOB (shortness of breath) [R06.02]  ES[facility]end stage [facility] [N18.6, Z99.2]      Problem List:     1. Acute hypoxic respiratory failure secondary acute pulmonary   edema.     2. Acute pulmonary edema due to volume overload.     3. End-stage renal disease requiring dialysis. 4. Uncontrolled hypertension w/ hypertensive emergency   5. Elevated troponin level, MPI 5/17 w/ EF 44% and no ischemia or infarct. Echo 5/17 w/ EF 55-60%. infarction. 6. Leukocytosis. 7. Anemia of end-stage renal disease. 8. Tobacco abuse. 9. Alcohol abuse. 10. Hyperlipidemia  11. Remote DVT     Cardiologist:  Joanie     Assessment/Plan: TnI 0.5 (peak)     On BiPAP yesterday, VERY sleep, hard to stay awake during conversation     Still SOB; on NC O2 today     Sats 100%     Echo w/ EF 45-50%    1. Continue eliquis but says he was not taking this at home  2. Continue ASA  3. Continue statin  4. Would consider a beta blocker (bystolic or coreg) or cardizem for BP/HR control. Remains tachy. Consider CTA to eval for PE.  5. Continue ISMN, increase as needed for better BP control  6. Midodrine per primary; would not recommend giving if SBP > 110 unless needed for HD  7. Volume/BP per renal         [x]       High complexity decision making was performed in this patient at high risk for decompensation with multiple organ involvement. Subjective: Inna Hoyt denies chest pain. Still SOB. Discussed with RN events overnight.      Review of Systems:    Symptom Y/N Comments  Symptom Y/N Comments   Fever/Chills N   Chest Pain N    Poor Appetite N   Edema N    Cough N   Abdominal Pain N    Sputum N   Joint Pain N    SOB/WARNER N   Pruritis/Rash N    Nausea/vomit N   Tolerating PT/OT Y    Diarrhea N   Tolerating Diet Y    Constipation N   Other       Could NOT obtain due to:      Objective:      Physical Exam:    Last 24hrs VS reviewed since prior progress note. Most recent are:    Visit Vitals  BP (!) 145/96   Pulse (!) 112   Temp 97.8 °F (36.6 °C)   Resp 17   Ht 6' (1.829 m)   Wt 74.8 kg (165 lb)   SpO2 95%   BMI 22.38 kg/m²       Intake/Output Summary (Last 24 hours) at 8/10/2021 0724  Last data filed at 8/10/2021 0300  Gross per 24 hour   Intake    Output 3500 ml   Net -3500 ml        General Appearance: Well developed, well nourished, alert & oriented x 3,    no acute distress. Ears/Nose/Mouth/Throat: Hearing grossly normal.  Neck: Supple. Chest: Lungs clear to auscultation bilaterally. Cardiovascular: Regular rate and rhythm, S1S2 normal, no murmur. Abdomen: Soft, non-tender, bowel sounds are active. Extremities: No edema bilaterally. Skin: Warm and dry. []         Post-cath site without hematoma, bruit, tenderness, or thrill. Distal pulses intact. PMH/SH reviewed - no change compared to H&P    Data Review    Telemetry: sinus rhythm     EKG:   [x]  No new EKG for review    Lab Data Personally Reviewed:    Recent Labs     08/10/21  0625 08/08/21  2137   WBC 12.9* 9.5   HGB 10.2* 9.0*   HCT 31.8* 29.3*    176     No results for input(s): INR, PTP, APTT, INREXT in the last 72 hours. Recent Labs     08/08/21 2052      K 5.0   *   CO2 24   BUN 80*   CREA 11.30*   GLU 80   CA 8.5     Recent Labs     08/09/21  1718 08/09/21  1024 08/09/21  0334   TROIQ 0.47* 0.47* 0.13*     No results found for: CHOL, CHOLX, CHLST, CHOLV, HDL, HDLP, LDL, LDLC, DLDLP, TGLX, TRIGL, TRIGP, CHHD, CHHDX    Recent Labs     08/08/21 2052   *   TP 7.0   ALB 2.4*   GLOB 4.6*     No results for input(s): PH, PCO2, PO2 in the last 72 hours.     Medications Personally Reviewed:    Current Facility-Administered Medications   Medication Dose Route Frequency    sodium chloride (NS) flush 5-40 mL  5-40 mL IntraVENous Q8H    sodium chloride (NS) flush 5-40 mL  5-40 mL IntraVENous PRN    apixaban (ELIQUIS) tablet 5 mg  5 mg Oral BID    aspirin delayed-release tablet 81 mg  81 mg Oral DAILY    atorvastatin (LIPITOR) tablet 40 mg  40 mg Oral QHS    citalopram (CELEXA) tablet 40 mg  40 mg Oral DAILY    gabapentin (NEURONTIN) capsule 300 mg  300 mg Oral BID    isosorbide mononitrate ER (IMDUR) tablet 30 mg  30 mg Oral 7am    pantoprazole (PROTONIX) tablet 40 mg  40 mg Oral DAILY    sevelamer carbonate (RENVELA) tab 800 mg  800 mg Oral TID    ondansetron (ZOFRAN) injection 4 mg  4 mg IntraVENous Q4H PRN    lidocaine 4 % patch 1 Patch  1 Patch TransDERmal DAILY    nicotine (NICODERM CQ) 21 mg/24 hr patch 1 Patch  1 Patch TransDERmal DAILY    levalbuterol (XOPENEX) nebulizer soln 1.25 mg/3 mL  1.25 mg Nebulization Q4H PRN    albumin human 25% (BUMINATE) solution 12.5 g  12.5 g IntraVENous DIALYSIS PRN    epoetin cristian-epbx (RETACRIT) injection 8,000 Units  8,000 Units SubCUTAneous Q MON, WED & FRI    oxyCODONE IR (ROXICODONE) tablet 2.5 mg  2.5 mg Oral Q4H PRN    Or    oxyCODONE IR (ROXICODONE) tablet 5 mg  5 mg Oral Q4H PRN    Or    oxyCODONE IR (ROXICODONE) tablet 7.5 mg  7.5 mg Oral Q4H PRN    naloxone (NARCAN) injection 0.4 mg  0.4 mg IntraVENous EVERY 2 MINUTES AS NEEDED    midodrine (PROAMATINE) tablet 10 mg  10 mg Oral ONCE         Alinda Osgood Mueller III, DO

## 2021-08-10 NOTE — PROGRESS NOTES
NAME: Elmira Rouse        :  1965        MRN:  [id number]                  Assessment   :                                               Plan:  [id number] [place]  Anemia  Dyspnea  Volume overload Dyspnea better this AM. Plan for HD in AM.     H/H in goal.  continue retacrit                  Subjective:     Chief Complaint:  \" I feel better. \"  Less dyspnea. No pain. No N/V. Review of Systems:    Symptom Y/N Comments  Symptom Y/N Comments   Fever/Chills    Chest Pain     Poor Appetite    Edema     Cough    Abdominal Pain     Sputum    Joint Pain     SOB/WARNER    Pruritis/Rash     Nausea/vomit    Tolerating PT/OT     Diarrhea    Tolerating Diet     Constipation    Other       Could not obtain due to:      Objective:     VITALS:   Last 24hrs VS reviewed since prior progress note.  Most recent are:  Visit Vitals  BP (!) 150/96 (BP 1 Location: Right upper arm, BP Patient Position: At rest)   Pulse (!) 113   Temp 97.8 °F (36.6 °C)   Resp 13   Ht 6' (1.829 m)   Wt 74.8 kg (165 lb)   SpO2 100%   BMI 22.38 kg/m²       Intake/Output Summary (Last 24 hours) at 8/10/2021 1016  Last data filed at 8/10/2021 7291  Gross per 24 hour   Intake 350 ml   Output 3500 ml   Net -3150 ml      Telemetry Reviewed:     PHYSICAL EXAM:  General: NAD  No edema    Lab Data Reviewed: (see below)    Medications Reviewed: (see below)    PMH/SH reviewed - no change compared to H&P  ________________________________________________________________________  Care Plan discussed with:  Patient     Family      RN     Care Manager                    Consultant:          Comments   >50% of visit spent in counseling and coordination of care       ________________________________________________________________________  Marisa Colby MD     Procedures: see electronic medical records for all procedures/Xrays and details which  were not copied into this note but were reviewed prior to creation of Plan. LABS:  Recent Labs     08/10/21  0625 08/08/21  2137   WBC 12.9* 9.5   HGB 10.2* 9.0*   HCT 31.8* 29.3*    176     Recent Labs     08/10/21  0625 08/08/21  2052    145   K 4.6 5.0   CL 98 109*   CO2 28 24   BUN 70* 80*   CREA 10.10* 11.30*   GLU 98 80   CA 9.8 8.5   MG 2.1  --    PHOS 5.2*  --      Recent Labs     08/08/21 2052   *   TP 7.0   ALB 2.4*   GLOB 4.6*     No results for input(s): INR, PTP, APTT, INREXT in the last 72 hours. No results for input(s): FE, TIBC, PSAT, FERR in the last 72 hours. No results found for: FOL, RBCF   No results for input(s): PH, PCO2, PO2 in the last 72 hours. No results for input(s): CPK, CKMB in the last 72 hours.     No lab exists for component: TROPONINI  No components found for: [place]  Lab Results   Component Value Date/Time    Color YELLOW/STRAW 05/06/2017 01:30 PM    Appearance CLOUDY (A) 05/06/2017 01:30 PM    Specific gravity 1.015 05/06/2017 01:30 PM    pH (UA) 7.5 05/06/2017 01:30 PM    Protein >300 (A) 05/06/2017 01:30 PM    Glucose NEGATIVE  05/06/2017 01:30 PM    Ketone NEGATIVE  05/06/2017 01:30 PM    Bilirubin NEGATIVE  05/06/2017 01:30 PM    Urobilinogen 0.2 05/06/2017 01:30 PM    Nitrites NEGATIVE  05/06/2017 01:30 PM    Leukocyte Esterase NEGATIVE  05/06/2017 01:30 PM    Epithelial cells FEW 05/06/2017 01:30 PM    Bacteria NEGATIVE  05/06/2017 01:30 PM    WBC 5-10 05/06/2017 01:30 PM    RBC 0-5 05/06/2017 01:30 PM       MEDICATIONS:  Current Facility-Administered Medications   Medication Dose Route Frequency    sodium chloride (NS) flush 5-40 mL  5-40 mL IntraVENous Q8H    sodium chloride (NS) flush 5-40 mL  5-40 mL IntraVENous PRN    apixaban (ELIQUIS) tablet 5 mg  5 mg Oral BID    aspirin delayed-release tablet 81 mg  81 mg Oral DAILY    atorvastatin (LIPITOR) tablet 40 mg  40 mg Oral QHS    citalopram (CELEXA) tablet 40 mg  40 mg Oral DAILY    gabapentin (NEURONTIN) capsule 300 mg  300 mg Oral BID    isosorbide mononitrate ER (IMDUR) tablet 30 mg  30 mg Oral 7am    pantoprazole (PROTONIX) tablet 40 mg  40 mg Oral DAILY    sevelamer carbonate (RENVELA) tab 800 mg  800 mg Oral TID    ondansetron (ZOFRAN) injection 4 mg  4 mg IntraVENous Q4H PRN    lidocaine 4 % patch 1 Patch  1 Patch TransDERmal DAILY    nicotine (NICODERM CQ) 21 mg/24 hr patch 1 Patch  1 Patch TransDERmal DAILY    levalbuterol (XOPENEX) nebulizer soln 1.25 mg/3 mL  1.25 mg Nebulization Q4H PRN    albumin human 25% (BUMINATE) solution 12.5 g  12.5 g IntraVENous DIALYSIS PRN    epoetin cristian-epbx (RETACRIT) injection 8,000 Units  8,000 Units SubCUTAneous Q MON, WED & FRI    oxyCODONE IR (ROXICODONE) tablet 2.5 mg  2.5 mg Oral Q4H PRN    Or    oxyCODONE IR (ROXICODONE) tablet 5 mg  5 mg Oral Q4H PRN    Or    oxyCODONE IR (ROXICODONE) tablet 7.5 mg  7.5 mg Oral Q4H PRN    naloxone (NARCAN) injection 0.4 mg  0.4 mg IntraVENous EVERY 2 MINUTES AS NEEDED

## 2021-08-10 NOTE — PROGRESS NOTES
End of Shift Note    Bedside shift change report given to [address] Solano (oncoming nurse) by Diony Alvarado RN (offgoing nurse). Report included the following information SBAR, Kardex, ED Summary, Procedure Summary, Intake/Output, MAR, Recent Results and Cardiac Rhythm . Shift worked:  Day     Shift summary and any significant changes:     Pt had lung scan today, Left rib fractures,   D Dimer elevated 1.47, low probability of PE, Seen by PT/OT, will need PT/OT at home 2X week, Plan HD in AM, not recommended per Cardiology to give midodrine if SBP >110 unless needed for HD, Pt has black box locked by key in room, suspect has meds in box, not able to assess box today, charge nurse aware   Concerns for physician to address: none     Zone phone for oncoming shift:          Activity:  Activity Level: Bed Rest  Number times ambulated in hallways past shift: 0  Number of times OOB to chair past shift: 3    Cardiac:   Cardiac Monitoring: Yes      Cardiac Rhythm: Sinus Tach    Access:   Current line(s): PIV and HD access     Genitourinary:   Urinary status: oliguric    Respiratory:   O2 Device: None (Room air)  Chronic home O2 use?: NO  Incentive spirometer at bedside: NO     GI:  Last Bowel Movement Date:  (before admission)  Current diet:  ADULT DIET Regular; No Salt Added (3-4 gm); Low Potassium (Less than 3000 mg/day); Low Phosphorus (Less than 1000 mg); Less than 60 gm; High Fiber  Passing flatus: YES  Tolerating current diet: YES       Pain Management:   Patient states pain is manageable on current regimen: YES    Skin:  Jovan Score: 19  Interventions: float heels, increase time out of bed and PT/OT consult    Patient Safety:  Fall Score:  Total Score: 3  Interventions: bed/chair alarm, assistive device (walker, cane, etc), gripper socks and pt to call before getting OOB  High Fall Risk: Yes    Length of Stay:  Expected LOS: 4d 0h  Actual LOS: 2      Diony Alvarado RN

## 2021-08-11 NOTE — PROGRESS NOTES
Problem: Mobility Impaired (Adult and Pediatric)  Goal: *Acute Goals and Plan of Care (Insert Text)  Description: FUNCTIONAL STATUS PRIOR TO ADMISSION: Patient was independent and active without use of DME.    HOME SUPPORT PRIOR TO ADMISSION: The patient lived with roommate but did not require assist.    Physical Therapy Goals  Initiated 8/10/2021  1. Patient will move from supine to sit and sit to supine  in bed with independence within 7 day(s). 2.  Patient will transfer from bed to chair and chair to bed with independence using the least restrictive device within 7 day(s). 3.  Patient will perform sit to stand with independence within 7 day(s). 4.  Patient will ambulate with supervision/set-up for 200 feet with the least restrictive device within 7 day(s). Outcome: Progressing Towards Goal   PHYSICAL THERAPY TREATMENT  Patient: Yulia Marino (42 y.o. male)  Date: 8/11/2021  Diagnosis: SOB (shortness of breath) [R06.02]  ESRD (end stage renal disease) [facility] [N18.6, Z99.2] <principal problem not specified>       Precautions:    Chart, physical therapy assessment, plan of care and goals were reviewed. ASSESSMENT  Patient continues with skilled PT services and is progressing towards goals. Patient limited by LE weakness and overall impaired dynamic balance. Needing overall CGA for transfers and has fair standing balance. Appears to have some weakness in LEs and is generally unstable but declines use of walker and strength/balance appears to improve with increased distance ambulated. He is at risk for falls and below his baseline. Declines HH PT as he lives with a \"friend\" who he states would not want people coming in/out of the home. However, he is open to OP PT. Other factors to consider for discharge: at risk for falls, complicated social situation         PLAN :  Patient continues to benefit from skilled intervention to address the above impairments.   Continue treatment per established plan of care. to address goals. Recommendation for discharge: (in order for the patient to meet his/her long term goals)  Outpatient physical therapy follow up recommended for general strength and balance      IF patient discharges home will need the following DME: patient owns DME required for discharge       SUBJECTIVE:   Patient stated I don't need that (in reference to RW).     OBJECTIVE DATA SUMMARY:   Critical Behavior:  Neurologic State: Alert, Eyes open spontaneously  Orientation Level: Oriented X4  Cognition: Follows commands  Safety/Judgement: Awareness of environment  Functional Mobility Training:  Bed Mobility:   Independent with bed mobility                 Transfers:  Sit to Stand: Contact guard assistance  Stand to Sit: Contact guard assistance                             Balance:  Sitting: Intact  Standing: Impaired  Standing - Static: Fair  Standing - Dynamic : Fair  Ambulation/Gait Training:  Distance (ft): 50 Feet (ft)  Assistive Device: Gait belt  Ambulation - Level of Assistance: Contact guard assistance        Gait Abnormalities: Decreased step clearance              Speed/Ligia: Pace decreased (<100 feet/min); Slow  Step Length: Left shortened;Right shortened         Pain Rating:  No c/o pain    Activity Tolerance:   Fair and requires rest breaks    After treatment patient left in no apparent distress:   Sitting EOB with call bell in reach    COMMUNICATION/COLLABORATION:   The patients plan of care was discussed with: Physical therapist, Occupational therapist, and Registered nurse.      Wing Gamble PT, DPT   Time Calculation: 17 mins

## 2021-08-11 NOTE — PROGRESS NOTES
NAME: Santhosh Ferreira        :  1965        MRN:  [id number]                  Assessment   :                                               Plan:  VRSU-HWP-ODN [place]  Anemia  Dyspnea  Volume overload Hd today. Try to pull 3kg as tolerated.     H/H in goal.  continue retacrit                  Subjective:     Chief Complaint:  \" My breathing is better. \"  Less dyspnea. C/o abdominal pain. No N/V. Review of Systems:    Symptom Y/N Comments  Symptom Y/N Comments   Fever/Chills    Chest Pain     Poor Appetite    Edema     Cough    Abdominal Pain     Sputum    Joint Pain     SOB/WARNER    Pruritis/Rash     Nausea/vomit    Tolerating PT/OT     Diarrhea    Tolerating Diet     Constipation    Other       Could not obtain due to:      Objective:     VITALS:   Last 24hrs VS reviewed since prior progress note.  Most recent are:  Visit Vitals  /86 (BP 1 Location: Right upper arm, BP Patient Position: At rest)   Pulse (!) 107   Temp 98.7 °F (37.1 °C)   Resp 11   Ht 6' (1.829 m)   Wt 75.5 kg (166 lb 8 oz)   SpO2 97%   BMI 22.58 kg/m²       Intake/Output Summary (Last 24 hours) at 2021 0948  Last data filed at 2021 0804  Gross per 24 hour   Intake 950 ml   Output    Net 950 ml      Telemetry Reviewed:     PHYSICAL EXAM:  General: NAD  No edema    Lab Data Reviewed: (see below)    Medications Reviewed: (see below)    PMH/SH reviewed - no change compared to H&P  ________________________________________________________________________  Care Plan discussed with:  Patient     Family      RN     Care Manager                    Consultant:          Comments   >50% of visit spent in counseling and coordination of care       ________________________________________________________________________  Elijah Verdugo MD     Procedures: see electronic medical records for all procedures/Xrays and details which  were not copied into this note but were reviewed prior to creation of Plan. LABS:  Recent Labs     08/11/21  0315 08/10/21  0625   WBC 11.6* 12.9*   HGB 9.0* 10.2*   HCT 27.7* 31.8*    192     Recent Labs     08/11/21  0315 08/10/21  0625 08/08/21  2052   * 138 145   K 4.1 4.6 5.0   CL 93* 98 109*   CO2 23 28 24   BUN 85* 70* 80*   CREA 11.50* 10.10* 11.30*   GLU 93 98 80   CA 9.1 9.8 8.5   MG  --  2.1  --    PHOS  --  5.2*  --      Recent Labs     08/08/21 2052   *   TP 7.0   ALB 2.4*   GLOB 4.6*     No results for input(s): INR, PTP, APTT, INREXT, INREXT in the last 72 hours. No results for input(s): FE, TIBC, PSAT, FERR in the last 72 hours. No results found for: FOL, RBCF   No results for input(s): PH, PCO2, PO2 in the last 72 hours. No results for input(s): CPK, CKMB in the last 72 hours.     No lab exists for component: TROPONINI  No components found for: [place]  Lab Results   Component Value Date/Time    Color YELLOW/STRAW 05/06/2017 01:30 PM    Appearance CLOUDY (A) 05/06/2017 01:30 PM    Specific gravity 1.015 05/06/2017 01:30 PM    pH (UA) 7.5 05/06/2017 01:30 PM    Protein >300 (A) 05/06/2017 01:30 PM    Glucose NEGATIVE  05/06/2017 01:30 PM    Ketone NEGATIVE  05/06/2017 01:30 PM    Bilirubin NEGATIVE  05/06/2017 01:30 PM    Urobilinogen 0.2 05/06/2017 01:30 PM    Nitrites NEGATIVE  05/06/2017 01:30 PM    Leukocyte Esterase NEGATIVE  05/06/2017 01:30 PM    Epithelial cells FEW 05/06/2017 01:30 PM    Bacteria NEGATIVE  05/06/2017 01:30 PM    WBC 5-10 05/06/2017 01:30 PM    RBC 0-5 05/06/2017 01:30 PM       MEDICATIONS:  Current Facility-Administered Medications   Medication Dose Route Frequency    sodium chloride (OCEAN) 0.65 % nasal squeeze bottle 2 Spray  2 Spray Both Nostrils Q2H PRN    metoprolol tartrate (LOPRESSOR) tablet 25 mg  25 mg Oral Q12H    sodium chloride (NS) flush 5-40 mL  5-40 mL IntraVENous Q8H    sodium chloride (NS) flush 5-40 mL  5-40 mL IntraVENous PRN    apixaban (ELIQUIS) tablet 5 mg  5 mg Oral BID    aspirin delayed-release tablet 81 mg  81 mg Oral DAILY    atorvastatin (LIPITOR) tablet 40 mg  40 mg Oral QHS    citalopram (CELEXA) tablet 40 mg  40 mg Oral DAILY    gabapentin (NEURONTIN) capsule 300 mg  300 mg Oral BID    isosorbide mononitrate ER (IMDUR) tablet 30 mg  30 mg Oral 7am    pantoprazole (PROTONIX) tablet 40 mg  40 mg Oral DAILY    sevelamer carbonate (RENVELA) tab 800 mg  800 mg Oral TID    ondansetron (ZOFRAN) injection 4 mg  4 mg IntraVENous Q4H PRN    lidocaine 4 % patch 1 Patch  1 Patch TransDERmal DAILY    nicotine (NICODERM CQ) 21 mg/24 hr patch 1 Patch  1 Patch TransDERmal DAILY    levalbuterol (XOPENEX) nebulizer soln 1.25 mg/3 mL  1.25 mg Nebulization Q4H PRN    albumin human 25% (BUMINATE) solution 12.5 g  12.5 g IntraVENous DIALYSIS PRN    epoetin cristian-epbx (RETACRIT) injection 8,000 Units  8,000 Units SubCUTAneous Q MON, WED & FRI    oxyCODONE IR (ROXICODONE) tablet 2.5 mg  2.5 mg Oral Q4H PRN    Or    oxyCODONE IR (ROXICODONE) tablet 5 mg  5 mg Oral Q4H PRN    Or    oxyCODONE IR (ROXICODONE) tablet 7.5 mg  7.5 mg Oral Q4H PRN    naloxone (NARCAN) injection 0.4 mg  0.4 mg IntraVENous EVERY 2 MINUTES AS NEEDED

## 2021-08-11 NOTE — PROGRESS NOTES
Hospitalist Progress Note    NAME: Lester Harmon   :  1965   MRN:  [id number]   Room Number:  DIAL/PL  @ [facility]       Interim Hospital Summary: 55 y.o. male whom presented on 2021 with      Assessment / Plan:      Acute hypoxic respiratory failure POA   Fluid overload POA   Acute on chronic systolic and diastolic CHF POA  Hypertensive emergency  POA   ESRD on HD M,W,F, POA   Elevated Troponin, POA  Left rib fractures POA  CT chest WO contrast: No acute findings suspected. Prominent interstitial disease likely chronic  CXR: Mild interstitial edema  Pro-BNP: >35,000  Troponin: 0.05, 0.13  EKG interpreted independently - no acute ST-T changes of ischemia. D-dimer 1.47, VQ scan done today which shows low probability for PE. Also shows left rib fracture. - O2 support and neb treatment PRN  - Nephrology followingappreciate recommendations. Hemodialysis per nephrology. - cardiology followingappreciate recommendations  - echocardiogramEF 45 to 50%, left ventricular diastolic dysfunction, mild to moderate MR.  Increase the dose of metoprolol 25 mg twice a day EKG repeated. Chest pain secondary to the rib fracture. Recurrent falls POA  - PT/OT      Hypertension, POA  Hyperlipidemia, POA  - started on Nitro drip in ED    - continue imdur. DC nitrolglycerin  - Resume home meds    GERD, POA  - continue pantoprazole    Neuropathy, POA  - continue gabapentin    History of DVT  - continue  Apixaban    Epistaxis  Hemoglobin stable, give Afrin.   If bleeding continues to be worsened then will do fine-tuning of the blood thinners.         Tobacco dependence POA   Patient was counseled extensively on the need to abstain from tobacco, its addictive tendencies, its deleterious effects on the lungs, cardiovascular  as well as its financial & social sequelae    Code Status: Full  Surrogate Decision Maker: Antwon Maire, mother ([phone])     DVT Prophylaxis: Eliquis  GI Prophylaxis: Protonix     Baseline: lives with fried, does not use assistive device             Subjective:     Chief Complaint / Reason for Physician Visit  Patient seen today, complaining of the bleeding from the nose on and off, patient stated mostly comes like a little bit of clots, will follow up on the bleeding. Also states that the pain is present on the left side of the chesT secondary to the left fractures. No other complaints. Objective:     VITALS:   Last 24hrs VS reviewed since prior progress note. Most recent are:  Patient Vitals for the past 24 hrs:   Temp Pulse Resp BP SpO2   08/11/21 1400  89 15 (!) 133/92    08/11/21 1345  86 15 139/85    08/11/21 1330  87 15 (!) 141/90    08/11/21 1315  88 15 127/83    08/11/21 1300  90 15 (!) 132/93    08/11/21 1245  89 15 130/81    08/11/21 1230  92 15 131/68    08/11/21 1215  95 15 122/79    08/11/21 1158 98.3 °F (36.8 °C) 95 15 120/72    08/11/21 1013 97.8 °F (36.6 °C) (!) 102 11 125/77 99 %   08/11/21 0804 98.7 °F (37.1 °C) (!) 107 11 139/86 97 %   08/11/21 0312 98.6 °F (37 °C) (!) 111 30 138/87 93 %   08/10/21 2251 98.7 °F (37.1 °C) (!) 121 18 (!) 157/91 98 %   08/10/21 2247  (!) 122  (!) 157/91    08/10/21 2000 98 °F (36.7 °C) (!) 115 20 138/82 99 %       Intake/Output Summary (Last 24 hours) at 8/11/2021 1452  Last data filed at 8/11/2021 1013  Gross per 24 hour   Intake 650 ml   Output    Net 650 ml        PHYSICAL EXAM:  General: Alert, cooperative, no acute distress    EENT:  EOMI. Anicteric sclerae. MMM  Resp:  CTA bilaterally, no wheezing or rales. No accessory muscle use  CV:  Regular  rhythm,  normal S1/S2, no murmurs rubs gallops, No edema  GI:  Soft, Non distended, Non tender. +Bowel sounds  Neurologic:  Alert and oriented X 3, normal speech,   Psych:   Good insight. Not anxious nor agitated  Skin:  No rashes.   No jaundice    Reviewed most current lab test results and cultures  YES  Reviewed most current radiology test results   YES  Review and summation of old records today    NO  Reviewed patient's current orders and MAR    YES  PMH/SH reviewed - no change compared to H&P  ________________________________________________________________________  Care Plan discussed with:    Comments   Patient x    Family      RN x    Care Manager x    Consultant                        Multidiciplinary team rounds were held today with , nursing, pharmacist and clinical coordinator. Patient's plan of care was discussed; medications were reviewed and discharge planning was addressed. ________________________________________________________________________  Total NON critical care TIME:  35   Minutes    Total CRITICAL CARE TIME Spent:   Minutes non procedure based      Comments   >50% of visit spent in counseling and coordination of care x    ________________________________________________________________________  Vikki Castellano MD     Procedures: see electronic medical records for all procedures/Xrays and details which were not copied into this note but were reviewed prior to creation of Plan. LABS:  I reviewed today's most current labs and imaging studies.   Pertinent labs include:  Recent Labs     08/11/21  0315 08/10/21  0625 08/08/21  2137   WBC 11.6* 12.9* 9.5   HGB 9.0* 10.2* 9.0*   HCT 27.7* 31.8* 29.3*    192 176     Recent Labs     08/11/21  0315 08/10/21  0625 08/08/21  2052   * 138 145   K 4.1 4.6 5.0   CL 93* 98 109*   CO2 23 28 24   GLU 93 98 80   BUN 85* 70* 80*   CREA 11.50* 10.10* 11.30*   CA 9.1 9.8 8.5   MG  --  2.1  --    PHOS  --  5.2*  --    ALB  --   --  2.4*   TBILI  --   --  0.5   ALT  --   --  27       Signed: Vikki Castellano MD

## 2021-08-11 NOTE — PROGRESS NOTES
Progress Note      8/11/2021 7:24 AM  NAME: Rebecca Ann   MRN:  [id number]   Admit Diagnosis: SOB (shortness of breath) [R06.02]  ESRD (end stage renal disease) on dialysis ([facility] [N18.6, Z99.2]      Problem List:     1. Acute hypoxic respiratory failure secondary acute pulmonary   edema.   secondary to volume overload. ESRD on Dialysis. 2. Acute pulmonary edema due to volume overload.   Improving with Dialysis. 3. End-stage renal disease requiring dialysis. 4. Uncontrolled hypertension w/ hypertensive emergency secondary to above. 5. Incidental Elevated troponin level, MPI 5/17 w/ EF 44% and no ischemia or infarct. Echo 5/17 w/ EF 55-60%. infarction. 6. Leukocytosis. 7. Anemia of end-stage renal disease. 8. Tobacco abuse. 9. Alcohol abuse. 10. Hyperlipidemia  11. Remote DVT    8/11 Remains Tachycardic. Repeat EKG today to assess rhythm. Continue dialysis to pull fluid .      Cardiologist:  Joanie     Assessment/Plan: TnI 0.5 (peak)        On BiPAP yesterday, VERY sleep, hard to stay awake during conversation     Still SOB; on NC O2 today     Sats 100%     Echo w/ EF 45-50%    1. Continue eliquis but says he was not taking this at home  2. Continue ASA  3. Continue statin  4. Would consider a beta blocker (bystolic or coreg) or cardizem for BP/HR control. Remains tachy. 5. Continue ISMN, increase as needed for better BP control  6. Midodrine per primary; would not recommend giving if SBP > 110 unless needed for HD  7. Volume/BP per renal         [x]       High complexity decision making was performed in this patient at high risk for decompensation with multiple organ involvement. Subjective: Rebecca Ann denies chest pain. Still SOB. Discussed with RN events overnight.      Review of Systems:    Symptom Y/N Comments  Symptom Y/N Comments   Fever/Chills N   Chest Pain N    Poor Appetite N   Edema N    Cough N   Abdominal Pain N    Sputum N   Joint Pain N    SOB/WARNER N Pruritis/Rash N    Nausea/vomit N   Tolerating PT/OT Y    Diarrhea N   Tolerating Diet Y    Constipation N   Other       Could NOT obtain due to:      Objective:      Physical Exam:    Last 24hrs VS reviewed since prior progress note. Most recent are:    Visit Vitals  /77 (BP 1 Location: Right upper arm, BP Patient Position: At rest)   Pulse (!) 102   Temp 97.8 °F (36.6 °C)   Resp 11   Ht 6' (1.829 m)   Wt 75.5 kg (166 lb 8 oz)   SpO2 99%   BMI 22.58 kg/m²       Intake/Output Summary (Last 24 hours) at 8/11/2021 1238  Last data filed at 8/11/2021 1013  Gross per 24 hour   Intake 1000 ml   Output    Net 1000 ml        General Appearance: Well developed, well nourished, alert & oriented x 3,    no acute distress. Ears/Nose/Mouth/Throat: Hearing grossly normal.  Neck: Supple. Chest: Lungs clear to auscultation bilaterally. Cardiovascular: Regular rate and rhythm, S1S2 normal, no murmur. Abdomen: Soft, non-tender, bowel sounds are active. Extremities: No edema bilaterally. Skin: Warm and dry. []         Post-cath site without hematoma, bruit, tenderness, or thrill. Distal pulses intact. PMH/SH reviewed - no change compared to H&P    Data Review    Telemetry: sinus rhythm     EKG:   [x]  No new EKG for review    Lab Data Personally Reviewed:    Recent Labs     08/11/21  0315 08/10/21  0625   WBC 11.6* 12.9*   HGB 9.0* 10.2*   HCT 27.7* 31.8*    192     No results for input(s): INR, PTP, APTT, INREXT, INREXT in the last 72 hours.    Recent Labs     08/11/21  0315 08/10/21  0625 08/08/21  2052   * 138 145   K 4.1 4.6 5.0   CL 93* 98 109*   CO2 23 28 24   BUN 85* 70* 80*   CREA 11.50* 10.10* 11.30*   GLU 93 98 80   CA 9.1 9.8 8.5   MG  --  2.1  --      Recent Labs     08/09/21  1718 08/09/21  1024 08/09/21  0334   TROIQ 0.47* 0.47* 0.13*     No results found for: CHOL, CHOLX, CHLST, CHOLV, HDL, HDLP, LDL, LDLC, DLDLP, Corrossy Prakash, [facility], [address]  [facility] Labs     08/08/21 2052   * TP 7.0   ALB 2.4*   GLOB 4.6*     No results for input(s): PH, PCO2, PO2 in the last 72 hours.     Medications Personally Reviewed:    Current Facility-Administered Medications   Medication Dose Route Frequency    sodium chloride (OCEAN) 0.65 % nasal squeeze bottle 2 Spray  2 Spray Both Nostrils Q2H PRN    metoprolol tartrate (LOPRESSOR) tablet 25 mg  25 mg Oral Q12H    sodium chloride (NS) flush 5-40 mL  5-40 mL IntraVENous Q8H    sodium chloride (NS) flush 5-40 mL  5-40 mL IntraVENous PRN    apixaban (ELIQUIS) tablet 5 mg  5 mg Oral BID    aspirin delayed-release tablet 81 mg  81 mg Oral DAILY    atorvastatin (LIPITOR) tablet 40 mg  40 mg Oral QHS    citalopram (CELEXA) tablet 40 mg  40 mg Oral DAILY    gabapentin (NEURONTIN) capsule 300 mg  300 mg Oral BID    isosorbide mononitrate ER (IMDUR) tablet 30 mg  30 mg Oral 7am    pantoprazole (PROTONIX) tablet 40 mg  40 mg Oral DAILY    sevelamer carbonate (RENVELA) tab 800 mg  800 mg Oral TID    ondansetron (ZOFRAN) injection 4 mg  4 mg IntraVENous Q4H PRN    lidocaine 4 % patch 1 Patch  1 Patch TransDERmal DAILY    nicotine (NICODERM CQ) 21 mg/24 hr patch 1 Patch  1 Patch TransDERmal DAILY    levalbuterol (XOPENEX) nebulizer soln 1.25 mg/3 mL  1.25 mg Nebulization Q4H PRN    albumin human 25% (BUMINATE) solution 12.5 g  12.5 g IntraVENous DIALYSIS PRN    epoetin cristian-epbx (RETACRIT) injection 8,000 Units  8,000 Units SubCUTAneous Q MON, WED & FRI    oxyCODONE IR (ROXICODONE) tablet 2.5 mg  2.5 mg Oral Q4H PRN    Or    oxyCODONE IR (ROXICODONE) tablet 5 mg  5 mg Oral Q4H PRN    Or    oxyCODONE IR (ROXICODONE) tablet 7.5 mg  7.5 mg Oral Q4H PRN    naloxone (NARCAN) injection 0.4 mg  0.4 mg IntraVENous EVERY 2 MINUTES AS NEEDED         Yann Conte MD

## 2021-08-11 NOTE — PROGRESS NOTES
HD TRANSFER - OUT REPORT:    Verbal report given to Lexy Coe RN on Nanda Wilkins being transferred to PCU for routine progression of care       Report consisted of patient's Situation, Background, Assessment and   Recommendations(SBAR). Information from the following report(s) SBAR, Procedure Summary, Intake/Output and Recent Results was reviewed with the receiving nurse. Method:  $$ Method: Hemodialysis (08/11/21 1158)    Fluid Removed  NET Fluid Removed (mL): 3500 ml (08/11/21 1530)     Patient response to treatment:  Stable    End Time  Hemodialysis End Time: 0473 (08/11/21 1530)  If not documented, dialysis nurse to update post-dialysis row in HD/Filtration flowsheet     Medications /Volume expansion agents or Fluid boluses administered during treatment? no    Post-dialysis medication administration due?  yes  Remind nurse to administer post-HD medication upon return to unit. Fistula hemostasis? yes    Line heparinization? no    Lines: GLORIA AVF    Opportunity for questions and clarification was provided.       Patient transported with: SCHEDit

## 2021-08-11 NOTE — PROGRESS NOTES
End of Shift Note    Bedside shift change report given to Eh (oncoming nurse) by Alma Parnell (offgoing nurse). Report included the following information SBAR, Kardex, Intake/Output, MAR, Recent Results and Cardiac Rhythm sinus tach    Shift worked:  night     Shift summary and any significant changes:     pt having nosebleed w/ blood clots , np to bedside order for saline nasal spray, CBC not abnormal, prn pain meds given       Concerns for physician to address: See above      Zone phone for oncoming shift:  [phone]       Activity:  Activity Level: Bed Rest, Up with Assistance  Number times ambulated in hallways past shift: 0  Number of times OOB to chair past shift: 0    Cardiac:   Cardiac Monitoring: Yes      Cardiac Rhythm: Sinus Tach    Access:   Current line(s): PIV and HD access     Genitourinary:   Urinary status: oliguric    Respiratory:   O2 Device: None (Room air)  Chronic home O2 use?: NO  Incentive spirometer at bedside: YES     GI:  Last Bowel Movement Date: 08/10/21 (pt states)  Current diet:  ADULT DIET Regular; No Salt Added (3-4 gm); Low Potassium (Less than 3000 mg/day); Low Phosphorus (Less than 1000 mg); Less than 60 gm; High Fiber  Passing flatus: YES  Tolerating current diet: YES       Pain Management:   Patient states pain is manageable on current regimen: YES    Skin:  Jovan Score: 18  Interventions: float heels, increase time out of bed, PT/OT consult and internal/external urinary devices    Patient Safety:  Fall Score:  Total Score: 3  Interventions: assistive device (walker, cane, etc), gripper socks, pt to call before getting OOB and stay with me (per policy)  High Fall Risk: Yes    Length of Stay:  Expected LOS: 4d 0h  Actual LOS: [facility]

## 2021-08-11 NOTE — PROCEDURES
Hemodialysis / [facility] / [phone]    Vitals   Pre   Post   Assessment   Pre   Post     Temp  Temp: 98.3 °F (36.8 °C) (08/11/21 1158)  97.7  oral LOC  A&Ox3 A&Ox3   HR   Pulse (Heart Rate): 95 (08/11/21 1158) 90 Lungs   Dim bases Dim bases   B/P   BP: 120/72 (08/11/21 1158) 117/72 Cardiac   Tele, ST Tele, NSR-ST   Resp   Resp Rate: 15 (08/11/21 1158) 16 Skin   CDI CDI   Pain level  Pain Intensity 1: 2 (08/11/21 1013) 0, no complaint Edema  Abd distention     Abd Distention   Orders:    Duration:   Start:    3189 End:    1724 Total:   3.5hr   Dialyzer:   Dialyzer/Set Up Inspection: Revaclear (08/11/21 1158)   K Bath:   Dialysate K (mEq/L): 2 (08/11/21 1158)   Ca Bath:   Dialysate CA (mEq/L): 2.5 (08/11/21 1158)   Na/Bicarb:   Dialysate NA (mEq/L): 138 (08/11/21 1158)   Target Fluid Removal:   Goal/Amount of Fluid to Remove (mL): 3500 mL (08/11/21 1158)   Access     Type & Location:   GLORIA AVF: skin CDI. No s/s of infection. + B/T. No issues with cannulation or hemostasis. Running well at .    Labs     Obtained/Reviewed   Critical Results Called   Date when labs were drawn-  Hgb-    HGB   Date Value Ref Range Status   08/11/2021 9.0 (L) 12.1 - 17.0 g/dL Final     K-    Potassium   Date Value Ref Range Status   08/11/2021 4.1 3.5 - 5.1 mmol/L Final     Ca-   Calcium   Date Value Ref Range Status   08/11/2021 9.1 8.5 - 10.1 MG/DL Final     Bun-   BUN   Date Value Ref Range Status   08/11/2021 85 (H) 6 - 20 MG/DL Final     Creat-   Creatinine   Date Value Ref Range Status   08/11/2021 11.50 (H) 0.70 - 1.30 MG/DL Final        Medications/ Blood Products Given     Name   Dose   Route and Time     None ordered                Blood Volume Processed (BVP):    84.6L Net Fluid   Removed:  3500ml   Comments   Time Out Done: (Time)  Primary Nurse Rpt Pre: Fortunato Bill, RN  Primary Nurse Rpt Post: Fortunato Bill, RN  Pt Education: procedural/ keep access arm still  Care Plan: ongoing  Tx Summary:  Pt arrived to HD suite A&Ox3. Consent signed & on file. SBAR received from Primary RN. 1158: Pt cannulated with 19C needles per policy & without issue. VSS. Dialysis Tx initiated. * no issues with treatment*  1530: Tx ended. VSS. All possible blood returned to patient. Hemostasis achieved without issue. Bed locked and in the lowest position, call bell and belongings in reach. SBAR given to Primary, RN. Patient is stable at time of their departure. All Dialysis related medications have been reviewed. Admiting Diagnosis: SOB/ ESRD  Pt's previous clinic- [facility] [address]  Consent signed - Informed Consent Verified: Yes (08/11/21 1158)  Madiha Consent - on file  Hepatitis Status-  Immune 8/9/21  Machine #- Machine Number: [id number] (08/11/21 1158)  Telemetry status- Tele, ST-NSR  Pre-dialysis wt. - Pre-Dialysis Weight: 79.4 kg (175 lb 0.7 oz) (08/09/21 2015)

## 2021-08-11 NOTE — PROGRESS NOTES
End of Shift Note    Bedside shift change report given to Raghavendra Ervin   (oncoming nurse) by Anna Cruz RN (offgoing nurse). Report included the following information SBAR, Kardex, ED Summary, Procedure Summary, Intake/Output, MAR, Recent Results and Cardiac Rhythm . Shift worked:  Day      Shift summary and any significant changes:    Dialysis today 3.5 off, EKG completed for Dr review, Oxy PRN Q4, for PT/OT outpatient, CXR showed new airspace disease-pneumonia R side no effusions. No further bleeding from nose       Concerns for physician to address: none   Zone phone for oncoming shift:        Activity:  Activity Level: Bed Rest, Bath Room Privileges  Number times ambulated in hallways past shift: 0  Number of times OOB to chair past shift: 0    Cardiac:   Cardiac Monitoring: Yes      Cardiac Rhythm: Sinus Tach    Access:   Current line(s): PIV and HD access     Genitourinary:   Urinary status: oliguric    Respiratory:   O2 Device: None (Room air)  Chronic home O2 use?: NO  Incentive spirometer at bedside: NO     GI:  Last Bowel Movement Date: 08/10/21  Current diet:  ADULT DIET Regular; No Salt Added (3-4 gm); Low Potassium (Less than 3000 mg/day); Low Phosphorus (Less than 1000 mg); Less than 60 gm; High Fiber  Passing flatus: YES  Tolerating current diet: YES       Pain Management:   Patient states pain is manageable on current regimen: YES    Skin:  Jovan Score: 20  Interventions: float heels, increase time out of bed and PT/OT consult    Patient Safety:  Fall Score:  Total Score: 3  Interventions: bed/chair alarm, assistive device (walker, cane, etc), gripper socks and pt to call before getting OOB  High Fall Risk: Yes    Length of Stay:  Expected LOS: 4d 0h  Actual LOS: Tiana Fairbanks RN

## 2021-08-11 NOTE — PROGRESS NOTES
TRANSFER - IN REPORT:    Verbal report received from Adonis Cleveland, [address] [facility]  being received from PCU for ordered procedure      Report consisted of patients Situation, Background, Assessment and   Recommendations(SBAR). Information from the following report(s) SBAR, Kardex, [facility], Recent Results and Cardiac Rhythm NS-ST was reviewed with the receiving nurse. Opportunity for questions and clarification was provided. Assessment completed upon patients arrival to unit and care assumed.            * Report received for HD in suite second shift today ~ noon*

## 2021-08-12 NOTE — PROGRESS NOTES
NAME: Rockney Meckel        :  1965        MRN:  [id number]                  Assessment   :                                               Plan:  [facility] [place]  Anemia  Dyspnea  Volume overload No acute need for HD today. Plan HD in AM.     H/H not at goal.  continue retacrit                  Subjective:     Chief Complaint:  \" I feel better. \"  No dyspnea. No abdominal pain. No N/V. Review of Systems:    Symptom Y/N Comments  Symptom Y/N Comments   Fever/Chills    Chest Pain     Poor Appetite    Edema     Cough    Abdominal Pain     Sputum    Joint Pain     SOB/WARNER    Pruritis/Rash     Nausea/vomit    Tolerating PT/OT     Diarrhea    Tolerating Diet     Constipation    Other       Could not obtain due to:      Objective:     VITALS:   Last 24hrs VS reviewed since prior progress note. Most recent are:  Visit Vitals  /70   Pulse 93   Temp 98.2 °F (36.8 °C)   Resp 16   Ht 6' (1.829 m)   Wt 75.5 kg (166 lb 8 oz)   SpO2 97%   BMI 22.58 kg/m²       Intake/Output Summary (Last 24 hours) at 2021 1007  Last data filed at 2021 1622  Gross per 24 hour   Intake 800 ml   Output 3500 ml   Net -2700 ml      Telemetry Reviewed:     PHYSICAL EXAM:  General: NAD  No edema    Lab Data Reviewed: (see below)    Medications Reviewed: (see below)    PMH/SH reviewed - no change compared to H&P  ________________________________________________________________________  Care Plan discussed with:  Patient     Family      RN     Care Manager                    Consultant:          Comments   >50% of visit spent in counseling and coordination of care       ________________________________________________________________________  Evette Haddad MD     Procedures: see electronic medical records for all procedures/Xrays and details which  were not copied into this note but were reviewed prior to creation of Plan. LABS:  Recent Labs     08/12/21  0424 08/11/21  0315   WBC 9.6 11.6*   HGB 9.7* 9.0*   HCT 29.7* 27.7*    175     Recent Labs     08/11/21  0315 08/10/21  0625   * 138   K 4.1 4.6   CL 93* 98   CO2 23 28   BUN 85* 70*   CREA 11.50* 10.10*   GLU 93 98   CA 9.1 9.8   MG  --  2.1   PHOS  --  5.2*     No results for input(s): AP, TBIL, TP, ALB, GLOB, GGT, AML, LPSE in the last 72 hours. No lab exists for component: SGOT, GPT, AMYP, HLPSE  No results for input(s): INR, PTP, APTT, INREXT, INREXT in the last 72 hours. No results for input(s): FE, TIBC, PSAT, FERR in the last 72 hours. No results found for: FOL, RBCF   No results for input(s): PH, PCO2, PO2 in the last 72 hours. No results for input(s): CPK, CKMB in the last 72 hours.     No lab exists for component: TROPONINI  No components found for: [place]  Lab Results   Component Value Date/Time    Color YELLOW/STRAW 05/06/2017 01:30 PM    Appearance CLOUDY (A) 05/06/2017 01:30 PM    Specific gravity 1.015 05/06/2017 01:30 PM    pH (UA) 7.5 05/06/2017 01:30 PM    Protein >300 (A) 05/06/2017 01:30 PM    Glucose NEGATIVE  05/06/2017 01:30 PM    Ketone NEGATIVE  05/06/2017 01:30 PM    Bilirubin NEGATIVE  05/06/2017 01:30 PM    Urobilinogen 0.2 05/06/2017 01:30 PM    Nitrites NEGATIVE  05/06/2017 01:30 PM    Leukocyte Esterase NEGATIVE  05/06/2017 01:30 PM    Epithelial cells FEW 05/06/2017 01:30 PM    Bacteria NEGATIVE  05/06/2017 01:30 PM    WBC 5-10 05/06/2017 01:30 PM    RBC 0-5 05/06/2017 01:30 PM       MEDICATIONS:  Current Facility-Administered Medications   Medication Dose Route Frequency    cefTRIAXone (ROCEPHIN) 1 g in 0.9% sodium chloride (MBP/ADV) 50 mL MBP  1 g IntraVENous Q24H    azithromycin (ZITHROMAX) 500 mg in 0.9% sodium chloride 250 mL (VIAL-MATE)  500 mg IntraVENous Q24H    HYDROmorphone (DILAUDID) injection 0.5 mg  0.5 mg IntraVENous ONCE    sodium chloride (OCEAN) 0.65 % nasal squeeze bottle 2 Spray  2 Spray Both Nostrils Q2H PRN    metoprolol tartrate (LOPRESSOR) tablet 25 mg  25 mg Oral Q12H    sodium chloride (NS) flush 5-40 mL  5-40 mL IntraVENous Q8H    sodium chloride (NS) flush 5-40 mL  5-40 mL IntraVENous PRN    apixaban (ELIQUIS) tablet 5 mg  5 mg Oral BID    aspirin delayed-release tablet 81 mg  81 mg Oral DAILY    atorvastatin (LIPITOR) tablet 40 mg  40 mg Oral QHS    citalopram (CELEXA) tablet 40 mg  40 mg Oral DAILY    gabapentin (NEURONTIN) capsule 300 mg  300 mg Oral BID    isosorbide mononitrate ER (IMDUR) tablet 30 mg  30 mg Oral 7am    pantoprazole (PROTONIX) tablet 40 mg  40 mg Oral DAILY    sevelamer carbonate (RENVELA) tab 800 mg  800 mg Oral TID    ondansetron (ZOFRAN) injection 4 mg  4 mg IntraVENous Q4H PRN    lidocaine 4 % patch 1 Patch  1 Patch TransDERmal DAILY    nicotine (NICODERM CQ) 21 mg/24 hr patch 1 Patch  1 Patch TransDERmal DAILY    levalbuterol (XOPENEX) nebulizer soln 1.25 mg/3 mL  1.25 mg Nebulization Q4H PRN    albumin human 25% (BUMINATE) solution 12.5 g  12.5 g IntraVENous DIALYSIS PRN    epoetin cristian-epbx (RETACRIT) injection 8,000 Units  8,000 Units SubCUTAneous Q MON, WED & FRI    oxyCODONE IR (ROXICODONE) tablet 5 mg  5 mg Oral Q4H PRN    Or    oxyCODONE IR (ROXICODONE) tablet 7.5 mg  7.5 mg Oral Q4H PRN    naloxone (NARCAN) injection 0.4 mg  0.4 mg IntraVENous EVERY 2 MINUTES AS NEEDED

## 2021-08-12 NOTE — PROGRESS NOTES
Progress Note      8/12/2021 7:24 AM  NAME: Patrick Ibarra   MRN:  [id number]   Admit Diagnosis: SOB (shortness of breath) [R06.02]  [facility]end stage renal disease[facility] [N18.6, Z99.2]      Problem List:     1. Acute hypoxic respiratory failure secondary acute pulmonary   edema.   secondary to volume overload. ESRD on Dialysis. 2. Acute pulmonary edema due to volume overload.   Improving with Dialysis. 3. End-stage renal disease requiring dialysis. 4. Uncontrolled hypertension w/ hypertensive emergency secondary to above. 5. Incidental Elevated troponin level, MPI 5/17 w/ EF 44% and no ischemia or infarct. Echo 5/17 w/ EF 55-60%. infarction. 6. Leukocytosis. 7. Anemia of end-stage renal disease. 8. Tobacco abuse. 9. Alcohol abuse. 10. Hyperlipidemia  11. Remote DVT    8/11 Remains Tachycardic. Repeat EKG today to assess rhythm. Continue dialysis to pull fluid . 8/12  Remains in NSR , rate is now controlled. Improved with fluid removal thru dialysis. Cardiac status stable.        Cardiologist:  Joanie     Assessment/Plan: TnI 0.5 (peak)        On BiPAP yesterday, VERY sleep, hard to stay awake during conversation     Still SOB; on NC O2 today     Sats 100%     Echo w/ EF 45-50%    1. Continue eliquis but says he was not taking this at home  2. Continue ASA  3. Continue statin  4. Would consider a beta blocker (bystolic or coreg) or cardizem for BP/HR control. Remains tachy. 5. Continue ISMN, increase as needed for better BP control  6. Midodrine per primary; would not recommend giving if SBP > 110 unless needed for HD  7. Volume/BP per renal         [x]       High complexity decision making was performed in this patient at high risk for decompensation with multiple organ involvement. Subjective: Patrick Ibarra denies chest pain. Still SOB. Discussed with RN events overnight.      Review of Systems:    Symptom Y/N Comments  Symptom Y/N Comments Fever/Chills N   Chest Pain N    Poor Appetite N   Edema N    Cough N   Abdominal Pain N    Sputum N   Joint Pain N    SOB/WARNER N   Pruritis/Rash N    Nausea/vomit N   Tolerating PT/OT Y    Diarrhea N   Tolerating Diet Y    Constipation N   Other       Could NOT obtain due to:      Objective:      Physical Exam:    Last 24hrs VS reviewed since prior progress note. Most recent are:    Visit Vitals  /70   Pulse 93   Temp 98.2 °F (36.8 °C)   Resp 16   Ht 6' (1.829 m)   Wt 75.5 kg (166 lb 8 oz)   SpO2 97%   BMI 22.58 kg/m²       Intake/Output Summary (Last 24 hours) at 8/12/2021 0915  Last data filed at 8/11/2021 1622  Gross per 24 hour   Intake 800 ml   Output 3500 ml   Net -2700 ml        General Appearance: Well developed, well nourished, alert & oriented x 3,    no acute distress. Ears/Nose/Mouth/Throat: Hearing grossly normal.  Neck: Supple. Chest: Lungs clear to auscultation bilaterally. Cardiovascular: Regular rate and rhythm, S1S2 normal, no murmur. Abdomen: Soft, non-tender, bowel sounds are active. Extremities: No edema bilaterally. Skin: Warm and dry. []         Post-cath site without hematoma, bruit, tenderness, or thrill. Distal pulses intact. PMH/SH reviewed - no change compared to H&P    Data Review    Telemetry: sinus rhythm     EKG:   [x]  No new EKG for review    Lab Data Personally Reviewed:    Recent Labs     08/12/21  0424 08/11/21  0315   WBC 9.6 11.6*   HGB 9.7* 9.0*   HCT 29.7* 27.7*    175     No results for input(s): INR, PTP, APTT, INREXT, INREXT in the last 72 hours.    Recent Labs     08/11/21  0315 08/10/21  0625   * 138   K 4.1 4.6   CL 93* 98   CO2 23 28   BUN 85* 70*   CREA 11.50* 10.10*   GLU 93 98   CA 9.1 9.8   MG  --  2.1     Recent Labs     08/09/21  1718 08/09/21  1024   TROIQ 0.47* 0.47*     No results found for: CHOL, CHOLX, CHLST, CHOLV, HDL, HDLP, LDL, LDLC, DLDLP, TGLX, TRIGL, TRIGP, CHHD, CHHDX    No results for input(s): AP, TBIL, TP, ALB, GLOB, GGT, AML, LPSE in the last 72 hours. No lab exists for component: SGOT, GPT, AMYP, HLPSE  No results for input(s): PH, PCO2, PO2 in the last 72 hours.     Medications Personally Reviewed:    Current Facility-Administered Medications   Medication Dose Route Frequency    cefTRIAXone (ROCEPHIN) 1 g in 0.9% sodium chloride (MBP/ADV) 50 mL MBP  1 g IntraVENous Q24H    azithromycin (ZITHROMAX) 500 mg in 0.9% sodium chloride 250 mL (VIAL-MATE)  500 mg IntraVENous Q24H    sodium chloride (OCEAN) 0.65 % nasal squeeze bottle 2 Spray  2 Spray Both Nostrils Q2H PRN    metoprolol tartrate (LOPRESSOR) tablet 25 mg  25 mg Oral Q12H    sodium chloride (NS) flush 5-40 mL  5-40 mL IntraVENous Q8H    sodium chloride (NS) flush 5-40 mL  5-40 mL IntraVENous PRN    apixaban (ELIQUIS) tablet 5 mg  5 mg Oral BID    aspirin delayed-release tablet 81 mg  81 mg Oral DAILY    atorvastatin (LIPITOR) tablet 40 mg  40 mg Oral QHS    citalopram (CELEXA) tablet 40 mg  40 mg Oral DAILY    gabapentin (NEURONTIN) capsule 300 mg  300 mg Oral BID    isosorbide mononitrate ER (IMDUR) tablet 30 mg  30 mg Oral 7am    pantoprazole (PROTONIX) tablet 40 mg  40 mg Oral DAILY    sevelamer carbonate (RENVELA) tab 800 mg  800 mg Oral TID    ondansetron (ZOFRAN) injection 4 mg  4 mg IntraVENous Q4H PRN    lidocaine 4 % patch 1 Patch  1 Patch TransDERmal DAILY    nicotine (NICODERM CQ) 21 mg/24 hr patch 1 Patch  1 Patch TransDERmal DAILY    levalbuterol (XOPENEX) nebulizer soln 1.25 mg/3 mL  1.25 mg Nebulization Q4H PRN    albumin human 25% (BUMINATE) solution 12.5 g  12.5 g IntraVENous DIALYSIS PRN    epoetin cristian-epbx (RETACRIT) injection 8,000 Units  8,000 Units SubCUTAneous Q MON, WED & FRI    oxyCODONE IR (ROXICODONE) tablet 5 mg  5 mg Oral Q4H PRN    Or    oxyCODONE IR (ROXICODONE) tablet 7.5 mg  7.5 mg Oral Q4H PRN    naloxone (NARCAN) injection 0.4 mg  0.4 mg IntraVENous EVERY 2 MINUTES AS NEEDED         Krystal Nair, MD

## 2021-08-12 NOTE — PROGRESS NOTES
Problem: Mobility Impaired (Adult and Pediatric)  Goal: *Acute Goals and Plan of Care (Insert Text)  Description: FUNCTIONAL STATUS PRIOR TO ADMISSION: Patient was independent and active without use of DME.    HOME SUPPORT PRIOR TO ADMISSION: The patient lived with roommate but did not require assist.    Physical Therapy Goals  Initiated 8/10/2021  1. Patient will move from supine to sit and sit to supine  in bed with independence within 7 day(s). 2.  Patient will transfer from bed to chair and chair to bed with independence using the least restrictive device within 7 day(s). 3.  Patient will perform sit to stand with independence within 7 day(s). 4.  Patient will ambulate with supervision/set-up for 200 feet with the least restrictive device within 7 day(s). Outcome: Progressing Towards Goal   PHYSICAL THERAPY TREATMENT  Patient: Ara Dumont (89 y.o. male)  Date: 8/12/2021  Diagnosis: SOB (shortness of breath) [R06.02]  [facility]end stage [facility] [N18.6, Z99.2] <principal problem not specified>       Precautions:    Chart, physical therapy assessment, plan of care and goals were reviewed. ASSESSMENT  Patient continues with skilled PT services and is progressing towards goals however continues to present with decreased insight, impaired standing balance, decreased endurance/activity tolerance, generalized weakness, and overall impaired functional mobility. Pt required max encouragement for OOB mobility/participation with therapy this date. Pt able to progress ambulation trial to a distance of 40ft x2 (seated rest break b/t) with CGAx1. Pt with x1 major LOB, requiring CGA for balance check/prevent fall. Gait remains generally unsteady however pt continues to decline use of rolling walker. VSS throughout. Pt declining HHPT however open to OP PT for balance training and strengthening. Current Level of Function Impacting Discharge (mobility/balance):  CGAx1     Other factors to consider for discharge: lives w/ roommate who works full-time, falls risk, decreased insight         PLAN :  Patient continues to benefit from skilled intervention to address the above impairments. Continue treatment per established plan of care. to address goals. Recommendation for discharge: (in order for the patient to meet his/her long term goals)  Outpatient physical therapy follow up recommended for strengthening and balance training - pt continues to decline HHPT    This discharge recommendation:  Has been made in collaboration with the attending provider and/or case management    IF patient discharges home will need the following DME: patient owns DME required for discharge       SUBJECTIVE:   Patient stated I didn't fall, I landed on the wall.     OBJECTIVE DATA SUMMARY:   Critical Behavior:  Neurologic State: Alert  Orientation Level: Oriented to person, Oriented to place, Oriented to situation, Disoriented to time  Cognition: Follows commands  Safety/Judgement: Awareness of environment  Functional Mobility Training:  Bed Mobility:  Rolling: Supervision  Supine to Sit: Supervision     Scooting: Supervision        Transfers:  Sit to Stand: Contact guard assistance  Stand to Sit: Contact guard assistance        Bed to Chair: Contact guard assistance                    Balance:  Sitting: Intact  Standing: Impaired  Standing - Static: Good  Standing - Dynamic : Fair  Ambulation/Gait Training:  Distance (ft): 80 Feet (ft) (40ft x2 w/ seated rest break b/t)  Assistive Device: Gait belt  Ambulation - Level of Assistance: Contact guard assistance        Gait Abnormalities: Decreased step clearance;Trunk sway increased; Path deviations              Speed/Ligia: Pace decreased (<100 feet/min)  Step Length: Left shortened;Right shortened                    Pain Rating:  Denied c/o pain, reports receiving pain medication prior to therapist arrival     Activity Tolerance:   Good    After treatment patient left in no apparent distress:   Sitting in chair, Heels elevated for pressure relief, and Call bell within reach    COMMUNICATION/COLLABORATION:   The patients plan of care was discussed with: Registered nurse.      My Rodriguez PT, DPT   Time Calculation: 15 mins

## 2021-08-12 NOTE — PROGRESS NOTES
End of Shift Note    Bedside shift change report given to bianka  (oncoming nurse) by Deangelo Gay (offgoing nurse). Report included the following information SBAR, Kardex, Intake/Output, MAR, Recent Results and Cardiac Rhythm sinus tach/nsr    Shift worked:  night     Shift summary and any significant changes:     VSS, prn pain meds given, poss dc/transfer     Concerns for physician to address:  see above      Zone phone for oncoming shift:   [phone]       Activity:  Activity Level: Bed Rest, Up with Assistance  Number times ambulated in hallways past shift: 0  Number of times OOB to chair past shift: 0    Cardiac:   Cardiac Monitoring: Yes      Cardiac Rhythm: Sinus Rhythm    Access:   Current line(s): PIV and HD access     Genitourinary:   Urinary status: oliguric    Respiratory:   O2 Device: None (Room air)  Chronic home O2 use?: NO  Incentive spirometer at bedside: YES     GI:  Last Bowel Movement Date: 08/10/21  Current diet:  ADULT DIET Regular; No Salt Added (3-4 gm); Low Potassium (Less than 3000 mg/day); Low Phosphorus (Less than 1000 mg); Less than 60 gm; High Fiber  Passing flatus: YES  Tolerating current diet: YES       Pain Management:   Patient states pain is manageable on current regimen: YES    Skin:  Jovan Score: 20  Interventions: float heels, increase time out of bed and foam dressing    Patient Safety:  Fall Score:  Total Score: 3  Interventions: assistive device (walker, cane, etc), gripper socks, pt to call before getting OOB and stay with me (per policy)  High Fall Risk: Yes    Length of Stay:  Expected LOS: 4d 0h  Actual LOS: [address]

## 2021-08-12 NOTE — PROGRESS NOTES
Hospitalist Progress Note    NAME: Nanine Dandy   :  1965   MRN:  [id number]   Room Number:  2275/01  @ [facility]       Interim Hospital Summary: 55 y.o. male whom presented on 2021 with      Assessment / Plan:      Acute hypoxic respiratory failure POA   Fluid overload POA   Acute on chronic systolic and diastolic CHF POA  Hypertensive emergency  POA   ESRD on HD M,W,F, POA   Elevated Troponin, POA  Left rib fractures POA  ? Community-acquired pneumonia  CT chest WO contrast: No acute findings suspected. Prominent interstitial disease likely chronic  CXR: Mild interstitial edema  Pro-BNP: >35,000  Troponin: 0.05, 0.13  EKG interpreted independently - no acute ST-T changes of ischemia. D-dimer 1.47, VQ scan done today which shows low probability for PE. Also shows left rib fracture. - O2 support and neb treatment PRN  - Nephrology followingappreciate recommendations. Hemodialysis per nephrology. - cardiology followingappreciate recommendations  - echocardiogramEF 45 to 50%, left ventricular diastolic dysfunction, mild to moderate MR.  Increase the dose of metoprolol 25 mg twice a day EKG repeated. Chest pain secondary to the rib fracture. Started on Rocephin azithromycin for community-acquired pneumonia. Likely atelectasis from the pain from the left rib fracture. Advised the use of spirometry to the patient. We will transition to the oral antibiotic tomorrow. Discharge tomorrow after dialysis. Recurrent falls POA  - PT/OT      Hypertension, POA  Hyperlipidemia, POA  - started on Nitro drip in ED    - continue imdur. DC nitrolglycerin  - Resume home meds    GERD, POA  - continue pantoprazole    Neuropathy, POA  - continue gabapentin    History of DVT  - continue  Apixaban    Epistaxis  Hemoglobin stable, give Afrin.   If bleeding continues to be worsened then will do fine-tuning of the blood thinners.         Tobacco dependence POA   Patient was counseled extensively on the need to abstain from tobacco, its addictive tendencies, its deleterious effects on the lungs, cardiovascular  as well as its financial & social sequelae    Dispositionwe will discharge the patient tomorrow after dialysis. Code Status: Full  Surrogate Decision Maker: Yogesh Gonzalez, mother ([phone])     DVT Prophylaxis: Eliquis  GI Prophylaxis: Protonix     Baseline: lives with fried, does not use assistive device             Subjective:     Chief Complaint / Reason for Physician Visit  Patient seen today, complaining of the pain on the left side of the chest secondary to the rib fractures. Even with the pain medication the pain is around 6-8/10 in severity. No other complaints. Objective:     VITALS:   Last 24hrs VS reviewed since prior progress note.  Most recent are:  Patient Vitals for the past 24 hrs:   Temp Pulse Resp BP SpO2   08/12/21 1130 98.8 °F (37.1 °C) 96 20 118/79 96 %   08/12/21 0724 98.2 °F (36.8 °C) 93 16 114/70 97 %   08/12/21 0424 97.6 °F (36.4 °C) 97 19 119/76 94 %   08/11/21 2253 97.9 °F (36.6 °C) (!) 101 16 119/76 99 %   08/11/21 1938 97.7 °F (36.5 °C) 95 15 121/84 97 %   08/11/21 1622 97.6 °F (36.4 °C) (!) 101 19 112/76 99 %   08/11/21 1530 97.7 °F (36.5 °C) 90 15 117/72    08/11/21 1515  95 15 (!) 155/88    08/11/21 1500  (!) 59 15 (!) 123/41    08/11/21 1445  90 15 (!) 118/96    08/11/21 1430  90 15 (!) 137/92    08/11/21 1415  87 15 136/88    08/11/21 1400  89 15 (!) 133/92    08/11/21 1345  86 15 139/85    08/11/21 1330  87 15 (!) 141/90    08/11/21 1315  88 15 127/83    08/11/21 1300  90 15 (!) 132/93    08/11/21 1245  89 15 130/81    08/11/21 1230  92 15 131/68    08/11/21 1215  95 15 122/79    08/11/21 1158 98.3 °F (36.8 °C) 95 15 120/72        Intake/Output Summary (Last 24 hours) at 8/12/2021 1145  Last data filed at 8/11/2021 1622  Gross per 24 hour   Intake 450 ml   Output 3500 ml   Net -3050 ml        PHYSICAL EXAM:  General: Alert, cooperative, no acute distress    EENT:  EOMI. Anicteric sclerae. MMM  Resp:  CTA bilaterally, no wheezing or rales. No accessory muscle use  CV:  Regular  rhythm,  normal S1/S2, no murmurs rubs gallops, No edema  GI:  Soft, Non distended, Non tender. +Bowel sounds  Neurologic:  Alert and oriented X 3, normal speech,   Psych:   Good insight. Not anxious nor agitated  Skin:  No rashes. No jaundice    Reviewed most current lab test results and cultures  YES  Reviewed most current radiology test results   YES  Review and summation of old records today    NO  Reviewed patient's current orders and MAR    YES  PMH/SH reviewed - no change compared to H&P  ________________________________________________________________________  Care Plan discussed with:    Comments   Patient x    Family      RN x    Care Manager x    Consultant                        Multidiciplinary team rounds were held today with , nursing, pharmacist and clinical coordinator. Patient's plan of care was discussed; medications were reviewed and discharge planning was addressed. ________________________________________________________________________  Total NON critical care TIME:  35   Minutes    Total CRITICAL CARE TIME Spent:   Minutes non procedure based      Comments   >50% of visit spent in counseling and coordination of care x    ________________________________________________________________________  Gigi Spicer MD     Procedures: see electronic medical records for all procedures/Xrays and details which were not copied into this note but were reviewed prior to creation of Plan. LABS:  I reviewed today's most current labs and imaging studies.   Pertinent labs include:  Recent Labs     08/12/21  0424 08/11/21 0315 08/10/21  0625   WBC 9.6 11.6* 12.9*   HGB 9.7* 9.0* 10.2*   HCT 29.7* 27.7* 31.8*    175 192     Recent Labs     08/11/21  0315 08/10/21  0625   * 138   K 4.1 4.6   CL 93* 98   CO2 23 28   GLU 93 98   BUN 85* 70*   CREA 11.50* 10.10*   CA 9.1 9.8   MG  --  2.1   PHOS  --  5.2*       Signed: Parish Pedroza MD

## 2021-08-13 NOTE — PROGRESS NOTES
NAME: Honey Dawson        :  1965        MRN:  [id number]                  Assessment   :                                               Plan:  [id number]-YBU [place]  Anemia  Dyspnea  Volume overload Seen on HD at 0830. SPB stable.     H/H not at goal.  continue retacrit    Sodium low. Start fluid restriction. Will see again Monday. Please call if any questions.                  Subjective:     Chief Complaint:  \" I feel fine. \"  No dyspnea. No abdominal pain. No N/V. Review of Systems:    Symptom Y/N Comments  Symptom Y/N Comments   Fever/Chills    Chest Pain     Poor Appetite    Edema     Cough    Abdominal Pain     Sputum    Joint Pain     SOB/WARNER    Pruritis/Rash     Nausea/vomit    Tolerating PT/OT     Diarrhea    Tolerating Diet     Constipation    Other       Could not obtain due to:      Objective:     VITALS:   Last 24hrs VS reviewed since prior progress note.  Most recent are:  Visit Vitals  BP (!) 145/100   Pulse 92   Temp 97.8 °F (36.6 °C) (Oral)   Resp 18   Ht 6' (1.829 m)   Wt 75.5 kg (166 lb 8 oz)   SpO2 99%   BMI 22.58 kg/m²       Intake/Output Summary (Last 24 hours) at 2021 1022  Last data filed at 2021 0914  Gross per 24 hour   Intake 350 ml   Output    Net 350 ml      Telemetry Reviewed:     PHYSICAL EXAM:  General: NAD  No edema    Lab Data Reviewed: (see below)    Medications Reviewed: (see below)    PMH/SH reviewed - no change compared to H&P  ________________________________________________________________________  Care Plan discussed with:  Patient     Family      RN     Care Manager                    Consultant:          Comments   >50% of visit spent in counseling and coordination of care       ________________________________________________________________________  Alicia Lam MD     Procedures: see electronic medical records for all procedures/Xrays and details which  were not copied into this note but were reviewed prior to creation of Plan. LABS:  Recent Labs     08/13/21  0321 08/12/21  0424   WBC 8.8 9.6   HGB 9.2* 9.7*   HCT 28.3* 29.7*    191     Recent Labs     08/13/21  0321 08/11/21  0315   * 131*   K 4.8 4.1   CL 85* 93*   CO2 21 23   BUN 69* 85*   CREA 9.66* 11.50*   GLU 88 93   CA 8.9 9.1     No results for input(s): AP, TBIL, TP, ALB, GLOB, GGT, AML, LPSE in the last 72 hours. No lab exists for component: SGOT, GPT, AMYP, HLPSE  No results for input(s): INR, PTP, APTT, INREXT, INREXT in the last 72 hours. No results for input(s): FE, TIBC, PSAT, FERR in the last 72 hours. No results found for: FOL, RBCF   No results for input(s): PH, PCO2, PO2 in the last 72 hours. No results for input(s): CPK, CKMB in the last 72 hours.     No lab exists for component: TROPONINI  No components found for: [place]  Lab Results   Component Value Date/Time    Color YELLOW/STRAW 05/06/2017 01:30 PM    Appearance CLOUDY (A) 05/06/2017 01:30 PM    Specific gravity 1.015 05/06/2017 01:30 PM    pH (UA) 7.5 05/06/2017 01:30 PM    Protein >300 (A) 05/06/2017 01:30 PM    Glucose NEGATIVE  05/06/2017 01:30 PM    Ketone NEGATIVE  05/06/2017 01:30 PM    Bilirubin NEGATIVE  05/06/2017 01:30 PM    Urobilinogen 0.2 05/06/2017 01:30 PM    Nitrites NEGATIVE  05/06/2017 01:30 PM    Leukocyte Esterase NEGATIVE  05/06/2017 01:30 PM    Epithelial cells FEW 05/06/2017 01:30 PM    Bacteria NEGATIVE  05/06/2017 01:30 PM    WBC 5-10 05/06/2017 01:30 PM    RBC 0-5 05/06/2017 01:30 PM       MEDICATIONS:  Current Facility-Administered Medications   Medication Dose Route Frequency    cefTRIAXone (ROCEPHIN) 1 g in 0.9% sodium chloride (MBP/ADV) 50 mL MBP  1 g IntraVENous Q24H    azithromycin (ZITHROMAX) 500 mg in 0.9% sodium chloride 250 mL (VIAL-MATE)  500 mg IntraVENous Q24H    sodium chloride (OCEAN) 0.65 % nasal squeeze bottle 2 Spray  2 Spray Both Nostrils Q2H PRN    metoprolol tartrate (LOPRESSOR) tablet 25 mg  25 mg Oral Q12H    sodium chloride (NS) flush 5-40 mL  5-40 mL IntraVENous Q8H    sodium chloride (NS) flush 5-40 mL  5-40 mL IntraVENous PRN    apixaban (ELIQUIS) tablet 5 mg  5 mg Oral BID    aspirin delayed-release tablet 81 mg  81 mg Oral DAILY    atorvastatin (LIPITOR) tablet 40 mg  40 mg Oral QHS    citalopram (CELEXA) tablet 40 mg  40 mg Oral DAILY    gabapentin (NEURONTIN) capsule 300 mg  300 mg Oral BID    isosorbide mononitrate ER (IMDUR) tablet 30 mg  30 mg Oral 7am    pantoprazole (PROTONIX) tablet 40 mg  40 mg Oral DAILY    sevelamer carbonate (RENVELA) tab 800 mg  800 mg Oral TID    ondansetron (ZOFRAN) injection 4 mg  4 mg IntraVENous Q4H PRN    lidocaine 4 % patch 1 Patch  1 Patch TransDERmal DAILY    nicotine (NICODERM CQ) 21 mg/24 hr patch 1 Patch  1 Patch TransDERmal DAILY    levalbuterol (XOPENEX) nebulizer soln 1.25 mg/3 mL  1.25 mg Nebulization Q4H PRN    albumin human 25% (BUMINATE) solution 12.5 g  12.5 g IntraVENous DIALYSIS PRN    epoetin cristian-epbx (RETACRIT) injection 8,000 Units  8,000 Units SubCUTAneous Q MON, WED & FRI    oxyCODONE IR (ROXICODONE) tablet 5 mg  5 mg Oral Q4H PRN    Or    oxyCODONE IR (ROXICODONE) tablet 7.5 mg  7.5 mg Oral Q4H PRN    naloxone (NARCAN) injection 0.4 mg  0.4 mg IntraVENous EVERY 2 MINUTES AS NEEDED

## 2021-08-13 NOTE — PROGRESS NOTES
Progress Note      8/13/2021 7:24 AM  NAME: Graeme Saint   MRN:  [id number]   Admit Diagnosis: SOB (shortness of breath) [R06.02]  ES[facility] (end stage renal disease) [facility] [N18.6, Z99.2]      Problem List:     1. Acute hypoxic respiratory failure secondary acute pulmonary   edema.   secondary to volume overload. ESRD on Dialysis. 2. Acute pulmonary edema due to volume overload.   Improving with Dialysis. 3. End-stage renal disease requiring dialysis. 4. Uncontrolled hypertension w/ hypertensive emergency secondary to above. 5. Incidental Elevated troponin level, MPI 5/17 w/ EF 44% and no ischemia or infarct. Echo 5/17 w/ EF 55-60%. infarction. 6. Leukocytosis. 7. Anemia of end-stage renal disease. 8. Tobacco abuse. 9. Alcohol abuse. 10. Hyperlipidemia  11. Remote DVT    8/11 Remains Tachycardic. Repeat EKG today to assess rhythm. Continue dialysis to pull fluid . 8/12  Remains in NSR , rate is now controlled. Improved with fluid removal thru dialysis. Cardiac status stable. 8/13   Cardiac status stable. BP is better.        Cardiologist:  Joanie     Assessment/Plan: TnI 0.5 (peak)        On BiPAP yesterday, VERY sleep, hard to stay awake during conversation     Still SOB; on NC O2 today     Sats 100%     Echo w/ EF 45-50%    1. Continue eliquis but says he was not taking this at home  2. Continue ASA  3. Continue statin  4. Would consider a beta blocker (bystolic or coreg) or cardizem for BP/HR control. Remains tachy. 5. Continue ISMN, increase as needed for better BP control  6. Midodrine per primary; would not recommend giving if SBP > 110 unless needed for HD  7. Volume/BP per renal         [x]       High complexity decision making was performed in this patient at high risk for decompensation with multiple organ involvement. Subjective: Graeme Saint denies chest pain. Still SOB. Discussed with RN events overnight.      Review of Systems:    Symptom Y/N Comments  Symptom Y/N Comments   Fever/Chills N   Chest Pain N    Poor Appetite N   Edema N    Cough N   Abdominal Pain N    Sputum N   Joint Pain N    SOB/WARNER N   Pruritis/Rash N    Nausea/vomit N   Tolerating PT/OT Y    Diarrhea N   Tolerating Diet Y    Constipation N   Other       Could NOT obtain due to:      Objective:      Physical Exam:    Last 24hrs VS reviewed since prior progress note. Most recent are:    Visit Vitals  /66   Pulse 91   Temp 98.8 °F (37.1 °C)   Resp 16   Ht 6' (1.829 m)   Wt 75.5 kg (166 lb 8 oz)   SpO2 96%   BMI 22.58 kg/m²       Intake/Output Summary (Last 24 hours) at 8/13/2021 1653  Last data filed at 8/13/2021 1527  Gross per 24 hour   Intake 650 ml   Output 3000 ml   Net -2350 ml        General Appearance: Well developed, well nourished, alert & oriented x 3,    no acute distress. Ears/Nose/Mouth/Throat: Hearing grossly normal.  Neck: Supple. Chest: Lungs clear to auscultation bilaterally. Cardiovascular: Regular rate and rhythm, S1S2 normal, no murmur. Abdomen: Soft, non-tender, bowel sounds are active. Extremities: No edema bilaterally. Skin: Warm and dry. []         Post-cath site without hematoma, bruit, tenderness, or thrill. Distal pulses intact. PMH/SH reviewed - no change compared to H&P    Data Review    Telemetry: sinus rhythm     EKG:   [x]  No new EKG for review    Lab Data Personally Reviewed:    Recent Labs     08/13/21  0321 08/12/21  0424   WBC 8.8 9.6   HGB 9.2* 9.7*   HCT 28.3* 29.7*    191     No results for input(s): INR, PTP, APTT, INREXT, INREXT in the last 72 hours. Recent Labs     08/13/21  0321 08/11/21  0315   * 131*   K 4.8 4.1   CL 85* 93*   CO2 21 23   BUN 69* 85*   CREA 9.66* 11.50*   GLU 88 93   CA 8.9 9.1     No results for input(s): CPK, CKNDX, TROIQ in the last 72 hours.     No lab exists for component: CPKMB  No results found for: CHOL, CHOLX, CHLST, CHOLV, HDL, HDLP, LDL, LDLC, DLDLP, TGLX, TRIGL, TRIGP, CHHD, CHHDX    No results for input(s): AP, TBIL, TP, ALB, GLOB, GGT, AML, LPSE in the last 72 hours. No lab exists for component: SGOT, GPT, AMYP, HLPSE  No results for input(s): PH, PCO2, PO2 in the last 72 hours.     Medications Personally Reviewed:    Current Facility-Administered Medications   Medication Dose Route Frequency    cefTRIAXone (ROCEPHIN) 1 g in 0.9% sodium chloride (MBP/ADV) 50 mL MBP  1 g IntraVENous Q24H    azithromycin (ZITHROMAX) 500 mg in 0.9% sodium chloride 250 mL (VIAL-MATE)  500 mg IntraVENous Q24H    sodium chloride (OCEAN) 0.65 % nasal squeeze bottle 2 Spray  2 Spray Both Nostrils Q2H PRN    metoprolol tartrate (LOPRESSOR) tablet 25 mg  25 mg Oral Q12H    sodium chloride (NS) flush 5-40 mL  5-40 mL IntraVENous Q8H    sodium chloride (NS) flush 5-40 mL  5-40 mL IntraVENous PRN    apixaban (ELIQUIS) tablet 5 mg  5 mg Oral BID    aspirin delayed-release tablet 81 mg  81 mg Oral DAILY    atorvastatin (LIPITOR) tablet 40 mg  40 mg Oral QHS    citalopram (CELEXA) tablet 40 mg  40 mg Oral DAILY    gabapentin (NEURONTIN) capsule 300 mg  300 mg Oral BID    isosorbide mononitrate ER (IMDUR) tablet 30 mg  30 mg Oral 7am    pantoprazole (PROTONIX) tablet 40 mg  40 mg Oral DAILY    sevelamer carbonate (RENVELA) tab 800 mg  800 mg Oral TID    ondansetron (ZOFRAN) injection 4 mg  4 mg IntraVENous Q4H PRN    lidocaine 4 % patch 1 Patch  1 Patch TransDERmal DAILY    nicotine (NICODERM CQ) 21 mg/24 hr patch 1 Patch  1 Patch TransDERmal DAILY    levalbuterol (XOPENEX) nebulizer soln 1.25 mg/3 mL  1.25 mg Nebulization Q4H PRN    albumin human 25% (BUMINATE) solution 12.5 g  12.5 g IntraVENous DIALYSIS PRN    epoetin cristian-epbx (RETACRIT) injection 8,000 Units  8,000 Units SubCUTAneous Q MON, WED & FRI    oxyCODONE IR (ROXICODONE) tablet 5 mg  5 mg Oral Q4H PRN    Or    oxyCODONE IR (ROXICODONE) tablet 7.5 mg  7.5 mg Oral Q4H PRN    naloxone (NARCAN) injection 0.4 mg 0.4 mg IntraVENous EVERY 2 MINUTES AS NEEDED         Mallika Silva MD

## 2021-08-13 NOTE — PROGRESS NOTES
Problem: Falls - Risk of  Goal: *Absence of Falls  Description: Document Vickie Broussard Fall Risk and appropriate interventions in the flowsheet.   Outcome: Resolved/Met  Note: Fall Risk Interventions:            Medication Interventions: Assess postural VS orthostatic hypotension, Evaluate medications/consider consulting pharmacy, Patient to call before getting OOB, Teach patient to arise slowly, Utilize gait belt for transfers/ambulation    Elimination Interventions: Elevated toilet seat, Call light in reach, Patient to call for help with toileting needs, Stay With Me (per policy), Toilet paper/wipes in reach, Toileting schedule/hourly rounds    History of Falls Interventions: Consult care management for discharge planning, Door open when patient unattended, Evaluate medications/consider consulting pharmacy, Room close to nurse's station, Utilize gait belt for transfer/ambulation, Vital signs minimum Q4HRs X 24 hrs (comment for end date), Assess for delayed presentation/identification of injury for 48 hrs (comment for end date)         Problem: Patient Education: Go to Patient Education Activity  Goal: Patient/Family Education  Outcome: Resolved/Met     Problem: Patient Education: Go to Patient Education Activity  Goal: Patient/Family Education  Outcome: Resolved/Met     Problem: Patient Education: Go to Patient Education Activity  Goal: Patient/Family Education  Outcome: Resolved/Met

## 2021-08-13 NOTE — ROUTINE PROCESS
Bedside and Verbal shift change report given to Deborah RN &Yareli FARLEY (oncoming nurse) by Yolanda Ayon RN (offgoing nurse). Report included the following information SBAR, Kardex, Intake/Output, MAR and Recent Results.

## 2021-08-13 NOTE — PROGRESS NOTES
Hospitalist Progress Note    NAME: Charissa Ordoñez   :  1965   MRN:  [id number]   Room Number:  3168/76  @ [facility]       Interim Hospital Summary: 55 y.o. male whom presented on 2021 with      Assessment / Plan:      Acute hypoxic respiratory failure POA   Fluid overload POA   Acute on chronic systolic and diastolic CHF POA  Hypertensive emergency  POA   ESRD on HD M,W,F, POA   Elevated Troponin, POA  Left rib fractures POA  ? Community-acquired pneumonia  CT chest WO contrast: No acute findings suspected. Prominent interstitial disease likely chronic  CXR: Mild interstitial edema  Pro-BNP: >35,000  Troponin: 0.05, 0.13  EKG interpreted independently - no acute ST-T changes of ischemia. D-dimer 1.47, VQ scan done today which shows low probability for PE. Also shows left rib fracture. - O2 support and neb treatment PRN  - Nephrology followingappreciate recommendations. Hemodialysis per nephrology. - cardiology followingappreciate recommendations  - echocardiogramEF 45 to 50%, left ventricular diastolic dysfunction, mild to moderate MR.  Increase the dose of metoprolol 25 mg twice a day EKG repeated. Chest pain secondary to the rib fracture. Continue Rocephin and azithromycin, likely atelectasis, advised the use of spirometry. We will transition to the oral antibiotic tomorrow. Recurrent falls POA  - PT/OT      Hypertension, POA  Hyperlipidemia, POA  - started on Nitro drip in ED    - continue imdur. DC nitrolglycerin  - Resume home meds    GERD, POA  - continue pantoprazole    Neuropathy, POA  - continue gabapentin    History of DVT  - continue  Apixaban    Epistaxis  Hemoglobin stable, give Afrin.   If bleeding continues to be worsened then will do fine-tuning of the blood thinners.         Tobacco dependence POA   Patient was counseled extensively on the need to abstain from tobacco, its addictive tendencies, its deleterious effects on the lungs, cardiovascular  as well as its financial & social sequelae    Dispositionpatient medically stable for discharge but there is no one at home to pick him up. Code Status: Full  Surrogate Decision Maker: Brenda Durant, mother ([phone])     DVT Prophylaxis: Eliquis  GI Prophylaxis: Protonix     Baseline: lives with fried, does not use assistive device             Subjective:     Chief Complaint / Reason for Physician Visit  Patient seen today, complaining of the pain on the left side of the chest secondary to the rib fractures. Even with the pain medication the pain is around 6-8/10 in severity. No other complaints. Objective:     VITALS:   Last 24hrs VS reviewed since prior progress note. Most recent are:  Patient Vitals for the past 24 hrs:   Temp Pulse Resp BP SpO2   08/13/21 1237 98.3 °F (36.8 °C) (!) 104 18 137/80 93 %   08/13/21 1145 98 °F (36.7 °C) 90 18 (!) 147/89    08/13/21 1130  91 18 (!) 144/89    08/13/21 1115  92 18 (!) 142/92    08/13/21 1100  91 18 (!) 150/90    08/13/21 1045  95 18 (!) 144/98    08/13/21 1030  95 18 (!) 173/100    08/13/21 1015  92 18 (!) 145/100    08/13/21 1000  92 18 (!) 147/91    08/13/21 0945  93 18 (!) 147/90    08/13/21 0930  89 18 (!) 142/92    08/13/21 0915  (!) 106 18 (!) 166/108    08/13/21 0900  94 18 (!) 150/103    08/13/21 0845  92 18 (!) 155/99    08/13/21 0830  (!) 102 18 (!) 148/94    08/13/21 0815 97.8 °F (36.6 °C) 100 18 114/74    08/13/21 0309 98.6 °F (37 °C) 95 16 135/83 99 %   08/12/21 2210 98.3 °F (36.8 °C) 94 18 133/66 95 %   08/12/21 1937 98.7 °F (37.1 °C) 93 17 119/78 97 %   08/12/21 1455 98.1 °F (36.7 °C) 95 13 123/79 97 %       Intake/Output Summary (Last 24 hours) at 8/13/2021 1316  Last data filed at 8/13/2021 1145  Gross per 24 hour   Intake 350 ml   Output 3000 ml   Net -2650 ml        PHYSICAL EXAM:  General: Alert, cooperative, no acute distress    EENT:  EOMI. Anicteric sclerae.  MMM  Resp:  CTA bilaterally, no wheezing or rales. No accessory muscle use  CV:  Regular  rhythm,  normal S1/S2, no murmurs rubs gallops, No edema  GI:  Soft, Non distended, Non tender. +Bowel sounds  Neurologic:  Alert and oriented X 3, normal speech,   Psych:   Good insight. Not anxious nor agitated  Skin:  No rashes. No jaundice    Reviewed most current lab test results and cultures  YES  Reviewed most current radiology test results   YES  Review and summation of old records today    NO  Reviewed patient's current orders and MAR    YES  PMH/SH reviewed - no change compared to H&P  ________________________________________________________________________  Care Plan discussed with:    Comments   Patient x    Family      RN x    Care Manager x    Consultant                        Multidiciplinary team rounds were held today with , nursing, pharmacist and clinical coordinator. Patient's plan of care was discussed; medications were reviewed and discharge planning was addressed. ________________________________________________________________________  Total NON critical care TIME:  35   Minutes    Total CRITICAL CARE TIME Spent:   Minutes non procedure based      Comments   >50% of visit spent in counseling and coordination of care x    ________________________________________________________________________  Amrik Rivera MD     Procedures: see electronic medical records for all procedures/Xrays and details which were not copied into this note but were reviewed prior to creation of Plan. LABS:  I reviewed today's most current labs and imaging studies.   Pertinent labs include:  Recent Labs     08/13/21  0321 08/12/21  0424 08/11/21  0315   WBC 8.8 9.6 11.6*   HGB 9.2* 9.7* 9.0*   HCT 28.3* 29.7* 27.7*    191 175     Recent Labs     08/13/21  0321 08/11/21  0315   * 131*   K 4.8 4.1   CL 85* 93*   CO2 21 23   GLU 88 93   BUN 69* 85*   CREA 9.66* 11.50*   CA 8.9 9.1       Signed: Amrik Rivera MD

## 2021-08-13 NOTE — PROGRESS NOTES
Transition of Care Plan:  RUR: 23% moderate  Disposition: home with friend   Follow up appointments: PCP  DME needed: none needed  Transportation at Discharge: will need medicaid transport arranged  Keys or means to access home: no- friend will be at apartment tomorrow morning to let pt in     IM Medicare Letter: n/a medicaid   Is patient a BCPI-A Bundle:  no         If yes, was Bundle Letter given?:     Caregiver Contact: mother Jose Rocha [phone]  Discharge Caregiver contacted prior to discharge? Pt declined     Update 3:45pm  CM contacted [facility] main number and obtained fax number for [address] [place] [address] location, [phone]. CM has faxed information to [address] [place] [address] branch. Initial note:  Pt transferred up to [facility] over night and was in HD all morning. MD requested CM make room visit with patient and discuss d/c planning and potential barriers to d/c today. Per MD pt mentioned a problem with him d/c today. CM completed chart review and made room visit with pt. Pt reported he lives at his friend's apartment. However, pt does not have a key to the apartment and his friend is in [place] visiting his sister but will be back around 2:00am. Pt reported that his friend would be at the apartment tomorrow morning to let him in, but has no way to get into apartment today. CM inquired how patient was getting to and from HD, pt reported he uses medicaid transport. Pt informed CM that he goes to [facility] on [facility] for HD M,W,F. Pt reported he would need transportation home at d/c. SILVERIO has sent message to [facility] specialist for PCP appointment.      Christopher Fellers, [place], [address]

## 2021-08-13 NOTE — PROGRESS NOTES
Spiritual Care Assessment/Progress Note  [facility]      NAME: Argenis Abreu      MRN: [id number]  AGE: 55 y.o.  SEX: male  Hoahaoism Affiliation: No Mormon   Language: English     8/13/2021           Spiritual Assessment begun in [facility] through conversation with:         [x]Patient        [] Family    [] Friend(s)        Reason for Consult: Initial/Spiritual assessment, patient floor     Spiritual beliefs: (Please include comment if needed)     [x] Identifies with a chelita tradition:         [] Supported by a chelita community:            [] Claims no spiritual orientation:           [] Seeking spiritual identity:                [] Adheres to an individual form of spirituality:           [] Not able to assess:                           Identified resources for coping:      [] Prayer                               [] Music                  [] Guided Imagery     [x] Family/friends                 [] Pet visits     [] Devotional reading                         [] Unknown     [] Other:                                              Interventions offered during this visit: (See comments for more details)    Patient Interventions: Affirmation of emotions/emotional suffering, Catharsis/review of pertinent events in supportive environment, Initial/Spiritual assessment, patient floor, Prayer (assurance of)           Plan of Care:     [x] Support spiritual and/or cultural needs    [] Support AMD and/or advance care planning process      [] Support grieving process   [] Coordinate Rites and/or Rituals    [] Coordination with community clergy   [] No spiritual needs identified at this time   [] Detailed Plan of Care below (See Comments)  [] Make referral to Music Therapy  [] Make referral to Pet Therapy     [] Make referral to Addiction services  [] Make referral to [facility]  [] Make referral to Spiritual Care Partner  [] No future visits requested        [x] Follow up upon further referrals Comments: Visited Mr Luis Eldridge in room  for initial spiritual assessment. Mr Luis Eldridge was sitting up in bed, eating. His facial affect was flat and he spoke very softly. Provided spiritual presence and active listening as Mr Luis Eldridge shared that he was doing okay & was supposed to be discharged tomorrow. He shared his concerns about not having a place of his own to stay. Maria C Cantored he was currently staying with friends. Acknowledged his feelings and concerns and encouraged him to talk with his  about possible resources. Mr Luis Eldridge gave permission to be kept in prayer and assured him of ongoing  availability for support. : Rev. Panfilo Sy.  Samuel Adams; [facility], to contact [phone] [address] call: [phone]

## 2021-08-14 NOTE — PROGRESS NOTES
DISCHARGE NOTE FROM [facility]    Patient determined to be stable for discharge by attending provider. I have reviewed the discharge instructions with the patient. They verbalized understanding and all questions were answered to their satisfaction. No complaints or further questions were expressed. Hard scripts for medications given to patient. and Medications sent to pharmacy. Appropriate educational materials and medication side effect teaching were provided. PIV were removed prior to discharge. Patient did not discharge with any line, szymanski, or drain.      Personal items and valuables accounted for at discharge by patient and/or family: YES    Post-op patient: No      Magdy Simms, RN

## 2021-08-14 NOTE — DISCHARGE INSTRUCTIONS
HOSPITALIST DISCHARGE INSTRUCTIONS    NAME: Eric Song   :  1965   MRN:  [id number]     Date/Time:  2021 10:46 AM    ADMIT DATE: 2021   DISCHARGE DATE: 2021     Attending Physician: Anna Shrestha MD    DISCHARGE DIAGNOSIS:  Acute hypoxic respiratory failure POA   Fluid overload POA   Acute on chronic systolic and diastolic CHF POA  Hypertensive emergency  POA   ESRD on HD M,W,F, POA   Elevated Troponin, POA  Left rib fractures POA  Community-acquired pneumonia  Recurrent falls POA  Hypertension, POA  Hyperlipidemia, POA  GERD, POA  Neuropathy  History of DVT    MEDICATIONS:  See above    · It is important that you take the medication exactly as they are prescribed. · Keep your medication in the bottles provided by the pharmacist and keep a list of the medication names, dosages, and times to be taken in your wallet. · Do not take other medications without consulting your doctor. Pain Management: per above medications    What to do at Home    Recommended diet:  Renal Diet    Recommended activity: Activity as tolerated    If you have questions regarding the hospital related prescriptions or hospital related issues please call [facility] at . You can always direct your questions to your primary care doctor if you are unable to reach your hospital physician; your PCP works as an extension of your hospital doctor just like your hospital doctor is an extension of your PCP for your time at [address]. If you experience any of the following symptoms then please call your primary care physician or return to the emergency room if you cannot get hold of your doctor:  Fever, chills, nausea, vomiting, diarrhea, change in mentation, falling, bleeding, shortness of breath    Additional Instructions:  Please get your BMP checked in 1 week after discharge from the hospital.    Bring these papers with you to your follow up appointments.  The papers will help your doctors be sure to continue the care plan from the hospital.              Information obtained by :  I understand that if any problems occur once I am at home I am to contact my physician. I understand and acknowledge receipt of the instructions indicated above.                                                                                                                                            Physician's or R.N.'s Signature                                                                  Date/Time                                                                                                                                              Patient or Representative Signature                                                          Da

## 2021-08-14 NOTE — PROGRESS NOTES
Problem: Pain - Acute  Goal: *Control of acute pain  Outcome: Resolved/Met     Problem: Patient Education: Go to Patient Education Activity  Goal: Patient/Family Education  Outcome: Resolved/Met

## 2021-08-14 NOTE — PROGRESS NOTES
Dr. Leroy López notified via perfect serve that Mr. Brenda Crowder is refusing continuing telemetry monitoring. When  Attempting to place telemetry monitor back on Mr. Brenda Crowder  he stated \"I'm not putting that shit back on. It's too heavy. \" He is also asking for Xanax or klonipin for his anxiety attacks he stated that, \"Celexa doesn't do shit for my anxiety\" order given to discontinue telemetry monitoring now.

## 2021-08-14 NOTE — DISCHARGE SUMMARY
Hospitalist Discharge Summary     Patient ID:  Patrick Ibarra  [id number]  55 y.o.  1965  8/8/2021    PCP on record: None    Admit date: 8/8/2021  Discharge date and time: 8/14/2021    DISCHARGE DIAGNOSIS:    Acute hypoxic respiratory failure POA   Fluid overload POA   Acute on chronic systolic and diastolic CHF POA  Hypertensive emergency  POA   ESRD on HD M,W,F, POA   Hyponatremia  Elevated Troponin, POA  Left rib fractures POA  Community-acquired pneumonia  Recurrent falls POA  Hypertension, POA  Hyperlipidemia, POA  GERD, POA  Neuropathy  History of DVT    CONSULTATIONS:  IP CONSULT TO HOSPITALIST  IP CONSULT TO NEPHROLOGY  IP CONSULT TO CARDIOLOGY    Excerpted HPI from H&P of Porfirio Ch MD:  Patrick Ibarra is a 55 y.o.  male who presents with SOB, left rib pain secondary to a fall incident last Friday. As per patient, last dialysis he had was Monday and missed the following schedule secondary to transportation issue. He was last seen in ED 07/10/2021 secondary to bilateral ankle swelling and left leg cellulitis, patient also fell that day with no documented injury. On assessment, patient reports 10/10 pain across his chest and getting more SOB, tachycardic and hypertensive. Decompensated on NC and had to be placed on BIPAP, started on Nitro drip. Past medical history is significant for ESRD with M,W,F hemodialysis, hypertension, hyperlipidemia, GERD, neuropathy and history of DVT. ______________________________________________________________________  DISCHARGE SUMMARY/HOSPITAL COURSE:  for full details see H&P, daily progress notes, labs, consult notes. Acute hypoxic respiratory failure POA   Fluid overload POA   Acute on chronic systolic and diastolic CHF POA  Hypertensive emergency  POA   ESRD on HD M,W,F, POA   Elevated Troponin, POA  Left rib fractures POA  ? Community-acquired pneumonia  CT chest WO contrast: No acute findings suspected.  Prominent interstitial disease likely chronic  CXR: Mild interstitial edema  Pro-BNP: >35,000  Troponin: 0.05, 0.13  EKG interpreted independently - no acute ST-T changes of ischemia. D-dimer 1.47, VQ scan done today which shows low probability for PE. Also shows left rib fracture. - O2 support and neb treatment PRN  - Nephrology followingappreciate recommendations. Hemodialysis per nephrology. - cardiology followingappreciate recommendations  - echocardiogramEF 45 to 50%, left ventricular diastolic dysfunction, mild to moderate MR.  Increase the dose of metoprolol 25 mg twice a day EKG repeated. Chest pain secondary to the rib fracture. Continue Rocephin and azithromycin, likely atelectasis, advised the use of spirometry. We will transition to the oral antibiotic tomorrow.     Recurrent falls POA  - PT/OT    Hyponatremia  Sodium improved to 128. Yesterday was 122. Asymptomatic.        Hypertension, POA  Hyperlipidemia, POA  - started on Nitro drip in ED     - continue imdur. DC nitrolglycerin  - Resume home meds     GERD, POA  - continue pantoprazole     Neuropathy, POA  - continue gabapentin     History of DVT  - continue  Apixaban     Epistaxis  Hemoglobin stable, give Afrin. If bleeding continues to be worsened then will do fine-tuning of the blood thinners.           Tobacco dependence POA   Patient was counseled extensively on the need to abstain from tobacco, its addictive tendencies, its deleterious effects on the lungs, cardiovascular  as well as its financial & social sequelae         _______________________________________________________________________  Patient seen and examined by me on discharge day. Pertinent Findings:  Gen:    Not in distress  Chest: Clear lungs  CVS:   Regular rhythm.   No edema  Abd:  Soft, not distended, not tender  Neuro:  Alert,   _______________________________________________________________________  DISCHARGE MEDICATIONS:   Current Discharge Medication List START taking these medications    Details   metoprolol tartrate (LOPRESSOR) 25 mg tablet Take 1 Tablet by mouth every twelve (12) hours for 30 days. Qty: 60 Tablet, Refills: 0  Start date: 8/13/2021, End date: 9/12/2021      amoxicillin-clavulanate (Augmentin) 875-125 mg per tablet Take 1 Tablet by mouth every twelve (12) hours for 5 days. Qty: 10 Tablet, Refills: 0  Start date: 8/13/2021, End date: 8/18/2021      oxyCODONE IR (ROXICODONE) 5 mg immediate release tablet Take 1 Tablet by mouth every four (4) hours as needed for Pain for up to 3 days. Max Daily Amount: 30 mg.  Qty: 12 Tablet, Refills: 0  Start date: 8/13/2021, End date: 8/16/2021    Associated Diagnoses: Closed fracture of multiple ribs of left side with routine healing, subsequent encounter         CONTINUE these medications which have NOT CHANGED    Details   isosorbide mononitrate ER (IMDUR) 30 mg tablet Take 1 Tab by mouth every morning. Qty: 30 Tab, Refills: 0      apixaban (ELIQUIS) 5 mg tablet Take 1 Tab by mouth two (2) times a day. Qty: 30 Tab, Refills: 0      citalopram (CELEXA) 40 mg tablet Take 1 Tab by mouth daily. Qty: 30 Tab, Refills: 1    Associated Diagnoses: Anxiety      sevelamer (RENAGEL) 400 mg tablet Take 800 mg by mouth three (3) times daily (with meals). lisinopril (PRINIVIL, ZESTRIL) 2.5 mg tablet Take 2.5 mg by mouth daily. promethazine (PHENERGAN) 25 mg tablet Take 25 mg by mouth every six (6) hours as needed for Nausea. atorvastatin (LIPITOR) 40 mg tablet Take 40 mg by mouth nightly. senna (SENNA) 8.6 mg tablet Take 1 Tab by mouth daily. lidocaine (LIDODERM) 5 % Apply patch to the affected area for 12 hours a day and remove for 12 hours a day. Qty: 60 Each, Refills: 0    Associated Diagnoses: Chronic pain of right ankle      aspirin delayed-release 81 mg tablet Take 1 Tab by mouth daily. Qty: 90 Tab, Refills: 0      multivitamin (ONE A DAY) tablet Take 1 Tab by mouth daily.       pantoprazole (PROTONIX) 40 mg tablet Take 40 mg by mouth daily. paricalcitol (ZEMPLAR) 1 mcg capsule Take 1 mcg by mouth daily. gabapentin (NEURONTIN) 300 mg capsule Take 300 mg by mouth two (2) times a day. STOP taking these medications       midodrine (PROAMATINE) 5 mg tablet Comments:   Reason for Stopping:         lidocaine 1.8 % ptmd Comments:   Reason for Stopping:         omeprazole (PRILOSEC) 40 mg capsule Comments:   Reason for Stopping:         lidocaine 5 % topical cream Comments:   Reason for Stopping:                 Patient Follow Up Instructions: Activity: Activity as tolerated  Diet: Cardiac Diet  Wound Care: None needed    Please get your BMP checked in 1 week after discharge from the hospital.    If you have questions regarding the hospital related prescriptions or hospital related issues please call [phone] [facility] at . You can always direct your questions to your primary care doctor if you are unable to reach your hospital physician; your PCP works as an extension of your hospital doctor just like your hospital doctor is an extension of your PCP for your time at [facility].     If you experience any of the following symptoms then please call your primary care physician or return to the emergency room if you cannot get hold of your doctor:  Fever, chills, nausea, vomiting, diarrhea, change in mentation, falling, bleeding, shortness of breath    Follow-up Information     Follow up With Specialties Details Why Contact Frank George NP Nurse Practitioner On 8/19/2021 For hospital follow up appointment at 3:00PM [address] [facility]  [phone]      None    None (395) Patient stated that they have no PCP          ________________________________________________________________    Risk of deterioration: Moderate    Condition at Discharge:  Stable  __________________________________________________________________    Disposition  Home with family and home health services    ____________________________________________________________________    Code Status: Full Code  ___________________________________________________________________      Total time in minutes spent coordinating this discharge (includes going over instructions, follow-up, prescriptions, and preparing report for sign off to her PCP) :  >30 minutes    Signed:  Anna Shrestha MD

## 2021-08-14 NOTE — PROGRESS NOTES
End of Shift Note    Bedside shift change report given to YONY Flores (oncoming nurse) by Lawrence Mccray LPN (offgoing nurse). Report included the following information SBAR, Kardex, Intake/Output, MAR and Recent Results    Shift worked:  7p-7:30a     Shift summary and any significant changes:          Concerns for physician to address:       Zone phone for oncoming shift:   [phone]       Activity:  Activity Level: Up with Assistance  Number times ambulated in hallways past shift: 0  Number of times OOB to chair past shift: 0    Cardiac:   Cardiac Monitoring: Yes      Cardiac Rhythm: Sinus Rhythm    Access:   Current line(s): PIV     Genitourinary:   Urinary status: voiding    Respiratory:   O2 Device: None (Room air)  Chronic home O2 use?: NO  Incentive spirometer at bedside: NO     GI:  Last Bowel Movement Date: 08/13/21  Current diet:  ADULT DIET Regular; No Salt Added (3-4 gm); Low Potassium (Less than 3000 mg/day); Low Phosphorus (Less than 1000 mg); Less than 60 gm; High Fiber; 1200 ml  Passing flatus: YES  Tolerating current diet: YES       Pain Management:   Patient states pain is manageable on current regimen: YES    Skin:  Jovan Score: 20  Interventions: float heels, increase time out of bed and nutritional support     Patient Safety:  Fall Score:  Total Score: 3  Interventions: assistive device (walker, cane, etc), gripper socks and pt to call before getting OOB  High Fall Risk: Yes    Length of Stay:  Expected LOS: 4d 0h  Actual LOS: Yisroel Rinne, LPN

## 2021-08-14 NOTE — PROGRESS NOTES
Transportation D/C requested via [employer] for 12:30PM. CM alerted RN of this information.     Cong Hogue LCSW

## 2021-08-16 PROBLEM — E87.5 HYPERKALEMIA: Status: ACTIVE | Noted: 2021-01-01

## 2021-08-16 PROBLEM — I50.43 ACUTE ON CHRONIC COMBINED SYSTOLIC AND DIASTOLIC CHF (CONGESTIVE HEART FAILURE) (HCC): Status: ACTIVE | Noted: 2021-01-01

## 2021-08-16 NOTE — PROGRESS NOTES
Transition of Care Plan:    RUR: 23% MOD  Disposition: TBD- home w/ f/u appts  Follow up appointments: PCP, Nephrology? DME needed: None  Transportation at Discharge: Medicaid transport/ round trip  [address] or means to access home:         Medicare Letter: N/A  Is patient a BCPI-A Bundle: No          If yes, was Bundle Letter given?:  N/A   Caregiver Contact: BrotherCamryn Anaya, [phone]  *pt does NOT want his mother contacted*  Discharge Caregiver contacted prior to discharge? Transition of Care Plan:    Based on readmission, the patient's previous Plan of Care   has been evaluated and/or modified. The current Transition of Care Plan is:       Home w/ f/u appts    CM reviewed pt's chart. Pt previously admitted on 8/8, d/c home w/ f/u appt on 8/14. CM met w/ pt at bedside to introduce & complete assessment. Pt confirmed demographic information is up to date. Pt lives w/ a friend at [address] [place] [address],  [address]. Pt reports he has been living there for ~2 months. PT reports being independent w/ ADLs/ IADLs; does not drive. Reports no use of DME or home O2. Pt receives HD at [facility], [facility] (center# [id number] fax# [phone]); CM to fax clinicals prior to d/c. Pt uses Medicaid transport & friends to assist w/ transportation. Pt provided his brother's contact information, requesting for his mother to not be contacted. Will continue to follow. Reason for Readmission:     Acute hyperkalemia          RUR Score/Risk Level:     23% MOD    PCP: First and Last name:  Jose Polanco NP   Name of Practice: [id number]   Are you a current patient: Yes/No: Yes   Approximate date of last visit: Unknown   Can you participate in a virtual visit with your PCP:     Is a Care Conference indicated:  Not at this time, addtl needs to be discussed during IDR    Did you attend your follow up appointment (s): If not, why not: No- pt admitted prior to f/u appt.  CM added appt to AVS & will reschedule as needed. Resources/supports as identified by patient/family:  Pt has family that lives local; relies on friends for transportation        Top Challenges facing patient (as identified by patient/family and CM): Finances/Medication cost?     Unemployed. Has Medicaid- reports no barriers w/ transportation or medication costs at this time  Transportation? Does not drive; receives support from friends/ family & utilizes [employer]        Support system or lack thereof? Living arrangements? Lives w/ a roommate        Self-care/ADLs/Cognition? Independent w/ ADLs/ IADLs. A & O x3. Plan for utilizing home health:   No current needs identified    Current Advanced Directive/Advance Care Plan:  Lane Mcgowan (ACP) Conversation  Date of Conversation: 8/16/2021  Conducted with: Patient with Decision Making Capacity    Healthcare Decision Maker: Today we discussed Healthcare Decision Makers. The patient is considering options.     Content/Action Overview:   DECLINED ACP conversation - will revisit periodically   Reviewed DNR/DNI and patient elects Full Code (Attempt Resuscitation)    Length of Voluntary ACP Conversation in minutes:  <16 minutes (Non-Billable)    Anastacio Batista         Readmission Assessment  Number of days since last admission?: 1-7 days (8/8-8/14)  Previous disposition: Home Alone (lives w/ roommate)  Who is being interviewed?: Patient  What was the patient's/caregiver's perception as to why they think they needed to return back to the hospital?: Other (Comment) (felt unwell)  Did you visit your Primary Care Physician after you left the hospital, before you returned this time?: No  Why weren't you able to visit your PCP?: Other (Comment) (Readmitted before f/u appt)  Did you see a specialist, such as Cardiac, Pulmonary, Orthopedic Physician, etc. after you left the hospital?: No  Who advised the patient to return to the hospital?: Self-referral  Does the patient report anything that got in the way of taking their medications?: No  In our efforts to provide the best possible care to you and others like you, can you think of anything that we could have done to help you after you left the hospital the first time, so that you might not have needed to return so soon?: Other (Comment) (pt reports no addtl concerns for d/c at this time)    Care Management Interventions  PCP Verified by CM: Yes  Palliative Care Criteria Met (RRAT>21 & CHF Dx)?: No  Mode of Transport at Discharge:  Other (see comment) (Medicaid transport)  Transition of Care Consult (CM Consult): Discharge Planning  Current Support Network: Family Lives [place], Other (lives w/ friend)  Confirm Follow Up Transport: Friends (& Medicaid transport)  Discharge Location  Discharge Placement: Home (w/ f/u appts)    YG Dumas  Care Management

## 2021-08-16 NOTE — DIALYSIS
Hemodialysis / [facility] / [phone]    Vitals   Pre   Post   Assessment   Pre   Post     Temp  Temp: 98.3 °F (36.8 °C) (08/16/21 0810)  98.2 LOC  ALERT AND ORIENTED *3 Alert and oriented *4   HR   Pulse (Heart Rate): 98 (08/16/21 0810) 96 Lungs   Crackle   no change   B/P   BP: (!) 143/92 (08/16/21 0810) 151/116 Cardiac   Regular monitored remootely  no change   Resp   Resp Rate: 18 (08/16/21 0810) 18 Skin   Warm dry and intact  no change     Pain level  Pain Intensity 1: 7 (08/16/21 0327) 0 Edema  In lower ext     No change     Orders:    Duration:   Start:    6563 End:    1140 Total:   3.5 hours   Dialyzer:   Dialyzer/Set Up Inspection: Wlil Lopez (08/16/21 0810)   K Bath:   Dialysate K (mEq/L): 2 (08/16/21 0810)   Ca Bath:   Dialysate CA (mEq/L): 2.5 (08/16/21 0810)   Na/Bicarb:   Dialysate NA (mEq/L): 138 (08/16/21 0810)   Target Fluid Removal:   Goal/Amount of Fluid to Remove (mL): 3000 mL (08/16/21 0810)   Access     Type & Location:   GLORIA AVF: skin CDI. No s/s of infection. + B/T. No issues with cannulation or hemostasis. Running well at .    Labs     Obtained/Reviewed   Critical Results Called   Date when labs were drawn-  Hgb-    HGB   Date Value Ref Range Status   08/16/2021 10.7 (L) 12.1 - 17.0 g/dL Final     K-    Potassium   Date Value Ref Range Status   08/16/2021 7.1 (HH) 3.5 - 5.1 mmol/L Final     Comment:     INVESTIGATED PER DELTA CHECK PROTOCOL  RESULTS VERIFIED, PHONED TO AND READ BACK BY  CHRISTOPHER BHAKTA AT 1046 JSS       Ca-   Calcium   Date Value Ref Range Status   08/16/2021 9.2 8.5 - 10.1 MG/DL Final     Bun-   BUN   Date Value Ref Range Status   08/16/2021 82 (H) 6 - 20 MG/DL Final     Comment:     INVESTIGATED PER DELTA CHECK PROTOCOL     Creat-   Creatinine   Date Value Ref Range Status   08/16/2021 12.00 (H) 0.70 - 1.30 MG/DL Final     Comment:     INVESTIGATED PER DELTA CHECK PROTOCOL        Medications/ Blood Products Given     Name   Dose   Route and Time Blood Volume Processed (BVP):    77.8 liter Net Fluid   Removed:  3000 ml   Comments   Time Out Done: (4115)  Primary Nurse Rpt Pre:Kavita Olivares  Primary Nurse Rpt Post:Chani  Pt Education:yes  Care Plan:continue hd  Tx Summary:       Pt arrived to HD [address]. Consent signed & on file. SBAR received from Primary RN. 0964: Pt cannulated with 04Q needles per policy & without issue. Labs drawn per request/ order. VSS. Dialysis Tx initiated. 1000 Patient stable. Access visible    1140: Tx ended. VSS. All possible blood returned to patient. Hemostasis achieved without issue. Bed locked and in the lowest position, call bell and belongings in reach. SBAR given to Primary, RN. Patient is stable at time of their/ my departure. All Dialysis related medications have been reviewed. Admiting Diagnosis:  Pt's previous clinic-  Consent signed - Informed Consent Verified: Yes (08/16/21 0810)  Madiha Consent - obtained before treatment. Hepatitis Status- Negative/Immune as of 8/9/21  Machine #- Machine Number: [id number] (08/16/21 0810)  Telemetry status-remotely monitored  Pre-dialysis wt. -

## 2021-08-16 NOTE — ED NOTES
Patient states that he feels like he needs dialysis. Missed two appointments, attended three. Discharged from hospital yesterday for fluid overload. Patient states he is \"only able to pee a little bit\" and that he \"feels paralyzed\" due to weakness. He reports loss of sensation in two of his fingers on his right hand, and denied sensation upon palpation. Patient expressed pain in LLQ of abdomen, 7/10.

## 2021-08-16 NOTE — ED NOTES
HYPOGLYCEMIC EPISODE DOCUMENTATION    Patient with hypoglycemic episode(s) at 0407(time) on 8/16 (date). BG value(s) pre-treatment 28    Was patient symptomatic? [x] yes, [] no  Patient was treated with the following rescue medications/treatments: [x] D50                [] Glucose tablets                [] Glucagon                [] 4oz juice                [] 6oz reg soda                [] 8oz low fat milk  BG value post-treatment: 112  Once BG treated and value greater than 80mg/dl, pt was provided with the following:  [] snack  [] meal  Name of MD notified:  Ninoska HOYT  The following orders were received: Q15 BGs

## 2021-08-16 NOTE — CONSULTS
Consultation Note    NAME: Ara Dumont   :  1965   MRN:  [id number]     Date/Time:  2021 11:22 AM    I have been asked to see this patient by Dr. Narda Marin  for advice/opinion re: ESRD.       Assessment   :                                               Plan:  [id number] [place]  Anemia  Dyspnea  Volume overload    noncompliance Seen on HD at 0845. SBP stable.     H/H at goal.  Hold retacrit     Sodium low. Should improve with HD. May need more Hd tomorrow.                   Subjective:   CHIEF COMPLAINT:  AMS    HISTORY OF PRESENT ILLNESS:     Steffen Toth is a 55 y.o.   male who has a history of ESRD admitted with hyperkalemia. He can give no history. Review of his chart shows that he was just here last week with a similar presentation. He was discharged 2 days ago. He came back to the ER with dyspnea. Workup showed that his potassium was elevated. Past Medical History:   Diagnosis Date    Chronic kidney disease     Dialysis patient ([facility] [address]     Hypertension       Past Surgical History:   Procedure Laterality Date    HX VASCULAR ACCESS Left     LUE AV fistula     Social History     Tobacco Use    Smoking status: Current Every Day Smoker     Packs/day: 0.25    Smokeless tobacco: Never Used   Substance Use Topics    Alcohol use: Yes      Family History   Problem Relation Age of Onset    No Known Problems Mother     No Known Problems Father     No Known Problems Sister     No Known Problems Brother     No Known Problems Sister     No Known Problems Sister     No Known Problems Brother       Allergies   Allergen Reactions    Motrin [Ibuprofen] Hives     17 Pt denies allergy    Tylenol [Acetaminophen] Hives     17 Pt denies allergy      Prior to Admission medications    Medication Sig Start Date End Date Taking? Authorizing Provider   amoxicillin-clavulanate (Augmentin) 875-125 mg per tablet Take 1 Tablet by mouth every twelve (12) hours for 5 days.  21 8/19/21  Kirit Ford MD   metoprolol tartrate (LOPRESSOR) 25 mg tablet Take 1 Tablet by mouth every twelve (12) hours for 30 days. 8/14/21 9/13/21  Kirti Ford MD   pantoprazole (PROTONIX) 40 mg tablet Take 1 Tablet by mouth daily for 30 days. 8/14/21 9/13/21  Kirti Ford MD   sevelamer (RenageL) 400 mg tablet Take 2 Tablets by mouth three (3) times daily (with meals) for 30 days. 8/14/21 9/13/21  Kirti Ford MD   isosorbide mononitrate ER (IMDUR) 30 mg tablet Take 1 Tablet by mouth every morning for 30 days. 8/14/21 9/13/21  Kirti Ford MD   citalopram (CELEXA) 40 mg tablet Take 1 Tablet by mouth daily for 30 days. 8/14/21 9/13/21  Kirti Ford MD   atorvastatin (LIPITOR) 40 mg tablet Take 1 Tablet by mouth nightly for 30 days. 8/14/21 9/13/21  Kirti Ford MD   apixaban (ELIQUIS) 5 mg tablet Take 1 Tablet by mouth two (2) times a day for 30 days. 8/14/21 9/13/21  Kirti Ford MD   paricalcitoL (ZEMPLAR) 1 mcg capsule Take 1 Capsule by mouth daily for 30 days. 8/14/21 9/13/21  Kirti Ford MD   oxyCODONE IR (ROXICODONE) 5 mg immediate release tablet Take 1 Tablet by mouth every four (4) hours as needed for Pain for up to 3 days. Max Daily Amount: 30 mg. 8/13/21 8/16/21  Kirti Ford MD   lidocaine (LIDODERM) 5 % Apply patch to the affected area for 12 hours a day and remove for 12 hours a day. 11/28/17   Mile Elena MD   aspirin delayed-release 81 mg tablet Take 1 Tab by mouth daily. 11/28/17   Mile Elena MD   multivitamin (ONE A DAY) tablet Take 1 Tab by mouth daily. Juan Olivarez MD   gabapentin (NEURONTIN) 300 mg capsule Take 300 mg by mouth two (2) times a day.     Other, MD Juan     REVIEW OF SYSTEMS:     [x]  Unable to obtain reliable ROS due to  [x] mental status  [] sedated   [] intubated   [] Total of 12 systems reviewed as follows:  Constitutional: negative fever, negative chills, negative weight loss  Eyes:   negative visual changes  ENT:   negative sore throat, tongue or lip swelling  Respiratory:  negative cough, negative dyspnea  Cards:  negative for chest pain, palpitations, lower extremity edema  GI:   negative for nausea, vomiting, diarrhea, and abdominal pain  :  negative for frequency, dysuria  Integument:  negative for rash and pruritus  Heme:  negative for easy bruising and gum/nose bleeding  Musculoskel: negative for myalgias,  back pain and muscle weakness  Neuro:  negative for headaches, dizziness, vertigo  Psych:  negative for feelings of anxiety, depression   Travel?: none    Objective:   VITALS:    Visit Vitals  BP (!) 172/114   Pulse 94   Temp 98.3 °F (36.8 °C) (Oral)   Resp 18   Ht 5' 9\" (1.753 m)   Wt 72.6 kg (160 lb)   SpO2 97%   BMI 23.63 kg/m²     PHYSICAL EXAM:  Gen:  []  WD []  WN  [] cachectic []  thin []  obese []  disheveled             []  ill apearing  []   Critical  [x]   Chronic    [x]  No acute distress    HEENT:   [x] NC/AT/PERRL    [x] pink conjunctivae      [] pale conjunctivae                  PERRL  [] yes  [] no      [] moist mucosa    [] dry mucosa    hearing intact to voice [] yes  [] No                 NECK:   supple [x] yes  [] no        masses [] yes  [x] No               thyroid  []  non tender  []  tender    RESP:   [] CTA bilaterally/no wheezing/rhonchi/rales/crackles    [x] rhonchi bilaterally - no dullness  [] wheezing   [] rhonchi   [] crackles     use of accessory muscles [] yes [] no    CARD:   [x]  regular rate and rhythm/No murmurs/rubs/gallops    murmur  [] yes ()  [] no      Rubs  [] yes  [] no       Gallops [] yes  [] no    Rate []  regular  []  irregular        carotid bruits  [] Right  []  Left                 LE edema [x] yes  [] no           JVP  []  yes   []  no    ABD:    [x] soft/non distended/non tender/+bowel sounds/no HSM    []  Rigid    tenderness [] yes [] no   Liver enlargement  []  yes []  no                Spleen enlargement  []  yes []  no     distended []  yes [] no     bowel sound  [] hypoactive   [] hyperactive    LYMPH:    Neck []  yes [x]  no       Axillae []  yes [x]  no    SKIN:   Rashes []  yes   [x]  no    Ulcers []  yes   [x]  no               [] tight to palpitation    skin turgor []  good  [] poor  [] decreased               Cyanosis/clubbing []  yes []  no    NEUR:   [x] cranial nerves II-XII grossly intact       [] Cranial nerves deficit                 []  facial droop    []  slurred speech   [] aphasic     [] Strength normal     []  weakness  []  LUE  []   RUE/ []  LLE  []   RLE    follows commands  []  yes [x]  no           PSYCH:   insight [x] poor [] good   Alert and Oriented to  [] person  [] place  []  time                    [] depressed [] anxious [] agitated  [] lethargic [] stuporous  [] sedated     LAB DATA REVIEWED:    Recent Labs     08/16/21  0404   WBC 11.1   HGB 10.7*   HCT 34.3*        Recent Labs     08/16/21 0404 08/14/21  0958   * 128*   K 7.1* 4.5   CL 94* 90*   CO2 20* 28   BUN 82* 48*   CREA 12.00* 7.56*   GLU 39* 101*   CA 9.2 9.2     Recent Labs     08/16/21  0404   ALT 65      TBILI 0.6   ALB 2.9*   GLOB 5.1*     No results for input(s): INR, PTP, APTT, INREXT in the last 72 hours. No results for input(s): FE, TIBC, PSAT, FERR in the last 72 hours. No results for input(s): PH, PCO2, PO2 in the last 72 hours. Recent Labs     08/16/21  0404   *   CKMB 3.6*     Lab Results   Component Value Date/Time    Glucose (POC) 85 08/16/2021 10:47 AM    Glucose (POC) 80 08/16/2021 09:04 AM    Glucose (POC) 195 (H) 08/16/2021 07:09 AM    Glucose (POC) 31 (LL) 08/16/2021 06:51 AM    Glucose (POC) 103 08/16/2021 05:43 AM    Glucose,  (H) 08/16/2021 04:13 AM    Glucose, POC 37 (LL) 08/16/2021 03:59 AM       Procedures: see electronic medical records for all procedures/Xrays and details which were not copied into this note but were reviewed prior to creation of Plan. ________________________________________________________________________       ___________________________________________________  Consulting Physician: Fernanda Ke, MD nothing

## 2021-08-16 NOTE — PROGRESS NOTES
Patient admitted earlier today, forearm pulmonary edema, AMS, metabolic acidosis, hyperkalemia  see earlier H&P for full details  -Getting hemodialysis today, repeat BMP this afternoon

## 2021-08-16 NOTE — ED NOTES
HYPOGLYCEMIC EPISODE DOCUMENTATION    Patient with hypoglycemic episode(s) at 6371 (time) on 8/16(date). BG value(s) pre-treatment 32    Was patient symptomatic? [x] yes, [] no  Patient was treated with the following rescue medications/treatments: [x] D50                [] Glucose tablets                [] Glucagon                [] 4oz juice                [] 6oz reg soda                [] 8oz low fat milk  BG value post-treatment: 195  Once BG treated and value greater than 80mg/dl, pt was provided with the following:  [] snack  [] meal  Name of MD notified:  Ninoska HOYT  The following orders were received: D10 drip

## 2021-08-16 NOTE — ED PROVIDER NOTES
EMERGENCY DEPARTMENT HISTORY AND PHYSICAL EXAM      Date: 8/16/2021  Patient Name: Lester Harmon    History of Presenting Illness     Chief Complaint   Patient presents with    Shortness of Breath     SOB with weakness today and yesterday, discharged after about a week for fluid overload. Missed dialysis twice one week ago, has not missed since and has had three treatments since. History Provided By: Patient    HPI: Lester Harmon, 55 y.o. male with PMHx significant for hypertension, end-stage renal disease on dialysis Monday, Wednesday, Friday who was recently admitted to [facility] for fluid overload after missed dialysis and discharged on August 14 presents to the ED with shortness of breath and feeling like he needs dialysis again. EMS was called due to patient feeling short of breath and weak. .  Patient was not hypoxic in route. Patient was last dialyzed on August 13, the day prior to discharge. He is due for dialysis this morning. He denies any chest pain. Feels generally weak all over, but no focal weakness. .  Denies any nausea or vomiting. Denies any fevers or chills. Denies headache. PCP: None    No current facility-administered medications on file prior to encounter. Current Outpatient Medications on File Prior to Encounter   Medication Sig Dispense Refill    amoxicillin-clavulanate (Augmentin) 875-125 mg per tablet Take 1 Tablet by mouth every twelve (12) hours for 5 days. 10 Tablet 0    metoprolol tartrate (LOPRESSOR) 25 mg tablet Take 1 Tablet by mouth every twelve (12) hours for 30 days. 60 Tablet 0    pantoprazole (PROTONIX) 40 mg tablet Take 1 Tablet by mouth daily for 30 days. 30 Tablet 0    sevelamer (RenageL) 400 mg tablet Take 2 Tablets by mouth three (3) times daily (with meals) for 30 days. 180 Tablet 0    isosorbide mononitrate ER (IMDUR) 30 mg tablet Take 1 Tablet by mouth every morning for 30 days.  30 Tablet 0    citalopram (CELEXA) 40 mg tablet Take 1 Tablet by mouth daily for 30 days. 30 Tablet 0    atorvastatin (LIPITOR) 40 mg tablet Take 1 Tablet by mouth nightly for 30 days. 30 Tablet 0    apixaban (ELIQUIS) 5 mg tablet Take 1 Tablet by mouth two (2) times a day for 30 days. 60 Tablet 0    paricalcitoL (ZEMPLAR) 1 mcg capsule Take 1 Capsule by mouth daily for 30 days. 30 Capsule 0    oxyCODONE IR (ROXICODONE) 5 mg immediate release tablet Take 1 Tablet by mouth every four (4) hours as needed for Pain for up to 3 days. Max Daily Amount: 30 mg. 12 Tablet 0    lidocaine (LIDODERM) 5 % Apply patch to the affected area for 12 hours a day and remove for 12 hours a day. 60 Each 0    aspirin delayed-release 81 mg tablet Take 1 Tab by mouth daily. 90 Tab 0    multivitamin (ONE A DAY) tablet Take 1 Tab by mouth daily.  gabapentin (NEURONTIN) 300 mg capsule Take 300 mg by mouth two (2) times a day. Past History     Past Medical History:  Past Medical History:   Diagnosis Date    Chronic kidney disease     Dialysis patient ([facility] [address]     Hypertension        Past Surgical History:  Past Surgical History:   Procedure Laterality Date    HX VASCULAR ACCESS Left     LUE AV fistula       Family History:  Family History   Problem Relation Age of Onset    No Known Problems Mother     No Known Problems Father     No Known Problems Sister     No Known Problems Brother     No Known Problems Sister     No Known Problems Sister     No Known Problems Brother        Social History:  Social History     Tobacco Use    Smoking status: Current Every Day Smoker     Packs/day: 0.25    Smokeless tobacco: Never Used   Substance Use Topics    Alcohol use: Yes    Drug use: Not Currently     Types: Heroin     Comment: Former       Allergies: Allergies   Allergen Reactions    Motrin [Ibuprofen] Hives     7/16/17 Pt denies allergy    Tylenol [Acetaminophen] Hives     7/16/17 Pt denies allergy         Review of Systems   Review of Systems   Constitutional: Positive for fatigue. Negative for chills and fever. HENT: Negative for congestion, ear pain, rhinorrhea and sore throat. Eyes: Negative for visual disturbance. Respiratory: Positive for shortness of breath. Negative for cough, chest tightness and wheezing. Cardiovascular: Negative for chest pain, palpitations and leg swelling. Gastrointestinal: Negative for abdominal pain, diarrhea, nausea and vomiting. Genitourinary: Negative for dysuria, flank pain and hematuria. Musculoskeletal: Negative for back pain, myalgias and neck pain. Skin: Negative for rash and wound. Neurological: Negative for dizziness, syncope, light-headedness and headaches. Psychiatric/Behavioral: Negative for confusion. The patient is nervous/anxious. All other systems reviewed and are negative.         Physical Exam    General appearance -anxious, well nourished, well appearing, and in no distress  Eyes - pupils equal and reactive, extraocular eye movements intact  ENT - mucous membranes moist, pharynx normal without lesions  Neck - supple, no significant adenopathy; non-tender to palpation  Chest - clear to auscultation, no wheezes, rales or rhonchi; non-tender to palpation  Heart - normal rate and regular rhythm, S1 and S2 normal, no murmurs noted  Abdomen - soft, nontender, nondistended, no masses or organomegaly  Musculoskeletal - no joint tenderness, deformity or swelling; normal ROM  Extremities - peripheral pulses normal, no pedal edema  Skin -diaphoretic, normal coloration and turgor, no rashes  Neurological -drowsy, anxious, oriented x3, normal speech, generalized weakness (has difficulty sitting up in bed), but no focal findings or movement disorder noted    Diagnostic Study Results     Labs -     Recent Results (from the past 12 hour(s))   BLOOD GAS,CHEM8,LACTIC ACID POC    Collection Time: 08/16/21  3:59 AM   Result Value Ref Range    Calcium, ionized (POC) 1.02 (L) 1.12 - 1.32 mmol/L    BICARBONATE 20 mmol/L    Base deficit (POC) 6.3 mmol/L    Sample source VENOUS BLOOD      CO2, POC 21 19 - 24 MMOL/L    Sodium,  (L) 136 - 145 MMOL/L    Potassium, POC 7.4 (HH) 3.5 - 5.5 MMOL/L    Chloride, POC 98 (L) 100 - 108 MMOL/L    Glucose, POC 37 (LL) 74 - 106 MG/DL    Creatinine, POC 12.3 (H) 0.6 - 1.3 MG/DL    Lactic Acid (POC) 5.21 (HH) 0.40 - 2.00 mmol/L    pH, venous (POC) 7.29 (L) 7.32 - 7.42      pCO2, venous (POC) 42.3 41 - 51 MMHG    pO2, venous (POC) 19 (L) 25 - 40 mmHg   CBC WITH AUTOMATED DIFF    Collection Time: 08/16/21  4:04 AM   Result Value Ref Range    WBC 11.1 4.1 - 11.1 K/uL    RBC 3.34 (L) 4.10 - 5.70 M/uL    HGB 10.7 (L) 12.1 - 17.0 g/dL    HCT 34.3 (L) 36.6 - 50.3 %    .7 (H) 80.0 - 99.0 FL    MCH 32.0 26.0 - 34.0 PG    MCHC 31.2 30.0 - 36.5 g/dL    RDW 17.2 (H) 11.5 - 14.5 %    PLATELET 732 836 - 244 K/uL    MPV 11.5 8.9 - 12.9 FL    NRBC 0.4 (H) 0  WBC    ABSOLUTE NRBC 0.04 (H) 0.00 - 0.01 K/uL    NEUTROPHILS 69 32 - 75 %    LYMPHOCYTES 20 12 - 49 %    MONOCYTES 11 5 - 13 %    EOSINOPHILS 0 0 - 7 %    BASOPHILS 0 0 - 1 %    IMMATURE GRANULOCYTES 0 0.0 - 0.5 %    ABS. NEUTROPHILS 7.7 1.8 - 8.0 K/UL    ABS. LYMPHOCYTES 2.2 0.8 - 3.5 K/UL    ABS. MONOCYTES 1.2 (H) 0.0 - 1.0 K/UL    ABS. EOSINOPHILS 0.0 0.0 - 0.4 K/UL    ABS. BASOPHILS 0.0 0.0 - 0.1 K/UL    ABS. IMM.  GRANS. 0.0 0.00 - 0.04 K/UL    DF AUTOMATED     METABOLIC PANEL, COMPREHENSIVE    Collection Time: 08/16/21  4:04 AM   Result Value Ref Range    Sodium 130 (L) 136 - 145 mmol/L    Potassium 7.1 (HH) 3.5 - 5.1 mmol/L    Chloride 94 (L) 97 - 108 mmol/L    CO2 20 (L) 21 - 32 mmol/L    Anion gap 16 (H) 5 - 15 mmol/L    Glucose 39 (LL) 65 - 100 mg/dL    BUN 82 (H) 6 - 20 MG/DL    Creatinine 12.00 (H) 0.70 - 1.30 MG/DL    BUN/Creatinine ratio 7 (L) 12 - 20      GFR est AA 5 (L) >60 ml/min/1.73m2    GFR est non-AA 4 (L) >60 ml/min/1.73m2    Calcium 9.2 8.5 - 10.1 MG/DL    Bilirubin, total 0.6 0.2 - 1.0 MG/DL    ALT (SGPT) 65 12 - 78 U/L    AST (SGOT) 131 (H) 15 - 37 U/L    Alk. phosphatase 109 45 - 117 U/L    Protein, total 8.0 6.4 - 8.2 g/dL    Albumin 2.9 (L) 3.5 - 5.0 g/dL    Globulin 5.1 (H) 2.0 - 4.0 g/dL    A-G Ratio 0.6 (L) 1.1 - 2.2     NT-PRO BNP    Collection Time: 08/16/21  4:04 AM   Result Value Ref Range    NT pro-BNP >35,000 (H) <125 PG/ML   CK W/ CKMB & INDEX    Collection Time: 08/16/21  4:04 AM   Result Value Ref Range    CK - MB 3.6 (H) <3.6 NG/ML    CK-MB Index 1.1 0.0 - 2.5       (H) 39 - 308 U/L   TROPONIN I    Collection Time: 08/16/21  4:04 AM   Result Value Ref Range    Troponin-I, Qt. 0.19 (H) <0.05 ng/mL   SAMPLES BEING HELD    Collection Time: 08/16/21  4:04 AM   Result Value Ref Range    SAMPLES BEING HELD  RED, BLUE, SST     COMMENT        Add-on orders for these samples will be processed based on acceptable specimen integrity and analyte stability, which may vary by analyte.    GLUCOSE, POC    Collection Time: 08/16/21  4:07 AM   Result Value Ref Range    Glucose (POC) 28 (LL) 65 - 117 mg/dL    Performed by [facility]    BLOOD GAS,CHEM8,LACTIC ACID POC    Collection Time: 08/16/21  4:13 AM   Result Value Ref Range    Calcium, ionized (POC) 1.01 (L) 1.12 - 1.32 mmol/L    BICARBONATE 21 mmol/L    Base deficit (POC) 5.9 mmol/L    Sample source VENOUS BLOOD      CO2, POC 22 19 - 24 MMOL/L    Sodium,  (L) 136 - 145 MMOL/L    Potassium, POC 8.0 (HH) 3.5 - 5.5 MMOL/L    Chloride, POC 97 (L) 100 - 108 MMOL/L    Glucose,  (H) 74 - 106 MG/DL    Creatinine, POC 11.7 (H) 0.6 - 1.3 MG/DL    Lactic Acid (POC) 6.74 (HH) 0.40 - 2.00 mmol/L    Critical value read back OBIER     pH, venous (POC) 7.26 (L) 7.32 - 7.42      pCO2, venous (POC) 47.5 41 - 51 MMHG    pO2, venous (POC) 21 (L) 25 - 40 mmHg   GLUCOSE, POC    Collection Time: 08/16/21  4:34 AM   Result Value Ref Range    Glucose (POC) 112 65 - 117 mg/dL    Performed by Loretta Subramanian    GLUCOSE, POC    Collection Time: 08/16/21  5:43 AM   Result Value Ref Range Glucose (POC) 103 65 - 117 mg/dL    Performed by Robbie Boateng POC    Collection Time: 08/16/21  6:51 AM   Result Value Ref Range    Glucose (POC) 31 (LL) 65 - 117 mg/dL    Performed by Regina Rdz RN    GLUCOSE, POC    Collection Time: 08/16/21  7:09 AM   Result Value Ref Range    Glucose (POC) 195 (H) 65 - 117 mg/dL    Performed by Regina Rdz RN        Radiologic Studies -   XR CHEST PORT   Final Result      Resolved right upper lobe airspace opacity. Mild central pulmonary vascular   congestion without overt pulmonary edema. CT Results  (Last 48 hours)    None        CXR Results  (Last 48 hours)               08/16/21 0405  XR CHEST PORT Final result    Impression:      Resolved right upper lobe airspace opacity. Mild central pulmonary vascular   congestion without overt pulmonary edema. Narrative:  EXAM:  XR CHEST PORT       INDICATION: Chest pain       COMPARISON: 8/11/2021       TECHNIQUE: Portable AP upright chest view at 0350 hours       FINDINGS: The cardiomediastinal contours are stable. There is mild central   pulmonary vascular congestion. Right upper lobe opacities resolved. The lungs and pleural spaces are clear. There is no pneumothorax. The visualized bones and upper abdomen are   age-appropriate. Medical Decision Making   I am the first provider for this patient. I reviewed the vital signs, available nursing notes, past medical history, past surgical history, family history and social history. Vital Signs-Reviewed the patient's vital signs.   Patient Vitals for the past 12 hrs:   Temp Pulse Resp BP SpO2   08/16/21 0715  96 22 (!) 154/89    08/16/21 0700  (!) 102 17     08/16/21 0515   18  97 %   08/16/21 0400 97.8 °F (36.6 °C) 76 21 121/69    08/16/21 0347  79 26  100 %   08/16/21 0345  78 20 121/76    08/16/21 0336     100 %   08/16/21 0327 97.8 °F (36.6 °C) 79 26 123/79 93 %       EKG: sinus rhythm with first-degree AV block, 84 bpm, widened QRS, prolonged QTc interval, nonspecific ST changes, no peaked T waves    Records Reviewed: Nursing Notes and Old Medical Records    Provider Notes (Medical Decision Making):   Differential diagnosis: Fluid overload, pneumonia, electrolyte abnormality  We will check CBC, CMP, chest x-ray. Will obtain point-of-care chemistry labs. ED Course:   Initial assessment performed. The patients presenting problems have been discussed, and they are in agreement with the care plan formulated and outlined with them. I have encouraged them to ask questions as they arise throughout their visit. Progress Notes:  500 a.m. patient noted to be hypoglycemic and given 1 amp of D50. On point-of-care lab work, potassium is elevated. Treated with bicarb, calcium gluconate, and an additional amp of D50 along with 10 units of insulin. Will monitor glucose frequently. ED Course as of Aug 16 0751   Mon Aug 16, 2021   3511 Case discussed with Dr. Steffen Fernandez (nephrology) who will arrange for emergent dialysis. [AO]   [id number] Case discussed with Dr. Javi Du (hospitalist) who will see and admit the patient.    [AO]   1099 Patient was initially hypoglycemic and was given D50. Then found to be hyperkalemic with potassium of 8.0 on point-of-care chemistry. He was treated with 1 amp bicarb, 1 g calcium gluconate, another amp of D50 and 10 units of insulin. Now his blood sugar has dropped again to 31. Will start on D10 drip.     [AO]      ED Course User Index  [AO] Britni Xiao MD     CRITICAL CARE NOTE :        IMPENDING DETERIORATION -Respiratory, Cardiovascular, CNS, Metabolic and Renal    ASSOCIATED RISK FACTORS - Dysrhythmia metabolic abnormality    MANAGEMENT- Bedside Assessment and Supervision of Care    INTERPRETATION -  Xrays, Blood Gases, ECG and Cardiac Output Measures     INTERVENTIONS - Neurologic interventions , Metobolic interventions and emergent dialysis    CASE REVIEW - Hospitalist/Intensivist, Medical Sub-Specialist and Nursing    TREATMENT RESPONSE -Improved    PERFORMED BY - Self        NOTES   :      I have spent 75 minutes of critical care time involved in lab review, consultations with specialist, family decision- making, bedside attention and documentation. During this entire length of time I was immediately available to the patient . Danielle Au MD          Disposition:  Admit to hospitliast        Diagnosis     Clinical Impression:   1. Acute hyperkalemia    2.  ESRD needing dialysis ([facility] [address]

## 2021-08-16 NOTE — CONSULTS
ESRD pt just d/c on 8/13- returns with weakness  K is high again at 7.1  Got medical Rx in ER  Will arrange HD asap- Will Meals notified    Consult to follow    Cat Balbuena MD  [address] [place] [address]

## 2021-08-16 NOTE — ED NOTES
Verbal order from Ninoska OHYT to go ahead and give calcium and bicarb, hold off on dextrose and insulin until hypoglycemic episode is resolved and then do Q15 BGs

## 2021-08-16 NOTE — H&P
Hospitalist Admission Note    NAME: Shaista Ireland   :  1965   MRN:  [id number]     Date/Time:  2021 6:12 AM    Patient PCP: None  ______________________________________________________________________  Given the patient's current clinical presentation, I have a high level of concern for decompensation if discharged from the emergency department. Complex decision making was performed, which includes reviewing the patient's available past medical records, laboratory results, and x-ray films. My assessment of this patient's clinical condition and my plan of care is as follows. Assessment / Plan:    Shortness of breath secondary to pulmonary edema  Acute on chronic systolic and diastolic heart failure  End-stage renal disease on dialysis  Hyperkalemia  Uremic encephalopathy  Metabolic acidosis  Hyponatremia  Chest x-rayResolved right upper lobe airspace opacity. Mild central pulmonary vascular congestion without overt pulmonary edema. Discharged on  secondary to similar complaints. Noncompliant with the fluid intake. On dialysisMonday, Wednesday and Friday. proBNP > 35,000  Troponin0.19, has been elevated in the past as well. Will trend another troponin. Duo nebs as needed. Nephrology consulteddialysis today. Patient got insulin along with D50 and calcium gluconate. Potassium 7.1.  Sodium 130  Patient also got 2 ampoules of bicarb in the ER. Anion gap is 16 and CO2 is 20. Hypertension  Hyperlipidemia  GERD  Neuropathy  History of DVT  Continue the home medications. Recurrent falls  Left rib fractures  Incentive spirometry, as needed pain medications. Code Status: Full code  Surrogate Decision Maker:    DVT Prophylaxis: Heparin  GI Prophylaxis: not indicated    Baseline: Ambulatory at home      Subjective:   CHIEF COMPLAINT: Shortness of breath    HISTORY OF PRESENT ILLNESS:     Shaista Ireland is a 55 y.o.    male who presents with shortness of breath that started yesterday. Patient past medical history of end-stage renal disease on dialysisMonday, Wednesday and Friday, hypertension, hyperlipidemia, GERD, neuropathy. Patient was discharged from the hospital on 8/13 secondary to return similar complaints. Patient currently encephalopathic and not able to give us the history but keep complaining about shortness of breath. Not complaining of anything else. We were asked to admit for work up and evaluation of the above problems. Past Medical History:   Diagnosis Date    Chronic kidney disease     Dialysis patient ([address]     Hypertension         Past Surgical History:   Procedure Laterality Date    HX VASCULAR ACCESS Left     LUE AV fistula       Social History     Tobacco Use    Smoking status: Current Every Day Smoker     Packs/day: 0.25    Smokeless tobacco: Never Used   Substance Use Topics    Alcohol use: Yes        Family History   Problem Relation Age of Onset    No Known Problems Mother     No Known Problems Father     No Known Problems Sister     No Known Problems Brother     No Known Problems Sister     No Known Problems Sister     No Known Problems Brother      Allergies   Allergen Reactions    Motrin [Ibuprofen] Hives     7/16/17 Pt denies allergy    Tylenol [Acetaminophen] Hives     7/16/17 Pt denies allergy        Prior to Admission medications    Medication Sig Start Date End Date Taking? Authorizing Provider   amoxicillin-clavulanate (Augmentin) 875-125 mg per tablet Take 1 Tablet by mouth every twelve (12) hours for 5 days. 8/14/21 8/19/21  Jessi George MD   metoprolol tartrate (LOPRESSOR) 25 mg tablet Take 1 Tablet by mouth every twelve (12) hours for 30 days. 8/14/21 9/13/21  Jessi George MD   pantoprazole (PROTONIX) 40 mg tablet Take 1 Tablet by mouth daily for 30 days.  8/14/21 9/13/21  Jessi George MD   sevelamer (RenageL) 400 mg tablet Take 2 Tablets by mouth three (3) times daily (with meals) for 30 days. 8/14/21 9/13/21  Le Lopez MD   isosorbide mononitrate ER (IMDUR) 30 mg tablet Take 1 Tablet by mouth every morning for 30 days. 8/14/21 9/13/21  Le Lopez MD   citalopram (CELEXA) 40 mg tablet Take 1 Tablet by mouth daily for 30 days. 8/14/21 9/13/21  Le Lopez MD   atorvastatin (LIPITOR) 40 mg tablet Take 1 Tablet by mouth nightly for 30 days. 8/14/21 9/13/21  Le Lopez MD   apixaban (ELIQUIS) 5 mg tablet Take 1 Tablet by mouth two (2) times a day for 30 days. 8/14/21 9/13/21  Le Lopez MD   paricalcitoL (ZEMPLAR) 1 mcg capsule Take 1 Capsule by mouth daily for 30 days. 8/14/21 9/13/21  Le Lopez MD   oxyCODONE IR (ROXICODONE) 5 mg immediate release tablet Take 1 Tablet by mouth every four (4) hours as needed for Pain for up to 3 days. Max Daily Amount: 30 mg. 8/13/21 8/16/21  Le Lopez MD   lidocaine (LIDODERM) 5 % Apply patch to the affected area for 12 hours a day and remove for 12 hours a day. 11/28/17   Reta De Oliveira MD   aspirin delayed-release 81 mg tablet Take 1 Tab by mouth daily. 11/28/17   Reta De Oliveira MD   multivitamin (ONE A DAY) tablet Take 1 Tab by mouth daily. Juan Olivarez MD   gabapentin (NEURONTIN) 300 mg capsule Take 300 mg by mouth two (2) times a day. Juan Olivarez MD       REVIEW OF SYSTEMS:     I am not able to complete the review of systems because:    The patient is intubated and sedated    The patient has altered mental status due to his acute medical problems    The patient has baseline aphasia from prior stroke(s)    The patient has baseline dementia and is not reliable historian    The patient is in acute medical distress and unable to provide information           Total of 12 systems reviewed as follows:       POSITIVE= underlined text  Negative = text not underlined  General:  fever, chills, sweats, generalized weakness, weight loss/gain,      loss of appetite   Eyes:    blurred vision, eye pain, loss of vision, double vision  ENT:    rhinorrhea, pharyngitis   Respiratory:   cough, sputum production, SOB, WARNER, wheezing, pleuritic pain   Cardiology:   chest pain, palpitations, orthopnea, PND, edema, syncope   Gastrointestinal:  abdominal pain , N/V, diarrhea, dysphagia, constipation, bleeding   Genitourinary:  frequency, urgency, dysuria, hematuria, incontinence   Muskuloskeletal :  arthralgia, myalgia, back pain  Hematology:  easy bruising, nose or gum bleeding, lymphadenopathy   Dermatological: rash, ulceration, pruritis, color change / jaundice  Endocrine:   hot flashes or polydipsia   Neurological:  headache, dizziness, confusion, focal weakness, paresthesia,     Speech difficulties, memory loss, gait difficulty  Psychological: Feelings of anxiety, depression, agitation    Objective:   VITALS:    Visit Vitals  /69 (BP 1 Location: Right upper arm, BP Patient Position: At rest)   Pulse 76   Temp 97.8 °F (36.6 °C)   Resp 18   Ht 5' 9\" (1.753 m)   Wt 72.6 kg (160 lb)   SpO2 97%   BMI 23.63 kg/m²       PHYSICAL EXAM:    General:    Alert, cooperative, mild distress, appears stated age. HEENT: Atraumatic, anicteric sclerae, pink conjunctivae     No oral ulcers, mucosa moist, throat clear, dentition fair  Neck:  Supple, symmetrical,  thyroid: non tender  Lungs:   Fine crackles bilaterally. No Wheezing or Rhonchi. No rales. Chest wall:  No tenderness  No Accessory muscle use. Heart:   Regular  rhythm,  No  murmur   No edema  Abdomen:   Soft, non-tender. Not distended. Bowel sounds normal  Extremities: No cyanosis. No clubbing,      Skin turgor normal, Capillary refill normal, Radial dial pulse 2+  Skin:     Not pale. Not Jaundiced  No rashes   Psych:  Good insight. Not depressed. Not anxious or agitated. Neurologic: EOMs intact. No facial asymmetry. No aphasia or slurred speech. Symmetrical strength, Sensation grossly intact. Alert and oriented X 2. _______________________________________________________________________  Care Plan discussed with:    Comments   Patient x    Family      RN x    Care Manager                    Consultant:  x    _______________________________________________________________________  Expected  Disposition:   Home with Family    HH/PT/OT/RN x   SNF/LTC    LEO    ________________________________________________________________________  TOTAL TIME:  61 Minutes    Critical Care Provided     Minutes non procedure based      Comments     Reviewed previous records   >50% of visit spent in counseling and coordination of care  Discussion with patient and/or family and questions answered       ________________________________________________________________________  Signed: Glenny Martinez MD    Procedures: see electronic medical records for all procedures/Xrays and details which were not copied into this note but were reviewed prior to creation of Plan.     LAB DATA REVIEWED:    Recent Results (from the past 24 hour(s))   BLOOD GAS,CHEM8,LACTIC ACID POC    Collection Time: 08/16/21  3:59 AM   Result Value Ref Range    Calcium, ionized (POC) 1.02 (L) 1.12 - 1.32 mmol/L    BICARBONATE 20 mmol/L    Base deficit (POC) 6.3 mmol/L    Sample source VENOUS BLOOD      CO2, POC 21 19 - 24 MMOL/L    Sodium,  (L) 136 - 145 MMOL/L    Potassium, POC 7.4 (HH) 3.5 - 5.5 MMOL/L    Chloride, POC 98 (L) 100 - 108 MMOL/L    Glucose, POC 37 (LL) 74 - 106 MG/DL    Creatinine, POC 12.3 (H) 0.6 - 1.3 MG/DL    Lactic Acid (POC) 5.21 (HH) 0.40 - 2.00 mmol/L    pH, venous (POC) 7.29 (L) 7.32 - 7.42      pCO2, venous (POC) 42.3 41 - 51 MMHG    pO2, venous (POC) 19 (L) 25 - 40 mmHg   CBC WITH AUTOMATED DIFF    Collection Time: 08/16/21  4:04 AM   Result Value Ref Range    WBC 11.1 4.1 - 11.1 K/uL    RBC 3.34 (L) 4.10 - 5.70 M/uL    HGB 10.7 (L) 12.1 - 17.0 g/dL    HCT 34.3 (L) 36.6 - 50.3 %    .7 (H) 80.0 - 99.0 FL    MCH 32.0 26.0 - 34.0 PG    MCHC 31.2 30.0 - 36.5 g/dL    RDW 17.2 (H) 11.5 - 14.5 %    PLATELET 369 068 - 281 K/uL    MPV 11.5 8.9 - 12.9 FL    NRBC 0.4 (H) 0  WBC    ABSOLUTE NRBC 0.04 (H) 0.00 - 0.01 K/uL    NEUTROPHILS 69 32 - 75 %    LYMPHOCYTES 20 12 - 49 %    MONOCYTES 11 5 - 13 %    EOSINOPHILS 0 0 - 7 %    BASOPHILS 0 0 - 1 %    IMMATURE GRANULOCYTES 0 0.0 - 0.5 %    ABS. NEUTROPHILS 7.7 1.8 - 8.0 K/UL    ABS. LYMPHOCYTES 2.2 0.8 - 3.5 K/UL    ABS. MONOCYTES 1.2 (H) 0.0 - 1.0 K/UL    ABS. EOSINOPHILS 0.0 0.0 - 0.4 K/UL    ABS. BASOPHILS 0.0 0.0 - 0.1 K/UL    ABS. IMM. GRANS. 0.0 0.00 - 0.04 K/UL    DF AUTOMATED     METABOLIC PANEL, COMPREHENSIVE    Collection Time: 08/16/21  4:04 AM   Result Value Ref Range    Sodium 130 (L) 136 - 145 mmol/L    Potassium 7.1 (HH) 3.5 - 5.1 mmol/L    Chloride 94 (L) 97 - 108 mmol/L    CO2 20 (L) 21 - 32 mmol/L    Anion gap 16 (H) 5 - 15 mmol/L    Glucose 39 (LL) 65 - 100 mg/dL    BUN 82 (H) 6 - 20 MG/DL    Creatinine 12.00 (H) 0.70 - 1.30 MG/DL    BUN/Creatinine ratio 7 (L) 12 - 20      GFR est AA 5 (L) >60 ml/min/1.73m2    GFR est non-AA 4 (L) >60 ml/min/1.73m2    Calcium 9.2 8.5 - 10.1 MG/DL    Bilirubin, total 0.6 0.2 - 1.0 MG/DL    ALT (SGPT) 65 12 - 78 U/L    AST (SGOT) 131 (H) 15 - 37 U/L    Alk.  phosphatase 109 45 - 117 U/L    Protein, total 8.0 6.4 - 8.2 g/dL    Albumin 2.9 (L) 3.5 - 5.0 g/dL    Globulin 5.1 (H) 2.0 - 4.0 g/dL    A-G Ratio 0.6 (L) 1.1 - 2.2     NT-PRO BNP    Collection Time: 08/16/21  4:04 AM   Result Value Ref Range    NT pro-BNP >35,000 (H) <125 PG/ML   CK W/ CKMB & INDEX    Collection Time: 08/16/21  4:04 AM   Result Value Ref Range    CK - MB 3.6 (H) <3.6 NG/ML    CK-MB Index 1.1 0.0 - 2.5       (H) 39 - 308 U/L   TROPONIN I    Collection Time: 08/16/21  4:04 AM   Result Value Ref Range    Troponin-I, Qt. 0.19 (H) <0.05 ng/mL   SAMPLES BEING HELD    Collection Time: 08/16/21  4:04 AM   Result Value Ref Range    SAMPLES BEING HELD  RED, BLUE, SST     COMMENT Add-on orders for these samples will be processed based on acceptable specimen integrity and analyte stability, which may vary by analyte.    GLUCOSE, POC    Collection Time: 08/16/21  4:07 AM   Result Value Ref Range    Glucose (POC) 28 (LL) 65 - 117 mg/dL    Performed by Samanta Boggs    BLOOD GAS,CHEM8,LACTIC ACID POC    Collection Time: 08/16/21  4:13 AM   Result Value Ref Range    Calcium, ionized (POC) 1.01 (L) 1.12 - 1.32 mmol/L    BICARBONATE 21 mmol/L    Base deficit (POC) 5.9 mmol/L    Sample source VENOUS BLOOD      CO2, POC 22 19 - 24 MMOL/L    Sodium,  (L) 136 - 145 MMOL/L    Potassium, POC 8.0 (HH) 3.5 - 5.5 MMOL/L    Chloride, POC 97 (L) 100 - 108 MMOL/L    Glucose,  (H) 74 - 106 MG/DL    Creatinine, POC 11.7 (H) 0.6 - 1.3 MG/DL    Lactic Acid (POC) 6.74 (HH) 0.40 - 2.00 mmol/L    Critical value read back OBIER     pH, venous (POC) 7.26 (L) 7.32 - 7.42      pCO2, venous (POC) 47.5 41 - 51 MMHG    pO2, venous (POC) 21 (L) 25 - 40 mmHg   GLUCOSE, POC    Collection Time: 08/16/21  4:34 AM   Result Value Ref Range    Glucose (POC) 112 65 - 117 mg/dL    Performed by Allen Elmore    GLUCOSE, POC    Collection Time: 08/16/21  5:43 AM   Result Value Ref Range    Glucose (POC) 103 65 - 117 mg/dL    Performed by Samanta Boggs

## 2021-08-17 NOTE — PROGRESS NOTES
End of Shift Note    Bedside shift change report given to [facility], RN (oncoming nurse) by Kat Lopez RN (offgoing nurse). Report included the following information SBAR, Kardex, Intake/Output, MAR and Recent Results    Shift worked: [id number]   Shift summary and any significant changes:    No significant changes during shift   Concerns for physician to address: None   Zone phone for oncoming shift:  [phone]       Activity:  Activity Level: Up ad brandy  Number times ambulated in hallways past shift: 0  Number of times OOB to chair past shift: 0    Cardiac:   Cardiac Monitoring: Yes      Cardiac Rhythm: Sinus Rhythm    Access:   Current line(s): PIV and HD access     Genitourinary:   Urinary status:    Respiratory:   O2 Device: None (Room air)  Chronic home O2 use?: NO  Incentive spirometer at bedside: NO     GI:  Last Bowel Movement Date: 08/14/21  Current diet:  ADULT DIET Regular; 4 carb choices (60 gm/meal); Low Fat/Low Chol/High Fiber/2 gm Na; Low Sodium (2 gm); 1500 ml  Passing flatus: YES  Tolerating current diet: YES       Pain Management:   Patient states pain is manageable on current regimen: YES    Skin:  Jovan Score: 21  Interventions: float heels and increase time out of bed    Patient Safety:  Fall Score:  Total Score: 1  Interventions: gripper socks and pt to call before getting OOB       Length of Stay:  Expected LOS: 4d 0h  Actual LOS: 1      Kat Lopez RN

## 2021-08-17 NOTE — PROGRESS NOTES
Hospitalist Progress Note    NAME: Lester Harmon   :  1965   MRN:  [id number]     I reviewed pertinent labs and imaging, and discussed /agreed on the plan of care with Dr. Yaquelin Patel. Assessment / Plan:  Pulmonary Edema and Volume Overload r/t Acute on Chronic Diastolic and Systolic Heart Failure Exacerbation and Non compliance with HD pBNP >38068  End-stage renal disease on dialysis Missed 2 HD treatments PTA   Hyperkalemia - resolved   Uremic Encephalopathy (resolving) and Metabolic Acidosis (resolved) r/t Noncompliance with HD   Hyponatremia - resolving   Chest XR  Resolved right upper lobe airspace opacity. Mild central pulmonary vascular congestion without overt pulmonary edema. Discharged on 8/3 secondary to similar complaints. Noncompliant with the fluid intake and HD attendance   On HD M//   Troponin slightly elevated in setting of ESRD and CHF exacerbation   Duo nebs PRN  -Tolerating RA   Na 134, follow   -Strict I&Os and daily weights   -Appreciate Nephrology input - for HD tomorrow   -Continues with some confusion today, although appears to be improving     Hypoglycemia Glucose 39 on admission  -Continue D10 drip   -Check HgbA1c   -Denies history of diabetes     Recent Community Acquired PNA Continue Augmentin      Hypertension  Hyperlipidemia  -ECHO - EF 45-50%. Moderately reduced systolic function. LV diastolic dysfunction. Mild to moderate MVR. -Continue ASA, atorvastatin, imdur, metoprolol      GERD Continue Protonix   Neuropathy  History of DVT Continue Eliquis      Recurrent falls  Left rib fractures  -PT/OT evaluations     States he is on methadone Pharmacy to complete med rec     18.5 - 24.9 Normal weight / Body mass index is 23.63 kg/m². Estimated discharge date:   Barriers:    Code status: Full  Prophylaxis: eliquis  Recommended Disposition: SNF/LTC? Subjective:     Chief Complaint / Reason for Physician Visit  Patient seen at bedside.  He remains confused and is a poor historian. Stated he was discharged and came back the same night - does not recall being home for a few days and missing HD treatments. Discussed plan of care, unclear if patient understood fully. Discussed with RN events overnight. Review of Systems:  Symptom Y/N Comments  Symptom Y/N Comments   Fever/Chills    Chest Pain     Poor Appetite    Edema     Cough    Abdominal Pain     Sputum    Joint Pain     SOB/WARNER    Pruritis/Rash     Nausea/vomit    Tolerating PT/OT     Diarrhea    Tolerating Diet     Constipation    Other       Could NOT obtain due to: Confusion      Objective:     VITALS:   Last 24hrs VS reviewed since prior progress note. Most recent are:  Patient Vitals for the past 24 hrs:   Temp Pulse Resp BP SpO2   08/17/21 0733 98.4 °F (36.9 °C) 98 16 (!) 131/98 96 %   08/17/21 0310 98.9 °F (37.2 °C) 100 18 125/83 92 %   08/17/21 0000  99      08/16/21 2010 97.8 °F (36.6 °C) (!) 101 18 139/84 98 %   08/16/21 2000  (!) 104      08/16/21 1537 98.7 °F (37.1 °C) 99 18 (!) 140/59 100 %   08/16/21 1252 98.6 °F (37 °C) (!) 104 18 (!) 143/89 100 %   08/16/21 1140  96 18 (!) 151/116    08/16/21 1115  98 18 (!) 174/112    08/16/21 1045  94 18 (!) 172/114    08/16/21 1015  100 19 (!) 174/119    08/16/21 0945  98 19 (!) 169/114        Intake/Output Summary (Last 24 hours) at 8/17/2021 0944  Last data filed at 8/17/2021 0211  Gross per 24 hour   Intake 600 ml   Output 3000 ml   Net -2400 ml        I had a face to face encounter and independently examined this patient on 8/17/2021, as outlined below:  PHYSICAL EXAM:  General: WD, WN. Alert, cooperative, male in no acute distress    EENT:  EOMI. Anicteric sclerae. MMM  Resp:  LS diminished at bases, no wheezing or rales. No accessory muscle use  CV:  Regular  rhythm,  No edema  GI:  Soft, Non distended, Non tender. +Bowel sounds  Neurologic:  Alert and oriented X 1-2, normal speech,   Psych:   Limited insight.  Not anxious nor agitated  Skin:  No rashes. No jaundice    Reviewed most current lab test results and cultures  YES  Reviewed most current radiology test results   YES  Review and summation of old records today    NO  Reviewed patient's current orders and MAR    YES  PMH/SH reviewed - no change compared to H&P  ________________________________________________________________________  Care Plan discussed with:    Comments   Patient x    Family      RN x    Care Manager     Consultant                        Multidiciplinary team rounds were held today with , nursing, pharmacist and clinical coordinator. Patient's plan of care was discussed; medications were reviewed and discharge planning was addressed. ________________________________________________________________________  Total NON critical care TIME:  25   Minutes    Total CRITICAL CARE TIME Spent:   Minutes non procedure based      Comments   >50% of visit spent in counseling and coordination of care x    ________________________________________________________________________  Maryanne Brown NP     Procedures: see electronic medical records for all procedures/Xrays and details which were not copied into this note but were reviewed prior to creation of Plan. LABS:  I reviewed today's most current labs and imaging studies.   Pertinent labs include:  Recent Labs     08/16/21  0404   WBC 11.1   HGB 10.7*   HCT 34.3*        Recent Labs     08/17/21  0225 08/16/21  1548 08/16/21  0404   * 134* 130*   K 4.0 4.3 7.1*   CL 92* 97 94*   CO2 27 30 20*   * 115* 39*   BUN 43* 35* 82*   CREA 7.73* 6.61* 12.00*   CA 9.0 8.8 9.2   MG 2.2  --   --    ALB  --   --  2.9*   TBILI  --   --  0.6   ALT  --   --  65       Signed: Maryanne Brown NP

## 2021-08-17 NOTE — PROGRESS NOTES
Physician Progress Note      PATIENT:               Andre Ren  CSN #:                  [id number]  :                       1965  ADMIT DATE:       2021 3:24 AM  DISCH DATE:  RESPONDING  PROVIDER #:        Trena GURROLA NP        QUERY TEXT:    Type of Anemia: Please provide further specificity, if known. Clinical indicators include: esrd, anemia, h/h, chronic kidney disease, bleeding, hgb, hct, fe, tibc, rbc  Options provided:  -- Anemia due to acute blood loss  -- Anemia due to chronic blood loss  -- Anemia due to iron deficiency  -- Anemia due to postoperative blood loss  -- Anemia due to chronic disease  -- Other - I will add my own diagnosis  -- Disagree - Not applicable / Not valid  -- Disagree - Clinically Unable to determine / Unknown        PROVIDER RESPONSE TEXT:    The patient has anemia due to chronic disease.       Electronically signed by:  Dandre Rai NP 2021 1:54 PM

## 2021-08-17 NOTE — PROGRESS NOTES
NAME: Santhosh Ferreira        :  1965        MRN:  [id number]               Assessment   :                                               Plan:  FUTR-RJG-FEE [place]  Anemia  Dyspnea  Volume overload     noncompliance No acute need for HD today. Plan HD in AM.     H/H at goal. [facility] retacrit     Sodium low.  A  Little better with HD. Fluid restrict.                      Subjective:     Chief Complaint:  \" I feel bad. \"\"  Nothing specific. No N/V. No dyspnea. No pain. Review of Systems:    Symptom Y/N Comments  Symptom Y/N Comments   Fever/Chills    Chest Pain     Poor Appetite    Edema     Cough    Abdominal Pain     Sputum    Joint Pain     SOB/WARNER    Pruritis/Rash     Nausea/vomit    Tolerating PT/OT     Diarrhea    Tolerating Diet     Constipation    Other       Could not obtain due to:      Objective:     VITALS:   Last 24hrs VS reviewed since prior progress note.  Most recent are:  Visit Vitals  BP (!) 131/98 (BP 1 Location: Right upper arm, BP Patient Position: At rest)   Pulse 98   Temp 98.4 °F (36.9 °C)   Resp 16   Ht 5' 9\" (1.753 m)   Wt 72.6 kg (160 lb)   SpO2 96%   BMI 23.63 kg/m²       Intake/Output Summary (Last 24 hours) at 2021 0934  Last data filed at 2021 9632  Gross per 24 hour   Intake 600 ml   Output 3000 ml   Net -2400 ml      Telemetry Reviewed:     PHYSICAL EXAM:  General: NAD  No edema    Lab Data Reviewed: (see below)    Medications Reviewed: (see below)    PMH/SH reviewed - no change compared to H&P  ________________________________________________________________________  Care Plan discussed with:  Patient     Family      RN     Care Manager                    Consultant:          Comments   >50% of visit spent in counseling and coordination of care       ________________________________________________________________________  Elijah Verdugo MD     Procedures: see electronic medical records for all procedures/Xrays and details which  were not copied into this note but were reviewed prior to creation of Plan. LABS:  Recent Labs     08/16/21  0404   WBC 11.1   HGB 10.7*   HCT 34.3*        Recent Labs     08/17/21  0225 08/16/21  1548 08/16/21  0404   * 134* 130*   K 4.0 4.3 7.1*   CL 92* 97 94*   CO2 27 30 20*   BUN 43* 35* 82*   CREA 7.73* 6.61* 12.00*   * 115* 39*   CA 9.0 8.8 9.2   MG 2.2  --   --      Recent Labs     08/16/21  0404      TP 8.0   ALB 2.9*   GLOB 5.1*     No results for input(s): INR, PTP, APTT, INREXT in the last 72 hours. No results for input(s): FE, TIBC, PSAT, FERR in the last 72 hours. No results found for: FOL, RBCF   No results for input(s): PH, PCO2, PO2 in the last 72 hours.   Recent Labs     08/16/21  0404   *   CKMB 3.6*     No components found for: [place]  Lab Results   Component Value Date/Time    Color YELLOW/STRAW 05/06/2017 01:30 PM    Appearance CLOUDY (A) 05/06/2017 01:30 PM    Specific gravity 1.015 05/06/2017 01:30 PM    pH (UA) 7.5 05/06/2017 01:30 PM    Protein >300 (A) 05/06/2017 01:30 PM    Glucose NEGATIVE  05/06/2017 01:30 PM    Ketone NEGATIVE  05/06/2017 01:30 PM    Bilirubin NEGATIVE  05/06/2017 01:30 PM    Urobilinogen 0.2 05/06/2017 01:30 PM    Nitrites NEGATIVE  05/06/2017 01:30 PM    Leukocyte Esterase NEGATIVE  05/06/2017 01:30 PM    Epithelial cells FEW 05/06/2017 01:30 PM    Bacteria NEGATIVE  05/06/2017 01:30 PM    WBC 5-10 05/06/2017 01:30 PM    RBC 0-5 05/06/2017 01:30 PM       MEDICATIONS:  Current Facility-Administered Medications   Medication Dose Route Frequency    apixaban (ELIQUIS) tablet 5 mg  5 mg Oral BID    atorvastatin (LIPITOR) tablet 40 mg  40 mg Oral QHS    citalopram (CELEXA) tablet 40 mg  40 mg Oral DAILY    gabapentin (NEURONTIN) capsule 300 mg  300 mg Oral BID    isosorbide mononitrate ER (IMDUR) tablet 30 mg  30 mg Oral DAILY    metoprolol tartrate (LOPRESSOR) tablet 25 mg  25 mg Oral Q12H    pantoprazole (PROTONIX) tablet 40 mg  40 mg Oral DAILY    doxercalciferoL (HECTOROL) capsule 0.5 mcg  0.5 mcg Oral DAILY    sevelamer carbonate (RENVELA) tab 800 mg  800 mg Oral TID WITH MEALS    aspirin delayed-release tablet 81 mg  81 mg Oral DAILY    sodium chloride (NS) flush 5-40 mL  5-40 mL IntraVENous Q8H    sodium chloride (NS) flush 5-40 mL  5-40 mL IntraVENous PRN    polyethylene glycol (MIRALAX) packet 17 g  17 g Oral DAILY PRN    albuterol-ipratropium (DUO-NEB) 2.5 MG-0.5 MG/3 ML  3 mL Nebulization Q6H PRN    dextrose 10% infusion  50 mL/hr IntraVENous CONTINUOUS    amoxicillin-clavulanate (AUGMENTIN) 500-125 mg per tablet 1 Tablet  1 Tablet Oral Q24H

## 2021-08-17 NOTE — PROGRESS NOTES
End of Shift Note    Bedside shift change report given to Abraham (oncoming nurse) by Davon Krishna RN (offgoing nurse). Report included the following information SBAR, Kardex, Intake/Output, MAR and Recent Results    Shift worked:  7a-7p     Shift summary and any significant changes:     Pt had HD today, PICC team called for labs, replacement bed obtained     Concerns for physician to address:  none     Zone phone for oncoming shift:   [phone]       Activity:  Activity Level: Up ad brandy  Number times ambulated in hallways past shift: 0  Number of times OOB to chair past shift: 1    Cardiac:   Cardiac Monitoring: Yes           Access:   Current line(s): PIV and HD access     Genitourinary:   Urinary status: due to void    Respiratory:   O2 Device: None (Room air)  Chronic home O2 use?: NO  Incentive spirometer at bedside: NO     GI:  Last Bowel Movement Date: 08/14/21  Current diet:  ADULT DIET Regular; 4 carb choices (60 gm/meal); Low Fat/Low Chol/High Fiber/2 gm Na; Low Sodium (2 gm); 1500 ml  Passing flatus: YES  Tolerating current diet: YES       Pain Management:   Patient states pain is manageable on current regimen: YES    Skin:  Jovan Score: 21  Interventions: increase time out of bed and limit briefs    Patient Safety:  Fall Score:  Total Score: 1  Interventions: gripper socks and pt to call before getting OOB       Length of Stay:  Expected LOS: 4d 0h  Actual LOS: 0      Davon Krishna RN

## 2021-08-17 NOTE — PROGRESS NOTES
End of Shift Note    Bedside shift change report given to Per Holley (oncoming nurse) by Dominga Gutierrez RN (offgoing nurse). Report included the following information SBAR, Kardex, Intake/Output, MAR and Recent Results    Shift worked:  7a-7p     Shift summary and any significant changes:     uneventful shift     Concerns for physician to address:  none     Zone phone for oncoming shift:   [phone]       Activity:  Activity Level: Up ad brandy  Number times ambulated in hallways past shift: 0  Number of times OOB to chair past shift: 1    Cardiac:   Cardiac Monitoring: Yes      Cardiac Rhythm: Sinus Rhythm    Access:   Current line(s): PIV and HD access     Genitourinary:   Urinary status:     Respiratory:   O2 Device: None (Room air)  Chronic home O2 use?: NO  Incentive spirometer at bedside: NO     GI:  Last Bowel Movement Date: 08/14/21  Current diet:  ADULT DIET Regular; 4 carb choices (60 gm/meal); Low Fat/Low Chol/High Fiber/2 gm Na; Low Sodium (2 gm); 1500 ml  Passing flatus: YES  Tolerating current diet: YES       Pain Management:   Patient states pain is manageable on current regimen: YES    Skin:  Jovan Score: 21  Interventions: increase time out of bed    Patient Safety:  Fall Score:  Total Score: 1  Interventions: gripper socks       Length of Stay:  Expected LOS: 4d 0h  Actual LOS: 1      Dominga Gutierrez RN

## 2021-08-17 NOTE — PROGRESS NOTES
Spiritual Care Partner Volunteer visited patient at [address] in [facility] [address] [facility] on 8/17/2021     Documented by:  Shey Vásquez M.Div,., [facility] Provider   Paging Service [phone])

## 2021-08-18 NOTE — PROGRESS NOTES
TRANSFER - IN REPORT:    [facility]  being received from Gen-Surg for ordered procedure      Report consisted of patients Situation, Background, Assessment and   Recommendations(SBAR). Information from the following report(s) SBAR, Kardex and Cardiac Rhythm NS was reviewed with the receiving nurse. Opportunity for questions and clarification was provided. Assessment completed upon patients arrival to unit and care assumed.

## 2021-08-18 NOTE — PROGRESS NOTES
End of Shift Note    Bedside shift change report given to CCU RN (oncoming nurse) by José Miguel Rodríguez RN . (offgoing nurse). Report included the following information SBAR, Kardex, Intake/Output, MAR and Recent Results    Shift worked:  7p-7a     Shift summary and any significant changes:    Encouraged pt to eat and drink during shift. Pt also experienced nausea, zofran ordered and given. Pt ambulated to bathroom and had a formed BM. Pt's IV was removed while ambulating to bathroom, see previous note regarding attempts to get a new IV placed. Contacted NP for orders in case of a low BG, since pt was no longer receiving D10. Pt did not have orders for blood glucose checks. 0710 was informed by tech that pt stated that he wasn't feeling well and I took BG- came back at 24 retook on other side still low. Contacted Charge RN and gave glucagon IM and oral glucose tablets. BG did not improve  in 15 mins. Charge called rapid response and I gave IM glucagon again, pt did not wish to have anything else orally. Staff arrived, pt's prescription oxy found in pt's pocket. IJ was placed- D50 and narcan given. Pt vomited and had dyspnea. HR 30s-90s. Pt was intubated at 200 and transported to CCU, report given at 0900.     Concerns for physician to address:    Zone phone for oncoming shift:             José Miguel Rodríguez, RN

## 2021-08-18 NOTE — PROGRESS NOTES
Hospitalist Progress Note    NAME: Kurt Dowell   :  1965   MRN:  [id number]         Assessment / Plan:  Pulmonary Edema and Volume Overload r/t Acute on Chronic Diastolic and Systolic Heart Failure Exacerbation and Non compliance with HD pBNP >56176  End-stage renal disease on dialysis Missed 2 HD treatments PTA   Hyperkalemia - resolved   Uremic Encephalopathy (resolving) and Metabolic Acidosis (resolved) r/t Noncompliance with HD   Hyponatremia - resolving   Chest XR  Resolved right upper lobe airspace opacity. Mild central pulmonary vascular congestion without overt pulmonary edema. Discharged on 8/3 secondary to similar complaints. Noncompliant with the fluid intake and HD attendance   On HD M/W/F   Troponin slightly elevated in setting of ESRD and CHF exacerbation   Duo nebs PRN    Na 134, follow   -Strict I&Os and daily weights   -Appreciate Nephrology input - for HD tomorrow       Acute hypoxic respiratory failure, not POA  Severe hypoglycemia  Narcotic abuse  -RRT this a.m. for hypoglycemia, lost access in the a.m., became hypoglycemic, diaphoretic, lethargic. Had a central line placed by anesthesia, after central line, he was given D50 and Narcan and afterwards became hypoxic to 50s and bradycardic to 30s, which improved with NRB. Was in significant respiratory distress, was intubated   -Care transferred over to ICU physician.  -Order blood culture to rule out sepsis due to persistent hypoglycemia             Hypoglycemia Glucose 39 on admission  -Continue D10 drip   -Check HgbA1c   -Denies history of diabetes     Recent Community Acquired PNA Continue Augmentin      Hypertension  Hyperlipidemia  -ECHO - EF 45-50%. Moderately reduced systolic function. LV diastolic dysfunction. Mild to moderate MVR.    -Continue ASA, atorvastatin, imdur, metoprolol      GERD Continue Protonix   Neuropathy  History of DVT Continue Eliquis      Recurrent falls  Left rib fractures  -PT/OT evaluations States he is on methadone Pharmacy to complete med rec     18.5 - 24.9 Normal weight / Body mass index is 24.51 kg/m². Transfered to ICU     Subjective:     Chief Complaint / Reason for Physician Visit  Patient had a response earlier in the morning. Patient had lost IV access, he was on D10 drip. His glucose was down trending. Last glucose was 20s, was diaphoretic, attempted glucagon and p.o. tablets without any success. He was lethargic, but he was responsive and answering questions. Also bottle of oxycodone was found in his pocket.   had a central line placed anesthesia, he was given Narcan and D50, glucose has improved. He was more alert, but soon after he became severely tachypneic, had abdominal breathing, has been yelling that he is unable to breathe. His saturation dropped to 50s, became bradycardic. He was placed on NRB, with improvement in saturations to 80s. Decision made to intubate him. Review of Systems:  Symptom Y/N Comments  Symptom Y/N Comments   Fever/Chills    Chest Pain     Poor Appetite    Edema     Cough    Abdominal Pain     Sputum    Joint Pain     SOB/WARNER    Pruritis/Rash     Nausea/vomit    Tolerating PT/OT     Diarrhea    Tolerating Diet     Constipation    Other       Could NOT obtain due to:      Objective:     VITALS:   Last 24hrs VS reviewed since prior progress note.  Most recent are:  Patient Vitals for the past 24 hrs:   Temp Pulse Resp BP SpO2   08/18/21 1300  81 18 113/65 100 %   08/18/21 1230  83 18 134/78 100 %   08/18/21 1215  83 20 138/83 100 %   08/18/21 1200 98 °F (36.7 °C) 82 21 (!) 143/89 100 %   08/18/21 1145  82 23 129/81    08/18/21 1130  61 27 (!) 103/56 100 %   08/18/21 1115  (!) 53 29 (!) 88/55 100 %   08/18/21 1103  63 29  100 %   08/18/21 0945  63 30 (!) 92/57 100 %   08/18/21 0930  67 29 (!) 103/56 100 %   08/18/21 0910     100 %   08/18/21 0907  87 (!) 35     08/18/21 0844  90  107/82    08/18/21 0843  78   93 %   08/18/21 0842  89   91 %   08/18/21 0841  (!) 49  103/63 (!) 64 %   08/18/21 0839  (!) 57  125/85 (!) 84 %   08/18/21 0827    (!) 93/45 (!) 80 %   08/18/21 0821    116/85    08/18/21 0814  88  92/60 95 %   08/18/21 0805  88   100 %   08/18/21 0749 97.8 °F (36.6 °C) 91 14 (!) 115/41 99 %   08/18/21 0554  92  (!) 147/97    08/18/21 0351 97.9 °F (36.6 °C) 64 18 100/66 97 %   08/18/21 0350  63  (!) 84/56    08/18/21 0014  94  130/85    08/17/21 2357 98.5 °F (36.9 °C) 79 20 104/70 97 %   08/17/21 2148  96  125/86    08/17/21 2132 97.8 °F (36.6 °C) 95 16 132/87 97 %   08/17/21 1609 98.2 °F (36.8 °C) 93 16 133/83 93 %       Intake/Output Summary (Last 24 hours) at 8/18/2021 1334  Last data filed at 8/18/2021 0550  Gross per 24 hour   Intake 250 ml   Output    Net 250 ml        I had a face to face encounter and independently examined this patient on 8/18/2021, as outlined below:  PHYSICAL EXAM:  General: WD, WN. Alert, cooperative, distress+  EENT:  EOMI. Anicteric sclerae. MMM  Resp:  LS diminished at bases, no wheezing or rales. No accessory muscle use  CV:  Regular  rhythm,  No edema  GI:  Soft, Non distended, Non tender. +Bowel sounds  Neurologic:  Lethargic, oriented x2, no focal deficits  Psych:   Limited insight. Not anxious nor agitated  Skin:  No rashes. No jaundice    Reviewed most current lab test results and cultures  YES  Reviewed most current radiology test results   YES  Review and summation of old records today    NO  Reviewed patient's current orders and MAR    YES  PMH/SH reviewed - no change compared to H&P  ________________________________________________________________________  Care Plan discussed with:    Comments   Patient x    Family      RN x    Care Manager     Consultant                        Multidiciplinary team rounds were held today with , nursing, pharmacist and clinical coordinator.   Patient's plan of care was discussed; medications were reviewed and discharge planning was addressed. ________________________________________________________________________  Total NON critical care TIME:  34   Minutes    Total CRITICAL CARE TIME Spent:  40 Minutes non procedure based      Comments   >50% of visit spent in counseling and coordination of care x    ________________________________________________________________________  Alyson Hammer MD     Procedures: see electronic medical records for all procedures/Xrays and details which were not copied into this note but were reviewed prior to creation of Plan. LABS:  I reviewed today's most current labs and imaging studies. Pertinent labs include:  Recent Labs     08/16/21  0404   WBC 11.1   HGB 10.7*   HCT 34.3*        Recent Labs     08/18/21  0937 08/17/21  0225 08/16/21  1548 08/16/21  0404 08/16/21  0404   * 131* 134*   < > 130*   K 6.9* 4.0 4.3   < > 7.1*   CL 82* 92* 97   < > 94*   CO2 14* 27 30   < > 20*   * 102* 115*   < > 39*   BUN 61* 43* 35*   < > 82*   CREA 11.00* 7.73* 6.61*   < > 12.00*   CA 8.7 9.0 8.8   < > 9.2   MG  --  2.2  --   --   --    ALB  --   --   --   --  2.9*   TBILI  --   --   --   --  0.6   ALT  --   --   --   --  65    < > = values in this interval not displayed.        Signed: Alyson Hammer MD

## 2021-08-18 NOTE — PROGRESS NOTES
RAPID RESPONSE TEAM    Responded to overhead RRT to 2107    Upon arrival, pt very lethargic, diaphoretic. VSS but BG 20. Staff reports pt has no IV access, has received IM glucagon prior to my arrival. Dialysis pt, can only use R arm. Attempted IV access x2, nursing supervisor x1. Unsuccessful. Almost empty bottle of oxycodone 5mg hanging out of pt pocket was discovered. Pt restless and agitated, not accepting glucose tablets. Dr Maximo Sparks at bedside. Called anesthesia for central line. Dr Edwin Rodriguez at bedside. Unsuccessful attempt RIJ, was able to place LIJ. Pt was monitored during procedure. VS were stable until the end of procedure. Bradycardic episode to the 20-40s noted, lasting 30-60 seconds. BP decreased to 90 SBP. Pt did recover and was given D50 as well as Narcan. Did not lose pulse or respirations but LOC was decreased. BG responded to quickly and improved to 98. Pt more alert and began vomiting. VSS. Shortly after, pt very anxious/restless, flailing arms, stating he cannot breathe. NRB 15L applied. Orders placed per Dr Maximo Sparks for CXR and ABG. Anesthesia called for possible intubation. Concern for pneumo? Pt again with episode of bradycardia that recovered back to 80s on own. Dr Edwin Rodriguez at bedside. Intensivist Dr Alise Campos at bedside. Decision was made to intubate pt. Pt was given 100mg propofol by Dr Edwin Rodriguez and intubated with 7.5 ETT. RT at bedside to manage airway/ventilations. Pt transferred to  1176 with RRT RN, RT, Surg Tele RN x2.      Patient Vitals for the past 4 hrs:   Temp Pulse Resp BP SpO2   08/18/21 0945  63 30 (!) 92/57 100 %   08/18/21 0930  67 29 (!) 103/56 100 %   08/18/21 0910     100 %   08/18/21 0907  87 (!) 35     08/18/21 0844  90  107/82    08/18/21 0843  78   93 %   08/18/21 0842  89   91 %   08/18/21 0841  (!) 49  103/63 (!) 64 %   08/18/21 0839  (!) 57  125/85 (!) 84 %   08/18/21 0827    (!) 93/45 (!) 80 %   08/18/21 0821    116/85    08/18/21 0814  88  92/60 95 %   08/18/21 0805  88   100 %   08/18/21 0749 97.8 °F (36.6 °C) 91 14 (!) 115/41 99 %           Please call with any questions or concerns.      Soraida Deng

## 2021-08-18 NOTE — PROGRESS NOTES
Occupational Therapy  OT order acknowledged and chart reviewed. Patient intubated this morning and moved to the CCU. OT will defer and follow up tomorrow/when medically stable.  Vannesa Acevedo OTSO/JACOB

## 2021-08-18 NOTE — PROGRESS NOTES
Patient transferred to CCU, report received from offgoing RN. Accompanied by RRT nurse and RT.   200: S 47, 1 AMP of glucose given  0900: D10W gt reinitiated at 50ml/hr, propofol gtt for RASS goal of -2 initiated  1030: Phenylephrine gtt started and titrated for MAP goal of 65mmHg or more.

## 2021-08-18 NOTE — PROCEDURES
[facility]  [phone]    Vitals   Pre   Post   Assessment   Pre   Post     Temp  Temp: (!) 96.5 °F (35.8 °C) (08/18/21 1522) 97.5 LOC  Intubated sedated Intubated sedated   HR   Pulse (Heart Rate): 75 (08/18/21 1522) 78 Lungs   Intubated, clear  intubated   B/P   BP: 105/70 (08/18/21 1522) 138/79 Cardiac   Regular bedside telemetry on corbin  regular bedside telemetry    Resp   Resp Rate: 16 (08/18/21 1522) 17 Skin   Warm and dry  Warm and dry    Pain level  Pain Intensity 1: 0 (08/18/21 1200) 0 Edema    None detected    None detected    Orders:    Duration:   Start:    1522 End:    1900 Total:      Dialyzer:   Dialyzer/Set Up Inspection: Revaclear (08/18/21 1522)   K Bath:   Dialysate K (mEq/L): 2 (08/18/21 1522)   Ca Bath:   Dialysate CA (mEq/L): 2.5 (08/18/21 1522)   Na/Bicarb:   Dialysate NA (mEq/L): 138 (08/18/21 1522)   Target Fluid Removal:   Goal/Amount of Fluid to Remove (mL): 0 mL (08/18/21 1522)   Access     Type & Location:   GLORIA AV fistula, + thrill/bruit, no redness or drainage, prepped with alcohol followed by chlorahexadine swab per policy, cannulated with two 15 gauge needles secured with tape, flushed easily   Labs     Obtained/Reviewed   Critical Results Called   Date when labs were drawn-  Hgb-    HGB   Date Value Ref Range Status   08/16/2021 10.7 (L) 12.1 - 17.0 g/dL Final     K-    Potassium   Date Value Ref Range Status   08/18/2021 6.9 (HH) 3.5 - 5.1 mmol/L Final     Comment:     INVESTIGATED PER DELTA CHECK PROTOCOL  RESULTS VERIFIED, PHONED TO AND READ BACK BY  DEBBIE BHAKTA AT 1045 NL       Ca-   Calcium   Date Value Ref Range Status   08/18/2021 8.7 8.5 - 10.1 MG/DL Final     Bun-   BUN   Date Value Ref Range Status   08/18/2021 61 (H) 6 - 20 MG/DL Final     Creat-   Creatinine   Date Value Ref Range Status   08/18/2021 11.00 (H) 0.70 - 1.30 MG/DL Final     Comment:     INVESTIGATED PER DELTA CHECK PROTOCOL        Medications/ Blood Products Given Name   Dose   Route and Time                     Blood Volume Processed (BVP):    79 Net Fluid   Removed:  0   Comments   Time Out Done: 9640  Primary Nurse Rpt Pre: REINALDO Hernandez RN   Primary Nurse Rpt Post: Morenita Jimenez RN   Pt Education: unable to educate intubated and sedated  Care Plan: continue current HD plan of care  Tx Summary: continue current HD plan of care   [id number] HD initiated as ordered. 1900 treatment completed, all possible blood returned, hemostasis achieved <10 minutes, dressing clean, dry and intact, + thrill. SBAR to primary nurse. All dialysis related medications have been reviewed.     Admiting Diagnosis:   Pt's previous clinic- [facility]   Consent signed - Informed Consent Verified: Yes (08/18/21 1522)  Hepatitis Status- 08/09/21 antigen negative immune connect care   Machine #- Machine Number: [id number] (08/18/21 1522)  Telemetry status- bedside telemetry

## 2021-08-18 NOTE — PROGRESS NOTES
PT order acknowledged and chart reviewed. Patient intubated this morning and moved to the CCU. PT will defer and follow up tomorrow for possible evaluation if appropriate.     Unknown Jury, PT

## 2021-08-18 NOTE — CONSULTS
Pulmonology Intensive Care Unit Initial Assessment    Subjective:        Subjective:     Critical Care Initial Evaluation Note: 8/18/2021 12:08 PM    Mr. Marcus Tobin is 55  past medical history of end-stage renal disease on dialysisMonday, Wednesday and Friday, hypertension, hyperlipidemia, GERD, neuropathy. He was admitted on 8/16 with hyperkalemia, shortness of breath and altered mental status. Patient received emergent HD. His K and respiratory failure had improved but this morning patient again was minimally responsive with ongoing hypoglycemia but patient had lost peripheral IV access. He was also found to have empty bottle of oxycodone in his pocket and possible ingestion to explain this acute change in mental status, he was given naracan and then dextrose but he developed respiratory distress. Rapid response was called. Decision was made to intubate him due to severe resp distress and encephalopathy and patient transferred to ICU for further care. Past Medical History:   Diagnosis Date    Chronic kidney disease     Dialysis patient ([facility] [address]     Hypertension       Past Surgical History:   Procedure Laterality Date    HX VASCULAR ACCESS Left     LUE AV fistula      Prior to Admission medications    Medication Sig Start Date End Date Taking? Authorizing Provider   methadone (DOLOPHINE) 10 mg/mL solution Take 160 mg by mouth daily. Yes Provider, Historical   amoxicillin-clavulanate (Augmentin) 875-125 mg per tablet Take 1 Tablet by mouth every twelve (12) hours for 5 days. 8/14/21 8/19/21  Alicia Ibarra MD   metoprolol tartrate (LOPRESSOR) 25 mg tablet Take 1 Tablet by mouth every twelve (12) hours for 30 days. 8/14/21 9/13/21  Alicia Ibarra MD   pantoprazole (PROTONIX) 40 mg tablet Take 1 Tablet by mouth daily for 30 days. 8/14/21 9/13/21  Alicia Ibarra MD   sevelamer (RenageL) 400 mg tablet Take 2 Tablets by mouth three (3) times daily (with meals) for 30 days.  8/14/21 9/13/21  Alicia Ibarra MD   isosorbide mononitrate ER (IMDUR) 30 mg tablet Take 1 Tablet by mouth every morning for 30 days. 8/14/21 9/13/21  Ava Addison MD   citalopram (CELEXA) 40 mg tablet Take 1 Tablet by mouth daily for 30 days. 8/14/21 9/13/21  Ava Addison MD   atorvastatin (LIPITOR) 40 mg tablet Take 1 Tablet by mouth nightly for 30 days. 8/14/21 9/13/21  Ava Addison MD   apixaban (ELIQUIS) 5 mg tablet Take 1 Tablet by mouth two (2) times a day for 30 days. 8/14/21 9/13/21  Ava Addison MD   paricalcitoL (ZEMPLAR) 1 mcg capsule Take 1 Capsule by mouth daily for 30 days. 8/14/21 9/13/21  Ava Addison MD   lidocaine (LIDODERM) 5 % Apply patch to the affected area for 12 hours a day and remove for 12 hours a day. 11/28/17   Renaldo Morales MD   aspirin delayed-release 81 mg tablet Take 1 Tab by mouth daily. 11/28/17   Renaldo Morales MD   multivitamin (ONE A DAY) tablet Take 1 Tab by mouth daily. Juan Olivarez MD   gabapentin (NEURONTIN) 100 mg capsule Take 100 mg by mouth daily. Juan Olivarez MD     Allergies   Allergen Reactions    Motrin [Ibuprofen] Hives     7/16/17 Pt denies allergy    Tylenol [Acetaminophen] Hives     7/16/17 Pt denies allergy      Social History     Tobacco Use    Smoking status: Current Every Day Smoker     Packs/day: 0.25    Smokeless tobacco: Never Used   Substance Use Topics    Alcohol use: Yes      Family History   Problem Relation Age of Onset    No Known Problems Mother     No Known Problems Father     No Known Problems Sister     No Known Problems Brother     No Known Problems Sister     No Known Problems Sister     No Known Problems Brother         Review of Systems   Unable to perform ROS: Mental status change       Objective:     Vital signs reviewed. No intake/output data recorded. 08/16 1901 - 08/18 0700  In: 850 [P.O.:250; I.V.:600]  Out: -     Physical Exam  Constitutional:       General: He is in acute distress.       Appearance: He is ill-appearing and toxic-appearing. Comments: Altered mental status   HENT:      Head: Normocephalic and atraumatic. Mouth/Throat:      Mouth: Mucous membranes are moist.   Eyes:      Extraocular Movements: Extraocular movements intact. Conjunctiva/sclera: Conjunctivae normal.   Cardiovascular:      Rate and Rhythm: Regular rhythm. Tachycardia present. Pulmonary:      Effort: Respiratory distress present. Breath sounds: Rales present. No wheezing. Abdominal:      General: There is no distension. Palpations: Abdomen is soft. Tenderness: There is no abdominal tenderness. Musculoskeletal:      Cervical back: Neck supple. Neurological:      Comments: Altered mental status.          Data Review:     Recent Results (from the past 24 hour(s))   GLUCOSE, POC    Collection Time: 08/18/21 12:24 AM   Result Value Ref Range    Glucose (POC) 74 65 - 117 mg/dL    Performed by Samuel Crury RN    GLUCOSE, POC    Collection Time: 08/18/21  7:15 AM   Result Value Ref Range    Glucose (POC) 24 (LL) 65 - 117 mg/dL    Performed by Dwight Mccormick (PCT)    GLUCOSE, POC    Collection Time: 08/18/21  7:17 AM   Result Value Ref Range    Glucose (POC) 29 (LL) 65 - 117 mg/dL    Performed by Dwight Mccormick (PCT)    GLUCOSE, POC    Collection Time: 08/18/21  7:36 AM   Result Value Ref Range    Glucose (POC) 32 (LL) 65 - 117 mg/dL    Performed by Jason Morocho RN    GLUCOSE, POC    Collection Time: 08/18/21  7:38 AM   Result Value Ref Range    Glucose (POC) 22 (LL) 65 - 117 mg/dL    Performed by Jason Morocho RN    GLUCOSE, POC    Collection Time: 08/18/21  7:58 AM   Result Value Ref Range    Glucose (POC) 15 (LL) 65 - 117 mg/dL    Performed by Samuel Curry RN    GLUCOSE, POC    Collection Time: 08/18/21  8:23 AM   Result Value Ref Range    Glucose (POC) 98 65 - 117 mg/dL    Performed by Samuel Curry RN    GLUCOSE, POC    Collection Time: 08/18/21  9:03 AM   Result Value Ref Range Glucose (POC) 47 (LL) 65 - 117 mg/dL    Performed by [facility] Chelsey Jose    GLUCOSE, POC    Collection Time: 08/18/21  9:25 AM   Result Value Ref Range    Glucose (POC) 207 (H) 65 - 117 mg/dL    Performed by [facility] Mago    METABOLIC PANEL, BASIC    Collection Time: 08/18/21  9:37 AM   Result Value Ref Range    Sodium 120 (L) 136 - 145 mmol/L    Potassium 6.9 (HH) 3.5 - 5.1 mmol/L    Chloride 82 (L) 97 - 108 mmol/L    CO2 14 (LL) 21 - 32 mmol/L    Anion gap 24 (H) 5 - 15 mmol/L    Glucose 231 (H) 65 - 100 mg/dL    BUN 61 (H) 6 - 20 MG/DL    Creatinine 11.00 (H) 0.70 - 1.30 MG/DL    BUN/Creatinine ratio 6 (L) 12 - 20      GFR est AA 6 (L) >60 ml/min/1.73m2    GFR est non-AA 5 (L) >60 ml/min/1.73m2    Calcium 8.7 8.5 - 10.1 MG/DL   GLUCOSE, POC    Collection Time: 08/18/21 10:27 AM   Result Value Ref Range    Glucose (POC) 200 (H) 65 - 117 mg/dL    Performed by [facility] Chelsey Jose    GLUCOSE, POC    Collection Time: 08/18/21 11:35 AM   Result Value Ref Range    Glucose (POC) 267 (H) 65 - 117 mg/dL    Performed by [facility] Chelsey Jose          Assessment:     -Acute hypoxemic respiratory failure. Sec to vol overload pulm edema, also patient aspirated during intubation.  -Acute metabolic/toxic encephalopathy. Suspected oxycodone overdose.   -Hyperkalemia.  -Suspected drug overdose while in the hospital. Empty bottle of oxycodone found in the pocket. On methadone at home. -ESRD. -Pulmonary edema. -H/o HTN. -GERD.  -H/o DVT. -Recurrent falls. Plan:     -Patient emergently intubated during the rapid response for resp distress and altered mental status. He did aspirate during intubation.  -Continue lung protective ventilation.  -Repeat labs including CBC, BMP, mag and phos. -Nephrology following for HD needs, will get HD today.  -Sedation with fentanyl gtt and propofol.  -Continue eliquis.  -Hold home BP meds for now.   -Closely monitor mental status.  -Start zosyn for aspiration pneumonia coverage. -ABCDE protocol      Prophylaxis:  Stress Ulcer Protocol Active: ON protonix already. DVT Protocol Active: continue qliquis. Critically ill and has high chances of further decompensation and poor prognosis overall. Full code. 60min of CC time spent without overlap and excluding procedures.

## 2021-08-18 NOTE — PROGRESS NOTES
Problem: Falls - Risk of  Goal: *Absence of Falls  Description: Document Mer Short Fall Risk and appropriate interventions in the flowsheet.   Outcome: Progressing Towards Goal  Note: Fall Risk Interventions:            Medication Interventions: Patient to call before getting OOB, Teach patient to arise slowly                   Problem: Patient Education: Go to Patient Education Activity  Goal: Patient/Family Education  Outcome: Progressing Towards Goal

## 2021-08-18 NOTE — PROGRESS NOTES
0715 Alerted by primary RN that patient has BG of 28. Orders entered by Night NP due to lack of IV Access; removed accidentally by patient early this morning. Patient is alert but diaphoretic. Attemtped x2 to by myself and evaluated by Rapid RN in regards to placing a new one. Unsuccessful. Call to ED placed for ultrasound guided IV.     0720 4 oz soda, 16G glucose tabs and 1mg glucagon administered. 0736 BG rechecked; 32. 1MG glucagon given. Rapid called in 2107 due to little response to interventions and lack of IV access.           Susan Fleming, RN

## 2021-08-18 NOTE — PROGRESS NOTES
Report received from Monica Jimenez RN. .  HD in room, not yet connected. 1600  Tube feeding started as ordered,  HD running smoothly per Griselda Hanesn RN. Will continue to monitor FSBS closely. See assessment. [address] bottles of pills in room, unable to leave message with Mother due to full mailbox. No other contact listed. Pharmacy aware. Security called for wallet and credit card to be locked up for patient. 1850 No changes noted, blood sugars stable. Will check every 2 hours. HD nearly completed. 1930  Report given to Gene Hollis RN. Now finished with HD. Again attempted to leave message with Mother, Ita Molinaangelito is full. Medications remain in room with patient in green bag.

## 2021-08-18 NOTE — PROGRESS NOTES
RAPID RESPONSE TEAM- Follow Up     Unable to obtain IV x2 stick. Primary RN notified and supervisor notified. Spoke with primary RN to message hospitalist about no access and to consent for central line access STAT d/t issues with hypoglycemia. Patient A & O x4. Was given glucose tablets and orange juice by primary RN. Primary RN to recheck BG in 15 mins. Gay Bruno.   Ext [id number]

## 2021-08-18 NOTE — PROCEDURES
Asked to place central line in this unresponsive, hypoglycemic patient with no iv access. Central Line Procedure Note    Indication: Inadequate venous access      Patient positioned in Trendelenburg. 7-Step Sterility Protocol followed. (cap, mask sterile gown, sterile gloves, large sterile sheet, hand hygiene, 2% chlorhexidine for cutaneous antisepsis)  5 mL 1% Lidocaine placed at insertion site. Initial attempt was RIJ, vessel cannulated successfully, but unable to advance guidewire. Left internal jugular cannulated x 1 attempt(s) utilizing the Seldinger technique. Catheter secured & Biopatch applied. Sterile Tegaderm placed. CXR pending.     Care turned over to covering Attending MD.

## 2021-08-18 NOTE — PROGRESS NOTES
Pt has been stuck multiple times in an attempt to get labs and a new IV this AM. Charge RN and RRN have tried. ED currently doesn't have an ultrasound tech.  Left message for PICC team.     Shiela Sánchez RN

## 2021-08-18 NOTE — PROGRESS NOTES
133 Received notification from bedside RN about patient with regards to: 7/10 headache, requesting medication for relief. Unable to take Tylenol and Ibuprofen  VS: /86, HR 96, RR 16, O2 sat 97% on RA    Intervention given: Resumed Roxicodone home regimen    0007: persistent nausea, needs PRN medication    - Zofran IV prn ordered    6183: Notified of accidental IV pull out, was on D10 IV. Awaiting PICC team to obtain new access.   VS: /97, HR 92, RR 18, O2 sat 97% on RA    - Hypoglycemia protocol ordered

## 2021-08-18 NOTE — PROGRESS NOTES
NAME: Ángel Cee        :  1965        MRN:  [id number]               Assessment   :                                               Plan:  [facility] [place]  Anemia  Dyspnea  Volume overload     noncompliance Events of this AM noted. Had poor IV access and hypoglycemia. Rapid called. Intubated and sent to ICU. Labs not done due to poor access. Will get labs now. Plan was for HD today. SBP now on the low side. Will need to see if he needs CVVHD/HD today.     H/H at goal. [facility] retacrit     ADDENDUM:    SBP 90's on a little pressor. Will try HD today. Pull no fluid. O2 sat OK on FIO2 30%. 2K bath. Repeat potassium after HD. D/W RN.                    Subjective:     Chief Complaint:  Intubated. Review of Systems:    Symptom Y/N Comments  Symptom Y/N Comments   Fever/Chills    Chest Pain     Poor Appetite    Edema     Cough    Abdominal Pain     Sputum    Joint Pain     SOB/WARNER    Pruritis/Rash     Nausea/vomit    Tolerating PT/OT     Diarrhea    Tolerating Diet     Constipation    Other       Could not obtain due to:      Objective:     VITALS:   Last 24hrs VS reviewed since prior progress note. Most recent are:  Visit Vitals  BP (!) 92/57   Pulse 63   Temp 97.8 °F (36.6 °C)   Resp 30   Ht 5' 9\" (1.753 m)   Wt 75.3 kg (166 lb)   SpO2 100%   BMI 24.51 kg/m²       Intake/Output Summary (Last 24 hours) at 2021 1041  Last data filed at 2021 0550  Gross per 24 hour   Intake 250 ml   Output    Net 250 ml      Telemetry Reviewed:     PHYSICAL EXAM:  General: Intubated.   No edema    Lab Data Reviewed: (see below)    Medications Reviewed: (see below)    PMH/SH reviewed - no change compared to H&P  ________________________________________________________________________  Care Plan discussed with:  Patient     Family      RN     Care Manager                    Consultant:          Comments   >50% of visit spent in counseling and coordination of care       ________________________________________________________________________  Chen Wagner MD     Procedures: see electronic medical records for all procedures/Xrays and details which  were not copied into this note but were reviewed prior to creation of Plan. LABS:  Recent Labs     08/16/21  0404   WBC 11.1   HGB 10.7*   HCT 34.3*        Recent Labs     08/17/21  0225 08/16/21  1548 08/16/21  0404   * 134* 130*   K 4.0 4.3 7.1*   CL 92* 97 94*   CO2 27 30 20*   BUN 43* 35* 82*   CREA 7.73* 6.61* 12.00*   * 115* 39*   CA 9.0 8.8 9.2   MG 2.2  --   --      Recent Labs     08/16/21  0404      TP 8.0   ALB 2.9*   GLOB 5.1*     No results for input(s): INR, PTP, APTT, INREXT, INREXT in the last 72 hours. No results for input(s): FE, TIBC, PSAT, FERR in the last 72 hours. No results found for: FOL, RBCF   No results for input(s): PH, PCO2, PO2 in the last 72 hours.   Recent Labs     08/16/21  0404   *   CKMB 3.6*     No components found for: [place]  Lab Results   Component Value Date/Time    Color YELLOW/STRAW 05/06/2017 01:30 PM    Appearance CLOUDY (A) 05/06/2017 01:30 PM    Specific gravity 1.015 05/06/2017 01:30 PM    pH (UA) 7.5 05/06/2017 01:30 PM    Protein >300 (A) 05/06/2017 01:30 PM    Glucose NEGATIVE  05/06/2017 01:30 PM    Ketone NEGATIVE  05/06/2017 01:30 PM    Bilirubin NEGATIVE  05/06/2017 01:30 PM    Urobilinogen 0.2 05/06/2017 01:30 PM    Nitrites NEGATIVE  05/06/2017 01:30 PM    Leukocyte Esterase NEGATIVE  05/06/2017 01:30 PM    Epithelial cells FEW 05/06/2017 01:30 PM    Bacteria NEGATIVE  05/06/2017 01:30 PM    WBC 5-10 05/06/2017 01:30 PM    RBC 0-5 05/06/2017 01:30 PM       MEDICATIONS:  Current Facility-Administered Medications   Medication Dose Route Frequency    ondansetron (ZOFRAN) injection 4 mg  4 mg IntraVENous Q4H PRN    glucose chewable tablet 16 g  4 Tablet Oral PRN    dextrose (D50W) injection syrg 12.5-25 g  12.5-25 g IntraVENous PRN    glucagon (GLUCAGEN) injection 1 mg  1 mg IntraMUSCular PRN    naloxone (NARCAN) injection 0.4 mg  0.4 mg IntraMUSCular EVERY 2 MINUTES AS NEEDED    propofol (DIPRIVAN) 10 mg/mL infusion  0-50 mcg/kg/min IntraVENous TITRATE    propofoL (DIPRIVAN) 10 mg/mL injection        piperacillin-tazobactam (ZOSYN) 2.25 g in 0.9% sodium chloride (MBP/ADV) 50 mL MBP  2.25 g IntraVENous Q12H    aspirin chewable tablet 81 mg  81 mg Oral DAILY    pantoprazole (PROTONIX) 40 mg in 0.9% sodium chloride 10 mL injection  40 mg IntraVENous DAILY    PHENYLephrine (LILIYA-SYNEPHRINE) 10 mg/mL injection        PHENYLephrine (LILIYA-SYNEPHRINE) 10 mg/mL injection        oxyCODONE IR (ROXICODONE) tablet 5 mg  5 mg Oral Q4H PRN    apixaban (ELIQUIS) tablet 5 mg  5 mg Oral BID    atorvastatin (LIPITOR) tablet 40 mg  40 mg Oral QHS    citalopram (CELEXA) tablet 40 mg  40 mg Oral DAILY    gabapentin (NEURONTIN) capsule 300 mg  300 mg Oral BID    [Held by provider] isosorbide mononitrate ER (IMDUR) tablet 30 mg  30 mg Oral DAILY    [Held by provider] metoprolol tartrate (LOPRESSOR) tablet 25 mg  25 mg Oral Q12H    doxercalciferoL (HECTOROL) capsule 0.5 mcg  0.5 mcg Oral DAILY    [Held by provider] sevelamer carbonate (RENVELA) tab 800 mg  800 mg Oral TID WITH MEALS    sodium chloride (NS) flush 5-40 mL  5-40 mL IntraVENous Q8H    sodium chloride (NS) flush 5-40 mL  5-40 mL IntraVENous PRN    polyethylene glycol (MIRALAX) packet 17 g  17 g Oral DAILY PRN    albuterol-ipratropium (DUO-NEB) 2.5 MG-0.5 MG/3 ML  3 mL Nebulization Q6H PRN    dextrose 10% infusion  50 mL/hr IntraVENous CONTINUOUS

## 2021-08-18 NOTE — PROCEDURES
Intubation Note    Called to bedside secondary to  respiratory failure. Patient pre-oxygenated with 100% oxygen. Smooth RSI with Propofol 100 mg IV. GlideScope x 1    7.5 ETT taped and secured at 21 cm at the teeth.    + Bilateral BS, + Chest rise, + ETCO2    CXR pending.     Care turned over to covering Attending MD.

## 2021-08-18 NOTE — PROGRESS NOTES
HYPOGLYCEMIC EPISODE DOCUMENTATION    Patient with hypoglycemic episode(s) at ***(time) on ***(date). BG value(s) pre-treatment ***    Was patient symptomatic?  [] yes, [] no  Patient was treated with the following rescue medications/treatments: [] D50                [] Glucose tablets                [] Glucagon                [] 4oz juice                [] 6oz reg soda                [] 8oz low fat milk  BG value post-treatment: ***  Once BG treated and value greater than 80mg/dl, pt was provided with the following:  [] snack  [] meal  Name of MD notified:***  The following orders were received: ***

## 2021-08-19 NOTE — PROGRESS NOTES
1645: Dr. Yissel Rutherford at bedside with orders for 0.4 mg narcan IM for respiratory rate 5-6 breaths per minute and difficulty arousing. Narcan given per [facility] per Dr. Yissel Rutherford twice. Pt slowly aroused with increase in respiratory rate, opening eyes spontaneously, and talking with staff.

## 2021-08-19 NOTE — PROGRESS NOTES
Chart reviewed. Pt remains intubated and medically inappropriate for PT at this time. Will defer and continue to follow.

## 2021-08-19 NOTE — PROGRESS NOTES
Pt agitated and pulling at ETT and OGT. Dr. Pretty Rodrigez called to bedside. Placed on pressure support by Dr. Prtety Rodrigez and then extubated to 4L NC. Pt tolerated extubation well.

## 2021-08-19 NOTE — PROGRESS NOTES
Chart reviewed. Pt remains intubated and medically inappropriate for OT at this time. Will defer and continue to follow.

## 2021-08-19 NOTE — CONSULTS
Pulmonology Intensive Care Unit Initial Assessment    Subjective:        Subjective:     Critical Care Initial Evaluation Note: 8/19/2021 12:08 PM    Mr. Michell Kowalski is 55  past medical history of end-stage renal disease on dialysisMonday, Wednesday and Friday, hypertension, hyperlipidemia, GERD, neuropathy. He was admitted on 8/16 with hyperkalemia, shortness of breath and altered mental status. Patient received emergent HD. His K and respiratory failure had improved but this morning patient again was minimally responsive with ongoing hypoglycemia but patient had lost peripheral IV access. He was also found to have empty bottle of oxycodone in his pocket and possible ingestion to explain this acute change in mental status, he was given naracan and then dextrose but he developed respiratory distress. Rapid response was called. Decision was made to intubate him due to severe resp distress and encephalopathy and patient transferred to ICU for further care. 8/19- failed sbt due to agitation early this a.m. Past Medical History:   Diagnosis Date    Chronic kidney disease     Dialysis patient ([facility] [address]     Hypertension       Past Surgical History:   Procedure Laterality Date    HX VASCULAR ACCESS Left     LUE AV fistula      Prior to Admission medications    Medication Sig Start Date End Date Taking? Authorizing Provider   methadone (DOLOPHINE) 10 mg/mL solution Take 160 mg by mouth daily. Yes Provider, Historical   amoxicillin-clavulanate (Augmentin) 875-125 mg per tablet Take 1 Tablet by mouth every twelve (12) hours for 5 days. 8/14/21 8/19/21  Yuval Hernández MD   metoprolol tartrate (LOPRESSOR) 25 mg tablet Take 1 Tablet by mouth every twelve (12) hours for 30 days. 8/14/21 9/13/21  Yuval Hernández MD   pantoprazole (PROTONIX) 40 mg tablet Take 1 Tablet by mouth daily for 30 days.  8/14/21 9/13/21  Yuval Hernández MD   sevelamer (RenageL) 400 mg tablet Take 2 Tablets by mouth three (3) times daily (with meals) for 30 days. 8/14/21 9/13/21  Mihaela Sears MD   isosorbide mononitrate ER (IMDUR) 30 mg tablet Take 1 Tablet by mouth every morning for 30 days. 8/14/21 9/13/21  Mihaela Sears MD   citalopram (CELEXA) 40 mg tablet Take 1 Tablet by mouth daily for 30 days. 8/14/21 9/13/21  Mihaela Sears MD   atorvastatin (LIPITOR) 40 mg tablet Take 1 Tablet by mouth nightly for 30 days. 8/14/21 9/13/21  Mihaela Sears MD   apixaban (ELIQUIS) 5 mg tablet Take 1 Tablet by mouth two (2) times a day for 30 days. 8/14/21 9/13/21  Mihaela Sears MD   paricalcitoL (ZEMPLAR) 1 mcg capsule Take 1 Capsule by mouth daily for 30 days. 8/14/21 9/13/21  Mihaela Sears MD   lidocaine (LIDODERM) 5 % Apply patch to the affected area for 12 hours a day and remove for 12 hours a day. 11/28/17   Elier Rodriguez MD   aspirin delayed-release 81 mg tablet Take 1 Tab by mouth daily. 11/28/17   Elier Rodriguez MD   multivitamin (ONE A DAY) tablet Take 1 Tab by mouth daily. Juan Olivarez MD   gabapentin (NEURONTIN) 100 mg capsule Take 100 mg by mouth daily. Juan Olivarez MD     Allergies   Allergen Reactions    Motrin [Ibuprofen] Hives     7/16/17 Pt denies allergy    Tylenol [Acetaminophen] Hives     7/16/17 Pt denies allergy      Social History     Tobacco Use    Smoking status: Current Every Day Smoker     Packs/day: 0.25    Smokeless tobacco: Never Used   Substance Use Topics    Alcohol use: Yes      Family History   Problem Relation Age of Onset    No Known Problems Mother     No Known Problems Father     No Known Problems Sister     No Known Problems Brother     No Known Problems Sister     No Known Problems Sister     No Known Problems Brother         Review of Systems   Unable to perform ROS: Mental status change       Objective:     Vital signs reviewed. No intake/output data recorded. 08/17 1901 - 08/19 0700  In: 1682 [P.O.:250;  I.V.:1352]  Out: 100     Physical Exam  Constitutional: General: He is not in acute distress. Appearance: He is not ill-appearing. Comments: Intubated sedated   HENT:      Head: Normocephalic and atraumatic. Mouth/Throat:      Mouth: Mucous membranes are moist.   Eyes:      Extraocular Movements: Extraocular movements intact. Conjunctiva/sclera: Conjunctivae normal.   Cardiovascular:      Rate and Rhythm: Regular rhythm. Tachycardia present. Pulmonary:      Breath sounds: Rales present. No wheezing. Abdominal:      General: There is no distension. Palpations: Abdomen is soft. Tenderness: There is no abdominal tenderness. Musculoskeletal:      Cervical back: Neck supple.          Data Review:     Recent Results (from the past 24 hour(s))   GLUCOSE, POC    Collection Time: 08/18/21  9:03 AM   Result Value Ref Range    Glucose (POC) 47 (LL) 65 - 117 mg/dL    Performed by [facility]) Brittanie Graham    GLUCOSE, POC    Collection Time: 08/18/21  9:25 AM   Result Value Ref Range    Glucose (POC) 207 (H) 65 - 117 mg/dL    Performed by [facility]) Mago    METABOLIC PANEL, BASIC    Collection Time: 08/18/21  9:37 AM   Result Value Ref Range    Sodium 120 (L) 136 - 145 mmol/L    Potassium 6.9 (HH) 3.5 - 5.1 mmol/L    Chloride 82 (L) 97 - 108 mmol/L    CO2 14 (LL) 21 - 32 mmol/L    Anion gap 24 (H) 5 - 15 mmol/L    Glucose 231 (H) 65 - 100 mg/dL    BUN 61 (H) 6 - 20 MG/DL    Creatinine 11.00 (H) 0.70 - 1.30 MG/DL    BUN/Creatinine ratio 6 (L) 12 - 20      GFR est AA 6 (L) >60 ml/min/1.73m2    GFR est non-AA 5 (L) >60 ml/min/1.73m2    Calcium 8.7 8.5 - 10.1 MG/DL   HEMOGLOBIN A1C WITH EAG    Collection Time: 08/18/21  9:37 AM   Result Value Ref Range    Hemoglobin A1c 4.6 4.0 - 5.6 %    Est. average glucose 85 mg/dL   CULTURE, BLOOD, PAIRED    Collection Time: 08/18/21  9:37 AM    Specimen: Blood   Result Value Ref Range    Special Requests: NO SPECIAL REQUESTS      Culture result: NO GROWTH AFTER 11 HOURS     GLUCOSE, POC    Collection Time: 08/18/21 10:27 AM   Result Value Ref Range    Glucose (POC) 200 (H) 65 - 117 mg/dL    Performed by [facility]) Herbert Lange    GLUCOSE, POC    Collection Time: 08/18/21 11:35 AM   Result Value Ref Range    Glucose (POC) 267 (H) 65 - 117 mg/dL    Performed by [facility]) Herbert Lange    BLOOD GAS, ARTERIAL    Collection Time: 08/18/21 12:09 PM   Result Value Ref Range    pH 7.33 (L) 7.35 - 7.45      PCO2 32 (L) 35 - 45 mmHg    PO2 171 (H) 80 - 100 mmHg    O2 SAT 99 (H) 92 - 97 %    BICARBONATE 17 (L) 22 - 26 mmol/L    BASE DEFICIT 8.1 mmol/L    O2 METHOD VENT      Sample source ARTERIAL      SITE RIGHT RADIAL      JENNY'S TEST YES     GLUCOSE, POC    Collection Time: 08/18/21 12:59 PM   Result Value Ref Range    Glucose (POC) 123 (H) 65 - 117 mg/dL    Performed by [facility]) Herbert Lange    GLUCOSE, POC    Collection Time: 08/18/21  1:57 PM   Result Value Ref Range    Glucose (POC) 85 65 - 117 mg/dL    Performed by [facility]) Herbert Lange    POTASSIUM    Collection Time: 08/18/21  2:27 PM   Result Value Ref Range    Potassium 5.2 (H) 3.5 - 5.1 mmol/L   GLUCOSE, POC    Collection Time: 08/18/21  2:53 PM   Result Value Ref Range    Glucose (POC) 73 65 - 117 mg/dL    Performed by [facility]) [address] [place] [address], POC    Collection Time: 08/18/21  3:54 PM   Result Value Ref Range    Glucose (POC) 85 65 - 117 mg/dL    Performed by Shawn[address], POC    Collection Time: 08/18/21  4:32 PM   Result Value Ref Range    Glucose (POC) 92 65 - 117 mg/dL    Performed by [address], POC    Collection Time: 08/18/21  6:13 PM   Result Value Ref Range    Glucose (POC) 85 65 - 117 mg/dL    Performed by [address] [place] [address], BASIC    Collection Time: 08/18/21  6:38 PM   Result Value Ref Range    Sodium 136 136 - 145 mmol/L    Potassium 3.3 (L) 3.5 - 5.1 mmol/L    Chloride 100 97 - 108 mmol/L    CO2 30 21 - 32 mmol/L    Anion gap 6 5 - 15 mmol/L    Glucose 99 65 - 100 mg/dL    BUN 15 6 - 20 MG/DL    Creatinine 3.00 (H) 0.70 - 1.30 MG/DL    BUN/Creatinine ratio 5 (L) 12 - 20      GFR est AA 26 (L) >60 ml/min/1.73m2    GFR est non-AA 22 (L) >60 ml/min/1.73m2    Calcium 8.1 (L) 8.5 - 10.1 MG/DL   GLUCOSE, POC    Collection Time: 08/19/21 12:05 AM   Result Value Ref Range    Glucose (POC) 109 65 - 117 mg/dL    Performed by [employer] (JENNY RN)    METABOLIC PANEL, BASIC    Collection Time: 08/19/21  4:03 AM   Result Value Ref Range    Sodium 132 (L) 136 - 145 mmol/L    Potassium 4.1 3.5 - 5.1 mmol/L    Chloride 97 97 - 108 mmol/L    CO2 28 21 - 32 mmol/L    Anion gap 7 5 - 15 mmol/L    Glucose 112 (H) 65 - 100 mg/dL    BUN 28 (H) 6 - 20 MG/DL    Creatinine 5.53 (H) 0.70 - 1.30 MG/DL    BUN/Creatinine ratio 5 (L) 12 - 20      GFR est AA 13 (L) >60 ml/min/1.73m2    GFR est non-AA 11 (L) >60 ml/min/1.73m2    Calcium 8.6 8.5 - 10.1 MG/DL   CBC WITH AUTOMATED DIFF    Collection Time: 08/19/21  4:04 AM   Result Value Ref Range    WBC 8.7 4.1 - 11.1 K/uL    RBC 2.45 (L) 4.10 - 5.70 M/uL    HGB 7.9 (L) 12.1 - 17.0 g/dL    HCT 25.1 (L) 36.6 - 50.3 %    .4 (H) 80.0 - 99.0 FL    MCH 32.2 26.0 - 34.0 PG    MCHC 31.5 30.0 - 36.5 g/dL    RDW 17.1 (H) 11.5 - 14.5 %    PLATELET 997 (L) 783 - 400 K/uL    MPV 11.9 8.9 - 12.9 FL    NRBC 0.9 (H) 0  WBC    ABSOLUTE NRBC 0.08 (H) 0.00 - 0.01 K/uL    NEUTROPHILS 84 (H) 32 - 75 %    LYMPHOCYTES 9 (L) 12 - 49 %    MONOCYTES 5 5 - 13 %    EOSINOPHILS 1 0 - 7 %    BASOPHILS 0 0 - 1 %    IMMATURE GRANULOCYTES 1 (H) 0.0 - 0.5 %    ABS. NEUTROPHILS 7.3 1.8 - 8.0 K/UL    ABS. LYMPHOCYTES 0.8 0.8 - 3.5 K/UL    ABS. MONOCYTES 0.4 0.0 - 1.0 K/UL    ABS. EOSINOPHILS 0.1 0.0 - 0.4 K/UL    ABS. BASOPHILS 0.0 0.0 - 0.1 K/UL    ABS. IMM. GRANS. 0.1 (H) 0.00 - 0.04 K/UL    DF SMEAR SCANNED      RBC COMMENTS ANISOCYTOSIS  1+        RBC COMMENTS POLYCHROMASIA  PRESENT        RBC COMMENTS MACROCYTOSIS  1+             Assessment:     -Acute hypoxemic respiratory failure.  Sec to vol overload pulm edema, also patient aspirated during intubation.  - Failed sbt this am due to agitation.       -Acute metabolic/toxic encephalopathy. Suspected oxycodone overdose.   -Hyperkalemia.  -Suspected drug overdose while in the hospital. Empty bottle of oxycodone found in the pocket. On methadone at home - restart home dose today to avoid withdrawl  -ESRD. -Pulmonary edema. -H/o HTN. -GERD.  -H/o DVT. -Recurrent falls. Plan:     -Patient emergently intubated during the rapid response for resp distress and altered mental status. He did aspirate during intubation.  - add precedex and wean propofol to aid in extubation  - Repeat cxr today to assess development of pneumonia  -Repeat labs including CBC, BMP, mag and phos. -Nephrology following for HD needs, will get HD today.  -Sedation with prop precedex  -Continue eliquis.  -Hold home BP meds for now. -Closely monitor mental status.  -Start zosyn for aspiration pneumonia coverage. -ABCDE protocol      Prophylaxis:  Stress Ulcer Protocol Active: ON protonix already. DVT Protocol Active: continue qliquis. Critically ill and has high chances of further decompensation and poor prognosis overall. Full code. 50 min of CC time spent without overlap and excluding procedures.

## 2021-08-19 NOTE — PROGRESS NOTES
NAME: Honey Dawson        :  1965        MRN:  [id number]               Assessment   :                                               Plan:  [facility] [place]  Anemia  Dyspnea  Volume overload     noncompliance Had HD yesterday. Potassium better. No acute need for HD today. Plan HD in AM.       H/H not at goal.  Start retacrit     D/W RN.                    Subjective:     Chief Complaint:  Intubated. Review of Systems:    Symptom Y/N Comments  Symptom Y/N Comments   Fever/Chills    Chest Pain     Poor Appetite    Edema     Cough    Abdominal Pain     Sputum    Joint Pain     SOB/WARNER    Pruritis/Rash     Nausea/vomit    Tolerating PT/OT     Diarrhea    Tolerating Diet     Constipation    Other       Could not obtain due to:      Objective:     VITALS:   Last 24hrs VS reviewed since prior progress note. Most recent are:  Visit Vitals  /74   Pulse 94   Temp 98.2 °F (36.8 °C)   Resp 19   Ht 5' 9\" (1.753 m)   Wt 72.1 kg (158 lb 15.2 oz)   SpO2 97%   BMI 23.47 kg/m²       Intake/Output Summary (Last 24 hours) at 2021 0901  Last data filed at 2021 0834  Gross per 24 hour   Intake 1472.04 ml   Output 100 ml   Net 1372.04 ml      Telemetry Reviewed:     PHYSICAL EXAM:  General: Intubated.   No edema    Lab Data Reviewed: (see below)    Medications Reviewed: (see below)    PMH/SH reviewed - no change compared to H&P  ________________________________________________________________________  Care Plan discussed with:  Patient     Family      RN     Care Manager                    Consultant:          Comments   >50% of visit spent in counseling and coordination of care       ________________________________________________________________________  Alicia Lam MD     Procedures: see electronic medical records for all procedures/Xrays and details which  were not copied into this note but were reviewed prior to creation of Plan. LABS:  Recent Labs     08/19/21  0404   WBC 8.7   HGB 7.9*   HCT 25.1*   *     Recent Labs     08/19/21  0403 08/18/21  1838 08/18/21  1427 08/18/21  0937 08/18/21  0937 08/17/21  0225 08/17/21  0225   * 136  --   --  120*   < > 131*   K 4.1 3.3* 5.2*   < > 6.9*   < > 4.0   CL 97 100  --   --  82*   < > 92*   CO2 28 30  --   --  14*   < > 27   BUN 28* 15  --   --  61*   < > 43*   CREA 5.53* 3.00*  --   --  11.00*   < > 7.73*   * 99  --   --  231*   < > 102*   CA 8.6 8.1*  --   --  8.7   < > 9.0   MG  --   --   --   --   --   --  2.2    < > = values in this interval not displayed. No results for input(s): AP, TBIL, TP, ALB, GLOB, GGT, AML, LPSE in the last 72 hours. No lab exists for component: SGOT, GPT, AMYP, HLPSE  No results for input(s): INR, PTP, APTT, INREXT, INREXT in the last 72 hours. No results for input(s): FE, TIBC, PSAT, FERR in the last 72 hours. No results found for: FOL, RBCF   Recent Labs     08/18/21  1209   PH 7.33*   PCO2 32*   PO2 171*     No results for input(s): CPK, CKMB in the last 72 hours.     No lab exists for component: TROPONINI  No components found for: [place]  Lab Results   Component Value Date/Time    Color YELLOW/STRAW 05/06/2017 01:30 PM    Appearance CLOUDY (A) 05/06/2017 01:30 PM    Specific gravity 1.015 05/06/2017 01:30 PM    pH (UA) 7.5 05/06/2017 01:30 PM    Protein >300 (A) 05/06/2017 01:30 PM    Glucose NEGATIVE  05/06/2017 01:30 PM    Ketone NEGATIVE  05/06/2017 01:30 PM    Bilirubin NEGATIVE  05/06/2017 01:30 PM    Urobilinogen 0.2 05/06/2017 01:30 PM    Nitrites NEGATIVE  05/06/2017 01:30 PM    Leukocyte Esterase NEGATIVE  05/06/2017 01:30 PM    Epithelial cells FEW 05/06/2017 01:30 PM    Bacteria NEGATIVE  05/06/2017 01:30 PM    WBC 5-10 05/06/2017 01:30 PM    RBC 0-5 05/06/2017 01:30 PM       MEDICATIONS:  Current Facility-Administered Medications   Medication Dose Route Frequency    alcohol 62% (NOZIN) nasal  1 Ampule  1 Ampule Topical Q12H    chlorhexidine (ORAL CARE KIT) 0.12 % mouthwash 15 mL  15 mL Oral Q12H    dexmedeTOMidine in 0.9 % NaCl (PRECEDEX) 400 mcg/100 mL (4 mcg/mL) infusion soln  0.1-1.5 mcg/kg/hr IntraVENous TITRATE    ondansetron (ZOFRAN) injection 4 mg  4 mg IntraVENous Q4H PRN    glucose chewable tablet 16 g  4 Tablet Oral PRN    glucagon (GLUCAGEN) injection 1 mg  1 mg IntraMUSCular PRN    naloxone (NARCAN) injection 0.4 mg  0.4 mg IntraMUSCular EVERY 2 MINUTES AS NEEDED    propofol (DIPRIVAN) 10 mg/mL infusion  0-50 mcg/kg/min IntraVENous TITRATE    aspirin chewable tablet 81 mg  81 mg Oral DAILY    pantoprazole (PROTONIX) 40 mg in 0.9% sodium chloride 10 mL injection  40 mg IntraVENous DAILY    piperacillin-tazobactam (ZOSYN) 3.375 g in 0.9% sodium chloride (MBP/ADV) 100 mL MBP  3.375 g IntraVENous Q12H    PHENYLephrine (LILIYA-SYNEPHRINE) 30 mg in 0.9% sodium chloride 250 mL infusion   mcg/min IntraVENous TITRATE    dextrose (D50W) injection syrg 12.5-25 g  12.5-25 g IntraVENous PRN    albumin human 25% (BUMINATE) solution 12.5 g  12.5 g IntraVENous DIALYSIS PRN    oxyCODONE IR (ROXICODONE) tablet 5 mg  5 mg Oral Q4H PRN    apixaban (ELIQUIS) tablet 5 mg  5 mg Oral BID    atorvastatin (LIPITOR) tablet 40 mg  40 mg Oral QHS    citalopram (CELEXA) tablet 40 mg  40 mg Oral DAILY    gabapentin (NEURONTIN) capsule 300 mg  300 mg Oral BID    [Held by provider] isosorbide mononitrate ER (IMDUR) tablet 30 mg  30 mg Oral DAILY    [Held by provider] metoprolol tartrate (LOPRESSOR) tablet 25 mg  25 mg Oral Q12H    doxercalciferoL (HECTOROL) capsule 0.5 mcg  0.5 mcg Oral DAILY    [Held by provider] sevelamer carbonate (RENVELA) tab 800 mg  800 mg Oral TID WITH MEALS    sodium chloride (NS) flush 5-40 mL  5-40 mL IntraVENous Q8H    sodium chloride (NS) flush 5-40 mL  5-40 mL IntraVENous PRN    polyethylene glycol (MIRALAX) packet 17 g  17 g Oral DAILY PRN    albuterol-ipratropium (DUO-NEB) 2.5 MG-0.5 MG/3 ML  3 mL Nebulization Q6H PRN    dextrose 10% infusion  50 mL/hr IntraVENous CONTINUOUS

## 2021-08-19 NOTE — PROGRESS NOTES
1910: SBAR report received from off going nurse Avelino Cartagena, [facility]. Assumed care of patient who is intubated and sedated resting in bed with no acute distress noted. 2000: Assessment completed per charting, Pt. Is tolerating the vent well and no acute distress noted. 2200: Pt. Continues to rest comfortably in bed and tolerating vent well, no acute distress noted. 0000: Assessment completed per charting, Pt. Is tolerating the vent well and no acute distress noted. 0200: Pt. Continues to rest comfortably in bed and tolerating vent well, no acute distress noted. 0400: Assessment completed per charting, Pt. Is tolerating the vent well and no acute distress noted. 3245: SBAR report given to oncoming nurse Ana RN. Pt. Continues to rest comfortably in bed and tolerate vent well, no acute distress noted. All questions answered and release care of patient.

## 2021-08-19 NOTE — PROGRESS NOTES
08/19/21 0627   ABCDEF Bundle   SBT Safety Screen Passed No   SBT Screen Reason for Failure Agitation  (failed SAT)

## 2021-08-20 NOTE — PROGRESS NOTES
Bedside and Verbal shift change report given to Ale Alexis (oncoming nurse) by Shannon Bello (offgoing nurse). Report included the following information SBAR, Kardex, Intake/Output, MAR and Recent Results.

## 2021-08-20 NOTE — PROGRESS NOTES
NAME: Lester Harmon        :  1965        MRN:  [id number]               Assessment   :                                               Plan:  [facility]  Anemia  Dyspnea  Volume overload     noncompliance Seen on HD at 0840 this AM.  SBP stable.     H/H not at goal.  Continue retacrit     D/W RN. Will see again Monday. Please call if any questions.                    Subjective:     Chief Complaint:  Extubated. Sleepy. Review of Systems:    Symptom Y/N Comments  Symptom Y/N Comments   Fever/Chills    Chest Pain     Poor Appetite    Edema     Cough    Abdominal Pain     Sputum    Joint Pain     SOB/WARNER    Pruritis/Rash     Nausea/vomit    Tolerating PT/OT     Diarrhea    Tolerating Diet     Constipation    Other       Could not obtain due to:      Objective:     VITALS:   Last 24hrs VS reviewed since prior progress note. Most recent are:  Visit Vitals  BP (!) 140/77 (BP 1 Location: Right upper arm, BP Patient Position: At rest)   Pulse 79   Temp 98.8 °F (37.1 °C)   Resp 10   Ht 5' 9\" (1.753 m)   Wt 72.1 kg (158 lb 15.2 oz)   SpO2 96%   BMI 23.47 kg/m²       Intake/Output Summary (Last 24 hours) at 2021 1228  Last data filed at 2021 1200  Gross per 24 hour   Intake 1530.47 ml   Output    Net 1530.47 ml      Telemetry Reviewed:     PHYSICAL EXAM:  General: extubated.   No edema    Lab Data Reviewed: (see below)    Medications Reviewed: (see below)    PMH/SH reviewed - no change compared to H&P  ________________________________________________________________________  Care Plan discussed with:  Patient     Family      RN     Care Manager                    Consultant:          Comments   >50% of visit spent in counseling and coordination of care       ________________________________________________________________________  Thirza [place], MD     Procedures: see electronic medical records for all procedures/Xrays and details which  were not copied into this note but were reviewed prior to creation of Plan. LABS:  Recent Labs     08/20/21  0416 08/19/21  0404   WBC 7.5 8.7   HGB 8.8* 7.9*   HCT 27.2* 25.1*   * 108*     Recent Labs     08/20/21  0416 08/19/21  0403 08/18/21  1838   * 132* 136   K 4.0 4.1 3.3*   CL 93* 97 100   CO2 28 28 30   BUN 41* 28* 15   CREA 7.36* 5.53* 3.00*   GLU 78 112* 99   CA 8.9 8.6 8.1*     No results for input(s): AP, TBIL, TP, ALB, GLOB, GGT, AML, LPSE in the last 72 hours. No lab exists for component: SGOT, GPT, AMYP, HLPSE  No results for input(s): INR, PTP, APTT, INREXT, INREXT in the last 72 hours. No results for input(s): FE, TIBC, PSAT, FERR in the last 72 hours. No results found for: FOL, RBCF   Recent Labs     08/19/21  1651 08/18/21  1209   PH 7.38 7.33*   PCO2 52* 32*   PO2 41* 171*     No results for input(s): CPK, CKMB in the last 72 hours.     No lab exists for component: TROPONINI  No components found for: [place]  Lab Results   Component Value Date/Time    Color YELLOW/STRAW 05/06/2017 01:30 PM    Appearance CLOUDY (A) 05/06/2017 01:30 PM    Specific gravity 1.015 05/06/2017 01:30 PM    pH (UA) 7.5 05/06/2017 01:30 PM    Protein >300 (A) 05/06/2017 01:30 PM    Glucose NEGATIVE  05/06/2017 01:30 PM    Ketone NEGATIVE  05/06/2017 01:30 PM    Bilirubin NEGATIVE  05/06/2017 01:30 PM    Urobilinogen 0.2 05/06/2017 01:30 PM    Nitrites NEGATIVE  05/06/2017 01:30 PM    Leukocyte Esterase NEGATIVE  05/06/2017 01:30 PM    Epithelial cells FEW 05/06/2017 01:30 PM    Bacteria NEGATIVE  05/06/2017 01:30 PM    WBC 5-10 05/06/2017 01:30 PM    RBC 0-5 05/06/2017 01:30 PM       MEDICATIONS:  Current Facility-Administered Medications   Medication Dose Route Frequency    alcohol 62% (NOZIN) nasal  1 Ampule  1 Ampule Topical Q12H    dexmedeTOMidine in 0.9 % NaCl (PRECEDEX) 400 mcg/100 mL (4 mcg/mL) infusion soln  0.1-1.5 mcg/kg/hr IntraVENous TITRATE    epoetin cristian-epbx (RETACRIT) injection 6,000 Units  6,000 Units SubCUTAneous Q MON, WED & FRI    ondansetron (ZOFRAN) injection 4 mg  4 mg IntraVENous Q4H PRN    glucose chewable tablet 16 g  4 Tablet Oral PRN    glucagon (GLUCAGEN) injection 1 mg  1 mg IntraMUSCular PRN    naloxone (NARCAN) injection 0.4 mg  0.4 mg IntraMUSCular EVERY 2 MINUTES AS NEEDED    propofol (DIPRIVAN) 10 mg/mL infusion  0-50 mcg/kg/min IntraVENous TITRATE    aspirin chewable tablet 81 mg  81 mg Oral DAILY    pantoprazole (PROTONIX) 40 mg in 0.9% sodium chloride 10 mL injection  40 mg IntraVENous DAILY    piperacillin-tazobactam (ZOSYN) 3.375 g in 0.9% sodium chloride (MBP/ADV) 100 mL MBP  3.375 g IntraVENous Q12H    PHENYLephrine (LILIYA-SYNEPHRINE) 30 mg in 0.9% sodium chloride 250 mL infusion   mcg/min IntraVENous TITRATE    dextrose (D50W) injection syrg 12.5-25 g  12.5-25 g IntraVENous PRN    albumin human 25% (BUMINATE) solution 12.5 g  12.5 g IntraVENous DIALYSIS PRN    oxyCODONE IR (ROXICODONE) tablet 5 mg  5 mg Oral Q4H PRN    apixaban (ELIQUIS) tablet 5 mg  5 mg Oral BID    atorvastatin (LIPITOR) tablet 40 mg  40 mg Oral QHS    citalopram (CELEXA) tablet 40 mg  40 mg Oral DAILY    [Held by provider] gabapentin (NEURONTIN) capsule 300 mg  300 mg Oral BID    [Held by provider] isosorbide mononitrate ER (IMDUR) tablet 30 mg  30 mg Oral DAILY    metoprolol tartrate (LOPRESSOR) tablet 25 mg  25 mg Oral Q12H    doxercalciferoL (HECTOROL) capsule 0.5 mcg  0.5 mcg Oral DAILY    [Held by provider] sevelamer carbonate (RENVELA) tab 800 mg  800 mg Oral TID WITH MEALS    sodium chloride (NS) flush 5-40 mL  5-40 mL IntraVENous Q8H    sodium chloride (NS) flush 5-40 mL  5-40 mL IntraVENous PRN    polyethylene glycol (MIRALAX) packet 17 g  17 g Oral DAILY PRN    albuterol-ipratropium (DUO-NEB) 2.5 MG-0.5 MG/3 ML  3 mL Nebulization Q6H PRN    dextrose 10% infusion  50 mL/hr IntraVENous CONTINUOUS

## 2021-08-20 NOTE — PROCEDURES
Hemodialysis / [facility] / [phone]    Vitals   Pre   Post   Assessment   Pre   Post     Temp  Temp: 98 °F (36.7 °C) (08/20/21 0841)  98.8 LOC  Drowsy  Oriented to self Drowsy  Oriented x3   HR   Pulse (Heart Rate): 84 (08/20/21 0841) 80 Lungs   Clear  Clear   B/P   BP: 127/72 (08/20/21 0841) 137/75 Cardiac   NSR  NSR   Resp   Resp Rate: 11 (08/20/21 0841) 10 Skin   Intact  Intact   Pain level  Pain Intensity 1: 0 (08/20/21 0800) 0 Edema    None   None   Orders:    Duration:   Start:    2222 End:    2788 Total:   3.5hrs   Dialyzer:   Dialyzer/Set Up Inspection: Jason Marinelli (08/20/21 0841)   K Bath:   Dialysate K (mEq/L): 3 (08/20/21 0841)   Ca Bath:   Dialysate CA (mEq/L): 2.5 (08/20/21 0841)   Na/Bicarb:   Dialysate NA (mEq/L): 138 (08/20/21 0841)   Target Fluid Removal:   Goal/Amount of Fluid to Remove (mL): 0 mL (08/20/21 0841)   Access     Type & Location:   GLORIA AVF: skin CDI. No s/s of infection. + B/T. No issues with cannulation or hemostasis. Running well at .    Labs     Obtained/Reviewed   Critical Results Called   Date when labs were drawn-  Hgb-    HGB   Date Value Ref Range Status   08/20/2021 8.8 (L) 12.1 - 17.0 g/dL Final     K-    Potassium   Date Value Ref Range Status   08/20/2021 4.0 3.5 - 5.1 mmol/L Final     Ca-   Calcium   Date Value Ref Range Status   08/20/2021 8.9 8.5 - 10.1 MG/DL Final     Bun-   BUN   Date Value Ref Range Status   08/20/2021 41 (H) 6 - 20 MG/DL Final     Creat-   Creatinine   Date Value Ref Range Status   08/20/2021 7.36 (H) 0.70 - 1.30 MG/DL Final     Comment:     INVESTIGATED PER DELTA CHECK PROTOCOL        Medications/ Blood Products Given     Name   Dose   Route and Time     None ordered                Blood Volume Processed (BVP):    88.0L Net Fluid   Removed:  0ml   Comments   Time Out Done: (DHEA)6706  Primary Nurse Rpt Berkley Brown RN  Primary Nurse Rpt Solomon Bamberger, RN  [address]  Tx Summary:  Arrived to pt room. Pt drowsy, oriented x1. Machine setup and tested per p&p. Consent signed & on file. SBAR received from Primary RN. 6497: Orders confirmed with MD, bath changed to 3k. Pt cannulated with 88T needles per policy & without issue. VSS. Dialysis Tx initiated. *Pt stable throughout treatment. No issues. 1212: Tx ended. VSS. All possible blood returned to patient. Hemostasis achieved without issue. Bed locked and in the lowest position, call bell and belongings in reach. SBAR given to Primary, RN. Patient is stable at time of my departure. All Dialysis related medications have been reviewed. Admiting Diagnosis:Hyperkalemia, Hypoglycemia, Acute/Chronic CHF  Pt's previous clinic-[facility]  Consent signed - Informed Consent Verified: Yes (08/18/21 1522)  Madiha Consent - On file  Hepatitis Status-  Immune 8/9/21  Machine #- Machine Number: [id number] (08/20/21 4179)  Telemetry status-NSR  Pre-dialysis wt. -

## 2021-08-20 NOTE — PROGRESS NOTES
Spiritual Care Assessment/Progress Note  [facility]      NAME: Ara Dumont      MRN: [id number]  AGE: 55 y.o. SEX: male  Mandaeism Affiliation: No Zoroastrianism   Language: English     8/20/2021     Total Time (in minutes): 11     Spiritual Assessment begun in [facility] CRITICAL CARE 2 through conversation with:         []Patient        [] Family    [] Friend(s)        Reason for Consult: Initial/Spiritual assessment, critical care     Spiritual beliefs: (Please include comment if needed)     [] Identifies with a chelita tradition:         [] Supported by a chelita community:            [] Claims no spiritual orientation:           [] Seeking spiritual identity:                [] Adheres to an individual form of spirituality:           [x] Not able to assess:                           Identified resources for coping:      [] Prayer                               [] Music                  [] Guided Imagery     [] Family/friends                 [] Pet visits     [] Devotional reading                         [x] Unknown     [] Other:                                          Interventions offered during this visit: (See comments for more details)    Patient Interventions: Initial visit           Plan of Care:     [x] Support spiritual and/or cultural needs    [] Support AMD and/or advance care planning process      [] Support grieving process   [] Coordinate Rites and/or Rituals    [] Coordination with community clergy   [] No spiritual needs identified at this time   [] Detailed Plan of Care below (See Comments)  [] Make referral to Music Therapy  [] Make referral to Pet Therapy     [] Make referral to Addiction services  [] Make referral to [facility]  [] Make referral to Spiritual Care Partner  [] No future visits requested        [x] Follow up upon further referrals     Comments:  Reviewed patient's chart prior to visit in CCU for spiritual assessment. No family/friends present.  Patient appeared to be sleeping soundly. Unable to assess for spiritual needs or concerns at this time.    available upon referral by nurse or by patient request.           EDWARDO Chavarria, [facility], Staff [address]    [address] Paging Service  [phone]

## 2021-08-20 NOTE — PROGRESS NOTES
Received patient from CCU. Patient has overdosed with patient's home pain medication during this admission. Checked patient belongings along with Nurse manager. Found bottles of prescribed meds some with labels on while others had it's label washed out. Leobardo Beatriz was asked to come and check the medications. According to him gabapentin and oxycodone were only 2 meds among all which he put it in patient controlled bin. Rest of the 12 bottles are non narcotic and are left locked in patient's .

## 2021-08-20 NOTE — PROGRESS NOTES
Hospitalist Progress Note    NAME: Barby Azul   :  1965   MRN:  [id number]         Assessment / Plan:  Pulmonary Edema and Volume Overload r/t Acute on Chronic Diastolic and Systolic Heart Failure Exacerbation and Non compliance with HD pBNP >57001  End-stage renal disease on dialysis Missed 2 HD treatments PTA   Uremic Encephalopathy (resolving) and Metabolic Acidosis (resolved) r/t         -Admitted on  for pulmonary edema, CHF secondary to nonadherence with dialysis treatment, he was transferred to ICU on  for ongoing hypoglycemia, lethargy. Found to have oxycodone bottle in his pocket, possible overdose, for which she was given Narcan, and afterwards he developed respiratory distress was intubated,,  He self extubated , currently on 4 L NC  -CXR , new left lower lobe airspace disease, possible atelectasis  -Continue dialysis as per nephro  -Wean down oxygen as able  Duo nebs PRN  -Continue Zosyn          Acute hypoxic respiratory failure, not POA  Severe hypoglycemia  Narcotic abuse  -required ICU as above  -He has per him he takes methadone 160 mg daily, but in the hospital he found to have a bottle of oxycodone.  -Yesterday due to concern of withdrawal he was given methadone in the ICU, which made him drowsy/lethargic, and it was discontinued  -Continue to hold methadone  -If there is concern for methadone/narcotic withdrawal, can use methadone 10 mg IM if needed  -DC as needed oxycodone               Hypoglycemia Glucose 39 on admission  -Continue D10 drip   -Check HgbA1c   -Denies history of diabetes     Recent Community Acquired PNA      Hypertension  Hyperlipidemia  -ECHO - EF 45-50%. Moderately reduced systolic function. LV diastolic dysfunction. Mild to moderate MVR.    -Continue ASA, atorvastatin, imdur, metoprolol      GERD Continue Protonix   Neuropathy  History of DVT Continue Eliquis      Recurrent falls  Left rib fractures  -PT/OT evaluations         18.5 - 24.9 Normal weight / Body mass index is 23.47 kg/m². Subjective:     Chief Complaint / Reason for Physician Visit  Awake, transferred from ICU. Currently on 4 L nasal cannula. Denies any complaints,  Review of Systems:  Symptom Y/N Comments  Symptom Y/N Comments   Fever/Chills    Chest Pain     Poor Appetite    Edema     Cough    Abdominal Pain     Sputum    Joint Pain     SOB/WARNER    Pruritis/Rash     Nausea/vomit    Tolerating PT/OT     Diarrhea    Tolerating Diet     Constipation    Other       Could NOT obtain due to:      Objective:     VITALS:   Last 24hrs VS reviewed since prior progress note.  Most recent are:  Patient Vitals for the past 24 hrs:   Temp Pulse Resp BP SpO2   08/20/21 1516 98.8 °F (37.1 °C) 83 12 123/75 100 %   08/20/21 1230  81 11 126/72 95 %   08/20/21 1215  79 11 137/75 95 %   08/20/21 1200 98.8 °F (37.1 °C) 79 10 (!) 140/77 96 %   08/20/21 1145  81 9 135/75 95 %   08/20/21 1130  81 9 (!) 142/82 96 %   08/20/21 1115  84 9 136/73 96 %   08/20/21 1100  83 9 136/79 95 %   08/20/21 1045  81 11 138/78 94 %   08/20/21 1030  84 13 136/79 95 %   08/20/21 1015  83 11 134/77 94 %   08/20/21 1000  82 11 133/78 94 %   08/20/21 0945  82 11 134/74 94 %   08/20/21 0930  84 16 134/74 94 %   08/20/21 0915  82 8 126/74 93 %   08/20/21 0900  82 9 121/78 95 %   08/20/21 0845  83 11 130/77 96 %   08/20/21 0841 98 °F (36.7 °C) 84 11 127/72 97 %   08/20/21 0800 98.3 °F (36.8 °C) 82 13 116/73 96 %   08/20/21 0700  83 17 119/70 96 %   08/20/21 0600  84 (!) 7 120/66 96 %   08/20/21 0530  82 (!) 6 118/65 95 %   08/20/21 0500  81 (!) 6 121/67 97 %   08/20/21 0430  82 (!) 7 119/68 96 %   08/20/21 0400 98.3 °F (36.8 °C) 82 (!) 7 118/69 97 %   08/20/21 0330  82 8 122/68 97 %   08/20/21 0300  84 (!) 7 122/69 96 %   08/20/21 0230  82 17 125/70 97 %   08/20/21 0200  83 8 126/71 96 %   08/20/21 0130  84 8 126/67 96 %   08/20/21 0100  80 8 117/68 95 %   08/20/21 0030  81 8 120/66 95 %   08/20/21 0015  81 10 121/69 95 %   08/20/21 0001  88 17 131/68 95 %   08/19/21 2345  86 15 133/70    08/19/21 2330  83 12 125/76 99 %   08/19/21 2315  81 13 122/73 96 %   08/19/21 2300 98.5 °F (36.9 °C) 85 18 128/70 92 %   08/19/21 2245  80 8 119/71 92 %   08/19/21 2230  82 9 122/76 93 %   08/19/21 2215  80 14 122/74 93 %   08/19/21 2200  81 10 121/72 93 %   08/19/21 2145  81 16 124/74 93 %   08/19/21 2130  82 11 120/70 92 %   08/19/21 2115  81 15 119/75 93 %   08/19/21 2100  79 17 118/71 93 %   08/19/21 2045  81 12 119/73 93 %   08/19/21 2030  79 11 120/71 93 %   08/19/21 2015  80 9 116/68 93 %   08/19/21 2000 98 °F (36.7 °C) 81 10 114/69 93 %   08/19/21 1945  76 20 114/69 93 %   08/19/21 1930  78 12 113/66 93 %   08/19/21 1915  77 13 110/64 93 %   08/19/21 1900  77 25 114/62 93 %   08/19/21 1800  82 15 124/68 93 %   08/19/21 1700  72 10 103/66 97 %   08/19/21 1600  74 9 (!) 105/55 97 %       Intake/Output Summary (Last 24 hours) at 8/20/2021 1548  Last data filed at 8/20/2021 1215  Gross per 24 hour   Intake 1154.95 ml   Output 0 ml   Net 1154.95 ml        I had a face to face encounter and independently examined this patient on 8/20/2021, as outlined below:  PHYSICAL EXAM:  General: WD, WN. Alert, cooperative,  EENT:  EOMI. Anicteric sclerae. MMM  Resp:  LS diminished at bases, no wheezing or rales. No accessory muscle use  CV:  Regular  rhythm,  No edema  GI:  Soft, Non distended, Non tender. +Bowel sounds  Neurologic:  Lethargic, oriented x2, no focal deficits  Psych:   Limited insight. Not anxious nor agitated  Skin:  No rashes.   No jaundice    Reviewed most current lab test results and cultures  YES  Reviewed most current radiology test results   YES  Review and summation of old records today    NO  Reviewed patient's current orders and MAR    YES  PMH/SH reviewed - no change compared to H&P  ________________________________________________________________________  Care Plan discussed with: Comments   Patient x    Family      RN x    Care Manager     Consultant                        Multidiciplinary team rounds were held today with , nursing, pharmacist and clinical coordinator. Patient's plan of care was discussed; medications were reviewed and discharge planning was addressed. ________________________________________________________________________  Total NON critical care TIME:  34   Minutes    Total CRITICAL CARE TIME Spent:  40 Minutes non procedure based      Comments   >50% of visit spent in counseling and coordination of care x    ________________________________________________________________________  Kimo Vásquez MD     Procedures: see electronic medical records for all procedures/Xrays and details which were not copied into this note but were reviewed prior to creation of Plan. LABS:  I reviewed today's most current labs and imaging studies.   Pertinent labs include:  Recent Labs     08/20/21 0416 08/19/21  0404   WBC 7.5 8.7   HGB 8.8* 7.9*   HCT 27.2* 25.1*   * 108*     Recent Labs     08/20/21  0416 08/19/21  0403 08/18/21  1838   * 132* 136   K 4.0 4.1 3.3*   CL 93* 97 100   CO2 28 28 30   GLU 78 112* 99   BUN 41* 28* 15   CREA 7.36* 5.53* 3.00*   CA 8.9 8.6 8.1*       Signed: Kimo Vásquez MD

## 2021-08-20 NOTE — PROGRESS NOTES
Attempted to see pt for PT eval. Currently getting HD at bedside.  Will continue to follow when appropriate for PT.

## 2021-08-20 NOTE — PROGRESS NOTES
TRANSFER - IN REPORT:    Verbal report received from Ana(name) on Moris Coy  being received from CCU(unit) for routine progression of care      Report consisted of patients Situation, Background, Assessment and   Recommendations(SBAR). Information from the following report(s) SBAR, Kardex, Intake/Output, MAR and Recent Results was reviewed with the receiving nurse. Opportunity for questions and clarification was provided. Assessment completed upon patients arrival to unit and care assumed.

## 2021-08-20 NOTE — PROGRESS NOTES
Attempted to see pt for OT eval. Currently getting HD at bedside. Will continue to follow when appropriate for OT.

## 2021-08-20 NOTE — CONSULTS
Pulmonology Intensive Care Unit Initial Assessment    Subjective:        Subjective:     Critical Care Initial Evaluation Note: 8/20/2021 12:08 PM    Mr. Luis Eldridge is 55  past medical history of end-stage renal disease on dialysisMonday, Wednesday and Friday, hypertension, hyperlipidemia, GERD, neuropathy. He was admitted on 8/16 with hyperkalemia, shortness of breath and altered mental status. Patient received emergent HD. His K and respiratory failure had improved but this morning patient again was minimally responsive with ongoing hypoglycemia but patient had lost peripheral IV access. He was also found to have empty bottle of oxycodone in his pocket and possible ingestion to explain this acute change in mental status, he was given naracan and then dextrose but he developed respiratory distress. Rapid response was called. Decision was made to intubate him due to severe resp distress and encephalopathy and patient transferred to ICU for further care. 8/19- failed sbt due to agitation early this a.m.     8/20 - self extubated before  SBT completed yesterday. Became increasing more lethargic over the afternoon but responded well to 2 doses of narcan. VBG showed adequate gas exchange. Methadone and gabapentin held. No further issues overnight. Receiving HD this a.m     Past Medical History:   Diagnosis Date    Chronic kidney disease     Dialysis patient ([facility] [address]     Hypertension       Past Surgical History:   Procedure Laterality Date    HX VASCULAR ACCESS Left     LUE AV fistula      Prior to Admission medications    Medication Sig Start Date End Date Taking? Authorizing Provider   methadone (DOLOPHINE) 10 mg/mL solution Take 160 mg by mouth daily. Yes Provider, Historical   amoxicillin-clavulanate (Augmentin) 875-125 mg per tablet Take 1 Tablet by mouth every twelve (12) hours for 5 days.  8/14/21 8/19/21  Luis Bernabe MD   metoprolol tartrate (LOPRESSOR) 25 mg tablet Take 1 Tablet by mouth every twelve (12) hours for 30 days. 8/14/21 9/13/21  Yuval Hernández MD   pantoprazole (PROTONIX) 40 mg tablet Take 1 Tablet by mouth daily for 30 days. 8/14/21 9/13/21  Yuval Hernández MD   sevelamer (RenageL) 400 mg tablet Take 2 Tablets by mouth three (3) times daily (with meals) for 30 days. 8/14/21 9/13/21  Yuval Hernández MD   isosorbide mononitrate ER (IMDUR) 30 mg tablet Take 1 Tablet by mouth every morning for 30 days. 8/14/21 9/13/21  Yuval Hernández MD   citalopram (CELEXA) 40 mg tablet Take 1 Tablet by mouth daily for 30 days. 8/14/21 9/13/21  Yuval Hernández MD   atorvastatin (LIPITOR) 40 mg tablet Take 1 Tablet by mouth nightly for 30 days. 8/14/21 9/13/21  Yuval Hernández MD   apixaban (ELIQUIS) 5 mg tablet Take 1 Tablet by mouth two (2) times a day for 30 days. 8/14/21 9/13/21  Yuval Hernández MD   paricalcitoL (ZEMPLAR) 1 mcg capsule Take 1 Capsule by mouth daily for 30 days. 8/14/21 9/13/21  Yuval Hernández MD   lidocaine (LIDODERM) 5 % Apply patch to the affected area for 12 hours a day and remove for 12 hours a day. 11/28/17   Angélica Sparks MD   aspirin delayed-release 81 mg tablet Take 1 Tab by mouth daily. 11/28/17   Angélica Sparks MD   multivitamin (ONE A DAY) tablet Take 1 Tab by mouth daily. Juan Olivarez MD   gabapentin (NEURONTIN) 100 mg capsule Take 100 mg by mouth daily.     Juan Olivarez MD     Allergies   Allergen Reactions    Motrin [Ibuprofen] Hives     7/16/17 Pt denies allergy    Tylenol [Acetaminophen] Hives     7/16/17 Pt denies allergy      Social History     Tobacco Use    Smoking status: Current Every Day Smoker     Packs/day: 0.25    Smokeless tobacco: Never Used   Substance Use Topics    Alcohol use: Yes      Family History   Problem Relation Age of Onset    No Known Problems Mother     No Known Problems Father     No Known Problems Sister     No Known Problems Brother     No Known Problems Sister     No Known Problems Sister     No Known Problems Brother         Review of Systems   All other systems reviewed and are negative. Objective:     Vital signs reviewed. No intake/output data recorded. 08/18 1901 - 08/20 0700  In: 2567.9 [I.V.:2467.9]  Out: -     Physical Exam  Constitutional:       General: He is not in acute distress. Appearance: He is not ill-appearing. Comments: Resting comfortably. HENT:      Head: Normocephalic and atraumatic. Mouth/Throat:      Mouth: Mucous membranes are moist.   Eyes:      Extraocular Movements: Extraocular movements intact. Conjunctiva/sclera: Conjunctivae normal.   Cardiovascular:      Rate and Rhythm: Regular rhythm. Tachycardia present. Pulmonary:      Breath sounds: No wheezing. Abdominal:      General: There is no distension. Palpations: Abdomen is soft. Tenderness: There is no abdominal tenderness. Musculoskeletal:      Cervical back: Neck supple. Neurological:      Mental Status: Mental status is at baseline.          Data Review:     Recent Results (from the past 24 hour(s))   GLUCOSE, POC    Collection Time: 08/19/21  9:08 AM   Result Value Ref Range    Glucose (POC) 108 65 - 117 mg/dL    Performed by Louise Dhillon (JENNY RN)    PROCALCITONIN    Collection Time: 08/19/21 10:49 AM   Result Value Ref Range    Procalcitonin 34.38 ng/mL   GLUCOSE, POC    Collection Time: 08/19/21  3:12 PM   Result Value Ref Range    Glucose (POC) 85 65 - 117 mg/dL    Performed by Louise Dhillon (JENNY RN)    BLOOD GAS, ARTERIAL    Collection Time: 08/19/21  4:51 PM   Result Value Ref Range    pH 7.38 7.35 - 7.45      PCO2 52 (H) 35 - 45 mmHg    PO2 41 (LL) 80 - 100 mmHg    O2 SAT 75 (L) 92 - 97 %    BICARBONATE 30 (H) 22 - 26 mmol/L    BASE EXCESS 3.9 mmol/L    O2 METHOD NASAL CANNULA      Sample source ARTERIAL      SITE RIGHT RADIAL      JENNY'S TEST YES      Critical value read back Called to Sanya on 08/19/2021 at 16:52    GLUCOSE, POC    Collection Time: 08/19/21 10:54 PM   Result Value Ref Range    Glucose (POC) 85 65 - 117 mg/dL    Performed by Lorena Sawyer (JENNY RN)    METABOLIC PANEL, BASIC    Collection Time: 08/20/21  4:16 AM   Result Value Ref Range    Sodium 129 (L) 136 - 145 mmol/L    Potassium 4.0 3.5 - 5.1 mmol/L    Chloride 93 (L) 97 - 108 mmol/L    CO2 28 21 - 32 mmol/L    Anion gap 8 5 - 15 mmol/L    Glucose 78 65 - 100 mg/dL    BUN 41 (H) 6 - 20 MG/DL    Creatinine 7.36 (H) 0.70 - 1.30 MG/DL    BUN/Creatinine ratio 6 (L) 12 - 20      GFR est AA 9 (L) >60 ml/min/1.73m2    GFR est non-AA 8 (L) >60 ml/min/1.73m2    Calcium 8.9 8.5 - 10.1 MG/DL   CBC W/O DIFF    Collection Time: 08/20/21  4:16 AM   Result Value Ref Range    WBC 7.5 4.1 - 11.1 K/uL    RBC 2.70 (L) 4.10 - 5.70 M/uL    HGB 8.8 (L) 12.1 - 17.0 g/dL    HCT 27.2 (L) 36.6 - 50.3 %    .7 (H) 80.0 - 99.0 FL    MCH 32.6 26.0 - 34.0 PG    MCHC 32.4 30.0 - 36.5 g/dL    RDW 16.8 (H) 11.5 - 14.5 %    PLATELET 878 (L) 546 - 400 K/uL    MPV 11.1 8.9 - 12.9 FL    NRBC 0.4 (H) 0  WBC    ABSOLUTE NRBC 0.03 (H) 0.00 - 0.01 K/uL   GLUCOSE, POC    Collection Time: 08/20/21  5:21 AM   Result Value Ref Range    Glucose (POC) 80 65 - 117 mg/dL    Performed by Lorena Sawyer (JENNY RN)    GLUCOSE, POC    Collection Time: 08/20/21  7:52 AM   Result Value Ref Range    Glucose (POC) 83 65 - 117 mg/dL    Performed by Tara Patel (JENNY RN)          Assessment:     -Acute hypoxemic respiratory failure. Sec to vol overload pulm edema, also patient aspirated during intubation. - self extubated yesterday but tolerated NC overnight. - CXR consistent with aspiration    -Acute metabolic/toxic encephalopathy. Suspected oxycodone overdose.   -Hyperkalemia.  -Suspected drug overdose while in the hospital. Empty bottle of oxycodone found in the pocket. On methadone at home - restarted home dose today to avoid withdrawal however he became lethargic and all doses held. -ESRD. -Pulmonary edema.   -H/o HTN.  -GERD.  -H/o DVT. -Recurrent falls. Plan:     -wean NC  - Repeat cxr today to assess development of pneumonia  -Repeat labs including CBC, BMP, mag and phos. -Nephrology following for HD needs, will get HD today.  -Continue eliquis.  -Hold home BP meds for now, consider restarting today if BP allows  -Closely monitor mental status.  -continue to hold sedation  -Start zosyn for aspiration pneumonia coverage. -ABCDE protocol      Prophylaxis:  Stress Ulcer Protocol Active: ON protonix already. DVT Protocol Active: continue qliquis.       Transfer to telemetry

## 2021-08-21 NOTE — PROGRESS NOTES
5:53 AM  Patient arrived to floor from Med Tele Unit. Home meds placed in pyxis by this RN.  Controlled substances placed in patient override controlled substace bin 2 bottles in plastic bag with label attached, 1 bottle of gabapentin and one bottle with one oxcodone

## 2021-08-21 NOTE — PROGRESS NOTES
Problem: Mobility Impaired (Adult and Pediatric)  Goal: *Acute Goals and Plan of Care (Insert Text)  Outcome: Not Met  Note: FUNCTIONAL STATUS PRIOR TO ADMISSION: Patient was independent and active without use of DME.    HOME SUPPORT PRIOR TO ADMISSION: The patient lived alone with no local support. He has been homeless for 6 months after the death of his girlfriend.(per patient)    Physical Therapy Goals  Initiated 8/21/2021  1. Patient will move from supine to sit and sit to supine , scoot up and down, and roll side to side in bed with independence within 7 day(s). 2.  Patient will transfer from bed to chair and chair to bed with independence using the least restrictive device within 7 day(s). 3.  Patient will perform sit to stand with modified independence within 7 day(s). 4.  Patient will ambulate with minimal assistance/contact guard assist for 150 feet with the least restrictive device within 7 day(s). Gait analysis will be completed within 2 days to complete eval.     PHYSICAL THERAPY EVALUATION  Patient: Charissa Ordoñez (3 y.o. male)  Date: 8/21/2021  Primary Diagnosis: Pulmonary edema [J81.1]  Acute on chronic combined systolic and diastolic CHF (congestive heart failure) (formerly [facility]) [I50.43]  ESRD (end stage renal disease) on dialysis ([facility] [address] [N18.6, Z99.2]  Hyperkalemia [E87.5]        Precautions:   Fall (left UE fistula)    ASSESSMENT  Based on the objective data described below, the patient presents with recovering from his in hospital overdose and now is fairly alert and cooperative though still didn't want to work with PT/OT. Patient presents with general weakness, limited bed mobility, needing assist with transfers and ADLs. Patient reluctantly stood at bedside for PT to assess standing balance but not interested in ambulation today. PIC team coming to insert PIC line so pt left supine, however he needs to be up in chair now that he can get up.   Gait was not evaluated today, only standing balance but pt should be able to ambulate with cane or HHA. BP was high - see vitals. Re his homeless situation and how he got the oxycodone  - this is unclear and needs to be investigated by CM and others. Patient will benefit from PT to reach stated goals and as he continues to recover it appears that his motivation will improve as well. Pt ws on 1 liter o2, but when taken off and checked pts sats were 98 with room air. Discussed with RN, and she reported that pt requested O2. For PT he c/o about having o2. ??? Current Level of Function Impacting Discharge (mobility/balance): still clearing mentally withdrawal, glassy eyed, impaired balance    Functional Outcome Measure: The patient scored 60 on the Barthel outcome measure which is indicative of dependence with mobility and ADLs. .      Other factors to consider for discharge: homeless     Patient will benefit from skilled therapy intervention to address the above noted impairments. PLAN :  Recommendations and Planned Interventions: bed mobility training, transfer training, gait training, therapeutic exercises, patient and family training/education, and therapeutic activities      Frequency/Duration: Patient will be followed by physical therapy:  4 times a week to address goals. Recommendation for discharge: (in order for the patient to meet his/her long term goals)  To be determined: This discharge recommendation:  Has not yet been discussed the attending provider and/or case management    IF patient discharges home will need the following DME: cane? To be determined         SUBJECTIVE:   Patient stated I don't really want to .     OBJECTIVE DATA SUMMARY:   HISTORY:    Past Medical History:   Diagnosis Date    Chronic kidney disease     Dialysis patient ([facility] [address]     Hypertension      Past Surgical History:   Procedure Laterality Date    HX VASCULAR ACCESS Left     LUE AV fistula       Personal factors and/or comorbidities impacting plan of care:     Home Situation  Home Environment:  (homeless )  Living Alone: Yes  Patient Expects to be Discharged to[de-identified]  (unsure)    EXAMINATION/PRESENTATION/DECISION MAKING:   Critical Behavior:  Neurologic State: Alert  Orientation Level: Oriented X4  Cognition: Follows commands  Safety/Judgement: Fall prevention  Hearing: Auditory  Auditory Impairment: None  Skin:    Edema:   Range Of Motion:  AROM: Generally decreased, functional (swelling)           PROM: Generally decreased, functional           Strength:    Strength: Generally decreased, functional                    Tone & Sensation:   Tone: Normal              Sensation: Intact               Coordination:  Coordination: Generally decreased, functional  Vision:   Corrective Lenses: Reading glasses  Functional Mobility:  Bed Mobility:  Rolling: Stand-by assistance  Supine to Sit: Minimum assistance     Scooting: Contact guard assistance; Additional time  Transfers:  Sit to Stand: Minimum assistance  Stand to Sit: Minimum assistance        Bed to Chair: Minimum assistance              Balance:   Sitting: Intact  Standing: Impaired  Standing - Static: Constant support; Fair  Standing - Dynamic : Constant support; Fair  Ambulation/Gait Training:      Standing balance fair. Patient did not ambulate today - unable to encourage him to walk, though should be min A to CGA best guess. Stairs: Therapeutic Exercises:       Functional Measure:  Barthel Index:    Bathin  Bladder: 10  Bowels: 10  Groomin  Dressin  Feeding: 10  Mobility: 5  Stairs: 0  Toilet Use: 5  Transfer (Bed to Chair and Back): 10  Total: 60/100       The Barthel ADL Index: Guidelines  1. The index should be used as a record of what a patient does, not as a record of what a patient could do. 2. The main aim is to establish degree of independence from any help, physical or verbal, however minor and for whatever reason. 3. The need for supervision renders the patient not independent.   4. A patient's performance should be established using the best available evidence. Asking the patient, friends/relatives and nurses are the usual sources, but direct observation and common sense are also important. However direct testing is not needed. 5. Usually the patient's performance over the preceding 24-48 hours is important, but occasionally longer periods will be relevant. 6. Middle categories imply that the patient supplies over 50 per cent of the effort. 7. Use of aids to be independent is allowed. Kunal Farrell., Barthel, D.W. (2959). Functional evaluation: the Barthel Index. [address] [facility] (14)2. Kayla Chaney ejny HALIMA GoodwinF, Iraj Henry., Agustina Correia., Vasile, [address] (1999). Measuring the change indisability after inpatient rehabilitation; comparison of the responsiveness of the Barthel Index and Functional [place] Measure. Journal of Neurology, Neurosurgery, and Psychiatry, 66(4), 267-109. Brandon Prado, N.J.A, JESSE Chow, & Alfredo Higgins MSvetlanaA. (2004.) Assessment of post-stroke quality of life in cost-effectiveness studies: The usefulness of the Barthel Index and the EuroQoL-5D.  Quality of Life Research, 15, 181-50           Physical Therapy Evaluation Charge Determination   History Examination Presentation Decision-Making   HIGH Complexity :3+ comorbidities / personal factors will impact the outcome/ POC  LOW Complexity : 1-2 Standardized tests and measures addressing body structure, function, activity limitation and / or participation in recreation  MEDIUM Complexity : Evolving with changing characteristics  LOW Complexity : FOTO score of       Based on the above components, the patient evaluation is determined to be of the following complexity level: LOW     Pain Rating:  Unable to pinpoint any pain today- generally sore and c/o blood clots in nose  Activity Tolerance  Fair    After treatment patient left in no apparent distress:   Supine in bed    COMMUNICATION/EDUCATION:   The patients plan of care was discussed with: Occupational therapist and Registered nurse. Patient/family have participated as able in goal setting and plan of care.     Thank you for this referral.  Franca Boyd, PT   Time Calculation: 16 mins

## 2021-08-21 NOTE — PROGRESS NOTES
Hospitalist Progress Note    NAME: Yulia Marino   :  1965   MRN:  [id number]         Assessment / Plan:  Pulmonary Edema and Volume Overload r/t Acute on Chronic Diastolic and Systolic Heart Failure Exacerbation and Non compliance with HD pBNP >96086  End-stage renal disease on dialysis Missed 2 HD treatments PTA   Uremic Encephalopathy (resolving) and Metabolic Acidosis (resolved) r/t         -Admitted on  for pulmonary edema, CHF secondary to nonadherence with dialysis treatment, he was transferred to ICU on  for ongoing hypoglycemia, lethargy. Found to have oxycodone bottle in his pocket, possible overdose, for which she was given Narcan, and afterwards he developed respiratory distress was intubated,,  He self extubated , currently on 4 L NC  -CXR , new left lower lobe airspace disease, possible atelectasis  -Continue dialysis as per nephro  -Wean down oxygen as able  Duo nebs PRN  -Continue Zosyn      Acute hypoxic respiratory failure, not POA  Severe hypoglycemia  Narcotic abuse  -required ICU as above  -He has per him he takes methadone 160 mg daily, but in the hospital he found to have a bottle of oxycodone.  -Yesterday due to concern of withdrawal he was given methadone in the ICU, which made him drowsy/lethargic, and it was discontinued  -Continue to hold methadone  -If there is concern for methadone/narcotic withdrawal, can use methadone 10 mg IM if needed  -DC as needed oxycodone  -Asking for oxycodone today, mentation improved, but still drowsy, avoid narcotics       -        Hypoglycemia improved  Will DC D10, continue to monitor glucose, if he needs D10 again  Ordered BCx for persistent Hypoglycemia earlier    Recent Community Acquired PNA      Hypertension  Hyperlipidemia  -ECHO - EF 45-50%. Moderately reduced systolic function. LV diastolic dysfunction. Mild to moderate MVR.    -Continue ASA, atorvastatin, imdur, metoprolol      GERD Continue Protonix   Neuropathy  History of DVT Continue Eliquis      Recurrent falls  Left rib fractures  -PT/OT evaluations         18.5 - 24.9 Normal weight / Body mass index is 23.47 kg/m². Anticipated discharge date     Subjective:     Chief Complaint / Reason for Physician Visit  Mentation improved since yesterday, still mild drowsy, asking for oxycodone. Review of Systems:  Symptom Y/N Comments  Symptom Y/N Comments   Fever/Chills    Chest Pain     Poor Appetite    Edema     Cough    Abdominal Pain     Sputum    Joint Pain     SOB/WARNER    Pruritis/Rash     Nausea/vomit    Tolerating PT/OT     Diarrhea    Tolerating Diet     Constipation    Other       Could NOT obtain due to:      Objective:     VITALS:   Last 24hrs VS reviewed since prior progress note. Most recent are:  Patient Vitals for the past 24 hrs:   Temp Pulse Resp BP SpO2   08/21/21 0801 98.4 °F (36.9 °C) 89 20 139/82 100 %   08/21/21 0619 98.5 °F (36.9 °C) 96 22 (!) 147/87 96 %   08/21/21 0505 98.7 °F (37.1 °C) 91 18 (!) 146/84 95 %   08/21/21 0030 98.4 °F (36.9 °C) 90 17 (!) 149/90 97 %   08/20/21 2106 98.5 °F (36.9 °C) 91 15 129/85 100 %   08/20/21 1516 98.8 °F (37.1 °C) 83 12 123/75 100 %   08/20/21 1230  81 11 126/72 95 %   08/20/21 1215  79 11 137/75 95 %   08/20/21 1200 98.8 °F (37.1 °C) 79 10 (!) 140/77 96 %   08/20/21 1145  81 9 135/75 95 %   08/20/21 1130  81 9 (!) 142/82 96 %   08/20/21 1115  84 9 136/73 96 %   08/20/21 1100  83 9 136/79 95 %   08/20/21 1045  81 11 138/78 94 %   08/20/21 1030  84 13 136/79 95 %   08/20/21 1015  83 11 134/77 94 %   08/20/21 1000  82 11 133/78 94 %       Intake/Output Summary (Last 24 hours) at 8/21/2021 0948  Last data filed at 8/20/2021 1215  Gross per 24 hour   Intake 150 ml   Output 0 ml   Net 150 ml        I had a face to face encounter and independently examined this patient on 8/21/2021, as outlined below:  PHYSICAL EXAM:  General: WD, WN. Alert, cooperative,  EENT:  EOMI. Anicteric sclerae.  MMM  Resp:  Lungs clear bilaterally  CV:  Regular  rhythm,  No edema  GI:  Soft, Non distended, Non tender. +Bowel sounds  Neurologic:  Awake oriented, no focal deficit  Psych:   Limited insight. Not anxious nor agitated  Skin:  No rashes. No jaundice    Reviewed most current lab test results and cultures  YES  Reviewed most current radiology test results   YES  Review and summation of old records today    NO  Reviewed patient's current orders and MAR    YES  PMH/SH reviewed - no change compared to H&P  ________________________________________________________________________  Care Plan discussed with:    Comments   Patient x    Family      RN x    Care Manager     Consultant                        Multidiciplinary team rounds were held today with , nursing, pharmacist and clinical coordinator. Patient's plan of care was discussed; medications were reviewed and discharge planning was addressed. ________________________________________________________________________  Total NON critical care TIME:  24   Minutes    Total CRITICAL CARE TIME Spent:   Minutes non procedure based      Comments   >50% of visit spent in counseling and coordination of care x    ________________________________________________________________________  Laurie Parra MD     Procedures: see electronic medical records for all procedures/Xrays and details which were not copied into this note but were reviewed prior to creation of Plan. LABS:  I reviewed today's most current labs and imaging studies.   Pertinent labs include:  Recent Labs     08/20/21  0416 08/19/21  0404   WBC 7.5 8.7   HGB 8.8* 7.9*   HCT 27.2* 25.1*   * 108*     Recent Labs     08/20/21  0416 08/19/21  0403 08/18/21  1838   * 132* 136   K 4.0 4.1 3.3*   CL 93* 97 100   CO2 28 28 30   GLU 78 112* 99   BUN 41* 28* 15   CREA 7.36* 5.53* 3.00*   CA 8.9 8.6 8.1*       Signed: Laurie Parra MD

## 2021-08-21 NOTE — PROGRESS NOTES
Problem: Self Care Deficits Care Plan (Adult)  Goal: *Acute Goals and Plan of Care (Insert Text)  Description: FUNCTIONAL STATUS PRIOR TO ADMISSION: homeless, performed all mobility and ADLS without assist    HOME SUPPORT PRIOR TO ADMISSION: homeless after his girlfriend passed away    Occupational Therapy Goals:  Initiated 8/21/2021  1. Patient will perform grooming standing with modified independence within 7 days. 2. Patient will perform lower body dressing with modified independence within 7 days. 3. Patient will perform toileting with modified independence within 7 days. 4. Patient will transfer from toilet with modified independence using the least restrictive device and appropriate durable medical equipment within 7 days. Outcome: Not Met   OCCUPATIONAL THERAPY EVALUATION  Patient: Ara Dumont (25 y.o. male)  Date: 8/21/2021  Primary Diagnosis: Pulmonary edema [J81.1]  Acute on chronic combined systolic and diastolic CHF (congestive heart failure) (formerly [facility]) [I50.43]  ESRD (end stage renal disease) on dialysis ([facility] [address] [N18.6, Z99.2]  Hyperkalemia [E87.5]       Precautions:   Fall (left UE fistula)    ASSESSMENT  Based on the objective data described below, the patient presents with flat affect, mild SOB and report of fatigue. Pts O2 sat on room air with activity was 94% and BP was elevated this session. Replaced O2 at end of session as pt was on 1L NC prior to arrival.  Pt reports that he was functional prior to onset of illness but is homeless. Min hand held assist needed for mobility short distance in the room and for sit to stand. Pt was able to reach feet seated to manage socks. Frequent rest breaks are needed at this time. Pt will need SNF for rehab at discharge.      Current Level of Function Impacting Discharge (ADLs/self-care): minimal assist with mobility HHA,  Feeding: Modified independent    Oral Facial Hygiene/Grooming: Setup (seated)    Bathing: Contact guard assistance    Upper Body Dressing: Contact guard assistance    Lower Body Dressing: Minimum assistance    Toileting: Minimum assistance       Functional Outcome Measure: The patient scored 60/100 on the barthel outcome measure which is indicative of minimal deficits with mobility and ADLS. Other factors to consider for discharge: pt reports being homeless, pt is a dialysis pt     Patient will benefit from skilled therapy intervention to address the above noted impairments. PLAN :  Recommendations and Planned Interventions: self care training, functional mobility training, therapeutic exercise, balance training, patient education, home safety training and family training/education    Frequency/Duration: Patient will be followed by occupational therapy 4 times a week to address goals. Recommendation for discharge: (in order for the patient to meet his/her long term goals)  Therapy up to 5 days/week in SNF setting    This discharge recommendation:  Has not yet been discussed the attending provider and/or case management    IF patient discharges home will need the following DME: SPC? SUBJECTIVE:   Patient stated I don't feel like doing this.   Pt was fatigued prior to start of session but was willing to participate with encouragement    OBJECTIVE DATA SUMMARY:   HISTORY:   Past Medical History:   Diagnosis Date    Chronic kidney disease     Dialysis patient ([facility] [address]     Hypertension      Past Surgical History:   Procedure Laterality Date    HX VASCULAR ACCESS Left     LUE AV fistula       Expanded or extensive additional review of patient history:     Home Situation  Home Environment:  (homeless )  Living Alone: Yes  Patient Expects to be Discharged to[facility]>  (unsure)    Hand dominance: Right    EXAMINATION OF PERFORMANCE DEFICITS:  Cognitive/Behavioral Status:  Neurologic State: Alert  Orientation Level: Oriented X4  Cognition: Follows commands  Perception: Cues to maintain midline in standing  Perseveration: No perseveration noted  Safety/Judgement: Fall prevention      Edema: moderate swelling in BUE, limb alert bracelet placed on pts LUE due to dialysis fistula    Hearing: Auditory  Auditory Impairment: None    Vision/Perceptual:                                Corrective Lenses: Reading glasses    Range of Motion:    AROM: Generally decreased, functional (swelling)  PROM: Generally decreased, functional                      Strength:    Strength: Generally decreased, functional                Coordination:  Coordination: Generally decreased, functional  Fine Motor Skills-Upper: Left Intact; Right Intact    Gross Motor Skills-Upper: Left Intact; Right Intact    Tone & Sensation:    Tone: Normal  Sensation: Intact                      Balance:  Sitting: Intact  Standing: Impaired  Standing - Static: Constant support; Fair  Standing - Dynamic : Constant support; Fair    Functional Mobility and Transfers for ADLs:  Bed Mobility:  Rolling: Stand-by assistance  Supine to Sit: Minimum assistance  Scooting: Contact guard assistance; Additional time    Transfers:  Sit to Stand: Minimum assistance  Stand to Sit: Minimum assistance  Bed to Chair: Minimum assistance  Bathroom Mobility: Minimum assistance  Toilet Transfer : Minimum assistance    ADL Assessment:  Feeding: Modified independent    Oral Facial Hygiene/Grooming: Setup (seated)    Bathing: Contact guard assistance    Upper Body Dressing: Contact guard assistance    Lower Body Dressing: Minimum assistance    Toileting: Minimum assistance          Cognitive Retraining  Safety/Judgement: Fall prevention      Functional Measure:  Barthel Index:    Bathin  Bladder: 10  Bowels: 10  Groomin  Dressin  Feeding: 10  Mobility: 5  Stairs: 0  Toilet Use: 5  Transfer (Bed to Chair and Back): 10  Total: 60/100        The Barthel ADL Index: Guidelines  1. The index should be used as a record of what a patient does, not as a record of what a patient could do.   2. The main aim is to establish degree of independence from any help, physical or verbal, however minor and for whatever reason. 3. The need for supervision renders the patient not independent. 4. A patient's performance should be established using the best available evidence. Asking the patient, friends/relatives and nurses are the usual sources, but direct observation and common sense are also important. However direct testing is not needed. 5. Usually the patient's performance over the preceding 24-48 hours is important, but occasionally longer periods will be relevant. 6. Middle categories imply that the patient supplies over 50 per cent of the effort. 7. Use of aids to be independent is allowed. Yajaira Adam., Barthel, D.W. (5713). Functional evaluation: the Barthel Index. [address] (14)2. KENNETH Finley Se, Julius Contreras, Becca Clemente., [place], [address] (1999). Measuring the change indisability after inpatient rehabilitation; comparison of the responsiveness of the Barthel Index and Functional [place] Measure. Journal of Neurology, Neurosurgery, and Psychiatry, 66(4), 762-004. Estefany Wharton, N.J.A, JESSE Chow, & Vivian Suggs, M.A. (2004.) Assessment of post-stroke quality of life in cost-effectiveness studies: The usefulness of the Barthel Index and the EuroQoL-5D. Quality of Life Research, 15, 258-53         Occupational Therapy Evaluation Charge Determination   History Examination Decision-Making   LOW Complexity : Brief history review  MEDIUM Complexity : 3-5 performance deficits relating to physical, cognitive , or psychosocial skils that result in activity limitations and / or participation restrictions MEDIUM Complexity : Patient may present with comorbidities that affect occupational performnce.  Miniml to moderate modification of tasks or assistance (eg, physical or verbal ) with assesment(s) is necessary to enable patient to complete evaluation       Based on the above components, the patient evaluation is determined to be of the following complexity level: LOW     Activity Tolerance:   Fair and requires frequent rest breaks    After treatment patient left in no apparent distress:    Supine in bed and Call bell within reach    COMMUNICATION/EDUCATION:   The patients plan of care was discussed with: Physical therapist, Registered nurse and patient. Patient/family have participated as able in goal setting and plan of care. and Patient/family agree to work toward stated goals and plan of care. This patients plan of care is appropriate for delegation to hospitals.     Thank you for this referral.  Abdirahman Pitts OTR/L  Time Calculation: 17 mins

## 2021-08-21 NOTE — PROGRESS NOTES
End of Shift Note    Bedside shift change report given to [facility] YONY TREJO (oncoming nurse) by Btetina Scott (offgoing nurse). Report included the following information SBAR, Kardex, MAR, Accordion and Recent Results    Shift worked:  Day     Shift summary and any significant changes:     PICC team called for blood cultures. IV medication given. Patient requesting pain medication doctor aware. Not fully with it has periodic confusion and seems dazed. No visitors came and bag by the window. Concerns for physician to address:  cultures     Zone phone for oncoming shift:   [phone]       Activity:  Activity Level: Up with Assistance  Number times ambulated in hallways past shift: 0  Number of times OOB to chair past shift: 1    Cardiac:   Cardiac Monitoring: Yes      Cardiac Rhythm: Sinus Rhythm    Access:   Current line(s): PIV     Genitourinary:   Urinary status: anuric    Respiratory:   O2 Device: None (Room air)  Chronic home O2 use?: NO  Incentive spirometer at bedside: NO  Actual Volume (ml): 1200 ml  GI:  Last Bowel Movement Date: 08/18/21  Current diet:  ADULT DIET Easy to Chew; Low Potassium (Less than 3000 mg/day); Low Phosphorus (Less than 1000 mg)  Passing flatus: YES  Tolerating current diet: YES       Pain Management:   Patient states pain is manageable on current regimen: YES    Skin:  Jovan Score: 16  Interventions: increase time out of bed, PT/OT consult and limit briefs    Patient Safety:  Fall Score:  Total Score: 3  Interventions: gripper socks, pt to call before getting OOB and stay with me (per policy)  High Fall Risk: Yes    Length of Stay:  Expected LOS: 4d 0h  Actual LOS: [address]

## 2021-08-21 NOTE — CONSULTS
ID consult  Reason for consult- Fungemia  Requesting provider- Dr Sarah Broderick   Blood cultures8/18  + for yeast , ID, CRISPIN pending  Repeat blood cultures-to be drawn  No history of fevers  -Acute hypoxic respiratory failure 2ry to fluid overload  S/p intubation, now extubated  - LLL infiltrate on CXR  -Suspected drug overdose while admitted(empty oxycodone bottle found in pt pocket, pt on methadone)  -Current smoker 1/4 PPD  -ESRD on HD  - H/o HTN  - H/o DVT  Plan  -Continue anidulafungin  -Repeat fungal cultures  -Respiratory cultures  -CXR.

## 2021-08-22 NOTE — PROGRESS NOTES
TRANSFER - OUT REPORT:    Verbal report given to PCU nurse(name) on Berkley Clarke  being transferred to PCU (unit) for change in patient condition(Unstable)       Report consisted of patients Situation, Background, Assessment and   Recommendations(SBAR). Information from the following report(s) SBAR, Kardex, Intake/Output, MAR and Recent Results was reviewed with the receiving nurse. Lines:   Triple Lumen Triple lumen left IJ 08/18/21 Anterior; Left Neck (Active)   Central Line Being Utilized Yes 08/22/21 0220   Criteria for Appropriate Use Limited/no vessel suitable for conventional peripheral access 08/22/21 0220   Site Assessment Intact 08/22/21 0220   Infiltration Assessment 0 08/22/21 0220   Affected Extremity/Extremities Color distal to insertion site pink (or appropriate for race) 08/22/21 0220   Date of Last Dressing Change 08/22/21 08/22/21 0303   Dressing Status Loose;New drainage 08/22/21 0220   Dressing Type Disk with Chlorhexadine gluconate (CHG); Transparent 08/22/21 0303   Action Taken Dressing changed; Open ports on tubing capped 08/22/21 0220   Proximal Hub Color/Line Status White;Flushed;Capped 08/21/21 2017   Positive Blood Return (Medial Site) Yes 08/21/21 2017   Medial Hub Color/Line Status Blue;Flushed;Capped 08/21/21 2017   Positive Blood Return (Lateral Site) Yes 08/21/21 2017   Distal Hub Color/Line Status Merry Pears; Infusing 08/21/21 2017   Positive Blood Return (Site #3) Yes 08/21/21 2017   Alcohol Cap Used Yes 08/21/21 2017        Opportunity for questions and clarification was provided.       Patient transported with:   Monitor  O2 @ 4 liters  Patient's medications from home  Registered Nurse

## 2021-08-22 NOTE — PROCEDURES
[facility]  [phone]    Vitals   Pre   Post   Assessment   Pre   Post     Temp  Temp: 98.4 °F (36.9 °C) (08/22/21 1241) 98.6 LOC  Lethargic / responds to voice AxOx4   HR   Pulse (Heart Rate): 93 (08/22/21 1241) 89 Lungs   Diminished / tachypneic Even and unlabored   B/P   BP: 121/72 (08/22/21 1241) 154/95 Cardiac   tachycardic tachycardic   Resp   Resp Rate: 28 (08/22/21 1241) 24 Skin   Moist/intact Dry/intact   Pain level  Pain Intensity 1: 0 (08/22/21 0743) 0 Edema  generalized   generalized   Orders:    Duration:   Start:    9359 End:    4299 Total:   3 hrs   Dialyzer:   Dialyzer/Set Up Inspection: Christine Yeboah (08/22/21 1241)   K Bath:   Dialysate K (mEq/L): 2 (08/22/21 1241)   Ca Bath:   Dialysate CA (mEq/L): 2.5 (08/22/21 1241)   Na/Bicarb:   Dialysate NA (mEq/L): 138 (08/22/21 1241)   Target Fluid Removal:   Goal/Amount of Fluid to Remove (mL): 3000 mL (08/22/21 1241)   Access     Type & Location:   Left upper arm AV Fistula without evidence of warmth, redness, or drainage. +thrill/+bruit. Each access site disinfected for 60 seconds per site with alcohol swabs per P&P. Cannulated with 15G needles x2 and secured with paper tape. +aspiration/+flushed.      Labs     Obtained/Reviewed   Critical Results Called   Date when labs were drawn-  Hgb-    HGB   Date Value Ref Range Status   08/20/2021 8.8 (L) 12.1 - 17.0 g/dL Final     K-    Potassium   Date Value Ref Range Status   08/22/2021 5.0 3.5 - 5.1 mmol/L Final     Ca-   Calcium   Date Value Ref Range Status   08/22/2021 8.7 8.5 - 10.1 MG/DL Final     Bun-   BUN   Date Value Ref Range Status   08/22/2021 39 (H) 6 - 20 MG/DL Final     Creat-   Creatinine   Date Value Ref Range Status   08/22/2021 7.50 (H) 0.70 - 1.30 MG/DL Final        Medications/ Blood Products Given     Name   Dose   Route and Time     none                Blood Volume Processed (BVP):   73.7 L Net Fluid   Removed:  3000 mL   Comments   Time Out Done: 200  Primary Nurse Rpt Pre: Leopoldo Marin RN  Primary Nurse Rpt Post: Leopoldo Marin RN  Pt Education: procedural / infection control  Care Plan: continue current HD plan of care  Tx Summary:  60-74-66-62 HD treatment initiated per physicians order. 1541 HD treatment completed per physician order. All possible blood returned to patient. Hemostasis achieved in <10 minutes. Access site dressings clean, dry, and intact. +thrill. Admiting Diagnosis: Pulmonary Edema / Acute on Chronic CHF  Pt's previous clinic- [facility]  Consent signed - Informed Consent Verified: Yes (08/22/21 1241)  Laurenita Consent - verified  Hepatitis Status- 8/9/21 neg/imm (epic)  Machine #- Machine Number: [id number] (08/22/21 1241)  Telemetry status- stepdown monitoring  Pre-dialysis wt. -

## 2021-08-22 NOTE — PROGRESS NOTES
Hospitalist Progress Note    NAME: Elmira Rouse   :  1965   MRN:  [id number]         Assessment / Plan:  Acute HFpEF/Acute Pul edema POA  End-stage renal disease on dialysis Missed 2 HD treatments PTA   Uremic Encephalopathy (resolving) and Metabolic Acidosis POA    -Admitted on  for pulmonary edema, CHF secondary to nonadherence with dialysis treatment, he was transferred to ICU on  for ongoing hypoglycemia, lethargy. Found to have oxycodone bottle in his pocket, possible overdose, for which she was given Narcan, and afterwards he developed respiratory distress was intubated,,  He self extubated ,   -CXR , new left lower lobe airspace disease, possible atelectasis  -Continue dialysis as per nephro        Acute hypoxic respiratory failure, not POA  Recurrent hypoglycemia,  Fungemia   Severe lactic acidosis with metabolic acidosis  Narcotic abuse  -required ICU as above  -Overnight he was transferred to stepdown for  hypoglycemia, SOB. -CXR with Pul edema, BMP  This am with Severe lactic acidosis  -BCX on admission showed 1/4 yeast.  -Suspect recurrent hypoglycemia from fungemia.   -Continue anidulafungin, repeat culture ordered yesterday, ID on board  -Procal improved from 34 ->24  -Discussed with nephrology, for HD today  -Continue low-dose d10NS for persistent hypoglycemia, watch for volume overload  -Get CT chest and abdomen, source for infection      Elevated LFTs  -Suspect ischemic injury,   -Avoid statin, check acute hep panel, check PT/INR  -Recheck tomorrow  -CT abdomen pending    Narcotic dependance  -If there is concern for methadone/narcotic withdrawal, can use methadone 10 mg IM if needed  -DC as needed oxycodone  -Asking for oxycodone today, mentation improved, but still drowsy, avoid narcotics         Recent Community Acquired PNA      Hypertension  Hyperlipidemia  -ECHO - EF 45-50%. Moderately reduced systolic function. LV diastolic dysfunction. Mild to moderate MVR. -Continue ASA, atorvastatin, imdur, metoprolol      GERD Continue Protonix   Neuropathy  History of DVT Continue Eliquis      Recurrent falls  Left rib fractures  -PT/OT evaluations         18.5 - 24.9 Normal weight / Body mass index is 23.47 kg/m². Subjective:     Chief Complaint / Reason for Physician Visit  -Overnight he was hypoglycemic, dyspneic. ABG reviewed. Review of Systems:  Symptom Y/N Comments  Symptom Y/N Comments   Fever/Chills    Chest Pain     Poor Appetite    Edema     Cough    Abdominal Pain     Sputum    Joint Pain     SOB/WARNER    Pruritis/Rash     Nausea/vomit    Tolerating PT/OT     Diarrhea    Tolerating Diet     Constipation    Other       Could NOT obtain due to:      Objective:     VITALS:   Last 24hrs VS reviewed since prior progress note. Most recent are:  Patient Vitals for the past 24 hrs:   Temp Pulse Resp BP SpO2   08/22/21 1032 97.8 °F (36.6 °C) 93 26 (!) 146/100 99 %   08/22/21 0743 97.4 °F (36.3 °C) 87 24 (!) 163/112    08/22/21 0605 (!) 96.3 °F (35.7 °C) 62 (!) 48 (!) 129/91 97 %   08/22/21 0541     95 %   08/22/21 0402 (!) 96.4 °F (35.8 °C) 66 22 (!) 142/93 100 %   08/22/21 0217 (!) 96.5 °F (35.8 °C) 97 22 (!) 171/120 93 %   08/22/21 0212 (!) 96.3 °F (35.7 °C) 97 22 (!) 156/106 94 %   08/21/21 1945 98.9 °F (37.2 °C) 100 20 (!) 167/90 96 %   08/21/21 1500    (!) 157/104 98 %   08/21/21 1451 98 °F (36.7 °C) 96 20 (!) 141/91 96 %     No intake or output data in the 24 hours ending 08/22/21 1232     I had a face to face encounter and independently examined this patient on 8/22/2021, as outlined below:  PHYSICAL EXAM:  General: WD, WN. EENT:  EOMI. Anicteric sclerae. MMM  Resp:  Minimal rales b/l  CV:  Regular  rhythm,    GI:  Soft, nondistended, nontender  Neurologic:  Drowsy,, but easily arousable, oriented x3. Psych:   Limited insight. Not anxious nor agitated  Skin:  No rashes.   No jaundice    Reviewed most current lab test results and cultures  YES  Reviewed most current radiology test results   YES  Review and summation of old records today    NO  Reviewed patient's current orders and MAR    YES  PMH/SH reviewed - no change compared to H&P  ________________________________________________________________________  Care Plan discussed with:    Comments   Patient x    Family      RN x    Care Manager     Consultant                        Multidiciplinary team rounds were held today with , nursing, pharmacist and clinical coordinator. Patient's plan of care was discussed; medications were reviewed and discharge planning was addressed. ________________________________________________________________________  Total NON critical care TIME:  34   Minutes    Total CRITICAL CARE TIME Spent: 35  Minutes non procedure based      Comments   >50% of visit spent in counseling and coordination of care x    ________________________________________________________________________  Jamilah Burnham MD     Procedures: see electronic medical records for all procedures/Xrays and details which were not copied into this note but were reviewed prior to creation of Plan. LABS:  I reviewed today's most current labs and imaging studies.   Pertinent labs include:  Recent Labs     08/20/21 0416   WBC 7.5   HGB 8.8*   HCT 27.2*   *     Recent Labs     08/22/21  0935 08/22/21  0422 08/20/21  0416   * 125* 129*   K 5.0 6.2* 4.0   CL 92* 92* 93*   CO2 15* 12* 28   * 88 78   BUN 39* 34* 41*   CREA 7.50* 7.14* 7.36*   CA 8.7 8.6 8.9   ALB 2.1*  --   --    TBILI 2.2*  --   --    ALT 2,233*  --   --        Signed: Jamilah Burnham MD

## 2021-08-22 NOTE — PROGRESS NOTES
NAME: Corrinne Leatherwood        :  1965        MRN:  [id number]               Assessment   :                                               Plan:  ESRD-  Anemia  Dyspnea  Volume overload     noncompliance [facility]; Needs  HD/UF today (orders placed, [facility] is aware). HD/UF again tomorrow.     H/H not at goal.  Continue retacrit    cxr-IMPRESSION  Increased pulmonary edema. Getting IVF's b/x high lactic - I would be careful with volume given pulmonary edema/HD patient                 Subjective:     Chief Complaint:  Ams, lethargic. High k. Hypoxia. Review of Systems:    Symptom Y/N Comments  Symptom Y/N Comments   Fever/Chills    Chest Pain     Poor Appetite    Edema     Cough    Abdominal Pain     Sputum    Joint Pain     SOB/WARNER    Pruritis/Rash     Nausea/vomit    Tolerating PT/OT     Diarrhea    Tolerating Diet     Constipation    Other       Could not obtain due to:      Objective:     VITALS:   Last 24hrs VS reviewed since prior progress note.  Most recent are:  Visit Vitals  BP (!) 129/91   Pulse 62   Temp (!) 96.3 °F (35.7 °C)   Resp (!) 48   Ht 5' 9\" (1.753 m)   Wt 72.1 kg (158 lb 15.2 oz)   SpO2 97%   BMI 23.47 kg/m²     No intake or output data in the 24 hours ending 21 0853   Telemetry Reviewed:     PHYSICAL EXAM:  General: In pcu  Dyspnea  lethargic  No edema    Lab Data Reviewed: (see below)    Medications Reviewed: (see below)    PMH/SH reviewed - no change compared to H&P  ________________________________________________________________________  Care Plan discussed with:  Patient     Family      RN     Care Manager                    Consultant:          Comments   >50% of visit spent in counseling and coordination of care       ________________________________________________________________________  Marilee Mejía MD     Procedures: see electronic medical records for all procedures/Xrays and details which  were not copied into this note but were reviewed prior to creation of Plan. LABS:  Recent Labs     08/20/21  0416   WBC 7.5   HGB 8.8*   HCT 27.2*   *     Recent Labs     08/22/21  0422 08/20/21  0416   * 129*   K 6.2* 4.0   CL 92* 93*   CO2 12* 28   BUN 34* 41*   CREA 7.14* 7.36*   GLU 88 78   CA 8.6 8.9     No results for input(s): AP, TBIL, TP, ALB, GLOB, GGT, AML, LPSE in the last 72 hours. No lab exists for component: SGOT, GPT, AMYP, HLPSE  No results for input(s): INR, PTP, APTT, INREXT, INREXT in the last 72 hours. No results for input(s): FE, TIBC, PSAT, FERR in the last 72 hours. No results found for: FOL, RBCF   Recent Labs     08/22/21  0452 08/19/21  1651   PH 7.36 7.38   PCO2 19* 52*   PO2 88 41*     No results for input(s): CPK, CKMB in the last 72 hours.     No lab exists for component: TROPONINI  No components found for: [place]  Lab Results   Component Value Date/Time    Color YELLOW/STRAW 05/06/2017 01:30 PM    Appearance CLOUDY (A) 05/06/2017 01:30 PM    Specific gravity 1.015 05/06/2017 01:30 PM    pH (UA) 7.5 05/06/2017 01:30 PM    Protein >300 (A) 05/06/2017 01:30 PM    Glucose NEGATIVE  05/06/2017 01:30 PM    Ketone NEGATIVE  05/06/2017 01:30 PM    Bilirubin NEGATIVE  05/06/2017 01:30 PM    Urobilinogen 0.2 05/06/2017 01:30 PM    Nitrites NEGATIVE  05/06/2017 01:30 PM    Leukocyte Esterase NEGATIVE  05/06/2017 01:30 PM    Epithelial cells FEW 05/06/2017 01:30 PM    Bacteria NEGATIVE  05/06/2017 01:30 PM    WBC 5-10 05/06/2017 01:30 PM    RBC 0-5 05/06/2017 01:30 PM       MEDICATIONS:  Current Facility-Administered Medications   Medication Dose Route Frequency    anidulafungin (ERAXIS) 100 mg in 0.9% sodium chloride 130 mL IVPB  100 mg IntraVENous Q24H    dextrose 10% infusion  75 mL/hr IntraVENous CONTINUOUS    alcohol 62% (NOZIN) nasal  1 Ampule  1 Ampule Topical Q12H    epoetin cristian-epbx (RETACRIT) injection 6,000 Units  6,000 Units SubCUTAneous Q MON, WED & FRI    ondansetron (ZOFRAN) injection 4 mg  4 mg IntraVENous Q4H PRN    glucose chewable tablet 16 g  4 Tablet Oral PRN    glucagon (GLUCAGEN) injection 1 mg  1 mg IntraMUSCular PRN    naloxone (NARCAN) injection 0.4 mg  0.4 mg IntraMUSCular EVERY 2 MINUTES AS NEEDED    aspirin chewable tablet 81 mg  81 mg Oral DAILY    pantoprazole (PROTONIX) 40 mg in 0.9% sodium chloride 10 mL injection  40 mg IntraVENous DAILY    piperacillin-tazobactam (ZOSYN) 3.375 g in 0.9% sodium chloride (MBP/ADV) 100 mL MBP  3.375 g IntraVENous Q12H    dextrose (D50W) injection syrg 12.5-25 g  12.5-25 g IntraVENous PRN    albumin human 25% (BUMINATE) solution 12.5 g  12.5 g IntraVENous DIALYSIS PRN    apixaban (ELIQUIS) tablet 5 mg  5 mg Oral BID    atorvastatin (LIPITOR) tablet 40 mg  40 mg Oral QHS    citalopram (CELEXA) tablet 40 mg  40 mg Oral DAILY    [Held by provider] gabapentin (NEURONTIN) capsule 300 mg  300 mg Oral BID    isosorbide mononitrate ER (IMDUR) tablet 30 mg  30 mg Oral DAILY    metoprolol tartrate (LOPRESSOR) tablet 25 mg  25 mg Oral Q12H    doxercalciferoL (HECTOROL) capsule 0.5 mcg  0.5 mcg Oral DAILY    [Held by provider] sevelamer carbonate (RENVELA) tab 800 mg  800 mg Oral TID WITH MEALS    sodium chloride (NS) flush 5-40 mL  5-40 mL IntraVENous Q8H    sodium chloride (NS) flush 5-40 mL  5-40 mL IntraVENous PRN    polyethylene glycol (MIRALAX) packet 17 g  17 g Oral DAILY PRN    albuterol-ipratropium (DUO-NEB) 2.5 MG-0.5 MG/3 ML  3 mL Nebulization Q6H PRN

## 2021-08-22 NOTE — PROGRESS NOTES
Problem: Infection - Risk of, Central Venous Catheter-Associated Bloodstream Infection  Goal: *Absence of infection signs and symptoms  Outcome: Progressing Towards Goal     Problem: Patient Education: Go to Patient Education Activity  Goal: Patient/Family Education  Outcome: Progressing Towards Goal

## 2021-08-22 NOTE — PROGRESS NOTES
6:55 AM  Patient transferred to [phone], pts home meds from pt rolan controlled drawer handed to RN on PCU

## 2021-08-22 NOTE — PROGRESS NOTES
Bedside shift change report given to YONY RANDLE (oncoming nurse) by Tim Zamora RN (offgoing nurse). Report included the following information SBAR, Kardex, Intake/Output, MAR and Cardiac Rhythm NSR. End of Shift Note    Bedside shift change report given to Tim Zamora RN (oncoming nurse) by Cat Pang RN (offgoing nurse). Report included the following information SBAR, Kardex, Intake/Output, MAR and Cardiac Rhythm NSR    Shift worked:  7a-7p     Shift summary and any significant changes:     Dialysis completed. Lactic down to 3.8     Concerns for physician to address:  none     Zone phone for oncoming shift:          Activity:  Activity Level: Bed Rest  Number times ambulated in hallways past shift: 0  Number of times OOB to chair past shift: 0    Cardiac:   Cardiac Monitoring: Yes      Cardiac Rhythm: Sinus Morris    Access:   Current line(s): central line     Genitourinary:   Urinary status: anuric    Respiratory:   O2 Device: Nasal cannula  Chronic home O2 use?: NO  Incentive spirometer at bedside: NO  Actual Volume (ml): 1200 ml  GI:  Last Bowel Movement Date: 08/21/21  Current diet:  ADULT DIET Easy to Chew; Low Potassium (Less than 3000 mg/day); Low Phosphorus (Less than 1000 mg)  Passing flatus: YES  Tolerating current diet: YES       Pain Management:   Patient states pain is manageable on current regimen: YES    Skin:  Jovan Score: 17  Interventions: limit briefs    Patient Safety:  Fall Score:  Total Score: 3  Interventions: bed/chair alarm  High Fall Risk: Yes    Length of Stay:  Expected LOS: 4d 0h  Actual LOS: [address], RN

## 2021-08-22 NOTE — PROGRESS NOTES
Provider notified about ABG results via [employer], nurse to move oxygen up to 4L NC per provider. Still refusing to keep warming blanket on.

## 2021-08-22 NOTE — PROGRESS NOTES
2:20 AM  Patient given d50, increased d10 rate, warming blanket applied. Patient alert to self, situation and place, a little fuzzy on time.  Central line dressing changed at this Time    4:27 AM  June from respiratory paged regarding ABG order    4:55 AM  Patient refusing to keep warming blanket on at this time

## 2021-08-23 NOTE — CONSULTS
GI Consultation Note Isidra Leal)    NAME: Manuel Hahn : 1965 MRN: [id number]   PCP: None  Date/Time:  2021 11:29 AM  Subjective:   REASON FOR CONSULT:    Elevated LFT's    Tu stringer is a 55 y.o.   male who I was asked to see for above. Pt very poor historian but admitted with pulmonary edema 2/2 non-compliance with CHF medication. Pt currently being treated for Fungemia and has a h/o poor adherence. +Drug abuse      Past Medical History:   Diagnosis Date    Chronic kidney disease     Dialysis patient ([facility] [address]     Hypertension       Past Surgical History:   Procedure Laterality Date    HX VASCULAR ACCESS Left     LUE AV fistula     Social History     Tobacco Use    Smoking status: Current Every Day Smoker     Packs/day: 0.25    Smokeless tobacco: Never Used   Substance Use Topics    Alcohol use: Yes      Family History   Problem Relation Age of Onset    No Known Problems Mother     No Known Problems Father     No Known Problems Sister     No Known Problems Brother     No Known Problems Sister     No Known Problems Sister     No Known Problems Brother       Allergies   Allergen Reactions    Motrin [Ibuprofen] Hives     17 Pt denies allergy    Tylenol [Acetaminophen] Hives     17 Pt denies allergy      Home Medications:  Prior to Admission Medications   Prescriptions Last Dose Informant Patient Reported? Taking?   amoxicillin-clavulanate (Augmentin) 875-125 mg per tablet   No No   Sig: Take 1 Tablet by mouth every twelve (12) hours for 5 days. apixaban (ELIQUIS) 5 mg tablet Unknown at Unknown time  No No   Sig: Take 1 Tablet by mouth two (2) times a day for 30 days. aspirin delayed-release 81 mg tablet Unknown at Unknown time Other No No   Sig: Take 1 Tab by mouth daily. atorvastatin (LIPITOR) 40 mg tablet   No No   Sig: Take 1 Tablet by mouth nightly for 30 days. citalopram (CELEXA) 40 mg tablet   No No   Sig: Take 1 Tablet by mouth daily for 30 days.    gabapentin (NEURONTIN) 100 mg capsule Unknown at Unknown time Other Yes No   Sig: Take 100 mg by mouth daily. isosorbide mononitrate ER (IMDUR) 30 mg tablet   No No   Sig: Take 1 Tablet by mouth every morning for 30 days. lidocaine (LIDODERM) 5 % Unknown at Unknown time  No No   Sig: Apply patch to the affected area for 12 hours a day and remove for 12 hours a day. methadone (DOLOPHINE) 10 mg/mL solution 8/16/2021  Yes Yes   Sig: Take 160 mg by mouth daily. metoprolol tartrate (LOPRESSOR) 25 mg tablet Unknown at Unknown time  No No   Sig: Take 1 Tablet by mouth every twelve (12) hours for 30 days. multivitamin (ONE A DAY) tablet   Yes No   Sig: Take 1 Tab by mouth daily. oxyCODONE IR (ROXICODONE) 5 mg immediate release tablet   No No   Sig: Take 1 Tablet by mouth every four (4) hours as needed for Pain for up to 3 days. Max Daily Amount: 30 mg.   pantoprazole (PROTONIX) 40 mg tablet   No No   Sig: Take 1 Tablet by mouth daily for 30 days. paricalcitoL (ZEMPLAR) 1 mcg capsule   No No   Sig: Take 1 Capsule by mouth daily for 30 days. sevelamer (RenageL) 400 mg tablet   No No   Sig: Take 2 Tablets by mouth three (3) times daily (with meals) for 30 days.       Facility-Administered Medications: None     Hospital medications:  Current Facility-Administered Medications   Medication Dose Route Frequency    phytonadione (vitamin K1) (AQUA-MEPHYTON) injection 10 mg  10 mg SubCUTAneous ONCE    dextrose 10% + sodium chloride 0.9%   IntraVENous CONTINUOUS    anidulafungin (ERAXIS) 100 mg in 0.9% sodium chloride 130 mL IVPB  100 mg IntraVENous Q24H    alcohol 62% (NOZIN) nasal  1 Ampule  1 Ampule Topical Q12H    epoetin cristian-epbx (RETACRIT) injection 6,000 Units  6,000 Units SubCUTAneous Q MON, WED & FRI    ondansetron (ZOFRAN) injection 4 mg  4 mg IntraVENous Q4H PRN    glucose chewable tablet 16 g  4 Tablet Oral PRN    glucagon (GLUCAGEN) injection 1 mg  1 mg IntraMUSCular PRN    naloxone (NARCAN) injection 0.4 mg  0.4 mg IntraMUSCular EVERY 2 MINUTES AS NEEDED    aspirin chewable tablet 81 mg  81 mg Oral DAILY    pantoprazole (PROTONIX) 40 mg in 0.9% sodium chloride 10 mL injection  40 mg IntraVENous DAILY    piperacillin-tazobactam (ZOSYN) 3.375 g in 0.9% sodium chloride (MBP/ADV) 100 mL MBP  3.375 g IntraVENous Q12H    dextrose (D50W) injection syrg 12.5-25 g  12.5-25 g IntraVENous PRN    albumin human 25% (BUMINATE) solution 12.5 g  12.5 g IntraVENous DIALYSIS PRN    [Held by provider] apixaban (ELIQUIS) tablet 5 mg  5 mg Oral BID    [Held by provider] atorvastatin (LIPITOR) tablet 40 mg  40 mg Oral QHS    citalopram (CELEXA) tablet 40 mg  40 mg Oral DAILY    [Held by provider] gabapentin (NEURONTIN) capsule 300 mg  300 mg Oral BID    isosorbide mononitrate ER (IMDUR) tablet 30 mg  30 mg Oral DAILY    metoprolol tartrate (LOPRESSOR) tablet 25 mg  25 mg Oral Q12H    doxercalciferoL (HECTOROL) capsule 0.5 mcg  0.5 mcg Oral DAILY    [Held by provider] sevelamer carbonate (RENVELA) tab 800 mg  800 mg Oral TID WITH MEALS    sodium chloride (NS) flush 5-40 mL  5-40 mL IntraVENous Q8H    sodium chloride (NS) flush 5-40 mL  5-40 mL IntraVENous PRN    polyethylene glycol (MIRALAX) packet 17 g  17 g Oral DAILY PRN    albuterol-ipratropium (DUO-NEB) 2.5 MG-0.5 MG/3 ML  3 mL Nebulization Q6H PRN     REVIEW OF SYSTEMS:     [x]     Unable to obtain  ROS due to  [x]    mental status change  []    sedated   []    intubated   []    Total of 11 systems reviewed as follows:  Const:  negative fever, negative chills, negative weight loss  Eyes:   negative diplopia or visual changes, negative eye pain  ENT:   negative coryza, negative sore throat  Resp:   negative cough, hemoptysis, dyspnea  Cards:  negative for chest pain, palpitations, lower extremity edema  :  negative for frequency, dysuria and hematuria  Skin:   negative for rash and pruritus  Heme:  negative for easy bruising and gum/nose bleeding  MS:  negative for myalgias, arthralgias, back pain and muscle weakness  Neurolo:  negative for headaches, dizziness, vertigo, memory problems   Psych:  negative for feelings of anxiety, depression     Pertinent Positives include :    Objective:   VITALS:    Visit Vitals  BP (!) (P) 158/87   Pulse (P) 85   Temp 98.1 °F (36.7 °C)   Resp (P) 20   Ht 5' 9\" (1.753 m)   Wt 76.3 kg (168 lb 3.2 oz)   SpO2 (P) 100%   BMI 24.84 kg/m²     Temp (24hrs), Av.6 °F (37 °C), Min:98.1 °F (36.7 °C), Max:99.7 °F (37.6 °C)    PHYSICAL EXAM:   General:    +Drowsy, but arousable  Head:   Normocephalic, without obvious abnormality, atraumatic. Eyes:   Conjunctivae clear, anicteric sclerae. Pupils are equal  Nose:  Nares normal. No drainage or sinus tenderness. Throat:    Lips, mucosa, and tongue normal.  No Thrush  Neck:  Supple, symmetrical,  no adenopathy, thyroid: non tender  Back:    Symmetric,  No CVA tenderness. Lungs:   CTA bilaterally. No wheezing/rhonchi/rales. Chest wall:  No tenderness or deformity. No Accessory muscle use. Heart:   Regular rate and rhythm,  no murmur, rub or gallop. Abdomen:   Soft, +RUQ TTP, Not distended. Bowel sounds normal. No masses  Extremities: Atraumatic, No cyanosis. No edema.  No clubbing  Skin:     Texture, turgor normal. No rashes/lesions/jaundice  Lymph:  Cervical, supraclavicular normal.      LAB DATA REVIEWED:    Recent Results (from the past 48 hour(s))   CULTURE, BLOOD, PAIRED    Collection Time: 21  1:43 PM    Specimen: Blood   Result Value Ref Range    Special Requests: NO SPECIAL REQUESTS      Culture result: NO GROWTH 2 DAYS     CULTURE, BLOOD FOR FUNGUS    Collection Time: 21  6:17 PM    Specimen: Blood   Result Value Ref Range    Special Requests: HOLD 21 DAYS FOR FUNGUS      Culture result: NO GROWTH 2 DAYS     GLUCOSE, POC    Collection Time: 21 10:14 PM   Result Value Ref Range    Glucose (POC) 62 (L) 65 - 117 mg/dL    Performed by Milind Flores LPN    GLUCOSE, POC Collection Time: 08/21/21 10:27 PM   Result Value Ref Range    Glucose (POC) 56 (L) 65 - 117 mg/dL    Performed by Shantell Carbajal LPN    GLUCOSE, POC    Collection Time: 08/21/21 10:39 PM   Result Value Ref Range    Glucose (POC) 93 65 - 117 mg/dL    Performed by Shantell Carbajal LPN    GLUCOSE, POC    Collection Time: 08/22/21 12:32 AM   Result Value Ref Range    Glucose (POC) 72 65 - 117 mg/dL    Performed by Shantell Carbajal LPN    GLUCOSE, POC    Collection Time: 08/22/21  2:05 AM   Result Value Ref Range    Glucose (POC) 52 (LL) 65 - 117 mg/dL    Performed by Shantell Carbajal LPN    GLUCOSE, POC    Collection Time: 08/22/21  2:19 AM   Result Value Ref Range    Glucose (POC) 93 65 - 117 mg/dL    Performed by Shantell Carbajal LPN    GLUCOSE, POC    Collection Time: 08/22/21  2:54 AM   Result Value Ref Range    Glucose (POC) 96 65 - 117 mg/dL    Performed by Shantell Carbajal LPN    GLUCOSE, POC    Collection Time: 08/22/21  3:54 AM   Result Value Ref Range    Glucose (POC) 79 65 - 117 mg/dL    Performed by Shantell Carbajal LPN    METABOLIC PANEL, BASIC    Collection Time: 08/22/21  4:22 AM   Result Value Ref Range    Sodium 125 (L) 136 - 145 mmol/L    Potassium 6.2 (H) 3.5 - 5.1 mmol/L    Chloride 92 (L) 97 - 108 mmol/L    CO2 12 (LL) 21 - 32 mmol/L    Anion gap 21 (H) 5 - 15 mmol/L    Glucose 88 65 - 100 mg/dL    BUN 34 (H) 6 - 20 MG/DL    Creatinine 7.14 (H) 0.70 - 1.30 MG/DL    BUN/Creatinine ratio 5 (L) 12 - 20      GFR est AA 10 (L) >60 ml/min/1.73m2    GFR est non-AA 8 (L) >60 ml/min/1.73m2    Calcium 8.6 8.5 - 10.1 MG/DL   SAMPLES BEING HELD    Collection Time: 08/22/21  4:22 AM   Result Value Ref Range    SAMPLES BEING HELD PST     COMMENT        Add-on orders for these samples will be processed based on acceptable specimen integrity and analyte stability, which may vary by analyte.    NT-PRO BNP    Collection Time: 08/22/21  4:22 AM   Result Value Ref Range    NT pro-BNP >35,000 (H) <125 PG/ML   BLOOD GAS, ARTERIAL    Collection Time: 08/22/21 4:52 AM   Result Value Ref Range    pH 7.36 7.35 - 7.45      PCO2 19 (L) 35 - 45 mmHg    PO2 88 80 - 100 mmHg    O2 SAT 97 92 - 97 %    BICARBONATE 10 (L) 22 - 26 mmol/L    BASE DEFICIT 12.5 mmol/L    O2 METHOD NASAL O2      O2 FLOW RATE 3 L/min    SPONTANEOUS RATE 32      Sample source ARTERIAL      SITE RIGHT RADIAL      JENNY'S TEST YES     GLUCOSE, POC    Collection Time: 08/22/21  5:59 AM   Result Value Ref Range    Glucose (POC) 36 (LL) 65 - 117 mg/dL    Performed by Estrellita Nolasco LPN    GLUCOSE, POC    Collection Time: 08/22/21  6:14 AM   Result Value Ref Range    Glucose (POC) 94 65 - 117 mg/dL    Performed by Estrellita Nolasco LPN    GLUCOSE, POC    Collection Time: 08/22/21  7:40 AM   Result Value Ref Range    Glucose (POC) 119 (H) 65 - 117 mg/dL    Performed by Tsering Brannon (Float)    GLUCOSE, POC    Collection Time: 08/22/21  8:32 AM   Result Value Ref Range    Glucose (POC) 146 (H) 65 - 117 mg/dL    Performed by Drena Severe H (Float)    METABOLIC PANEL, BASIC    Collection Time: 08/22/21  9:35 AM   Result Value Ref Range    Sodium 127 (L) 136 - 145 mmol/L    Potassium 5.0 3.5 - 5.1 mmol/L    Chloride 92 (L) 97 - 108 mmol/L    CO2 15 (LL) 21 - 32 mmol/L    Anion gap 20 (H) 5 - 15 mmol/L    Glucose 128 (H) 65 - 100 mg/dL    BUN 39 (H) 6 - 20 MG/DL    Creatinine 7.50 (H) 0.70 - 1.30 MG/DL    BUN/Creatinine ratio 5 (L) 12 - 20      GFR est AA 9 (L) >60 ml/min/1.73m2    GFR est non-AA 8 (L) >60 ml/min/1.73m2    Calcium 8.7 8.5 - 10.1 MG/DL   LACTIC ACID    Collection Time: 08/22/21  9:35 AM   Result Value Ref Range    Lactic acid 9.2 (HH) 0.4 - 2.0 MMOL/L   PROCALCITONIN    Collection Time: 08/22/21  9:35 AM   Result Value Ref Range    Procalcitonin 24.04 ng/mL   HEPATIC FUNCTION PANEL    Collection Time: 08/22/21  9:35 AM   Result Value Ref Range    Protein, total 5.9 (L) 6.4 - 8.2 g/dL    Albumin 2.1 (L) 3.5 - 5.0 g/dL    Globulin 3.8 2.0 - 4.0 g/dL    A-G Ratio 0.6 (L) 1.1 - 2.2      Bilirubin, total 2.2 (H) 0.2 - 1.0 MG/DL    Bilirubin, direct 1.6 (H) 0.0 - 0.2 MG/DL    Alk. phosphatase 138 (H) 45 - 117 U/L    AST (SGOT) >2,000 (H) 15 - 37 U/L    ALT (SGPT) 2,233 (H) 12 - 78 U/L   CK    Collection Time: 08/22/21  9:35 AM   Result Value Ref Range     39 - 308 U/L   GLUCOSE, POC    Collection Time: 08/22/21  9:36 AM   Result Value Ref Range    Glucose (POC) 122 (H) 65 - 117 mg/dL    Performed by Regina GEORGES (Float)    GLUCOSE, POC    Collection Time: 08/22/21 10:31 AM   Result Value Ref Range    Glucose (POC) 113 65 - 117 mg/dL    Performed by Regina GEORGES (Float)    GLUCOSE, POC    Collection Time: 08/22/21 11:44 AM   Result Value Ref Range    Glucose (POC) 108 65 - 117 mg/dL    Performed by Joanne Rizo (Float)    GLUCOSE, POC    Collection Time: 08/22/21 12:54 PM   Result Value Ref Range    Glucose (POC) 83 65 - 117 mg/dL    Performed by Regina GEORGES (Float)    LACTIC ACID    Collection Time: 08/22/21  1:07 PM   Result Value Ref Range    Lactic acid 6.0 (HH) 0.4 - 2.0 MMOL/L   GLUCOSE, POC    Collection Time: 08/22/21  1:26 PM   Result Value Ref Range    Glucose (POC) 79 65 - 117 mg/dL    Performed by Regina GEORGES (Float)    LACTIC ACID    Collection Time: 08/22/21  2:04 PM   Result Value Ref Range    Lactic acid 3.8 (HH) 0.4 - 2.0 MMOL/L   HEPATITIS PANEL, ACUTE    Collection Time: 08/22/21  2:04 PM   Result Value Ref Range    Hepatitis A, IgM NONREACTIVE NR      __          Hepatitis B surface Ag <0.10 Index    Hep B surface Ag Interp. Negative NEG      __          Hepatitis B core, IgM NONREACTIVE NR      __          Hepatitis C virus Ab >11.00 Index    Hep C virus Ab Interp.  REACTIVE (A) NR     PROTHROMBIN TIME + INR    Collection Time: 08/22/21  2:04 PM   Result Value Ref Range    INR 3.0 (H) 0.9 - 1.1      Prothrombin time 29.5 (H) 9.0 - 11.1 sec   PTT    Collection Time: 08/22/21  2:04 PM   Result Value Ref Range    aPTT 40.4 (H) 22.1 - 31.0 sec    aPTT, therapeutic range     58.0 - 77.0 SECS   GLUCOSE, POC Collection Time: 08/22/21  2:28 PM   Result Value Ref Range    Glucose (POC) 78 65 - 117 mg/dL    Performed by Marina AdameFlojulius)    GLUCOSE, POC    Collection Time: 08/22/21  3:36 PM   Result Value Ref Range    Glucose (POC) 77 65 - 117 mg/dL    Performed by Romario Mondragon RN    GLUCOSE, POC    Collection Time: 08/22/21  4:56 PM   Result Value Ref Range    Glucose (POC) 77 65 - 117 mg/dL    Performed by Romario Mondragon RN    GLUCOSE, POC    Collection Time: 08/22/21  6:01 PM   Result Value Ref Range    Glucose (POC) 83 65 - 117 mg/dL    Performed by Lynn MELGAR    GLUCOSE, POC    Collection Time: 08/22/21  7:28 PM   Result Value Ref Range    Glucose (POC) 66 65 - 117 mg/dL    Performed by Brandon Emanuel (JENNY RN)    GLUCOSE, POC    Collection Time: 08/22/21  7:44 PM   Result Value Ref Range    Glucose (POC) 57 (L) 65 - 117 mg/dL    Performed by Brandon Emanuel (JENNY RN)    GLUCOSE, POC    Collection Time: 08/22/21  8:34 PM   Result Value Ref Range    Glucose (POC) 125 (H) 65 - 117 mg/dL    Performed by Brandon Emanuel (JENNY RN)    METABOLIC PANEL, BASIC    Collection Time: 08/22/21  8:53 PM   Result Value Ref Range    Sodium 131 (L) 136 - 145 mmol/L    Potassium 3.8 3.5 - 5.1 mmol/L    Chloride 95 (L) 97 - 108 mmol/L    CO2 23 21 - 32 mmol/L    Anion gap 13 5 - 15 mmol/L    Glucose 108 (H) 65 - 100 mg/dL    BUN 23 (H) 6 - 20 MG/DL    Creatinine 5.09 (H) 0.70 - 1.30 MG/DL    BUN/Creatinine ratio 5 (L) 12 - 20      GFR est AA 14 (L) >60 ml/min/1.73m2    GFR est non-AA 12 (L) >60 ml/min/1.73m2    Calcium 8.6 8.5 - 10.1 MG/DL   CBC WITH AUTOMATED DIFF    Collection Time: 08/22/21  8:53 PM   Result Value Ref Range    WBC 9.5 4.1 - 11.1 K/uL    RBC 2.45 (L) 4.10 - 5.70 M/uL    HGB 8.0 (L) 12.1 - 17.0 g/dL    HCT 25.0 (L) 36.6 - 50.3 %    .0 (H) 80.0 - 99.0 FL    MCH 32.7 26.0 - 34.0 PG    MCHC 32.0 30.0 - 36.5 g/dL    RDW 17.1 (H) 11.5 - 14.5 %    PLATELET 98 (L) 322 - 400 K/uL    MPV 11.7 8.9 - 12.9 FL    NRBC 1.5 (H) 0  WBC    ABSOLUTE NRBC 0.14 (H) 0.00 - 0.01 K/uL    NEUTROPHILS 82 (H) 32 - 75 %    LYMPHOCYTES 11 (L) 12 - 49 %    MONOCYTES 6 5 - 13 %    EOSINOPHILS 0 0 - 7 %    BASOPHILS 0 0 - 1 %    IMMATURE GRANULOCYTES 1 (H) 0.0 - 0.5 %    ABS. NEUTROPHILS 7.8 1.8 - 8.0 K/UL    ABS. LYMPHOCYTES 1.0 0.8 - 3.5 K/UL    ABS. MONOCYTES 0.6 0.0 - 1.0 K/UL    ABS. EOSINOPHILS 0.0 0.0 - 0.4 K/UL    ABS. BASOPHILS 0.0 0.0 - 0.1 K/UL    ABS. IMM.  GRANS. 0.1 (H) 0.00 - 0.04 K/UL    DF SMEAR SCANNED      PLATELET COMMENTS DECREASED PLATELETS      RBC COMMENTS MACROCYTOSIS  PRESENT       GLUCOSE, POC    Collection Time: 08/22/21  9:13 PM   Result Value Ref Range    Glucose (POC) 112 65 - 117 mg/dL    Performed by Ian Alexandra (PCT)    GLUCOSE, POC    Collection Time: 08/22/21 10:13 PM   Result Value Ref Range    Glucose (POC) 112 65 - 117 mg/dL    Performed by Bianca Patel (TRV RN)    GLUCOSE, POC    Collection Time: 08/22/21 11:18 PM   Result Value Ref Range    Glucose (POC) 88 65 - 117 mg/dL    Performed by Mearl Riedel PCT    GLUCOSE, POC    Collection Time: 08/23/21 12:16 AM   Result Value Ref Range    Glucose (POC) 79 65 - 117 mg/dL    Performed by Mearl Riedel PCT    GLUCOSE, POC    Collection Time: 08/23/21  1:09 AM   Result Value Ref Range    Glucose (POC) 75 65 - 117 mg/dL    Performed by Mearl Riedel PCT    GLUCOSE, POC    Collection Time: 08/23/21  2:08 AM   Result Value Ref Range    Glucose (POC) 62 (L) 65 - 117 mg/dL    Performed by Mearl Riedel PCT    CBC WITH AUTOMATED DIFF    Collection Time: 08/23/21  2:26 AM   Result Value Ref Range    WBC 10.3 4.1 - 11.1 K/uL    RBC 2.34 (L) 4.10 - 5.70 M/uL    HGB 7.5 (L) 12.1 - 17.0 g/dL    HCT 24.1 (L) 36.6 - 50.3 %    .0 (H) 80.0 - 99.0 FL    MCH 32.1 26.0 - 34.0 PG    MCHC 31.1 30.0 - 36.5 g/dL    RDW 17.0 (H) 11.5 - 14.5 %    PLATELET 93 (L) 160 - 400 K/uL    MPV 11.5 8.9 - 12.9 FL    NRBC 1.9 (H) 0  WBC    ABSOLUTE NRBC 0.19 (H) 0.00 - 0.01 K/uL NEUTROPHILS 77 (H) 32 - 75 %    LYMPHOCYTES 14 12 - 49 %    MONOCYTES 8 5 - 13 %    EOSINOPHILS 0 0 - 7 %    BASOPHILS 0 0 - 1 %    IMMATURE GRANULOCYTES 1 (H) 0.0 - 0.5 %    ABS. NEUTROPHILS 8.0 1.8 - 8.0 K/UL    ABS. LYMPHOCYTES 1.4 0.8 - 3.5 K/UL    ABS. MONOCYTES 0.8 0.0 - 1.0 K/UL    ABS. EOSINOPHILS 0.0 0.0 - 0.4 K/UL    ABS. BASOPHILS 0.0 0.0 - 0.1 K/UL    ABS. IMM. GRANS. 0.1 (H) 0.00 - 0.04 K/UL    DF SMEAR SCANNED      RBC COMMENTS ANISOCYTOSIS  1+        RBC COMMENTS MACROCYTOSIS  1+        RBC COMMENTS CLAUDINE CELLS  1+       HEPATIC FUNCTION PANEL    Collection Time: 08/23/21  2:26 AM   Result Value Ref Range    Protein, total 6.1 (L) 6.4 - 8.2 g/dL    Albumin 1.8 (L) 3.5 - 5.0 g/dL    Globulin 4.3 (H) 2.0 - 4.0 g/dL    A-G Ratio 0.4 (L) 1.1 - 2.2      Bilirubin, total 2.4 (H) 0.2 - 1.0 MG/DL    Bilirubin, direct 1.5 (H) 0.0 - 0.2 MG/DL    Alk.  phosphatase 148 (H) 45 - 117 U/L    AST (SGOT) >2,000 (H) 15 - 37 U/L    ALT (SGPT) 3,083 (H) 12 - 78 U/L   METABOLIC PANEL, BASIC    Collection Time: 08/23/21  2:26 AM   Result Value Ref Range    Sodium 129 (L) 136 - 145 mmol/L    Potassium 4.0 3.5 - 5.1 mmol/L    Chloride 94 (L) 97 - 108 mmol/L    CO2 21 21 - 32 mmol/L    Anion gap 14 5 - 15 mmol/L    Glucose 51 (L) 65 - 100 mg/dL    BUN 26 (H) 6 - 20 MG/DL    Creatinine 5.48 (H) 0.70 - 1.30 MG/DL    BUN/Creatinine ratio 5 (L) 12 - 20      GFR est AA 13 (L) >60 ml/min/1.73m2    GFR est non-AA 11 (L) >60 ml/min/1.73m2    Calcium 8.7 8.5 - 10.1 MG/DL   MAGNESIUM    Collection Time: 08/23/21  2:26 AM   Result Value Ref Range    Magnesium 2.2 1.6 - 2.4 mg/dL   LACTIC ACID    Collection Time: 08/23/21  2:26 AM   Result Value Ref Range    Lactic acid 5.9 (HH) 0.4 - 2.0 MMOL/L   GLUCOSE, POC    Collection Time: 08/23/21  3:13 AM   Result Value Ref Range    Glucose (POC) 72 65 - 117 mg/dL    Performed by Kade MELGAR    GLUCOSE, POC    Collection Time: 08/23/21  4:15 AM   Result Value Ref Range    Glucose (POC) 88 65 - 117 mg/dL    Performed by Apurva MELGAR    GLUCOSE, POC    Collection Time: 08/23/21  6:26 AM   Result Value Ref Range    Glucose (POC) 87 65 - 117 mg/dL    Performed by Apurva MELGAR    GLUCOSE, POC    Collection Time: 08/23/21  8:25 AM   Result Value Ref Range    Glucose (POC) 87 65 - 117 mg/dL    Performed by Dominic Kam, POC    Collection Time: 08/23/21 11:23 AM   Result Value Ref Range    Glucose (POC) 79 65 - 117 mg/dL    Performed by Dominic Kam, POC    Collection Time: 08/23/21 11:24 AM   Result Value Ref Range    Glucose (POC) 78 65 - 117 mg/dL    Performed by Kat Lopez      IMAGING RESULTS:   []      I have personally reviewed the actual   []    CXR  []    CT  []     US    Assessment/Plan:      Active Problems:    Pulmonary edema (5/6/2017)      ESRD (end stage renal disease) on dialysis ([facility] [address] (8/8/2021)      Hyperkalemia (8/16/2021)      Acute on chronic combined systolic and diastolic CHF (congestive heart failure) ([facility] [address] (8/16/2021)    1. Acute hypoxic respiratory failure 2/2 pulmonary edema 2/2 CHF exacerbation 2/2 non-compliance with medications  2. ESRD on HD  3. Drug abuse  4. Fungemia  5. Acute elevation in LFT's, PT/INR  6. H/o DVT on Eliquis  7. CT with ?gastroenteritis  ___________________________________________________  RECOMMENDATIONS:    - highly suspect ischemic hepatitis  - viral hepatitis panel +Hep C; very, very unlikely as cause of elevated LFT's. Will check Hep C PCR and Genotype  - given h/o DVT will not do Vitamin K challenge at this time, though need trend of LFT's and PT/INR  - avoid hepatotoxic agents (?Eraxis)  - CT personally reviewed with Radiology; c/w non-specific hepatitis. No evidence of vascular compromise. Findings in small bowel/colon are reactive and not primary process.  No plans for Colonoscopy at this time    ___________________________________________________  Care Plan discussed with:    [x]    Patient   []    Family   [] Nursing   []    Attending   ___________________________________________________  : Cherise Rosenbaum MD

## 2021-08-23 NOTE — PROGRESS NOTES
Problem: Falls - Risk of  Goal: *Absence of Falls  Description: Document Aung Sol Fall Risk and appropriate interventions in the flowsheet. Outcome: Progressing Towards Goal  Note: Fall Risk Interventions:  Mobility Interventions: Bed/chair exit alarm, Patient to call before getting OOB, PT Consult for mobility concerns, PT Consult for assist device competence         Medication Interventions: Bed/chair exit alarm, Teach patient to arise slowly    Elimination Interventions: Bed/chair exit alarm, Call light in reach, Patient to call for help with toileting needs, Toileting schedule/hourly rounds              Problem: Patient Education: Go to Patient Education Activity  Goal: Patient/Family Education  Outcome: Progressing Towards Goal     Problem: Pressure Injury - Risk of  Goal: *Prevention of pressure injury  Description: Document Jovan Scale and appropriate interventions in the flowsheet. Outcome: Progressing Towards Goal  Note: Pressure Injury Interventions:  Sensory Interventions: Assess changes in LOC, Assess need for specialty bed, Keep linens dry and wrinkle-free, Monitor skin under medical devices    Moisture Interventions: Absorbent underpads, Apply protective barrier, creams and emollients    Activity Interventions: Assess need for specialty bed, PT/OT evaluation, Increase time out of bed    Mobility Interventions: Assess need for specialty bed, PT/OT evaluation, Turn and reposition approx.  every two hours(pillow and wedges)    Nutrition Interventions: Document food/fluid/supplement intake    Friction and Shear Interventions: Minimize layers                Problem: Patient Education: Go to Patient Education Activity  Goal: Patient/Family Education  Outcome: Progressing Towards Goal     Problem: Patient Education: Go to Patient Education Activity  Goal: Patient/Family Education  Outcome: Progressing Towards Goal     Problem: Patient Education: Go to Patient Education Activity  Goal: Patient/Family Education  Outcome: Progressing Towards Goal     Problem: Patient Education: Go to Patient Education Activity  Goal: Patient/Family Education  Outcome: Progressing Towards Goal

## 2021-08-23 NOTE — PROGRESS NOTES
Problem: Self Care Deficits Care Plan (Adult)  Goal: *Acute Goals and Plan of Care (Insert Text)  Description: FUNCTIONAL STATUS PRIOR TO ADMISSION: homeless, performed all mobility and ADLS without assist    HOME SUPPORT PRIOR TO ADMISSION: homeless after his girlfriend passed away    Occupational Therapy Goals:  Initiated 8/21/2021  1. Patient will perform grooming standing with modified independence within 7 days. 2. Patient will perform lower body dressing with modified independence within 7 days. 3. Patient will perform toileting with modified independence within 7 days. 4. Patient will transfer from toilet with modified independence using the least restrictive device and appropriate durable medical equipment within 7 days. Outcome: Progressing Towards Goal   OCCUPATIONAL THERAPY TREATMENT  Patient: Patrick Ibarra (17 y.o. male)  Date: 8/23/2021  Diagnosis: Pulmonary edema [J81.1]  Acute on chronic combined systolic and diastolic CHF (congestive heart failure) ([facility]) [I50.43]  ESRD (end stage renal disease) on dialysis ([facility] [address] [N18.6, Z99.2]  Hyperkalemia [E87.5] <principal problem not specified>       Precautions: Fall (left UE fistula)  Chart, occupational therapy assessment, plan of care, and goals were reviewed. ASSESSMENT  Patient continues with skilled OT services and is progressing slowly towards goals. Patient initially declined therapy but with encouragement, he was able to get up out of the bed to standing position only, declined to sit in chair during or after therapy, reporting he is too tired after dialysis to sit up; did not eat meal due to \"don't like what they brought. \" (will benefit from nutrition consult to maximize safe PO; Note BG later to be 48 after he declined this meal.) S-CGA functional mobility this session with decreased dynamic sitting and standing balance. Unable vs unwilling to remove socks.  Able to follow the commands that he choose to follow; recommend cog assessment/screening post urgent intubation and self extubation. Current Level of Function Impacting Discharge (ADLs): min A-S functional mobility; declined ADLs overall    Other factors to consider for discharge: homeless after death of girlfriend         PLAN :  Patient continues to benefit from skilled intervention to address the above impairments. Continue treatment per established plan of care to address goals. Recommend with staff: up to chair for all meals    Recommend next OT session: MoCA, ADLs, may work well w/male? Recommendation for discharge: (in order for the patient to meet his/her long term goals)  Therapy up to 5 days/week in SNF setting    This discharge recommendation:  Has been made in collaboration with the attending provider and/or case management    IF patient discharges home will need the following DME: TBA       SUBJECTIVE:   Patient stated The blood all over the bed is from dialysis.     OBJECTIVE DATA SUMMARY:   Cognitive/Behavioral Status:  Neurologic State: Alert; Appropriate for age;Irritable (he extubated himself 8-20; needs further cog assessment)  Orientation Level: Oriented to person;Oriented to place  Cognition: Follows commands; Impaired decision making; Impulsive;Decreased attention/concentration (would benefit from MoCA to assist w/d/c planning)  Perception: Tactile;Verbal  Perseveration: No perseveration noted  Safety/Judgement: Decreased insight into deficits; Decreased awareness of need for safety;Decreased awareness of need for assistance    Functional Mobility and Transfers for ADLs:  Bed Mobility:  Rolling: Supervision  Supine to Sit: Supervision  Sit to Supine: Supervision  Scooting: Supervision    Transfers:  Sit to Stand: Contact guard assistance  Functional Transfers  Toilet Transfer : Minimum assistance (inferred based on bed transfer)       Balance:  Sitting: Intact  Standing: Impaired  Standing - Static: Fair  Standing - Dynamic : Fair    ADL Intervention:  Feeding  Feeding Assistance: Modified independent (decreased appetite/declined lunch)    Grooming  Grooming Assistance: Supervision (inferred; declined to participate in freshening tasks)              Upper Body Dressing Assistance  Dressing Assistance: Contact guard assistance    Lower Body Dressing Assistance  Dressing Assistance: Minimum assistance; Moderate assistance (inferred; decreased dynamic activity /balance; poor particip)    Toileting  Bladder Hygiene:  (HD MWF)    Cognitive Retraining  Attention to Task: Single task  Maintains Attention For (Time): Greater than 10 minutes  Following Commands: Follows one step commands/directions (only if he wants to)  Safety/Judgement: Decreased insight into deficits; Decreased awareness of need for safety;Decreased awareness of need for assistance      Pain:  5/10 pain \"all over\" not new    Activity Tolerance:   Poor, SpO2 stable on RA, requires frequent rest breaks, and reports feeling tired; no meal taken expect sugar will be low    After treatment patient left in no apparent distress:   Supine in bed, Call bell within reach, Caregiver / family present, and Side rails x 3    COMMUNICATION/COLLABORATION:   The patients plan of care was discussed with: Physical therapist and Registered nurse.      Trinity Beyer OTR/L  Time Calculation: 20 mins

## 2021-08-23 NOTE — PROCEDURES
Hemodialysis / [facility] / [phone]    Vitals   Pre   Post   Assessment   Pre   Post     Temp  Temp: 98.1 °F (36.7 °C) (08/23/21 0825)  98.5 ax LOC  A & O x 3 No change   HR   Pulse (Heart Rate): 89 (08/23/21 0845) 89 Lungs   Dim bases  No change   B/P   BP: (!) 161/84 (08/23/21 0845) 158/94 Cardiac   NSR  No change   Resp   Resp Rate: 23 (08/23/21 0845) 20 Skin   Warm dry & intact  no change   Pain level  Pain Intensity 1: 0 (08/23/21 0825) 0 Edema    none   No Change   Orders:    Duration:   Start:    0815 End:    1145 Total:   3.5   Dialyzer:   Dialyzer/Set Up Inspection: Revaclear (08/23/21 0815)   K Bath:   Dialysate K (mEq/L): 3 (08/23/21 0815)   Ca Bath:   Dialysate CA (mEq/L): 2.5 (08/23/21 0815)   Na/Bicarb:   Dialysate NA (mEq/L): 138 (08/23/21 0815)   Target Fluid Removal:   Goal/Amount of Fluid to Remove (mL): 0 mL (08/23/21 0815)   Access  AVF   Type & Location:   Left upper AVF   Labs     Obtained/Reviewed   Critical Results Called   Date when labs were drawn-  Hgb-    HGB   Date Value Ref Range Status   08/23/2021 7.5 (L) 12.1 - 17.0 g/dL Final     K-    Potassium   Date Value Ref Range Status   08/23/2021 4.0 3.5 - 5.1 mmol/L Final     Ca-   Calcium   Date Value Ref Range Status   08/23/2021 8.7 8.5 - 10.1 MG/DL Final     Bun-   BUN   Date Value Ref Range Status   08/23/2021 26 (H) 6 - 20 MG/DL Final     Creat-   Creatinine   Date Value Ref Range Status   08/23/2021 5.48 (H) 0.70 - 1.30 MG/DL Final        Medications/ Blood Products Given     Name   Dose   Route and Time                     Blood Volume Processed (BVP):    80.0 Net Fluid   Removed:  1000ml   Comments RN reviewed LPN assessment and completed RN assessment. RN completed patient assessment. RN reviewed technicians vital signs and procedure note. Tx completed.  Reviewed by YONY Urena  Time Out Done: (DAMD)1640  Primary Nurse Rpt Pre:Alesia Guevara  Primary Nurse Rpt Post:Alesia Narvaez RN  Pt Education:access care  Care Plan:continue HD  Tx Summary:GLORIA AVF: skin CDI. No s/s of infection. + B/T. No issues with cannulation or hemostasis. Running well at . I arrived to pt's room A&Ox3. Consent signed & on file. SBAR received from Primary RN. 0815: Pt cannulated with 21N needles per policy & without issue. Labs drawn per request/ order. VSS. Dialysis Tx initiated. 0830 Pt resting quietly. lines secure and visible   0845: pt. Stable. 0900: pt. Stable. Lines secure and visible  0915: Pt. Stable, lines secure and visible  0945: pt. Resting well.  1000: Pt. Lila. Hd well. Lines secure  1015: pt. Stable, lines secure and visible. Dr. Pito Sr rounding  [phone]: pt. Stable, lines secure and visible  1100: Pt. Lila. Hd well. Lines secure and visible  1115: pt. Stable, lines secure and visible  1130: pt. Stable  1145: Tx ended. VSS. All possible blood returned to patient. Hemostasis achieved without issue. Bed locked and in the lowest position, call bell and belongings in reach. SBAR given to Primary, RN. Patient is stable at time of their/ my departure. All Dialysis related medications have been reviewed. Admiting Diagnosis:Pulmonary Edema / Acute on Chronic CHF  Pt's previous clinic- [facility]  Consent signed - Informed Consent Verified: Yes (08/23/21 0815)  Laurenita Consent - yes  Hepatitis Status- neg/imm 8/9/21  Machine #- Machine Number: [id number] (08/23/21 0815)  Telemetry status-yes  Pre-dialysis wt. -

## 2021-08-23 NOTE — PROGRESS NOTES
Occupational Therapy  Chart reviewed; currently with dialysis; will retry later today as able.  Sridhar Espinosa OTR/L

## 2021-08-23 NOTE — PROGRESS NOTES
Transition of Care Plan:     RUR: 30%  Disposition: TBD- home w/ f/u appts  Follow up appointments: PCP, Nephrology? DME needed: None  Transportation at [address] transport/ round trip  [address] or means to access home:         Medicare Letter: N/A     Caregiver Contact: Brother- Nick Yeager, [phone]  *pt does NOT want his mother contacted*  Discharge Caregiver contacted prior to discharge?       Pt is not medically stable due to pt was transfer over the weekend to [facility]. CM will continue to follow and assist with d/c planning. Nesha Santos.   Care Manager ED [facility]  [phone]

## 2021-08-23 NOTE — PROGRESS NOTES
Hospitalist Progress Note    NAME: Inna Hoyt   :  1965   MRN:  [id number]         Assessment / Plan:  Acute HFpEF/Acute Pul edema POA  End-stage renal disease on dialysis Missed 2 HD treatments PTA   Uremic Encephalopathy (resolving) and Metabolic Acidosis POA    -Admitted on  for pulmonary edema, CHF secondary to nonadherence with dialysis treatment, he was transferred to ICU on  for ongoing hypoglycemia, lethargy. Found to have oxycodone bottle in his pocket, possible overdose, for which she was given Narcan, and afterwards he developed respiratory distress was intubated,,  He self extubated ,   -CXR , new left lower lobe airspace disease, possible atelectasis  -Continue dialysis as per nephro        Acute hypoxic respiratory failure, not POA  Recurrent hypoglycemia,  Fungemia   Severe lactic acidosis with metabolic acidosis  Narcotic abuse  -required ICU as above  -on  he was transferred to stepdown for  hypoglycemia, SOB. Suspect lethargy/lactic acidosis from hypoglycemia, improving  -BCX on admission showed 1/4 yeast.  -Suspect recurrent hypoglycemia from fungemia.   -Continue anidulafungin, repeat cultureNGTD, ID on board  -Procal improved from 34 ->24  -Discussed with nephrology,  -Continue low-dose d10NS for persistent hypoglycemia,  - CT chest and abdomen without source of infection      Elevated LFTs  -Suspect ischemic injury,   -Avoid statin, Hep panel Neg  -worse LFTS  -consulted GI      Narcotic dependance  -If there is concern for methadone/narcotic withdrawal, can use methadone 10 mg IM if needed  -he reports he does not want to be started on methadone  -DC as needed oxycodone           Recent Community Acquired PNA      Hypertension  Hyperlipidemia  -ECHO - EF 45-50%. Moderately reduced systolic function. LV diastolic dysfunction. Mild to moderate MVR.    -Continue ASA, atorvastatin, imdur, metoprolol      GERD Continue Protonix   Neuropathy  History of DVT on eliquis, holding for coagulopathy from above     Recurrent falls  Left rib fractures  -PT/OT evaluations     Attempted to call Mother yesterday, VM full. this morning pt says he does not want us to contact any family members    18.5 - 24.9 Normal weight / Body mass index is 24.84 kg/m². Subjective:     Chief Complaint / Reason for Physician Visit  Mentation much better . oriented x 3. Not in distress. Vitals stable    Review of Systems:  Symptom Y/N Comments  Symptom Y/N Comments   Fever/Chills    Chest Pain     Poor Appetite    Edema     Cough    Abdominal Pain     Sputum    Joint Pain     SOB/WARNER    Pruritis/Rash     Nausea/vomit    Tolerating PT/OT     Diarrhea    Tolerating Diet     Constipation    Other       Could NOT obtain due to:      Objective:     VITALS:   Last 24hrs VS reviewed since prior progress note.  Most recent are:  Patient Vitals for the past 24 hrs:   Temp Pulse Resp BP SpO2   08/23/21 1145 98.5 °F (36.9 °C) 89 16 (!) 158/94 97 %   08/23/21 1130  82 16 (!) 162/91 100 %   08/23/21 1115  84 14 (!) 159/96 99 %   08/23/21 1100  85 20 (!) 158/87 100 %   08/23/21 1045  87 24 (!) 144/91 100 %   08/23/21 1030  86 24 (!) 156/85 98 %   08/23/21 1015  87 13 (!) 163/92 100 %   08/23/21 1000  87 21 (!) 157/84    08/23/21 0945  87 16 (!) 155/91 100 %   08/23/21 0930  86 12 (!) 155/89 99 %   08/23/21 0915  85 17 (!) 160/81 93 %   08/23/21 0900  93 30 (!) 162/96 98 %   08/23/21 0845  89 23 (!) 161/84 100 %   08/23/21 0830  89 20 (!) 155/98 100 %   08/23/21 0825 98.1 °F (36.7 °C) 89 14 (!) 161/94 93 %   08/23/21 0815 98.1 °F (36.7 °C) 99 25 (!) 161/94 95 %   08/23/21 0403 98.6 °F (37 °C) 95 29 (!) 149/84 94 %   08/22/21 2320 98.7 °F (37.1 °C) 93 14 (!) 148/87 100 %   08/22/21 1930 99.7 °F (37.6 °C) 96  (!) 143/102 100 %   08/22/21 1530  82 24 (!) 151/89 100 %   08/22/21 1515 98.6 °F (37 °C) 85 24 (!) 140/96 100 %       Intake/Output Summary (Last 24 hours) at 8/23/2021 1501  Last data filed at 8/23/2021 1413  Gross per 24 hour   Intake 1299.17 ml   Output 1000 ml   Net 299.17 ml        I had a face to face encounter and independently examined this patient on 8/23/2021, as outlined below:  PHYSICAL EXAM:  General: WD, WN. EENT:  EOMI. Anicteric sclerae. MMM  Resp:  Minimal rales b/l  CV:  Regular  rhythm,    GI:  Soft, nondistended, nontender  Neurologic:  awake,, alert, oriented x3. Psych:   Limited insight. Not anxious nor agitated  Skin:  No rashes. No jaundice    Reviewed most current lab test results and cultures  YES  Reviewed most current radiology test results   YES  Review and summation of old records today    NO  Reviewed patient's current orders and MAR    YES  PMH/SH reviewed - no change compared to H&P  ________________________________________________________________________  Care Plan discussed with:    Comments   Patient x    Family      RN x    Care Manager     Consultant                        Multidiciplinary team rounds were held today with , nursing, pharmacist and clinical coordinator. Patient's plan of care was discussed; medications were reviewed and discharge planning was addressed. ________________________________________________________________________  Total NON critical care TIME:  34   Minutes    Total CRITICAL CARE TIME Spent:   Minutes non procedure based      Comments   >50% of visit spent in counseling and coordination of care x    ________________________________________________________________________  Yogesh Voss MD     Procedures: see electronic medical records for all procedures/Xrays and details which were not copied into this note but were reviewed prior to creation of Plan. LABS:  I reviewed today's most current labs and imaging studies.   Pertinent labs include:  Recent Labs     08/23/21 0226 08/22/21 2053   WBC 10.3 9.5   HGB 7.5* 8.0*   HCT 24.1* 25.0*   PLT 93* 98*     Recent Labs     08/23/21 0226 08/22/21 2053 08/22/21  1404 08/22/21  0935   * 131*  --  127*   K 4.0 3.8  --  5.0   CL 94* 95*  --  92*   CO2 21 23  --  15*   GLU 51* 108*  --  128*   BUN 26* 23*  --  39*   CREA 5.48* 5.09*  --  7.50*   CA 8.7 8.6  --  8.7   MG 2.2  --   --   --    ALB 1.8*  --   --  2.1*   TBILI 2.4*  --   --  2.2*   ALT 3,083*  --   --  2,233*   INR  --   --  3.0*  --        Signed: Yogesh Voss MD

## 2021-08-23 NOTE — PROGRESS NOTES
Comprehensive Nutrition Assessment    Type and Reason for Visit: Initial, RD nutrition re-screen/LOS    Nutrition Recommendations/Plan:  Continue current diet     Nutrition Assessment:     Patient medically noted for pulmonary edema, CHF, and ESRD on HD. PMH HTN and GERD. Chart reviewed for length of stay. Patient sleeping at time attempted visit; didn't wake to knock on door or name call. No documented PO intake per flowsheets but noted to refuse lunch today as he didn't like what was offered. K+ WNL today but elevated yesterday; no recent phos. Continue renal diet for now. Encourage intake of meals and use of menu/room service to order meals per his preference. Estimated Daily Nutrient Needs:  Energy (kcal): 2061 kcal (BMR 1585 x 1. 3AF); Weight Used for Energy Requirements: Current  Protein (g): 91-106g (1.2-1.4 g/kg bw); Weight Used for Protein Requirements: Current  Fluid (ml/day): 1800 mL or per MD; Method Used for Fluid Requirements: 1 ml/kcal    Nutrition Related Findings:       Na 129, K+ 4.0, BG 43-55-14-87-88  BM 8/21  Celexa, Lopressor, Protonix, D10% IVF    Wounds:    None       Current Nutrition Therapies:  ADULT DIET Easy to Chew; Low Potassium (Less than 3000 mg/day); Low Phosphorus (Less than 1000 mg)    Anthropometric Measures:  · Height:  5' 9\" (175.3 cm)  · Current Body Wt:  76.3 kg (168 lb 3.4 oz)   · BMI Category:  Normal weight (BMI 18.5-24. 9)       Nutrition Diagnosis:   No nutrition diagnosis at this time     Nutrition Interventions:   Food and/or Nutrient Delivery: Continue current diet  Nutrition Education and Counseling: No recommendations at this time  Coordination of Nutrition Care: No recommendation at this time    Goals:  PO intake >70% of meals and K+/phos WNL next 5-7 days       Nutrition Monitoring and Evaluation:   Behavioral-Environmental Outcomes: None identified  Food/Nutrient Intake Outcomes: Food and nutrient intake  Physical Signs/Symptoms Outcomes: Biochemical data, Weight    Discharge Planning:    Continue current diet     Electronically signed by Isabella Hurd RD on 8/23/2021 at 2:16 PM    Contact: ext [phone]

## 2021-08-23 NOTE — PROGRESS NOTES
TRANSFER - IN REPORT:    Verbal report received from Lalo Tran on Kim Melgoza  being received from PCU(unit) for ordered procedure      Report consisted of patients Situation, Background, Assessment and   Recommendations(SBAR). Information from the following report(s) Kardex was reviewed with the receiving nurse. Opportunity for questions and clarification was provided. Assessment completed upon patients arrival to unit and care assumed.

## 2021-08-23 NOTE — CONSULTS
Infectious Disease Consult    Date of Consultation:  8/22/21  Date of Admission: 8/16/2021   Referring Physician: Dr Jon Counter:     Patient is a 55 y.o. male who is being seen for - Fungemia        IMPRESSION:       -Fungemia   Blood cultures8/18  + for C.glabrata 1/4 ? source  MICs  requested  Repeat blood cultures-8/21 - NG  No history of fevers. CT abdomen -Colonic wall thickening and mild hyperemia of colonic and small bowel mucosa may  be related to mild gastroenteritis. ? source    -Acute hypoxic respiratory failure 2ry to fluid overload  S/p intubation, now extubated. LLL infiltrate on CXR. -Suspected drug overdose while admitted(empty oxycodone bottle found in pt pocket, pt on methadone)    -Current smoker 1/4 PPD    -ESRD on HD via H.D access   No malfuncton, no swelling.    - H/o HTN    - H/o DVT    - Lines - TLC 8/18         PLAN:          -Continue Anidulafungin pending sensitivities  -Repeat fungal  Blood cultures  -Respiratory cultures if pt can expectorate  -CXR - increase in pulmonary edema  - ECHO cardiogram evaluate for vegetations  - Pt will require Ophthalmology exam at some point to evaluate for Candida endophthalmitis    Dr Lia Juarez to cover 8/23 , I will be back 8/24       Kurt Dowell is sseen for - Fungemia    Mr. Tasia Haley is  a 55 yr old male with   past medical history of end-stage renal disease on dialysisMonday, Wednesday and Friday, hypertension, hyperlipidemia, GERD, neuropathy. He was admitted on 8/16 with hyperkalemia, shortness of breath and altered mental status. Patient received emergent HD & his K and respiratory failure had improved but patient again was minimally responsive with ongoing hypoglycemia , lost peripheral IV access. He was also found to have empty bottle of oxycodone in his pocket and possible ingestion to explain this acute change in mental status, he was given naracan and then dextrose , therafter developed respiratory distress.  Rapid response was called. Decision was made to intubate him due to severe resp distress and encephalopathy and patient transferred to ICU for further care. On 8/20  Pt  self extubated . He became increasingly more lethargic over the afternoon but responded well to 2 doses of narcan. Pt was transferred to floor . Blood cultures done on 8/18 + for C.glabrata 1/4 . Pt seen today . Awake, oriented x 4 . Says he feels tired  D/w RN events of the day. Pt became more alert after HD    Patient Active Problem List   Diagnosis Code    Pulmonary edema J81.1    ESRD on dialysis ([facility] N18.6, Z99.2    Hypertension I10    Tobacco abuse Z72.0    Hypoxia R09.02    SOB (shortness of breath) R06.02    ESRD (end stage renal disease) on dialysis ([facility]) N18.6, Z99.2    Hyperkalemia E87.5    Acute on chronic combined systolic and diastolic CHF (congestive heart failure) ([facility]) I50.43     Past Medical History:   Diagnosis Date    Chronic kidney disease     Dialysis patient ([facility]     Hypertension       Family History   Problem Relation Age of Onset    No Known Problems Mother     No Known Problems Father     No Known Problems Sister     No Known Problems Brother     No Known Problems Sister     No Known Problems Sister     No Known Problems Brother       Social History     Tobacco Use    Smoking status: Current Every Day Smoker     Packs/day: 0.25    Smokeless tobacco: Never Used   Substance Use Topics    Alcohol use: Yes     Past Surgical History:   Procedure Laterality Date    HX VASCULAR ACCESS Left     LUE AV fistula      Prior to Admission medications    Medication Sig Start Date End Date Taking? Authorizing Provider   methadone (DOLOPHINE) 10 mg/mL solution Take 160 mg by mouth daily. Yes Provider, Historical   metoprolol tartrate (LOPRESSOR) 25 mg tablet Take 1 Tablet by mouth every twelve (12) hours for 30 days.  8/14/21 9/13/21  Alba Tapia MD   pantoprazole (PROTONIX) 40 mg tablet Take 1 Tablet by mouth daily for 30 days. 21  Mihaela Sears MD   sevelamer (RenageL) 400 mg tablet Take 2 Tablets by mouth three (3) times daily (with meals) for 30 days. 21  Mihaela Sears MD   isosorbide mononitrate ER (IMDUR) 30 mg tablet Take 1 Tablet by mouth every morning for 30 days. 21  Mihaela Sears MD   citalopram (CELEXA) 40 mg tablet Take 1 Tablet by mouth daily for 30 days. 21  Mihaela Sears MD   atorvastatin (LIPITOR) 40 mg tablet Take 1 Tablet by mouth nightly for 30 days. 21  Mihaela Sears MD   apixaban (ELIQUIS) 5 mg tablet Take 1 Tablet by mouth two (2) times a day for 30 days. 21  Mihaela Sears MD   paricalcitoL (ZEMPLAR) 1 mcg capsule Take 1 Capsule by mouth daily for 30 days. 21  Mihaela Sears MD   lidocaine (LIDODERM) 5 % Apply patch to the affected area for 12 hours a day and remove for 12 hours a day. 17   Elier Rodriguez MD   aspirin delayed-release 81 mg tablet Take 1 Tab by mouth daily. 17   Elier Rodriguez MD   multivitamin (ONE A DAY) tablet Take 1 Tab by mouth daily. Juan Olivarez MD   gabapentin (NEURONTIN) 100 mg capsule Take 100 mg by mouth daily. Juan Olivarez MD     Allergies   Allergen Reactions    Motrin [Ibuprofen] Hives     17 Pt denies allergy    Tylenol [Acetaminophen] Hives     17 Pt denies allergy        Review of Systems:  A comprehensive review of systems was negative. 14 point review of systems obtained . All other systems negative    Objective:   Blood pressure (!) 148/87, pulse 93, temperature 98.7 °F (37.1 °C), resp. rate 14, height 5' 9\" (1.753 m), weight 168 lb 3.2 oz (76.3 kg), SpO2 100 %.   Temp (24hrs), Av.6 °F (36.4 °C), Min:96.3 °F (35.7 °C), Max:99.7 °F (37.6 °C)    Current Facility-Administered Medications   Medication Dose Route Frequency    dextrose 10% + sodium chloride 0.9%   IntraVENous CONTINUOUS    anidulafungin (ERAXIS) 100 mg in 0.9% sodium chloride 130 mL IVPB  100 mg IntraVENous Q24H    alcohol 62% (NOZIN) nasal  1 Ampule  1 Ampule Topical Q12H    epoetin cristian-epbx (RETACRIT) injection 6,000 Units  6,000 Units SubCUTAneous Q MON, WED & FRI    ondansetron (ZOFRAN) injection 4 mg  4 mg IntraVENous Q4H PRN    glucose chewable tablet 16 g  4 Tablet Oral PRN    glucagon (GLUCAGEN) injection 1 mg  1 mg IntraMUSCular PRN    naloxone (NARCAN) injection 0.4 mg  0.4 mg IntraMUSCular EVERY 2 MINUTES AS NEEDED    aspirin chewable tablet 81 mg  81 mg Oral DAILY    pantoprazole (PROTONIX) 40 mg in 0.9% sodium chloride 10 mL injection  40 mg IntraVENous DAILY    piperacillin-tazobactam (ZOSYN) 3.375 g in 0.9% sodium chloride (MBP/ADV) 100 mL MBP  3.375 g IntraVENous Q12H    dextrose (D50W) injection syrg 12.5-25 g  12.5-25 g IntraVENous PRN    albumin human 25% (BUMINATE) solution 12.5 g  12.5 g IntraVENous DIALYSIS PRN    [Held by provider] apixaban (ELIQUIS) tablet 5 mg  5 mg Oral BID    [Held by provider] atorvastatin (LIPITOR) tablet 40 mg  40 mg Oral QHS    citalopram (CELEXA) tablet 40 mg  40 mg Oral DAILY    [Held by provider] gabapentin (NEURONTIN) capsule 300 mg  300 mg Oral BID    isosorbide mononitrate ER (IMDUR) tablet 30 mg  30 mg Oral DAILY    metoprolol tartrate (LOPRESSOR) tablet 25 mg  25 mg Oral Q12H    doxercalciferoL (HECTOROL) capsule 0.5 mcg  0.5 mcg Oral DAILY    [Held by provider] sevelamer carbonate (RENVELA) tab 800 mg  800 mg Oral TID WITH MEALS    sodium chloride (NS) flush 5-40 mL  5-40 mL IntraVENous Q8H    sodium chloride (NS) flush 5-40 mL  5-40 mL IntraVENous PRN    polyethylene glycol (MIRALAX) packet 17 g  17 g Oral DAILY PRN    albuterol-ipratropium (DUO-NEB) 2.5 MG-0.5 MG/3 ML  3 mL Nebulization Q6H PRN        Exam:    General:  Awake, cooperative,    Eyes:  Sclera anicteric. Pupils equally round and reactive to light.    Mouth/Throat: Mucous membranes normal, oral pharynx  clear Neck: Supple   Lungs:   Reduced auscultation bases   CV:  Regular rate and rhythm,no murmur, click, rub or gallop   Abdomen:   Soft, non-tender. bowel sounds normal. non-distended   Extremities: No  Edema, LUE - AV fistula   Skin: Skin color, texture, turgor normal. no acute rash or lesions   Lymph nodes: Cervical and supraclavicular normal   Musculoskeletal: No swelling or deformity   Lines/Devices:  Intact, no erythema, drainage or tenderness   Psych: Awake  and oriented x 4        Data Reviewed:   CBC:   Recent Labs     08/22/21 2053 08/20/21  0416   WBC 9.5 7.5   RBC 2.45* 2.70*   HGB 8.0* 8.8*   HCT 25.0* 27.2*   PLT 98* 101*   GRANS 82*  --    LYMPH 11*  --    EOS 0  --      CMP:   Recent Labs     08/22/21 2053 08/22/21  0935 08/22/21  0422   * 128* 88   * 127* 125*   K 3.8 5.0 6.2*   CL 95* 92* 92*   CO2 23 15* 12*   BUN 23* 39* 34*   CREA 5.09* 7.50* 7.14*   CA 8.6 8.7 8.6   AGAP 13 20* 21*   BUCR 5* 5* 5*   AP  --  138*  --    TP  --  5.9*  --    ALB  --  2.1*  --    GLOB  --  3.8  --    AGRAT  --  0.6*  --        Lab Results   Component Value Date/Time    Culture result: NO GROWTH AFTER 13 HOURS 08/21/2021 06:17 PM    Culture result: NO GROWTH AFTER 13 HOURS 08/21/2021 01:43 PM    Culture result: (A) 08/18/2021 09:37 AM     SAMI GLABRATA GROWING IN 1 OF 4 BOTTLES DRAWN NO SITE INDICATED     Culture result: REMAINING BOTTLE(S) HAS/HAVE NO GROWTH SO FAR 08/18/2021 09:37 AM    Culture result:  08/18/2021 09:37 AM     DR Cristobal WALKER YEAST SENSITIVITIES FOR FLUCONAZOLE, ANIDULAFUNGIN, AND VORICONAZOLE          XR Results (most recent)reviewed:  Results from Hospital Encounter encounter on 08/16/21    XR CHEST PORT    Narrative  Indication: Increased work of breathing, tachypnea    Comparison to 8/19/2021.  Portable exam obtained at 502 demonstrates an interval  increase in pulmonary edema compared to the prior exam. ET tube has been removed  in the interval.    Impression  Increased pulmonary edema. ICD-10-CM ICD-9-CM    1. Acute hyperkalemia  E87.5 276.7    2. ESRD needing dialysis ([facility]  N18.6 585.6     Z99.2             Antibiotic History    I have discussed the diagnosis with the patient and the intended plan as seen in the above orders. I have discussed medication side effects and warnings with the patient as well.     Reviewed test results at length with patient    Signed By: Marisol Dailey MD FACP     August 23, 2021

## 2021-08-23 NOTE — PROGRESS NOTES
Problem: Mobility Impaired (Adult and Pediatric)  Goal: *Acute Goals and Plan of Care (Insert Text)  Description: Note: FUNCTIONAL STATUS PRIOR TO ADMISSION: Patient was independent and active without use of DME.     HOME SUPPORT PRIOR TO ADMISSION: The patient lived alone with no local support. He has been homeless for 6 months after the death of his girlfriend.(per patient)     Physical Therapy Goals  Initiated 8/21/2021  1. Patient will move from supine to sit and sit to supine , scoot up and down, and roll side to side in bed with independence within 7 day(s). 2.  Patient will transfer from bed to chair and chair to bed with independence using the least restrictive device within 7 day(s). 3.  Patient will perform sit to stand with modified independence within 7 day(s). 4.  Patient will ambulate with minimal assistance/contact guard assist for 150 feet with the least restrictive device within 7 day(s). Gait analysis will be completed within 2 days to complete eval.     8/23/2021 1434 by Edwar Szymanski PT  Outcome: Progressing Towards Goal  PHYSICAL THERAPY TREATMENT  Patient: Harris Salgado (21 y.o. male)  Date: 8/23/2021  Diagnosis: Pulmonary edema [J81.1]  Acute on chronic combined systolic and diastolic CHF (congestive heart failure) ([facility] [I50.43]  ESRD (end stage renal disease) on dialysis ([facility] [N18.6, Z99.2]  Hyperkalemia [E87.5] <principal problem not specified>       Precautions: Fall (left UE fistula)  Chart, physical therapy assessment, plan of care and goals were reviewed. ASSESSMENT  Patient continues with skilled PT services and is slowly progressing towards goals however remains limited by impaired safety awareness, decreased insight, agitation, impaired standing balance, generalized weakness, and decreased endurance/activity tolerance. Pt only agreeable to limited participation with therapy this date.  Pt largely dismissive of any safety education provided to him, preferring to do things \"his way. \" Pt ambulated 4ft w/ CGAx1 and HHAx1, exhibiting increased trunk sway with slow, shuffled gait. Slight posterior lean and mild posterior LOB noted during gait. Pt tolerated therapy session well however continues to function well below his baseline independent level and most appropriate for additional rehab at discharge. Current Level of Function Impacting Discharge (mobility/balance): CGAx1 during sit<>stand, CGAx1 w/ HHA during ambulation    Other factors to consider for discharge: falls risk, impaired safety awareness         PLAN :  Patient continues to benefit from skilled intervention to address the above impairments. Continue treatment per established plan of care. to address goals. Recommendation for discharge: (in order for the patient to meet his/her long term goals)  Therapy up to 5 days/week in SNF setting    This discharge recommendation:  Has been made in collaboration with the attending provider and/or case management    IF patient discharges home will need the following DME: to be determined (TBD)       SUBJECTIVE:   Patient stated nah, I'm not doing that.     OBJECTIVE DATA SUMMARY:   Critical Behavior:  Neurologic State: Alert  Orientation Level: Oriented to person, Oriented to place, Oriented to time, Disoriented to situation  Cognition: Follows commands  Safety/Judgement: Fall prevention  Functional Mobility Training:  Bed Mobility:  Rolling: Supervision  Supine to Sit: Supervision  Sit to Supine: Supervision  Scooting: Supervision        Transfers:  Sit to Stand: Contact guard assistance  Stand to Sit: Contact guard assistance                             Balance:  Sitting: Intact  Standing: Impaired  Standing - Static: Fair  Standing - Dynamic : Fair  Ambulation/Gait Training:  Distance (ft): 4 Feet (ft)  Assistive Device: Gait belt; Other (comment) (HHA)  Ambulation - Level of Assistance: Contact guard assistance        Gait Abnormalities: Decreased step clearance; Path deviations;Trunk sway increased        Base of Support: Widened     Speed/Ligia: Pace decreased (<100 feet/min)  Step Length: Left shortened;Right shortened                      Pain Rating:  Denied c/o pain    Activity Tolerance:   VSS on RA    After treatment patient left in no apparent distress:   Supine in bed, Call bell within reach, and Bed / chair alarm activated    COMMUNICATION/COLLABORATION:   The patients plan of care was discussed with: Occupational therapist and Registered nurse.      Karey Buck PT, DPT   Time Calculation: 15 mins

## 2021-08-23 NOTE — PROGRESS NOTES
2300: shift report given by Abigail Reynaga RN . Pt currently down in CT   2315: Pt arrived to CT via transport. Pt assessed. Vitals stable. Dual skin completed. No skin concerns at this time. Pt resting in bed, call bell in reach, requesting ginger ale , no other request at this time. Pt stable. 0700: End of Shift Note    Bedside shift change report given by Laura Gallardo (offgoing nurse). Report included the following information SBAR, Kardex, MAR, Recent Results and Cardiac Rhythm NSR    Shift worked:  3058-0981     Shift summary and any significant changes:     none at this time      Concerns for physician to address:  none      Zone phone for oncoming shift:          Activity:  Activity Level: Up with Assistance  Number times ambulated in hallways past shift: 0  Number of times OOB to chair past shift: 0    Cardiac:   Cardiac Monitoring: Yes      Cardiac Rhythm: Sinus Rhythm    Access:   Current line(s): central line     Genitourinary:   Urinary status: voiding and external catheter    Respiratory:   O2 Device: None (Room air)  Chronic home O2 use?: NO  Incentive spirometer at bedside: NO  Actual Volume (ml): 1200 ml  GI:  Last Bowel Movement Date: 08/21/21  Current diet:  ADULT DIET Easy to Chew; Low Potassium (Less than 3000 mg/day); Low Phosphorus (Less than 1000 mg)  Passing flatus: YES  Tolerating current diet: YES       Pain Management:   Patient states pain is manageable on current regimen: YES    Skin:  Jovan Score: 17  Interventions: turn team, speciality bed, increase time out of bed and PT/OT consult    Patient Safety:  Fall Score:  Total Score: 3  Interventions: bed/chair alarm, assistive device (walker, cane, etc), gripper socks and pt to call before getting OOB  High Fall Risk: Yes    Length of Stay:  Expected LOS: 4d 0h  Actual LOS: 7      Laura Gallardo

## 2021-08-23 NOTE — PROGRESS NOTES
NAME: Manuel Hahn        :  1965        MRN:  [id number]               Assessment   :                                               Plan:  ESRD-  AVF  Anemia  Dyspnea  Volume overload  Fungemia  HTN     H/o poor adherence with medical advice MWF-[facility]; HD today     Tolerated HD well yesterday (3 liters off) - now on room air     H/H not at goal.  Continue retacrit    ID following                 Subjective:     Chief Complaint:  Ams. The patient was seen on dialysis at 10:50 AM .  BP is stable. Access is working well. Review of Systems:    Symptom Y/N Comments  Symptom Y/N Comments   Fever/Chills    Chest Pain     Poor Appetite    Edema     Cough    Abdominal Pain     Sputum    Joint Pain     SOB/WARNER    Pruritis/Rash     Nausea/vomit    Tolerating PT/OT     Diarrhea    Tolerating Diet     Constipation    Other       Could not obtain due to:      Objective:     VITALS:   Last 24hrs VS reviewed since prior progress note.  Most recent are:  Visit Vitals  BP (!) 149/84 (BP 1 Location: Right upper arm)   Pulse 95   Temp 98.6 °F (37 °C)   Resp 29   Ht 5' 9\" (1.753 m)   Wt 76.3 kg (168 lb 3.2 oz)   SpO2 94%   BMI 24.84 kg/m²       Intake/Output Summary (Last 24 hours) at 2021 5773  Last data filed at 2021 0403  Gross per 24 hour   Intake 1019.17 ml   Output 0 ml   Net 1019.17 ml      Telemetry Reviewed:     PHYSICAL EXAM:  General: In pcu  Dyspnea  lethargic  No edema    Lab Data Reviewed: (see below)    Medications Reviewed: (see below)    PMH/SH reviewed - no change compared to H&P  ________________________________________________________________________  Care Plan discussed with:  Patient     Family      RN     Care Manager                    Consultant:          Comments   >50% of visit spent in counseling and coordination of care ________________________________________________________________________  Susi Newell MD     Procedures: see electronic medical records for all procedures/Xrays and details which  were not copied into this note but were reviewed prior to creation of Plan. LABS:  Recent Labs     08/23/21 0226 08/22/21 2053   WBC 10.3 9.5   HGB 7.5* 8.0*   HCT 24.1* 25.0*   PLT 93* 98*     Recent Labs     08/23/21 0226 08/22/21 2053 08/22/21  0935   * 131* 127*   K 4.0 3.8 5.0   CL 94* 95* 92*   CO2 21 23 15*   BUN 26* 23* 39*   CREA 5.48* 5.09* 7.50*   GLU 51* 108* 128*   CA 8.7 8.6 8.7   MG 2.2  --   --      Recent Labs     08/23/21 0226 08/22/21  0935   * 138*   TP 6.1* 5.9*   ALB 1.8* 2.1*   GLOB 4.3* 3.8     Recent Labs     08/22/21  1404   INR 3.0*   PTP 29.5*   APTT 40.4*      No results for input(s): FE, TIBC, PSAT, FERR in the last 72 hours.    No results found for: FOL, RBCF   Recent Labs     08/22/21  0452   PH 7.36   PCO2 19*   PO2 88     Recent Labs     08/22/21  0935        No components found for: [place]  Lab Results   Component Value Date/Time    Color YELLOW/STRAW 05/06/2017 01:30 PM    Appearance CLOUDY (A) 05/06/2017 01:30 PM    Specific gravity 1.015 05/06/2017 01:30 PM    pH (UA) 7.5 05/06/2017 01:30 PM    Protein >300 (A) 05/06/2017 01:30 PM    Glucose NEGATIVE  05/06/2017 01:30 PM    Ketone NEGATIVE  05/06/2017 01:30 PM    Bilirubin NEGATIVE  05/06/2017 01:30 PM    Urobilinogen 0.2 05/06/2017 01:30 PM    Nitrites NEGATIVE  05/06/2017 01:30 PM    Leukocyte Esterase NEGATIVE  05/06/2017 01:30 PM    Epithelial cells FEW 05/06/2017 01:30 PM    Bacteria NEGATIVE  05/06/2017 01:30 PM    WBC 5-10 05/06/2017 01:30 PM    RBC 0-5 05/06/2017 01:30 PM       MEDICATIONS:  Current Facility-Administered Medications   Medication Dose Route Frequency    dextrose 10% + sodium chloride 0.9%   IntraVENous CONTINUOUS    anidulafungin (ERAXIS) 100 mg in 0.9% sodium chloride 130 mL IVPB  100 mg IntraVENous Q24H    alcohol 62% (NOZIN) nasal  1 Ampule  1 Ampule Topical Q12H    epoetin cristian-epbx (RETACRIT) injection 6,000 Units  6,000 Units SubCUTAneous Q MON, WED & FRI    ondansetron (ZOFRAN) injection 4 mg  4 mg IntraVENous Q4H PRN    glucose chewable tablet 16 g  4 Tablet Oral PRN    glucagon (GLUCAGEN) injection 1 mg  1 mg IntraMUSCular PRN    naloxone (NARCAN) injection 0.4 mg  0.4 mg IntraMUSCular EVERY 2 MINUTES AS NEEDED    aspirin chewable tablet 81 mg  81 mg Oral DAILY    pantoprazole (PROTONIX) 40 mg in 0.9% sodium chloride 10 mL injection  40 mg IntraVENous DAILY    piperacillin-tazobactam (ZOSYN) 3.375 g in 0.9% sodium chloride (MBP/ADV) 100 mL MBP  3.375 g IntraVENous Q12H    dextrose (D50W) injection syrg 12.5-25 g  12.5-25 g IntraVENous PRN    albumin human 25% (BUMINATE) solution 12.5 g  12.5 g IntraVENous DIALYSIS PRN    [Held by provider] apixaban (ELIQUIS) tablet 5 mg  5 mg Oral BID    [Held by provider] atorvastatin (LIPITOR) tablet 40 mg  40 mg Oral QHS    citalopram (CELEXA) tablet 40 mg  40 mg Oral DAILY    [Held by provider] gabapentin (NEURONTIN) capsule 300 mg  300 mg Oral BID    isosorbide mononitrate ER (IMDUR) tablet 30 mg  30 mg Oral DAILY    metoprolol tartrate (LOPRESSOR) tablet 25 mg  25 mg Oral Q12H    doxercalciferoL (HECTOROL) capsule 0.5 mcg  0.5 mcg Oral DAILY    [Held by provider] sevelamer carbonate (RENVELA) tab 800 mg  800 mg Oral TID WITH MEALS    sodium chloride (NS) flush 5-40 mL  5-40 mL IntraVENous Q8H    sodium chloride (NS) flush 5-40 mL  5-40 mL IntraVENous PRN    polyethylene glycol (MIRALAX) packet 17 g  17 g Oral DAILY PRN    albuterol-ipratropium (DUO-NEB) 2.5 MG-0.5 MG/3 ML  3 mL Nebulization Q6H PRN

## 2021-08-23 NOTE — PROGRESS NOTES
0700- Report given to Hayder Reed RN by off going nurse. 0800- Report given to oncoming nurse by Hayder Reed RN.

## 2021-08-24 NOTE — PROGRESS NOTES
Occupational Therapy    Patient chart reviewed up to date. Attempted visit, receiving patient supine in bed, grimacing. Declines therapy at this time due to 8/10 pain in abdomen. Will follow-up as able and appropriate.

## 2021-08-24 NOTE — PROGRESS NOTES
Infectious Disease progress      IMPRESSION:       -Fungemia   Blood cultures8/18 + for C.glabrata 1/4 ? source  MICs requested, pending  Repeat blood cultures-8/21- NG  No history of fevers. CT abdomen -Colonic wall thickening and mild hyperemia of colonic and small bowel mucosa may  be related to mild gastroenteritis. ? S/p GI evaluation- Findings in small bowel/colon are reactive and not primary process. Liver appearance - c/w nonspecific hepatitis. -ESRD on HD via H.D access   No malfuncton, warmth or  swelling.    -Acute transaminitis  AST>2000, ALT-2472? Ischemic hepatitis. - Hepatitis C?chronic    - Recurrent hypoglycemia. ? Related to acute liver injury    -Acute hypoxic respiratory failure 2ry to fluid overload  S/p intubation, now extubated. LLL infiltrate on CXR. Completed treatment for HAP    -Suspected drug overdose while admitted(empty oxycodone bottle found in pt pocket, pt on methadone)    -Current smoker 1/4 PPD    - H/o HTN    - H/o DVT    - Lines - TLC 8/18         PLAN:          -Continue Anidulafungin pending sensitivities. Monitor LFTs, if persistently elevated may need to consider holding anidulafungin as it could exacerbate  liver toxicity. Also could cause hyper/ hypoglycemia. -U/s of HD access -evaluate for collection, abscess. -DC TLC if not required  -Await repeat fungal blood cultures  - HCV RNA , HIV testing  - Respiratory cultures if pt can expectorate  - ECHO cardiogram - negative  for vegetations  - Pt will require Ophthalmology exam at some point to evaluate for Candida endophthalmitis. Patient seen today. Appears encephalopathic, answering questions with nods of the head. Denies abdominal pain, nontender on exam  LUE HD access-no swelling, no warmth    Mr. Darcy Herrera is  a 55 yr old male with   past medical history of end-stage renal disease on dialysisMonday, Wednesday and Friday, hypertension, hyperlipidemia, GERD, neuropathy.  He was admitted on 8/16 with hyperkalemia, shortness of breath and altered mental status. Patient received emergent HD & his K and respiratory failure had improved but patient again was minimally responsive with ongoing hypoglycemia , lost peripheral IV access. He was also found to have empty bottle of oxycodone in his pocket and possible ingestion to explain this acute change in mental status, he was given naracan and then dextrose , therafter developed respiratory distress. Rapid response was called. Decision was made to intubate him due to severe resp distress and encephalopathy and patient transferred to ICU for further care. On 8/20  Pt  self extubated . He became increasingly more lethargic over the afternoon but responded well to 2 doses of narcan. Pt was transferred to floor . Blood cultures done on 8/18 + for C.glabrata 1/4 . Pt seen today . Awake, oriented x 4 . Says he feels tired  D/w RN events of the day.  Pt became more alert after HD    Patient Active Problem List   Diagnosis Code    Pulmonary edema J81.1    ESRD on dialysis ([facility] N18.6, Z99.2    Hypertension I10    Tobacco abuse Z72.0    Hypoxia R09.02    SOB (shortness of breath) R06.02    ESRD (end stage renal disease) on dialysis ([facility]) N18.6, Z99.2    Hyperkalemia E87.5    Acute on chronic combined systolic and diastolic CHF (congestive heart failure) ([facility]) I50.43     Past Medical History:   Diagnosis Date    Chronic kidney disease     Dialysis patient ([facility]     Hypertension       Family History   Problem Relation Age of Onset    No Known Problems Mother     No Known Problems Father     No Known Problems Sister     No Known Problems Brother     No Known Problems Sister     No Known Problems Sister     No Known Problems Brother       Social History     Tobacco Use    Smoking status: Current Every Day Smoker     Packs/day: 0.25    Smokeless tobacco: Never Used   Substance Use Topics    Alcohol use: Yes     Past Surgical History:   Procedure Laterality Date    HX VASCULAR ACCESS Left     LUE AV fistula      Prior to Admission medications    Medication Sig Start Date End Date Taking? Authorizing Provider   methadone (DOLOPHINE) 10 mg/mL solution Take 160 mg by mouth daily. Yes Provider, Historical   metoprolol tartrate (LOPRESSOR) 25 mg tablet Take 1 Tablet by mouth every twelve (12) hours for 30 days. 8/14/21 9/13/21  Brandon Vega MD   pantoprazole (PROTONIX) 40 mg tablet Take 1 Tablet by mouth daily for 30 days. 8/14/21 9/13/21  Brandon Vega MD   sevelamer (RenageL) 400 mg tablet Take 2 Tablets by mouth three (3) times daily (with meals) for 30 days. 8/14/21 9/13/21  Brandon Vega MD   isosorbide mononitrate ER (IMDUR) 30 mg tablet Take 1 Tablet by mouth every morning for 30 days. 8/14/21 9/13/21  Brandon Vega MD   citalopram (CELEXA) 40 mg tablet Take 1 Tablet by mouth daily for 30 days. 8/14/21 9/13/21  Brandon Vega MD   atorvastatin (LIPITOR) 40 mg tablet Take 1 Tablet by mouth nightly for 30 days. 8/14/21 9/13/21  Brandon Vega MD   apixaban (ELIQUIS) 5 mg tablet Take 1 Tablet by mouth two (2) times a day for 30 days. 8/14/21 9/13/21  Brandon Vega MD   paricalcitoL (ZEMPLAR) 1 mcg capsule Take 1 Capsule by mouth daily for 30 days. 8/14/21 9/13/21  Brandon Vega MD   lidocaine (LIDODERM) 5 % Apply patch to the affected area for 12 hours a day and remove for 12 hours a day. 11/28/17   Kasi Salvador MD   aspirin delayed-release 81 mg tablet Take 1 Tab by mouth daily. 11/28/17   Kasi Salvador MD   multivitamin (ONE A DAY) tablet Take 1 Tab by mouth daily. Juan Olivarez MD   gabapentin (NEURONTIN) 100 mg capsule Take 100 mg by mouth daily. Juan Olivarez MD     Allergies   Allergen Reactions    Motrin [Ibuprofen] Hives     7/16/17 Pt denies allergy    Tylenol [Acetaminophen] Hives     7/16/17 Pt denies allergy        Review of Systems:  A comprehensive review of systems was negative. 14 point review of systems obtained . All other systems negative    Objective:   Blood pressure 139/78, pulse 86, temperature 98.4 °F (36.9 °C), resp. rate 20, height 5' 9\" (1.753 m), weight 168 lb 3.2 oz (76.3 kg), SpO2 100 %.   Temp (24hrs), Av.4 °F (36.9 °C), Min:98.1 °F (36.7 °C), Max:98.7 °F (37.1 °C)    Current Facility-Administered Medications   Medication Dose Route Frequency    oxyCODONE IR (ROXICODONE) tablet 5 mg  5 mg Oral Q6H PRN    rifAXIMin (XIFAXAN) tablet 550 mg  550 mg Oral BID    piperacillin-tazobactam (ZOSYN) 3.375 g in 0.9% sodium chloride (MBP/ADV) 100 mL MBP  3.375 g IntraVENous Q12H    dextrose 10% + sodium chloride 0.9%   IntraVENous CONTINUOUS    anidulafungin (ERAXIS) 100 mg in 0.9% sodium chloride 130 mL IVPB  100 mg IntraVENous Q24H    alcohol 62% (NOZIN) nasal  1 Ampule  1 Ampule Topical Q12H    epoetin cristian-epbx (RETACRIT) injection 6,000 Units  6,000 Units SubCUTAneous Q MON, WED & FRI    ondansetron (ZOFRAN) injection 4 mg  4 mg IntraVENous Q4H PRN    glucose chewable tablet 16 g  4 Tablet Oral PRN    glucagon (GLUCAGEN) injection 1 mg  1 mg IntraMUSCular PRN    naloxone (NARCAN) injection 0.4 mg  0.4 mg IntraMUSCular EVERY 2 MINUTES AS NEEDED    aspirin chewable tablet 81 mg  81 mg Oral DAILY    pantoprazole (PROTONIX) 40 mg in 0.9% sodium chloride 10 mL injection  40 mg IntraVENous DAILY    dextrose (D50W) injection syrg 12.5-25 g  12.5-25 g IntraVENous PRN    albumin human 25% (BUMINATE) solution 12.5 g  12.5 g IntraVENous DIALYSIS PRN    [Held by provider] apixaban (ELIQUIS) tablet 5 mg  5 mg Oral BID    [Held by provider] atorvastatin (LIPITOR) tablet 40 mg  40 mg Oral QHS    citalopram (CELEXA) tablet 40 mg  40 mg Oral DAILY    [Held by provider] gabapentin (NEURONTIN) capsule 300 mg  300 mg Oral BID    isosorbide mononitrate ER (IMDUR) tablet 30 mg  30 mg Oral DAILY    metoprolol tartrate (LOPRESSOR) tablet 25 mg  25 mg Oral Q12H    doxercalciferoL (HECTOROL) capsule 0.5 mcg  0.5 mcg Oral DAILY    [Held by provider] sevelamer carbonate (RENVELA) tab 800 mg  800 mg Oral TID WITH MEALS    sodium chloride (NS) flush 5-40 mL  5-40 mL IntraVENous Q8H    sodium chloride (NS) flush 5-40 mL  5-40 mL IntraVENous PRN    polyethylene glycol (MIRALAX) packet 17 g  17 g Oral DAILY PRN    albuterol-ipratropium (DUO-NEB) 2.5 MG-0.5 MG/3 ML  3 mL Nebulization Q6H PRN        Exam:    General:  Awake, cooperative,    Eyes:  Sclera anicteric. Pupils equally round and reactive to light. Mouth/Throat: Mucous membranes normal, oral pharynx  clear   Neck: Supple   Lungs:   Reduced auscultation bases   CV:  Regular rate and rhythm,no murmur, click, rub or gallop   Abdomen:   Soft, non-tender. bowel sounds normal. non-distended   Extremities: No  Edema, LUE - AV fistula   Skin: Skin color, texture, turgor normal. no acute rash or lesions   Lymph nodes: Cervical and supraclavicular normal   Musculoskeletal: No swelling or deformity   Lines/Devices:  Intact, no erythema, drainage or tenderness   Psych: Awake  and oriented x 4        Data Reviewed:   CBC:   Recent Labs     08/23/21 0226 08/22/21 2053   WBC 10.3 9.5   RBC 2.34* 2.45*   HGB 7.5* 8.0*   HCT 24.1* 25.0*   PLT 93* 98*   GRANS 77* 82*   LYMPH 14 11*   EOS 0 0     CMP:   Recent Labs     08/24/21  0412 08/23/21 0226 08/22/21 2053 08/22/21  0935 08/22/21  0935   GLU 65 51* 108*   < > 128*    129* 131*   < > 127*   K 3.6 4.0 3.8   < > 5.0    94* 95*   < > 92*   CO2 26 21 23   < > 15*   BUN 20 26* 23*   < > 39*   CREA 4.50* 5.48* 5.09*   < > 7.50*   CA 8.6 8.7 8.6   < > 8.7   AGAP 9 14 13   < > 20*   BUCR 4* 5* 5*   < > 5*   * 148*  --   --  138*   TP 5.9* 6.1*  --   --  5.9*   ALB 1.7* 1.8*  --   --  2.1*   GLOB 4.2* 4.3*  --   --  3.8   AGRAT 0.4* 0.4*  --   --  0.6*    < > = values in this interval not displayed.        Lab Results   Component Value Date/Time    Culture result: NO GROWTH 3 DAYS 08/21/2021 06:17 PM    Culture result: NO GROWTH 3 DAYS 08/21/2021 01:43 PM    Culture result: (A) 08/18/2021 09:37 AM     SAMI GLABRATA GROWING IN 1 OF 4 BOTTLES DRAWN NO SITE INDICATED     Culture result: REMAINING BOTTLE(S) HAS/HAVE NO GROWTH SO FAR 08/18/2021 09:37 AM    Culture result:  08/18/2021 09:37 AM     DR Michele Denver REQ YEAST SENSITIVITIES FOR FLUCONAZOLE, ANIDULAFUNGIN, AND VORICONAZOLE          XR Results (most recent)reviewed:  Results from [place] encounter on 08/16/21    XR CHEST PORT    Narrative  Indication: Increased work of breathing, tachypnea    Comparison to 8/19/2021. Portable exam obtained at 502 demonstrates an interval  increase in pulmonary edema compared to the prior exam. ET tube has been removed  in the interval.    Impression  Increased pulmonary edema. ICD-10-CM ICD-9-CM    1. Acute hyperkalemia  E87.5 276.7    2. ESRD needing dialysis ([facility])  N18.6 585.6     Z99.2     3. Accidental drug overdose, initial encounter  T50.901A 977.9      E858.9    4. Fungemia  B49 117.9    5. Hypervolemia, unspecified hypervolemia type  E87.70 276.69    6. Infection due to Candida glabrata  B37.9 112.9    7. Acute pulmonary edema ([facility])  J81.0 518.4    8. Tobacco abuse  Z72.0 305.1            Antibiotic History    I have discussed the diagnosis with the patient and the intended plan as seen in the above orders. I have discussed medication side effects and warnings with the patient as well.     Reviewed test results at length with patient    Signed By: Jerald Pugh MD FACP     August 24, 2021

## 2021-08-24 NOTE — PROGRESS NOTES
1944: Bedside and Verbal shift change report given to Davin Gillette RN (oncoming nurse) by Elidia Sethi RN (offgoing nurse). Report included the following information SBAR, Kardex, Intake/Output, MAR, Accordion, Recent Results, Med Rec Status and Cardiac Rhythm NSR.    2327: This RN did central line dressing change on patient. 8439 08/24/2021: Patient's lactic acid 3.3 Purveyor, NP notified. No new orders at this time. End of Shift Note    Bedside shift change report given to Em Gary RN (oncoming nurse) by Davin Gillette (offgoing nurse). Report included the following information SBAR, Kardex, Intake/Output, MAR, Accordion, Recent Results, Med Rec Status and Cardiac Rhythm NSR    Shift worked:  [id number]     Shift summary and any significant changes:     NONE     Concerns for physician to address:  NONE     Zone phone for oncoming shift:   [phone]       Activity:  Activity Level: Up with Assistance  Number times ambulated in hallways past shift: 0  Number of times OOB to chair past shift: 0    Cardiac:   Cardiac Monitoring: Yes      Cardiac Rhythm: Sinus Rhythm    Access:   Current line(s): central line     Genitourinary:   Urinary status: oliguric    Respiratory:   O2 Device: None (Room air)  Chronic home O2 use?: NO  Incentive spirometer at bedside: YES  Actual Volume (ml): 1200 ml  GI:  Last Bowel Movement Date: 08/21/21  Current diet:  ADULT DIET Easy to Chew; Low Potassium (Less than 3000 mg/day); Low Phosphorus (Less than 1000 mg)  DIET ONE TIME MESSAGE  Passing flatus: YES  Tolerating current diet: NO       Pain Management:   Patient states pain is manageable on current regimen: YES    Skin:  Jovan Score: 18  Interventions: turn team, speciality bed, float heels, increase time out of bed, PT/OT consult and nutritional support     Patient Safety:  Fall Score:  Total Score: 3  Interventions: bed/chair alarm, gripper socks, pt to call before getting OOB and stay with me (per policy)  High Fall Risk: Yes    Length of Stay:  Expected LOS: 4d 0h  Actual LOS: [address] [facility]

## 2021-08-24 NOTE — CONSULTS
Consult    Patient: Graeme Saint MRN: [id number]  SSN: [id number]    YOB: 1965  Age: 55 y.o. Sex: male      Subjective: Graeme Saint is a 55 y.o. male who is being seen for hypoglycemia in a non-diabetic patient. Pt presented to the ED on 8/16/21 with SOB and general malaise. He has a hx or ESRD and had missed a couple of HD sessions. He was found to be fluid overloaded and in CHF exacerbation. Part of his work up also showed fungus in blood cultures for which he has been treated. On admission he had a very low blood sugar in the 30s. During his admission he has had multiple treatments for hypoglycemia and is currently on a D-10 drip at 50cc/hr. During his hospital stay he had been found to be in liver failure and is being evaluated by GI. It was felt to be due to possible hepatic congestion from fluid overload. This afternoon pt is drowsy, but rousable. He is oriented to person only but is able to answer some questions. He notes that he has some abdominal discomfort currently and he is able to tell me that he does receive HD on M/W/F, but he was not able to tell me the day of the week, the month or which hospital he is currently in. Pt dose report that his PO intake has been poor and per the MD and his RN they confirm that he is not eating much of his meals.     Pt states that he is NOT diabetic and has never had issues of hypoglycemia in the past.    Past Medical History:   Diagnosis Date    Chronic kidney disease     Dialysis patient ([facility] [address]     Hypertension      Past Surgical History:   Procedure Laterality Date    HX VASCULAR ACCESS Left     LUE AV fistula      Family History   Problem Relation Age of Onset    No Known Problems Mother     No Known Problems Father     No Known Problems Sister     No Known Problems Brother     No Known Problems Sister     No Known Problems Sister     No Known Problems Brother      Social History     Tobacco Use    Smoking status: Current Every Day Smoker     Packs/day: 0.25    Smokeless tobacco: Never Used   Substance Use Topics    Alcohol use: Yes      Current Facility-Administered Medications   Medication Dose Route Frequency Provider Last Rate Last Admin    oxyCODONE IR (ROXICODONE) tablet 5 mg  5 mg Oral Q6H PRN Thelma Nova MD   5 mg at 08/24/21 1250    rifAXIMin (XIFAXAN) tablet 550 mg  550 mg Oral BID Michael Smith NP        piperacillin-tazobactam (ZOSYN) 3.375 g in 0.9% sodium chloride (MBP/ADV) 100 mL MBP  3.375 g IntraVENous Q12H Otto Browne MD        cosyntropin (CORTROSYN) injection 0.25 mg  0.25 mg IntraVENous ONCE Gabe Navarrete MD        dextrose 10% + sodium chloride 0.9%   IntraVENous CONTINUOUS Thelma Nova MD 50 mL/hr at 08/23/21 2354 New Bag at 08/23/21 2354    anidulafungin (ERAXIS) 100 mg in 0.9% sodium chloride 130 mL IVPB  100 mg IntraVENous Q24H Thelma Nova MD 83 mL/hr at 08/23/21 1413 100 mg at 08/23/21 1413    alcohol 62% (NOZIN) nasal  1 Ampule  1 Ampule Topical Q12H Savanna Montes MD   1 Ampule at 08/24/21 0815    epoetin cristian-epbx (RETACRIT) injection 6,000 Units  6,000 Units SubCUTAneous Q MON, WED & FRI Jossue Littlejohn III, MD   6,000 Units at 08/23/21 2255    ondansetron (ZOFRAN) injection 4 mg  4 mg IntraVENous Q4H PRN Goldie Sr NP   4 mg at 08/18/21 0015    glucose chewable tablet 16 g  4 Tablet Oral PRN Horris Vince, NP   16 g at 08/18/21 0720    glucagon (GLUCAGEN) injection 1 mg  1 mg IntraMUSCular PRN Horris Vince, NP   1 mg at 08/18/21 0737    naloxone (NARCAN) injection 0.4 mg  0.4 mg IntraMUSCular EVERY 2 MINUTES AS NEEDED Samy Larsen NP   0.4 mg at 08/19/21 1657    aspirin chewable tablet 81 mg  81 mg Oral DAILY Savanna Montes MD   81 mg at 08/24/21 0815    pantoprazole (PROTONIX) 40 mg in 0.9% sodium chloride 10 mL injection  40 mg IntraVENous DAILY Savanna Montes MD   40 mg at 08/24/21 0814    dextrose (D50W) injection syrg 12.5-25 g  12.5-25 g IntraVENous PRN Zoila Thrasher MD   25 g at 08/23/21 1620    albumin human 25% (BUMINATE) solution 12.5 g  12.5 g IntraVENous DIALYSIS PRN Teresita Bunch MD        [Held by provider] apixaban (ELIQUIS) tablet 5 mg  5 mg Oral BID Mihaela Sears MD   5 mg at 08/22/21 0826    [Held by provider] atorvastatin (LIPITOR) tablet 40 mg  40 mg Oral QHS Mihaela Sears MD   40 mg at 08/21/21 2121    citalopram (CELEXA) tablet 40 mg  40 mg Oral DAILY Mihaela Sears MD   40 mg at 08/24/21 0815    [Held by provider] gabapentin (NEURONTIN) capsule 300 mg  300 mg Oral BID Mihaela Sears MD   300 mg at 08/19/21 0816    isosorbide mononitrate ER (IMDUR) tablet 30 mg  30 mg Oral DAILY Mihaela Sears MD   30 mg at 08/24/21 1543    metoprolol tartrate (LOPRESSOR) tablet 25 mg  25 mg Oral Q12H Mihaela Sears MD   25 mg at 08/24/21 0815    doxercalciferoL (HECTOROL) capsule 0.5 mcg  0.5 mcg Oral DAILY Mihaela Sears MD   0.5 mcg at 08/24/21 0815    [Held by provider] sevelamer carbonate (RENVELA) tab 800 mg  800 mg Oral TID WITH MEALS Mihaela Sears MD   800 mg at 08/17/21 1813    sodium chloride (NS) flush 5-40 mL  5-40 mL IntraVENous Q8H Mihaela Sears MD   10 mL at 08/24/21 0416    sodium chloride (NS) flush 5-40 mL  5-40 mL IntraVENous PRN Mihaela Sears MD   10 mL at 08/18/21 1829    polyethylene glycol (MIRALAX) packet 17 g  17 g Oral DAILY PRN Mihaela Sears MD        albuterol-ipratropium (DUO-NEB) 2.5 MG-0.5 MG/3 ML  3 mL Nebulization Q6H PRN Mihaela Sears MD   3 mL at 08/22/21 0541        Allergies   Allergen Reactions    Motrin [Ibuprofen] Hives     7/16/17 Pt denies allergy    Tylenol [Acetaminophen] Hives     7/16/17 Pt denies allergy       Review of Systems:  As noted in HPI and as able.     Objective:     Vitals:    08/23/21 2327 08/24/21 0354 08/24/21 0814 08/24/21 1120   BP: (!) 157/94 (!) 148/87 (!) 155/94 139/78   Pulse: 85 84 85 86   Resp: 16 18 18 20   Temp: 98.4 °F (36.9 °C) 98.7 °F (37.1 °C) 98.1 °F (36.7 °C) 98.4 °F (36.9 °C)   TempSrc:       SpO2: 100% 100% 100% 100%   Weight:       Height:            Physical Exam:  GENERAL: alert, cooperative, no distress, appears stated age  EYE: negative  LYMPHATIC: Cervical, supraclavicular, and axillary nodes normal.   THROAT & NECK: normal  LUNG: clear to auscultation bilaterally  HEART: regular rate and rhythm, S1, S2 normal, no murmur, click, rub or gallop  ABDOMEN: + BS heard, pt reports tenderness, but no guarding  EXTREMITIES:  extremities normal, atraumatic, no cyanosis or edema  SKIN: Normal.  NEUROLOGIC: Awake and alert, oriented to person., DTR 2+, no tremors, no asterixis.   PSYCHIATRIC: non focal    Recent Results (from the past 24 hour(s))   ECHO ADULT COMPLETE    Collection Time: 08/23/21  3:24 PM   Result Value Ref Range    IVSd 1.84 (A) 0.60 - 1.00 cm    IVSs 1.87 cm    LVIDd 4.61 4.20 - 5.90 cm    LVIDs 3.60 cm    LVOT d 2.13 cm    LVPWd 1.50 (A) 0.60 - 1.00 cm    LVPWs 2.12 cm    LVOT Peak Gradient 3.94 mmHg    LVOT Peak Velocity 99.20 cm/s    RVIDd 4.58 cm    Left Atrium Major Axis 3.71 cm    LA Volume 63.28 18.0 - 58.0 mL    LA Area 4C 20.93 cm2    LA Vol 2C 49.06 18.00 - 58.00 mL    LA Vol 4C 56.71 18.00 - 58.00 mL    Left Atrium to Aortic Root Ratio 1.11     Left Atrium to Aortic Root Ratio 1.11     AV Cusp 2.20 cm    Aortic Valve Area by Continuity of Peak Velocity 2.61 cm2    AoV PG 7.38 mmHg    Aortic Valve Systolic Peak Velocity 327.37 cm/s    MV A Mars 107.94 cm/s    Mitral Valve E Wave Deceleration Time 263.63 ms    MV E Mars 90.93 cm/s    E/E' ratio (averaged) 14.54     E/E' lateral 10.52     E/E' septal 18.56     LV E' Lateral Velocity 8.64 cm/s    LV E' Septal Velocity 4.90 cm/s    TR Max Velocity 584.05 cm/s    Pulmonic Valve Systolic Peak Instantaneous Gradient 4.24 mmHg    Pulmonic Valve Max Velocity 102.69 cm/s    Triscuspid Valve Regurgitation Peak Gradient 24.85 mmHg    TR Max Velocity 246.27 cm/s    MV E/A 0.84     LV Mass .1 88.0 - 224.0 g    LV Mass AL Index 175.6 49.0 - 115.0 g/m2    Left Atrium Minor Axis 1.93 cm    LA Vol Index 32.96 16.00 - 28.00 ml/m2    LA Vol Index 25.55 16.00 - 28.00 ml/m2    LA Vol Index 29.54 16.00 - 28.00 ml/m2    KENDAL/BSA Pk Mars 1.4 cm2/m2   GLUCOSE, POC    Collection Time: 08/23/21  4:04 PM   Result Value Ref Range    Glucose (POC) 48 (LL) 65 - 117 mg/dL    Performed by Dolores [facility]    GLUCOSE, POC    Collection Time: 08/23/21  4:06 PM   Result Value Ref Range    Glucose (POC) 49 (LL) 65 - 117 mg/dL    Performed by Dolores Cordoba    GLUCOSE, POC    Collection Time: 08/23/21  4:29 PM   Result Value Ref Range    Glucose (POC) 222 (H) 65 - 117 mg/dL    Performed by Dolores Cordoba    GLUCOSE, POC    Collection Time: 08/23/21  6:37 PM   Result Value Ref Range    Glucose (POC) 97 65 - 117 mg/dL    Performed by Dolores Cordoba    GLUCOSE, POC    Collection Time: 08/23/21  9:18 PM   Result Value Ref Range    Glucose (POC) 116 65 - 117 mg/dL    Performed by Devan Marin RN    GLUCOSE, POC    Collection Time: 08/24/21 12:46 AM   Result Value Ref Range    Glucose (POC) 111 65 - 117 mg/dL    Performed by Devan Marin RN    GLUCOSE, POC    Collection Time: 08/24/21  3:52 AM   Result Value Ref Range    Glucose (POC) 78 65 - 117 mg/dL    Performed by Devan Marin RN    HEPATIC FUNCTION PANEL    Collection Time: 08/24/21  4:12 AM   Result Value Ref Range    Protein, total 5.9 (L) 6.4 - 8.2 g/dL    Albumin 1.7 (L) 3.5 - 5.0 g/dL    Globulin 4.2 (H) 2.0 - 4.0 g/dL    A-G Ratio 0.4 (L) 1.1 - 2.2      Bilirubin, total 3.9 (H) 0.2 - 1.0 MG/DL    Bilirubin, direct 2.5 (H) 0.0 - 0.2 MG/DL    Alk.  phosphatase 156 (H) 45 - 117 U/L    AST (SGOT) >2,000 (H) 15 - 37 U/L    ALT (SGPT) 2,472 (H) 12 - 78 U/L   PROTHROMBIN TIME + INR    Collection Time: 08/24/21  4:12 AM   Result Value Ref Range    INR 3.7 (H) 0.9 - 1.1      Prothrombin time 36.9 (H) 9.0 - 11.1 sec PROCALCITONIN    Collection Time: 08/24/21  4:12 AM   Result Value Ref Range    Procalcitonin 43.91 ng/mL   LACTIC ACID    Collection Time: 08/24/21  4:12 AM   Result Value Ref Range    Lactic acid 3.3 (HH) 0.4 - 2.0 MMOL/L   METABOLIC PANEL, BASIC    Collection Time: 08/24/21  4:12 AM   Result Value Ref Range    Sodium 136 136 - 145 mmol/L    Potassium 3.6 3.5 - 5.1 mmol/L    Chloride 101 97 - 108 mmol/L    CO2 26 21 - 32 mmol/L    Anion gap 9 5 - 15 mmol/L    Glucose 65 65 - 100 mg/dL    BUN 20 6 - 20 MG/DL    Creatinine 4.50 (H) 0.70 - 1.30 MG/DL    BUN/Creatinine ratio 4 (L) 12 - 20      GFR est AA 17 (L) >60 ml/min/1.73m2    GFR est non-AA 14 (L) >60 ml/min/1.73m2    Calcium 8.6 8.5 - 10.1 MG/DL   GLUCOSE, POC    Collection Time: 08/24/21  6:01 AM   Result Value Ref Range    Glucose (POC) 77 65 - 117 mg/dL    Performed by Leti Patel RN    GLUCOSE, POC    Collection Time: 08/24/21 12:08 PM   Result Value Ref Range    Glucose (POC) 77 65 - 117 mg/dL    Performed by Singh Feldman PCT        Assessment:     Hospital Problems  Date Reviewed: 11/28/2017        Codes Class Noted POA    Hyperkalemia ICD-10-CM: E87.5  ICD-9-CM: 276.7  8/16/2021 Unknown        Acute on chronic combined systolic and diastolic CHF (congestive heart failure) (HCC) ICD-10-CM: I50.43  ICD-9-CM: 428.43, 428.0  8/16/2021 Unknown        ESRD (end stage renal disease) on dialysis [facility]) ICD-10-CM: N18.6, Z99.2  ICD-9-CM: 585.6, V45.11  8/8/2021 Unknown        Pulmonary edema ICD-10-CM: J81.1  ICD-9-CM: 248  5/6/2017 Unknown              Plan:     1) Hypoglycemia > Pt has no hx of DM and based on his medical course I suspect what we are seeing is lack of gluconeogenesis and depletion of his body's glycogen stores due to lack of PO intake and the acute liver failure.  In the setting of acute liver failure/liver injury the liver loses the ability to produce and store glycogen and under normal circumstances the liver will hold about 24 hours worth of glycogen. With the liver injury and with his poor PO intake I suspect the underlying issue is depletion of body stores. To rule out adrenal insufficiency I will order a cosyntropin stim test (Draw a now cortisol, give IV cosyntropin and then repeat the Cortisol in 30 and 60 minutes). If this comes back normal the next step will be to stop the D-10 drop, continue testing his BGs every 2 hours and if his BGs drop under 60 to draw an insulin, c-peptide, proinsulin and Beta-hydroxyurea BEFORE any glucose or rescue medications are given to raise his BGs. We can not test insulin levels in the face of him receiving a D-10 drip, because the dextrose is a stimulation for insulin production. Will continue to follow. I spent 85 minutes on his case and > 50% of the time was spent reviewing the chart and coordinating his care with the primary team and the nurse caring for him.         Signed By: Geno Franco MD     August 24, 2021

## 2021-08-24 NOTE — PROGRESS NOTES
Physical Therapy:    Pt arrived back to unit from testing. Pt currently declining PT tx session due to 8/10 abdominal pain. Pt in bed L side-lying fetal positioning with nurse present.   Follow-up in am.

## 2021-08-24 NOTE — PROGRESS NOTES
NAME: Lester Harmon        :  1965        MRN:  [id number]               Assessment   :                                               Plan:  ESRD-  AVF  Anemia  Dyspnea  Volume overload  Fungemia  HTN     H/o poor adherence with medical advice [facility]; no HD today     On room air     H/H not at goal.  Continue retacrit    ID following                 Subjective:     Chief Complaint:  Ams, more alert. Review of Systems:    Symptom Y/N Comments  Symptom Y/N Comments   Fever/Chills    Chest Pain     Poor Appetite    Edema     Cough    Abdominal Pain     Sputum    Joint Pain     SOB/WARNER    Pruritis/Rash     Nausea/vomit    Tolerating PT/OT     Diarrhea    Tolerating Diet     Constipation    Other       Could not obtain due to:      Objective:     VITALS:   Last 24hrs VS reviewed since prior progress note.  Most recent are:  Visit Vitals  BP (!) 148/87 (BP 1 Location: Right upper arm, BP Patient Position: At rest)   Pulse 84   Temp 98.7 °F (37.1 °C)   Resp 18   Ht 5' 9\" (1.753 m)   Wt 76.3 kg (168 lb 3.2 oz)   SpO2 100%   BMI 24.84 kg/m²       Intake/Output Summary (Last 24 hours) at 2021 5541  Last data filed at 2021 0354  Gross per 24 hour   Intake 1118.33 ml   Output 1000 ml   Net 118.33 ml      Telemetry Reviewed:     PHYSICAL EXAM:  General: In pcu  Dyspnea  lethargic  No edema    Lab Data Reviewed: (see below)    Medications Reviewed: (see below)    PMH/SH reviewed - no change compared to H&P  ________________________________________________________________________  Care Plan discussed with:  Patient     Family      RN     Care Manager                    Consultant:          Comments   >50% of visit spent in counseling and coordination of care       ________________________________________________________________________  Yelena Gonzalez MD     Procedures: see electronic medical records for all procedures/Xrays and details which  were not copied into this note but were reviewed prior to creation of Plan. LABS:  Recent Labs     08/23/21 0226 08/22/21 2053   WBC 10.3 9.5   HGB 7.5* 8.0*   HCT 24.1* 25.0*   PLT 93* 98*     Recent Labs     08/24/21 0412 08/23/21 0226 08/22/21 2053    129* 131*   K 3.6 4.0 3.8    94* 95*   CO2 26 21 23   BUN 20 26* 23*   CREA 4.50* 5.48* 5.09*   GLU 65 51* 108*   CA 8.6 8.7 8.6   MG  --  2.2  --      Recent Labs     08/24/21 0412 08/23/21 0226 08/22/21  0935   * 148* 138*   TP 5.9* 6.1* 5.9*   ALB 1.7* 1.8* 2.1*   GLOB 4.2* 4.3* 3.8     Recent Labs     08/24/21 0412 08/22/21  1404   INR 3.7* 3.0*   PTP 36.9* 29.5*   APTT  --  40.4*      No results for input(s): FE, TIBC, PSAT, FERR in the last 72 hours.    No results found for: FOL, RBCF   Recent Labs     08/22/21  0452   PH 7.36   PCO2 19*   PO2 88     Recent Labs     08/22/21  0935        No components found for: [place]  Lab Results   Component Value Date/Time    Color YELLOW/STRAW 05/06/2017 01:30 PM    Appearance CLOUDY (A) 05/06/2017 01:30 PM    Specific gravity 1.015 05/06/2017 01:30 PM    pH (UA) 7.5 05/06/2017 01:30 PM    Protein >300 (A) 05/06/2017 01:30 PM    Glucose NEGATIVE  05/06/2017 01:30 PM    Ketone NEGATIVE  05/06/2017 01:30 PM    Bilirubin NEGATIVE  05/06/2017 01:30 PM    Urobilinogen 0.2 05/06/2017 01:30 PM    Nitrites NEGATIVE  05/06/2017 01:30 PM    Leukocyte Esterase NEGATIVE  05/06/2017 01:30 PM    Epithelial cells FEW 05/06/2017 01:30 PM    Bacteria NEGATIVE  05/06/2017 01:30 PM    WBC 5-10 05/06/2017 01:30 PM    RBC 0-5 05/06/2017 01:30 PM       MEDICATIONS:  Current Facility-Administered Medications   Medication Dose Route Frequency    dextrose 10% + sodium chloride 0.9%   IntraVENous CONTINUOUS    anidulafungin (ERAXIS) 100 mg in 0.9% sodium chloride 130 mL IVPB  100 mg IntraVENous Q24H    alcohol 62% (NOZIN) nasal  1 Ampule  1 Ampule Topical Q12H    epoetin cristian-epbx (RETACRIT) injection 6,000 Units  6,000 Units SubCUTAneous Q MON, WED & FRI    ondansetron (ZOFRAN) injection 4 mg  4 mg IntraVENous Q4H PRN    glucose chewable tablet 16 g  4 Tablet Oral PRN    glucagon (GLUCAGEN) injection 1 mg  1 mg IntraMUSCular PRN    naloxone (NARCAN) injection 0.4 mg  0.4 mg IntraMUSCular EVERY 2 MINUTES AS NEEDED    aspirin chewable tablet 81 mg  81 mg Oral DAILY    pantoprazole (PROTONIX) 40 mg in 0.9% sodium chloride 10 mL injection  40 mg IntraVENous DAILY    piperacillin-tazobactam (ZOSYN) 3.375 g in 0.9% sodium chloride (MBP/ADV) 100 mL MBP  3.375 g IntraVENous Q12H    dextrose (D50W) injection syrg 12.5-25 g  12.5-25 g IntraVENous PRN    albumin human 25% (BUMINATE) solution 12.5 g  12.5 g IntraVENous DIALYSIS PRN    [Held by provider] apixaban (ELIQUIS) tablet 5 mg  5 mg Oral BID    [Held by provider] atorvastatin (LIPITOR) tablet 40 mg  40 mg Oral QHS    citalopram (CELEXA) tablet 40 mg  40 mg Oral DAILY    [Held by provider] gabapentin (NEURONTIN) capsule 300 mg  300 mg Oral BID    isosorbide mononitrate ER (IMDUR) tablet 30 mg  30 mg Oral DAILY    metoprolol tartrate (LOPRESSOR) tablet 25 mg  25 mg Oral Q12H    doxercalciferoL (HECTOROL) capsule 0.5 mcg  0.5 mcg Oral DAILY    [Held by provider] sevelamer carbonate (RENVELA) tab 800 mg  800 mg Oral TID WITH MEALS    sodium chloride (NS) flush 5-40 mL  5-40 mL IntraVENous Q8H    sodium chloride (NS) flush 5-40 mL  5-40 mL IntraVENous PRN    polyethylene glycol (MIRALAX) packet 17 g  17 g Oral DAILY PRN    albuterol-ipratropium (DUO-NEB) 2.5 MG-0.5 MG/3 ML  3 mL Nebulization Q6H PRN

## 2021-08-24 NOTE — PROGRESS NOTES
Problem: Falls - Risk of  Goal: *Absence of Falls  Description: Document Rahat Darlene Fall Risk and appropriate interventions in the flowsheet. Outcome: Progressing Towards Goal  Note: Fall Risk Interventions:  Mobility Interventions: Assess mobility with egress test, Bed/chair exit alarm, Communicate number of staff needed for ambulation/transfer, Patient to call before getting OOB         Medication Interventions: Assess postural VS orthostatic hypotension, Bed/chair exit alarm, Evaluate medications/consider consulting pharmacy, Patient to call before getting OOB, Teach patient to arise slowly    Elimination Interventions: Bed/chair exit alarm, Call light in reach, Patient to call for help with toileting needs, Stay With Me (per policy), Toileting schedule/hourly rounds, Urinal in reach              Problem: Pressure Injury - Risk of  Goal: *Prevention of pressure injury  Description: Document Jovan Scale and appropriate interventions in the flowsheet. Outcome: Progressing Towards Goal  Note: Pressure Injury Interventions:  Sensory Interventions: Assess changes in LOC, Assess need for specialty bed, Check visual cues for pain, Discuss PT/OT consult with provider, Float heels, Keep linens dry and wrinkle-free, Maintain/enhance activity level, Minimize linen layers, Monitor skin under medical devices, Turn and reposition approx. every two hours (pillows and wedges if needed)    Moisture Interventions: Absorbent underpads, Apply protective barrier, creams and emollients, Maintain skin hydration (lotion/cream), Minimize layers    Activity Interventions: Assess need for specialty bed, Increase time out of bed, PT/OT evaluation    Mobility Interventions: Assess need for specialty bed, Float heels, HOB 30 degrees or less, Turn and reposition approx.  every two hours(pillow and wedges)    Nutrition Interventions: Document food/fluid/supplement intake, Offer support with meals,snacks and hydration, Discuss nutritional consult with provider    Friction and Shear Interventions: Apply protective barrier, creams and emollients, HOB 30 degrees or less, Lift sheet, Lift team/patient mobility team                Problem: Patient Education: Go to Patient Education Activity  Goal: Patient/Family Education  Outcome: Progressing Towards Goal     Problem: Patient Education: Go to Patient Education Activity  Goal: Patient/Family Education  Outcome: Progressing Towards Goal     Problem: Patient Education: Go to Patient Education Activity  Goal: Patient/Family Education  Outcome: Progressing Towards Goal     Problem: Infection - Risk of, Central Venous Catheter-Associated Bloodstream Infection  Goal: *Absence of infection signs and symptoms  Outcome: Progressing Towards Goal

## 2021-08-24 NOTE — PROGRESS NOTES
GI PROGRESS NOTE  Nuzhat Bryant, JHON  [phone] NP in-hospital cell phone M-F until 4:30  After 5pm or on weekends, please call  for physician on call    NAME:Allan Addyston :1965 MPR:[id number]   ATTG: Dr. Estelita Moralez  PCP: None  Date/Time:  2021 12:16 PM     Primary GI: Dr. Yoly Sky    Reason for following: acute liver injury, elevated lfts, elevated INR    Assessment:   1. Acute hypoxic respiratory failure 2/2 pulmonary edema 2/2 CHF exacerbation 2/2 non-compliance with medications, resolving  2. ESRD on HD  3. Drug abuse  4. Fungemia  5. Acute elevation in LFT's, PT/INR, still very elevated, needs continued monitoring. Pending hep C PCR and genotype  6. H/o DVT on Eliquis, 10/2020  7. CT with ?gastroenteritis, likely reactive process   8. Abdominal ultrasound showing:No hepatic vasculature thrombus. Distended IVC and pulsatile hepatic venous flow, possibly due to right heart disease. Edematous gallbladder without cholelithiasis. No biliary dilation. 9. Elevated INR, continuing to elevate, now at 3.7  10. Encephalopathic, will add xifaxan     Plan:   -Continue to suspect ischemic hepatitis, likely with continued management LFTs will start to decline, Tbili can lag behind. Still acute phase of liver injury, needs close monitoring  - viral hepatitis panel +Hep C; very, very unlikely as cause of elevated LFT's. Pending Hep C PCR and Genotype  - Given h/o DVT within last year will not do Vitamin K challenge at this time, though need trend of LFT's and PT/INR, could consider if continues to rise  -Xifaxan 550mg BID  - avoid hepatotoxic agents (?Eraxis)  - CT personally reviewed with Radiology; c/w non-specific hepatitis. No evidence of vascular compromise. Findings in small bowel/colon are reactive and not primary process. No plans for Colonoscopy at this time  -Will continue to follow. Please call GI with any further questions or concerns. Thank you!    *Plan discussed with Dr. Yoly Sky  Subjective: Discussed with RN events overnight. Denies any needs from GI team at this time. Patient resting comfortably in bed during. Reports doesn't feel well, not very active in interview, mostly just grunts yes or says no to questions. Denies abdominal pain and N/V    Complaint Y/N Description   Abdominal Pain N    Hematemesis     Hematochezia     Melena     Constipation     Diarrhea     Dyspepsia     Dysphagia     Jaundiced     Nausea/vomiting N      Review of Systems:  Symptom Y/N Comments  Symptom Y/N Comments   Fever/Chills    Chest Pain     Cough    Headaches     Sputum    Joint Pain     SOB/WARNER    Pruritis/Rash     Tolerating Diet    Other       Could NOT obtain due to:      Objective:   VITALS:   Last 24hrs VS reviewed since prior progress note. Most recent are:  Visit Vitals  /78 (BP 1 Location: Right upper arm, BP Patient Position: At rest)   Pulse 86   Temp 98.4 °F (36.9 °C)   Resp 20   Ht 5' 9\" (1.753 m)   Wt 76.3 kg (168 lb 3.2 oz)   SpO2 100%   BMI 24.84 kg/m²       Intake/Output Summary (Last 24 hours) at 8/24/2021 1216  Last data filed at 8/24/2021 0354  Gross per 24 hour   Intake 968.33 ml   Output    Net 968.33 ml     PHYSICAL EXAM:  General: WD, WN. Alert, cooperative, no acute distress    HEENT: NC, Atraumatic. Anicteric sclerae. Lungs:  CTA Bilaterally. No Wheezing/Rhonchi/Rales. Heart:  Regular  rhythm,  No murmur (), No Rubs, No Gallops  Abdomen: Soft, Non distended, tender throughout with noticed grimace.  +Bowel sounds, no HSM  Extremities: No c/c/e  Neurologic:  Alert and oriented X 3. No acute neurological distress   Psych:   Good insight. Not anxious nor agitated.     Lab and Radiology Data Reviewed: (see below)    Medications Reviewed: (see below)  PMH/SH reviewed - no change compared to H&P  ________________________________________________________________________  Total time spent with patient: 30 minutes ________________________________________________________________________  Care Plan discussed with:  Patient Y   Family     RN Y              Consultant:       Jesika Dexter NP     Procedures: see electronic medical records for all procedures/Xrays and details which were not copied into this note but were reviewed prior to creation of Plan. LABS:  Recent Labs     08/23/21 0226 08/22/21 2053   WBC 10.3 9.5   HGB 7.5* 8.0*   HCT 24.1* 25.0*   PLT 93* 98*     Recent Labs     08/24/21 0412 08/23/21 0226 08/22/21 2053    129* 131*   K 3.6 4.0 3.8    94* 95*   CO2 26 21 23   BUN 20 26* 23*   CREA 4.50* 5.48* 5.09*   GLU 65 51* 108*   CA 8.6 8.7 8.6   MG  --  2.2  --      Recent Labs     08/24/21 0412 08/23/21 0226 08/22/21 0935   * 148* 138*   TP 5.9* 6.1* 5.9*   ALB 1.7* 1.8* 2.1*   GLOB 4.2* 4.3* 3.8     Recent Labs     08/24/21 0412 08/22/21  1404   INR 3.7* 3.0*   PTP 36.9* 29.5*   APTT  --  40.4*      No results for input(s): FE, TIBC, PSAT, FERR in the last 72 hours.    No results found for: FOL, RBCF  Recent Labs     08/22/21  0452   PH 7.36   PCO2 19*   PO2 88     Recent Labs     08/22/21  0935        Lab Results   Component Value Date/Time    Color YELLOW/STRAW 05/06/2017 01:30 PM    Appearance CLOUDY (A) 05/06/2017 01:30 PM    Specific gravity 1.015 05/06/2017 01:30 PM    pH (UA) 7.5 05/06/2017 01:30 PM    Protein >300 (A) 05/06/2017 01:30 PM    Glucose NEGATIVE  05/06/2017 01:30 PM    Ketone NEGATIVE  05/06/2017 01:30 PM    Bilirubin NEGATIVE  05/06/2017 01:30 PM    Urobilinogen 0.2 05/06/2017 01:30 PM    Nitrites NEGATIVE  05/06/2017 01:30 PM    Leukocyte Esterase NEGATIVE  05/06/2017 01:30 PM    Epithelial cells FEW 05/06/2017 01:30 PM    Bacteria NEGATIVE  05/06/2017 01:30 PM    WBC 5-10 05/06/2017 01:30 PM    RBC 0-5 05/06/2017 01:30 PM       MEDICATIONS:  Current Facility-Administered Medications   Medication Dose Route Frequency    dextrose 10% + sodium chloride 0.9%   IntraVENous CONTINUOUS    anidulafungin (ERAXIS) 100 mg in 0.9% sodium chloride 130 mL IVPB  100 mg IntraVENous Q24H    alcohol 62% (NOZIN) nasal  1 Ampule  1 Ampule Topical Q12H    epoetin cristian-epbx (RETACRIT) injection 6,000 Units  6,000 Units SubCUTAneous Q MON, WED & FRI    ondansetron (ZOFRAN) injection 4 mg  4 mg IntraVENous Q4H PRN    glucose chewable tablet 16 g  4 Tablet Oral PRN    glucagon (GLUCAGEN) injection 1 mg  1 mg IntraMUSCular PRN    naloxone (NARCAN) injection 0.4 mg  0.4 mg IntraMUSCular EVERY 2 MINUTES AS NEEDED    aspirin chewable tablet 81 mg  81 mg Oral DAILY    pantoprazole (PROTONIX) 40 mg in 0.9% sodium chloride 10 mL injection  40 mg IntraVENous DAILY    piperacillin-tazobactam (ZOSYN) 3.375 g in 0.9% sodium chloride (MBP/ADV) 100 mL MBP  3.375 g IntraVENous Q12H    dextrose (D50W) injection syrg 12.5-25 g  12.5-25 g IntraVENous PRN    albumin human 25% (BUMINATE) solution 12.5 g  12.5 g IntraVENous DIALYSIS PRN    [Held by provider] apixaban (ELIQUIS) tablet 5 mg  5 mg Oral BID    [Held by provider] atorvastatin (LIPITOR) tablet 40 mg  40 mg Oral QHS    citalopram (CELEXA) tablet 40 mg  40 mg Oral DAILY    [Held by provider] gabapentin (NEURONTIN) capsule 300 mg  300 mg Oral BID    isosorbide mononitrate ER (IMDUR) tablet 30 mg  30 mg Oral DAILY    metoprolol tartrate (LOPRESSOR) tablet 25 mg  25 mg Oral Q12H    doxercalciferoL (HECTOROL) capsule 0.5 mcg  0.5 mcg Oral DAILY    [Held by provider] sevelamer carbonate (RENVELA) tab 800 mg  800 mg Oral TID WITH MEALS    sodium chloride (NS) flush 5-40 mL  5-40 mL IntraVENous Q8H    sodium chloride (NS) flush 5-40 mL  5-40 mL IntraVENous PRN    polyethylene glycol (MIRALAX) packet 17 g  17 g Oral DAILY PRN    albuterol-ipratropium (DUO-NEB) 2.5 MG-0.5 MG/3 ML  3 mL Nebulization Q6H PRN

## 2021-08-24 NOTE — PROGRESS NOTES
Hospitalist Progress Note    NAME: Graeme Saint   :  1965   MRN:  [id number]     -Admitted on  for pulmonary edema, CHF secondary to nonadherence with dialysis treatment, he was transferred to ICU on  for ongoing hypoglycemia, lethargy.   -Found to have oxycodone bottle in his pocket, possible overdose, for which she was given Narcan, and afterwards he developed respiratory distress was intubated. He self extubated . Blood culture came back positive for 1 out of 4 for Candida, remains hypoglycemic. After transfer out of the ICU, he had episodes of lethargy/lactic acidosis, suspected from severe hypoglycemia, which had improved with dextrose drip. Currently he is on D10 drip, his mentation/lethargy improving    Assessment / Plan:  Acute hypoxic respiratory failure, not POA  Recurrent hypoglycemia,  Fungemia   Severe lactic acidosis with metabolic acidosis  Narcotic abuse  -required ICU as above  -on  he was transferred to stepdown for  hypoglycemia, SOB.    Suspect lethargy/lactic acidosis from hypoglycemia, improving  -BCX on admission showed 1/4 yeast.  -Suspect recurrent hypoglycemia from fungemia/Liver falure  -Continue anidulafungin, repeat cultureNGTD, ID on board  -Procal improved from 34 ->24->43  -Discussed with nephrology,  -Continue low-dose d10NS for persistent hypoglycemia,  - CT chest and abdomen without source of infection  -ID following    -For recurrent hypoglycemia, consulted endocrinologist      Acute HFpEF/Acute Pul edema POA  End-stage renal disease on dialysis Missed 2 HD treatments PTA   Uremic Encephalopathy (resolving) and Metabolic Acidosis POA      -Continue dialysis as per nephro  -CXR , new left lower lobe airspace disease, possible atelectasis        Elevated LFTs  -Suspect ischemic injury,   -Avoid statin, Hep panel Neg  -LFTs minimally improving, still quite high  -GI on board      Narcotic dependance  -He is to take methadone 160 mg daily, last dose before he was admitted to hospital.  He also found to have a oxycodone bottle in his pocket. -he reports he does not want to be started on methadone  -He reports abdominal pain, will give small dose of oxycodone,  unable to do Tylenol/NSAIDs      Recent Community Acquired PNA      Hypertension  Hyperlipidemia  -ECHO - EF 45-50%. Moderately reduced systolic function. LV diastolic dysfunction. Mild to moderate MVR. -Continue ASA, atorvastatin, imdur, metoprolol      GERD Continue Protonix   Neuropathy  History of DVT on eliquis, holding for coagulopathy from above     Recurrent falls  Left rib fractures  -PT/OT evaluations     Attempted to call Mother earlier, VM full. pt says he does not want us to contact any family members    18.5 - 24.9 Normal weight / Body mass index is 24.84 kg/m². Subjective:     Chief Complaint / Reason for Physician Visit  Mentation improving today, complain of abdominal pain, asking for pain medication. .  Was hypoglycemic earlier today    Review of Systems:  Symptom Y/N Comments  Symptom Y/N Comments   Fever/Chills    Chest Pain     Poor Appetite    Edema     Cough    Abdominal Pain     Sputum    Joint Pain     SOB/WARNER    Pruritis/Rash     Nausea/vomit    Tolerating PT/OT     Diarrhea    Tolerating Diet     Constipation    Other       Could NOT obtain due to:      Objective:     VITALS:   Last 24hrs VS reviewed since prior progress note.  Most recent are:  Patient Vitals for the past 24 hrs:   Temp Pulse Resp BP SpO2   08/24/21 1439 98.2 °F (36.8 °C) 83 22 (!) 157/96 98 %   08/24/21 1120 98.4 °F (36.9 °C) 86 20 139/78 100 %   08/24/21 0814 98.1 °F (36.7 °C) 85 18 (!) 155/94 100 %   08/24/21 0354 98.7 °F (37.1 °C) 84 18 (!) 148/87 100 %   08/23/21 2327 98.4 °F (36.9 °C) 85 16 (!) 157/94 100 %   08/23/21 2259  85  (!) 150/94 100 %   08/23/21 2256  83  (!) 150/94    08/23/21 1944 98.4 °F (36.9 °C) 86 15 (!) 151/84 91 %   08/23/21 1636  85   94 %   08/23/21 1635 98.1 °F (36.7 °C) 84 16 (!) 145/88 94 %       Intake/Output Summary (Last 24 hours) at 8/24/2021 1526  Last data filed at 8/24/2021 0354  Gross per 24 hour   Intake 838.33 ml   Output    Net 838.33 ml        I had a face to face encounter and independently examined this patient on 8/24/2021, as outlined below:  PHYSICAL EXAM:  General: WD, WN. EENT:  EOMI. Anicteric sclerae. MMM  Resp:  Lungs clear bilaterally   CV:  Regular  rhythm,    GI:  Soft, nondistended, nontender  Neurologic:  awake,, alert, oriented x3. Psych:   Poor insight  Skin:  No rashes. No jaundice    Reviewed most current lab test results and cultures  YES  Reviewed most current radiology test results   YES  Review and summation of old records today    NO  Reviewed patient's current orders and MAR    YES  PMH/ reviewed - no change compared to H&P  ________________________________________________________________________  Care Plan discussed with:    Comments   Patient x    Family      RN x    Care Manager     Consultant                        Multidiciplinary team rounds were held today with , nursing, pharmacist and clinical coordinator. Patient's plan of care was discussed; medications were reviewed and discharge planning was addressed. ________________________________________________________________________  Total NON critical care TIME:  24   Minutes    Total CRITICAL CARE TIME Spent:   Minutes non procedure based      Comments   >50% of visit spent in counseling and coordination of care x    ________________________________________________________________________  Carline Wong MD     Procedures: see electronic medical records for all procedures/Xrays and details which were not copied into this note but were reviewed prior to creation of Plan. LABS:  I reviewed today's most current labs and imaging studies.   Pertinent labs include:  Recent Labs     08/23/21 0226 08/22/21 2053   WBC 10.3 9.5   HGB 7.5* 8.0*   HCT 24.1* 25.0* PLT 93* 98*     Recent Labs     08/24/21  0412 08/23/21  0226 08/22/21  2053 08/22/21  1404 08/22/21  0935 08/22/21  0935    129* 131*  --    < > 127*   K 3.6 4.0 3.8  --    < > 5.0    94* 95*  --    < > 92*   CO2 26 21 23  --    < > 15*   GLU 65 51* 108*  --    < > 128*   BUN 20 26* 23*  --    < > 39*   CREA 4.50* 5.48* 5.09*  --    < > 7.50*   CA 8.6 8.7 8.6  --    < > 8.7   MG  --  2.2  --   --   --   --    ALB 1.7* 1.8*  --   --   --  2.1*   TBILI 3.9* 2.4*  --   --   --  2.2*   ALT 2,472* 3,083*  --   --   --  2,233*   INR 3.7*  --   --  3.0*  --   --     < > = values in this interval not displayed.        Signed: Yogesh Voss MD

## 2021-08-24 NOTE — PROGRESS NOTES
Endocrinology Progress Note    Received a Perfect Serve message from patient's nurse, Yariel Vera, to call about hypoglycemia. He told me that the D10 that was running at 50 ml/hr was stopped around 5pm and his blood sugar dropped from 83 at 4pm to 54 at 6:48pm.  Yariel Vera mirela all of the requested labs from Dr. Flora Fernandez prior to giving any treatment and I advised Yariel Vera to go ahead and give the D50 now and repeat his glucose in the next 10-15 minutes and if over 70, he can hold on resuming the D10 but if under 70, he should resume the D10 at 50 ml/hr. Patient did have the ACTH stimulation test drawn earlier today at 2pm and results are pending. I told him I would notify Dr. Flora Fernandez of this event and to call me back with any questions this evening. Please don't hesitate to send me a message through perfect serve with any questions or concerns.     Anders Patrick MD

## 2021-08-24 NOTE — PROGRESS NOTES
0700- Report given to Felisa Zuleta, RN by off going nurse. 1900- Pt's blood sugar 54. Ordered labs sent to lab. Spoke with Dr. Ena Dunlap who advised me to treat pt with amp of D50. If BS is less than 70 resume D10 gtt @ 50 ml/hr and continue to check sugars Q 2 hrs.     1900- Report given to oncoming nurse by Felisa Zuleta, [facility].

## 2021-08-24 NOTE — PROGRESS NOTES
Physical Therapy:    Medical chart reviewed. Pt is currently off the floor for testing. Continue to follow.

## 2021-08-25 NOTE — PROCEDURES
Norberto Garcia 1153 HD nurse called [id number] [facility]  ED for ETA to dialyze patient within the hour. HD nurse was informed to not to start dialysis in ER as patient is going to 2526 when CCU nurse comes to get patient. Acute  notified.

## 2021-08-25 NOTE — PROGRESS NOTES
RAPID RESPONSE TEAM    Rounded on patient per request of primary RN. Patient more altered and lethargic. 2300 LA 10.7; was 3.3. Ammonia 60. Repeat LA and VBG ordered stat @ 0400. Dialysis patient with elevated pro-BNP. Patient on IV antifungal and was started on IV Zosyn by ID; also on xifaxin. BG consistently dropping into 40s & 50s. Call placed to endocrinology and restarted on D10 gtt @ 50cc/hr. Q2hr blood glucose checks. VBG:  Co2 7.34; PH 23.2; Bicarb; 12 Base excess -11.1. Barely compensated metabolic acidosis. 0545: Repeat LA 14. Patient  SBP 150s and HR SR 80s, but concern for rapid decline. Hospitalist NP notified of concerns and critical lab values and VBG results with no new orders. No CCU consult ordered for recommendations.       Ty Gongora RN  Ext. [phone]

## 2021-08-25 NOTE — PROGRESS NOTES
1930: Bedside and Verbal shift change report given to Saint Grills, RN (oncoming nurse) by Javad Hampton RN (offgoing nurse). Report included the following information SBAR, Kardex, Intake/Output, MAR, Accordion, Recent Results, Med Rec Status and Cardiac Rhythm NSR      2304: Patient was attempting to get out of the bed. Patient appeared diaphoretic and confused. Patient BG was 44, treated with D50, and current BG as of 0030 08/25/2021 is 106. This RN mirela patient's labs ordered by endocrinology and his eyes appeared jaundiced, as well as the palms of his hands appearing pale yellow. STAT ammonia was sent, and his ammonia is 60. The patient's lactic acid is 10.7, which was called from lab at [facility] 08/25/2021. JHON Osuna notified of this at 0145am 08/25/2021. Was told to order a CMP, which was already drawn. 0234: Patient appearing diaphoretic per Dayron Hilton RN. Patient's BG 70. Dr. Mary Agarwal, endocrinology, notified. Gave telephone order with readback for 1/2 amp of D50 and recheck in 15 minutes. 80: Asked JHON Osuna if wanted to treat ammonia. JHON Osuna entered orders for lactulose. 0256: D50 12.5g given. Will recheck BG in 15 minutes. Patient unable to take lactulose at this time due to change in mentation. Will reassess. 0301: JHON Osuna entered orders for lactulose. Repeat ammonia in am(08/26/2021)    0319: Patient's temperature 96.7 axillary. Bairhugger placed on patient. 7521: Recheck patient's Glucose POC via fingerstick. BG 78.     0328:  via central line. Patient's temp 96.7. This RN placed bairhugger on patient. Notified Meng Pascal RRT of patient's change in mentation. Patient was able to take dose of lactulose. No issues. 0443Achristine Cortez RRT at bedside    Orders entered for VBG and repeat lactic.     0443: Patient's BG 53.  Dr. Mary Agarwal notified of this RN administering D50 and restarting patient on D10 drip at 50 mL/hr.     0446: D50 given by Omega Browne RN    0500: Patient restarted on D10 drip. Drip is currently going at 50 ml/hr. 5964: Repeat lactic called in from lab. Lactic is now increased form 10.7 to 14.     0547: Notified JHON Osuna of above. Informed her that patients overall has worsened since the beginning of the shift. JHON Osuna stated that patient is hemodynamically stable at this time and would not be a candidate for ICU transfer. Per Thao, continue to manage patient's symptoms, as this is a result of hypoglycemia.     0551: Patient's BG is 116 at this time. End of Shift Note    Bedside shift change report given to Alan Jon RN (oncoming nurse) by Umesh Nunez (offgoing nurse). Report included the following information SBAR, Kardex, Intake/Output, MAR, Accordion, Recent Results, Med Rec Status and Cardiac Rhythm NSR    Shift worked:  0128-3575     Shift summary and any significant changes:     See note above     Concerns for physician to address:  See note above     Zone phone for oncoming shift:   [phone]     Activity:  Activity Level: Up with Assistance  Number times ambulated in hallways past shift: 0  Number of times OOB to chair past shift: 0    Cardiac:   Cardiac Monitoring: Yes      Cardiac Rhythm: Sinus Rhythm    Access:   Current line(s): central line     Genitourinary:   Urinary status: oliguric    Respiratory:   O2 Device: None (Room air)  Chronic home O2 use?: NO  Incentive spirometer at bedside: YES  Actual Volume (ml): 1200 ml  GI:  Last Bowel Movement Date: 08/24/21  Current diet:  ADULT DIET Easy to Chew; Low Potassium (Less than 3000 mg/day); Low Phosphorus (Less than 1000 mg)  DIET ONE TIME MESSAGE  Passing flatus: YES  Tolerating current diet: NO       Pain Management:   Patient states pain is manageable on current regimen: NO    Skin:  Jovan Score: 17  Interventions: turn team, speciality bed, float heels and nutritional support     Patient Safety:  Fall Score:  Total Score: 4  Interventions: bed/chair alarm, assistive device (walker, cane, etc), gripper socks, pt to call before getting OOB and stay with me (per policy)  High Fall Risk: Yes    Length of Stay:  Expected LOS: 4d 0h  Actual LOS: [address]

## 2021-08-25 NOTE — PROGRESS NOTES
GI PROGRESS NOTE  Lester Castro, JHON  [phone] NP in-hospital cell phone M-F until 4:30  After 5pm or on weekends, please call  for physician on call    NAME:Allan Kowalski :1965 JFA:[id number]   ATTG: Dr. Carolina Khan  PCP: None  Date/Time:  2021 12:16 PM     Primary GI: Dr. Thai Angel    Reason for following: acute liver injury, elevated lfts, elevated INR    Assessment:   -Acute hypoxic respiratory failure 2/2 pulmonary edema 2/2 CHF exacerbation 2/2 non-compliance with medications  -ESRD on HD  -Drug abuse  -Fungemia  -Acute liver injury elevation in LFT's, PT/INR, still very elevated, needs continued monitoring. Pending hep C PCR and genotype  · Abdominal ultrasound showing:No hepatic vasculature thrombus. Distended IVC and pulsatile hepatic venous flow, possibly due to right heart disease. Edematous gallbladder without cholelithiasis. No biliary dilation. · Elevated INR, continuing to elevate, now at 5.7> will trial vitamin K 10mg subQ although with impaired liver function may not work  · Encephalopathic xifaxan and lactulose  · Platelets 513    - H/o DVT on Eliquis, 10/2020  -CT with ?gastroenteritis, likely reactive process        Plan:   -Still acute phase of liver injury, needs close monitoring. Appears to be worsening despite medical management. Poor prognosis, would be best served by tertiary care facility  -May benefit from more acute care setting like ICU given patients worsening acute liver injury, AMS, hypothermia, and elevated liver enzymes  -Patient is HUGE fall risk, not ideal at all with elevated INR  -Further liver serology ordered: HANNAH, AMA, ASMA, alpha 1 antitripsin, IgG, iron profile, etc  - Given h/o DVT within last year tried to hold off on vitamin K challenge, however with worsening INR (5.7) will give 20mg subQ.  Liver may not be able to utilize, so may not even work  -Continue Xifaxan 550mg BID and lactulose TID  - avoid hepatotoxic agents (?Eraxis)  -Nurse to draw hepatic function panel with upcoming labs  -Will continue to follow. Please call GI with any further questions or concerns. Thank you! *Plan discussed with Dr. Allen Ruiz  Subjective:   Discussed with RN events overnight. Patient hypothermic, not oriented or aware of surroundings, however continually trying to remove bear hugger blanket. Patient unable to communicate, significantly altered mental status    Complaint Y/N Description   Abdominal Pain     Hematemesis     Hematochezia     Melena     Constipation     Diarrhea     Dyspepsia     Dysphagia     Jaundiced     Nausea/vomiting       Review of Systems:  Symptom Y/N Comments  Symptom Y/N Comments   Fever/Chills    Chest Pain     Cough    Headaches     Sputum    Joint Pain     SOB/WARNER    Pruritis/Rash     Tolerating Diet    Other       Could NOT obtain due to: AMS     Objective:   VITALS:   Last 24hrs VS reviewed since prior progress note. Most recent are:  Visit Vitals  BP (!) 163/105   Pulse 84   Temp (!) 96.2 °F (35.7 °C)   Resp 22   Ht 5' 9\" (1.753 m)   Wt 76.3 kg (168 lb 3.2 oz)   SpO2 92%   BMI 24.84 kg/m²       Intake/Output Summary (Last 24 hours) at 8/25/2021 1028  Last data filed at 8/24/2021 1742  Gross per 24 hour   Intake 900.83 ml   Output    Net 900.83 ml     PHYSICAL EXAM:  General: Not alert  HEENT: NC, Atraumatic. Anicteric sclerae. Lungs:  CTA Bilaterally. No Wheezing/Rhonchi/Rales. Heart:  tachy,   Abdomen: Soft, Non distended, tender throughout with noticed grimace.  +Bowel sounds, no HSM  Extremities: No c/c/e  Neurologic:  Alert and oriented X 0, appears significantly altered, eyes are open but not responding appropriately to voice. Moving extremities x4. Psych:   Not anxious nor agitated.     Lab and Radiology Data Reviewed: (see below)    Medications Reviewed: (see below)  PMH/SH reviewed - no change compared to H&P  ________________________________________________________________________  Total time spent with patient: 30 minutes ________________________________________________________________________  Care Plan discussed with:  Patient Y   Family     RN Brenda              Consultant:       Nadira Daniels NP     Procedures: see electronic medical records for all procedures/Xrays and details which were not copied into this note but were reviewed prior to creation of Plan. LABS:  Recent Labs     08/25/21 0800 08/24/21 2319   WBC 17.5* 15.9*   HGB 7.5* 6.7*   HCT 25.7* 22.9*   * 125*     Recent Labs     08/24/21 2319 08/24/21 0412 08/23/21 0226   * 136 129*   K 4.4 3.6 4.0    101 94*   CO2 20* 26 21   BUN 32* 20 26*   CREA 6.48* 4.50* 5.48*   * 65 51*   CA 9.1 8.6 8.7   MG  --   --  2.2     Recent Labs     08/24/21 2319 08/24/21 0412 08/23/21 0226   *  160* 156* 148*   TP 5.9*  5.9* 5.9* 6.1*   ALB 1.7*  1.7* 1.7* 1.8*   GLOB 4.2*  4.2* 4.2* 4.3*     Recent Labs     08/25/21 0800 08/24/21 0412 08/22/21  1404   INR 5.7* 3.7* 3.0*   PTP 55.5* 36.9* 29.5*   APTT  --   --  40.4*      No results for input(s): FE, TIBC, PSAT, FERR in the last 72 hours. No results found for: FOL, RBCF  No results for input(s): PH, PCO2, PO2 in the last 72 hours. No results for input(s): CPK, CKMB in the last 72 hours.     No lab exists for component: TROPONINI  Lab Results   Component Value Date/Time    Color YELLOW/STRAW 05/06/2017 01:30 PM    Appearance CLOUDY (A) 05/06/2017 01:30 PM    Specific gravity 1.015 05/06/2017 01:30 PM    pH (UA) 7.5 05/06/2017 01:30 PM    Protein >300 (A) 05/06/2017 01:30 PM    Glucose NEGATIVE  05/06/2017 01:30 PM    Ketone NEGATIVE  05/06/2017 01:30 PM    Bilirubin NEGATIVE  05/06/2017 01:30 PM    Urobilinogen 0.2 05/06/2017 01:30 PM    Nitrites NEGATIVE  05/06/2017 01:30 PM    Leukocyte Esterase NEGATIVE  05/06/2017 01:30 PM    Epithelial cells FEW 05/06/2017 01:30 PM    Bacteria NEGATIVE  05/06/2017 01:30 PM    WBC 5-10 05/06/2017 01:30 PM    RBC 0-5 05/06/2017 01:30 PM MEDICATIONS:  Current Facility-Administered Medications   Medication Dose Route Frequency    lactulose (CHRONULAC) 10 gram/15 mL solution 30 mL  20 g Oral TID    epoetin cristian-epbx (RETACRIT) injection 10,000 Units  10,000 Units SubCUTAneous Q MON, WED & FRI    0.9% sodium chloride infusion  125 mL/hr IntraVENous CONTINUOUS    phytonadione (vitamin K1) (AQUA-MEPHYTON) injection 10 mg  10 mg SubCUTAneous ONCE    oxyCODONE IR (ROXICODONE) tablet 5 mg  5 mg Oral Q6H PRN    rifAXIMin (XIFAXAN) tablet 550 mg  550 mg Oral BID    piperacillin-tazobactam (ZOSYN) 3.375 g in 0.9% sodium chloride (MBP/ADV) 100 mL MBP  3.375 g IntraVENous Q12H    dextrose 10% + sodium chloride 0.9%   IntraVENous CONTINUOUS    anidulafungin (ERAXIS) 100 mg in 0.9% sodium chloride 130 mL IVPB  100 mg IntraVENous Q24H    alcohol 62% (NOZIN) nasal  1 Ampule  1 Ampule Topical Q12H    ondansetron (ZOFRAN) injection 4 mg  4 mg IntraVENous Q4H PRN    glucose chewable tablet 16 g  4 Tablet Oral PRN    glucagon (GLUCAGEN) injection 1 mg  1 mg IntraMUSCular PRN    naloxone (NARCAN) injection 0.4 mg  0.4 mg IntraMUSCular EVERY 2 MINUTES AS NEEDED    aspirin chewable tablet 81 mg  81 mg Oral DAILY    pantoprazole (PROTONIX) 40 mg in 0.9% sodium chloride 10 mL injection  40 mg IntraVENous DAILY    dextrose (D50W) injection syrg 12.5-25 g  12.5-25 g IntraVENous PRN    albumin human 25% (BUMINATE) solution 12.5 g  12.5 g IntraVENous DIALYSIS PRN    [Held by provider] apixaban (ELIQUIS) tablet 5 mg  5 mg Oral BID    [Held by provider] atorvastatin (LIPITOR) tablet 40 mg  40 mg Oral QHS    citalopram (CELEXA) tablet 40 mg  40 mg Oral DAILY    [Held by provider] gabapentin (NEURONTIN) capsule 300 mg  300 mg Oral BID    isosorbide mononitrate ER (IMDUR) tablet 30 mg  30 mg Oral DAILY    metoprolol tartrate (LOPRESSOR) tablet 25 mg  25 mg Oral Q12H    doxercalciferoL (HECTOROL) capsule 0.5 mcg  0.5 mcg Oral DAILY    [Held by provider] sevelamer carbonate (RENVELA) tab 800 mg  800 mg Oral TID WITH MEALS    sodium chloride (NS) flush 5-40 mL  5-40 mL IntraVENous Q8H    sodium chloride (NS) flush 5-40 mL  5-40 mL IntraVENous PRN    polyethylene glycol (MIRALAX) packet 17 g  17 g Oral DAILY PRN    albuterol-ipratropium (DUO-NEB) 2.5 MG-0.5 MG/3 ML  3 mL Nebulization Q6H PRN

## 2021-08-25 NOTE — DIALYSIS
Hemodialysis / [phone]    Vitals Pre Post Assessment Pre Post   BP BP: 132/77 (08/25/21 1815) 156/87 LOC Intubated same   HR Pulse (Heart Rate): 73 (08/25/21 1815) 73 Lungs Diminished across bases same   Resp Resp Rate: 20 (08/25/21 1815) 18 Cardiac NSR same   Temp Temp: 98.1 °F (36.7 °C) (08/25/21 1815) 97.6 Skin Warm/dry same   Weight Pre-Dialysis Weight: 76.3 kg (168 lb 3.4 oz) (08/25/21 1815) 76.3 Edema Generalized same   Tele status Bedside same Pain Pain Intensity 1: 3 (08/25/21 1600) same     Orders   Duration: Start: 5636 End: 2145 Total: 3.5hr   Dialyzer: Dialyzer/Set Up Inspection: Christine Yeboah (08/25/21 1815)   K Bath: Dialysate K (mEq/L): 3 (08/25/21 1815)   Ca Bath: Dialysate CA (mEq/L): 2.5 (08/25/21 1815)   Na: Dialysate NA (mEq/L): 138 (08/25/21 1815)   Bicarb: Target Fluid Removal: Goal/Amount of Fluid to Remove (mL): 0 mL (08/25/21 1815)     Access   Type & Location: L-UA-AVF-W/ bruit/thrill. Site intact, no S+S of infection.   Cannulated w/ 2x 15g, [email or web address]   Comments:                                        Labs   HBsAg (Antigen) / date: Neg 8/9/21                                              HBsAb (Antibody) / date: Immune 8/9/21   Source: Epic   Obtained/Reviewed  Critical Results Called HGB   Date Value Ref Range Status   08/25/2021 7.5 (L) 12.1 - 17.0 g/dL Final     Potassium   Date Value Ref Range Status   08/24/2021 4.4 3.5 - 5.1 mmol/L Final     Calcium   Date Value Ref Range Status   08/24/2021 9.1 8.5 - 10.1 MG/DL Final     BUN   Date Value Ref Range Status   08/24/2021 32 (H) 6 - 20 MG/DL Final     Creatinine   Date Value Ref Range Status   08/24/2021 6.48 (H) 0.70 - 1.30 MG/DL Final     Comment:     INVESTIGATED PER DELTA CHECK PROTOCOL        Meds Given   Name Dose Route                    Adequacy / Fluid    Total Liters Process: 80.6   Net Fluid Removed: 0      Comments   Time Out Done:   (Time) 1810   Admitting Diagnosis: Pulmonary Edema   Consent obtained/signed: Informed Consent Verified: Yes (08/25/21 7570)   Machine / RO # Machine Number: [id number] (08/25/21 5819)   Primary Nurse Rpt Pre: Ariela Spicer RN   Primary Nurse Rpt Post: Ariela Spicer RN   Pt Education: Sedated   Care Plan: CContinue Tx as ordered   Pts outpatient clinic: [facility]     Tx Summary   Comments:        Pt tolerated Tx well. All possible blood returned via NS rinseback. Needles pulled, sites secured, no excessive bleeding.

## 2021-08-25 NOTE — PROGRESS NOTES
Infectious Disease progress      IMPRESSION:           -Acute hypoxic respiratory failure intubated  -Acute hepatic failure with encephalopathy  -Transaminitis AST>2000, 2390   ? Shock liver    -Fungemia   Blood cultures8/18 + for C.glabrata 1/4 ? source  MICs pending  Repeat blood cultures-8/21- NG, TTE-negative  No history of fevers. Possible etiology- abdomen, lines, HD access    CT abdomen -Colonic wall thickening and mild hyperemia of colonic and small bowel mucosa may  be related to mild gastroenteritis. ?s/p  GI evaluation- Findings in small bowel/colon are reactive and not primary process. -ESRD on HD via H.D access   No malfuncton, warmth or  Swelling. For U/s of AVF evaluate for collection, abscess    -Acute transaminitis  AST>2000, ALT-2472? Ischemic hepatitis. - Hepatitis C ?chronic/  HCV PCR pending    -HIV negative    - Recurrent hypoglycemia, related to acute liver injury    -Acute hypoxic respiratory failure 2ry to fluid overload  S/p intubation, now extubated. S/p LLL infiltrate on CXR. Completed treatment for HAP    -Suspected drug overdose while admitted (empty oxycodone bottle found in pt pocket, pt on methadone)    -Current smoker 1/4 PPD    - H/o HTN    - H/o DVT    - Lines - TLC 8/18         PLAN:        -Continue Vancomycin, Zosyn IV  -Hold Anidulafungin   -U/s of HD access -evaluate for collection, abscess.  - BCx 2 during next HD via AVF  -DC TLC if not required, change lines  -Await repeat fungal blood cultures  - HCV RNA   - Respiratory cultures  - Pt will require Ophthalmology exam at some point to evaluate for Candida endophthalmitis. Patient seen today. Intubated in ICU . D/w Dr Jay Nicole today    Mr. Lennox Moros is  a 55 yr old male with   past medical history of end-stage renal disease on dialysisMonday, Wednesday and Friday, hypertension, hyperlipidemia, GERD, neuropathy.  He was admitted on 8/16 with hyperkalemia, shortness of breath and altered mental status. Patient received emergent HD & his K and respiratory failure had improved but patient again was minimally responsive with ongoing hypoglycemia , lost peripheral IV access. He was also found to have empty bottle of oxycodone in his pocket and possible ingestion to explain this acute change in mental status, he was given naracan and then dextrose , therafter developed respiratory distress. Rapid response was called. Decision was made to intubate him due to severe resp distress and encephalopathy and patient transferred to ICU for further care. On 8/20  Pt  self extubated . He became increasingly more lethargic over the afternoon but responded well to 2 doses of narcan. Pt was transferred to floor . Blood cultures done on 8/18 + for C.glabrata 1/4 . Pt seen today . Awake, oriented x 4 . Says he feels tired  D/w RN events of the day.  Pt became more alert after HD    Patient Active Problem List   Diagnosis Code    Pulmonary edema J81.1    ESRD on dialysis ([facility] N18.6, Z99.2    Hypertension I10    Tobacco abuse Z72.0    Hypoxia R09.02    SOB (shortness of breath) R06.02    ESRD (end stage renal disease) on dialysis ([facility]) N18.6, Z99.2    Hyperkalemia E87.5    Acute on chronic combined systolic and diastolic CHF (congestive heart failure) ([facility]) I50.43     Past Medical History:   Diagnosis Date    Chronic kidney disease     Dialysis patient ([facility]     Hypertension       Family History   Problem Relation Age of Onset    No Known Problems Mother     No Known Problems Father     No Known Problems Sister     No Known Problems Brother     No Known Problems Sister     No Known Problems Sister     No Known Problems Brother       Social History     Tobacco Use    Smoking status: Current Every Day Smoker     Packs/day: 0.25    Smokeless tobacco: Never Used   Substance Use Topics    Alcohol use: Yes     Past Surgical History:   Procedure Laterality Date    HX VASCULAR ACCESS Left     LUE AV fistula Prior to Admission medications    Medication Sig Start Date End Date Taking? Authorizing Provider   methadone (DOLOPHINE) 10 mg/mL solution Take 160 mg by mouth daily. Yes Provider, Historical   metoprolol tartrate (LOPRESSOR) 25 mg tablet Take 1 Tablet by mouth every twelve (12) hours for 30 days. 8/14/21 9/13/21  Mihaela Sears MD   pantoprazole (PROTONIX) 40 mg tablet Take 1 Tablet by mouth daily for 30 days. 8/14/21 9/13/21  Mihaela Sears MD   sevelamer (RenageL) 400 mg tablet Take 2 Tablets by mouth three (3) times daily (with meals) for 30 days. 8/14/21 9/13/21  Mihaela Sears MD   isosorbide mononitrate ER (IMDUR) 30 mg tablet Take 1 Tablet by mouth every morning for 30 days. 8/14/21 9/13/21  Mihaela Sears MD   citalopram (CELEXA) 40 mg tablet Take 1 Tablet by mouth daily for 30 days. 8/14/21 9/13/21  Mihaela Sears MD   atorvastatin (LIPITOR) 40 mg tablet Take 1 Tablet by mouth nightly for 30 days. 8/14/21 9/13/21  Mihaela Sears MD   apixaban (ELIQUIS) 5 mg tablet Take 1 Tablet by mouth two (2) times a day for 30 days. 8/14/21 9/13/21  Mihaela Sears MD   paricalcitoL (ZEMPLAR) 1 mcg capsule Take 1 Capsule by mouth daily for 30 days. 8/14/21 9/13/21  Mihaela Sears MD   lidocaine (LIDODERM) 5 % Apply patch to the affected area for 12 hours a day and remove for 12 hours a day. 11/28/17   Elier Rodriguez MD   aspirin delayed-release 81 mg tablet Take 1 Tab by mouth daily. 11/28/17   Elier Rodriguez MD   multivitamin (ONE A DAY) tablet Take 1 Tab by mouth daily. Juan Olivarez MD   gabapentin (NEURONTIN) 100 mg capsule Take 100 mg by mouth daily. Sher, MD Juan     Allergies   Allergen Reactions    Motrin [Ibuprofen] Hives     7/16/17 Pt denies allergy    Tylenol [Acetaminophen] Hives     7/16/17 Pt denies allergy        Review of Systems:  A comprehensive review of systems was negative. 14 point review of systems obtained .  All other systems negative    Objective: Blood pressure (!) 140/83, pulse 74, temperature 98.2 °F (36.8 °C), resp. rate 19, height 5' 9\" (1.753 m), weight 168 lb 3.2 oz (76.3 kg), SpO2 96 %.   Temp (24hrs), Av.5 °F (36.4 °C), Min:96.1 °F (35.6 °C), Max:99.4 °F (37.4 °C)    Current Facility-Administered Medications   Medication Dose Route Frequency    lactulose (CHRONULAC) 10 gram/15 mL solution 30 mL  20 g Oral TID    epoetin cristian-epbx (RETACRIT) injection 10,000 Units  10,000 Units SubCUTAneous Q MON, WED & FRI    dexmedeTOMidine in 0.9 % NaCl (PRECEDEX) 400 mcg/100 mL (4 mcg/mL) infusion soln  0.1-1.5 mcg/kg/hr IntraVENous TITRATE    vancomycin (VANCOCIN) 1750 mg in  ml infusion  1,750 mg IntraVENous ONCE    acetylcysteine (ACETADOTE) 3,820 mg in dextrose 5% 500 mL infusion  50 mg/kg IntraVENous ONCE    Followed by   Connye Sole acetylcysteine (ACETADOTE) 7,640 mg in dextrose 5% 1,000 mL infusion  100 mg/kg IntraVENous ONCE    fentaNYL (PF) 1,500 mcg/30 mL (50 mcg/mL) infusion  0-200 mcg/hr IntraVENous TITRATE    midazolam (VERSED) injection 2 mg  2 mg IntraVENous Q1H PRN    sodium bicarbonate (8.4%) 150 mEq in dextrose 5% 1,000 mL infusion   IntraVENous CONTINUOUS    chlorhexidine (ORAL CARE KIT) 0.12 % mouthwash 15 mL  15 mL Oral Q12H    alcohol 62% (NOZIN) nasal  1 Ampule  1 Ampule Topical Q12H    piperacillin-tazobactam (ZOSYN) 3.375 g in 0.9% sodium chloride (MBP/ADV) 100 mL MBP  3.375 g IntraVENous Q12H    VANCOMYCIN INFORMATION NOTE   Other Rx Dosing/Monitoring    oxyCODONE IR (ROXICODONE) tablet 5 mg  5 mg Oral Q6H PRN    rifAXIMin (XIFAXAN) tablet 550 mg  550 mg Oral BID    alcohol 62% (NOZIN) nasal  1 Ampule  1 Ampule Topical Q12H    ondansetron (ZOFRAN) injection 4 mg  4 mg IntraVENous Q4H PRN    glucose chewable tablet 16 g  4 Tablet Oral PRN    glucagon (GLUCAGEN) injection 1 mg  1 mg IntraMUSCular PRN    naloxone (NARCAN) injection 0.4 mg  0.4 mg IntraMUSCular EVERY 2 MINUTES AS NEEDED    aspirin chewable tablet 81 mg  81 mg Oral DAILY    pantoprazole (PROTONIX) 40 mg in 0.9% sodium chloride 10 mL injection  40 mg IntraVENous DAILY    dextrose (D50W) injection syrg 12.5-25 g  12.5-25 g IntraVENous PRN    albumin human 25% (BUMINATE) solution 12.5 g  12.5 g IntraVENous DIALYSIS PRN    metoprolol tartrate (LOPRESSOR) tablet 25 mg  25 mg Oral Q12H    sodium chloride (NS) flush 5-40 mL  5-40 mL IntraVENous Q8H    sodium chloride (NS) flush 5-40 mL  5-40 mL IntraVENous PRN    polyethylene glycol (MIRALAX) packet 17 g  17 g Oral DAILY PRN    albuterol-ipratropium (DUO-NEB) 2.5 MG-0.5 MG/3 ML  3 mL Nebulization Q6H PRN        Exam:    General:  Awake, cooperative,    Eyes:  Sclera anicteric. Pupils equally round and reactive to light. Mouth/Throat: Mucous membranes normal, oral pharynx  clear   Neck: Supple   Lungs:   Reduced auscultation bases   CV:  Regular rate and rhythm,no murmur, click, rub or gallop   Abdomen:   Soft, non-tender.  bowel sounds normal. non-distended   Extremities: No  Edema, LUE - AV fistula   Skin: Skin color, texture, turgor normal. no acute rash or lesions   Lymph nodes: Cervical and supraclavicular normal   Musculoskeletal: No swelling or deformity   Lines/Devices:  Intact, no erythema, drainage or tenderness   Psych: Awake  and oriented x 4        Data Reviewed:   CBC:   Recent Labs     08/25/21  0800 08/24/21 2319 08/23/21 0226   WBC 17.5* 15.9* 10.3   RBC 2.28* 2.08* 2.34*   HGB 7.5* 6.7* 7.5*   HCT 25.7* 22.9* 24.1*   * 125* 93*   GRANS 89* 67 77*   LYMPH 7* 24 14   EOS 0 0 0     CMP:   Recent Labs     08/25/21  0715 08/24/21  2319 08/24/21  0412 08/23/21  0226 08/23/21 0226   GLU  --  209* 65  --  51*   NA  --  135* 136  --  129*   K  --  4.4 3.6  --  4.0   CL  --  100 101  --  94*   CO2  --  20* 26  --  21   BUN  --  32* 20  --  26*   CREA  --  6.48* 4.50*  --  5.48*   CA  --  9.1 8.6  --  8.7   AGAP  --  15 9  --  14   BUCR  --  5* 4*  --  5*   * 159* 160* 156*   < > 148*   TP 6.6 5.9*  5.9* 5.9*   < > 6.1*   ALB 2.0* 1.7*  1.7* 1.7*   < > 1.8*   GLOB 4.6* 4.2*  4.2* 4.2*   < > 4.3*   AGRAT 0.4* 0.4*  0.4* 0.4*   < > 0.4*    < > = values in this interval not displayed. Lab Results   Component Value Date/Time    Culture result: NO GROWTH 4 DAYS 08/21/2021 06:17 PM    Culture result: NO GROWTH 4 DAYS 08/21/2021 01:43 PM    Culture result: (A) 08/18/2021 09:37 AM     SAMI GLABRATA GROWING IN 1 OF 4 BOTTLES DRAWN NO SITE INDICATED     Culture result: REMAINING BOTTLE(S) HAS/HAVE NO GROWTH SO FAR 08/18/2021 09:37 AM    Culture result:  08/18/2021 09:37 AM     DR Brandon Joshi REQ YEAST SENSITIVITIES FOR FLUCONAZOLE, ANIDULAFUNGIN, AND VORICONAZOLE          XR Results (most recent)reviewed:  Results from [place] encounter on 08/16/21    XR CHEST PORT    Narrative  EXAM: XR CHEST PORT    INDICATION: ETT confirmation    COMPARISON: 8/25/2021 at 1042    FINDINGS: A portable AP radiograph of the chest was obtained at 1212 hours. The  patient is on a cardiac monitor. The ET tube is in satisfactory position 3.6 cm  above the moiz. Left IJ catheter overlies left brachycephalic vein. Lungs  demonstrate no pneumothorax. Impression  1. The ET tube is in satisfactory position            ICD-10-CM ICD-9-CM    1. Acute hyperkalemia  E87.5 276.7    2. ESRD needing dialysis (HCC)  N18.6 585.6     Z99.2     3. Accidental drug overdose, initial encounter  T50.901A 977.9      E858.9    4. Fungemia  B49 117.9    5. Hypervolemia, unspecified hypervolemia type  E87.70 276.69    6. Infection due to Candida glabrata  B37.9 112.9    7. Acute pulmonary edema (HCC)  J81.0 518.4    8. Tobacco abuse  Z72.0 305.1    9. Chronic hepatitis C without hepatic coma (HCC)  B18.2 070.54    10. Colonic disorder  K63.9 569.9    11. Hypoglycemia  E16.2 251.2    12. Ischemic hepatitis  K75.9 570    13. Transaminitis  R74.01 790.4    14.  Essential hypertension  I10 401.9 Antibiotic History    I have discussed the diagnosis with the patient and the intended plan as seen in the above orders. I have discussed medication side effects and warnings with the patient as well.     Reviewed test results at length with patient    Signed By: Katrina Conde MD FACP     August 25, 2021

## 2021-08-25 NOTE — PROGRESS NOTES
Received message from patient's nurse stating:    Patient lactic repeat is 14. Patient's VBG results are updated. I also checked his temp around 3:00am, and it was 96.7. I put him on a bairhugger and it cam eupp to 97.5         Discussion / orders:    Patient's lactic acid level last night 10.7. He is followed by infectious disease and has been on Zosyn and was also started on Eraxis and rifaximin. White blood count also further elevated from 10.23 yesterday to 15.9 today. Is a dialysis patient with significantly elevated proBNP of more than 35,000 on August 22. Bolusing patient with fluid therefore not an option at this time  Of note is that he had an echocardiogram done on August 23 which reported ejection fraction of 40 to 45%. Most recent blood cultures were done on 8/21 which reported no growth. Spoke with patient's nurse who informs me that patient is hemodynamically stable however has been running very low blood glucose which has been an issue for him and is under care of inpatient endocrinologist.  She states that she has spoken with on-call endocrinologist recently and that she was told to restart patient on D10 IV infusion. · Repeat paired blood culture and lactic acid level  · Patient has infectious disease provider on board and therefore any change to antibiotic would be per their recommendation. · We will hold off on bicarb infusion replacement since pH is not low enough and will defer that to patient's nephrologist.  · Some lethargy and altered mental status likely secondary to significant hypoglycemia and should improve once blood glucose is corrected with D10 infusion  · If patient's hemodynamic status changes, will need to consult intensivist for recommendation or possible transfer to CCU.   · Will repeat NT pro-BNP    Patient Vitals for the past 12 hrs:   Temp Pulse Resp BP SpO2   08/25/21 0552  85  135/88    08/25/21 0421 97.5 °F (36.4 °C) 82 26 (!) 159/109 98 %   08/25/21 0319 (!) 96.7 °F (35.9 °C) 82 23 (!) 163/112 100 %   08/25/21 0300  82  (!) 151/106    08/24/21 2346 97.4 °F (36.3 °C) 88 22 (!) 160/101 96 %   08/24/21 2326  89  (!) 164/102 98 %   08/24/21 2240  89  137/83 97 %   08/24/21 2238  89  137/83    08/24/21 1957 98.1 °F (36.7 °C) 87 19 (!) 157/88 96 %              Please note that this note was dictated using Dragon computer voice recognition software. Quite often unanticipated grammatical, syntax, homophones, and other interpretive errors are inadvertently transcribed by the computer software. Please disregard these errors. Please excuse any errors that have escaped final proofreading.

## 2021-08-25 NOTE — ED NOTES
Patient is being transferred to [facility], Room # 058 [id number]. Report given to Treasure Clarke on Charissa Ordoñez for routine progression of care. Report consisted of the following information SBAR, ED Summary, Intake/Output, MAR and Recent Results. Patient transferred to receiving unit by: CCU RN, RT (RN or tech name). Outstanding consults needed: No     Next labs due: No     The following personal items will be sent with the patient during transfer to the floor:     All valuables:    Cardiac monitoring ordered: Yes    The following CURRENT information was reported to the receiving RN:    Code status: Full Code at time of transfer    Last set of vital signs:  Vital Signs  Level of Consciousness: Responds to Voice (1) (08/25/21 1024)  Temp: (!) 96.2 °F (35.7 °C) (08/25/21 0925)  Temp Source: Axillary (08/25/21 0738)  Pulse (Heart Rate): 82 (08/25/21 1130)  Heart Rate Source: Monitor (08/25/21 0738)  Cardiac Rhythm: Sinus Rhythm (08/25/21 0738)  Resp Rate: 24 (08/25/21 1130)  BP: (!) 152/105 (08/25/21 1130)  MAP (Monitor): 120 (08/25/21 1130)  MAP (Calculated): 121 (08/25/21 1130)  BP 1 Location: Right upper arm (08/25/21 0738)  BP 1 Method: Automatic (08/25/21 0738)  BP Patient Position: At rest (08/25/21 0738)  MEWS Score: 2 (08/25/21 9268)  More BP/Pulse rows needed?: Yes (08/21/21 1500)    Additional Blood Pressure/Pulse Data  BP 2: (!) 160/99 (08/21/21 1500)  Patient Position 2: Sitting (08/21/21 1500)    Oxygen Therapy  O2 Sat (%): (!) 77 % (08/25/21 1024)  Pulse via Oximetry: 108 beats per minute (08/25/21 1024)  O2 Device: Ventilator (08/25/21 1115)  O2 Flow Rate (L/min): 2 l/min (08/22/21 1159)  FIO2 (%): 40 % (08/25/21 1115)      Last pain assessment:  Pain 1  Pain Scale 1: Numeric (0 - 10)  Pain Intensity 1: 0  Patient Stated Pain Goal: 0  Pain Reassessment 1: Yes  Faces (Lindsey-Baker) Scale 1: No hurt  Pain Onset 1: Around 2300 last night, intermittent  Pain Location 1: Abdomen  Pain Orientation 1: Medial  Pain Description 1: Aching  Pain Intervention(s) 1: Medication (see MAR)      Wounds: No     Urinary catheter: anuric  Is there a szymanski order: No     LDAs:     Triple Lumen Triple lumen left IJ 08/18/21 Anterior; Left Neck (Active)   Central Line Being Utilized Yes 08/25/21 7692   Criteria for Appropriate Use Limited/no vessel suitable for conventional peripheral access 08/25/21 0738   Site Assessment Clean, dry, & intact 08/25/21 0738   Infiltration Assessment 0 08/25/21 0738   Affected Extremity/Extremities Color distal to insertion site pink (or appropriate for race); Pulses palpable 08/25/21 0738   Date of Last Dressing Change 08/23/21 08/25/21 0738   Dressing Status Clean, dry, & intact 08/25/21 0738   Dressing Type Disk with Chlorhexadine gluconate (CHG); Transparent 08/25/21 0738   Action Taken Open ports on tubing capped 08/25/21 0421   Proximal Hub Color/Line Status White;Capped 08/25/21 0738   Positive Blood Return (Medial Site) Yes 08/25/21 0738   Medial Hub Color/Line Status Blue;Capped 08/25/21 0738   Positive Blood Return (Lateral Site) Yes 08/25/21 0738   Distal Hub Color/Line Status Brown;Capped 08/25/21 0738   Positive Blood Return (Site #3) Yes 08/25/21 0738   Alcohol Cap Used Yes 08/25/21 0738      Peripheral IV 08/25/21 Anterior;Proximal;Right Forearm (Active)      Airway - Endotracheal Tube 08/25/21 Oral (Active)   Insertion Depth (cm) 22 cm 08/25/21 1115   Line Favio Lips 08/25/21 1115   Side Secured Device; Centered 08/25/21 1115   Site Assessment Clean, dry, & intact 08/25/21 1115       Opportunity for questions and clarification was provided.     Ann Gallagher RN

## 2021-08-25 NOTE — PROGRESS NOTES
Chart reviewed. Pt s/p rapid response this AM resulting in intubation at bedside. Pt transferred to ED as CCU overflow. Will defer OT intervention at this time however continue to follow.

## 2021-08-25 NOTE — PROGRESS NOTES
0745- Pt more lethargic than yesterday. Hypothermic 96.1. Rin hugger reapplied. Dr. Pelaez Forward notified and now at bedside. See new orders. 0925- Temp 96.1     1030- Entered pt's room. Pt very restless and kicking off Rin hugger. Temp still 96.1. Educated pt on importance of keeping warming blanket on. Pt still restless and kicking it off. Pt became increasingly diaphoretic and restless. SATS 70-93. Rapid response called. Pt placed on NRB. ABG and CXR ordered. Order for precidex gtt obtained. Precidex started by Samaria Cornejo RRT RN. Anesthesia called to bedside to intubate to protect airway. 1100- Report called to Gail Hand RN. Pt transferred to ED in bed with belongings.

## 2021-08-25 NOTE — PROGRESS NOTES
ICU Progress Note        Subjective: Patient is lying on the bed with normal work of breathing. Opens eyes to command but not communicative or following commands. No supplemental oxygen needs and hemodynamically stable. Vital Signs:    Visit Vitals  BP (!) 164/134   Pulse 84   Temp (!) 96.2 °F (35.7 °C)   Resp 14   Ht 5' 9\" (1.753 m)   Wt 76.3 kg (168 lb 3.2 oz)   SpO2 (!) 77%   BMI 24.84 kg/m²       O2 Device: Ventilator   O2 Flow Rate (L/min): 2 l/min   Temp (24hrs), Av.2 °F (36.2 °C), Min:96.1 °F (35.6 °C), Max:98.2 °F (36.8 °C)       Intake/Output:   Last shift:      No intake/output data recorded. Last 3 shifts:  1901 -  0700  In: 1385 [I.V.:1385]  Out: -     Intake/Output Summary (Last 24 hours) at 2021 1122  Last data filed at 2021 1742  Gross per 24 hour   Intake 900.83 ml   Output    Net 900.83 ml       Ventilator Settings:  Ventilator Mode: Assist control  Respiratory Rate  Back-Up Rate: 14  Insp Flow (l/min): 60 l/min  Ventilator Volumes  Vt Set (ml): 500 ml  Vt Exhaled (Machine Breath) (ml): 500 ml  Ve Observed (l/min): 14.4 l/min  Ventilator Pressures  PIP Observed (cm H2O): 16 cm H2O  Plateau Pressure (cm H2O): 14 cm H2O  MAP (cm H2O): 9  PEEP/VENT (cm H2O): 5 cm H20  Auto PEEP Observed (cm H2O): 4.9 cm H2O    Physical Exam:    General: Alert, awake. Not in acute distress  HEENT:  Icterus +  Resp:  Symmetrical chest expansion, no accessory muscle use; good airway entry; no rales/ wheezing/ rhonchi noted  CV:  S1, S2 present; regular rate and rhythm  GI:  Abdomen soft, non-tender; (+) active bowel sounds  Extremities:  +2 pulses on all extremities; no cyanosis/ clubbing noted  Skin:  Warm; no rashes/ lesions noted  Neurologic:  Alert, awake, opens eyes to command but not communicative.      DATA:     Current Facility-Administered Medications   Medication Dose Route Frequency    lactulose (CHRONULAC) 10 gram/15 mL solution 30 mL  20 g Oral TID    epoetin cristian-epbx (RETACRIT) injection 10,000 Units  10,000 Units SubCUTAneous Q MON, WED & FRI    0.9% sodium chloride infusion  125 mL/hr IntraVENous CONTINUOUS    dexmedeTOMidine in 0.9 % NaCl (PRECEDEX) 400 mcg/100 mL (4 mcg/mL) infusion soln  0.1-1.5 mcg/kg/hr IntraVENous TITRATE    phytonadione (vitamin K1) (AQUA-MEPHYTON) 10 mg in 0.9% sodium chloride 50 mL IVPB  10 mg IntraVENous ONCE    oxyCODONE IR (ROXICODONE) tablet 5 mg  5 mg Oral Q6H PRN    rifAXIMin (XIFAXAN) tablet 550 mg  550 mg Oral BID    piperacillin-tazobactam (ZOSYN) 3.375 g in 0.9% sodium chloride (MBP/ADV) 100 mL MBP  3.375 g IntraVENous Q12H    dextrose 10% + sodium chloride 0.9%   IntraVENous CONTINUOUS    anidulafungin (ERAXIS) 100 mg in 0.9% sodium chloride 130 mL IVPB  100 mg IntraVENous Q24H    alcohol 62% (NOZIN) nasal  1 Ampule  1 Ampule Topical Q12H    ondansetron (ZOFRAN) injection 4 mg  4 mg IntraVENous Q4H PRN    glucose chewable tablet 16 g  4 Tablet Oral PRN    glucagon (GLUCAGEN) injection 1 mg  1 mg IntraMUSCular PRN    naloxone (NARCAN) injection 0.4 mg  0.4 mg IntraMUSCular EVERY 2 MINUTES AS NEEDED    aspirin chewable tablet 81 mg  81 mg Oral DAILY    pantoprazole (PROTONIX) 40 mg in 0.9% sodium chloride 10 mL injection  40 mg IntraVENous DAILY    dextrose (D50W) injection syrg 12.5-25 g  12.5-25 g IntraVENous PRN    albumin human 25% (BUMINATE) solution 12.5 g  12.5 g IntraVENous DIALYSIS PRN    [Held by provider] apixaban (ELIQUIS) tablet 5 mg  5 mg Oral BID    [Held by provider] atorvastatin (LIPITOR) tablet 40 mg  40 mg Oral QHS    citalopram (CELEXA) tablet 40 mg  40 mg Oral DAILY    [Held by provider] gabapentin (NEURONTIN) capsule 300 mg  300 mg Oral BID    isosorbide mononitrate ER (IMDUR) tablet 30 mg  30 mg Oral DAILY    metoprolol tartrate (LOPRESSOR) tablet 25 mg  25 mg Oral Q12H    doxercalciferoL (HECTOROL) capsule 0.5 mcg  0.5 mcg Oral DAILY    [Held by provider] sevelamer carbonate (RENVELA) tab 800 mg  800 mg Oral TID WITH MEALS    sodium chloride (NS) flush 5-40 mL  5-40 mL IntraVENous Q8H    sodium chloride (NS) flush 5-40 mL  5-40 mL IntraVENous PRN    polyethylene glycol (MIRALAX) packet 17 g  17 g Oral DAILY PRN    albuterol-ipratropium (DUO-NEB) 2.5 MG-0.5 MG/3 ML  3 mL Nebulization Q6H PRN     Current Outpatient Medications   Medication Sig    methadone (DOLOPHINE) 10 mg/mL solution Take 160 mg by mouth daily.  metoprolol tartrate (LOPRESSOR) 25 mg tablet Take 1 Tablet by mouth every twelve (12) hours for 30 days.  pantoprazole (PROTONIX) 40 mg tablet Take 1 Tablet by mouth daily for 30 days.  sevelamer (RenageL) 400 mg tablet Take 2 Tablets by mouth three (3) times daily (with meals) for 30 days.  isosorbide mononitrate ER (IMDUR) 30 mg tablet Take 1 Tablet by mouth every morning for 30 days.  citalopram (CELEXA) 40 mg tablet Take 1 Tablet by mouth daily for 30 days.  atorvastatin (LIPITOR) 40 mg tablet Take 1 Tablet by mouth nightly for 30 days.  apixaban (ELIQUIS) 5 mg tablet Take 1 Tablet by mouth two (2) times a day for 30 days.  paricalcitoL (ZEMPLAR) 1 mcg capsule Take 1 Capsule by mouth daily for 30 days.  lidocaine (LIDODERM) 5 % Apply patch to the affected area for 12 hours a day and remove for 12 hours a day.  aspirin delayed-release 81 mg tablet Take 1 Tab by mouth daily.  multivitamin (ONE A DAY) tablet Take 1 Tab by mouth daily.  gabapentin (NEURONTIN) 100 mg capsule Take 100 mg by mouth daily.          Labs: Results:       Chemistry Recent Labs     08/25/21  0715 08/24/21  2319 08/24/21  0412 08/23/21  0226 08/23/21  0226   GLU  --  209* 65  --  51*   NA  --  135* 136  --  129*   K  --  4.4 3.6  --  4.0   CL  --  100 101  --  94*   CO2  --  20* 26  --  21   BUN  --  32* 20  --  26*   CREA  --  6.48* 4.50*  --  5.48*   CA  --  9.1 8.6  --  8.7   AGAP  --  15 9  --  14   BUCR  --  5* 4*  --  5*   * 159*  160* 156*   < > 148*   TP 6.6 5. 9*  5.9* 5.9*   < > 6.1*   ALB 2.0* 1.7*  1.7* 1.7*   < > 1.8*   GLOB 4.6* 4.2*  4.2* 4.2*   < > 4.3*   AGRAT 0.4* 0.4*  0.4* 0.4*   < > 0.4*    < > = values in this interval not displayed. CBC w/Diff Recent Labs     08/25/21  0800 08/24/21  2319 08/23/21  0226   WBC 17.5* 15.9* 10.3   RBC 2.28* 2.08* 2.34*   HGB 7.5* 6.7* 7.5*   HCT 25.7* 22.9* 24.1*   * 125* 93*   GRANS 89* 67 77*   LYMPH 7* 24 14   EOS 0 0 0      Coagulation Recent Labs     08/25/21  0800 08/24/21  0412 08/22/21  1404 08/22/21  1404   PTP 55.5* 36.9*   < > 29.5*   INR 5.7* 3.7*   < > 3.0*   APTT  --   --   --  40.4*    < > = values in this interval not displayed. Liver Enzymes Recent Labs     08/25/21  0715   TP 6.6   ALB 2.0*   *      ABG Lab Results   Component Value Date/Time    PH 7.39 08/25/2021 10:30 AM    PCO2 <17 (L) 08/25/2021 10:30 AM    PO2 433 (H) 08/25/2021 10:30 AM      Microbiology No results for input(s): CULT in the last 72 hours. maging:  CXR Results  (Last 48 hours)               08/25/21 1100  XR CHEST PORT Final result    Impression:  Tubes and lines, as described above. No pneumothorax. Improved   bilateral aeration. Narrative:  INDICATION: Shortness of breath, hypoxia. Portable AP spine view of the chest.       Direct comparison made to prior chest x-ray dated August 22, 2021. Cardiomediastinal silhouette is stable. ET tube tip is 3.6 cm superior to level   of the moiz. There is a left internal jugular central venous catheter which   extends to the distal left brachiocephalic vein. There is no pneumothorax. No   pleural fluid is visualized. There is improved by lateral aeration. CT Results  (Last 48 hours)    None               IMrSvetlana Resendez is 55 Year old male with past medical history of end-stage renal disease on dialysisMonday, Wednesday and Friday, hypertension, hyperlipidemia, GERD, neuropathy.  He was admitted on 8/16 with hyperkalemia, shortness of breath and altered mental status. Patient received emergent HD. His K and respiratory failure had improved but this morning patient again was minimally responsive with ongoing hypoglycemia but patient had lost peripheral IV access. He was also found to have empty bottle of oxycodone in his pocket and possible ingestion to explain this acute change in mental status, he was given naracan and then dextrose but he developed respiratory distress. Rapid response was called. Decision was made to intubate him due to severe resp distress and encephalopathy and patient transferred to ICU for further care. He was transferred to unit on 08/19/21 after intubation. Patient self-extubated same day and transferred to floor. We are re-consulted due to his acute liver failure, altered mental status. Acute liver failure - Unclear etiology. GI following. Work up in progress. He has coagulopathy, elevated LFT's ( >2642), metabolic acidosis. All these are bad prognostic marker. He is also clinically jaundiced with bilirubin of 9.9 mg/dl. Low albumin 2 gm/dl. Patients synthetic activity of liver is compromised. His lactic acid is 15. Clinically, he is a huge risk of deterioration. I would recommend transfer to liver center like [facility]. Cont. Lactulose/Rifaximin as recommended by GI in the interim. Agree with vitamin K.     ESRD on HD - Cont. Per nephrology recommendation. Altered mental status/Hypothermia/ Hypoglycemia - This is likely secondary to liver failure but consider sepsis. I would recommend broad spectrum antibiotics. He is already on anti-fungals for fungemia. Apparently ID is already following, I would defer this to them. Other chronic medical problems  - HTN  - Hyperlipidemia  - GERD    Patient is a very high risk for deterioration. I spoke to Dr. Sariah Pierce and recommended transfer to [facility] as this was a good window since patient is hemodynamically stable and respiratory status is stable as well.  If unable to transfer we will move to ICU for closer monitoring. Unfortunately, he has high risk of inpatient mortality due to fulminant hepatic failure. [facility] time - 35 minutes. Addendum:   - Dr. Paty Ahn stopped by in ICU, patient's respiratory status is worsening (likely from metabolic acidosis) and he has called anesthesia for intubation. I agree with Dr. Paty Ahn. He also called [facility] but they don't have bed available. - Will transfer to MICU for further care.        Darrell Bloom MD, FCCP, ATSF, FACP, DAABIP  Interventional Pulmonology/Critical [facility]

## 2021-08-25 NOTE — PROGRESS NOTES
Pharmacy Automatic Renal Dosing Protocol - Antimicrobials    Indication for Antimicrobials: sepsis     Current Regimen of Each Antimicrobial:   Anidulafungin 200 mg IV x1 dose then 100 mg IV daily; started ; day 5   Piperacillin/tazobactam 3.375g IV q12h, start ; day 8   Vancomycin - pharmacy to dose; started ; day 1     Previous Antimicrobial Therapy:   N/a    Goal Level: VANCOMYCIN TROUGH GOAL RANGE    Vancomycin Trough: 15 - 20 mcg/mL  (AUC: 400 - 600 mg/hr/Liter/day)     Date Dose & Interval Measured (mcg/mL) Extrapolated (mcg/mL)                       Significant Cultures:    blood cx candida glabrata   blood cx NGTD pending   blood cx NGTD pending     Paralysis, amputations, malnutrition: -    Labs:  Recent Labs     21  0800 21  2319 21  0412 21  0226   CREA  --  6.48* 4.50* 5.48*   BUN  --  32* 20 26*   WBC 17.5* 15.9*  --  10.3     Temp (24hrs), Av.5 °F (36.4 °C), Min:96.1 °F (35.6 °C), Max:99.4 °F (37.4 °C)    Creatinine Clearance (mL/min) or Dialysis: HD    Impression/Plan:   Vancomycin 1750 mg IV x1  Possible CRRT today  Will plan for vancomycin post HD but will schedule dose if CRRT started  Continue zosyn at q12h, increase if CRRT started  Eraxis held for liver failure per ID  ID following  Antimicrobial stop date pending     Pharmacy will follow daily and adjust medications as appropriate for renal function and/or serum levels.     Thank you,  Owen Maysite, VERNELL

## 2021-08-25 NOTE — PROGRESS NOTES
Progress Note    Patient: Berkley Clarke MRN: [id number]  SSN: [id number]    YOB: 1965  Age: 55 y.o. Sex: male      Admit Date: 8/16/2021    LOS: 9 days     Subjective:     Notes from overnight reviewed including the notes from Dr. Jc Poon. Pts Cosyntropin stim test came back and his adrenal gland function was very good. His D-10 drip was held from 1118-9119 and his BG dropped to 54. The RN mirela the hypoglycemia labs I ordered and he then required D-50 and eventually was placed back on the D-10 drip this AM.  Overnight he continued to be more lethargic and his ammonia level increased to 60. This AM pt lethargic and is not answering questions or following commands. Objective:     Vitals:    08/25/21 0300 08/25/21 0319 08/25/21 0421 08/25/21 0552   BP: (!) 151/106 (!) 163/112 (!) 159/109 135/88   Pulse: 82 82 82 85   Resp:  23 26    Temp:  (!) 96.7 °F (35.9 °C) 97.5 °F (36.4 °C)    TempSrc:       SpO2:  100% 98%    Weight:       Height:            Intake and Output:  Current Shift: No intake/output data recorded. Last three shifts: 08/23 0701 - 08/24 1900  In: 2019.2 [P.O.:50; I.V.:1969.2]  Out: 1000     Physical Exam:   GENERAL: fatigued, no distress, appears stated age  LUNG: clear to auscultation bilaterally  HEART: regular rate and rhythm, S1, S2 normal, no murmur, click, rub or gallop  ABDOMEN: bowel sounds present, pt not responding to palpation  EXTREMITIES:  No edema, no tremor, pt moving extremities indepenently  SKIN: Normal.      Lab/Data Review: All lab results for the last 24 hours reviewed.      Component      Latest Ref Rng & Units 8/24/2021           2:32 PM   Cortisol, baseline      ug/dL 22.4   Cortisol, 30 min.      ug/dL 35.4   Cortisol, 60 min.      ug/dL 38.3       Assessment:     Active Problems:    Pulmonary edema (5/6/2017)      ESRD (end stage renal disease) on dialysis ([address] (8/8/2021)      Hyperkalemia (8/16/2021)      Acute on chronic combined systolic and diastolic CHF (congestive heart failure) ([facility] [address] (8/16/2021)        Plan:     1) Hypoglycemia > His Cosyntropin stim test came back good, no evidence of adrenal insufficiency. He had the insulin, pro-insulin c-peptide and Beta-hydroxybutyrate drawn twice once at 1430 when he was not hypoglycemic and again at 2319 after an episode of hypoglycemia to 54, but before he received any dextrose or glucagon. I continue to suspect his hypoglycemia is due to loss of body stores of glucagon from hepatic injury and no PO intake. I will order TFTS and wait for the results of his above labs. I spent 25 minutes on his case and > 50% of the time was spent reviewing the chart and coordinating his care with the nurse caring for him.         Signed By: Bay Grider MD     August 25, 2021

## 2021-08-25 NOTE — PROGRESS NOTES
Initiated Transfer to [address] [place] [address] as per the recommendations of intensivist Dr Mundo Brooks as pt may be heading towards [address] [facility] Acute hepatic failure. - [facility] is on full diversion at present but patient will be put on wait list as per [facility] transfer center. Awaiting [facility] hospitalist to call back for further steps in acceptance process. In the mean time pt to stay at [facility] and Intensivist to closely follow pt along.

## 2021-08-25 NOTE — PROGRESS NOTES
RAPID RESPONSE TEAM    Responded to overhead RRT to 2268    Upon arrival, pt very diaphoretic, difficult to obtain pulse ox reading, other VSS, BP hypertensive. BG checked and returned at 80. Pt has been on D10 gtt. Received orders from Dr Flip Younger at bedside to give amp of D50. Pt responsive to verbal, nodding yes or no, very restless/agitated but not following commands, labored breathing and tachypneic. Pt put on NRB 15L. Lactic acid continually increasing now up to 15, proBNP >29696, AST >2000. EKG done per protocol and reviewed by Dr Flip Younger. Received orders for trop, CXR, ABG, precedex gtt, and transfer to CCU. Pt to be intubated per Dr Flip Younger. ABG:    Lab Results   Component Value Date/Time    PH 7.39 08/25/2021 10:30 AM    PCO2 <17 (L) 08/25/2021 10:30 AM    PO2 433 (H) 08/25/2021 10:30 AM     Pt connected to defib monitor, BVM available at bedside, suction available. CCU called for glidescope and RSI meds, Anesthesia called for intubation. Pt started on precedex gtt just prior to intubation. Dr Medardo Pereira at bedside for intubation. Successful intubation with 100mg propofol per Dr Medardo Pereira, 7.5 ETT, 22 at teeth. CXR done post intubation for confirmation. Repeat BP now noted hypotensive 60 SBP. Received order from Dr Medardo Pereira for Bert push. Pt given half of push, BP stabilized. Other VSS. Pt transferred to ER 18 with RT, RRT RN, PCU RNx2, NS. Pt to go to CCU when available. Awaiting possible transfer to [facility]. Care handed off to ER staff.     Patient Vitals for the past 4 hrs:   Temp Pulse Resp BP SpO2   08/25/21 1130  82 24 (!) 152/105    08/25/21 1115  85 28 (!) 157/104    08/25/21 1050  83  (!) 164/134    08/25/21 1044  83  (!) 153/106    08/25/21 1030  85  (!) 160/106    08/25/21 1024  84 (!) 32 (!) 153/97 (!) 77 %   08/25/21 1022  82  (!) 166/109    08/25/21 0925 (!) 96.2 °F (35.7 °C)       08/25/21 0900  84  (!) 163/105        XR Results (most recent):  Results from [facility] Encounter encounter on 08/16/21    XR CHEST PORT    Narrative  INDICATION: Shortness of breath, hypoxia. Portable AP spine view of the chest.    Direct comparison made to prior chest x-ray dated August 22, 2021. Cardiomediastinal silhouette is stable. ET tube tip is 3.6 cm superior to level  of the moiz. There is a left internal jugular central venous catheter which  extends to the distal left brachiocephalic vein. There is no pneumothorax. No  pleural fluid is visualized. There is improved by lateral aeration. Impression  Tubes and lines, as described above. No pneumothorax. Improved  bilateral aeration. Please call with any questions or concerns.      Soraida Deng

## 2021-08-25 NOTE — PROGRESS NOTES
NAME: Argenis Abreu        :  1965        MRN:  [id number]               Assessment   :                                               Plan:  ESRD-  AVF  Anemia  Dyspnea  Volume overload  Fungemia  HTN  transaminitis     H/o poor adherence with medical advice [facility]; HD today; now with fulminant liver failure, on vent; might need to switch to crrt after iHD today to help metabolically    On room air     H/H not at goal.  Continue retacrit (increase to 10 k tiw). Prn prbc's                     Subjective:     Chief Complaint:  Intubated. Review of Systems:    Symptom Y/N Comments  Symptom Y/N Comments   Fever/Chills    Chest Pain     Poor Appetite    Edema     Cough    Abdominal Pain     Sputum    Joint Pain     SOB/WARNER    Pruritis/Rash     Nausea/vomit    Tolerating PT/OT     Diarrhea    Tolerating Diet     Constipation    Other       Could not obtain due to:      Objective:     VITALS:   Last 24hrs VS reviewed since prior progress note.  Most recent are:  Visit Vitals  BP (!) 151/106   Pulse 82   Temp 97.4 °F (36.3 °C)   Resp 22   Ht 5' 9\" (1.753 m)   Wt 76.3 kg (168 lb 3.2 oz)   SpO2 96%   BMI 24.84 kg/m²       Intake/Output Summary (Last 24 hours) at 2021 0558  Last data filed at 2021 1742  Gross per 24 hour   Intake 900.83 ml   Output    Net 900.83 ml      Telemetry Reviewed:     PHYSICAL EXAM:  General: In pcu  Dyspnea  lethargic  No edema    Lab Data Reviewed: (see below)    Medications Reviewed: (see below)    PMH/SH reviewed - no change compared to H&P  ________________________________________________________________________  Care Plan discussed with:  Patient     Family      RN     Care Manager                    Consultant:          Comments   >50% of visit spent in counseling and coordination of care       ________________________________________________________________________  Susi Newell MD Procedures: see electronic medical records for all procedures/Xrays and details which  were not copied into this note but were reviewed prior to creation of Plan. LABS:  Recent Labs     08/24/21 2319 08/23/21 0226   WBC 15.9* 10.3   HGB 6.7* 7.5*   HCT 22.9* 24.1*   * 93*     Recent Labs     08/24/21 2319 08/24/21 0412 08/23/21 0226   * 136 129*   K 4.4 3.6 4.0    101 94*   CO2 20* 26 21   BUN 32* 20 26*   CREA 6.48* 4.50* 5.48*   * 65 51*   CA 9.1 8.6 8.7   MG  --   --  2.2     Recent Labs     08/24/21 2319 08/24/21 0412 08/23/21 0226   *  160* 156* 148*   TP 5.9*  5.9* 5.9* 6.1*   ALB 1.7*  1.7* 1.7* 1.8*   GLOB 4.2*  4.2* 4.2* 4.3*     Recent Labs     08/24/21 0412 08/22/21  1404   INR 3.7* 3.0*   PTP 36.9* 29.5*   APTT  --  40.4*      No results for input(s): FE, TIBC, PSAT, FERR in the last 72 hours. No results found for: FOL, RBCF   No results for input(s): PH, PCO2, PO2 in the last 72 hours.   Recent Labs     08/22/21  0935        No components found for: [place]  Lab Results   Component Value Date/Time    Color YELLOW/STRAW 05/06/2017 01:30 PM    Appearance CLOUDY (A) 05/06/2017 01:30 PM    Specific gravity 1.015 05/06/2017 01:30 PM    pH (UA) 7.5 05/06/2017 01:30 PM    Protein >300 (A) 05/06/2017 01:30 PM    Glucose NEGATIVE  05/06/2017 01:30 PM    Ketone NEGATIVE  05/06/2017 01:30 PM    Bilirubin NEGATIVE  05/06/2017 01:30 PM    Urobilinogen 0.2 05/06/2017 01:30 PM    Nitrites NEGATIVE  05/06/2017 01:30 PM    Leukocyte Esterase NEGATIVE  05/06/2017 01:30 PM    Epithelial cells FEW 05/06/2017 01:30 PM    Bacteria NEGATIVE  05/06/2017 01:30 PM    WBC 5-10 05/06/2017 01:30 PM    RBC 0-5 05/06/2017 01:30 PM       MEDICATIONS:  Current Facility-Administered Medications   Medication Dose Route Frequency    lactulose (CHRONULAC) 10 gram/15 mL solution 30 mL  20 g Oral TID    oxyCODONE IR (ROXICODONE) tablet 5 mg  5 mg Oral Q6H PRN    rifAXIMin Abran Calvin) tablet 550 mg  550 mg Oral BID    piperacillin-tazobactam (ZOSYN) 3.375 g in 0.9% sodium chloride (MBP/ADV) 100 mL MBP  3.375 g IntraVENous Q12H    dextrose 10% + sodium chloride 0.9%   IntraVENous CONTINUOUS    anidulafungin (ERAXIS) 100 mg in 0.9% sodium chloride 130 mL IVPB  100 mg IntraVENous Q24H    alcohol 62% (NOZIN) nasal  1 Ampule  1 Ampule Topical Q12H    epoetin cristian-epbx (RETACRIT) injection 6,000 Units  6,000 Units SubCUTAneous Q MON, WED & FRI    ondansetron (ZOFRAN) injection 4 mg  4 mg IntraVENous Q4H PRN    glucose chewable tablet 16 g  4 Tablet Oral PRN    glucagon (GLUCAGEN) injection 1 mg  1 mg IntraMUSCular PRN    naloxone (NARCAN) injection 0.4 mg  0.4 mg IntraMUSCular EVERY 2 MINUTES AS NEEDED    aspirin chewable tablet 81 mg  81 mg Oral DAILY    pantoprazole (PROTONIX) 40 mg in 0.9% sodium chloride 10 mL injection  40 mg IntraVENous DAILY    dextrose (D50W) injection syrg 12.5-25 g  12.5-25 g IntraVENous PRN    albumin human 25% (BUMINATE) solution 12.5 g  12.5 g IntraVENous DIALYSIS PRN    [Held by provider] apixaban (ELIQUIS) tablet 5 mg  5 mg Oral BID    [Held by provider] atorvastatin (LIPITOR) tablet 40 mg  40 mg Oral QHS    citalopram (CELEXA) tablet 40 mg  40 mg Oral DAILY    [Held by provider] gabapentin (NEURONTIN) capsule 300 mg  300 mg Oral BID    isosorbide mononitrate ER (IMDUR) tablet 30 mg  30 mg Oral DAILY    metoprolol tartrate (LOPRESSOR) tablet 25 mg  25 mg Oral Q12H    doxercalciferoL (HECTOROL) capsule 0.5 mcg  0.5 mcg Oral DAILY    [Held by provider] sevelamer carbonate (RENVELA) tab 800 mg  800 mg Oral TID WITH MEALS    sodium chloride (NS) flush 5-40 mL  5-40 mL IntraVENous Q8H    sodium chloride (NS) flush 5-40 mL  5-40 mL IntraVENous PRN    polyethylene glycol (MIRALAX) packet 17 g  17 g Oral DAILY PRN    albuterol-ipratropium (DUO-NEB) 2.5 MG-0.5 MG/3 ML  3 mL Nebulization Q6H PRN

## 2021-08-25 NOTE — PROGRESS NOTES
Spiritual Care Assessment/Progress Note  [facility]      NAME: Charissa Ordoñez      MRN: [id number]  AGE: 55 y.o. SEX: male  Spiritism Affiliation: No Adventism   Language: English     8/25/2021     Total Time (in minutes): 12     Spiritual Assessment begun in [facility] PROGRESSIVE CARE through conversation with:         []Patient        [] Family    [] Friend(s)        Reason for Consult: Rapid response team (Code Stroke)     Spiritual beliefs: (Please include comment if needed)     [] Identifies with a chelita tradition:         [] Supported by a chelita community:            [] Claims no spiritual orientation:           [] Seeking spiritual identity:                [] Adheres to an individual form of spirituality:           [x] Not able to assess:                           Identified resources for coping:      [] Prayer                               [] Music                  [] Guided Imagery     [] Family/friends                 [] Pet visits     [] Devotional reading                         [x] Unknown     [] Other:                                             Interventions offered during this visit: (See comments for more details)    Patient Interventions: Crisis           Plan of Care:     [x] Support spiritual and/or cultural needs    [] Support AMD and/or advance care planning process      [] Support grieving process   [] Coordinate Rites and/or Rituals    [] Coordination with community clergy   [] No spiritual needs identified at this time   [] Detailed Plan of Care below (See Comments)  [] Make referral to Music Therapy  [] Make referral to Pet Therapy     [] Make referral to Addiction services  [] Make referral to [facility]  [] Make referral to Spiritual Care Partner  [] No future visits requested        [x] Follow up upon further referrals     Comments:  Responded to RRT on PCU. No family/friends present. Patient was being evaluated by clinical staff.  Unable to assess for spiritual needs or concerns at this time.      EDWARDO Flores, [facility], Staff [address]    [address] Paging Service  [phone]

## 2021-08-25 NOTE — PROGRESS NOTES
1900 - Verbal shift change report given to YONY Pike (oncoming nurse) by Kim Parks RN (offgoing nurse). Report included the following information SBAR, Kardex, Intake/Output, MAR, Recent Results, Cardiac Rhythm normal sinus rhythm, and dual skin assessment at the bedside. 2000 - Assessment complete. Receiving oxygen via Ventilator. Oral care complete. Santo Lighter at bedside. Bilateral soft wrist restraints in place without injury. Vent/Bipap: mode: VACV 12/500/5/30%    Lines: ETT # 7.5/22 cm at lips,. PIV: x1, L triple lumen CVC, L AV fistula,. OGT 60 cm clamped,. Dialysis: HD MWF. Infusions: Fentanyl 50mcg, Precedex 1.4mcg, D5W + 150 meq Sodium Bicarb 100 ml/hr,     2145 OGT placement attempted, pt got extremely agitated. Sedation titrated and will re-attempt soon. 2325 OGT placed, pt tolerated well. + auscultation, KUB ordered. 0000 - Reassessment complete. VSS. Oral care complete. 0400 - Reassessment complete. VSS. Oral care complete. Labs sent. 0530 Critical Hemoglobin 5.9. JHON Galvez notified. Orders placed. End of Shift Note    Bedside shift change report given to YONY Dwyer (oncoming nurse) by Chad Shields RN (offgoing nurse).   Report included the following information SBAR, Kardex, ED Summary, Intake/Output, MAR and Recent Results    Shift worked:  7p-7a     Shift summary and any significant changes:     see above     Concerns for physician to address:  none     Zone phone for oncoming shift:   [phone]       Activity:  Activity Level: Bed Rest  Number times ambulated in hallways past shift: 0  Number of times OOB to chair past shift: 0    Cardiac:   Cardiac Monitoring: Yes      Cardiac Rhythm: Sinus Rhythm    Access:   Current line(s): central line and HD access     Genitourinary:   Urinary status: anuric    Respiratory:   O2 Device: Ventilator  Chronic home O2 use?: NO  Incentive spirometer at bedside: NO  Actual Volume (ml): 1200 ml  GI:  Last Bowel Movement Date: 08/24/21  Current diet:  DIET NPO  Passing flatus: YES  Tolerating current diet: YES       Pain Management:   Patient states pain is manageable on current regimen: YES    Skin:  Jovan Score: 13  Interventions: speciality bed    Patient Safety:  Fall Score:  Total Score: 3  Interventions: bed/chair alarm  High Fall Risk: Yes    Length of Stay:  Expected LOS: 4d 0h  Actual LOS: Carolyne Joaquin, RN

## 2021-08-25 NOTE — PROGRESS NOTES
Chart reviewed. Pt s/p rapid response this AM resulting in intubation at bedside. Pt transferred to ED as CCU overflow. Will defer PT intervention at this time however continue to follow.      Milissa Leyden, PT, DPT

## 2021-08-25 NOTE — PROGRESS NOTES
Hospitalist Progress Note    NAME: Rock Cruz   :  1965   MRN:  [id number]     -Admitted on  for pulmonary edema, CHF secondary to nonadherence with dialysis treatment, he was transferred to ICU on  for ongoing hypoglycemia, lethargy.   -Found to have oxycodone bottle in his pocket, possible overdose, for which he was given Narcan, and afterwards he developed respiratory distress was intubated. He self extubated . Blood culture came back positive for 1 out of 4 for Candida, remains hypoglycemic. After transfer out of the ICU, he had episodes of lethargy/lactic acidosis, suspected from severe hypoglycemia, which had improved with dextrose drip. Currently he is on D10 drip, his mentation/lethargy was improving but went [address] again today AM ()    Assessment / Plan:  Acute hypoxic respiratory failure, not POA  Recurrent hypoglycemia   Fungemia   Hypothermia ()  Severe lactic acidosis with metabolic acidosis- worsening , up to 14.0 today am  Narcotic abuse POA  -required ICU as above  -on  he was transferred to stepdown for  hypoglycemia, SOB.    Suspect lethargy/lactic acidosis from hypoglycemia- was improving but now worsening due to Hypothermia  -BCX on admission showed 1/4 yeast.  -Suspect recurrent hypoglycemia from fungemia/Liver falure  -Continue anidulafungin, repeat cultureNGTD, ID on board  -Procal improved from 34 ->24->43  -Discussed with nephrology,  -Continue low-dose d10NS for persistent hypoglycemia,  - CT chest and abdomen without source of infection  -ID following  -For recurrent hypoglycemia, consulted endocrinologist- Dr Kel Shah following  Start IVF 125ml/hr x 6 hrs, serial Lactate Q 4 hrs for now  Pt critically ill and worsening- called Intensivist on call (Dr Tamar Perkins) - will evaluate for Likely ICU transfer    Elevated LFTs- worsening   Hepatic Failure/Hepatic encephalopathy ()  Ammonia 60  -Suspect ischemic injury,   -Avoid statin, Hep panel Neg  -LFTs minimally improving, still quite high & worsening  IP GI consulted following  Lactulose started at 20g TID for now, serial ammonia  Cont Rifaximin started yesterday by GI      Acute HFpEF/Acute Pul edema POA  End-stage renal disease on dialysis Missed 2 HD treatments PTA   Uremic Encephalopathy (resolving) and Metabolic Acidosis POA    -Continue dialysis as per nephro  -CXR 8/19, new left lower lobe airspace disease, possible atelectasis    Narcotic dependance POA  -He is to take methadone 160 mg daily, last dose before he was admitted to hospital.  He also found to have a oxycodone bottle in his pocket. -he reports he does not want to be started on methadone  -He reports abdominal pain, will give small dose of oxycodone,  unable to do Tylenol/NSAIDs      Recent Community Acquired PNA      Hypertension  Hyperlipidemia  -ECHO - EF 45-50%. Moderately reduced systolic function. LV diastolic dysfunction. Mild to moderate MVR. -Continue ASA, atorvastatin, imdur, metoprolol      GERD Continue Protonix   Neuropathy  History of DVT on eliquis, holding for coagulopathy from above     Recurrent falls  Left rib fractures  -PT/OT evaluations     Attempted to call Mother earlier, VM full. pt says he does not want us to contact any family members    18.5 - 24.9 Normal weight / Body mass index is 24.84 kg/m². Subjective:     Chief Complaint / Reason for Physician Visit  Mentation worse today AM  was hypoglycemic earlier today- s/p D50 x 1, now back on D10 drip   Lactic acidosis worsening  Ammonia up at 60      Review of Systems:  Symptom Y/N Comments  Symptom Y/N Comments   Fever/Chills    Chest Pain     Poor Appetite    Edema     Cough    Abdominal Pain     Sputum    Joint Pain     SOB/WARNER    Pruritis/Rash     Nausea/vomit    Tolerating PT/OT     Diarrhea    Tolerating Diet     Constipation    Other       Could NOT obtain due to: Lethargic     Objective:     VITALS:   Last 24hrs VS reviewed since prior progress note. Most recent are:  Patient Vitals for the past 24 hrs:   Temp Pulse Resp BP SpO2   08/25/21 0738 (!) 96.1 °F (35.6 °C) 85 22 (!) 142/91 92 %   08/25/21 0552  85  135/88    08/25/21 0421 97.5 °F (36.4 °C) 82 26 (!) 159/109 98 %   08/25/21 0319 (!) 96.7 °F (35.9 °C) 82 23 (!) 163/112 100 %   08/25/21 0300  82  (!) 151/106    08/24/21 2346 97.4 °F (36.3 °C) 88 22 (!) 160/101 96 %   08/24/21 2326  89  (!) 164/102 98 %   08/24/21 2240  89  137/83 97 %   08/24/21 2238  89  137/83    08/24/21 1957 98.1 °F (36.7 °C) 87 19 (!) 157/88 96 %   08/24/21 1439 98.2 °F (36.8 °C) 83 22 (!) 157/96 98 %   08/24/21 1120 98.4 °F (36.9 °C) 86 20 139/78 100 %       Intake/Output Summary (Last 24 hours) at 8/25/2021 0815  Last data filed at 8/24/2021 1742  Gross per 24 hour   Intake 900.83 ml   Output    Net 900.83 ml        I had a face to face encounter and independently examined this patient on 8/25/2021, as outlined below:  PHYSICAL EXAM:  General: WD, WN. EENT:  EOMI. Anicteric sclerae. MMM, Pupils sluggish but reactive, no pin point pupils   Resp:  Lungs clear bilaterally   CV:  Regular  rhythm,    GI:  Soft, nondistended, nontender  Neurologic:  Drowsy, Arousable, answers the question but briefly before going back to lethargic state  Psych:   Poor insight  Skin:  No rashes. No jaundice    Reviewed most current lab test results and cultures  YES  Reviewed most current radiology test results   YES  Review and summation of old records today    NO  Reviewed patient's current orders and MAR    YES  PMH/SH reviewed - no change compared to H&P  ________________________________________________________________________  Care Plan discussed with:    Comments   Patient x    Family      RN x    Care Manager x    Consultant  x Intensivist Dr Dyana Bowman                    x Multidiciplinary team rounds were held today with , nursing, pharmacist and clinical coordinator.   Patient's plan of care was discussed; medications were reviewed and discharge planning was addressed. ________________________________________________________________________  Total NON critical care TIME:  45  Minutes    Total CRITICAL CARE TIME Spent: 39  Minutes non procedure based      Comments   >50% of visit spent in counseling and coordination of care x    ________________________________________________________________________  Luca Godoy MD     Procedures: see electronic medical records for all procedures/Xrays and details which were not copied into this note but were reviewed prior to creation of Plan. LABS:  I reviewed today's most current labs and imaging studies. Pertinent labs include:  Recent Labs     08/24/21 2319 08/23/21 0226 08/22/21 2053   WBC 15.9* 10.3 9.5   HGB 6.7* 7.5* 8.0*   HCT 22.9* 24.1* 25.0*   * 93* 98*     Recent Labs     08/24/21 2319 08/24/21 0412 08/23/21 0226 08/22/21 2053 08/22/21  1404   * 136 129*   < >  --    K 4.4 3.6 4.0   < >  --     101 94*   < >  --    CO2 20* 26 21   < >  --    * 65 51*   < >  --    BUN 32* 20 26*   < >  --    CREA 6.48* 4.50* 5.48*   < >  --    CA 9.1 8.6 8.7   < >  --    MG  --   --  2.2  --   --    ALB 1.7*  1.7* 1.7* 1.8*  --   --    TBILI 7.1*  7.0* 3.9* 2.4*  --   --    ALT 2,222*  2,228* 2,472* 3,083*  --   --    INR  --  3.7*  --   --  3.0*    < > = values in this interval not displayed.        Signed: Luca Godoy MD

## 2021-08-25 NOTE — PROGRESS NOTES
Received message from patient's nurse stating:    Elevated ammonia level         Discussion / orders:    Ammonia level 60  Order entered for lactulose 20 g po tid. Recheck ammonia tomorrow morning         Please note that this note was dictated using Dragon computer voice recognition software. Quite often unanticipated grammatical, syntax, homophones, and other interpretive errors are inadvertently transcribed by the computer software. Please disregard these errors. Please excuse any errors that have escaped final proofreading.

## 2021-08-25 NOTE — PROGRESS NOTES
Intubation Note    Called to bedside secondary to  airway compromise. Patient pre-oxygenated with 100% oxygen. Smooth RSI with propofol 100  mg  IV.    glidescope (4)  x 1    7.5 ETT taped and secured at 22 cm at the teeth.    + Bilateral BS, + Chest rise, + ETCO2    CXR pending.     Care turned over to covering Attending MD.

## 2021-08-25 NOTE — PROGRESS NOTES
Endocrinology progress note    Paged for patients sugar of 70. He was able to stay off the D10 after the last time I was called as his sugar came back over 70 and his nurse wanted to know what to do and whether to restart the D10. I advised to give 1/2 amp of D50 and then repeat sugar in 15 minutes. I did review his ACTH stimulation test results from earlier today and his 60 min value was well above 20 so this rules out adrenal ins ufficiency and strongly suggests his underlying hypoglycemia is from decreased glucose production and glycogen storage from underlying liver and kidney disease. Please dont hesitate to contact me over [employer] with any further questions tonight while Im on call for Dr Nico Tomlin. Laura Musa.

## 2021-08-25 NOTE — PROGRESS NOTES
1500 Patient retrieved from ED and settled in room. Patient intubated, sedated on Precedex and Fentanyl. Assessment as documented. 1900 HD in progress, report given to on-coming nurse, Obinna Jarrett RN.

## 2021-08-26 NOTE — PROGRESS NOTES
Pt intubated and sedated. Not appropriate for OT at this time. Will continues to follow as appropriate.

## 2021-08-26 NOTE — PROGRESS NOTES
0700  Bedside shift change report received from YONY Pike (offgoing nurse). Assumed care of pt at this time. Report included the following information SBAR, Kardex, Intake/Output, MAR, Recent Results, Heart rhythm and Medications. 0915  Turned left. CHG bath given. BMx1.    1011  Rounds report complete. Blood status: Pending allocation. D/C Bicarb gtt. Continue on Mucomyst gtt one more day. 1300  PRBC 1 UNIT:  Unable to get consent from pt (sedated) or mother (no answer). Emergency infusion started for Hgb 5.9 per Dr. Martin Sheehan. 1330  Cleaned of incontinent stool. Continued to ooze stool when turned. Propped to left side and will check to see if completed. Mary Kate Hill started for hypothermia (T 94.2 Ax). 1700  PRBC's completed, no S/E noted. Temp WNL 97.7Ax. [id number] [facility] [place].    1800  Cleaned of incontinent stool. Hanged pad and TAP. Applied orange cream to buttocks and oli-area.      1935  Report to YONY Ford

## 2021-08-26 NOTE — PROGRESS NOTES
Reviewing Mr. Durán's labs. His TSH was good, ruling out hypothyroidism. His insulin level was not high either. I am not seeing any endocrine causes for his hypoglycemia. This is likely due to the acute illness and his hepatic failure so that his body does not have any glycogen stores and is not able to form any new.

## 2021-08-26 NOTE — PROGRESS NOTES
Pt intubated and sedated. Not appropriate for PT at this time. Will continues to follow as appropriate.

## 2021-08-26 NOTE — PROGRESS NOTES
NAME: Honey Dawson        :  1965        MRN:  [id number]               Assessment   :                                               Plan:  ESRD-  AVF  Anemia  Liver failure  Acute respiratory failure  Lactic acidosis  Fungemia  HTN     H/o poor adherence with medical advice [facility]; no HD today     liver failure, on vent    H/H not at goal.  Continue retacrit. Prn prbc's                     Subjective:     Chief Complaint:  Intubated. Review of Systems:    Symptom Y/N Comments  Symptom Y/N Comments   Fever/Chills    Chest Pain     Poor Appetite    Edema     Cough    Abdominal Pain     Sputum    Joint Pain     SOB/WARNER    Pruritis/Rash     Nausea/vomit    Tolerating PT/OT     Diarrhea    Tolerating Diet     Constipation    Other       Could not obtain due to:      Objective:     VITALS:   Last 24hrs VS reviewed since prior progress note.  Most recent are:  Visit Vitals  BP (!) 152/97   Pulse 71   Temp 97.7 °F (36.5 °C)   Resp 18   Ht 5' 9\" (1.753 m)   Wt 76.3 kg (168 lb 3.2 oz)   SpO2 91%   BMI 24.84 kg/m²       Intake/Output Summary (Last 24 hours) at 2021 9767  Last data filed at 2021 2145  Gross per 24 hour   Intake 3547.22 ml   Output 0 ml   Net 3547.22 ml      Telemetry Reviewed:     PHYSICAL EXAM:  General: In pcu  Dyspnea  lethargic  No edema    Lab Data Reviewed: (see below)    Medications Reviewed: (see below)    PMH/SH reviewed - no change compared to H&P  ________________________________________________________________________  Care Plan discussed with:  Patient     Family      RN     Care Manager                    Consultant:          Comments   >50% of visit spent in counseling and coordination of care       ________________________________________________________________________  Wellington Nix MD     Procedures: see electronic medical records for all procedures/Xrays and details which  were not copied into this note but were reviewed prior to creation of Plan. LABS:  Recent Labs     08/26/21  0450 08/25/21  0800   WBC 13.9* 17.5*   HGB 5.9* 7.5*   HCT 18.8* 25.7*   * 119*     Recent Labs     08/24/21  2319 08/24/21  0412   * 136   K 4.4 3.6    101   CO2 20* 26   BUN 32* 20   CREA 6.48* 4.50*   * 65   CA 9.1 8.6     Recent Labs     08/25/21  0715 08/24/21  2319 08/24/21  0412   * 159*  160* 156*   TP 6.6 5.9*  5.9* 5.9*   ALB 2.0* 1.7*  1.7* 1.7*   GLOB 4.6* 4.2*  4.2* 4.2*     Recent Labs     08/25/21  0800 08/24/21  0412   INR 5.7* 3.7*   PTP 55.5* 36.9*      Recent Labs     08/25/21  1139   TIBC 150*   PSAT INDETERMINATE - SENT TO REFERENCE LAB FOR ADDITIONAL TESTING. No results found for: FOL, RBCF   Recent Labs     08/25/21  1330 08/25/21  1030   PH 7.34* 7.39   PCO2 26* <17*   PO2 126* 433*     No results for input(s): CPK, CKMB in the last 72 hours.     No lab exists for component: TROPONINI  No components found for: [place]  Lab Results   Component Value Date/Time    Color YELLOW/STRAW 05/06/2017 01:30 PM    Appearance CLOUDY (A) 05/06/2017 01:30 PM    Specific gravity 1.015 05/06/2017 01:30 PM    pH (UA) 7.5 05/06/2017 01:30 PM    Protein >300 (A) 05/06/2017 01:30 PM    Glucose NEGATIVE  05/06/2017 01:30 PM    Ketone NEGATIVE  05/06/2017 01:30 PM    Bilirubin NEGATIVE  05/06/2017 01:30 PM    Urobilinogen 0.2 05/06/2017 01:30 PM    Nitrites NEGATIVE  05/06/2017 01:30 PM    Leukocyte Esterase NEGATIVE  05/06/2017 01:30 PM    Epithelial cells FEW 05/06/2017 01:30 PM    Bacteria NEGATIVE  05/06/2017 01:30 PM    WBC 5-10 05/06/2017 01:30 PM    RBC 0-5 05/06/2017 01:30 PM       MEDICATIONS:  Current Facility-Administered Medications   Medication Dose Route Frequency    0.9% sodium chloride infusion 250 mL  250 mL IntraVENous PRN    lactulose (CHRONULAC) 10 gram/15 mL solution 30 mL  20 g Oral TID    epoetin cristian-epbx (RETACRIT) injection 10,000 Units 10,000 Units SubCUTAneous Q MON, WED & FRI    dexmedeTOMidine in 0.9 % NaCl (PRECEDEX) 400 mcg/100 mL (4 mcg/mL) infusion soln  0.1-1.5 mcg/kg/hr IntraVENous TITRATE    acetylcysteine (ACETADOTE) 7,640 mg in dextrose 5% 1,000 mL infusion  100 mg/kg IntraVENous ONCE    fentaNYL (PF) 1,500 mcg/30 mL (50 mcg/mL) infusion  0-200 mcg/hr IntraVENous TITRATE    midazolam (VERSED) injection 2 mg  2 mg IntraVENous Q1H PRN    chlorhexidine (ORAL CARE KIT) 0.12 % mouthwash 15 mL  15 mL Oral Q12H    piperacillin-tazobactam (ZOSYN) 3.375 g in 0.9% sodium chloride (MBP/ADV) 100 mL MBP  3.375 g IntraVENous Q12H    VANCOMYCIN INFORMATION NOTE   Other Rx Dosing/Monitoring    sodium bicarbonate 150 mEq/1000 mL D5W (premix)   IntraVENous CONTINUOUS    oxyCODONE IR (ROXICODONE) tablet 5 mg  5 mg Oral Q6H PRN    rifAXIMin (XIFAXAN) tablet 550 mg  550 mg Oral BID    alcohol 62% (NOZIN) nasal  1 Ampule  1 Ampule Topical Q12H    ondansetron (ZOFRAN) injection 4 mg  4 mg IntraVENous Q4H PRN    glucose chewable tablet 16 g  4 Tablet Oral PRN    glucagon (GLUCAGEN) injection 1 mg  1 mg IntraMUSCular PRN    naloxone (NARCAN) injection 0.4 mg  0.4 mg IntraMUSCular EVERY 2 MINUTES AS NEEDED    aspirin chewable tablet 81 mg  81 mg Oral DAILY    pantoprazole (PROTONIX) 40 mg in 0.9% sodium chloride 10 mL injection  40 mg IntraVENous DAILY    dextrose (D50W) injection syrg 12.5-25 g  12.5-25 g IntraVENous PRN    albumin human 25% (BUMINATE) solution 12.5 g  12.5 g IntraVENous DIALYSIS PRN    metoprolol tartrate (LOPRESSOR) tablet 25 mg  25 mg Oral Q12H    sodium chloride (NS) flush 5-40 mL  5-40 mL IntraVENous Q8H    sodium chloride (NS) flush 5-40 mL  5-40 mL IntraVENous PRN    polyethylene glycol (MIRALAX) packet 17 g  17 g Oral DAILY PRN    albuterol-ipratropium (DUO-NEB) 2.5 MG-0.5 MG/3 ML  3 mL Nebulization Q6H PRN

## 2021-08-26 NOTE — PROGRESS NOTES
ICU Progress Note        Subjective: Overnight events noted. Vital Signs:    Visit Vitals  /86   Pulse 68   Temp 97.7 °F (36.5 °C)   Resp 16   Ht 5' 9\" (1.753 m)   Wt 76.3 kg (168 lb 3.2 oz)   SpO2 91%   BMI 24.84 kg/m²       O2 Device: Ventilator   O2 Flow Rate (L/min): 2 l/min   Temp (24hrs), Av.9 °F (36.6 °C), Min:97 °F (36.1 °C), Max:99.4 °F (37.4 °C)       Intake/Output:   Last shift:      No intake/output data recorded. Last 3 shifts:  1901 -  0700  In: 5314.5 [I.V.:5314.5]  Out: 0     Intake/Output Summary (Last 24 hours) at 2021 0946  Last data filed at 2021 0700  Gross per 24 hour   Intake 5314.49 ml   Output 0 ml   Net 5314.49 ml       Ventilator Settings:  Ventilator Mode: Assist control  Respiratory Rate  Back-Up Rate: 12  Insp Flow (l/min): 60 l/min  I:E Ratio: 1:1.9  Ventilator Volumes  Vt Set (ml): 500 ml  Vt Exhaled (Machine Breath) (ml): 540 ml  Ve Observed (l/min): 9.27 l/min  Ventilator Pressures  PIP Observed (cm H2O): 22 cm H2O  Plateau Pressure (cm H2O): 18 cm H2O  MAP (cm H2O): 9.6  PEEP/VENT (cm H2O): 5 cm H20  Auto PEEP Observed (cm H2O): 0 cm H2O    Physical Exam:    Physical exam:   GEN: Intubated, sedated, exam limited by sedation. HEENT: anicteric sclerae, pink conj, ETT in place. NECK: Supple, -LAD, no neck mass, CVC in place with dressing C/D/I  CV: no murmurs noted. LUNGS: Coarse BS at bases, otherwise no wheezes, rhonchi, rales  ABD: soft, non-tender, no masses  EXT: No cyanosis, distal pulses palpable, + edema  SKIN: warm, dry and intact. NEURO: Sedated, exam is limited by sedation.       DATA:     Current Facility-Administered Medications   Medication Dose Route Frequency    0.9% sodium chloride infusion 250 mL  250 mL IntraVENous PRN    lactulose (CHRONULAC) 10 gram/15 mL solution 30 mL  20 g Oral TID    epoetin cristian-epbx (RETACRIT) injection 10,000 Units  10,000 Units SubCUTAneous Q MON, WED & FRI    dexmedeTOMidine in 0.9 % NaCl (PRECEDEX) 400 mcg/100 mL (4 mcg/mL) infusion soln  0.1-1.5 mcg/kg/hr IntraVENous TITRATE    acetylcysteine (ACETADOTE) 7,640 mg in dextrose 5% 1,000 mL infusion  100 mg/kg IntraVENous ONCE    fentaNYL (PF) 1,500 mcg/30 mL (50 mcg/mL) infusion  0-200 mcg/hr IntraVENous TITRATE    midazolam (VERSED) injection 2 mg  2 mg IntraVENous Q1H PRN    chlorhexidine (ORAL CARE KIT) 0.12 % mouthwash 15 mL  15 mL Oral Q12H    piperacillin-tazobactam (ZOSYN) 3.375 g in 0.9% sodium chloride (MBP/ADV) 100 mL MBP  3.375 g IntraVENous Q12H    VANCOMYCIN INFORMATION NOTE   Other Rx Dosing/Monitoring    sodium bicarbonate 150 mEq/1000 mL D5W (premix)   IntraVENous CONTINUOUS    oxyCODONE IR (ROXICODONE) tablet 5 mg  5 mg Oral Q6H PRN    rifAXIMin (XIFAXAN) tablet 550 mg  550 mg Oral BID    alcohol 62% (NOZIN) nasal  1 Ampule  1 Ampule Topical Q12H    ondansetron (ZOFRAN) injection 4 mg  4 mg IntraVENous Q4H PRN    glucose chewable tablet 16 g  4 Tablet Oral PRN    glucagon (GLUCAGEN) injection 1 mg  1 mg IntraMUSCular PRN    naloxone (NARCAN) injection 0.4 mg  0.4 mg IntraMUSCular EVERY 2 MINUTES AS NEEDED    aspirin chewable tablet 81 mg  81 mg Oral DAILY    pantoprazole (PROTONIX) 40 mg in 0.9% sodium chloride 10 mL injection  40 mg IntraVENous DAILY    dextrose (D50W) injection syrg 12.5-25 g  12.5-25 g IntraVENous PRN    albumin human 25% (BUMINATE) solution 12.5 g  12.5 g IntraVENous DIALYSIS PRN    metoprolol tartrate (LOPRESSOR) tablet 25 mg  25 mg Oral Q12H    sodium chloride (NS) flush 5-40 mL  5-40 mL IntraVENous Q8H    sodium chloride (NS) flush 5-40 mL  5-40 mL IntraVENous PRN    polyethylene glycol (MIRALAX) packet 17 g  17 g Oral DAILY PRN    albuterol-ipratropium (DUO-NEB) 2.5 MG-0.5 MG/3 ML  3 mL Nebulization Q6H PRN         Labs: Results:       Chemistry Recent Labs     08/26/21  0450 08/25/21  0715 08/24/21  2319 08/24/21  0412 08/24/21  0412   *  --  209*  --  65     -- 135*  --  136   K 3.4*  --  4.4  --  3.6   CL 97  --  100  --  101   CO2 28  --  20*  --  26   BUN 21*  --  32*  --  20   CREA 4.01*  --  6.48*  --  4.50*   CA 9.2  --  9.1  --  8.6   AGAP 13  --  15  --  9   BUCR 5*  --  5*  --  4*   * 170* 159*  160*   < > 156*   TP 5.5* 6.6 5.9*  5.9*   < > 5.9*   ALB 1.6* 2.0* 1.7*  1.7*   < > 1.7*   GLOB 3.9 4.6* 4.2*  4.2*   < > 4.2*   AGRAT 0.4* 0.4* 0.4*  0.4*   < > 0.4*    < > = values in this interval not displayed. CBC w/Diff Recent Labs     08/26/21  0450 08/25/21  0800 08/24/21  2319   WBC 13.9* 17.5* 15.9*   RBC 1.80* 2.28* 2.08*   HGB 5.9* 7.5* 6.7*   HCT 18.8* 25.7* 22.9*   * 119* 125*   GRANS  --  89* 67   LYMPH  --  7* 24   EOS  --  0 0      Coagulation Recent Labs     08/25/21  0800 08/24/21  0412   PTP 55.5* 36.9*   INR 5.7* 3.7*       Liver Enzymes Recent Labs     08/26/21  0450   TP 5.5*   ALB 1.6*   *      ABG Lab Results   Component Value Date/Time    PH 7.34 (L) 08/25/2021 01:30 PM    PCO2 26 (L) 08/25/2021 01:30 PM    PO2 126 (H) 08/25/2021 01:30 PM    HCO3 14 (L) 08/25/2021 01:30 PM    FIO2 40 08/25/2021 01:30 PM      Microbiology Recent Labs     08/25/21  2146 08/25/21  0800   CULT NO GROWTH AFTER 9 HOURS NO GROWTH AFTER 22 HOURS        maging:  CXR Results  (Last 48 hours)               08/25/21 1220  XR CHEST PORT Final result    Impression:  1. The ET tube is in satisfactory position       Narrative:  EXAM: XR CHEST PORT       INDICATION: ETT confirmation       COMPARISON: 8/25/2021 at 1042       FINDINGS: A portable AP radiograph of the chest was obtained at 1212 hours. The   patient is on a cardiac monitor. The ET tube is in satisfactory position 3.6 cm   above the moiz. Left IJ catheter overlies left brachycephalic vein. Lungs   demonstrate no pneumothorax. 08/25/21 1100  XR CHEST PORT Final result    Impression:  Tubes and lines, as described above. No pneumothorax. Improved   bilateral aeration. Narrative:  INDICATION: Shortness of breath, hypoxia. Portable AP spine view of the chest.       Direct comparison made to prior chest x-ray dated August 22, 2021. Cardiomediastinal silhouette is stable. ET tube tip is 3.6 cm superior to level   of the moiz. There is a left internal jugular central venous catheter which   extends to the distal left brachiocephalic vein. There is no pneumothorax. No   pleural fluid is visualized. There is improved by lateral aeration. CT Results  (Last 48 hours)    None               IMr. Karlie Gonzalez is 55 Year old male with past medical history of end-stage renal disease on dialysisMonday, Wednesday and Friday, hypertension, hyperlipidemia, GERD, neuropathy. He was admitted on 8/16 with hyperkalemia, shortness of breath and altered mental status. Patient received emergent HD. His K and respiratory failure had improved but this morning patient again was minimally responsive with ongoing hypoglycemia but patient had lost peripheral IV access. He was also found to have empty bottle of oxycodone in his pocket and possible ingestion to explain this acute change in mental status, he was given naracan and then dextrose but he developed respiratory distress. Rapid response was called. Decision was made to intubate him due to severe resp distress and encephalopathy and patient transferred to ICU for further care. He was transferred to unit on 08/19/21 after intubation. Patient self-extubated same day and transferred to floor. We are re-consulted due to his acute liver failure, altered mental status. Acute liver failure - Unclear etiology. GI following. Work up in progress. ? Hep C related. He has coagulopathy, elevated LFT's, metabolic acidosis. All these are bad prognostic marker. He is also clinically jaundiced with bilirubin of 11 mg/dl. Low albumin 2 gm/dl. Patients synthetic activity of liver is compromised.  He also had elevated lactic acid and metabolic acidosis which has improved since HD yesterday Cont. Lactulose/Rifaximin as recommended by GI in the interim. Agree with vitamin K. Started on N-acetyl cyestein due to some reports of its benefits in non-tylenol related hepatic injury. [facility] is on diversion hence unable to transfer to liver center. ESRD on HD - Cont. Per nephrology recommendation. Anemia - likely hemolysis ? No blood loss anemia. Hb is 5.9. Will transfuse one unit of NV BC. Respiratory failure - acute, secondary to metabolic acidosis/Lactic acidosis in the setting of acute liver failure. Additionally, had pulmonary edema in the setting of ESRD. Cont. Mechanical ventilation and sedation. Altered mental status/Hypothermia/ Hypoglycemia - This is likely secondary to liver failure but consider sepsis. Started on Vancomycin and Zosyn on 08/25/21. He was on anti-fungals for fungemia (Candida). ID following, appreciate the recommendations. Blood cultures negative to date. Other chronic medical problems  - HTN  - Hyperlipidemia  - GERD    Patient is a very high risk for deterioration. Many of his metabolic abnormalities have stabilized due to HD. However, he is still critically ill with worsening bilirubin and multiple organ failure. CCM time - 40 minutes.      Art Phelps MD, FCCP, ATSF, FACP, DAABIP  Interventional Pulmonology/Critical [address]

## 2021-08-26 NOTE — PROGRESS NOTES
Infectious Disease progress      IMPRESSION:       -Acute hypoxic respiratory failure remains intubated    -Acute hepatic failure with encephalopathy  -Transaminitis AST>2000, ALT 1858 , ammonia 10   ? Shock liver.    -Hepatitis C ?chronic/  HCV PCR pending    -Fungemia   Blood cultures8/18 + for C.glabrata 1/4 ? source  MICs pending  Negative repeat blood cultures-8/21, 8/25   TTE-negative. No history of fevers. etiology-? abdomen ? IV lines,  HD access-less likely, no malfunction/US-noticed stenosis, no fluid.    -Leucocytosis improving. Wbc 13.9    - Acute anemia Hb 5.9   No GI bleed. ? Hemolysis. T.bilirubin 11.4     For direct bilirubin, stool hemoccult. CT abdomen -Colonic wall thickening and mild hyperemia of colonic and small bowel mucosa may  be related to mild gastroenteritis. ?s/p  GI evaluation- Findings in small bowel/colon are reactive and not primary process. -ESRD on HD     -HIV negative    - Recurrent hypoglycemia, related to acute liver injury    -Acute hypoxic respiratory failure 2ry to fluid overload  S/p intubation,extubated. S/p LLL infiltrate on CXR. Completed treatment for HAP    -Suspected drug overdose while admitted (empty oxycodone bottle found in pt pocket, pt on methadone)    -Current smoker 1/4 PPD    - H/o HTN    - H/o DVT    - Lines - TLC 8/18         PLAN:        -Continue  Zosyn IV, DC Vancomycin  -Restart  Anidulafungin   -BCx 2 during next HD via AVF  -DC TLC if not required, change lines  - Await repeat fungal blood cultures  - HCV RNA   - Respiratory cultures  - Pt will require Ophthalmology exam at some point to evaluate for Candida endophthalmitis. Patient seen today. Intubated in ICU . D/w RN events of the day. Mr. Michael Cervantes is  a 55 yr old male with   past medical history of end-stage renal disease on dialysisMonday, Wednesday and Friday, hypertension, hyperlipidemia, GERD, neuropathy.  He was admitted on 8/16 with hyperkalemia, shortness of breath and altered mental status. Patient received emergent HD & his K and respiratory failure had improved but patient again was minimally responsive with ongoing hypoglycemia , lost peripheral IV access. He was also found to have empty bottle of oxycodone in his pocket and possible ingestion to explain this acute change in mental status, he was given naracan and then dextrose , therafter developed respiratory distress. Rapid response was called. Decision was made to intubate him due to severe resp distress and encephalopathy and patient transferred to ICU for further care. On 8/20  Pt  self extubated . He became increasingly more lethargic over the afternoon but responded well to 2 doses of narcan. Pt was transferred to floor . Blood cultures done on 8/18 + for C.glabrata 1/4 . Repeat BC - NG      Patient Active Problem List   Diagnosis Code    Pulmonary edema J81.1    ESRD on dialysis ([facility] N18.6, Z99.2    Hypertension I10    Tobacco abuse Z72.0    Hypoxia R09.02    SOB (shortness of breath) R06.02    ESRD (end stage renal disease) on dialysis ([facility] N18.6, Z99.2    Hyperkalemia E87.5    Acute on chronic combined systolic and diastolic CHF (congestive heart failure) ([facility]) I50.43     Past Medical History:   Diagnosis Date    Chronic kidney disease     Dialysis patient ([facility]     Hypertension       Family History   Problem Relation Age of Onset    No Known Problems Mother     No Known Problems Father     No Known Problems Sister     No Known Problems Brother     No Known Problems Sister     No Known Problems Sister     No Known Problems Brother       Social History     Tobacco Use    Smoking status: Current Every Day Smoker     Packs/day: 0.25    Smokeless tobacco: Never Used   Substance Use Topics    Alcohol use: Yes     Past Surgical History:   Procedure Laterality Date    HX VASCULAR ACCESS Left     LUE AV fistula      Prior to Admission medications    Medication Sig Start Date End Date Taking? Authorizing Provider   methadone (DOLOPHINE) 10 mg/mL solution Take 160 mg by mouth daily. Yes Provider, Historical   metoprolol tartrate (LOPRESSOR) 25 mg tablet Take 1 Tablet by mouth every twelve (12) hours for 30 days. 8/14/21 9/13/21  Benja Renee MD   pantoprazole (PROTONIX) 40 mg tablet Take 1 Tablet by mouth daily for 30 days. 8/14/21 9/13/21  Benja Renee MD   sevelamer (RenageL) 400 mg tablet Take 2 Tablets by mouth three (3) times daily (with meals) for 30 days. 8/14/21 9/13/21  Benja Renee MD   isosorbide mononitrate ER (IMDUR) 30 mg tablet Take 1 Tablet by mouth every morning for 30 days. 8/14/21 9/13/21  Benja Renee MD   citalopram (CELEXA) 40 mg tablet Take 1 Tablet by mouth daily for 30 days. 8/14/21 9/13/21  Benja Renee MD   atorvastatin (LIPITOR) 40 mg tablet Take 1 Tablet by mouth nightly for 30 days. 8/14/21 9/13/21  Benja Renee MD   apixaban (ELIQUIS) 5 mg tablet Take 1 Tablet by mouth two (2) times a day for 30 days. 8/14/21 9/13/21  Benja Renee MD   paricalcitoL (ZEMPLAR) 1 mcg capsule Take 1 Capsule by mouth daily for 30 days. 8/14/21 9/13/21  Benja Renee MD   lidocaine (LIDODERM) 5 % Apply patch to the affected area for 12 hours a day and remove for 12 hours a day. 11/28/17   Ashley Davis MD   aspirin delayed-release 81 mg tablet Take 1 Tab by mouth daily. 11/28/17   Ashley Davis MD   multivitamin (ONE A DAY) tablet Take 1 Tab by mouth daily. Juan Olivarez MD   gabapentin (NEURONTIN) 100 mg capsule Take 100 mg by mouth daily. Juan Olivarez MD     Allergies   Allergen Reactions    Motrin [Ibuprofen] Hives     7/16/17 Pt denies allergy    Tylenol [Acetaminophen] Hives     7/16/17 Pt denies allergy        Review of Systems:  A comprehensive review of systems was negative. 14 point review of systems obtained . All other systems negative    Objective:   Blood pressure 113/69, pulse 73, temperature 97.8 °F (36.6 °C), resp. rate 22, height 5' 9\" (1.753 m), weight 168 lb 3.2 oz (76.3 kg), SpO2 94 %.   Temp (24hrs), Av.9 °F (36.1 °C), Min:94.2 °F (34.6 °C), Max:97.8 °F (36.6 °C)    Current Facility-Administered Medications   Medication Dose Route Frequency    0.9% sodium chloride infusion 250 mL  250 mL IntraVENous PRN    acetylcysteine (ACETADOTE) 7,640 mg in dextrose 5% 1,000 mL infusion  100 mg/kg IntraVENous ONCE    lactulose (CHRONULAC) 10 gram/15 mL solution 30 mL  20 g Oral TID    epoetin cristian-epbx (RETACRIT) injection 10,000 Units  10,000 Units SubCUTAneous Q MON, WED & FRI    dexmedeTOMidine in 0.9 % NaCl (PRECEDEX) 400 mcg/100 mL (4 mcg/mL) infusion soln  0.1-1.5 mcg/kg/hr IntraVENous TITRATE    fentaNYL (PF) 1,500 mcg/30 mL (50 mcg/mL) infusion  0-200 mcg/hr IntraVENous TITRATE    midazolam (VERSED) injection 2 mg  2 mg IntraVENous Q1H PRN    chlorhexidine (ORAL CARE KIT) 0.12 % mouthwash 15 mL  15 mL Oral Q12H    piperacillin-tazobactam (ZOSYN) 3.375 g in 0.9% sodium chloride (MBP/ADV) 100 mL MBP  3.375 g IntraVENous Q12H    VANCOMYCIN INFORMATION NOTE   Other Rx Dosing/Monitoring    oxyCODONE IR (ROXICODONE) tablet 5 mg  5 mg Oral Q6H PRN    rifAXIMin (XIFAXAN) tablet 550 mg  550 mg Oral BID    alcohol 62% (NOZIN) nasal  1 Ampule  1 Ampule Topical Q12H    ondansetron (ZOFRAN) injection 4 mg  4 mg IntraVENous Q4H PRN    glucose chewable tablet 16 g  4 Tablet Oral PRN    glucagon (GLUCAGEN) injection 1 mg  1 mg IntraMUSCular PRN    naloxone (NARCAN) injection 0.4 mg  0.4 mg IntraMUSCular EVERY 2 MINUTES AS NEEDED    aspirin chewable tablet 81 mg  81 mg Oral DAILY    pantoprazole (PROTONIX) 40 mg in 0.9% sodium chloride 10 mL injection  40 mg IntraVENous DAILY    dextrose (D50W) injection syrg 12.5-25 g  12.5-25 g IntraVENous PRN    albumin human 25% (BUMINATE) solution 12.5 g  12.5 g IntraVENous DIALYSIS PRN    metoprolol tartrate (LOPRESSOR) tablet 25 mg  25 mg Oral Q12H    sodium chloride (NS) flush 5-40 mL  5-40 mL IntraVENous Q8H    sodium chloride (NS) flush 5-40 mL  5-40 mL IntraVENous PRN    polyethylene glycol (MIRALAX) packet 17 g  17 g Oral DAILY PRN    albuterol-ipratropium (DUO-NEB) 2.5 MG-0.5 MG/3 ML  3 mL Nebulization Q6H PRN        Exam:    General:  Awake, cooperative,    Eyes:  Sclera anicteric. Pupils equally round and reactive to light. Mouth/Throat: Mucous membranes normal, oral pharynx  clear   Neck: Supple   Lungs:   Reduced auscultation bases   CV:  Regular rate and rhythm,no murmur, click, rub or gallop   Abdomen:   Soft, non-tender. bowel sounds normal. non-distended   Extremities: No  Edema, LUE - AV fistula   Skin: Skin color, texture, turgor normal. no acute rash or lesions   Lymph nodes: Cervical and supraclavicular normal   Musculoskeletal: No swelling or deformity   Lines/Devices:  Intact, no erythema, drainage or tenderness   Psych: Awake  and oriented x 4        Data Reviewed:   CBC:   Recent Labs     08/26/21  0450 08/25/21  0800 08/24/21  2319   WBC 13.9* 17.5* 15.9*   RBC 1.80* 2.28* 2.08*   HGB 5.9* 7.5* 6.7*   HCT 18.8* 25.7* 22.9*   * 119* 125*   GRANS  --  89* 67   LYMPH  --  7* 24   EOS  --  0 0     CMP:   Recent Labs     08/26/21  0450 08/25/21  0715 08/24/21  2319 08/24/21  0412 08/24/21  0412   *  --  209*  --  65     --  135*  --  136   K 3.4*  --  4.4  --  3.6   CL 97  --  100  --  101   CO2 28  --  20*  --  26   BUN 21*  --  32*  --  20   CREA 4.01*  --  6.48*  --  4.50*   CA 9.2  --  9.1  --  8.6   AGAP 13  --  15  --  9   BUCR 5*  --  5*  --  4*   * 170* 159*  160*   < > 156*   TP 5.5* 6.6 5.9*  5.9*   < > 5.9*   ALB 1.6* 2.0* 1.7*  1.7*   < > 1.7*   GLOB 3.9 4.6* 4.2*  4.2*   < > 4.2*   AGRAT 0.4* 0.4* 0.4*  0.4*   < > 0.4*    < > = values in this interval not displayed.        Lab Results   Component Value Date/Time    Culture result: NO GROWTH AFTER 9 HOURS 08/25/2021 09:46 PM    Culture result: NO GROWTH AFTER 22 HOURS 08/25/2021 08:00 AM    Culture result: NO GROWTH 5 DAYS 08/21/2021 06:17 PM    Culture result: NO GROWTH 5 DAYS 08/21/2021 01:43 PM    Culture result: (A) 08/18/2021 09:37 AM     SAMI GLABRATA GROWING IN 1 OF 4 BOTTLES DRAWN NO SITE INDICATED     Culture result: REMAINING BOTTLE(S) HAS/HAVE NO GROWTH SO FAR 08/18/2021 09:37 AM    Culture result:  08/18/2021 09:37 AM     DR Evelyn Gotti RECHARISSE YEAST SENSITIVITIES FOR FLUCONAZOLE, ANIDULAFUNGIN, AND VORICONAZOLE          XR Results (most recent)reviewed:  Results from [place] encounter on 08/16/21    XR ABD (KUB)    Narrative  CLINICAL HISTORY: Evaluate NG tube/OG tube    INDICATION: Evaluate NG/OG tube    COMPARISON: 8/22/2021  FINDINGS:  AP Limited supine view of the abdomen is obtained. The OG/NG tube tip lies in  appropriate position below the diaphragm. Otherwise no significant interval  change or acute findings. Impression  Gastric tube tip is in appropriate position for use. ICD-10-CM ICD-9-CM    1. Acute hyperkalemia  E87.5 276.7    2. ESRD needing dialysis (HCC)  N18.6 585.6     Z99.2     3. Accidental drug overdose, initial encounter  T50.901A 977.9      E858.9    4. Fungemia  B49 117.9    5. Hypervolemia, unspecified hypervolemia type  E87.70 276.69    6. Infection due to Candida glabrata  B37.9 112.9    7. Acute pulmonary edema (HCC)  J81.0 518.4    8. Tobacco abuse  Z72.0 305.1    9. Chronic hepatitis C without hepatic coma (HCC)  B18.2 070.54    10. Colonic disorder  K63.9 569.9    11. Hypoglycemia  E16.2 251.2    12. Ischemic hepatitis  K75.9 570    13. Transaminitis  R74.01 790.4    14. Essential hypertension  I10 401.9    15. Acute liver failure with hepatic coma (HCC)  K72.01 570      572.2    16. Acute respiratory failure with hypoxia (HCC)  J96.01 518.81    17. Candida glabrata infection  B37.9 112.9    18. ESRD (end stage renal disease) on dialysis ([address]  N18.6 585.6     Z99.2 V45.11    19.  Acute on chronic combined systolic and diastolic CHF (congestive heart failure) ([facility])  I50.43 428.43      428.0            Antibiotic History    I have discussed the diagnosis with the patient and the intended plan as seen in the above orders. I have discussed medication side effects and warnings with the patient as well.     Reviewed test results at length with patient    Signed By: Marisol Dailey MD FACP     August 26, 2021

## 2021-08-26 NOTE — PROGRESS NOTES
GI PROGRESS NOTE  Kimmy Patelopshire, NP  [phone] NP in-hospital cell phone M-F until 4:30  After 5pm or on weekends, please call  for physician on call    NAME:Allan Eldridge :1965 NEQ:[id number]   ATTG: Dr. Jeet Hoffman  PCP: None  Date/Time:  2021 12:16 PM     Primary GI: Dr. Sierra Carlisle    Reason for following: acute liver injury, elevated lfts, elevated INR    Assessment:   -Acute hypoxic respiratory failure 2/2 pulmonary edema 2/2 CHF exacerbation 2/2 non-compliance with medications  -ESRD on HD  -Drug abuse  -Fungemia  -Acute liver injury elevation in LFT's, PT/INR, still very elevated, needs continued monitoring. · Abdominal ultrasound showing:No hepatic vasculature thrombus. Distended IVC and pulsatile hepatic venous flow, possibly due to right heart disease. Edematous gallbladder without cholelithiasis. No biliary dilation. · Elevated INR, continuing to elevate, now at 5.7 2021, given vitamin K trial, although repeat today 5.2 per nurse?, likely was not able to be utilized given degree of injury  · Was Encephalopathic xifaxan and lactulose, ammonia level down to 10 which is great  · Platelets 654  · Pending hep C PCR and genotype    - H/o DVT on Eliquis, 10/2020  -CT with ?gastroenteritis, likely reactive process        Plan:   -Still acute phase of liver injury, needs close monitoring. Appears to be worsening despite medical management. Poor prognosis, would be best served by tertiary care. GI unable to offer much at this time facility, although doubtful he would qualify for transplant  -Further liver serology ordered: HANNAH, AMA, ASMA, alpha 1 antitripsin, IgG, iron profile, etc  -Continue Xifaxan 550mg BID and lactulose TID  - avoid hepatotoxic agents (?Eraxis)  -Will continue to follow. Please call GI with any further questions or concerns. Thank you! *Plan discussed with Dr. Sierra Carlisle  Subjective:   Patient intubated and sedated, unable to participate in interview.     Bed side nurse reports patient as stable. No GI needs at this time. Complaint Y/N Description   Abdominal Pain     Hematemesis     Hematochezia     Melena     Constipation     Diarrhea     Dyspepsia     Dysphagia     Jaundiced     Nausea/vomiting       Review of Systems:  Symptom Y/N Comments  Symptom Y/N Comments   Fever/Chills    Chest Pain     Cough    Headaches     Sputum    Joint Pain     SOB/WARNER    Pruritis/Rash     Tolerating Diet    Other       Could NOT obtain due to: AMS     Objective:   VITALS:   Last 24hrs VS reviewed since prior progress note. Most recent are:  Visit Vitals  /81   Pulse 68   Temp 97.4 °F (36.3 °C)   Resp 17   Ht 5' 9\" (1.753 m)   Wt 76.3 kg (168 lb 3.2 oz)   SpO2 94%   BMI 24.84 kg/m²       Intake/Output Summary (Last 24 hours) at 8/26/2021 1259  Last data filed at 8/26/2021 0700  Gross per 24 hour   Intake 5264.49 ml   Output 0 ml   Net 5264.49 ml     PHYSICAL EXAM:  General: Not alert  HEENT: NC, Atraumatic. icteric sclerae. Lungs:  Vented, sating 94%  Heart:  NSR,   Abdomen: Soft, Non distended, nontender, firm. Extremities: No c/c/e  Neurologic:  Alert and oriented X 0, sedated. Psych:   Not anxious nor agitated. Lab and Radiology Data Reviewed: (see below)    Medications Reviewed: (see below)  PMH/SH reviewed - no change compared to H&P  ________________________________________________________________________  Total time spent with patient: 30 minutes ________________________________________________________________________  Care Plan discussed with:  Patient Y   Family     RN Y              Consultant:       Karn Landau, NP     Procedures: see electronic medical records for all procedures/Xrays and details which were not copied into this note but were reviewed prior to creation of Plan.       LABS:  Recent Labs     08/26/21  0450 08/25/21  0800   WBC 13.9* 17.5*   HGB 5.9* 7.5*   HCT 18.8* 25.7*   * 119*     Recent Labs     08/26/21  0450 08/24/21  2319 08/24/21  4502  135* 136   K 3.4* 4.4 3.6   CL 97 100 101   CO2 28 20* 26   BUN 21* 32* 20   CREA 4.01* 6.48* 4.50*   * 209* 65   CA 9.2 9.1 8.6     Recent Labs     08/26/21  0450 08/25/21  0715 08/24/21  2319   * 170* 159*  160*   TP 5.5* 6.6 5.9*  5.9*   ALB 1.6* 2.0* 1.7*  1.7*   GLOB 3.9 4.6* 4.2*  4.2*     Recent Labs     08/25/21  0800 08/24/21  0412   INR 5.7* 3.7*   PTP 55.5* 36.9*      Recent Labs     08/25/21  1139   TIBC 150*   PSAT INDETERMINATE - SENT TO REFERENCE LAB FOR ADDITIONAL TESTING. No results found for: FOL, RBCF  Recent Labs     08/26/21  1131 08/25/21  1330   PH 7.54* 7.34*   PCO2 30* 26*   PO2 84 126*     No results for input(s): CPK, CKMB in the last 72 hours.     No lab exists for component: TROPONINI  Lab Results   Component Value Date/Time    Color YELLOW/STRAW 05/06/2017 01:30 PM    Appearance CLOUDY (A) 05/06/2017 01:30 PM    Specific gravity 1.015 05/06/2017 01:30 PM    pH (UA) 7.5 05/06/2017 01:30 PM    Protein >300 (A) 05/06/2017 01:30 PM    Glucose NEGATIVE  05/06/2017 01:30 PM    Ketone NEGATIVE  05/06/2017 01:30 PM    Bilirubin NEGATIVE  05/06/2017 01:30 PM    Urobilinogen 0.2 05/06/2017 01:30 PM    Nitrites NEGATIVE  05/06/2017 01:30 PM    Leukocyte Esterase NEGATIVE  05/06/2017 01:30 PM    Epithelial cells FEW 05/06/2017 01:30 PM    Bacteria NEGATIVE  05/06/2017 01:30 PM    WBC 5-10 05/06/2017 01:30 PM    RBC 0-5 05/06/2017 01:30 PM       MEDICATIONS:  Current Facility-Administered Medications   Medication Dose Route Frequency    0.9% sodium chloride infusion 250 mL  250 mL IntraVENous PRN    acetylcysteine (ACETADOTE) 7,640 mg in dextrose 5% 1,000 mL infusion  100 mg/kg IntraVENous ONCE    lactulose (CHRONULAC) 10 gram/15 mL solution 30 mL  20 g Oral TID    epoetin cristian-epbx (RETACRIT) injection 10,000 Units  10,000 Units SubCUTAneous Q MON, WED & FRI    dexmedeTOMidine in 0.9 % NaCl (PRECEDEX) 400 mcg/100 mL (4 mcg/mL) infusion soln  0.1-1.5 mcg/kg/hr IntraVENous TITRATE    fentaNYL (PF) 1,500 mcg/30 mL (50 mcg/mL) infusion  0-200 mcg/hr IntraVENous TITRATE    midazolam (VERSED) injection 2 mg  2 mg IntraVENous Q1H PRN    chlorhexidine (ORAL CARE KIT) 0.12 % mouthwash 15 mL  15 mL Oral Q12H    piperacillin-tazobactam (ZOSYN) 3.375 g in 0.9% sodium chloride (MBP/ADV) 100 mL MBP  3.375 g IntraVENous Q12H    VANCOMYCIN INFORMATION NOTE   Other Rx Dosing/Monitoring    oxyCODONE IR (ROXICODONE) tablet 5 mg  5 mg Oral Q6H PRN    rifAXIMin (XIFAXAN) tablet 550 mg  550 mg Oral BID    alcohol 62% (NOZIN) nasal  1 Ampule  1 Ampule Topical Q12H    ondansetron (ZOFRAN) injection 4 mg  4 mg IntraVENous Q4H PRN    glucose chewable tablet 16 g  4 Tablet Oral PRN    glucagon (GLUCAGEN) injection 1 mg  1 mg IntraMUSCular PRN    naloxone (NARCAN) injection 0.4 mg  0.4 mg IntraMUSCular EVERY 2 MINUTES AS NEEDED    aspirin chewable tablet 81 mg  81 mg Oral DAILY    pantoprazole (PROTONIX) 40 mg in 0.9% sodium chloride 10 mL injection  40 mg IntraVENous DAILY    dextrose (D50W) injection syrg 12.5-25 g  12.5-25 g IntraVENous PRN    albumin human 25% (BUMINATE) solution 12.5 g  12.5 g IntraVENous DIALYSIS PRN    metoprolol tartrate (LOPRESSOR) tablet 25 mg  25 mg Oral Q12H    sodium chloride (NS) flush 5-40 mL  5-40 mL IntraVENous Q8H    sodium chloride (NS) flush 5-40 mL  5-40 mL IntraVENous PRN    polyethylene glycol (MIRALAX) packet 17 g  17 g Oral DAILY PRN    albuterol-ipratropium (DUO-NEB) 2.5 MG-0.5 MG/3 ML  3 mL Nebulization Q6H PRN

## 2021-08-27 NOTE — PROGRESS NOTES
Bedside shift change report given to YONY Ford and Duane Jackson RN (oncoming nurse) by Cassandra Severance, RN (offgoing nurse). Report included the following information SBAR, Kardex, ED Summary, [facility] and Recent Results. 2000-initial assessment complete, see flowsheet. Precedex at 1.5 mcg/kg/hr, Fentanyl at 200 mcg/hr. 2209-pt's BG at 55, pt given D50 25g, recheck at 140. Will continue to monitor. 0023-reassessment complete, see flowsheet. BG at 104. 0230-MIQUEL De Jesus NP notified of repeatedly low BG. Last BG at 68, D50 12.5 grams given, BG 75. Orders for D10 at 50ml/hr placed. Will continue to monitor. 0243-MIQUEL De Jesus NP notified of best practice alarm for sepsis. No new orders at this time. 0452-reassessment complete, see flowsheet. BG 70, MIQUEL De Jesus NP, okay to give D50 12.5g per Maryruth Mask, NP. Recheck 101. Will continue to monitor. 0510-Precedex titrated to 1mcg/kg/hr. 0543-Precedex titrated to 0.7mcg/kg/hr. 0627-attempted to check pt's BG via fingerstick, 1st attempt 65, 2nd attempt 36, pulled blood from central line-. Fingerstick to left hand 84.     0715-Bedside shift change report given to Cassandra Severance, RN (oncoming nurse) by Krystal Ardon RN and Duane Jackson RN (offgoing nurse). Report included the following information SBAR, Kardex, ED Summary, [facility] and Recent Results.

## 2021-08-27 NOTE — PROGRESS NOTES
-- DO NOT REPLY / DO NOT REPLY ALL --  -- Message is from the [facility]--    COVID-19 Universal Screening: Negative    General Patient Message      Reason for Call: Patient's mother is calling in. Patient's sister is scheduled to see Dr. Rosy Madrid on Monday 06/15/20 at 11:15 am. Mom would like to schedule patient for his annual physical on that same day around the same time so she can bring both children in at once. Dr. Madrid's schedule in Epic is showing no open slots in the template's release range and so [facility] can not schedule. Please call back at earliest convenience to follow up. Thank you.      Caller Information       Type Contact Phone    06/11/2020 07:31 PM Phone (Incoming) MATTHEW SERRANO (Mother) [phone]          Alternative phone number: none    Turnaround time given to caller:   \"This message will be sent to [state Provider's name]. The clinical team will fulfill your request as soon as they review your message when the office opens tomorrow.\"     ICU Progress Note        Subjective: Overnight events noted. Off fentanyl most of this day. Starting to come around. Tolerating SBT on PS 5 cm H2O but cognition prohibits extubation    Vital Signs:    Visit Vitals  BP (!) 91/54   Pulse 77   Temp 98.4 °F (36.9 °C)   Resp 22   Ht 5' 9\" (1.753 m)   Wt 87.7 kg (193 lb 5.5 oz)   SpO2 91%   BMI 28.55 kg/m²       O2 Device: Endotracheal tube   O2 Flow Rate (L/min): 2 l/min   Temp (24hrs), Av.3 °F (36.8 °C), Min:97.6 °F (36.4 °C), Max:99 °F (37.2 °C)       Intake/Output:   Last shift:       07 -  1900  In: 165.9 [I.V.:165.9]  Out: 244   Last 3 shifts:  190 -  0700  In: 5702.6 [I.V.:5069.2]  Out: 0     Intake/Output Summary (Last 24 hours) at 2021 1632  Last data filed at 2021 1355  Gross per 24 hour   Intake 1507.48 ml   Output 244 ml   Net 1263.48 ml       Ventilator Settings:  Ventilator Mode: CPAP  Respiratory Rate  Resp Rate Observed: 27  Back-Up Rate: 12  Insp Flow (l/min): 60 l/min  I:E Ratio: 1:3.5  Ventilator Volumes  Vt Set (ml): 420 ml  Vt Exhaled (Machine Breath) (ml): 478 ml  Vt Spont (ml): 374 ml  Ve Observed (l/min): 10.7 l/min  Ventilator Pressures  Pressure Support (cm H2O): 5 cm H2O  PIP Observed (cm H2O): 12 cm H2O  Plateau Pressure (cm H2O): 14 cm H2O  MAP (cm H2O): 7.5  PEEP/VENT (cm H2O): 5 cm H20  Auto PEEP Observed (cm H2O): 5.3 cm H2O    Physical Exam:    Physical exam:   GEN: Intubated, sedated, exam limited by sedation. HEENT: anicteric sclerae, pink conj, ETT in place. NECK: Supple, -LAD, no neck mass, CVC in place with dressing C/D/I  CV: no murmurs noted. LUNGS: R>L rhonchi, no wheezes  ABD: soft, non-tender, no masses  EXT: No cyanosis, distal pulses palpable, + edema  SKIN: warm, dry and intact. NEURO: Sedated, exam is limited by sedation.       DATA:     Current Facility-Administered Medications   Medication Dose Route Frequency    dextrose 10% infusion  50 mL/hr IntraVENous CONTINUOUS    anidulafungin (ERAXIS) 200 mg in 0.9% sodium chloride 260 mL IVPB  200 mg IntraVENous ONCE    [START ON 8/28/2021] anidulafungin (ERAXIS) 100 mg in 0.9% sodium chloride 130 mL IVPB  100 mg IntraVENous Q24H    acetylcysteine (ACETADOTE) 7,640 mg in dextrose 5% 1,000 mL infusion  100 mg/kg IntraVENous ONCE    0.9% sodium chloride infusion 250 mL  250 mL IntraVENous PRN    lactulose (CHRONULAC) 10 gram/15 mL solution 30 mL  20 g Oral TID    epoetin cristian-epbx (RETACRIT) injection 10,000 Units  10,000 Units SubCUTAneous Q MON, WED & FRI    dexmedeTOMidine in 0.9 % NaCl (PRECEDEX) 400 mcg/100 mL (4 mcg/mL) infusion soln  0.1-1.5 mcg/kg/hr IntraVENous TITRATE    fentaNYL (PF) 1,500 mcg/30 mL (50 mcg/mL) infusion  0-200 mcg/hr IntraVENous TITRATE    midazolam (VERSED) injection 2 mg  2 mg IntraVENous Q1H PRN    chlorhexidine (ORAL CARE KIT) 0.12 % mouthwash 15 mL  15 mL Oral Q12H    piperacillin-tazobactam (ZOSYN) 3.375 g in 0.9% sodium chloride (MBP/ADV) 100 mL MBP  3.375 g IntraVENous Q12H    oxyCODONE IR (ROXICODONE) tablet 5 mg  5 mg Oral Q6H PRN    rifAXIMin (XIFAXAN) tablet 550 mg  550 mg Oral BID    alcohol 62% (NOZIN) nasal  1 Ampule  1 Ampule Topical Q12H    ondansetron (ZOFRAN) injection 4 mg  4 mg IntraVENous Q4H PRN    glucose chewable tablet 16 g  4 Tablet Oral PRN    glucagon (GLUCAGEN) injection 1 mg  1 mg IntraMUSCular PRN    naloxone (NARCAN) injection 0.4 mg  0.4 mg IntraMUSCular EVERY 2 MINUTES AS NEEDED    aspirin chewable tablet 81 mg  81 mg Oral DAILY    pantoprazole (PROTONIX) 40 mg in 0.9% sodium chloride 10 mL injection  40 mg IntraVENous DAILY    dextrose (D50W) injection syrg 12.5-25 g  12.5-25 g IntraVENous PRN    albumin human 25% (BUMINATE) solution 12.5 g  12.5 g IntraVENous DIALYSIS PRN    metoprolol tartrate (LOPRESSOR) tablet 25 mg  25 mg Oral Q12H    sodium chloride (NS) flush 5-40 mL  5-40 mL IntraVENous Q8H    sodium chloride (NS) flush 5-40 mL  5-40 mL IntraVENous PRN    polyethylene glycol (MIRALAX) packet 17 g  17 g Oral DAILY PRN    albuterol-ipratropium (DUO-NEB) 2.5 MG-0.5 MG/3 ML  3 mL Nebulization Q6H PRN         Labs: Results:       Chemistry Recent Labs     08/27/21 0344 08/26/21  0450 08/25/21  0715 08/24/21 2319 08/24/21 2319   GLU 78 144*  --   --  209*    138  --   --  135*   K 3.4* 3.4*  --   --  4.4   CL 98 97  --   --  100   CO2 26 28  --   --  20*   BUN 30* 21*  --   --  32*   CREA 5.40* 4.01*  --   --  6.48*   CA 10.2* 9.2  --   --  9.1   AGAP 13 13  --   --  15   BUCR 6* 5*  --   --  5*   * 145* 170*   < > 159*  160*   TP 5.1* 5.5* 6.6   < > 5.9*  5.9*   ALB 1.3* 1.6* 2.0*   < > 1.7*  1.7*   GLOB 3.8 3.9 4.6*   < > 4.2*  4.2*   AGRAT 0.3* 0.4* 0.4*   < > 0.4*  0.4*    < > = values in this interval not displayed. CBC w/Diff Recent Labs     08/27/21 0344 08/26/21  0450 08/25/21  0800 08/24/21 2319 08/24/21 2319   WBC 20.7* 13.9* 17.5*   < > 15.9*   RBC 2.23* 1.80* 2.28*   < > 2.08*   HGB 7.4* 5.9* 7.5*   < > 6.7*   HCT 23.4* 18.8* 25.7*   < > 22.9*   * 102* 119*   < > 125*   GRANS  --   --  89*  --  67   LYMPH  --   --  7*  --  24   EOS  --   --  0  --  0    < > = values in this interval not displayed.       Coagulation Recent Labs     08/27/21 0344 08/25/21  0800   PTP 24.3* 55.5*   INR 2.4* 5.7*       Liver Enzymes Recent Labs     08/27/21  0344   TP 5.1*   ALB 1.3*   *      ABG No results found for: PH, PHI, PCO2, PCO2I, PO2, PO2I, HCO3, HCO3I, FIO2, FIO2I   Microbiology Recent Labs     08/25/21  2146 08/25/21  0800   CULT NO GROWTH 2 DAYS NO GROWTH 2 DAYS        maging:  CXR Results  (Last 48 hours)    None          CT Results  (Last 48 hours)    None             VDRF - intubated due to AMS/suspected surreptitious opioid use (He was also found to have empty bottle of oxycodone in his pocket)    ESRD on HD    Acute liver failure    Anemia - S/P RBC transfusion 08/26    Fungemia (Candida glabrata on [facility] 08/18)    Empiric pip-tazo for suspected aspiration    Acute encephalopathy    Other chronic medical problems  - HTN  - Hyperlipidemia  - GERD    Vent settings reviewed and discussed with RT  Wean in PSV mode as tolerated  Reduce sedation with goal of extubation 08/28  Continue empiric antibiotics  ID service following  Daily CBC, chem panels  Follow LFTs    Patient remains critically ill and is a very high risk for deterioration. .     CCM time - 40 minutes.      Verita Claude, [facility],[address]  [phone]  8/27/2021 4:42 PM

## 2021-08-27 NOTE — PROGRESS NOTES
Chart reviewed. Pt remains intubated.  Will defer at this time and reassess when medically stable and appropriate for PT.

## 2021-08-27 NOTE — PROGRESS NOTES
OT note:  OT reviewed chart and pt is intubated and sedated at this time.   Will defer and continue to follow

## 2021-08-27 NOTE — PROGRESS NOTES
0700  Bedside shift change report received from YONY Ford (offgoing nurse). Assumed care of pt at this time. Report included the following information SBAR, Kardex, Intake/Output, MAR, Recent Results, Heart rhythm and Medications. On SBT and doing well. 0845  BG 22 from right hand (low perfusion).  via TLC. Eyes open, rarely F/C. Biting ETT. Requires constant mouth care and ETT suctioning for copious secretions. 0900  Large BM. CHG bath given. Turned to right side and padded area, continues to ooze stool. Is on Lactulose for Ammonia level control. 0930  Seen by Dr. Toño Auguste at [facility]. Precedex increased to 1.5mcg and Fentanyl weaned from 75 to 50 mcg.    1200  Fentanyl decreased to 25mcg. Will attempt to wean off for possible extubation. HD still in process. 1330  Fentanyl off. HD finishing up. Precedex at 1.5mcg. 1529  HD completed. BG 82 via central line. Eyes open but not following commands. 1600  Precedex weaning 2/2 hypotension. 36  MD in to reassess. CXR ordered. Will keep lightly sedated on Precedex and continue SPONT vent mode.     Sukhjinder d/c'd    1900  Report to YONY Ford

## 2021-08-27 NOTE — PROGRESS NOTES
GI PROGRESS NOTE  Heather Stringer, NP  [phone] NP in-hospital cell phone M-F until 4:30  After 5pm or on weekends, please call  for physician on call    NAME:Allan Tobin :1965 ABD:[id number]   ATTG: Dr. Diana Parker  PCP: None  Date/Time:  2021 12:16 PM     Primary GI: Dr. Liliana Adams    Reason for following: acute liver injury, elevated lfts, elevated INR    Assessment:   -Acute hypoxic respiratory failure 2/2 pulmonary edema 2/2 CHF exacerbation 2/2 non-compliance with medications  · Breathing trial today, per nurse doing well so far  -ESRD on HD  -Drug abuse  -Fungemia  -Acute liver injury. Likely ischemic. elevation in LFT's still very elevated, needs continued monitoring. · Abdominal ultrasound showing:No hepatic vasculature thrombus. Distended IVC and pulsatile hepatic venous flow, possibly due to right heart disease. Edematous gallbladder without cholelithiasis. No biliary dilation. · Elevated INR, much better today, platelets stable  · Was Encephalopathic xifaxan and lactulose, ammonia level down to 10 which is great  · Alhpa 1 antitrypsin normal, HANNAH negative, IgG mildly elevated, AMA normal, ASMA mildly positive> overall labs good, not consistent with AIH  · Hep C viral load 224,000, genotype pending  · On Nac, day 3    - H/o DVT on Eliquis, 10/2020  -CT with ?gastroenteritis, likely reactive process        Plan:   -Still acute phase of liver injury, needs close monitoring. Appears to be doing a bit better today! INR much better, per bedside nurse is waking up and is on breathing trial this am. Liver enzymes still very elevated. Hemodynamically stable. Ideally would be best served by liver center, although probably wouldn't qualify for liver transplant.  GI unable to offer much at this time.  -Liver serology overall good, not consistent with AIH  -If survives this, should see hepatologist for management/treatment evaluation for Hep C  -3rd day of NAC  -Continue Xifaxan 550mg BID and lactulose TID  - avoid hepatotoxic agents (?Eraxis)  -Will see on request over the weekend. Please call GI with any further questions or concerns. Thank you! *Plan discussed with Dr. Luz Oviedo  Subjective:   Patient intubated and sedated, unable to participate in interview. Bed side nurse reports patient as stable. No GI needs at this time. Reports waking up, not always cooperative. Reports currently on breathing trial and doing well. Having multiple bowel movements, lactulose working. Complaint Y/N Description   Abdominal Pain     Hematemesis     Hematochezia     Melena     Constipation     Diarrhea     Dyspepsia     Dysphagia     Jaundiced     Nausea/vomiting       Review of Systems:  Symptom Y/N Comments  Symptom Y/N Comments   Fever/Chills    Chest Pain     Cough    Headaches     Sputum    Joint Pain     SOB/WARNER    Pruritis/Rash     Tolerating Diet    Other       Could NOT obtain due to: AMS     Objective:   VITALS:   Last 24hrs VS reviewed since prior progress note. Most recent are:  Visit Vitals  BP (!) 99/54   Pulse 72   Temp 98.4 °F (36.9 °C)   Resp 28   Ht 5' 9\" (1.753 m)   Wt 87.7 kg (193 lb 5.5 oz)   SpO2 92%   BMI 28.55 kg/m²       Intake/Output Summary (Last 24 hours) at 8/27/2021 0857  Last data filed at 8/27/2021 0732  Gross per 24 hour   Intake 3513.68 ml   Output    Net 3513.68 ml     PHYSICAL EXAM:  General: Not alert  HEENT: NC, Atraumatic. icteric sclerae. Lungs:  Vented, sating 94-98%  Heart:  NSR, HR 60's  Abdomen: Soft, Non distended, nontender. OG in place. Extremities: No c/c/e  Neurologic:  Alert and oriented to self, sedated. Psych:   Not anxious nor agitated.     Lab and Radiology Data Reviewed: (see below)    Medications Reviewed: (see below)  PMH/SH reviewed - no change compared to H&P  ________________________________________________________________________  Total time spent with patient: 30 minutes ________________________________________________________________________  Care Plan discussed with:  Patient Y   Family     RN Y              Consultant:       Nadira Daniels NP     Procedures: see electronic medical records for all procedures/Xrays and details which were not copied into this note but were reviewed prior to creation of Plan. LABS:  Recent Labs     08/27/21 0344 08/26/21  0450   WBC 20.7* 13.9*   HGB 7.4* 5.9*   HCT 23.4* 18.8*   * 102*     Recent Labs     08/27/21  0344 08/26/21  0450 08/24/21  2319    138 135*   K 3.4* 3.4* 4.4   CL 98 97 100   CO2 26 28 20*   BUN 30* 21* 32*   CREA 5.40* 4.01* 6.48*   GLU 78 144* 209*   CA 10.2* 9.2 9.1     Recent Labs     08/27/21  0344 08/26/21  0450 08/25/21  0715   * 145* 170*   TP 5.1* 5.5* 6.6   ALB 1.3* 1.6* 2.0*   GLOB 3.8 3.9 4.6*     Recent Labs     08/27/21  0344 08/25/21  0800   INR 2.4* 5.7*   PTP 24.3* 55.5*      Recent Labs     08/25/21  1139   TIBC 150*   PSAT INDETERMINATE - SENT TO REFERENCE LAB FOR ADDITIONAL TESTING. No results found for: FOL, RBCF  Recent Labs     08/26/21  1535 08/26/21  1131   PH 7.51* 7.54*   PCO2 37 30*   PO2 74* 84     No results for input(s): CPK, CKMB in the last 72 hours.     No lab exists for component: TROPONINI  Lab Results   Component Value Date/Time    Color YELLOW/STRAW 05/06/2017 01:30 PM    Appearance CLOUDY (A) 05/06/2017 01:30 PM    Specific gravity 1.015 05/06/2017 01:30 PM    pH (UA) 7.5 05/06/2017 01:30 PM    Protein >300 (A) 05/06/2017 01:30 PM    Glucose NEGATIVE  05/06/2017 01:30 PM    Ketone NEGATIVE  05/06/2017 01:30 PM    Bilirubin NEGATIVE  05/06/2017 01:30 PM    Urobilinogen 0.2 05/06/2017 01:30 PM    Nitrites NEGATIVE  05/06/2017 01:30 PM    Leukocyte Esterase NEGATIVE  05/06/2017 01:30 PM    Epithelial cells FEW 05/06/2017 01:30 PM    Bacteria NEGATIVE  05/06/2017 01:30 PM    WBC 5-10 05/06/2017 01:30 PM    RBC 0-5 05/06/2017 01:30 PM       MEDICATIONS:  Current Facility-Administered Medications   Medication Dose Route Frequency    dextrose 10% infusion  50 mL/hr IntraVENous CONTINUOUS    0.9% sodium chloride infusion 250 mL  250 mL IntraVENous PRN    acetylcysteine (ACETADOTE) 7,640 mg in dextrose 5% 1,000 mL infusion  100 mg/kg IntraVENous ONCE    lactulose (CHRONULAC) 10 gram/15 mL solution 30 mL  20 g Oral TID    epoetin cristian-epbx (RETACRIT) injection 10,000 Units  10,000 Units SubCUTAneous Q MON, WED & FRI    dexmedeTOMidine in 0.9 % NaCl (PRECEDEX) 400 mcg/100 mL (4 mcg/mL) infusion soln  0.1-1.5 mcg/kg/hr IntraVENous TITRATE    fentaNYL (PF) 1,500 mcg/30 mL (50 mcg/mL) infusion  0-200 mcg/hr IntraVENous TITRATE    midazolam (VERSED) injection 2 mg  2 mg IntraVENous Q1H PRN    chlorhexidine (ORAL CARE KIT) 0.12 % mouthwash 15 mL  15 mL Oral Q12H    piperacillin-tazobactam (ZOSYN) 3.375 g in 0.9% sodium chloride (MBP/ADV) 100 mL MBP  3.375 g IntraVENous Q12H    VANCOMYCIN INFORMATION NOTE   Other Rx Dosing/Monitoring    oxyCODONE IR (ROXICODONE) tablet 5 mg  5 mg Oral Q6H PRN    rifAXIMin (XIFAXAN) tablet 550 mg  550 mg Oral BID    alcohol 62% (NOZIN) nasal  1 Ampule  1 Ampule Topical Q12H    ondansetron (ZOFRAN) injection 4 mg  4 mg IntraVENous Q4H PRN    glucose chewable tablet 16 g  4 Tablet Oral PRN    glucagon (GLUCAGEN) injection 1 mg  1 mg IntraMUSCular PRN    naloxone (NARCAN) injection 0.4 mg  0.4 mg IntraMUSCular EVERY 2 MINUTES AS NEEDED    aspirin chewable tablet 81 mg  81 mg Oral DAILY    pantoprazole (PROTONIX) 40 mg in 0.9% sodium chloride 10 mL injection  40 mg IntraVENous DAILY    dextrose (D50W) injection syrg 12.5-25 g  12.5-25 g IntraVENous PRN    albumin human 25% (BUMINATE) solution 12.5 g  12.5 g IntraVENous DIALYSIS PRN    metoprolol tartrate (LOPRESSOR) tablet 25 mg  25 mg Oral Q12H    sodium chloride (NS) flush 5-40 mL  5-40 mL IntraVENous Q8H    sodium chloride (NS) flush 5-40 mL  5-40 mL IntraVENous PRN    polyethylene glycol (MIRALAX) packet 17 g  17 g Oral DAILY PRN    albuterol-ipratropium (DUO-NEB) 2.5 MG-0.5 MG/3 ML  3 mL Nebulization Q6H PRN

## 2021-08-27 NOTE — PROCEDURES
Hemodialysis / [phone]    Vitals Pre Post Assessment Pre Post   BP BP: 109/66 (08/27/21 1000) 96/56 LOC Awake, bites on vent Awake bits on vent   HR Pulse (Heart Rate): 75 (08/27/21 1000) 77 Lungs Intubated coarse Coarse, intubated   Resp Resp Rate: 23 (08/27/21 1000) 27 Cardiac NSR NSR   Temp Temp: 98.5 °F (36.9 °C) (08/27/21 1000) 98.4 Skin W/D W/D   Weight Pre-Dialysis Weight: 76.3 kg (168 lb 3.4 oz) (08/25/21 1815)  Edema Trace dependent edema Trace dependent edema   Tele status bedside NSR Pain Pain Intensity 1: 3 (08/27/21 0700)      Orders   Duration: Start: 10:24 End: 13:55 Total: 3.5   Dialyzer: Dialyzer/Set Up Inspection: Zonia Campuzano (08/25/21 1815)   K Bath: Dialysate K (mEq/L): 3 (08/25/21 1815)   Ca Bath: Dialysate CA (mEq/L): 2.5 (08/25/21 1815)   Na: Dialysate NA (mEq/L): 138 (08/25/21 1815)   Bicarb: Dialysate HCO3 (mEq/L): 35 (08/25/21 1815)   Target Fluid Removal: Goal/Amount of Fluid to Remove (mL): 0 mL (08/25/21 1815)     Access   Type & Location: Left arm AV fistula   Comments:                                   Accessed with two 15 gauge needles without difficulty.       Labs   HBsAg (Antigen) / date:                    8/22/21 negative                           HBsAb (Antibody) / date: 8/9/21 Immune   Source: [facility]   Obtained/Reviewed  Critical Results Called HGB   Date Value Ref Range Status   08/27/2021 7.4 (L) 12.1 - 17.0 g/dL Final     Potassium   Date Value Ref Range Status   08/27/2021 3.4 (L) 3.5 - 5.1 mmol/L Final     Calcium   Date Value Ref Range Status   08/27/2021 10.2 (H) 8.5 - 10.1 MG/DL Final     BUN   Date Value Ref Range Status   08/27/2021 30 (H) 6 - 20 MG/DL Final     Creatinine   Date Value Ref Range Status   08/27/2021 5.40 (H) 0.70 - 1.30 MG/DL Final     Comment:     INVESTIGATED PER DELTA CHECK PROTOCOL        Meds Given   Name Dose Route   Albumin 12.5% IV fistula 10:55               Adequacy / Fluid    Total Liters Process:    Net Fluid Removed: 013 Comments   Time Out Done:   (Time)    Admitting Diagnosis: Pulmonary Edema   Consent obtained/signed: Informed Consent Verified: Yes (08/25/21 1815)   Machine / RO # Machine Number: [id number] (08/25/21 7305)   Primary Nurse Rpt Pre: Dania Rousseau RN   Primary Nurse Rpt Post: Dania Rousseau RN   Pt Education: Pt is sedated/ procedural    Care Plan: Try to extubate today   Pts outpatient clinic: [facility]     Tx Summary  Time out and assessment performed and documented. Blood cultures drawn thru fistula per Dr. Jyoti Gibson. Left arm cleaned and accessed with two 15 gauge needles without difficulty. Treatment started at 10:24  10:53 BP dropped and Albumin given. 11:15 BP dropped to 82/59. Called and spoke with Dr. Jyoti Gibson and she stated to leave the UF off.   13:55 Treatment completed. All possible blood returned to patient. Needles removed and access held until hemostasis achieved. 20 minutes for venous site to stop bleeding and 10 for arterial using quick clot.       Comments:

## 2021-08-27 NOTE — PROGRESS NOTES
NAME: Charlotte Augustine        :  1965        MRN:  [id number]               Assessment   :                                               Plan:  ESRD-  AVF  Anemia  Liver failure  Acute respiratory failure  Lactic acidosis  Fungemia  HTN     H/o poor adherence with medical advice MWF-[facility] [place]; HD today     Soft BP    Send blood culture from AVF per ID request    liver failure, on vent    H/H not at goal.  Continue retacrit. Prn prbc's    Will see again on Monday, but please call with questions or changes in the meantime.              Subjective:     Chief Complaint:  Intubated. The patient was seen on dialysis at 10:30 AM .  BP is stable. Access is working well. Review of Systems:    Symptom Y/N Comments  Symptom Y/N Comments   Fever/Chills    Chest Pain     Poor Appetite    Edema     Cough    Abdominal Pain     Sputum    Joint Pain     SOB/WARNER    Pruritis/Rash     Nausea/vomit    Tolerating PT/OT     Diarrhea    Tolerating Diet     Constipation    Other       Could not obtain due to:      Objective:     VITALS:   Last 24hrs VS reviewed since prior progress note.  Most recent are:  Visit Vitals  BP (!) 107/58   Pulse 74   Temp 98.6 °F (37 °C)   Resp 27   Ht 5' 9\" (1.753 m)   Wt 87.7 kg (193 lb 5.5 oz)   SpO2 90%   BMI 28.55 kg/m²       Intake/Output Summary (Last 24 hours) at 2021 0507  Last data filed at 2021 0500  Gross per 24 hour   Intake 5001.17 ml   Output    Net 5001.17 ml      Telemetry Reviewed:     PHYSICAL EXAM:  General: In icu, intubated    No edema    Lab Data Reviewed: (see below)    Medications Reviewed: (see below)    PMH/SH reviewed - no change compared to H&P  ________________________________________________________________________  Care Plan discussed with:  Patient     Family      RN     Care Manager                    Consultant:          Comments   >50% of visit spent in counseling and coordination of care       ________________________________________________________________________  Luella Angelucci, MD     Procedures: see electronic medical records for all procedures/Xrays and details which  were not copied into this note but were reviewed prior to creation of Plan. LABS:  Recent Labs     08/27/21 0344 08/26/21  0450   WBC 20.7* 13.9*   HGB 7.4* 5.9*   HCT 23.4* 18.8*   * 102*     Recent Labs     08/27/21  0344 08/26/21  0450 08/24/21  2319    138 135*   K 3.4* 3.4* 4.4   CL 98 97 100   CO2 26 28 20*   BUN 30* 21* 32*   CREA 5.40* 4.01* 6.48*   GLU 78 144* 209*   CA 10.2* 9.2 9.1     Recent Labs     08/27/21  0344 08/26/21  0450 08/25/21  0715   * 145* 170*   TP 5.1* 5.5* 6.6   ALB 1.3* 1.6* 2.0*   GLOB 3.8 3.9 4.6*     Recent Labs     08/27/21  0344 08/25/21  0800   INR 2.4* 5.7*   PTP 24.3* 55.5*      Recent Labs     08/25/21  1139   TIBC 150*   PSAT INDETERMINATE - SENT TO REFERENCE LAB FOR ADDITIONAL TESTING. No results found for: FOL, RBCF   Recent Labs     08/26/21  1535 08/26/21  1131   PH 7.51* 7.54*   PCO2 37 30*   PO2 74* 84     No results for input(s): CPK, CKMB in the last 72 hours.     No lab exists for component: TROPONINI  No components found for: [place]  Lab Results   Component Value Date/Time    Color YELLOW/STRAW 05/06/2017 01:30 PM    Appearance CLOUDY (A) 05/06/2017 01:30 PM    Specific gravity 1.015 05/06/2017 01:30 PM    pH (UA) 7.5 05/06/2017 01:30 PM    Protein >300 (A) 05/06/2017 01:30 PM    Glucose NEGATIVE  05/06/2017 01:30 PM    Ketone NEGATIVE  05/06/2017 01:30 PM    Bilirubin NEGATIVE  05/06/2017 01:30 PM    Urobilinogen 0.2 05/06/2017 01:30 PM    Nitrites NEGATIVE  05/06/2017 01:30 PM    Leukocyte Esterase NEGATIVE  05/06/2017 01:30 PM    Epithelial cells FEW 05/06/2017 01:30 PM    Bacteria NEGATIVE  05/06/2017 01:30 PM    WBC 5-10 05/06/2017 01:30 PM    RBC 0-5 05/06/2017 01:30 PM       MEDICATIONS:  Current Facility-Administered Medications   Medication Dose Route Frequency    dextrose 10% infusion  50 mL/hr IntraVENous CONTINUOUS    0.9% sodium chloride infusion 250 mL  250 mL IntraVENous PRN    acetylcysteine (ACETADOTE) 7,640 mg in dextrose 5% 1,000 mL infusion  100 mg/kg IntraVENous ONCE    lactulose (CHRONULAC) 10 gram/15 mL solution 30 mL  20 g Oral TID    epoetin cristian-epbx (RETACRIT) injection 10,000 Units  10,000 Units SubCUTAneous Q MON, WED & FRI    dexmedeTOMidine in 0.9 % NaCl (PRECEDEX) 400 mcg/100 mL (4 mcg/mL) infusion soln  0.1-1.5 mcg/kg/hr IntraVENous TITRATE    fentaNYL (PF) 1,500 mcg/30 mL (50 mcg/mL) infusion  0-200 mcg/hr IntraVENous TITRATE    midazolam (VERSED) injection 2 mg  2 mg IntraVENous Q1H PRN    chlorhexidine (ORAL CARE KIT) 0.12 % mouthwash 15 mL  15 mL Oral Q12H    piperacillin-tazobactam (ZOSYN) 3.375 g in 0.9% sodium chloride (MBP/ADV) 100 mL MBP  3.375 g IntraVENous Q12H    VANCOMYCIN INFORMATION NOTE   Other Rx Dosing/Monitoring    oxyCODONE IR (ROXICODONE) tablet 5 mg  5 mg Oral Q6H PRN    rifAXIMin (XIFAXAN) tablet 550 mg  550 mg Oral BID    alcohol 62% (NOZIN) nasal  1 Ampule  1 Ampule Topical Q12H    ondansetron (ZOFRAN) injection 4 mg  4 mg IntraVENous Q4H PRN    glucose chewable tablet 16 g  4 Tablet Oral PRN    glucagon (GLUCAGEN) injection 1 mg  1 mg IntraMUSCular PRN    naloxone (NARCAN) injection 0.4 mg  0.4 mg IntraMUSCular EVERY 2 MINUTES AS NEEDED    aspirin chewable tablet 81 mg  81 mg Oral DAILY    pantoprazole (PROTONIX) 40 mg in 0.9% sodium chloride 10 mL injection  40 mg IntraVENous DAILY    dextrose (D50W) injection syrg 12.5-25 g  12.5-25 g IntraVENous PRN    albumin human 25% (BUMINATE) solution 12.5 g  12.5 g IntraVENous DIALYSIS PRN    metoprolol tartrate (LOPRESSOR) tablet 25 mg  25 mg Oral Q12H    sodium chloride (NS) flush 5-40 mL  5-40 mL IntraVENous Q8H    sodium chloride (NS) flush 5-40 mL  5-40 mL IntraVENous PRN    polyethylene glycol (MIRALAX) packet 17 g  17 g Oral DAILY PRN    albuterol-ipratropium (DUO-NEB) 2.5 MG-0.5 MG/3 ML  3 mL Nebulization Q6H PRN

## 2021-08-27 NOTE — PROGRESS NOTES
08/27/21 0520 08/27/21 0535 08/27/21 0550   ABCDEF Bundle   SBT Safety Screen Passed Yes  --   --    SBT Trial Passed  --   --  Yes   Weaning Parameters   Resp Rate Observed  --  24 27   Ve  --  11.2 8.8   VT  --  360 261   RSBI  --  01 91

## 2021-08-28 NOTE — PROGRESS NOTES
Bedside shift change report given to Liliana, RN and Vanessa BHAKTA (oncoming nurse) by Kat Posey RN (offgoing nurse). Report included the following information SBAR, Kardex, ED Summary, [facility] and Recent Results. 2000-initial assessment complete, see flowsheet. 2030-BG in L hand via fingerstick 40, R hand via fingerstick 20, BG via central line,  will continue to monitor. 2246-BG 68, checked again recheck 71, D50 12.5 g given. 2344-Recheck 160.     0000-reassessment complete, see flowsheet. 7617-Y9 sats in 80's, Taye Cortés NP notified, ABG obtained by RT, results given to NP. FiO2 increased to 50%. 0218-BG 73, MIQUEL Romero NP, notified. Recommend D20 IVF at 50ml/hr, call placed to pharmacy to see if IVF available. 0400-assessment complete, see flowsheet. 0418- BG 61 per central line, D50 12.5g given, recheck 123. D20 arrived from pharmacy, administered, see MAR.    0500-D. Kait May NP notified of MAP in 50's and SBP between 80's-90's. Orders for Levophed placed. Also notified of Hgb of 6.7, orders for 1 unit PRBC's placed. 0530-D. Kait May NP attempted to reach family for blood consent, no success. Per Taye Cortés NP okay to hold off giving blood for now, need consent and then give. Will notify day nurse. 0700-Levophed started at 4mcg/min, see MAR.    0730-Bedside shift change report given to Lamont Costa, YONY and Alfredo Antonio RN (oncoming nurse) by Marcela Cassidy RN and Gilford Reason, RN (offgoing nurse). Report included the following information SBAR, Kardex, ED Summary, Intake/Output, MAR and Recent Results.

## 2021-08-28 NOTE — PROGRESS NOTES
[facility]      Name: Gigi Crane   : 1965   MRN: [id number]   Date: 2021      Assessment:   Brief patient summary:  55 M with PMH of HTN, ESRD on HD admitted  to the Hospitalist Service  with dyspnea, pulmonary edema, hyperkalemia, uremic encephalopathy after recent hospitalization for same. Underwent urgent HD with some improvement. Transferred to ICU  with depressed LOC and hypoglycemia. Emergently intubated due to decreased LOC and was felt to have aspirated. An empty bottle of oxycodone was found in his pocket raising concern for opioid OD. During his ICU stay, he has remained minimally responsive. He has developed progressive acute liver failure for which he was treated with IV NAC. Blood cultures drawn  positive for Candida glabrata. ICU course has been complicated by recurrent hypoglycemia    Hospital course/major results:   CT chest/abd.pelvis: Small right-sided pleural effusion with cardiomegaly and coronary artery disease. Moderate centrilobular emphysematous change. No evidence of pulmonary embolism. No aortic aneurysm or dissection. Colonic wall thickening and mild hyperemia of colonic and small bowel mucosa may be related to mild gastroenteritis. No additional acute process is identified in the chest, abdomen or pelvis   Echocardiogram: Estimated LVEF is 40 - 45%. Normal cavity size. Mild concentric hypertrophy. Mildly dilated right ventricle. Borderline low systolic function. Mitral valve non-specific thickening. Mild to moderate mitral valve regurgitation is present. Mild to moderate tricuspid valve regurgitation is present. Mild to moderate pulmonary hypertension. Pulmonary arterial systolic pressure is 40 mmHg.  US LUE fistula: AV fistula, of the left upper extremity; widely patent with no evidence of hemodynamically significant stenosis. Incidental finding of a patent stent in the axillary region of the AV fistula.   No evidence of oli-fistula fluid. 08/28 CTH: no acute intracranial findings  08/28 Minimally responsive off all sedatives other than dexmedetomidine. Frequently gnawing on ETT. Minimal spontaneous movement    Lines/tubes/devices:  ETT 08/18 >>   L IJ CVL 08/18 >>     Active Problems: (By systems)  PULMONARY:  1. Ventilator dependent respiratory failure - presently cannot extubate due to AMS  2. Pulm edema with R pleural effusion    Current ventilator settings: PS 5 cm H2O    CARDIOVASCULAR/HEMODYNAMIC:  3. Vasopressor dependent hypotension    Current vasopressors: NE 3 mcg/min    RENAL:  4. ESRD on chronic HD    Current RRT: IHD    GI/NUTRITION:  5. Recurrent hypoglycemia - on D20W infusion @ 25 cc/hr  6. Acute liver failure - severe  7. Heme positive stool 08/28 (per RN report)    Current nutritional support: TFs - renal formula    INFECTIOUS DISEASE  8. Severe sepsis  9. Candidemia - unclear source    Micro:  Blood 08/18 >> C glabrata in 1/4 bottles  Blood 08/21 >> NGTD >>   Blood 08/25 >> NGTD >>   Blood 08/27 >> NGTD >>     Antibiotics:  Pip-tazo  Anidulafungin    HEME  10. Acute blood loss anemia  a. Received 1 unit RBCs 08/26  b. Received one unit RBCs 08/28  11. Macrocytosis  12. Moderate thrombocytopenia  13. H/O DVT    DVT prophylaxis: SCDs    NEURO  14. Acute encephalopathy/TME - likely hepatic. Possibly component of hypoglycemic and/or hypoxic injury @ time of intubation    RASS goal: 0  Current sedatives: dex  Current analgesics: none      OTHER  15. Very poor prognosis    GOALS OF CARE/ADVANCED DIRECTIVES  16. CODE STATUS: DNR in event of cardiac arrest  17. ICU Comprehensive Plan of Care:     Plans for this Shift:    1. Ventilator settings reviewed with RT and adjusted as indicated  2. Daily SBT if meets criteria  3. ICU hemodynamic monitoring  4. MAP goal > 55 mmHg  5. SBP goal > 90 mmHg  6. Monitor chemistry panels daily  7. Correct electrolytes as indicated  8. HD schedule per Nephrology  9.  Not a candidate for CRRT - see ACP note from 08/28  10. Nutrition: TFs - no advance  11. SUP: IV PPI  12. Glycemic control: N/I - requiring dextrose infusion for hypoglycemia  13. Micro and antibiotics as documented above  14. ID service following  15. Transfuse as needed to maintain Hgb > 7.0  16. CT head ordered 08/28 - results reviewed  17. EEG ordered to be done 08/31  18. Continue lactulose, rifaximin  19. Brother, Last Lemon, updated in detail @ bedside. I explained the patient's multitude of severe problems and very poor likelihood of good survival. I indicated that we are preventing the patient from dying but we are not likely to be able to restore him to a functional state of being. I recommended DNR in event of cardiac arrest. Last Lemon agreed with this recommendation. Further, I suggested that we not continue this level of support for more than a couple or few more days. If no substantial improvement in that interval, I recommended that we proceed with compassionate extubation and comfort measures only      HPI/Consult/Subjective:   24 Hr Events 8/28/2021:   Transfused one unit RBCs this AM    SUBJ:   Minimally responsive off all sedatives other than dexmedetomidine. Frequently gnawing on ETT. Minimal spontaneous movement    Past Medical History:      has a past medical history of Chronic kidney disease, Dialysis patient ([facility] [address] and Hypertension. Past Surgical History:      has a past surgical history that includes hx vascular access (Left). Home Medications:     Prior to Admission medications    Medication Sig Start Date End Date Taking? Authorizing Provider   methadone (DOLOPHINE) 10 mg/mL solution Take 160 mg by mouth daily. Yes Provider, Historical   metoprolol tartrate (LOPRESSOR) 25 mg tablet Take 1 Tablet by mouth every twelve (12) hours for 30 days. 8/14/21 9/13/21  Cayetano Castro MD   pantoprazole (PROTONIX) 40 mg tablet Take 1 Tablet by mouth daily for 30 days.  8/14/21 9/13/21  Samir Sit, MD Bernard   sevelamer (RenageL) 400 mg tablet Take 2 Tablets by mouth three (3) times daily (with meals) for 30 days. 8/14/21 9/13/21  Brandon Vega MD   isosorbide mononitrate ER (IMDUR) 30 mg tablet Take 1 Tablet by mouth every morning for 30 days. 8/14/21 9/13/21  Brandon Vega MD   citalopram (CELEXA) 40 mg tablet Take 1 Tablet by mouth daily for 30 days. 8/14/21 9/13/21  Brandon Vega MD   atorvastatin (LIPITOR) 40 mg tablet Take 1 Tablet by mouth nightly for 30 days. 8/14/21 9/13/21  Brandon Vega MD   apixaban (ELIQUIS) 5 mg tablet Take 1 Tablet by mouth two (2) times a day for 30 days. 8/14/21 9/13/21  Brandon Vega MD   paricalcitoL (ZEMPLAR) 1 mcg capsule Take 1 Capsule by mouth daily for 30 days. 8/14/21 9/13/21  Brandon Vega MD   lidocaine (LIDODERM) 5 % Apply patch to the affected area for 12 hours a day and remove for 12 hours a day. 11/28/17   Kasi Salvador MD   aspirin delayed-release 81 mg tablet Take 1 Tab by mouth daily. 11/28/17   Kasi Salvador MD   multivitamin (ONE A DAY) tablet Take 1 Tab by mouth daily. Juan Olivarez MD   gabapentin (NEURONTIN) 100 mg capsule Take 100 mg by mouth daily. Juan Olivarez MD       Allergies/Social/Family History: Allergies   Allergen Reactions    Motrin [Ibuprofen] Hives     7/16/17 Pt denies allergy    Tylenol [Acetaminophen] Hives     7/16/17 Pt denies allergy      Social History     Tobacco Use    Smoking status: Current Every Day Smoker     Packs/day: 0.25    Smokeless tobacco: Never Used   Substance Use Topics    Alcohol use: Yes      Family History   Problem Relation Age of Onset    No Known Problems Mother     No Known Problems Father     No Known Problems Sister     No Known Problems Brother     No Known Problems Sister     No Known Problems Sister     No Known Problems Brother            Objective:   Vital Signs:  Visit Vitals  /67 (BP 1 Location: Right upper arm, BP Patient Position:  At rest)   Pulse 83   Temp 99.3 °F (37.4 °C)   Resp (!) 32   Ht 5' 9\" (1.753 m)   Wt 87.7 kg (193 lb 5.5 oz)   SpO2 100%   BMI 28.55 kg/m²    O2 Flow Rate (L/min): 2 l/min O2 Device: Endotracheal tube Temp (24hrs), Av.9 °F (37.2 °C), Min:97.9 °F (36.6 °C), Max:99.3 °F (37.4 °C)           Intake/Output:     Intake/Output Summary (Last 24 hours) at 2021 1728  Last data filed at 2021 1605  Gross per 24 hour   Intake 2845.12 ml   Output    Net 2845.12 ml       Physical Exam:  GEN: Minimally responsive, gnawing on ETT, not F/C, no blink to threat  HEENT: NCAT, severe scleral icterus, ETT present  NECK: JVP not visualized  CHEST: Clear anteriorly  CARDIAC: NSR, reg, no M  ABD: Soft, NABS  EXT: warm, no edema  NEURO: no spontaneous movement. Minimal withdrawal. PERRL  DERM: No lesions noted    I have examined the patient on this day 2021 and the above documented exam is accurate including the components that have been copied forward    LABS AND  DATA: Personally reviewed  Recent Labs     21   WBC 30.9* 20.7*   HGB 6.5* 7.4*   HCT 20.5* 23.4*   PLT 81* 103*     Recent Labs     21   * 137   K 4.0 3.4*   CL 98 98   CO2 22 26   BUN 21* 30*   CREA 4.00* 5.40*   GLU 38* 78   CA 9.8 10.2*     Recent Labs     21   * 166*   TP 4.8* 5.1*   ALB 1.3* 1.3*   GLOB 3.5 3.8     Recent Labs     21  034   INR 2.3* 2.4*   PTP 22.9* 24.3*      No results for input(s): PHI, PCO2I, PO2I, FIO2I in the last 72 hours. No results for input(s): CPK, CKMB, TROIQ, BNPP in the last 72 hours.     Hemodynamics:   PAP:   CO:     Wedge:   CI:     CVP:    SVR: BP 2: (!) 160/99 (21 1500)     PVR:       Ventilator Settings:  Mode Rate Tidal Volume Pressure FiO2 PEEP   CPAP   420 ml  5 cm H2O 50 % 5 cm H20     Peak airway pressure: 12 cm H2O    Minute ventilation: 16.9 l/min        MEDS: Reviewed    Chest X-Ray:  CXR Results (Last 48 hours)               08/27/21 1757  XR CHEST PORT Final result    Impression:      1. Significant increased moderate layering right pleural effusion with hazy   right mid and lower lung atelectasis versus airspace disease. New small left   pleural effusion with hazy left basilar volume loss. 2.  Worsening pulmonary edema pattern. Narrative:  EXAM:  XR CHEST PORT       INDICATION: Vent dep       COMPARISON: Chest radiograph 8/25/2021       TECHNIQUE: Semiupright portable chest AP view       FINDINGS:        Interval placement of enteric tube extending inferiorly off the field-of-view. Stable ET tube and left jugular approach central venous catheter. Cardiothymic silhouette is stably enlarged. Perivascular congestion and moderate   to severe edema pattern, worsened. Significant increased moderate layering right pleural effusion with hazy right   mid and lower lung atelectasis versus airspace disease. New small left pleural   effusion with hazy left basilar volume loss. No discernible pneumothorax. Multidisciplinary Rounds Completed:  Yes      SPECIAL EQUIPMENT  IHD    DISPOSITION  Stay in ICU    CRITICAL CARE CONSULTANT NOTE  I had a in-person encounter with Kurt Dowell, reviewed and interpreted patient data including events, labs, images, vital signs, I/O's, and examined patient. I have discussed the case and the plan and management of the patient's care with the consulting services, the bedside nurses and the respiratory therapist.      NOTE OF PERSONAL INVOLVEMENT IN CARE   This patient is at high risk for sudden and clinically significant deterioration, which requires the highest level of preparedness to intervene urgently. I participated in the decision-making and personally managed or directed the management of the following life and organ supporting interventions that required my frequent assessment to treat or prevent imminent deterioration.     I personally spent 45 minutes of critical care time. This is time spent at patient's bedside actively involved in patient care as well as the coordination of care and discussions with the patient's family. This does not include any procedural time which has been billed separately.     Goran Ochoa MD  Pulmonary/[facility]  [phone]  8/28/2021

## 2021-08-28 NOTE — PROGRESS NOTES
Infectious Disease progress      IMPRESSION:       -Acute hypoxic respiratory failure remains intubated    -Acute hepatic failure with encephalopathy  -Transaminitis AST>2000, ALT 1328 , ammonia 10   Elevated bilirubin total-16.1 , Direct 10.9   ? Shock liver.    -Hepatitis C ?chronic/  HCV PCR pending    -Fungemia   Blood cultures8/18 + for C.glabrata 1/4 ? source  MICs pending  Negative repeat blood cultures-8/21, 8/25   TTE-negative. No history of fevers. etiology-? abdomen ? IV lines,  HD access-less likely, no malfunction/US-noticed stenosis, no fluid.    -Leucocytosis - reactive,secondary to sepsis   Wbc 20.7    - Acute anemia Hb 5.9   No GI bleed. ? Hemolysis. T.bilirubin 11.4     For direct bilirubin, stool hemoccult. CT abdomen -Colonic wall thickening and mild hyperemia of colonic and small bowel mucosa may  be related to mild gastroenteritis. ?s/p  GI evaluation- Findings in small bowel/colon are reactive and not primary process. -ESRD on HD     -HIV negative    - Recurrent hypoglycemia, related to acute liver injury    -Acute hypoxic respiratory failure 2ry to fluid overload  S/p intubation,extubated. S/p LLL infiltrate on CXR. Completed treatment for HAP    -Suspected drug overdose while admitted (empty oxycodone bottle found in pt pocket, pt on methadone)    -Current smoker 1/4 PPD    - H/o HTN    - H/o DVT    - Lines - TLC 8/18         PLAN:        -Continue  Zosyn IV, Anidulafungin  --Change lines  - Await repeat fungal blood cultures, BC from HD  - HCV RNA   - Respiratory cultures  - Pt will require Ophthalmology exam at some point to evaluate for Candida endophthalmitis. - Please contact ID on call with questions. Patient seen today. in ICU . Undergoing HD  D/w RN events of the day. Mr. Tara Huynh is  a 55 yr old male with   past medical history of end-stage renal disease on dialysisMonday, Wednesday and Friday, hypertension, hyperlipidemia, GERD, neuropathy.  He was admitted on 8/16 with hyperkalemia, shortness of breath and altered mental status. Patient received emergent HD & his K and respiratory failure had improved but patient again was minimally responsive with ongoing hypoglycemia , lost peripheral IV access. He was also found to have empty bottle of oxycodone in his pocket and possible ingestion to explain this acute change in mental status, he was given naracan and then dextrose , therafter developed respiratory distress. Rapid response was called. Decision was made to intubate him due to severe resp distress and encephalopathy and patient transferred to ICU for further care. On 8/20  Pt  self extubated . He became increasingly more lethargic over the afternoon but responded well to 2 doses of narcan. Pt was transferred to floor . Blood cultures done on 8/18 + for C.glabrata 1/4 . Repeat BC - NG      Patient Active Problem List   Diagnosis Code    Pulmonary edema J81.1    ESRD on dialysis ([facility] N18.6, Z99.2    Hypertension I10    Tobacco abuse Z72.0    Hypoxia R09.02    SOB (shortness of breath) R06.02    ESRD (end stage renal disease) on dialysis ([facility]) N18.6, Z99.2    Hyperkalemia E87.5    Acute on chronic combined systolic and diastolic CHF (congestive heart failure) ([facility]) I50.43     Past Medical History:   Diagnosis Date    Chronic kidney disease     Dialysis patient ([facility]     Hypertension       Family History   Problem Relation Age of Onset    No Known Problems Mother     No Known Problems Father     No Known Problems Sister     No Known Problems Brother     No Known Problems Sister     No Known Problems Sister     No Known Problems Brother       Social History     Tobacco Use    Smoking status: Current Every Day Smoker     Packs/day: 0.25    Smokeless tobacco: Never Used   Substance Use Topics    Alcohol use: Yes     Past Surgical History:   Procedure Laterality Date    HX VASCULAR ACCESS Left     LUE AV fistula      Prior to Admission medications    Medication Sig Start Date End Date Taking? Authorizing Provider   methadone (DOLOPHINE) 10 mg/mL solution Take 160 mg by mouth daily. Yes Provider, Historical   metoprolol tartrate (LOPRESSOR) 25 mg tablet Take 1 Tablet by mouth every twelve (12) hours for 30 days. 8/14/21 9/13/21  Tasia Pacheco MD   pantoprazole (PROTONIX) 40 mg tablet Take 1 Tablet by mouth daily for 30 days. 8/14/21 9/13/21  Tasia Pacheco MD   sevelamer (RenageL) 400 mg tablet Take 2 Tablets by mouth three (3) times daily (with meals) for 30 days. 8/14/21 9/13/21  Tasia Pacheco MD   isosorbide mononitrate ER (IMDUR) 30 mg tablet Take 1 Tablet by mouth every morning for 30 days. 8/14/21 9/13/21  Tasia Pacheco MD   citalopram (CELEXA) 40 mg tablet Take 1 Tablet by mouth daily for 30 days. 8/14/21 9/13/21  Tasia Pacheco MD   atorvastatin (LIPITOR) 40 mg tablet Take 1 Tablet by mouth nightly for 30 days. 8/14/21 9/13/21  Tasia Pacheco MD   apixaban (ELIQUIS) 5 mg tablet Take 1 Tablet by mouth two (2) times a day for 30 days. 8/14/21 9/13/21  Tasia Pacheco MD   paricalcitoL (ZEMPLAR) 1 mcg capsule Take 1 Capsule by mouth daily for 30 days. 8/14/21 9/13/21  Tasia Pacheco MD   lidocaine (LIDODERM) 5 % Apply patch to the affected area for 12 hours a day and remove for 12 hours a day. 11/28/17   Serenity Espino MD   aspirin delayed-release 81 mg tablet Take 1 Tab by mouth daily. 11/28/17   Serenity Espino MD   multivitamin (ONE A DAY) tablet Take 1 Tab by mouth daily. Juan Olivarez MD   gabapentin (NEURONTIN) 100 mg capsule Take 100 mg by mouth daily. Juan Olivarez MD     Allergies   Allergen Reactions    Motrin [Ibuprofen] Hives     7/16/17 Pt denies allergy    Tylenol [Acetaminophen] Hives     7/16/17 Pt denies allergy        Review of Systems:  A comprehensive review of systems was negative. 14 point review of systems obtained .  All other systems negative    Objective:   Blood pressure (!) 90/57, pulse 79, temperature 98.7 °F (37.1 °C), resp. rate 23, height 5' 9\" (1.753 m), weight 193 lb 5.5 oz (87.7 kg), SpO2 91 %.   Temp (24hrs), Av.4 °F (36.9 °C), Min:97.6 °F (36.4 °C), Max:98.7 °F (37.1 °C)    Current Facility-Administered Medications   Medication Dose Route Frequency    dextrose 10% infusion  50 mL/hr IntraVENous CONTINUOUS    [START ON 2021] anidulafungin (ERAXIS) 100 mg in 0.9% sodium chloride 130 mL IVPB  100 mg IntraVENous Q24H    [START ON 2021] lactulose (CHRONULAC) 10 gram/15 mL solution 30 mL  20 g Per G Tube BID    rifAXIMin (XIFAXAN) tablet 550 mg  550 mg Per G Tube BID    0.9% sodium chloride infusion 250 mL  250 mL IntraVENous PRN    epoetin cristian-epbx (RETACRIT) injection 10,000 Units  10,000 Units SubCUTAneous Q MON, WED & FRI    dexmedeTOMidine in 0.9 % NaCl (PRECEDEX) 400 mcg/100 mL (4 mcg/mL) infusion soln  0.1-1.5 mcg/kg/hr IntraVENous TITRATE    fentaNYL (PF) 1,500 mcg/30 mL (50 mcg/mL) infusion  0-200 mcg/hr IntraVENous TITRATE    midazolam (VERSED) injection 2 mg  2 mg IntraVENous Q1H PRN    chlorhexidine (ORAL CARE KIT) 0.12 % mouthwash 15 mL  15 mL Oral Q12H    piperacillin-tazobactam (ZOSYN) 3.375 g in 0.9% sodium chloride (MBP/ADV) 100 mL MBP  3.375 g IntraVENous Q12H    oxyCODONE IR (ROXICODONE) tablet 5 mg  5 mg Oral Q6H PRN    alcohol 62% (NOZIN) nasal  1 Ampule  1 Ampule Topical Q12H    ondansetron (ZOFRAN) injection 4 mg  4 mg IntraVENous Q4H PRN    naloxone (NARCAN) injection 0.4 mg  0.4 mg IntraMUSCular EVERY 2 MINUTES AS NEEDED    aspirin chewable tablet 81 mg  81 mg Oral DAILY    pantoprazole (PROTONIX) 40 mg in 0.9% sodium chloride 10 mL injection  40 mg IntraVENous DAILY    dextrose (D50W) injection syrg 12.5-25 g  12.5-25 g IntraVENous PRN    albumin human 25% (BUMINATE) solution 12.5 g  12.5 g IntraVENous DIALYSIS PRN    metoprolol tartrate (LOPRESSOR) tablet 25 mg  25 mg Oral Q12H    sodium chloride (NS) flush 5-40 mL  5-40 mL IntraVENous Q8H    sodium chloride (NS) flush 5-40 mL  5-40 mL IntraVENous PRN    polyethylene glycol (MIRALAX) packet 17 g  17 g Oral DAILY PRN    albuterol-ipratropium (DUO-NEB) 2.5 MG-0.5 MG/3 ML  3 mL Nebulization Q6H PRN        Exam:    General:  Awake, cooperative,    Eyes:  Sclera anicteric. Pupils equally round and reactive to light. Mouth/Throat: Mucous membranes normal, oral pharynx  clear   Neck: Supple   Lungs:   Reduced auscultation bases   CV:  Regular rate and rhythm,no murmur, click, rub or gallop   Abdomen:   Soft, non-tender. bowel sounds normal. non-distended   Extremities: No  Edema, LUE - AV fistula   Skin: Skin color, texture, turgor normal. no acute rash or lesions   Lymph nodes: Cervical and supraclavicular normal   Musculoskeletal: No swelling or deformity   Lines/Devices:  Intact, no erythema, drainage or tenderness   Psych: Awake  and oriented x 4        Data Reviewed:   CBC:   Recent Labs     08/27/21  0344 08/26/21 0450 08/25/21  0800 08/24/21 2319 08/24/21 2319   WBC 20.7* 13.9* 17.5*   < > 15.9*   RBC 2.23* 1.80* 2.28*   < > 2.08*   HGB 7.4* 5.9* 7.5*   < > 6.7*   HCT 23.4* 18.8* 25.7*   < > 22.9*   * 102* 119*   < > 125*   GRANS  --   --  89*  --  67   LYMPH  --   --  7*  --  24   EOS  --   --  0  --  0    < > = values in this interval not displayed.      CMP:   Recent Labs     08/27/21  0344 08/26/21  0450 08/25/21  0715 08/24/21 2319 08/24/21 2319   GLU 78 144*  --   --  209*    138  --   --  135*   K 3.4* 3.4*  --   --  4.4   CL 98 97  --   --  100   CO2 26 28  --   --  20*   BUN 30* 21*  --   --  32*   CREA 5.40* 4.01*  --   --  6.48*   CA 10.2* 9.2  --   --  9.1   AGAP 13 13  --   --  15   BUCR 6* 5*  --   --  5*   * 145* 170*   < > 159*  160*   TP 5.1* 5.5* 6.6   < > 5.9*  5.9*   ALB 1.3* 1.6* 2.0*   < > 1.7*  1.7*   GLOB 3.8 3.9 4.6*   < > 4.2*  4.2*   AGRAT 0.3* 0.4* 0.4*   < > 0.4*  0.4*    < > = values in this interval not displayed. Lab Results   Component Value Date/Time    Culture result: NO GROWTH 2 DAYS 08/25/2021 09:46 PM    Culture result: NO GROWTH 2 DAYS 08/25/2021 08:00 AM    Culture result: NO GROWTH 6 DAYS 08/21/2021 06:17 PM    Culture result: NO GROWTH 5 DAYS 08/21/2021 01:43 PM    Culture result: (A) 08/18/2021 09:37 AM     SAMI GLABRATA GROWING IN 1 OF 4 BOTTLES DRAWN NO SITE INDICATED     Culture result: REMAINING BOTTLE(S) HAS/HAVE NO GROWTH SO FAR 08/18/2021 09:37 AM    Culture result:  08/18/2021 09:37 AM     DR Vonzell Holstein REQ YEAST SENSITIVITIES FOR FLUCONAZOLE, ANIDULAFUNGIN, AND VORICONAZOLE          XR Results (most recent)reviewed:  Results from [place] encounter on 08/16/21    XR CHEST PORT    Narrative  EXAM:  XR CHEST PORT    INDICATION: Vent dep    COMPARISON: Chest radiograph 8/25/2021    TECHNIQUE: Semiupright portable chest AP view    FINDINGS:    Interval placement of enteric tube extending inferiorly off the field-of-view. Stable ET tube and left jugular approach central venous catheter. Cardiothymic silhouette is stably enlarged. Perivascular congestion and moderate  to severe edema pattern, worsened. Significant increased moderate layering right pleural effusion with hazy right  mid and lower lung atelectasis versus airspace disease. New small left pleural  effusion with hazy left basilar volume loss. No discernible pneumothorax. Impression  1. Significant increased moderate layering right pleural effusion with hazy  right mid and lower lung atelectasis versus airspace disease. New small left  pleural effusion with hazy left basilar volume loss. 2.  Worsening pulmonary edema pattern. ICD-10-CM ICD-9-CM    1. Acute hyperkalemia  E87.5 276.7    2. ESRD needing dialysis (HCC)  N18.6 585.6     Z99.2     3. Accidental drug overdose, initial encounter  T50.901A 977.9      E858.9    4. Fungemia  B49 117.9    5.  Hypervolemia, unspecified hypervolemia type  E87.70 276.69    6. Infection due to Candida glabrata  B37.9 112.9    7. Acute pulmonary edema (HCC)  J81.0 518.4    8. Tobacco abuse  Z72.0 305.1    9. Chronic hepatitis C without hepatic coma (HCC)  B18.2 070.54    10. Colonic disorder  K63.9 569.9    11. Hypoglycemia  E16.2 251.2    12. Ischemic hepatitis  K75.9 570    13. Transaminitis  R74.01 790.4    14. Essential hypertension  I10 401.9    15. Acute liver failure with hepatic coma (HCC)  K72.01 570      572.2    16. Acute respiratory failure with hypoxia (HCC)  J96.01 518.81    17. Candida glabrata infection  B37.9 112.9    18. ESRD (end stage renal disease) on dialysis ([facility]  N18.6 585.6     Z99.2 V45.11    19. Acute on chronic combined systolic and diastolic CHF (congestive heart failure) ([facility])  I50.43 428.43      428.0            Antibiotic History    I have discussed the diagnosis with the patient and the intended plan as seen in the above orders. I have discussed medication side effects and warnings with the patient as well.     Reviewed test results at length with patient    Signed By: David Mata MD FACP     August 27, 2021

## 2021-08-28 NOTE — PROGRESS NOTES
0700- Received bedside shift report from YONY Ford and Gilford Reason, RN.     0800- Shift assessment complete, pt resting quietly in bed. Levo at 4 mcg/min, pt tolerating well with blood pressure of 121/77. Will continue to closely monitor. See flowsheets for more details. 1210- Reassessment complete, see flowsheets for details. Transfusion of PRBC's started, will continue to closely monitor pt. 200- Nepro started at goal of 21.     1300- Spoke with pt's brother, Spenser Melgoza, and confirmed pt's wishes for the mother to not receive information but mother able to visit today. Pt's mother currently at bedside. 1355- pt off the floor to CT    1428- pt returned to room from [address]- Unit of PRBC's complete, will continue to closely monitor. At bedside with pt, swelling on penis is continuing to increase, Dr. Alejandro Rucker aware. Venelex ordered. 36- Dr. Alejandro Rucker at bedside speaking with pt's brother. 1900- Bedside shift report given to YONY Ford and Gilford Reason, RN.

## 2021-08-28 NOTE — PROGRESS NOTES
Spiritual Care Assessment/Progress Note  [facility]      NAME: John Sanchez      MRN: [id number]  AGE: 55 y.o. SEX: male  Advent Affiliation: No Jehovah's witness   Language: English     8/28/2021     Total Time (in minutes): 12     Spiritual Assessment begun in [facility] CRITICAL CARE 2 through conversation with:         []Patient        [] Family    [] Friend(s)        Reason for Consult: Initial/Spiritual assessment, critical care     Spiritual beliefs: (Please include comment if needed)     [] Identifies with a chelita tradition:         [] Supported by a chelita community:            [] Claims no spiritual orientation:           [] Seeking spiritual identity:                [] Adheres to an individual form of spirituality:           [x] Not able to assess:                           Identified resources for coping:      [] Prayer                               [] Music                  [] Guided Imagery     [] Family/friends                 [] Pet visits     [] Devotional reading                         [x] Unknown     [] Other:                                           Interventions offered during this visit: (See comments for more details)    Patient Interventions: Initial visit           Plan of Care:     [x] Support spiritual and/or cultural needs    [] Support AMD and/or advance care planning process      [] Support grieving process   [] Coordinate Rites and/or Rituals    [] Coordination with community clergy   [] No spiritual needs identified at this time   [] Detailed Plan of Care below (See Comments)  [] Make referral to Music Therapy  [] Make referral to Pet Therapy     [] Make referral to Addiction services  [] Make referral to [facility]  [] Make referral to Spiritual Care Partner  [] No future visits requested        [x] Follow up upon further referrals     Comments:  Reviewed chart prior to visit in CCU for spiritual assessment. No family/friends present. Patient is currently intubated. Unable to assess for spiritual needs or concerns at this time.        EDWARDO Saravia, [facility], Staff [address]    [address] Paging Service  [phone]

## 2021-08-29 NOTE — PROGRESS NOTES
GI Progress Note Kehinde Summers)  NAME:Allan Thakur :1965 SNJ:[id number]   ATTG: Vargas Juarez MD  Prim GI: Pati Nicole MD  PCP: Lesley Delgado NP  Date/Time:  2021 2:38 PM   Assessment:   · Progressive Liver failure- likely multifactorial with some baseline liver injury from HCV (RNA PCR positive) exacerbated by ischemia. Notes HCV is unlikely to be source for acute liver failure alone. Persistent hepatic dysfunction with elevated INR, low albumin, rising bilirubin, jaundice noted. · Anemia- unlikely to be focal bleeding source for his anemia as INR remains elevated and progressive thrombocytopenia noted  · ESRD- on HD  · Drug abuse- tox screen positive  · Fungemia  · Respiratory failure     Plan:   · Poor overall prognosis in setting of acute liver injury on top of baseline commorbidities (ESRD on HD) with all factors pointing to worsening of his hepatic function. · He is not a transplant candidate in setting of active drug abuse and hx of missed medical compliance/adherence  · Continue BS antibiotics, Xifaxan, Lactulose, Vitamin K, albumin  · Caution with hepatoxic agents  · No EGD planned at this time  · Consider for x-ricky to hepatology center, with caveat that overall prognosis grim and not a transplant candidate     Subjective:   Discussed with RN events overnight. ATSP WKT service with noted persistent anemia, ongoing pressor and sedation requirements and reported blood in his OGT and FMS. He remains sedated and intubated.     Complaint Y/N Description   Abdominal Pain     Hematemesis     Hematochezia     Melena     Constipation     Diarrhea y    Dyspepsia     Dysphagia     Jaundiced y    Nausea/vomiting       Review of Systems:  Symptom Y/N Comments  Symptom Y/N Comments   Fever/Chills    Chest Pain     Cough    Headaches     Sputum    Joint Pain     SOB/WARNER    Pruritis/Rash     Tolerating Diet    Other       Could NOT obtain due to: Sedated/intubated     Objective:   VITALS:   Last 24hrs VS reviewed since prior progress note. Most recent are:  Visit Vitals  /60   Pulse 75   Temp 98.5 °F (36.9 °C)   Resp 21   Ht 5' 9\" (1.753 m)   Wt 91.5 kg (201 lb 11.5 oz)   SpO2 98%   BMI 29.79 kg/m²       Intake/Output Summary (Last 24 hours) at 8/29/2021 1438  Last data filed at 8/29/2021 1300  Gross per 24 hour   Intake 2816.99 ml   Output 200 ml   Net 2616.99 ml     PHYSICAL EXAM:  General: WD, WN. Cooperative, no acute distress    HEENT: NC, Atraumatic. PERRL. +icteric sclerae. +ETT, +OGT with thin orange output  Lungs:  CTA anteriorly. No Wheezing/Rhonchi/Rales. Heart:  Regular  rhythm,  No murmur/Rub/Gallops  Abdomen: Soft, Non distended, Non tender.  +hypoactive BS  Extremities: No c/c. Cool edematous extremities with anasarca  Skin:  Jaundiced, tattoos  Neurologic:  A/O to pain only. No acute neurological distress   Psych:   Unable to assess insight. Not agitated. Lab and Radiology Data Reviewed: (see below)    Medications Reviewed: (see below)  PMH/SH reviewed - no change compared to H&P  ________________________________________________________________________  Total time spent with patient: 20 minutes ________________________________________________________________________  Care Plan discussed with:  Patient    Family     RN y              Consultant:  Brenda Mcnamara MD     Procedures: see electronic medical records for all procedures/Xrays and details which were not copied into this note but were reviewed prior to creation of Plan.       LABS:  Recent Labs     08/29/21  0344 08/28/21 0412   WBC 29.6* 30.9*   HGB 6.5* 6.5*   HCT 20.0* 20.5*   PLT 57* 81*     Recent Labs     08/29/21 0344 08/28/21 0412 08/27/21 0344   * 133* 137   K 3.8 4.0 3.4*   CL 98 98 98   CO2 22 22 26   BUN 36* 21* 30*   CREA 5.34* 4.00* 5.40*   GLU 93 38* 78   CA 9.4 9.8 10.2*   MG 1.8  --   --    PHOS 5.1*  --   --      Recent Labs     08/29/21 0344 08/28/21 0412 08/27/21 0344   * 146* 166* TP 3.9* 4.8* 5.1*   ALB 1.1* 1.3* 1.3*   GLOB 2.8 3.5 3.8     Recent Labs     08/29/21  0344 08/28/21  0412 08/27/21  0344   INR 2.1* 2.3* 2.4*   PTP 21.1* 22.9* 24.3*      No results for input(s): FE, TIBC, PSAT, FERR in the last 72 hours. No results found for: FOL, RBCF  Recent Labs     08/28/21  0138 08/26/21  1535   PH 7.44 7.51*   PCO2 33* 37   PO2 64* 74*     No results for input(s): CPK, CKMB in the last 72 hours.     No lab exists for component: TROPONINI  Lab Results   Component Value Date/Time    Color YELLOW/STRAW 05/06/2017 01:30 PM    Appearance CLOUDY (A) 05/06/2017 01:30 PM    Specific gravity 1.015 05/06/2017 01:30 PM    pH (UA) 7.5 05/06/2017 01:30 PM    Protein >300 (A) 05/06/2017 01:30 PM    Glucose NEGATIVE  05/06/2017 01:30 PM    Ketone NEGATIVE  05/06/2017 01:30 PM    Bilirubin NEGATIVE  05/06/2017 01:30 PM    Urobilinogen 0.2 05/06/2017 01:30 PM    Nitrites NEGATIVE  05/06/2017 01:30 PM    Leukocyte Esterase NEGATIVE  05/06/2017 01:30 PM    Epithelial cells FEW 05/06/2017 01:30 PM    Bacteria NEGATIVE  05/06/2017 01:30 PM    WBC 5-10 05/06/2017 01:30 PM    RBC 0-5 05/06/2017 01:30 PM       MEDICATIONS:  Current Facility-Administered Medications   Medication Dose Route Frequency    0.9% sodium chloride infusion 250 mL  250 mL IntraVENous PRN    pantoprazole (PROTONIX) 40 mg in 0.9% sodium chloride 10 mL injection  40 mg IntraVENous Q12H    dextrose 20% in water (compounded by pharmacy)   IntraVENous CONTINUOUS    NOREPINephrine (LEVOPHED) 8 mg in 5% dextrose 250mL (32 mcg/mL) infusion  0.5-30 mcg/min IntraVENous TITRATE    phytonadione (vitamin K1) (AQUA-MEPHYTON) 10 mg in 0.9% sodium chloride 50 mL IVPB  10 mg IntraVENous DAILY    balsam peru-castor oiL (VENELEX) ointment   Topical BID    white petrolatum-mineral oiL (LACRILUBE S.O.P.) ointment   Both Eyes Q12H    anidulafungin (ERAXIS) 100 mg in 0.9% sodium chloride 130 mL IVPB  100 mg IntraVENous Q24H    lactulose (CHRONULAC) 10 gram/15 mL solution 30 mL  20 g Per G Tube BID    rifAXIMin (XIFAXAN) tablet 550 mg  550 mg Per G Tube BID    epoetin cristian-epbx (RETACRIT) injection 10,000 Units  10,000 Units SubCUTAneous Q MON, WED & FRI    dexmedeTOMidine in 0.9 % NaCl (PRECEDEX) 400 mcg/100 mL (4 mcg/mL) infusion soln  0.1-1.5 mcg/kg/hr IntraVENous TITRATE    midazolam (VERSED) injection 2 mg  2 mg IntraVENous Q1H PRN    chlorhexidine (ORAL CARE KIT) 0.12 % mouthwash 15 mL  15 mL Oral Q12H    piperacillin-tazobactam (ZOSYN) 3.375 g in 0.9% sodium chloride (MBP/ADV) 100 mL MBP  3.375 g IntraVENous Q12H    alcohol 62% (NOZIN) nasal  1 Ampule  1 Ampule Topical Q12H    ondansetron (ZOFRAN) injection 4 mg  4 mg IntraVENous Q4H PRN    aspirin chewable tablet 81 mg  81 mg Oral DAILY    dextrose (D50W) injection syrg 12.5-25 g  12.5-25 g IntraVENous PRN    albumin human 25% (BUMINATE) solution 12.5 g  12.5 g IntraVENous DIALYSIS PRN    sodium chloride (NS) flush 5-40 mL  5-40 mL IntraVENous Q8H    sodium chloride (NS) flush 5-40 mL  5-40 mL IntraVENous PRN    polyethylene glycol (MIRALAX) packet 17 g  17 g Oral DAILY PRN    albuterol-ipratropium (DUO-NEB) 2.5 MG-0.5 MG/3 ML  3 mL Nebulization Q6H PRN

## 2021-08-29 NOTE — PROGRESS NOTES
Patient bathed, linens changed. While lying on L side, flat, patient vomited moderate amount dark orange/red fluid. OGT placed to suction, received 900ml dark orange/red fluid within 10 minutes. O2 sats 87%. HOB increased to 40 degrees. Increased work of breathing, nasal flaring noted. Notified Santo Belt, NP notified. Placed back on previous settings (AC 12, , FiO2 40%, PEEP +6. O2 sats remain 88-90%. Increased FiO2 to 50%. O2 sats 90-92%.

## 2021-08-29 NOTE — PROGRESS NOTES
[facility]      Name: John Sanchez   : 1965   MRN: [id number]   Date: 2021      Assessment:   Brief patient summary:  55 M with PMH of HTN, ESRD on HD admitted  to the Hospitalist Service  with dyspnea, pulmonary edema, hyperkalemia, uremic encephalopathy after recent hospitalization for same. Underwent urgent HD with some improvement. Transferred to ICU  with depressed LOC and hypoglycemia. Emergently intubated due to decreased LOC and was felt to have aspirated. An empty bottle of oxycodone was found in his pocket raising concern for opioid OD. During his ICU stay, he has remained minimally responsive. He has developed progressive acute liver failure for which he was treated with IV NAC. Blood cultures drawn  positive for Candida glabrata. ICU course has been complicated by recurrent hypoglycemia    Hospital course/major results:   CT chest/abd.pelvis: Small right-sided pleural effusion with cardiomegaly and coronary artery disease. Moderate centrilobular emphysematous change. No evidence of pulmonary embolism. No aortic aneurysm or dissection. Colonic wall thickening and mild hyperemia of colonic and small bowel mucosa may be related to mild gastroenteritis. No additional acute process is identified in the chest, abdomen or pelvis   Echocardiogram: Estimated LVEF is 40 - 45%. Normal cavity size. Mild concentric hypertrophy. Mildly dilated right ventricle. Borderline low systolic function. Mitral valve non-specific thickening. Mild to moderate mitral valve regurgitation is present. Mild to moderate tricuspid valve regurgitation is present. Mild to moderate pulmonary hypertension. Pulmonary arterial systolic pressure is 40 mmHg.  US LUE fistula: AV fistula, of the left upper extremity; widely patent with no evidence of hemodynamically significant stenosis. Incidental finding of a patent stent in the axillary region of the AV fistula.   No evidence of oli-fistula fluid. 08/28 CTH: no acute intracranial findings  08/28 Minimally responsive off all sedatives other than dexmedetomidine. Frequently gnawing on ETT. Minimal spontaneous movement  08/29 Slightly more responsive (minimally). Grimaces with stimulation. Not F/C.   08/29 Gastroenterology consultation: liver failure likely multifactorial superimposed on HCV. Poor overall prognosis noted. No indication for endoscopy given very poor overall prognosis    Lines/tubes/devices:  ETT 08/18 >>   L IJ CVL 08/18 >>     Active Problems: (By systems)  PULMONARY:  1. Ventilator dependent respiratory failure - presently cannot extubate due to AMS  2. Pulm edema with R pleural effusion    Current ventilator settings: reviewed    CARDIOVASCULAR/HEMODYNAMIC:  3. Vasopressor dependent hypotension    Current vasopressors: NE 4 mcg/min    RENAL:  4. ESRD on chronic HD    Current RRT: IHD    GI/NUTRITION:  5. Recurrent hypoglycemia - on D20W infusion @ 25 cc/hr  6. Acute liver failure - severe  7. Heme positive stool with dark NGT aspirate  8. Gastric ileus - high residuals, intolerance to TFs    Current nutritional support: TFs on hold    INFECTIOUS DISEASE  9. Severe sepsis  10. Candidemia - unclear source    Micro:  Blood 08/18 >> C glabrata in 1/4 bottles  Blood 08/21 >> NGTD >>   Blood 08/25 >> NGTD >>   Blood 08/27 >> NGTD >>     Antibiotics:  Pip-tazo  Anidulafungin    HEME  11. Acute blood loss anemia  a. Received 1 unit RBCs 08/26  b. Received one unit RBCs 08/28  c. One unit RBCs 08/29  12. Macrocytosis  13. Moderate-severe thrombocytopenia  14. H/O DVT    DVT prophylaxis: SCDs    NEURO  15. Acute encephalopathy/TME - likely hepatic. Possibly component of hypoglycemic and/or hypoxic injury @ time of intubation    RASS goal: 0  Current sedatives: dex  Current analgesics: none      OTHER  16. Very poor prognosis    GOALS OF CARE/ADVANCED DIRECTIVES  17. CODE STATUS: DNR in event of cardiac arrest  18.        ICU Comprehensive Plan of Care:     Plans for this Shift:    1. Ventilator settings reviewed with RT and adjusted as indicated  2. Daily SBT if meets criteria  3. ICU hemodynamic monitoring  4. MAP goal > 55 mmHg  5. SBP goal > 90 mmHg  6. Monitor chemistry panels daily  7. Correct electrolytes as indicated  8. HD schedule per Nephrology  9. Not a candidate for CRRT - see ACP note from 08/28  10. Nutrition: TFs - no advance  11. SUP: IV PPI  12. Gastroenterology consultation 08/29 - Dr Curtis Share input much appreciated  13. Glycemic control: N/I - requiring dextrose infusion for hypoglycemia  14. Micro and antibiotics as documented above  15. ID service following  16. Transfuse as needed to maintain Hgb > 7.0  17. CT head ordered 08/28 - results reviewed  18. EEG ordered to be done 08/31  19. Continue lactulose, rifaximin  20. Brother, Shaun Van, updated in detail @ bedside 08/28. I explained the patient's multitude of severe problems and very poor likelihood of good survival. I indicated that we are preventing the patient from dying but we are not likely to be able to restore him to a functional state of being. I recommended DNR in event of cardiac arrest. Shaun Van agreed with this recommendation. Further, I suggested that we not continue this level of support for more than a couple or few more days. If no substantial improvement in that interval, I recommended that we proceed with compassionate extubation and comfort measures only      HPI/Consult/Subjective:   24 Hr Events 8/29/2021:   Transfused one unit RBCs this AM    SUBJ:   Minimal change    Past Medical History:      has a past medical history of Chronic kidney disease, Dialysis patient ([facility] [address] and Hypertension. Past Surgical History:      has a past surgical history that includes hx vascular access (Left). Home Medications:     Prior to Admission medications    Medication Sig Start Date End Date Taking?  Authorizing Provider   methadone (DOLOPHINE) 10 mg/mL solution Take 160 mg by mouth daily. Yes Provider, Historical   metoprolol tartrate (LOPRESSOR) 25 mg tablet Take 1 Tablet by mouth every twelve (12) hours for 30 days. 8/14/21 9/13/21  Benja Renee MD   pantoprazole (PROTONIX) 40 mg tablet Take 1 Tablet by mouth daily for 30 days. 8/14/21 9/13/21  Benja Renee MD   sevelamer (RenageL) 400 mg tablet Take 2 Tablets by mouth three (3) times daily (with meals) for 30 days. 8/14/21 9/13/21  Benja Renee MD   isosorbide mononitrate ER (IMDUR) 30 mg tablet Take 1 Tablet by mouth every morning for 30 days. 8/14/21 9/13/21  Benja Renee MD   citalopram (CELEXA) 40 mg tablet Take 1 Tablet by mouth daily for 30 days. 8/14/21 9/13/21  Benja Renee MD   atorvastatin (LIPITOR) 40 mg tablet Take 1 Tablet by mouth nightly for 30 days. 8/14/21 9/13/21  Benja Renee MD   apixaban (ELIQUIS) 5 mg tablet Take 1 Tablet by mouth two (2) times a day for 30 days. 8/14/21 9/13/21  Benja Renee MD   paricalcitoL (ZEMPLAR) 1 mcg capsule Take 1 Capsule by mouth daily for 30 days. 8/14/21 9/13/21  Benja Renee MD   lidocaine (LIDODERM) 5 % Apply patch to the affected area for 12 hours a day and remove for 12 hours a day. 11/28/17   Ashley Davis MD   aspirin delayed-release 81 mg tablet Take 1 Tab by mouth daily. 11/28/17   Ashley Davis MD   multivitamin (ONE A DAY) tablet Take 1 Tab by mouth daily. Juan Olivarez MD   gabapentin (NEURONTIN) 100 mg capsule Take 100 mg by mouth daily. Juan Olivarez MD       Allergies/Social/Family History:      Allergies   Allergen Reactions    Motrin [Ibuprofen] Hives     7/16/17 Pt denies allergy    Tylenol [Acetaminophen] Hives     7/16/17 Pt denies allergy      Social History     Tobacco Use    Smoking status: Current Every Day Smoker     Packs/day: 0.25    Smokeless tobacco: Never Used   Substance Use Topics    Alcohol use: Yes      Family History   Problem Relation Age of Onset    No Known Problems Mother     No Known Problems Father     No Known Problems Sister     No Known Problems Brother     No Known Problems Sister     No Known Problems Sister     No Known Problems Brother            Objective:   Vital Signs:  Visit Vitals  /60   Pulse 74   Temp 98.5 °F (36.9 °C)   Resp 22   Ht 5' 9\" (1.753 m)   Wt 91.5 kg (201 lb 11.5 oz)   SpO2 97%   BMI 29.79 kg/m²    O2 Flow Rate (L/min): 2 l/min O2 Device: Endotracheal tube Temp (24hrs), Av.3 °F (36.8 °C), Min:97.7 °F (36.5 °C), Max:99.1 °F (37.3 °C)           Intake/Output:     Intake/Output Summary (Last 24 hours) at 2021 1541  Last data filed at 2021 1400  Gross per 24 hour   Intake 2904.79 ml   Output 200 ml   Net 2704.79 ml       Physical Exam:  GEN: Minimally responsive, not F/C  HEENT: NCAT, severe scleral icterus, ETT present  NECK: JVP not visualized  CHEST: Clear anteriorly  CARDIAC: NSR, reg, no M  ABD: Soft, NABS  EXT: warm, no edema  NEURO: minimal spontaneous movement. Weak withdrawal. PERRL  DERM: No lesions noted    I have examined the patient on this day 2021 and the above documented exam is accurate including the components that have been copied forward    LABS AND  DATA: Personally reviewed  Recent Labs     21   WBC 29.6* 30.9*   HGB 6.5* 6.5*   HCT 20.0* 20.5*   PLT 57* 81*     Recent Labs     21   * 133*   K 3.8 4.0   CL 98 98   CO2 22 22   BUN 36* 21*   CREA 5.34* 4.00*   GLU 93 38*   CA 9.4 9.8   MG 1.8  --    PHOS 5.1*  --      Recent Labs     21   * 146*   TP 3.9* 4.8*   ALB 1.1* 1.3*   GLOB 2.8 3.5     Recent Labs     21   INR 2.1* 2.3*   PTP 21.1* 22.9*      No results for input(s): PHI, PCO2I, PO2I, FIO2I in the last 72 hours. No results for input(s): CPK, CKMB, TROIQ, BNPP in the last 72 hours.     Hemodynamics:   PAP:   CO:     Wedge:   CI:     CVP:    SVR: BP 2: (!) 160/99 (08/21/21 1500)     PVR:       Ventilator Settings:  Mode Rate Tidal Volume Pressure FiO2 PEEP   Assist control   420 ml  5 cm H2O 40 % 5 cm H20     Peak airway pressure: 18 cm H2O    Minute ventilation: 9.23 l/min        MEDS: Reviewed    Chest X-Ray:  CXR Results  (Last 48 hours)               08/29/21 0414  XR CHEST PORT Final result    Impression:  Stable bilateral effusions with underlying consolidation and   pulmonary edema. Narrative: Indication: Follow-up pleural effusions       Comparison 8/27/2021. Portable exam obtained at 409 demonstrates life-support   lines and tubes unchanged in position. There has been little change in the   bilateral pleural effusions with underlying consolidation and pulmonary edema   compared to prior exam.           08/27/21 1757  XR CHEST PORT Final result    Impression:      1. Significant increased moderate layering right pleural effusion with hazy   right mid and lower lung atelectasis versus airspace disease. New small left   pleural effusion with hazy left basilar volume loss. 2.  Worsening pulmonary edema pattern. Narrative:  EXAM:  XR CHEST PORT       INDICATION: Vent dep       COMPARISON: Chest radiograph 8/25/2021       TECHNIQUE: Semiupright portable chest AP view       FINDINGS:        Interval placement of enteric tube extending inferiorly off the field-of-view. Stable ET tube and left jugular approach central venous catheter. Cardiothymic silhouette is stably enlarged. Perivascular congestion and moderate   to severe edema pattern, worsened. Significant increased moderate layering right pleural effusion with hazy right   mid and lower lung atelectasis versus airspace disease. New small left pleural   effusion with hazy left basilar volume loss. No discernible pneumothorax.                Multidisciplinary Rounds Completed:  Yes      SPECIAL EQUIPMENT  IHD    DISPOSITION  Stay in ICU    CRITICAL CARE CONSULTANT NOTE  I had a in-person encounter with Yulia Marino, reviewed and interpreted patient data including events, labs, images, vital signs, I/O's, and examined patient. I have discussed the case and the plan and management of the patient's care with the consulting services, the bedside nurses and the respiratory therapist.      NOTE OF PERSONAL INVOLVEMENT IN CARE   This patient is at high risk for sudden and clinically significant deterioration, which requires the highest level of preparedness to intervene urgently. I participated in the decision-making and personally managed or directed the management of the following life and organ supporting interventions that required my frequent assessment to treat or prevent imminent deterioration. I personally spent 40 minutes of critical care time. This is time spent at patient's bedside actively involved in patient care as well as the coordination of care and discussions with the patient's family. This does not include any procedural time which has been billed separately.     Denver Broccoli, MD  Pulmonary/[facility]  [phone]  8/29/2021

## 2021-08-29 NOTE — PROGRESS NOTES
1930-Bedside shift change report given to Liliana, RN and Vanessa BHAKTA (oncoming nurse) by Sloan Espino, RN and Jose Bernard, RN (offgoing nurse). Report included the following information SBAR, Kardex, ED Summary, OR Summary, Intake/Output, MAR and Recent Results. 2000-assessment completed, see flowsheet. Family at bedside. Pt intermittently gnawing on ET tube, no command following. Precedex 1.2 mcg/kg/hr, Levophed at 4mcg/min. Residuals from OG tube greater than 400 ml. Tube feeds at 20 ml/hr. Will hold and recheck. 2145-Levophed titrated to 5mcg/min, MAP 60.    0030-reassessment complete, see flowsheet. Residuals still greater than 300 mls. Anna Dexter NP notified, okay to hold tube feeds for now. Residuals tan with pink tinge. 0330-JHON Carlton notified of BG WDL with checks every 2 hours. Per Anna Dexter NP okay to change to every 4 hours. 0400-assessment complete, see flowsheet. 0534-JHON Carlton notified of Hgb 6.5, orders for 1 unit of PRBC's placed. 0730-Bedside shift change report given to Sloan Espino, RN and Jose Bernard, RN (oncoming nurse) by Destiny Jaimes RN and Murrell Najjar, RN  (offgoing nurse). Report included the following information SBAR, Kardex, ED Summary, [facility] and Recent Results.

## 2021-08-29 NOTE — PROGRESS NOTES
0700- Received bedside shift report from YONY Ford and Archana Pena RN.    0730- Precedex decreased to 1 mcg/kg/hr, will closely monitor. 0800- Shift assessment completed, pt is opening eyes to painful stimuli, does not track or follow. Not withdrawing to painful stimuli with upper or lower extremities. Noted, OG output dimitris colored/red streaked. Dr. Vito Lambert aware of characteristics, volume, and overnight emesis. Tube feeding to remain held until further notice. 0881- Precedex decreased to .9 mcg/kg/hr, will continue to closely monitor. 7851- Pt is restless with increased respiratory rate and labored breathing, drop in SaO2 < 90%, ventilator alarming. Pt increasingly agitated, manipulating ETT with tongue. Precedex increased to 1.2. Dr. Vito Lambert aware, no new orders noted. 1200- Reassessment completed, pt resting quietly in bed, no changes to previous assessment, see flowsheets for more details. 1430- Dr. Maria G Luciano, at bedside with pt, no new orders noted. 1600- Reassessment completed, pt resting quietly in bed, no changes to previous assessment, see flowsheets for more details. 1800- Penile blister ruptured, large skin tear. Dr. Sharon Oswald aware, wound care consult placed. Will continue to treat with venelex. 1900- Bedside shift report given to [place], RN.

## 2021-08-30 NOTE — PROGRESS NOTES
Spiritual Care Assessment/Progress Note  [facility]      NAME: Marissa Plaza      MRN: [id number]  AGE: 55 y.o. SEX: male  Yarsani Affiliation: No Caodaism   Language: English     8/30/2021     Total Time (in minutes): 5     Spiritual Assessment begun in [facility] through conversation with:         []Patient        [] Family    [] Friend(s)        Reason for Consult: Palliative Care, Initial/Spiritual Assessment     Spiritual beliefs: (Please include comment if needed)     [] Identifies with a chelita tradition:         [] Supported by a chelita community:            [] Claims no spiritual orientation:           [] Seeking spiritual identity:                [] Adheres to an individual form of spirituality:           [x] Not able to assess:                           Identified resources for coping:      [] Prayer                               [] Music                  [] Guided Imagery     [] Family/friends                 [] Pet visits     [] Devotional reading                         [x] Unknown     [] Other:                                               Interventions offered during this visit: (See comments for more details)    Patient Interventions: Initial visit           Plan of Care:     [] Support spiritual and/or cultural needs    [] Support AMD and/or advance care planning process      [] Support grieving process   [] Coordinate Rites and/or Rituals    [] Coordination with community clergy   [] No spiritual needs identified at this time   [] Detailed Plan of Care below (See Comments)  [] Make referral to Music Therapy  [] Make referral to Pet Therapy     [] Make referral to Addiction services  [] Make referral to [facility]  [] Make referral to Spiritual Care Partner  [] No future visits requested        [] Follow up upon further referrals     Comments: Attempted Palliative Initial Spiritual Assessment for this pt in [facility].  Reviewed pt's chart prior to this visit to augment any observed or expressed support needs this pt may have. Pt is intubated and therefore unable to be assessed at this time. No family/friends present at time of visit. Contact Spiritual Care Services for any spiritual or emotional support needs. Angie Christie MDiv.  Staff   Request  Support/Spiritual Care Services via [facility] [employer]

## 2021-08-30 NOTE — PROGRESS NOTES
Infectious Disease progress      IMPRESSION:       -Acute hypoxic respiratory failure remains intubated    -Acute hepatic failure with encephalopathy  -Transaminitis improving [id number],    Elevated bilirubin total-16.1 , Direct 10.9   ? Shock liver, probable multifactorial etiology.    -Hepatitis C ?chronic/  HCV ,000    Possibly contributory, but not inciting event        -Fungemia   Blood cultures8/18 + for C.glabrata 1/4 ? source  MICs pending  Negative repeat blood cultures-8/21, 8/25, 8/27 from HD access   TTE-negative. No history of fevers. etiology-? abdomen ? IV lines,  HD access-less likely, no malfunction/US-noticed stenosis, no fluid.    -Leucocytosis - reactive,inflammation, sepsis   Wbc 20.7    - Acute anemia Hb 7.8   Heme + stool, NG aspirate dark    -Thrombocytopenia platelets 28     CT abdomen -Colonic wall thickening and mild hyperemia of colonic and small bowel mucosa may  be related to mild gastroenteritis. ?s/p  GI evaluation- Findings in small bowel/colon are reactive and not primary process. -ESRD on HD     -HIV negative    - Recurrent hypoglycemia, related to acute liver injury    -Acute hypoxic respiratory failure 2ry to fluid overload  S/p intubation,extubated. S/p LLL infiltrate on CXR. Completed treatment for HAP    -Suspected drug overdose while admitted (empty oxycodone bottle found in pt pocket, pt on methadone)    -Current smoker 1/4 PPD    - H/o HTN    - H/o DVT    - Lines - TLC 8/18         PLAN:        - Continue  Zosyn IV, Anidulafungin x 14 days  -Overall prognosis poor. Patient seen today. in ICU . Remains intubated  D/w RN events of the day. Mr. Palomino is  a 55 yr old male with   past medical history of end-stage renal disease on dialysisMonday, Wednesday and Friday, hypertension, hyperlipidemia, GERD, neuropathy. He was admitted on 8/16 with hyperkalemia, shortness of breath and altered mental status.  Patient received emergent HD & his K and respiratory failure had improved but patient again was minimally responsive with ongoing hypoglycemia , lost peripheral IV access. He was also found to have empty bottle of oxycodone in his pocket and possible ingestion to explain this acute change in mental status, he was given naracan and then dextrose , therafter developed respiratory distress. Rapid response was called. Decision was made to intubate him due to severe resp distress and encephalopathy and patient transferred to ICU for further care. On 8/20  Pt  self extubated . He became increasingly more lethargic over the afternoon but responded well to 2 doses of narcan. Pt was transferred to floor . Blood cultures done on 8/18 + for C.glabrata 1/4 . Repeat BC - NG      Patient Active Problem List   Diagnosis Code    Pulmonary edema J81.1    ESRD on dialysis ([facility] N18.6, Z99.2    Hypertension I10    Tobacco abuse Z72.0    Hypoxia R09.02    SOB (shortness of breath) R06.02    ESRD (end stage renal disease) on dialysis ([facility] N18.6, Z99.2    Hyperkalemia E87.5    Acute on chronic combined systolic and diastolic CHF (congestive heart failure) ([facility]) I50.43     Past Medical History:   Diagnosis Date    Chronic kidney disease     Dialysis patient ([facility]     Hypertension       Family History   Problem Relation Age of Onset    No Known Problems Mother     No Known Problems Father     No Known Problems Sister     No Known Problems Brother     No Known Problems Sister     No Known Problems Sister     No Known Problems Brother       Social History     Tobacco Use    Smoking status: Current Every Day Smoker     Packs/day: 0.25    Smokeless tobacco: Never Used   Substance Use Topics    Alcohol use: Yes     Past Surgical History:   Procedure Laterality Date    HX VASCULAR ACCESS Left     LUE AV fistula      Prior to Admission medications    Medication Sig Start Date End Date Taking?  Authorizing Provider   methadone (DOLOPHINE) 10 mg/mL solution Take 160 mg by mouth daily. Yes Provider, Historical   metoprolol tartrate (LOPRESSOR) 25 mg tablet Take 1 Tablet by mouth every twelve (12) hours for 30 days. 8/14/21 9/13/21  Alicia Ibarra MD   pantoprazole (PROTONIX) 40 mg tablet Take 1 Tablet by mouth daily for 30 days. 8/14/21 9/13/21  Alicia Ibarra MD   sevelamer (RenageL) 400 mg tablet Take 2 Tablets by mouth three (3) times daily (with meals) for 30 days. 8/14/21 9/13/21  Alicia Ibarra MD   isosorbide mononitrate ER (IMDUR) 30 mg tablet Take 1 Tablet by mouth every morning for 30 days. 8/14/21 9/13/21  Alicia Ibarra MD   citalopram (CELEXA) 40 mg tablet Take 1 Tablet by mouth daily for 30 days. 8/14/21 9/13/21  Alicia Ibarra MD   atorvastatin (LIPITOR) 40 mg tablet Take 1 Tablet by mouth nightly for 30 days. 8/14/21 9/13/21  Alicia Ibarra MD   apixaban (ELIQUIS) 5 mg tablet Take 1 Tablet by mouth two (2) times a day for 30 days. 8/14/21 9/13/21  Alicia Ibarra MD   paricalcitoL (ZEMPLAR) 1 mcg capsule Take 1 Capsule by mouth daily for 30 days. 8/14/21 9/13/21  Alicia Ibarra MD   lidocaine (LIDODERM) 5 % Apply patch to the affected area for 12 hours a day and remove for 12 hours a day. 11/28/17   Nishi Torres MD   aspirin delayed-release 81 mg tablet Take 1 Tab by mouth daily. 11/28/17   Nishi Torres MD   multivitamin (ONE A DAY) tablet Take 1 Tab by mouth daily. Sher, MD Juan   gabapentin (NEURONTIN) 100 mg capsule Take 100 mg by mouth daily. Other, MD Juan     Allergies   Allergen Reactions    Motrin [Ibuprofen] Hives     7/16/17 Pt denies allergy    Tylenol [Acetaminophen] Hives     7/16/17 Pt denies allergy        Review of Systems:  A comprehensive review of systems was negative. 14 point review of systems obtained . All other systems negative    Objective:   Blood pressure (!) 146/64, pulse 91, temperature 97.7 °F (36.5 °C), temperature source Axillary, resp.  rate 25, height 5' 9\" (1.753 m), weight 209 lb 14.1 oz (95.2 kg), SpO2 95 %.   Temp (24hrs), Av °F (36.7 °C), Min:97.6 °F (36.4 °C), Max:98.7 °F (37.1 °C)    Current Facility-Administered Medications   Medication Dose Route Frequency    albumin human 5% (BUMINATE) solution 25 g  25 g IntraVENous Q6H    0.9% sodium chloride infusion 250 mL  250 mL IntraVENous PRN    pantoprazole (PROTONIX) 40 mg in 0.9% sodium chloride 10 mL injection  40 mg IntraVENous Q12H    dextrose 20% in water (compounded by pharmacy)   IntraVENous CONTINUOUS    NOREPINephrine (LEVOPHED) 8 mg in 5% dextrose 250mL (32 mcg/mL) infusion  0.5-30 mcg/min IntraVENous TITRATE    phytonadione (vitamin K1) (AQUA-MEPHYTON) 10 mg in 0.9% sodium chloride 50 mL IVPB  10 mg IntraVENous DAILY    balsam peru-castor oiL (VENELEX) ointment   Topical BID    white petrolatum-mineral oiL (LACRILUBE S.O.P.) ointment   Both Eyes Q12H    anidulafungin (ERAXIS) 100 mg in 0.9% sodium chloride 130 mL IVPB  100 mg IntraVENous Q24H    lactulose (CHRONULAC) 10 gram/15 mL solution 30 mL  20 g Per G Tube BID    rifAXIMin (XIFAXAN) tablet 550 mg  550 mg Per G Tube BID    epoetin cristian-epbx (RETACRIT) injection 10,000 Units  10,000 Units SubCUTAneous Q MON, WED & FRI    dexmedeTOMidine in 0.9 % NaCl (PRECEDEX) 400 mcg/100 mL (4 mcg/mL) infusion soln  0.1-1.5 mcg/kg/hr IntraVENous TITRATE    midazolam (VERSED) injection 2 mg  2 mg IntraVENous Q1H PRN    chlorhexidine (ORAL CARE KIT) 0.12 % mouthwash 15 mL  15 mL Oral Q12H    piperacillin-tazobactam (ZOSYN) 3.375 g in 0.9% sodium chloride (MBP/ADV) 100 mL MBP  3.375 g IntraVENous Q12H    alcohol 62% (NOZIN) nasal  1 Ampule  1 Ampule Topical Q12H    ondansetron (ZOFRAN) injection 4 mg  4 mg IntraVENous Q4H PRN    aspirin chewable tablet 81 mg  81 mg Oral DAILY    dextrose (D50W) injection syrg 12.5-25 g  12.5-25 g IntraVENous PRN    albumin human 25% (BUMINATE) solution 12.5 g  12.5 g IntraVENous DIALYSIS PRN    sodium chloride (NS) flush 5-40 mL  5-40 mL IntraVENous Q8H    sodium chloride (NS) flush 5-40 mL  5-40 mL IntraVENous PRN    polyethylene glycol (MIRALAX) packet 17 g  17 g Oral DAILY PRN    albuterol-ipratropium (DUO-NEB) 2.5 MG-0.5 MG/3 ML  3 mL Nebulization Q6H PRN        Exam:    General:  Awake, cooperative,    Eyes:  Sclera anicteric. Pupils equally round and reactive to light. Mouth/Throat: Mucous membranes normal, oral pharynx  clear   Neck: Supple   Lungs:   Reduced auscultation bases   CV:  Regular rate and rhythm,no murmur, click, rub or gallop   Abdomen:   Soft, non-tender.  bowel sounds normal. non-distended   Extremities: No  Edema, LUE - AV fistula   Skin: Skin color, texture, turgor normal. no acute rash or lesions   Lymph nodes: Cervical and supraclavicular normal   Musculoskeletal: No swelling or deformity   Lines/Devices:  Intact, no erythema, drainage or tenderness   Psych: Awake  and oriented x 4        Data Reviewed:   CBC:   Recent Labs     08/30/21  0359 08/29/21  0344 08/28/21  0412   WBC 28.2* 29.6* 30.9*   RBC 2.43* 1.98* 1.93*   HGB 7.8* 6.5* 6.5*   HCT 24.2* 20.0* 20.5*   PLT 28* 57* 81*     CMP:   Recent Labs     08/30/21  0359 08/29/21  0344 08/28/21  0412   * 93 38*   * 134* 133*   K 3.6 3.8 4.0   CL 97 98 98   CO2 22 22 22   BUN 41* 36* 21*   CREA 6.28* 5.34* 4.00*   CA 8.4* 9.4 9.8   AGAP 14 14 13   BUCR 7* 7* 5*   * 140* 146*   TP 3.6* 3.9* 4.8*   ALB 0.9* 1.1* 1.3*   GLOB 2.7 2.8 3.5   AGRAT 0.3* 0.4* 0.4*       Lab Results   Component Value Date/Time    Culture result: NO GROWTH 3 DAYS 08/27/2021 10:26 AM    Culture result: NO GROWTH 5 DAYS 08/25/2021 09:46 PM    Culture result: NO GROWTH 5 DAYS 08/25/2021 08:00 AM    Culture result: NO GROWTH 9 DAYS 08/21/2021 06:17 PM    Culture result: NO GROWTH 5 DAYS 08/21/2021 01:43 PM          XR Results (most recent)reviewed:  Results from Hospital Encounter encounter on 08/16/21    XR CHEST PORT    Narrative  Indication: Follow-up pleural effusions    Comparison 8/27/2021. Portable exam obtained at 409 demonstrates life-support  lines and tubes unchanged in position. There has been little change in the  bilateral pleural effusions with underlying consolidation and pulmonary edema  compared to prior exam.    Impression  Stable bilateral effusions with underlying consolidation and  pulmonary edema. ICD-10-CM ICD-9-CM    1. Acute hyperkalemia  E87.5 276.7    2. ESRD needing dialysis (HCC)  N18.6 585.6     Z99.2     3. Accidental drug overdose, initial encounter  T50.901A 977.9      E858.9    4. Fungemia  B49 117.9    5. Hypervolemia, unspecified hypervolemia type  E87.70 276.69    6. Infection due to Candida glabrata  B37.9 112.9    7. Acute pulmonary edema (HCC)  J81.0 518.4    8. Tobacco abuse  Z72.0 305.1    9. Chronic hepatitis C without hepatic coma ([facility])  B18.2 070.54    10. Colonic disorder  K63.9 569.9    11. Hypoglycemia  E16.2 251.2    12. Ischemic hepatitis  K75.9 570    13. Transaminitis  R74.01 790.4    14. Essential hypertension  I10 401.9    15. Acute liver failure with hepatic coma ([facility])  K72.01 570      572.2    16. Acute respiratory failure with hypoxia ([facility])  J96.01 518.81    17. Candida glabrata infection  B37.9 112.9    18. ESRD (end stage renal disease) on dialysis ([facility] [address]  N18.6 585.6     Z99.2 V45.11    19. Acute on chronic combined systolic and diastolic CHF (congestive heart failure) ([facility])  I50.43 428.43      428.0    20. Bandemia  D72.825 288.66    21. History of drug dependence ([facility] [address]  F19.21 304.93    22. Noncompliance  Z91.19 V15.81    23. SOB (shortness of breath)  R06.02 786.05            Antibiotic History    I have discussed the diagnosis with the patient and the intended plan as seen in the above orders. I have discussed medication side effects and warnings with the patient as well.     Reviewed test results at length with patient    Signed By: Desi Torres MD FACP     August 30, 2021

## 2021-08-30 NOTE — CONSULTS
Palliative Medicine Consult  Willy: [phone] ([phone])    Patient Name: Marissa Plaza  YOB: 1965    Date of Initial Consult: 8/30/21  Reason for Consult: care decisions  Requesting Provider: Nino Wilkins MD  Primary Care Physician: Charles Gutiérrez NP     SUMMARY:   Marissa Plaza is a 55 y.o. male with a past history of hypertension, chronic systolic and diastolic CHF end-stage renal disease on hemodialysis times a week, history of DVT, recent discharge on 8/14/2021 treated for acute on chronic CHF with fluid overload who was admitted on 8/16/2021 from from home with a diagnosis of shortness of breath secondary to acute CHF hyperkalemia metabolic acidosis and uremic encephalopathy. He is  currently on spontaneous breathing trial, his mental status would not allow him to extubate medically, he is unresponsive on minimal sedation. Current medical issues leading to palliative care involvement are goals of care in the setting of complicated hospital course notable for transfer to ICU on 8/18 with depressed LOC and hypoglycemia emergently intubated due to loss of consciousness secondary to aspiration, there is a concern for opioid overdose empty bottle of oxycodone was found in his pocket,  he has developed acute liver failure with coagulopathy, thrombocytopenia, metabolic encephalopathy, fungemia. Social history: He has 2 sons who are incarcerated, his mom is alive, he has 5 siblings his brother Mr. Shon Edwards is point of contact. PALLIATIVE DIAGNOSES:   1. Palliative care encounter   2. Encephalopathy  3. Liver failure  4. Debility  5. Drug abuse       PLAN:   1. I spoke to bedside RN and intensive care specialist.  2. I spoke to his his brother Mr. Shon Edwards, there was a lot of background noises, he told me that he will call me later. 3. Later afternoon Mr. Yusef Hammond, called back, I introduced my role and  purpose of phone call.   4. Legal next of kin :He gave me information regarding patient has 3 son's, one son ,  His two sons are currently incarcerated. Patient mom is legal next of kin in line and available to talk to us . 5. We decided to meet tomorrow at 3:30 pm  with his brother and patient mom, to get more information about patient, to update them and support care decisions. 6. Initial consult note routed to primary continuity provider and/or primary health care team members  7. Communicated plan of care with: Palliative IDTJulio Cesar [facility] Team     GOALS OF CARE / TREATMENT PREFERENCES:     GOALS OF CARE:  Patient/Health Care Proxy Stated Goals:  (not discussed yet)    TREATMENT PREFERENCES:   Code Status: DNR    Patient's personal goals include:     Important upcoming milestones or family events: The patient identifies the following as important for living well:       Advance Care Planning:  [] [facility] has updated the ACP Navigator with [address] and Patient Capacity      Advance Care Planning 2020   Patient's Healthcare Decision Maker is: -   Confirm Advance Directive None       Medical Interventions: Limited additional interventions     Other Instructions: Other:    As far as possible, the palliative care team has discussed with patient / health care proxy about goals of care / treatment preferences for patient. HISTORY:     History obtained from: Chart, bedside RN.     CHIEF COMPLAINT: Unresponsive    HPI/SUBJECTIVE:    The patient is:     [x] Non-participatory due to:     Per bed side Rn did not tolerated tube feeding because of high residual, currently on SBT x 30 mins  Clinical Pain Assessment (nonverbal scale for severity on nonverbal patients):   Clinical Pain Assessment  Severity: 0     Activity (Movement): Seeking attention through movement or slow, cautious movement    Duration: for how long has pt been experiencing pain (e.g., 2 days, 1 month, years)  Frequency: how often pain is an issue (e.g., several times per day, once every few days, constant)     FUNCTIONAL ASSESSMENT:     Palliative Performance Scale (PPS):          PSYCHOSOCIAL/SPIRITUAL SCREENING:     Palliative IDT has assessed this patient for cultural preferences / practices and a referral made as appropriate to needs (Cultural Services, Patient Advocacy, Ethics, etc.)    Any spiritual / Protestant concerns:  [] Yes /  [x] No    Caregiver Burnout:  [] Yes /  [x] No /  [] No Caregiver Present      Anticipatory grief assessment:   [x] Normal  / [] Maladaptive       ESAS Anxiety:      ESAS Depression:          REVIEW OF SYSTEMS:     Positive and pertinent negative findings in ROS are noted above in HPI. The following systems were [x] reviewed / [] unable to be reviewed as noted in HPI  Other findings are noted below. Systems: constitutional, ears/nose/mouth/throat, respiratory, gastrointestinal, genitourinary, musculoskeletal, integumentary, neurologic, psychiatric, endocrine. Positive findings noted below. Modified ESAS Completed by: provider           Pain: 0     Nausea: 0     Dyspnea: 0           Stool Occurrence(s): 1        PHYSICAL EXAM:     From RN flowsheet:  Wt Readings from Last 3 Encounters:   08/30/21 209 lb 14.1 oz (95.2 kg)   08/13/21 (!) 373 lb 7.4 oz (169.4 kg)   07/10/21 179 lb 10.8 oz (81.5 kg)     Blood pressure (!) 105/48, pulse 92, temperature 97.4 °F (36.3 °C), resp. rate 28, height 5' 9\" (1.753 m), weight 209 lb 14.1 oz (95.2 kg), SpO2 95 %. Pain Scale 1: Behavioral Pain Scale (BPS)  Pain Intensity 1: 8  Pain Onset 1: Around 2300 last night, intermittent  Pain Location 1: Abdomen  Pain Orientation 1: Medial  Pain Description 1: Aching  Pain Intervention(s) 1: Medication (see MAR)  Last bowel movement, if known:     Unresponsive , on SBT, not in any distress. Eyes: scleral edema.   Anasarca  ENMT: ET Tubeno nasal discharge, moist mucous membranes  Cardiovascular: regular rhythm, not tachycardic   Respiratory: breathing not labored, symmetric  Gastrointestinal: NGT with blood draining        HISTORY:     Active Problems:    Pulmonary edema (5/6/2017)      ESRD (end stage renal disease) on dialysis ([facility] (8/8/2021)      Hyperkalemia (8/16/2021)      Acute on chronic combined systolic and diastolic CHF (congestive heart failure) ([facility] (8/16/2021)      Past Medical History:   Diagnosis Date    Chronic kidney disease     Dialysis patient ([facility]     Hypertension       Past Surgical History:   Procedure Laterality Date    HX VASCULAR ACCESS Left     LUE AV fistula      Family History   Problem Relation Age of Onset    No Known Problems Mother     No Known Problems Father     No Known Problems Sister     No Known Problems Brother     No Known Problems Sister     No Known Problems Sister     No Known Problems Brother       History reviewed, no pertinent family history. Social History     Tobacco Use    Smoking status: Current Every Day Smoker     Packs/day: 0.25    Smokeless tobacco: Never Used   Substance Use Topics    Alcohol use:  Yes     Allergies   Allergen Reactions    Motrin [Ibuprofen] Hives     7/16/17 Pt denies allergy    Tylenol [Acetaminophen] Hives     7/16/17 Pt denies allergy      Current Facility-Administered Medications   Medication Dose Route Frequency    albumin human 5% (BUMINATE) solution 25 g  25 g IntraVENous Q6H    0.9% sodium chloride infusion 250 mL  250 mL IntraVENous PRN    0.9% sodium chloride infusion 250 mL  250 mL IntraVENous PRN    pantoprazole (PROTONIX) 40 mg in 0.9% sodium chloride 10 mL injection  40 mg IntraVENous Q12H    dextrose 20% in water (compounded by pharmacy)   IntraVENous CONTINUOUS    NOREPINephrine (LEVOPHED) 8 mg in 5% dextrose 250mL (32 mcg/mL) infusion  0.5-30 mcg/min IntraVENous TITRATE    phytonadione (vitamin K1) (AQUA-MEPHYTON) 10 mg in 0.9% sodium chloride 50 mL IVPB  10 mg IntraVENous DAILY    balsam peru-castor oiL (VENELEX) ointment   Topical BID  white petrolatum-mineral oiL (LACRILUBE S.O.P.) ointment   Both Eyes Q12H    anidulafungin (ERAXIS) 100 mg in 0.9% sodium chloride 130 mL IVPB  100 mg IntraVENous Q24H    lactulose (CHRONULAC) 10 gram/15 mL solution 30 mL  20 g Per G Tube BID    rifAXIMin (XIFAXAN) tablet 550 mg  550 mg Per G Tube BID    epoetin cristian-epbx (RETACRIT) injection 10,000 Units  10,000 Units SubCUTAneous Q MON, WED & FRI    dexmedeTOMidine in 0.9 % NaCl (PRECEDEX) 400 mcg/100 mL (4 mcg/mL) infusion soln  0.1-1.5 mcg/kg/hr IntraVENous TITRATE    midazolam (VERSED) injection 2 mg  2 mg IntraVENous Q1H PRN    chlorhexidine (ORAL CARE KIT) 0.12 % mouthwash 15 mL  15 mL Oral Q12H    piperacillin-tazobactam (ZOSYN) 3.375 g in 0.9% sodium chloride (MBP/ADV) 100 mL MBP  3.375 g IntraVENous Q12H    alcohol 62% (NOZIN) nasal  1 Ampule  1 Ampule Topical Q12H    ondansetron (ZOFRAN) injection 4 mg  4 mg IntraVENous Q4H PRN    aspirin chewable tablet 81 mg  81 mg Oral DAILY    dextrose (D50W) injection syrg 12.5-25 g  12.5-25 g IntraVENous PRN    albumin human 25% (BUMINATE) solution 12.5 g  12.5 g IntraVENous DIALYSIS PRN    sodium chloride (NS) flush 5-40 mL  5-40 mL IntraVENous Q8H    sodium chloride (NS) flush 5-40 mL  5-40 mL IntraVENous PRN    polyethylene glycol (MIRALAX) packet 17 g  17 g Oral DAILY PRN    albuterol-ipratropium (DUO-NEB) 2.5 MG-0.5 MG/3 ML  3 mL Nebulization Q6H PRN          LAB AND IMAGING FINDINGS:     Lab Results   Component Value Date/Time    WBC 27.9 (H) 08/30/2021 05:24 PM    HGB 6.5 (L) 08/30/2021 05:24 PM    PLATELET 16 (LL) 94/46/1458 05:24 PM     Lab Results   Component Value Date/Time    Sodium 133 (L) 08/30/2021 03:59 AM    Potassium 3.6 08/30/2021 03:59 AM    Chloride 97 08/30/2021 03:59 AM    CO2 22 08/30/2021 03:59 AM    BUN 41 (H) 08/30/2021 03:59 AM    Creatinine 6.28 (H) 08/30/2021 03:59 AM    Calcium 8.4 (L) 08/30/2021 03:59 AM    Magnesium 2.0 08/30/2021 03:59 AM    Phosphorus 5.9 (H) 08/30/2021 03:59 AM      Lab Results   Component Value Date/Time    Alk. phosphatase 144 (H) 08/30/2021 03:59 AM    Protein, total 3.6 (L) 08/30/2021 03:59 AM    Albumin 0.9 (L) 08/30/2021 03:59 AM    Globulin 2.7 08/30/2021 03:59 AM     Lab Results   Component Value Date/Time    INR 2.0 (H) 08/30/2021 05:24 PM    Prothrombin time 19.8 (H) 08/30/2021 05:24 PM    aPTT 40.4 (H) 08/22/2021 02:04 PM      Lab Results   Component Value Date/Time    Iron 175 (H) 08/25/2021 11:39 AM    TIBC 150 (L) 08/25/2021 11:39 AM    Iron % saturation  08/25/2021 11:39 AM     INDETERMINATE - SENT TO REFERENCE LAB FOR ADDITIONAL TESTING. Lab Results   Component Value Date/Time    pH 7.44 08/28/2021 01:38 AM    PCO2 33 (L) 08/28/2021 01:38 AM    PO2 64 (L) 08/28/2021 01:38 AM     No components found for: [place]   Lab Results   Component Value Date/Time     08/22/2021 09:35 AM    CK - MB 3.6 (H) 08/16/2021 04:04 AM                Total time: 50 mins  Counseling / coordination time, spent as noted above: 30 mins  > 50% counseling / coordination?: yes   Prolonged service was provided for  []30 min   []75 min in face to face time in the presence of the patient, spent as noted above. Time Start:   Time End:   Note: this can only be billed with 87650 (initial) or 85350 (follow up). If multiple start / stop times, list each separately.

## 2021-08-30 NOTE — PROGRESS NOTES
0730-Bedside shift change report received from [place], RN (offgoing nurse) to Kay Bishop RN (oncoming nurse). Report included the following information SBAR, Kardex, Procedure Summary, Intake/Output, MAR, Recent Results, Med Rec Status and Cardiac Rhythm --NSR.     0800-Shift assessment complete-see flowsheet for findings. Pt. remains intubated and sedated on Precedex. Pt. is tolerating current vent settings well. HD in progress. Anasarca with weeping noted from around head of penis and RUE.    0935-HD complete at this time. 1015-Jigna Liao in to see pt. At this time. 1040-IDT rounds held at this time. 1045-Sputum obtained from ETT/inline suction at this time. 1138-Precedex paused at this time. 1200-Reassessment complete-no changes noted. 1415-Writer called [employer] at this time due to GCS of 4 without Precedex-spoke with Klaus-states they will follow along. 1440-Alli with [employer] called back and states they will not be following along, but call back with D/C or TOD.    1600-Reassessment complete-no changes noted. 1750-Dr. Alicia Velázquez back in to see pt. and made aware of blood coming from OGT as well as low platelets and plan to transfuse with one unit of platelets. 1900-Bedside shift change report given to [place] Clair, RN (oncoming nurse) by Kay Bishop RN (offgoing nurse). Report included the following information SBAR, Kardex, Intake/Output, MAR, Recent Results and Cardiac Rhythm -NSR/ST.

## 2021-08-30 NOTE — PROGRESS NOTES
GI Progress Note Nyasia Lala)  NAME:Allan Phan :1965 MRX:[id number]   ATTG: Gisselle Graham MD, MD  Prim GI: Benny Spicer MD  PCP: Marilia Lincoln NP  Date/Time:  2021 1017  Assessment:   · Progressive Liver failure- likely multifactorial with some baseline liver injury from HCV (RNA PCR positive) exacerbated by ischemia. Notes HCV is unlikely to be source for acute liver failure alone. Persistent hepatic dysfunction with elevated INR, low albumin, rising bilirubin, jaundice noted. · Hepatic encephalopathy- elevated NH3; on Lactuose, Xifaxin  · Anemia- unlikely to be focal bleeding source for his anemia as INR remains elevated and progressive thrombocytopenia noted  · ESRD- on HD  · Drug abuse- tox screen positive  · Fungemia  · Respiratory failure     Plan:   · Poor overall prognosis in setting of acute liver injury on top of baseline commorbidities (ESRD on HD) with all factors pointing to worsening of his hepatic function. · He is not a transplant candidate in setting of active drug abuse and hx of missed medical compliance/adherence  · Continue BS antibiotics, Xifaxan, Lactulose, Vitamin K, albumin  · Caution with hepatoxic agents  · No EGD planned at this time  · Consider for x-ricky to hepatology center, with caveat that overall prognosis grim and not a transplant candidate   Will sign off and see on request only  Subjective:   Discussed with RN events overnight. Persistent anemia without overt bleeding. Ongoing pressor (increased Levophed requirement noted) and sedation need persitis. No blood noted in OGT and FMS. He remains sedated and intubated.     Complaint Y/N Description   Abdominal Pain     Hematemesis     Hematochezia     Melena     Constipation     Diarrhea y    Dyspepsia     Dysphagia     Jaundiced y    Nausea/vomiting       Review of Systems:  Symptom Y/N Comments  Symptom Y/N Comments   Fever/Chills n   Chest Pain     Cough    Headaches     Sputum    Joint Pain     SOB/WARNER Pruritis/Rash     Tolerating Diet    Other       Could NOT obtain due to: Sedated/intubated     Objective:   VITALS:   Last 24hrs VS reviewed since prior progress note. Most recent are:  Visit Vitals  /64   Pulse 95   Temp 98 °F (36.7 °C)   Resp 26   Ht 5' 9\" (1.753 m)   Wt 95.2 kg (209 lb 14.1 oz)   SpO2 99%   BMI 30.99 kg/m²       Intake/Output Summary (Last 24 hours) at 8/30/2021 1017  Last data filed at 8/30/2021 0800  Gross per 24 hour   Intake 2374.62 ml   Output 175 ml   Net 2199.62 ml     PHYSICAL EXAM:  General: WD, WN. Cooperative, no acute distress    HEENT: NC, Atraumatic. PERRL. +icteric sclerae. +ETT, +OGT with thin orange output  Lungs:  CTA anteriorly. No Wheezing/Rhonchi/Rales. Heart:  Regular  rhythm,  No murmur/Rub/Gallops  Abdomen: Soft, +distended, Non tender.  +hypoactive BS  Extremities: No c/c. Cool edematous extremities with anasarca  Skin:  Jaundiced, tattoos  Neurologic:  A/O to pain only. No acute neurological distress   Psych:   Unable to assess insight. Not agitated. Lab and Radiology Data Reviewed: (see below)    Medications Reviewed: (see below)  PMH/SH reviewed - no change compared to H&P  ________________________________________________________________________  Total time spent with patient: 20 minutes ________________________________________________________________________  Care Plan discussed with:  Patient y   Family     RN y              Consultant:       Meghana Broderick MD     Procedures: see electronic medical records for all procedures/Xrays and details which were not copied into this note but were reviewed prior to creation of Plan.       LABS:  Recent Labs     08/30/21 0359 08/29/21  0344   WBC 28.2* 29.6*   HGB 7.8* 6.5*   HCT 24.2* 20.0*   PLT 28* 57*     Recent Labs     08/30/21  0359 08/29/21 0344 08/28/21  0412   * 134* 133*   K 3.6 3.8 4.0   CL 97 98 98   CO2 22 22 22   BUN 41* 36* 21*   CREA 6.28* 5.34* 4.00*   * 93 38*   CA 8.4* 9.4 9.8   MG 2.0 1.8  --    PHOS 5.9* 5.1*  --      Recent Labs     08/30/21  0359 08/29/21  0344 08/28/21  0412   * 140* 146*   TP 3.6* 3.9* 4.8*   ALB 0.9* 1.1* 1.3*   GLOB 2.7 2.8 3.5     Recent Labs     08/29/21  0344 08/28/21  0412   INR 2.1* 2.3*   PTP 21.1* 22.9*      No results for input(s): FE, TIBC, PSAT, FERR in the last 72 hours. No results found for: FOL, RBCF  Recent Labs     08/28/21  0138   PH 7.44   PCO2 33*   PO2 64*     No results for input(s): CPK, CKMB in the last 72 hours.     No lab exists for component: TROPONINI  Lab Results   Component Value Date/Time    Color YELLOW/STRAW 05/06/2017 01:30 PM    Appearance CLOUDY (A) 05/06/2017 01:30 PM    Specific gravity 1.015 05/06/2017 01:30 PM    pH (UA) 7.5 05/06/2017 01:30 PM    Protein >300 (A) 05/06/2017 01:30 PM    Glucose NEGATIVE  05/06/2017 01:30 PM    Ketone NEGATIVE  05/06/2017 01:30 PM    Bilirubin NEGATIVE  05/06/2017 01:30 PM    Urobilinogen 0.2 05/06/2017 01:30 PM    Nitrites NEGATIVE  05/06/2017 01:30 PM    Leukocyte Esterase NEGATIVE  05/06/2017 01:30 PM    Epithelial cells FEW 05/06/2017 01:30 PM    Bacteria NEGATIVE  05/06/2017 01:30 PM    WBC 5-10 05/06/2017 01:30 PM    RBC 0-5 05/06/2017 01:30 PM       MEDICATIONS:  Current Facility-Administered Medications   Medication Dose Route Frequency    0.9% sodium chloride infusion 250 mL  250 mL IntraVENous PRN    pantoprazole (PROTONIX) 40 mg in 0.9% sodium chloride 10 mL injection  40 mg IntraVENous Q12H    dextrose 20% in water (compounded by pharmacy)   IntraVENous CONTINUOUS    NOREPINephrine (LEVOPHED) 8 mg in 5% dextrose 250mL (32 mcg/mL) infusion  0.5-30 mcg/min IntraVENous TITRATE    phytonadione (vitamin K1) (AQUA-MEPHYTON) 10 mg in 0.9% sodium chloride 50 mL IVPB  10 mg IntraVENous DAILY    balsam peru-castor oiL (VENELEX) ointment   Topical BID    white petrolatum-mineral oiL (LACRILUBE S.O.P.) ointment   Both Eyes Q12H    anidulafungin (ERAXIS) 100 mg in 0.9% sodium chloride 130 mL IVPB  100 mg IntraVENous Q24H    lactulose (CHRONULAC) 10 gram/15 mL solution 30 mL  20 g Per G Tube BID    rifAXIMin (XIFAXAN) tablet 550 mg  550 mg Per G Tube BID    epoetin cristian-epbx (RETACRIT) injection 10,000 Units  10,000 Units SubCUTAneous Q MON, WED & FRI    dexmedeTOMidine in 0.9 % NaCl (PRECEDEX) 400 mcg/100 mL (4 mcg/mL) infusion soln  0.1-1.5 mcg/kg/hr IntraVENous TITRATE    midazolam (VERSED) injection 2 mg  2 mg IntraVENous Q1H PRN    chlorhexidine (ORAL CARE KIT) 0.12 % mouthwash 15 mL  15 mL Oral Q12H    piperacillin-tazobactam (ZOSYN) 3.375 g in 0.9% sodium chloride (MBP/ADV) 100 mL MBP  3.375 g IntraVENous Q12H    alcohol 62% (NOZIN) nasal  1 Ampule  1 Ampule Topical Q12H    ondansetron (ZOFRAN) injection 4 mg  4 mg IntraVENous Q4H PRN    aspirin chewable tablet 81 mg  81 mg Oral DAILY    dextrose (D50W) injection syrg 12.5-25 g  12.5-25 g IntraVENous PRN    albumin human 25% (BUMINATE) solution 12.5 g  12.5 g IntraVENous DIALYSIS PRN    sodium chloride (NS) flush 5-40 mL  5-40 mL IntraVENous Q8H    sodium chloride (NS) flush 5-40 mL  5-40 mL IntraVENous PRN    polyethylene glycol (MIRALAX) packet 17 g  17 g Oral DAILY PRN    albuterol-ipratropium (DUO-NEB) 2.5 MG-0.5 MG/3 ML  3 mL Nebulization Q6H PRN

## 2021-08-30 NOTE — PROGRESS NOTES
NAME: Jefferson Anthony        :  1965        MRN:  [id number]               Assessment   :                                               Plan:  ESRD-  AVF  Anemia  Liver failure  Acute respiratory failure  Lactic acidosis  Fungemia  HTN     H/o poor adherence with medical advice [facility]; HD today     Seen on hd  Prognosis poor  Palliative meeting with family           Subjective:     Chief Complaint:  Intubated. Review of Systems:    Symptom Y/N Comments  Symptom Y/N Comments   Fever/Chills    Chest Pain     Poor Appetite    Edema     Cough    Abdominal Pain     Sputum    Joint Pain     SOB/WARNER    Pruritis/Rash     Nausea/vomit    Tolerating PT/OT     Diarrhea    Tolerating Diet     Constipation    Other       Could not obtain due to:      Objective:     VITALS:   Last 24hrs VS reviewed since prior progress note.  Most recent are:  Visit Vitals  BP (!) 148/57   Pulse 91   Temp 97.7 °F (36.5 °C) (Axillary)   Resp 25   Ht 5' 9\" (1.753 m)   Wt 95.2 kg (209 lb 14.1 oz)   SpO2 94%   BMI 30.99 kg/m²       Intake/Output Summary (Last 24 hours) at 2021 1413  Last data filed at 2021 1336  Gross per 24 hour   Intake 1903.02 ml   Output 2700 ml   Net -796.98 ml      Telemetry Reviewed:     PHYSICAL EXAM:  General: In icu, intubated    No edema    Lab Data Reviewed: (see below)    Medications Reviewed: (see below)    PMH/SH reviewed - no change compared to H&P  ________________________________________________________________________  Care Plan discussed with:  Patient     Family      RN     Care Manager                    Consultant:          Comments   >50% of visit spent in counseling and coordination of care       ________________________________________________________________________  Tani Reynaga MD     Procedures: see electronic medical records for all procedures/Xrays and details which  were not copied into this note but were reviewed prior to creation of Plan. LABS:  Recent Labs     08/30/21 0359 08/29/21 0344   WBC 28.2* 29.6*   HGB 7.8* 6.5*   HCT 24.2* 20.0*   PLT 28* 57*     Recent Labs     08/30/21 0359 08/29/21 0344 08/28/21 0412   * 134* 133*   K 3.6 3.8 4.0   CL 97 98 98   CO2 22 22 22   BUN 41* 36* 21*   CREA 6.28* 5.34* 4.00*   * 93 38*   CA 8.4* 9.4 9.8   MG 2.0 1.8  --    PHOS 5.9* 5.1*  --      Recent Labs     08/30/21 0359 08/29/21 0344 08/28/21 0412   * 140* 146*   TP 3.6* 3.9* 4.8*   ALB 0.9* 1.1* 1.3*   GLOB 2.7 2.8 3.5     Recent Labs     08/29/21 0344 08/28/21 0412   INR 2.1* 2.3*   PTP 21.1* 22.9*      No results for input(s): FE, TIBC, PSAT, FERR in the last 72 hours. No results found for: FOL, RBCF   Recent Labs     08/28/21  0138   PH 7.44   PCO2 33*   PO2 64*     No results for input(s): CPK, CKMB in the last 72 hours.     No lab exists for component: TROPONINI  No components found for: [place]  Lab Results   Component Value Date/Time    Color YELLOW/STRAW 05/06/2017 01:30 PM    Appearance CLOUDY (A) 05/06/2017 01:30 PM    Specific gravity 1.015 05/06/2017 01:30 PM    pH (UA) 7.5 05/06/2017 01:30 PM    Protein >300 (A) 05/06/2017 01:30 PM    Glucose NEGATIVE  05/06/2017 01:30 PM    Ketone NEGATIVE  05/06/2017 01:30 PM    Bilirubin NEGATIVE  05/06/2017 01:30 PM    Urobilinogen 0.2 05/06/2017 01:30 PM    Nitrites NEGATIVE  05/06/2017 01:30 PM    Leukocyte Esterase NEGATIVE  05/06/2017 01:30 PM    Epithelial cells FEW 05/06/2017 01:30 PM    Bacteria NEGATIVE  05/06/2017 01:30 PM    WBC 5-10 05/06/2017 01:30 PM    RBC 0-5 05/06/2017 01:30 PM       MEDICATIONS:  Current Facility-Administered Medications   Medication Dose Route Frequency    albumin human 5% (BUMINATE) solution 25 g  25 g IntraVENous Q6H    0.9% sodium chloride infusion 250 mL  250 mL IntraVENous PRN    pantoprazole (PROTONIX) 40 mg in 0.9% sodium chloride 10 mL injection  40 mg IntraVENous Q12H    dextrose 20% in water (compounded by pharmacy)   IntraVENous CONTINUOUS    NOREPINephrine (LEVOPHED) 8 mg in 5% dextrose 250mL (32 mcg/mL) infusion  0.5-30 mcg/min IntraVENous TITRATE    phytonadione (vitamin K1) (AQUA-MEPHYTON) 10 mg in 0.9% sodium chloride 50 mL IVPB  10 mg IntraVENous DAILY    balsam peru-castor oiL (VENELEX) ointment   Topical BID    white petrolatum-mineral oiL (LACRILUBE S.O.P.) ointment   Both Eyes Q12H    anidulafungin (ERAXIS) 100 mg in 0.9% sodium chloride 130 mL IVPB  100 mg IntraVENous Q24H    lactulose (CHRONULAC) 10 gram/15 mL solution 30 mL  20 g Per G Tube BID    rifAXIMin (XIFAXAN) tablet 550 mg  550 mg Per G Tube BID    epoetin cristian-epbx (RETACRIT) injection 10,000 Units  10,000 Units SubCUTAneous Q MON, WED & FRI    dexmedeTOMidine in 0.9 % NaCl (PRECEDEX) 400 mcg/100 mL (4 mcg/mL) infusion soln  0.1-1.5 mcg/kg/hr IntraVENous TITRATE    midazolam (VERSED) injection 2 mg  2 mg IntraVENous Q1H PRN    chlorhexidine (ORAL CARE KIT) 0.12 % mouthwash 15 mL  15 mL Oral Q12H    piperacillin-tazobactam (ZOSYN) 3.375 g in 0.9% sodium chloride (MBP/ADV) 100 mL MBP  3.375 g IntraVENous Q12H    alcohol 62% (NOZIN) nasal  1 Ampule  1 Ampule Topical Q12H    ondansetron (ZOFRAN) injection 4 mg  4 mg IntraVENous Q4H PRN    aspirin chewable tablet 81 mg  81 mg Oral DAILY    dextrose (D50W) injection syrg 12.5-25 g  12.5-25 g IntraVENous PRN    albumin human 25% (BUMINATE) solution 12.5 g  12.5 g IntraVENous DIALYSIS PRN    sodium chloride (NS) flush 5-40 mL  5-40 mL IntraVENous Q8H    sodium chloride (NS) flush 5-40 mL  5-40 mL IntraVENous PRN    polyethylene glycol (MIRALAX) packet 17 g  17 g Oral DAILY PRN    albuterol-ipratropium (DUO-NEB) 2.5 MG-0.5 MG/3 ML  3 mL Nebulization Q6H PRN

## 2021-08-30 NOTE — PROGRESS NOTES
Comprehensive Nutrition Assessment    Type and Reason for Visit: Reassess    Nutrition Recommendations/Plan:   Resume trophic tube feeding when okay per Dr Barnett Gain @ 10mL/hr + free water flush 50mL Q 6hr  If able to advance, goal rate will be Nepro @ 45mL/hr + Prosource BID + free water flush 50mL Q 6hr    Nutrition Assessment:      Chart reviewed and case discussed during CCU rounds. Pt is intubated and requiring vasopressor support. TFs on hold. 8/28 night residuals were >400mL with TF running at 20mL/hr. Per notes, pt vomited yesterday morning, then OGT suctioned 900mL dark orange/red fluid. Gastric ileus noted by Dr Manasa Rea yesterday. OGT on intermittent suction, with 200mL output this afternoon. Will place orders for trophic TF to resume when okay with Dr Jesus Blanc. Palliative consulted as long term prognosis is quite poor. Will continue monitoring. Estimated Daily Nutrient Needs:  Energy (kcal): PSU [address] [facility] [address]); Weight Used for Energy Requirements: Current  Protein (g): 110-146g (1.5-2.0g/kg IBW); Weight Used for Protein Requirements: Ideal  Fluid (ml/day): 1800 mL or per MD; Method Used for Fluid Requirements: 1 ml/kcal      Nutrition Related Findings:  Labs: phos 5.9, Na 133, BG . Meds: lactulose, eraxis, D12 IVF, K1, zosyn. BM 8/29. Wounds:    Partial thickness       Current Nutrition Therapies:  DIET NPO    Anthropometric Measures:  · Height:  5' 9\" (175.3 cm)  · Current Body Wt:  95.2 kg (209 lb 14.1 oz)   · Ideal Body Wt:  160 lbs:  105.1 %   · BMI Category:  Obese class 1 (BMI 30.0-34. 9)       Nutrition Diagnosis:   · Inadequate protein-energy intake related to impaired respiratory function as evidenced by NPO or clear liquid status due to medical condition, intubation      Nutrition Interventions:   Food and/or Nutrient Delivery: Start tube feeding  Nutrition Education and Counseling: No recommendations at this time  Coordination of Nutrition Care: Continue to monitor while inpatient, Interdisciplinary rounds    Goals:  Pt will start TF, advancing toward goal rate if not extubated or transitioned to [address] [facility] [address] next 2-4 days       Nutrition Monitoring and Evaluation:   Behavioral-Environmental Outcomes: None identified  Food/Nutrient Intake Outcomes: Enteral nutrition intake/tolerance  Physical Signs/Symptoms Outcomes: Biochemical data, GI status, Fluid status or edema, Hemodynamic status, Weight, Skin    Discharge Planning:     Too soon to determine     Electronically signed by Marycruz Manley RD on 8/30/2021 at 1:42 PM    Contact: [phone]

## 2021-08-30 NOTE — DIALYSIS
Hemodialysis / [phone]    Vitals Pre Post Assessment Pre Post   BP BP: (!) 92/50 (Simultaneous filing.  User may not have seen previous data.) (08/30/21 0730) 170/72 LOC sadated sadated   HR Pulse (Heart Rate): 89 (08/30/21 0735) 97 Lungs coarse coarse   Resp Resp Rate: 30 (08/30/21 0735) 28 Cardiac regular regular   Temp Temp: 98.1 °F (36.7 °C) (08/30/21 0535) 97.7 Skin regular regular   Weight Pre-Dialysis Weight: 95.2 kg (209 lb 14.1 oz) (08/30/21 0535) 93.1 Edema generalized generalized   Tele status bedside bedside Pain Pain Intensity 1: 8 (08/30/21 0400) No pain     Orders   Duration: Start: 0540 End: 0910 Total: 3.5 hr   Dialyzer: Dialyzer/Set Up Inspection: Jerald Cue ([id number]) (08/30/21 0535)   Katherine Gains Bath: Dialysate K (mEq/L): 3 (08/30/21 0535)   Ca Bath: Dialysate CA (mEq/L): 2.5 (08/30/21 0535)   Na: Dialysate NA (mEq/L): 138 (08/30/21 0535)   Bicarb: Dialysate HCO3 (mEq/L): 35 (08/30/21 0535)   Target Fluid Removal: Goal/Amount of Fluid to Remove (mL): 2500 mL (08/30/21 0535)     Access   Type & Location: GLORIA AVF   Comments:                 Patent, B&T positive, cannulated with 15 g needles x 2                       Labs   HBsAg (Antigen) / date:       08/09/21 Negative [facility]                                        HBsAb (Antibody) / date: 08/09/21  Immune [facility]    Source:    Obtained/Reviewed  Critical Results Called HGB   Date Value Ref Range Status   08/30/2021 7.8 (L) 12.1 - 17.0 g/dL Final     Potassium   Date Value Ref Range Status   08/30/2021 3.6 3.5 - 5.1 mmol/L Final     Calcium   Date Value Ref Range Status   08/30/2021 8.4 (L) 8.5 - 10.1 MG/DL Final     BUN   Date Value Ref Range Status   08/30/2021 41 (H) 6 - 20 MG/DL Final     Creatinine   Date Value Ref Range Status   08/30/2021 6.28 (H) 0.70 - 1.30 MG/DL Final     Comment:     ICTERIC SPECIMEN        Meds Given   Name Dose Route                    Adequacy / Fluid    Total Liters Process: 84 L   Net Fluid Removed: 2000 ml      Comments   Time Out Done:   (Time) Yes, 2896   Admitting Diagnosis: Renal failure   Consent obtained/signed: Informed Consent Verified: Yes (08/27/21 1024)   Machine / RO # Machine Number: [id number] (08/30/21 4370)   Primary Nurse Rpt Pre: Carolina Galvez RN   Primary Nurse Rpt Post: Marina Noel RN   Pt Education: Facundo alcaraz   Care Plan: Continue HD as ordered   Pts outpatient clinic:      Tx Summary   Comments: Tolerated tx well, at end, all blood in circuit returned with 300 ml NS, GLORIA AVF patent, B&T positive, bleeding stopped at both sites, pressure dressing in place.

## 2021-08-30 NOTE — PROGRESS NOTES
08/30/21 0639   ABCDEF Bundle   SBT Safety Screen Passed No   SBT Screen Reason for Failure Vasopressor use

## 2021-08-30 NOTE — PROGRESS NOTES
Brief summary of visit, full consult note will follow. Patient is unresponsive, intubated sedated. I spoke to his brother Leeanna Langford/contact point on chart,  introduced myself, Mr Felipe Brink shared  patient has two sons who are \"incarcerated\" patient mom is next in line/ legal next of kin. We have scheduled a family meeting with patient mom and his brother at 3:30 tomorrow, to provide information. I have coordinated the plan with the intensive care specialist Dr. Lindsay Hills. Thank you for allowing us to participate in the care of Mr. Jj Rivera

## 2021-08-30 NOTE — PROGRESS NOTES
Care Management:    Transition of Care Plan:     RUR: 54%  Disposition: TBD- home w/ f/u appts  Follow up appointments: PCP, Nephrology? DME needed: None  Transportation at [address] transport/ round trip  [employer] or means to access home:         Medicare Letter: N/A     Caregiver Contact: Brother- Kavita Corral, [phone]  *pt does NOT want his mother contacted*  Discharge Caregiver contacted prior to discharge?      Heart Failure and ESRD, liver failure. Palliative consulted.      Pt vented in the ICU at this time. His brother Josie Perez is his NOK.    CM will continue to follow and assist with d/c planning.     Rommel Ly RN ACM 0717

## 2021-08-30 NOTE — WOUND CARE
Wound Care nurse consult: consult placed by staff nurse on CCU for \" Skin tear/blister reptured head of penis\". Patient is a 56 y/o AAM admitted for pulmonary edema, acute of chronic CHF and ESRD. Past Medical History:   Diagnosis Date    Chronic kidney disease     Dialysis patient ([facility] [address]     Hypertension      Current everyday smoker    Patient in liver failure and has a poor overall diagnosis. Patient is vented and sedated in CCU. He is anasarcic and has scrotal and penis edema causing the skin to blister and peel off the head of the penis. Recommend:    Penis and scrotum edema: cleanse gently with soap and water when soiled. Apply Venelex ointment BID to open and intact skin.     Marilyn Cash RN, [employer]

## 2021-08-30 NOTE — INTERDISCIPLINARY ROUNDS
Critical care interdisciplinary rounds held on 08/26/2021. Following members present, Pharmacy, Diabetes Treatment, Case Management,  Physical Therapy, Clinical Leader and Nutrition. Led by Aida Parr RN and Dr. Luis Miguel Guido. Plan of care discussed. See clinical pathway for plan of care and interventions and desired outcomes.
Critical care interdisciplinary rounds held on 08/27/2021. Following members present, Pharmacy, Diabetes Treatment, Case Management, Respiratory Therapy, Physical Therapy, Palliative Care and Nutrition. Led by Tonie Cordova RN and Dr. Christie Garcia. Plan of care discussed. See clinical pathway for plan of care and interventions and desired outcomes.
Interdisciplinary Rounds were completed on 08/17/21 for this patient. Rounds included nursing, clinical care leader, pharmacy, and case management. Plan of care discussed. See clinical pathway and/or care plan for interventions and desired outcomes.
Interdisciplinary team rounds were held 8/18/2021 with the following team members:Care Management, Diabetes Treatment Specialist, Nursing, Nutrition, Pharmacy, Physical Therapy, Physician and Respiratory Therapy. Plan of care discussed. See clinical pathway and/or care plan for interventions and desired outcomes. \
Interdisciplinary team rounds were held 8/19/2021 with the following team members:Care Management, Diabetes Management, Nursing, Nutrition, Pharmacy, Physical Therapy, Physician, Respiratory Therapy and Clinical Coordinator. Plan of care discussed. See clinical pathway and/or care plan for interventions and desired outcomes.
Interdisciplinary team rounds were held 8/30/2021 with the following team members:Care Management, Diabetes Treatment Specialist, Nursing, Nutrition, Pharmacy, Physical Therapy, Physician, Respiratory Therapy and Clinical Coordinator. Tthe Plan of care discussed. See clinical pathway and/or care plan for interventions and desired outcomes.
General

## 2021-08-30 NOTE — PROGRESS NOTES
[facility]      Name: Ramírez Carmen   : 1965   MRN: [id number]   Date: 2021      Assessment:   Brief patient summary:  55 M with PMH of HTN, ESRD on HD admitted  to the Hospitalist Service  with dyspnea, pulmonary edema, hyperkalemia, uremic encephalopathy after recent hospitalization for same. Underwent urgent HD with some improvement. Transferred to ICU  with depressed LOC and hypoglycemia. Emergently intubated due to decreased LOC and was felt to have aspirated. An empty bottle of oxycodone was found in his pocket raising concern for opioid OD. During his ICU stay, he has remained minimally responsive. He has developed progressive acute liver failure for which he was treated with IV NAC. Blood cultures drawn  positive for Candida glabrata. ICU course has been complicated by recurrent hypoglycemia    Hospital course/major results:   CT chest/abd.pelvis: Small right-sided pleural effusion with cardiomegaly and coronary artery disease. Moderate centrilobular emphysematous change. No evidence of pulmonary embolism. No aortic aneurysm or dissection. Colonic wall thickening and mild hyperemia of colonic and small bowel mucosa may be related to mild gastroenteritis. No additional acute process is identified in the chest, abdomen or pelvis   Echocardiogram: Estimated LVEF is 40 - 45%. Normal cavity size. Mild concentric hypertrophy. Mildly dilated right ventricle. Borderline low systolic function. Mitral valve non-specific thickening. Mild to moderate mitral valve regurgitation is present. Mild to moderate tricuspid valve regurgitation is present. Mild to moderate pulmonary hypertension. Pulmonary arterial systolic pressure is 40 mmHg.  US LUE fistula: AV fistula, of the left upper extremity; widely patent with no evidence of hemodynamically significant stenosis. Incidental finding of a patent stent in the axillary region of the AV fistula.   No evidence of oli-fistula fluid. 08/28 CTH: no acute intracranial findings  08/28 Minimally responsive off all sedatives other than dexmedetomidine. Frequently gnawing on ETT. Minimal spontaneous movement  08/29 Slightly more responsive (minimally). Grimaces with stimulation. Not F/C.   08/29 Gastroenterology consultation: liver failure likely multifactorial superimposed on HCV. Poor overall prognosis noted. No indication for endoscopy given very poor overall prognosis    Lines/tubes/devices:  ETT 08/18 >>   L IJ CVL 08/18 >>     Active Problems: (By systems)  PULMONARY:  1. Ventilator dependent respiratory failure - presently cannot extubate due to AMS  2. Pulm edema with R pleural effusion    Current ventilator settings: reviewed    CARDIOVASCULAR/HEMODYNAMIC:  3. Vasopressor dependent hypotension    Current vasopressors: NE 4 mcg/min>>25 mcg with HD    RENAL:  4. ESRD on chronic HD    Current RRT: IHD    GI/NUTRITION:  5. Recurrent hypoglycemia - on D20W infusion @ 25 cc/hr  6. Acute liver failure - severe  7. Heme positive stool with dark NGT aspirate  Gastric ileus - high residuals, previously  Current nutritional support: TFs on hold    INFECTIOUS DISEASE  8. Severe sepsis  9. Candidemia - unclear source    Micro:  Blood 08/18 >> C glabrata in 1/4 bottles  Blood 08/21 >> NGTD >>   Blood 08/25 >> NGTD >>   Blood 08/27 >> NGTD >>     Antibiotics:  Pip-tazo  Anidulafungin    HEME  10. Acute blood loss anemia  a. Received 1 unit RBCs 08/26  b. Received one unit RBCs 08/28  c. One unit RBCs 08/29  11. Macrocytosis  12. Moderate-severe thrombocytopenia  13. H/O DVT    DVT prophylaxis: SCDs    NEURO  14. Acute encephalopathy/TME - likely hepatic. Possibly component of hypoglycemic and/or hypoxic injury @ time of intubation    RASS goal: 0  Current sedatives: dex  Current analgesics: none      OTHER  15. Wound care ulcerated / degloved penis  16. Very poor prognosis    GOALS OF CARE/ADVANCED DIRECTIVES  17.  CODE STATUS: DNR in event of cardiac arrest  18. ICU Comprehensive Plan of Care:     Plans for this Shift:    1. Ventilator settings reviewed with RT and adjusted as indicated  2. Daily SBT if meets criteria  3. ICU hemodynamic monitoring  4. MAP goal > 55 mmHg  5. SBP goal > 90 mmHg  6. Monitor chemistry panels daily  7. Correct electrolytes as indicated  8. HD schedule per Nephrology  9. Not a candidate for CRRT - see ACP note from 08/28  10. Nutrition: TFs - no advance  11. SUP: IV PPI  12. Gastroenterology consultation 08/29 - Dr Padma Cardona input much appreciated  13. Glycemic control: N/I - requiring dextrose infusion for hypoglycemia  14. Micro and antibiotics as documented above  15. ID service following  16. Transfuse as needed to maintain Hgb > 7.0  17. CT head ordered 08/28 - results reviewed  18. EEG ordered to be done 08/31  19. Continue lactulose, rifaximin  20. Consult palliative care  21. Trickle tube feeds  22. Start albumin    Brother, Leisa Bowman, updated in detail @ bedside 08/28 by Dr. Niyah Colon. They discussed transitioning to comfort care if there was no improvement soon. HPI/Consult/Subjective:   24 Hr Events 8/30/2021:   Transfused one unit RBCs this AM    SUBJ:   Minimal change    Past Medical History:      has a past medical history of Chronic kidney disease, Dialysis patient ([facility] [address] and Hypertension. Past Surgical History:      has a past surgical history that includes hx vascular access (Left). Home Medications:     Prior to Admission medications    Medication Sig Start Date End Date Taking? Authorizing Provider   methadone (DOLOPHINE) 10 mg/mL solution Take 160 mg by mouth daily. Yes Provider, Historical   metoprolol tartrate (LOPRESSOR) 25 mg tablet Take 1 Tablet by mouth every twelve (12) hours for 30 days. 8/14/21 9/13/21  Yuval Hernández MD   pantoprazole (PROTONIX) 40 mg tablet Take 1 Tablet by mouth daily for 30 days.  8/14/21 9/13/21  Yuval Hernández MD   sevelamer (RenageL) 400 mg tablet Take 2 Tablets by mouth three (3) times daily (with meals) for 30 days. 8/14/21 9/13/21  Bradford Shaw MD   isosorbide mononitrate ER (IMDUR) 30 mg tablet Take 1 Tablet by mouth every morning for 30 days. 8/14/21 9/13/21  Bradford Shaw MD   citalopram (CELEXA) 40 mg tablet Take 1 Tablet by mouth daily for 30 days. 8/14/21 9/13/21  Bradford Shaw MD   atorvastatin (LIPITOR) 40 mg tablet Take 1 Tablet by mouth nightly for 30 days. 8/14/21 9/13/21  Bradford Shaw MD   apixaban (ELIQUIS) 5 mg tablet Take 1 Tablet by mouth two (2) times a day for 30 days. 8/14/21 9/13/21  Bradford Shaw MD   paricalcitoL (ZEMPLAR) 1 mcg capsule Take 1 Capsule by mouth daily for 30 days. 8/14/21 9/13/21  Bradford Shaw MD   lidocaine (LIDODERM) 5 % Apply patch to the affected area for 12 hours a day and remove for 12 hours a day. 11/28/17   Tara Ramirez MD   aspirin delayed-release 81 mg tablet Take 1 Tab by mouth daily. 11/28/17   Tara Ramirez MD   multivitamin (ONE A DAY) tablet Take 1 Tab by mouth daily. Juan Olivarez MD   gabapentin (NEURONTIN) 100 mg capsule Take 100 mg by mouth daily. Juan Olivarez MD       Allergies/Social/Family History:      Allergies   Allergen Reactions    Motrin [Ibuprofen] Hives     7/16/17 Pt denies allergy    Tylenol [Acetaminophen] Hives     7/16/17 Pt denies allergy      Social History     Tobacco Use    Smoking status: Current Every Day Smoker     Packs/day: 0.25    Smokeless tobacco: Never Used   Substance Use Topics    Alcohol use: Yes      Family History   Problem Relation Age of Onset    No Known Problems Mother     No Known Problems Father     No Known Problems Sister     No Known Problems Brother     No Known Problems Sister     No Known Problems Sister     No Known Problems Brother            Objective:   Vital Signs:  Visit Vitals  /64   Pulse 95 Comment: sadated   Temp 98 °F (36.7 °C)   Resp 26   Ht 5' 9\" (1.753 m)   Wt 95.2 kg (209 lb 14.1 oz)   SpO2 99%   BMI 30.99 kg/m²    O2 Flow Rate (L/min): 2 l/min O2 Device: Endotracheal tube, Ventilator Temp (24hrs), Av.1 °F (36.7 °C), Min:97.6 °F (36.4 °C), Max:98.7 °F (37.1 °C)           Intake/Output:     Intake/Output Summary (Last 24 hours) at 2021 1034  Last data filed at 2021 0800  Gross per 24 hour   Intake 2374.62 ml   Output 175 ml   Net 2199.62 ml       Physical Exam:  GEN: Minimally responsive, not F/C  HEENT: NCAT, severe scleral icterus, ETT present  NECK: JVP not visualized  CHEST: Clear anteriorly  CARDIAC: NSR, reg, no M  ABD: Soft, NABS  EXT: warm, no edema  NEURO: minimal spontaneous movement. Weak withdrawal. PERRL  DERM: No lesions noted    I have examined the patient on this day 2021 and the above documented exam is accurate including the components that have been copied forward    LABS AND  DATA: Personally reviewed  Recent Labs     21   WBC 28.2* 29.6*   HGB 7.8* 6.5*   HCT 24.2* 20.0*   PLT 28* 57*     Recent Labs     21   * 134*   K 3.6 3.8   CL 97 98   CO2 22 22   BUN 41* 36*   CREA 6.28* 5.34*   * 93   CA 8.4* 9.4   MG 2.0 1.8   PHOS 5.9* 5.1*     Recent Labs     21   * 140*   TP 3.6* 3.9*   ALB 0.9* 1.1*   GLOB 2.7 2.8     Recent Labs     21  041   INR 2.1* 2.3*   PTP 21.1* 22.9*      No results for input(s): PHI, PCO2I, PO2I, FIO2I in the last 72 hours. No results for input(s): CPK, CKMB, TROIQ, BNPP in the last 72 hours.     Hemodynamics:   PAP:   CO:     Wedge:   CI:     CVP:    SVR: BP 2: (!) 160/99 (21 1500)     PVR:       Ventilator Settings:  Mode Rate Tidal Volume Pressure FiO2 PEEP   Assist control   420 ml  5 cm H2O 60 % 5 cm H20     Peak airway pressure: 14 cm H2O    Minute ventilation: 12 l/min        MEDS: Reviewed    Chest X-Ray:  CXR Results  (Last 48 hours)               21 0414  XR CHEST PORT Final result Impression:  Stable bilateral effusions with underlying consolidation and   pulmonary edema. Narrative: Indication: Follow-up pleural effusions       Comparison 8/27/2021. Portable exam obtained at 409 demonstrates life-support   lines and tubes unchanged in position. There has been little change in the   bilateral pleural effusions with underlying consolidation and pulmonary edema   compared to prior exam.               Multidisciplinary Rounds Completed:  Yes      SPECIAL EQUIPMENT  IHD    DISPOSITION  Stay in ICU    CRITICAL CARE CONSULTANT NOTE  I had a in-person encounter with Alexey Kamaljitnuria, reviewed and interpreted patient data including events, labs, images, vital signs, I/O's, and examined patient. I have discussed the case and the plan and management of the patient's care with the consulting services, the bedside nurses and the respiratory therapist.      NOTE OF PERSONAL INVOLVEMENT IN CARE   This patient is at high risk for sudden and clinically significant deterioration, which requires the highest level of preparedness to intervene urgently. I participated in the decision-making and personally managed or directed the management of the following life and organ supporting interventions that required my frequent assessment to treat or prevent imminent deterioration. I personally spent 50 minutes of critical care time. This is time spent at patient's bedside actively involved in patient care as well as the coordination of care and discussions with the patient's family. This does not include any procedural time which has been billed separately.     Jack Nissen,  [facility]  [phone]    8/30/2021

## 2021-08-30 NOTE — PROGRESS NOTES
Chart reviewed and discussed during interdisciplinary rounds. Remains intubated and not appropriate for OT. Will sign off at this time.

## 2021-08-30 NOTE — PROGRESS NOTES
Chart reviewed and discussed during interdisciplinary rounds. Remains intubated and not appropriate for PT. Will sign off at this time.

## 2021-08-31 NOTE — PROGRESS NOTES
[facility]      Name: Marina Arnett   : 1965   MRN: [id number]   Date: 2021      Assessment:   Brief patient summary:  55 M with PMH of HTN, ESRD on HD admitted  to the Hospitalist Service  with dyspnea, pulmonary edema, hyperkalemia, uremic encephalopathy after recent hospitalization for same. Underwent urgent HD with some improvement. Transferred to ICU  with depressed LOC and hypoglycemia. Emergently intubated due to decreased LOC and was felt to have aspirated. An empty bottle of oxycodone was found in his pocket raising concern for opioid OD. During his ICU stay, he has remained minimally responsive. He has developed progressive acute liver failure for which he was treated with IV NAC. Blood cultures drawn  positive for Candida glabrata. ICU course has been complicated by recurrent hypoglycemia    Hospital course/major results:   CT chest/abd.pelvis: Small right-sided pleural effusion with cardiomegaly and coronary artery disease. Moderate centrilobular emphysematous change. No evidence of pulmonary embolism. No aortic aneurysm or dissection. Colonic wall thickening and mild hyperemia of colonic and small bowel mucosa may be related to mild gastroenteritis. No additional acute process is identified in the chest, abdomen or pelvis   Echocardiogram: Estimated LVEF is 40 - 45%. Normal cavity size. Mild concentric hypertrophy. Mildly dilated right ventricle. Borderline low systolic function. Mitral valve non-specific thickening. Mild to moderate mitral valve regurgitation is present. Mild to moderate tricuspid valve regurgitation is present. Mild to moderate pulmonary hypertension. Pulmonary arterial systolic pressure is 40 mmHg.  US LUE fistula: AV fistula, of the left upper extremity; widely patent with no evidence of hemodynamically significant stenosis. Incidental finding of a patent stent in the axillary region of the AV fistula.   No evidence of oli-fistula fluid. 08/28 CTH: no acute intracranial findings  08/28 Minimally responsive off all sedatives other than dexmedetomidine. Frequently gnawing on ETT. Minimal spontaneous movement  08/29 Slightly more responsive (minimally). Grimaces with stimulation. Not F/C.   08/29 Gastroenterology consultation: liver failure likely multifactorial superimposed on HCV. Poor overall prognosis noted. No indication for endoscopy given very poor overall prognosis  8/31 - red blood from OG yesterday. GI notified. Tube clamped O/N to avoid suction trauma. More bleeding this a.m     Lines/tubes/devices:  ETT 08/18 >>   L IJ CVL 08/18 >>     Active Problems: (By systems)  PULMONARY:  1. Ventilator dependent respiratory failure - presently cannot extubate due to AMS  2. Pulm edema with R pleural effusion    Current ventilator settings: reviewed    CARDIOVASCULAR/HEMODYNAMIC:  3. Vasopressor dependent hypotension    Current vasopressors: NE 4 mcg/min>>25 mcg with HD    RENAL:  4. ESRD on chronic HD    Current RRT: IHD    GI/NUTRITION:  5. Recurrent hypoglycemia - on D20W infusion @ 25 cc/hr  6. Acute liver failure - severe  7. Upper GIB  Gastric ileus - high residuals, previously  Current nutritional support: TFs on hold    INFECTIOUS DISEASE  8. Severe sepsis  9. Candidemia - unclear source    Micro:  Blood 08/18 >> C glabrata in 1/4 bottles  Blood 08/21 >> NGTD >>   Blood 08/25 >> NGTD >>   Blood 08/27 >> NGTD >>     Antibiotics:  Pip-tazo  Anidulafungin    HEME  10. Acute blood loss anemia  a. Received 1 unit RBCs 08/26  b. Received one unit RBCs 08/28  c. One unit RBCs 08/29  d. 1 plt 8/30  e.  2 RBC, FFP and plt 8/31  11. Macrocytosis  12. Moderate-severe thrombocytopenia  13. H/O DVT    DVT prophylaxis: SCDs    NEURO  14. Acute encephalopathy/TME - likely hepatic.  Possibly component of hypoglycemic and/or hypoxic injury @ time of intubation    RASS goal: 0  Current sedatives: dex  Current analgesics: none      OTHER  15. Wound care ulcerated / degloved penis  16. Very poor prognosis    GOALS OF CARE/ADVANCED DIRECTIVES  17. CODE STATUS: DNR in event of cardiac arrest  18. ICU Comprehensive Plan of Care:     Plans for this Shift:    1. Ventilator settings reviewed with RT and adjusted as indicated  2. Daily SBT if meets criteria  3. ICU hemodynamic monitoring  4. MAP goal > 55 mmHg  5. SBP goal > 90 mmHg  6. Monitor chemistry panels daily  7. Correct electrolytes as indicated  8. HD schedule per Nephrology  9. Not a candidate for CRRT - see ACP note from 08/28  10. Nutrition: TFs - no advance  11. SUP: IV PPI  12. Gastroenterology consultation 08/29 - Dr Shruthi Carter input much appreciated  13. Glycemic control: N/I - requiring dextrose infusion for hypoglycemia  14. Micro and antibiotics as documented above  15. ID service following  16. Transfuse as needed to maintain Hgb > 7.0  17. CT head ordered 08/28 - results reviewed  18. EEG completed follow up read  19. Continue lactulose, rifaximin  20. palliative care to meet with family  21. Hold trickle  Extremely poor prognosis. Brother, Raza Pavon, updated in detail @ bedside 08/28 by Dr. Tatiana Hallman. They discussed transitioning to comfort care if there was no improvement soon. Palliative care following. Family meeting today  HPI/Consult/Subjective:   24 Hr Events 8/31/2021:   Transfused one unit RBCs this AM    SUBJ:   Minimal change    Past Medical History:      has a past medical history of Chronic kidney disease, Dialysis patient ([facility] [place] [address] and Hypertension. Past Surgical History:      has a past surgical history that includes hx vascular access (Left). Home Medications:     Prior to Admission medications    Medication Sig Start Date End Date Taking? Authorizing Provider   methadone (DOLOPHINE) 10 mg/mL solution Take 160 mg by mouth daily.    Yes Provider, Historical   metoprolol tartrate (LOPRESSOR) 25 mg tablet Take 1 Tablet by mouth every twelve (12) hours for 30 days. 8/14/21 9/13/21  Salomon Villalta MD   pantoprazole (PROTONIX) 40 mg tablet Take 1 Tablet by mouth daily for 30 days. 8/14/21 9/13/21  Salomon Villalta MD   sevelamer (RenageL) 400 mg tablet Take 2 Tablets by mouth three (3) times daily (with meals) for 30 days. 8/14/21 9/13/21  Salomon Villalta MD   isosorbide mononitrate ER (IMDUR) 30 mg tablet Take 1 Tablet by mouth every morning for 30 days. 8/14/21 9/13/21  Salomon Villalta MD   citalopram (CELEXA) 40 mg tablet Take 1 Tablet by mouth daily for 30 days. 8/14/21 9/13/21  Salomon Villalta MD   atorvastatin (LIPITOR) 40 mg tablet Take 1 Tablet by mouth nightly for 30 days. 8/14/21 9/13/21  Salomon Villalta MD   apixaban (ELIQUIS) 5 mg tablet Take 1 Tablet by mouth two (2) times a day for 30 days. 8/14/21 9/13/21  Salomon Villalta MD   paricalcitoL (ZEMPLAR) 1 mcg capsule Take 1 Capsule by mouth daily for 30 days. 8/14/21 9/13/21  Salomon Villalta MD   lidocaine (LIDODERM) 5 % Apply patch to the affected area for 12 hours a day and remove for 12 hours a day. 11/28/17   Murray Hinds MD   aspirin delayed-release 81 mg tablet Take 1 Tab by mouth daily. 11/28/17   Murray Hinds MD   multivitamin (ONE A DAY) tablet Take 1 Tab by mouth daily. Juan Olivarez MD   gabapentin (NEURONTIN) 100 mg capsule Take 100 mg by mouth daily. Juan Olivarez MD       Allergies/Social/Family History:      Allergies   Allergen Reactions    Motrin [Ibuprofen] Hives     7/16/17 Pt denies allergy    Tylenol [Acetaminophen] Hives     7/16/17 Pt denies allergy      Social History     Tobacco Use    Smoking status: Current Every Day Smoker     Packs/day: 0.25    Smokeless tobacco: Never Used   Substance Use Topics    Alcohol use: Yes      Family History   Problem Relation Age of Onset    No Known Problems Mother     No Known Problems Father     No Known Problems Sister     No Known Problems Brother     No Known Problems Sister     No Known Problems Sister     No Known Problems Brother            Objective:   Vital Signs:  Visit Vitals  BP (!) 128/53   Pulse 93   Temp 97.1 °F (36.2 °C)   Resp (!) 31   Ht 5' 9\" (1.753 m)   Wt 95.2 kg (209 lb 14.1 oz)   SpO2 (!) 89%   BMI 30.99 kg/m²    O2 Flow Rate (L/min): 2 l/min O2 Device: Ventilator Temp (24hrs), Av.9 °F (36.1 °C), Min:96.6 °F (35.9 °C), Max:97.7 °F (36.5 °C)           Intake/Output:     Intake/Output Summary (Last 24 hours) at 2021 0944  Last data filed at 2021 0800  Gross per 24 hour   Intake 4856.62 ml   Output 925 ml   Net 3931.62 ml       Physical Exam:  GEN: Minimally responsive, not F/C  HEENT: NCAT, severe scleral icterus, ETT present  NECK: JVP not visualized  CHEST: Clear anteriorly  CARDIAC: NSR, reg, no M  ABD: Soft, NABS  EXT: warm, no edema  NEURO: minimal spontaneous movement. Weak withdrawal. PERRL  DERM: No lesions noted    I have examined the patient on this day 2021 and the above documented exam is accurate including the components that have been copied forward    LABS AND  DATA: Personally reviewed  Recent Labs     21  04021  1724   WBC 24.4* 27.9*   HGB 5.6* 6.5*   HCT 17.9* 20.4*   PLT 30* 16*     Recent Labs     21  0404 21  0359   * 133*   K 3.3* 3.6   CL 96* 97   CO2 21 22   BUN 24* 41*   CREA 4.76* 6.28*   * 115*   CA 9.2 8.4*   MG 1.8 2.0   PHOS 4.9* 5.9*     Recent Labs     21  0404 21  0359    144*   TP 4.3* 3.6*   ALB 2.0* 0.9*   GLOB 2.3 2.7     Recent Labs     21  1724 21  0344   INR 2.0* 2.1*   PTP 19.8* 21.1*      No results for input(s): PHI, PCO2I, PO2I, FIO2I in the last 72 hours. No results for input(s): CPK, CKMB, TROIQ, BNPP in the last 72 hours.     Hemodynamics:   PAP:   CO:     Wedge:   CI:     CVP:    SVR: BP 2: (!) 160/99 (21 1500)     PVR:       Ventilator Settings:  Mode Rate Tidal Volume Pressure FiO2 PEEP   Assist control   420 ml  6 cm H2O 60 % 6 cm H20     Peak airway pressure: 17 cm H2O    Minute ventilation: 12.4 l/min        MEDS: Reviewed    Chest X-Ray:  CXR Results  (Last 48 hours)    None        Multidisciplinary Rounds Completed:  Yes      SPECIAL EQUIPMENT  IHD    DISPOSITION  Stay in ICU    CRITICAL CARE CONSULTANT NOTE  I had a in-person encounter with Alexey Maurer, reviewed and interpreted patient data including events, labs, images, vital signs, I/O's, and examined patient. I have discussed the case and the plan and management of the patient's care with the consulting services, the bedside nurses and the respiratory therapist.      NOTE OF PERSONAL INVOLVEMENT IN CARE   This patient is at high risk for sudden and clinically significant deterioration, which requires the highest level of preparedness to intervene urgently. I participated in the decision-making and personally managed or directed the management of the following life and organ supporting interventions that required my frequent assessment to treat or prevent imminent deterioration. I personally spent 60 minutes of critical care time. This is time spent at patient's bedside actively involved in patient care as well as the coordination of care and discussions with the patient's family. This does not include any procedural time which has been billed separately.     Jack Nissen,  [facility]  [phone]    8/31/2021

## 2021-08-31 NOTE — PROGRESS NOTES
Palliative Medicine  [place] [phone] - [phone] (COPE)      Diagnoses: Acute hypoxic respiratory failure (intubated), acute hepatic failure with encephalopathy, hx of drug abuse, empty bottle of oxycodone found in his coat pocket. GOALS OF CARE: Family to decide next steps. Educated today on patient's medical condition, discussed patient's overall poor prognosis and continuing current aggressive care vs focus on comfort and de-escalating care at some point with a focus on comfort. TREATMENT PREFERENCES:   Code Status: DNR    Advance Care Planning:  [] [facility] Interdisciplinary Team has updated the ACP Navigator with [address] and Patient Capacity    Primary Decision Maker ([address]):   Relationship to patient:  [] Named in a scanned document   [x] Legal Next of Kin  [] Guardian    Medical Interventions: Limited additional interventions     Family Meeting Documentation    Participants: Family including mother, Regina Masters, sister Neal Councilman, sister Lilia Clark, brother Sumi Beltre (patient has three other siblings per Lilia Clark). Contact phone number for mother is "[employer]'s cell, mother does not have a phone #[phone] - Yaseminette Epley #, or mother's 's # [phone]    Psychosocial: Very complicated family dynamics, tension noted between the brother Sumi Beltre and mother along with sister Neal Councilman. Brother Ely Guerra walked out of meeting when we discussed that patient's mother is Lashaun Gunderson in the absence of an AMD. Palliative team was unaware that at one point while able to engage in conversation, patient had wanted his brother to be the \"contact point\" and did not want his mother contacted. Ely Guerra had not informed rest of family about patient being hospitalized until 8/27/21. Rest of family (especially mom and Barbette Epley) quite upset about this. Mother did not make any decisions today regarding changing course of medical care for patient.  She needs time to think about everything. In reviewing chart from prior notes, it is noted that on 21 patient shared with CM that he wanted brother Huan Burns to be the point of contact and \"not to contact mother\". It appears that Aguila Ibanez did eventually speak to mother, Millicent Frost about patient and she did come to see patient last week. Patient did not complete an AMD, he seemed not interested in completing this document (per CM notes). Palliative team discussed case with Risk Management, it was recommended that in this case both brother and mother be involved in decision making (patient's verbal wish for brother to be contact, no AMD, patient never verbally designated brother as MPOA, patient's one adult child is  and one adult child incarcerated, so hierarchy for Alexandria Thakur would be mother as Alexandriacruz Thakur). Spiritual History: Family open to  support.  was contacted to meet with family and he came to room. Discussion: Family aware of patient's medical condition as they have spoken to ICU team this morning and there were conversations with family on an ongoing basis regarding patient's condition. Family aware that patient's prognosis is poor, that he has internal bleeding now, that his chances of recovery are poor. Family all agree to DNR status (this decision was made by brother in conversation with team), mother agrees to DNR status. Family want time to process all that is going on with patient, mother is not ready to make any decisions at this time. Outcome / Plan: We agreed to involve family, mother is Alexandria Thakur, brother was point of contact initially, but no AMD completed by patient, he had noted that he would \"think about it\". Mother is the Alexandria Thakur and decision maker, but family can be involved.

## 2021-08-31 NOTE — ACP (ADVANCE CARE PLANNING)
Primary Decision Maker: Marisela Reed (please leave VM on phone) - Mother - [phone]. Please leave a message. Also call - [phone] if other phone does not go through. Advance Care Planning 2021   Patient's Healthcare Decision Maker is: Legal Next of Kin   Confirm Advance Directive None   Patient Would Like to Complete Advance Directive Unable   Does the patient have other document types Do Not Resuscitate     Chart reviewed thoroughly today, have spoken to both Risk Management and Ethics (Debra) to determine who MPOA should be as patient had wanted brother Gery Leventhal as \"contact\", did not want mother contacted but he did not complete an AMD and he was going to \"think about this\". Patient noted to be somewhat confused, day after admission. Patient's mother noted above is [facility] Staff (he has adult children, one is  and the other is incarcerated) and is his medical decision maker. It was discussed with team that both brother Stone Velazco and mother be involved in conversations if possible, but Maria Luz Patterson is [facility] Staff and MPOA.

## 2021-08-31 NOTE — PROGRESS NOTES
NAME: Santhosh Ferreira        :  1965        MRN:  [id number]               Assessment   :                                               Plan:  ESRD-  AVF  Anemia  Liver failure  Acute respiratory failure  Lactic acidosis  Fungemia  HTN     H/o poor adherence with medical advice [facility];      Prognosis poor  Palliative meeting with family  He is becoming too unstable for dialysis  I am not offering cvvh as I believe it would be futile. Treatment of bleeding, etc per primary team           Subjective:     Chief Complaint:  Intubated. Review of Systems:    Symptom Y/N Comments  Symptom Y/N Comments   Fever/Chills    Chest Pain     Poor Appetite    Edema     Cough    Abdominal Pain     Sputum    Joint Pain     SOB/WARNER    Pruritis/Rash     Nausea/vomit    Tolerating PT/OT     Diarrhea    Tolerating Diet     Constipation    Other       Could not obtain due to:      Objective:     VITALS:   Last 24hrs VS reviewed since prior progress note.  Most recent are:  Visit Vitals  BP (!) 123/52   Pulse 95   Temp 98 °F (36.7 °C)   Resp 27   Ht 5' 9\" (1.753 m)   Wt 95.2 kg (209 lb 14.1 oz)   SpO2 92%   BMI 30.99 kg/m²       Intake/Output Summary (Last 24 hours) at 2021 1205  Last data filed at 2021 1000  Gross per 24 hour   Intake 3718.02 ml   Output 700 ml   Net 3018.02 ml      Telemetry Reviewed:     PHYSICAL EXAM:  General: In icu, intubated    No edema    Lab Data Reviewed: (see below)    Medications Reviewed: (see below)    PMH/SH reviewed - no change compared to H&P  ________________________________________________________________________  Care Plan discussed with:  Patient     Family      RN     Care Manager                    Consultant:          Comments   >50% of visit spent in counseling and coordination of care       ________________________________________________________________________  Pili Barron MD Procedures: see electronic medical records for all procedures/Xrays and details which  were not copied into this note but were reviewed prior to creation of Plan. LABS:  Recent Labs     08/31/21  0404 08/30/21  1724   WBC 24.4* 27.9*   HGB 5.6* 6.5*   HCT 17.9* 20.4*   PLT 30* 16*     Recent Labs     08/31/21  0404 08/30/21  0359 08/29/21  0344   * 133* 134*   K 3.3* 3.6 3.8   CL 96* 97 98   CO2 21 22 22   BUN 24* 41* 36*   CREA 4.76* 6.28* 5.34*   * 115* 93   CA 9.2 8.4* 9.4   MG 1.8 2.0 1.8   PHOS 4.9* 5.9* 5.1*     Recent Labs     08/31/21  0404 08/30/21  0359 08/29/21  0344    144* 140*   TP 4.3* 3.6* 3.9*   ALB 2.0* 0.9* 1.1*   GLOB 2.3 2.7 2.8     Recent Labs     08/30/21  1724 08/29/21  0344   INR 2.0* 2.1*   PTP 19.8* 21.1*      No results for input(s): FE, TIBC, PSAT, FERR in the last 72 hours. No results found for: FOL, RBCF   No results for input(s): PH, PCO2, PO2 in the last 72 hours. No results for input(s): CPK, CKMB in the last 72 hours.     No lab exists for component: TROPONINI  No components found for: [place]  Lab Results   Component Value Date/Time    Color YELLOW/STRAW 05/06/2017 01:30 PM    Appearance CLOUDY (A) 05/06/2017 01:30 PM    Specific gravity 1.015 05/06/2017 01:30 PM    pH (UA) 7.5 05/06/2017 01:30 PM    Protein >300 (A) 05/06/2017 01:30 PM    Glucose NEGATIVE  05/06/2017 01:30 PM    Ketone NEGATIVE  05/06/2017 01:30 PM    Bilirubin NEGATIVE  05/06/2017 01:30 PM    Urobilinogen 0.2 05/06/2017 01:30 PM    Nitrites NEGATIVE  05/06/2017 01:30 PM    Leukocyte Esterase NEGATIVE  05/06/2017 01:30 PM    Epithelial cells FEW 05/06/2017 01:30 PM    Bacteria NEGATIVE  05/06/2017 01:30 PM    WBC 5-10 05/06/2017 01:30 PM    RBC 0-5 05/06/2017 01:30 PM       MEDICATIONS:  Current Facility-Administered Medications   Medication Dose Route Frequency    0.9% sodium chloride infusion 250 mL  250 mL IntraVENous PRN    0.9% sodium chloride infusion 250 mL  250 mL IntraVENous PRN    albumin human 5% (BUMINATE) solution 25 g  25 g IntraVENous Q6H    pantoprazole (PROTONIX) 40 mg in 0.9% sodium chloride 10 mL injection  40 mg IntraVENous Q12H    dextrose 20% in water (compounded by pharmacy)   IntraVENous CONTINUOUS    NOREPINephrine (LEVOPHED) 8 mg in 5% dextrose 250mL (32 mcg/mL) infusion  0.5-30 mcg/min IntraVENous TITRATE    balsam peru-castor oiL (VENELEX) ointment   Topical BID    white petrolatum-mineral oiL (LACRILUBE S.O.P.) ointment   Both Eyes Q12H    anidulafungin (ERAXIS) 100 mg in 0.9% sodium chloride 130 mL IVPB  100 mg IntraVENous Q24H    lactulose (CHRONULAC) 10 gram/15 mL solution 30 mL  20 g Per G Tube BID    rifAXIMin (XIFAXAN) tablet 550 mg  550 mg Per G Tube BID    epoetin cristian-epbx (RETACRIT) injection 10,000 Units  10,000 Units SubCUTAneous Q MON, WED & FRI    dexmedeTOMidine in 0.9 % NaCl (PRECEDEX) 400 mcg/100 mL (4 mcg/mL) infusion soln  0.1-1.5 mcg/kg/hr IntraVENous TITRATE    midazolam (VERSED) injection 2 mg  2 mg IntraVENous Q1H PRN    chlorhexidine (ORAL CARE KIT) 0.12 % mouthwash 15 mL  15 mL Oral Q12H    piperacillin-tazobactam (ZOSYN) 3.375 g in 0.9% sodium chloride (MBP/ADV) 100 mL MBP  3.375 g IntraVENous Q12H    alcohol 62% (NOZIN) nasal  1 Ampule  1 Ampule Topical Q12H    ondansetron (ZOFRAN) injection 4 mg  4 mg IntraVENous Q4H PRN    aspirin chewable tablet 81 mg  81 mg Oral DAILY    dextrose (D50W) injection syrg 12.5-25 g  12.5-25 g IntraVENous PRN    albumin human 25% (BUMINATE) solution 12.5 g  12.5 g IntraVENous DIALYSIS PRN    sodium chloride (NS) flush 5-40 mL  5-40 mL IntraVENous Q8H    sodium chloride (NS) flush 5-40 mL  5-40 mL IntraVENous PRN    polyethylene glycol (MIRALAX) packet 17 g  17 g Oral DAILY PRN    albuterol-ipratropium (DUO-NEB) 2.5 MG-0.5 MG/3 ML  3 mL Nebulization Q6H PRN

## 2021-08-31 NOTE — PROGRESS NOTES
I got a perfect serve message from intensive care specialist Zeinab Welsh, patient is decompensating, bleeding hemoglobin is dropping. I spoke to his brother brother Mr. Michel Osorio and Anna Macario over the phone, updated on patient condition, suggested that we should meet earlier earlier than planned for 3:30 today, he shared that patient father is also alive, he is going to get back to me after talking to patient mom and sister, if they can come earlier than 3:30 for a family meeting to discuss goals of care.

## 2021-08-31 NOTE — PROGRESS NOTES
Interdisciplinary team rounds were held   8/31/2021 with the following team members:Care Management, Diabetes Treatment Specialist, Nursing, Pharmacy, Physician and Clinical Coordinator. Plan of care discussed. Goal: transfuse, family meeting pending. See MD orders and progress notes for further  interventions and desired outcomes.

## 2021-08-31 NOTE — CONSULTS
Palliative Medicine Consult  [place]: [phone] ([phone])    Patient Name: Elmira Rouse  YOB: 1965    Date of Initial Consult: 8/30/21  Reason for Consult: care decisions  Requesting Provider: Dodie Diaz MD  Primary Care Physician: Pito Costa NP     SUMMARY:   Elmira Rouse is a 55 y.o. male with a past history of hypertension, chronic systolic and diastolic CHF end-stage renal disease on hemodialysis times a week, history of DVT, recent discharge on 8/14/2021 treated for acute on chronic CHF with fluid overload who was admitted on 8/16/2021 from from home with a diagnosis of shortness of breath secondary to acute CHF hyperkalemia metabolic acidosis and uremic encephalopathy. He is  currently on spontaneous breathing trial, his mental status would not allow him to extubate medically, he is unresponsive on minimal sedation. Current medical issues leading to palliative care involvement are goals of care in the setting of complicated hospital course notable for transfer to ICU on 8/18 with LOC and hypoglycemia emergently intubated due to loss of consciousness secondary to aspiration, there is a concern for opioid overdose empty bottle of oxycodone was found in his pocket,  he has developed acute liver failure with coagulopathy, thrombocytopenia, metabolic encephalopathy, fungemia and multiorgan failure. 8/30/21: per GI,  not a candidate for liver transplant due to active drug abuse and history of list dialysis medical noncompliance  8/31/21:  Increased GI bleed from the OG tube  Per nephrology he is too unstable for dialysis , they are not going to offer CVVH,futile     Social history: He has one sons who is  incarcerated, his mom is alive, used to live with mom until two month ago,  he has 5 siblings his brother Mr. Cb Mustafa is point of contact. PALLIATIVE DIAGNOSES:   1. Palliative care encounter /gaols of care discussion   2. Vent dependant   3. Encephalopathy  4.  Upper GI bleed   5. Liver failure  6. Debility  7. Drug abuse  8. Multiorgan failure   9. Severe sepsis        PLAN:   1. I got a perfect serve message from intensive care specialist Ion Plaza, patient is decompensating, bleeding hemoglobin is dropping, intubated, unresponsive on minimal sedation and on vasopressor. 2. This morning I spoke to his brother brother Mr. James Rodriguez over the phone, updated on patient condition, suggested that we should meet earlier earlier than planned for 3:30 today,  he is going to get back to me after talking patient mom and sister, if they can come earlier than 3:30 for a family meeting to discuss to discuss goals of car    3. Family meeting with Ca Medeiros, with several family members Family including mother, Nedra Ortega, sister Bill Blackwell, sister Sawyer Huitron, brother James Rodriguez (patient has three other siblings per Sawyer Huitron). · Advanced directive:  None, patient legal next of kin is his mom Derek Kuhn(palliative care team was unaware that at one point patient was alert he wanted his brother James Rodriguez to be the contact point and does not want his  mother contacted however, he did not verbally designate MPOA or completed AMD(Discussed curb side with ethics, refer to ACP note from Niru Solorio )  · Introduced Palliative care service and added support. · Patient mom  is going through a lot of emotional distress, she was not informed by his brother, until last week, that patient is hospitalized. · Family had spoken to intensive care specialist this morning and has a fair understanding of medical course. · We discussed currently he is alive on high level of artificial support , he is not showing any evidence of recovery thus far. · We briefly discussed continuation of life prolonging measures vs focusing on comfort. · His mom has lot to process and absorb \" everything is new \".   · For now family agrees to continue with DNR status (ICU team discussion noted with his brother ), and continue with full restorative care . · Educated them, regarding  Patient mom is legal next of kin, however suggested that his brother Cathi Charles who patient wanted to be contact point should be involved in discussion . · Psychosocial : very complex dynamics , case discussed with ethics /curb side and risk management( refer to detail note from team  Elisha Hancock). · We will continue to support family, we have given our contact number to call us for support . 4. Initial consult note routed to primary continuity provider and/or primary health care team members  5. Communicated plan of care with: Palliative IDT, [address] Team     GOALS OF CARE / TREATMENT PREFERENCES:     GOALS OF CARE:  Patient/Health Care Proxy Stated Goals: Prolong life    TREATMENT PREFERENCES:   Code Status: DNR    Patient's personal goals include:     Important upcoming milestones or family events: The patient identifies the following as important for living well:       Advance Care Planning:  [x] The [facility] Interdisciplinary Team has updated the ACP Navigator with [address] and Patient Capacity    Primary Decision Maker (Active): Fortunato Tenorio (please leave VM on phone) - Mother - [phone]    Advance Care Planning 8/31/2021   Patient's Healthcare Decision Maker is: Legal Next of Kin   Confirm Advance Directive None   Patient Would Like to Complete Advance Directive Unable   Does the patient have other document types Do Not Resuscitate       Medical Interventions: Limited additional interventions     Other Instructions: Other:    As far as possible, the palliative care team has discussed with patient / health care proxy about goals of care / treatment preferences for patient. HISTORY:     History obtained from: Chart, bedside RN.     CHIEF COMPLAINT: Unresponsive    HPI/SUBJECTIVE:    The patient is:     [x] Non-participatory due to:     Per bed side Rn did not tolerated tube feeding because of high residual secondary to ileus,  currently on SBT x 30 mins . 8/31/21; increased GI bleed/ red blood from OG tube . Patient remains unresponsive does not respond to painful stimuli.      Clinical Pain Assessment (nonverbal scale for severity on nonverbal patients):   Clinical Pain Assessment  Severity: 0     Activity (Movement): Lying quietly, normal position    Duration: for how long has pt been experiencing pain (e.g., 2 days, 1 month, years)  Frequency: how often pain is an issue (e.g., several times per day, once every few days, constant)     FUNCTIONAL ASSESSMENT:     Palliative Performance Scale (PPS):          PSYCHOSOCIAL/SPIRITUAL SCREENING:     Palliative IDT has assessed this patient for cultural preferences / practices and a referral made as appropriate to needs (Cultural Services, Patient Advocacy, Ethics, etc.)    Any spiritual / Yarsani concerns:  [] Yes /  [x] No    Caregiver Burnout:  [] Yes /  [x] No /  [] No Caregiver Present      Anticipatory grief assessment:   [x] Normal  / [] Maladaptive       ESAS Anxiety:      ESAS Depression:          REVIEW OF SYSTEMS:     Positive and pertinent negative findings in ROS are noted above in HPI. The following systems were [x] reviewed / [] unable to be reviewed as noted in HPI  Other findings are noted below. Systems: constitutional, ears/nose/mouth/throat, respiratory, gastrointestinal, genitourinary, musculoskeletal, integumentary, neurologic, psychiatric, endocrine. Positive findings noted below. Modified ESAS Completed by: provider           Pain: 0     Nausea: 0     Dyspnea: 0           Stool Occurrence(s): 1        PHYSICAL EXAM:     From RN flowsheet:  Wt Readings from Last 3 Encounters:   08/30/21 209 lb 14.1 oz (95.2 kg)   08/13/21 (!) 373 lb 7.4 oz (169.4 kg)   07/10/21 179 lb 10.8 oz (81.5 kg)     Blood pressure (!) 126/58, pulse (!) 101, temperature 99 °F (37.2 °C), resp.  rate 26, height 5' 9\" (1.753 m), weight 209 lb 14.1 oz (95.2 kg), SpO2 93 %. Pain Scale 1: Behavioral Pain Scale (BPS)  Pain Intensity 1: 0  Pain Onset 1: Around 2300 last night, intermittent  Pain Location 1: Abdomen  Pain Orientation 1: Medial  Pain Description 1: Aching  Pain Intervention(s) 1: Medication (see MAR)  Last bowel movement, if known:     Unresponsive , on SBT, not in any distress. Eyes: scleral edema. Anasarca  ENMT: ET Tube  Cardiovascular: regular rhythm, not tachycardic   Respiratory: breathing not labored, symmetric  Gastrointestinal: NGT with blood draining        HISTORY:     Active Problems:    Pulmonary edema (5/6/2017)      ESRD (end stage renal disease) on dialysis ([facility] [address] (8/8/2021)      Hyperkalemia (8/16/2021)      Acute on chronic combined systolic and diastolic CHF (congestive heart failure) ([facility] [address] (8/16/2021)      Past Medical History:   Diagnosis Date    Chronic kidney disease     Dialysis patient ([facility] [address]     Hypertension       Past Surgical History:   Procedure Laterality Date    HX VASCULAR ACCESS Left     LUE AV fistula      Family History   Problem Relation Age of Onset    No Known Problems Mother     No Known Problems Father     No Known Problems Sister     No Known Problems Brother     No Known Problems Sister     No Known Problems Sister     No Known Problems Brother       History reviewed, no pertinent family history. Social History     Tobacco Use    Smoking status: Current Every Day Smoker     Packs/day: 0.25    Smokeless tobacco: Never Used   Substance Use Topics    Alcohol use:  Yes     Allergies   Allergen Reactions    Motrin [Ibuprofen] Hives     7/16/17 Pt denies allergy    Tylenol [Acetaminophen] Hives     7/16/17 Pt denies allergy      Current Facility-Administered Medications   Medication Dose Route Frequency    0.9% sodium chloride infusion 250 mL  250 mL IntraVENous PRN    0.9% sodium chloride infusion 250 mL  250 mL IntraVENous PRN    0.9% sodium chloride infusion 250 mL  250 mL IntraVENous PRN    albumin human 5% (BUMINATE) solution 25 g  25 g IntraVENous Q6H    pantoprazole (PROTONIX) 40 mg in 0.9% sodium chloride 10 mL injection  40 mg IntraVENous Q12H    dextrose 20% in water (compounded by pharmacy)   IntraVENous CONTINUOUS    NOREPINephrine (LEVOPHED) 8 mg in 5% dextrose 250mL (32 mcg/mL) infusion  0.5-30 mcg/min IntraVENous TITRATE    balsam peru-castor oiL (VENELEX) ointment   Topical BID    white petrolatum-mineral oiL (LACRILUBE S.O.P.) ointment   Both Eyes Q12H    anidulafungin (ERAXIS) 100 mg in 0.9% sodium chloride 130 mL IVPB  100 mg IntraVENous Q24H    lactulose (CHRONULAC) 10 gram/15 mL solution 30 mL  20 g Per G Tube BID    rifAXIMin (XIFAXAN) tablet 550 mg  550 mg Per G Tube BID    epoetin cristian-epbx (RETACRIT) injection 10,000 Units  10,000 Units SubCUTAneous Q MON, WED & FRI    dexmedeTOMidine in 0.9 % NaCl (PRECEDEX) 400 mcg/100 mL (4 mcg/mL) infusion soln  0.1-1.5 mcg/kg/hr IntraVENous TITRATE    midazolam (VERSED) injection 2 mg  2 mg IntraVENous Q1H PRN    chlorhexidine (ORAL CARE KIT) 0.12 % mouthwash 15 mL  15 mL Oral Q12H    piperacillin-tazobactam (ZOSYN) 3.375 g in 0.9% sodium chloride (MBP/ADV) 100 mL MBP  3.375 g IntraVENous Q12H    alcohol 62% (NOZIN) nasal  1 Ampule  1 Ampule Topical Q12H    ondansetron (ZOFRAN) injection 4 mg  4 mg IntraVENous Q4H PRN    aspirin chewable tablet 81 mg  81 mg Oral DAILY    dextrose (D50W) injection syrg 12.5-25 g  12.5-25 g IntraVENous PRN    albumin human 25% (BUMINATE) solution 12.5 g  12.5 g IntraVENous DIALYSIS PRN    sodium chloride (NS) flush 5-40 mL  5-40 mL IntraVENous Q8H    sodium chloride (NS) flush 5-40 mL  5-40 mL IntraVENous PRN    polyethylene glycol (MIRALAX) packet 17 g  17 g Oral DAILY PRN    albuterol-ipratropium (DUO-NEB) 2.5 MG-0.5 MG/3 ML  3 mL Nebulization Q6H PRN          LAB AND IMAGING FINDINGS:     Lab Results   Component Value Date/Time    WBC 24.4 (H) 08/31/2021 04:04 AM    HGB 5.6 (LL) 08/31/2021 04:04 AM    PLATELET 30 (LL) 76/88/0867 04:04 AM     Lab Results   Component Value Date/Time    Sodium 133 (L) 08/31/2021 04:04 AM    Potassium 3.3 (L) 08/31/2021 04:04 AM    Chloride 96 (L) 08/31/2021 04:04 AM    CO2 21 08/31/2021 04:04 AM    BUN 24 (H) 08/31/2021 04:04 AM    Creatinine 4.76 (H) 08/31/2021 04:04 AM    Calcium 9.2 08/31/2021 04:04 AM    Magnesium 1.8 08/31/2021 04:04 AM    Phosphorus 4.9 (H) 08/31/2021 04:04 AM      Lab Results   Component Value Date/Time    Alk. phosphatase 104 08/31/2021 04:04 AM    Protein, total 4.3 (L) 08/31/2021 04:04 AM    Albumin 2.0 (L) 08/31/2021 04:04 AM    Globulin 2.3 08/31/2021 04:04 AM     Lab Results   Component Value Date/Time    INR 2.0 (H) 08/30/2021 05:24 PM    Prothrombin time 19.8 (H) 08/30/2021 05:24 PM    aPTT 40.4 (H) 08/22/2021 02:04 PM      Lab Results   Component Value Date/Time    Iron 175 (H) 08/25/2021 11:39 AM    TIBC 150 (L) 08/25/2021 11:39 AM    Iron % saturation  08/25/2021 11:39 AM     INDETERMINATE - SENT TO REFERENCE LAB FOR ADDITIONAL TESTING. Lab Results   Component Value Date/Time    pH 7.44 08/28/2021 01:38 AM    PCO2 33 (L) 08/28/2021 01:38 AM    PO2 64 (L) 08/28/2021 01:38 AM     No components found for: [place]   Lab Results   Component Value Date/Time     08/22/2021 09:35 AM    CK - MB 3.6 (H) 08/16/2021 04:04 AM                Total time: 120   Counseling / coordination time, spent as noted above: 90 mins  > 50% counseling / coordination?: yes   Prolonged service was provided for  []30 min   []75 min in face to face time in the presence of the patient, spent as noted above. Time Start: 1:50 pm   Time End: 3:30 pm  Note: this can only be billed with  (initial) or 95320 (follow up). If multiple start / stop times, list each separately.

## 2021-08-31 NOTE — PROGRESS NOTES
0720-Bedside shift change report received from [place], RN (offgoing nurse) to Donte Kincaid RN (oncoming nurse). Report included the following information SBAR, Kardex, Intake/Output, MAR, Recent Results and Cardiac Rhythm -sinus tach. OGT noted to be clamped-unclamped to [facility] as ordered. 0745-150mL of thick bloody liquid noted in canister from OGT [facility].     0800-Shift assessment complete-see flowsheet for findings. Pt. remains intubated with minimal sedation on Precedex. No command following, eyes stay partially open and tearing, does not withdraw to pain or move any limbs. Skin is cool and dry. Mouth care and oli-care completed; turned and repositioned at this time. 1100-IDT rounds held at this time. 1115-Pt's sister here to visit-does not have the pt's code nor is listed in the chart-directed to speak with pt's brother, Kendall Lynn. 1200-Reassessment complete-no changes noted. Pt's mother also in to visit. 1400-Pt's brother, Kendall Done, in to visit and is upset that his mother is making decisions as the pt. had verbally voiced to staff early in his hospital stay that he did not want his mother to be contacted. Dr. Siva Suggs made aware of this information. 1435-Dr. Dunbar Cassette in at this time and was given an update on pt.    1600-Reassessment complete-no changes noted. 1900-Bedside shift change report given to YONY Domínguez (oncoming nurse) by Donte Kincaid RN (offgoing nurse). Report included the following information SBAR, Kardex, Intake/Output, MAR, Recent Results and Cardiac Rhythm -sinus tach.

## 2021-08-31 NOTE — PROGRESS NOTES
Spiritual Care Assessment/Progress Note  [facility]      NAME: Kurt Dowell      MRN: [id number]  AGE: 55 y.o. SEX: male  Confucianist Affiliation: No Sabianism   Language: English     8/31/2021     Total Time (in minutes): 19     Spiritual Assessment begun in [facility] CRITICAL CARE 2 through conversation with:         []Patient        [x] Family    [] Friend(s)        Reason for Consult: Request by staff     Spiritual beliefs: (Please include comment if needed)     [] Identifies with a chelita tradition:         [] Supported by a chelita community:            [x] Claims no spiritual orientation:           [] Seeking spiritual identity:                [] Adheres to an individual form of spirituality:           [] Not able to assess:                           Identified resources for coping:      [x] Prayer                               [] Music                  [] Guided Imagery     [x] Family/friends                 [] Pet visits     [] Devotional reading                         [] Unknown     [] Other:                                            Interventions offered during this visit: (See comments for more details)    Patient Interventions: Prayer (actual)     Family/Friend(s):  Affirmation of emotions/emotional suffering, Catharsis/review of pertinent events in supportive environment, Initial Assessment, Prayer (actual), Prayer (assurance of)     Plan of Care:     [x] Support spiritual and/or cultural needs    [] Support AMD and/or advance care planning process      [] Support grieving process   [] Coordinate Rites and/or Rituals    [] Coordination with community clergy   [] No spiritual needs identified at this time   [] Detailed Plan of Care below (See Comments)  [] Make referral to Music Therapy  [] Make referral to Pet Therapy     [] Make referral to Addiction services  [] Make referral to [facility]  [] Make referral to Spiritual Care Partner  [] No future visits requested        [x] Follow up upon further referrals     Comments: Provided support for this pt's family in [facility]. Pt's family was present during this visit. Reviewed pt's chart prior to this visit to augment any observed or expressed support needs this pt may have. Facilitated life review to assess potential support needs or coping strategies. Pt's offered brief review of pt's current situation. Pt's family expressed pronounced anticipatory grief. Provided pastoral presence as pt's family processed this grief and shared thoughts and feelings about pt. Offered prayer at request of the family. Assured pt of prayers and affirmed ongoing availability of support. Hiram Cohen MDiv.  Staff   Request  Support/Spiritual Care Services via [facility]

## 2021-09-01 NOTE — PROGRESS NOTES
1900- Bedside and verbal report received from [facility] 2000- Assessment complete. Pt is comfort measures, family at bedside. 0000- Sister remains at bedside, support offered during this difficult time. Pt medicated twice for comfort, see MAR.  0139- Pt asystole on monitor. Akhil NP notified.  paged and lifeNet updated. Sister Stann Schilder contacted family, they will not be coming to hospital. RN attempted to call brother with no answer. Valuable receipt on chart, per notes all valuables were given to mother on day shift, called security to verify that all belongings were returned to family.

## 2021-09-01 NOTE — PROGRESS NOTES
09/01/21 0513   ABCDEF Bundle   SBT Safety Screen Passed No   SBT Screen Reason for Failure FiO2 > 50%;PEEP > 7.5

## 2021-09-01 NOTE — PROGRESS NOTES
Palliative Medicine  [place]: [phone] ([phone])  [facility]: [phone] ([phone])    Telephone call returned to patient's mother, Johnna Mars. Kristin Villalta asking if this writer knew anything about patient's \"cards that were on him when he came in\". It appeared that Kalyanhollie Seerles is looking for some personal belongings which she did ask about yesterday to floor staff, when she was in to see patient. LCSW shared with Kalyanhollie Villalta that I did not know where these may be, she will check with floor staff when she comes in again today. It could be that other family members (brother Candelaria Palencia being one) that were present earlier in hospital stay with patient may have knowledge of patient's belongings, unsure if Kalyanhollie Pawan has spoken to him. Asked if Kristin Villalta is coming in to hospital today, she noted that she is planning to come sometime this afternoon, \"around 12\". Have asked her to call our team when she is here. 12 noon: Phone call attempted to Candelaria Palencia, to assess his need for emotional support. Unable to reach him and his telephone does not allow for a message to be left.

## 2021-09-01 NOTE — PROGRESS NOTES
Palliative Medicine  [place] [phone] (COPE)  [place] [phone] ([place])      Diagnoses: ESRD,  CAD, Acute liver failure, sepsis, intubated. GOALS OF CARE: Family in agreement to extubate patient after 4 pm, after sister Murray Domínguez has had a chance to see patient. Family has agreed with making patient comfort care at this time. Should patient survive extubation then hospice team should be consulted for continued support. TREATMENT PREFERENCES:   Code Status: DNR    Advance Care Planning:  [] The [facility] Interdisciplinary Team has updated the ACP Navigator with [address] and Patient Capacity    Primary Decision Maker ([address]):   Primary Decision Maker (Active): Marisela Reed (please leave VM on phone) - Mother - [phone]  Relationship to patient:  [] Named in a scanned document   [x] Legal Next of Kin  [] Guardian    Medical Interventions: Limited additional interventions     Family Meeting Documentation    Participants: Family including mother Ryan Kyle, brother Colton Armas, sister Doug Master, Dr Nirav Bond, this writer. Phyllis Salter, sister, was available via phone towards the end of the meeting. Psychosocial: Family, although they have difference of opinions on some issues, have come together and agree to compassionately extubate patient today. Oleg Dee requested that extubation happen after sister, Murray Domínguez, has a chance to come and see patient. Other family members may be coming to see patient. Family grieving appropriately. Discussion: Dr Geronimo Barton provided support and explained process of compassionate extubation. Family asked appropriate questions, wanting to make sure that patient would not suffer. All agree that this is the right thing to do. Dr Geronimo Barton explained that patient may not die right away, that he could take a few days. Family informed about hospice becoming involved if that happens and they are open to this.  Front door informed that other family members will be coming to see patient. Pearlie Goldberg asked about patient's personal belongings that are with security. These were obtained via Becky Delacruz, and given to patient's mother Saumya Lutz. Also attempted to provide some validation to Allison Montano, for all that he has done for patient. He shared that there remains tension between him and some family members but he was unable to talk much about it. Outcome / Plan: Continue to provide support to family as needed. Will refer to hospice team as appropriate.

## 2021-09-01 NOTE — PROGRESS NOTES
0800 report received from Wiley BHAKTA via SBAR and bedside format. Skin warm to touch, dark complexion. Generalized edema with weeping from head of penis which appears split with darkened tip left upper corner. Respirations slightly labored at rest appears to be gnawing on ETT. Repositioned and suctioned for thick tan/dark red /brown secretions oral cavity and ETT. Dialysis nurse in. Yang Seay in,  Slight opening of eyes with stimulation and pain stimul. Sclera of eyes discolored yellow/brown. Og with red/brown drainage. Fecal management system intact with red/brown drainage. Dialysis in progress. 1200 appears to tolerate HD. Some grimace with stimulation. Medicated with Fentanyl. Precedex maxed. 1300 dialysis completed. Edema and weeping of scrotum upper arms continue unchanged. note SBP after medicated   1400 Mother at bedside. [address] in progress. Report given to CARMEN Morelos.

## 2021-09-01 NOTE — PROCEDURES
[address]     Electroencephalogram Report    Date: August 30, 2021    Patient Name: Kurt Dowell  YOB: 1965   Medical Record No: [id number]    Procedure ID: [id number]  Procedure Type: Routine    INDICATION: Altered mental status    STATE: Intubated sedated    IMPRESSION:  Abnormal EEG generalized delta slowing. No seizures noted  Absence of seizures on this study does not exclude epilepsy. Clinical correlation is advised. DESCRIPTION OF PROCEDURE: Electrodes were applied in accordance with the international 10-20 system of electrode placement. EEG was reviewed in both bipolar and referential montages. DESCRIPTION OF FINDINGS: Background consists of generalized delta activity. Occasionally this is interrupted with spindle-like activity. No seizures were noted. Photic stimulation and manipulation by the tech did not produce a notable change in the background.      Medications:  Current Facility-Administered Medications   Medication Dose Route Frequency    0.9% sodium chloride infusion 250 mL  250 mL IntraVENous PRN    0.9% sodium chloride infusion 250 mL  250 mL IntraVENous PRN    0.9% sodium chloride infusion 250 mL  250 mL IntraVENous PRN    0.9% sodium chloride infusion 250 mL  250 mL IntraVENous PRN    albumin human 5% (BUMINATE) solution 25 g  25 g IntraVENous Q6H    pantoprazole (PROTONIX) 40 mg in 0.9% sodium chloride 10 mL injection  40 mg IntraVENous Q12H    dextrose 20% in water (compounded by pharmacy)   IntraVENous CONTINUOUS    NOREPINephrine (LEVOPHED) 8 mg in 5% dextrose 250mL (32 mcg/mL) infusion  0.5-30 mcg/min IntraVENous TITRATE    balsam peru-castor oiL (VENELEX) ointment   Topical BID    white petrolatum-mineral oiL (LACRILUBE S.O.P.) ointment   Both Eyes Q12H    anidulafungin (ERAXIS) 100 mg in 0.9% sodium chloride 130 mL IVPB  100 mg IntraVENous Q24H    lactulose (CHRONULAC) 10 gram/15 mL solution 30 mL  20 g Per G Tube BID    rifAXIMin (XIFAXAN) tablet 550 mg  550 mg Per G Tube BID    epoetin cristian-epbx (RETACRIT) injection 10,000 Units  10,000 Units SubCUTAneous Q MON, WED & FRI    dexmedeTOMidine in 0.9 % NaCl (PRECEDEX) 400 mcg/100 mL (4 mcg/mL) infusion soln  0.1-1.5 mcg/kg/hr IntraVENous TITRATE    midazolam (VERSED) injection 2 mg  2 mg IntraVENous Q1H PRN    chlorhexidine (ORAL CARE KIT) 0.12 % mouthwash 15 mL  15 mL Oral Q12H    piperacillin-tazobactam (ZOSYN) 3.375 g in 0.9% sodium chloride (MBP/ADV) 100 mL MBP  3.375 g IntraVENous Q12H    alcohol 62% (NOZIN) nasal  1 Ampule  1 Ampule Topical Q12H    ondansetron (ZOFRAN) injection 4 mg  4 mg IntraVENous Q4H PRN    aspirin chewable tablet 81 mg  81 mg Oral DAILY    dextrose (D50W) injection syrg 12.5-25 g  12.5-25 g IntraVENous PRN    albumin human 25% (BUMINATE) solution 12.5 g  12.5 g IntraVENous DIALYSIS PRN    sodium chloride (NS) flush 5-40 mL  5-40 mL IntraVENous Q8H    sodium chloride (NS) flush 5-40 mL  5-40 mL IntraVENous PRN    polyethylene glycol (MIRALAX) packet 17 g  17 g Oral DAILY PRN    albuterol-ipratropium (DUO-NEB) 2.5 MG-0.5 MG/3 ML  3 mL Nebulization Q6H PRN

## 2021-09-01 NOTE — PROGRESS NOTES
Renal-seen on hd this am. BP labile. NOte plans for compassionate extubation.   Will see again upon request  MD Iesha Black MD

## 2021-09-01 NOTE — PROCEDURES
[facility]  [phone]    Vitals   Pre   Post   Assessment   Pre   Post     Temp  Temp: 98.5 °F (36.9 °C) (09/01/21 0837)  97.7 LOC  Intubated/sedated Intubated and sedated   HR   Pulse (Heart Rate): 83 (09/01/21 0814) 83 Lungs   Intubated, coarse breath sounds  intubated    B/P   BP: 124/62 (09/01/21 0700) 103/45 Cardiac   Bedside telemetry regular   bedside   Resp   Resp Rate: 25 (09/01/21 0814) 27 Skin   Warm and dry  Warm and dry    Pain level  Pain Intensity 1: 3 (09/01/21 0001) Intubated/sedated Edema    Ascites with weeping   Ascites    Orders:    Duration:   Start:    6740 End:    1210 Total:   3.5   Dialyzer:   Dialyzer/Set Up Inspection: Zonia Campuzano (09/01/21 0837)   Rula [place] Bath:   Dialysate K (mEq/L): 3 (09/01/21 0837)   Ca Bath:   Dialysate CA (mEq/L): 2.5 (09/01/21 0837)   Na/Bicarb:   Dialysate NA (mEq/L): 138 (09/01/21 0837)   Target Fluid Removal:   Goal/Amount of Fluid to Remove (mL): 2000 mL (09/01/21 0837)   Access     Type & Location:   GLORIA AV fistula, dressing present upon arrival from last tx, some moistness under dressing, no redness or warmth, + thrill/bruit, prepped with alcohol followed by chlorhexidine per policy cannulated with two 15 gauge needles, secured with tape and flushed   Labs     Obtained/Reviewed   Critical Results Called   Date when labs were drawn-  Hgb-    HGB   Date Value Ref Range Status   09/01/2021 7.6 (L) 12.1 - 17.0 g/dL Final     K-    Potassium   Date Value Ref Range Status   09/01/2021 3.2 (L) 3.5 - 5.1 mmol/L Final     Ca-   Calcium   Date Value Ref Range Status   09/01/2021 9.7 8.5 - 10.1 MG/DL Final     Bun-   BUN   Date Value Ref Range Status   09/01/2021 30 (H) 6 - 20 MG/DL Final     Creat-   Creatinine   Date Value Ref Range Status   09/01/2021 5.33 (H) 0.70 - 1.30 MG/DL Final     Comment:     ICTERIC        Medications/ Blood Products Given     Name   Dose   Route and Time                     Blood Volume Processed (BVP):    86 Net Fluid   Removed:  2000   Comments   Time Out Done: 0830  Primary Nurse Rpt Pre: Kelsey David RN   Primary Nurse Rpt Post: Kelsey David RN  Pt Education: sedated and intubated   Care Plan: continue current HD plan of care   Tx Summary:  6930 HD initiated as ordered  0845 Dr. Bishop Daily in to see patient on HD discussed target loss of 2 kg  1210 treatment completed, all possible blood returned, hemostasis achieved <10 minutes dressing clean, dry and intact. + thrill. SBAR to include blood pressure     All dialysis related medications have been reviewed.'  Admiting Diagnosis:  Shortness of breath  Pt's previous clinic- [facility]  Consent signed - Informed Consent Verified: Yes (09/01/21 0837)  Hepatitis Status- 08/09/21 immune antigen negative   Machine #- Machine Number: [id number] (09/01/21 6390)  Telemetry status- bedside  Pre-dialysis wt. - Pre-Dialysis Weight: 95.2 kg (209 lb 14.1 oz) (08/30/21 0592)

## 2021-09-01 NOTE — PROGRESS NOTES
1500 Report from Grabiel Carter [facility] 19 Per Dr Bright Millard patient is now comfort measures. 1546 2 mg versed IV for respirations of 38, comfort. [address] called about plan to discontinue care. Per coordinator Miles Guevara patient is not a candidate for organ donation to call with time of death for possible eye donation qualification. 1732 Patient extubated to RA by RT.  1643 50 mcgs fentanyl for comfort measures  1737 50 mcgs fentanyl for comfort measures. 1753 2 mg versed per patient's family's  request for ragged respirations/ comfort. 1900 Report given to Rob Daniel, RN by Grabiel Carter RN. Updateds given to Rob Daniel RN.

## 2021-09-01 NOTE — PROGRESS NOTES
[facility]      Name: Ángel Cee   : 1965   MRN: [id number]   Date: 2021      Assessment:   Brief patient summary:  55 M with PMH of HTN, ESRD on HD admitted  to the Hospitalist Service  with dyspnea, pulmonary edema, hyperkalemia, uremic encephalopathy after recent hospitalization for same. Underwent urgent HD with some improvement. Transferred to ICU  with depressed LOC and hypoglycemia. Emergently intubated due to decreased LOC and was felt to have aspirated. An empty bottle of oxycodone was found in his pocket raising concern for opioid OD. During his ICU stay, he has remained minimally responsive. He has developed progressive acute liver failure for which he was treated with IV NAC. Blood cultures drawn  positive for Candida glabrata. ICU course has been complicated by recurrent hypoglycemia    Hospital course/major results:   CT chest/abd.pelvis: Small right-sided pleural effusion with cardiomegaly and coronary artery disease. Moderate centrilobular emphysematous change. No evidence of pulmonary embolism. No aortic aneurysm or dissection. Colonic wall thickening and mild hyperemia of colonic and small bowel mucosa may be related to mild gastroenteritis. No additional acute process is identified in the chest, abdomen or pelvis   Echocardiogram: Estimated LVEF is 40 - 45%. Normal cavity size. Mild concentric hypertrophy. Mildly dilated right ventricle. Borderline low systolic function. Mitral valve non-specific thickening. Mild to moderate mitral valve regurgitation is present. Mild to moderate tricuspid valve regurgitation is present. Mild to moderate pulmonary hypertension. Pulmonary arterial systolic pressure is 40 mmHg.  US LUE fistula: AV fistula, of the left upper extremity; widely patent with no evidence of hemodynamically significant stenosis. Incidental finding of a patent stent in the axillary region of the AV fistula.   No evidence of oli-fistula fluid. 08/28 CTH: no acute intracranial findings  08/28 Minimally responsive off all sedatives other than dexmedetomidine. Frequently gnawing on ETT. Minimal spontaneous movement  08/29 Slightly more responsive (minimally). Grimaces with stimulation. Not F/C.   08/29 Gastroenterology consultation: liver failure likely multifactorial superimposed on HCV. Poor overall prognosis noted. No indication for endoscopy given very poor overall prognosis  8/31 - red blood from OG yesterday. GI notified. Tube clamped O/N to avoid suction trauma. More bleeding this a.m     Lines/tubes/devices:  ETT 08/18 >>   L IJ CVL 08/18 >>     Active Problems: (By systems)  PULMONARY:  1. Ventilator dependent respiratory failure - presently cannot extubate due to AMS  2. Pulm edema with R pleural effusion    Current ventilator settings: reviewed    CARDIOVASCULAR/HEMODYNAMIC:  3. Vasopressor dependent hypotension    Current vasopressors: NE intermittent    RENAL:  4. ESRD on chronic HD    Current RRT: IHD    GI/NUTRITION:  5. Recurrent hypoglycemia - on D20W infusion @ 25 cc/hr  6. Acute liver failure - severe  7. Upper GIB  Gastric ileus - high residuals, previously  Current nutritional support: TFs on hold    INFECTIOUS DISEASE  8. Severe sepsis  9. Candidemia - unclear source    Micro:  Blood 08/18 >> C glabrata in 1/4 bottles  Blood 08/21 >> NGTD >>   Blood 08/25 >> NGTD >>   Blood 08/27 >> NGTD >>     Antibiotics:  Pip-tazo  Anidulafungin    HEME  10. Acute blood loss anemia  a. Received 1 unit RBCs 08/26  b. Received one unit RBCs 08/28  c. One unit RBCs 08/29  d. 1 plt 8/30  e.  2 RBC, FFP and plt 8/31  11. Macrocytosis  12. Moderate-severe thrombocytopenia  13. H/O DVT    DVT prophylaxis: SCDs    NEURO  14. Acute encephalopathy/TME - likely hepatic. Possibly component of hypoglycemic and/or hypoxic injury @ time of intubation    RASS goal: 0  Current sedatives: dex  Current analgesics: none      OTHER  15.  Wound care ulcerated / degloved penis  16. Very poor prognosis    GOALS OF CARE/ADVANCED DIRECTIVES  17. CODE STATUS: DNR in event of cardiac arrest        ICU Comprehensive Plan of Care:     Plans for this Shift:    1. Ventilator settings reviewed with RT and adjusted as indicated  2. Daily SBT if meets criteria  3. ICU hemodynamic monitoring  4. MAP goal > 55 mmHg  5. SBP goal > 90 mmHg  6. Monitor chemistry panels daily  7. Correct electrolytes as indicated  8. HD schedule per Nephrology  9. Not a candidate for CRRT - see ACP note from 08/28  10. Nutrition: TFs - no advance  11. SUP: IV PPI  12. Gastroenterology consultation 08/29 - Dr Rosemarie Arevalo input much appreciated  13. Glycemic control: N/I - requiring dextrose infusion for hypoglycemia - increase today  14. Micro and antibiotics as documented above  15. ID service following  16. Transfuse as needed to maintain Hgb > 7.0  17. CT head ordered 08/28 - results reviewed  18. EEG completed follow up read  19. Continue lactulose, rifaximin  20. palliative care to meet with family  21. Hold feeds  Extremely poor prognosis. Brother, Nydia Stoddard, updated in detail @ bedside 08/28 by Dr. Otis Okeefe. They discussed transitioning to comfort care if there was no improvement soon. Palliative care following. Family meeting today  HPI/Consult/Subjective:   24 Hr Events 9/1/2021:   Transfused one unit RBCs this AM    SUBJ:   Minimal change    Past Medical History:      has a past medical history of Chronic kidney disease, Dialysis patient ([facility] [address] and Hypertension. Past Surgical History:      has a past surgical history that includes hx vascular access (Left). Home Medications:     Prior to Admission medications    Medication Sig Start Date End Date Taking? Authorizing Provider   methadone (DOLOPHINE) 10 mg/mL solution Take 160 mg by mouth daily. Yes Provider, Historical   metoprolol tartrate (LOPRESSOR) 25 mg tablet Take 1 Tablet by mouth every twelve (12) hours for 30 days. 8/14/21 9/13/21  Salomon Villalta MD   pantoprazole (PROTONIX) 40 mg tablet Take 1 Tablet by mouth daily for 30 days. 8/14/21 9/13/21  Salomon Villalta MD   sevelamer (RenageL) 400 mg tablet Take 2 Tablets by mouth three (3) times daily (with meals) for 30 days. 8/14/21 9/13/21  Salomon Villalta MD   isosorbide mononitrate ER (IMDUR) 30 mg tablet Take 1 Tablet by mouth every morning for 30 days. 8/14/21 9/13/21  Salomon Villalta MD   citalopram (CELEXA) 40 mg tablet Take 1 Tablet by mouth daily for 30 days. 8/14/21 9/13/21  Salomon Villalta MD   atorvastatin (LIPITOR) 40 mg tablet Take 1 Tablet by mouth nightly for 30 days. 8/14/21 9/13/21  Salomon Villalta MD   apixaban (ELIQUIS) 5 mg tablet Take 1 Tablet by mouth two (2) times a day for 30 days. 8/14/21 9/13/21  Salomon Villalta MD   paricalcitoL (ZEMPLAR) 1 mcg capsule Take 1 Capsule by mouth daily for 30 days. 8/14/21 9/13/21  Salomon Villalta MD   lidocaine (LIDODERM) 5 % Apply patch to the affected area for 12 hours a day and remove for 12 hours a day. 11/28/17   Murray Hinds MD   aspirin delayed-release 81 mg tablet Take 1 Tab by mouth daily. 11/28/17   Murray Hinds MD   multivitamin (ONE A DAY) tablet Take 1 Tab by mouth daily. Juan Olivarez MD   gabapentin (NEURONTIN) 100 mg capsule Take 100 mg by mouth daily. Juan Olivarez MD       Allergies/Social/Family History:      Allergies   Allergen Reactions    Motrin [Ibuprofen] Hives     7/16/17 Pt denies allergy    Tylenol [Acetaminophen] Hives     7/16/17 Pt denies allergy      Social History     Tobacco Use    Smoking status: Current Every Day Smoker     Packs/day: 0.25    Smokeless tobacco: Never Used   Substance Use Topics    Alcohol use: Yes      Family History   Problem Relation Age of Onset    No Known Problems Mother     No Known Problems Father     No Known Problems Sister     No Known Problems Brother     No Known Problems Sister     No Known Problems Sister     No Known Problems Brother            Objective:   Vital Signs:  Visit Vitals  BP (!) 121/56   Pulse 87   Temp 98.5 °F (36.9 °C) (Axillary)   Resp 25   Ht 5' 9\" (1.753 m)   Wt 98.9 kg (218 lb 0.6 oz)   SpO2 100%   BMI 32.20 kg/m²    O2 Flow Rate (L/min): 2 l/min O2 Device: Endotracheal tube Temp (24hrs), Av.3 °F (36.8 °C), Min:97.2 °F (36.2 °C), Max:99.6 °F (37.6 °C)           Intake/Output:     Intake/Output Summary (Last 24 hours) at 2021 1056  Last data filed at 2021 0900  Gross per 24 hour   Intake 5341.06 ml   Output 600 ml   Net 4741.06 ml       Physical Exam:  GEN: not responsive, not F/C  HEENT: NCAT, severe scleral icterus, ETT present  NECK: JVP not visualized  CHEST: Clear anteriorly  CARDIAC: NSR, reg, no M  ABD: Soft, NABS  EXT: warm, no edema  NEURO: minimal spontaneous movement. Rosemarie Baeza PERISAEL  DERM: No lesions noted    I have examined the patient on this day 2021 and the above documented exam is accurate including the components that have been copied forward    LABS AND  DATA: Personally reviewed  Recent Labs     21  0611 21  2220 21  0404 21  0404   WBC 16.1*  --   --  24.4*   HGB 7.6* 6.8*   < > 5.6*   HCT 23.2* 21.2*   < > 17.9*   PLT 23*  --   --  30*    < > = values in this interval not displayed. Recent Labs     21  0841 21  06   * 131*   K 3.2* 3.2*   CL 97 95*   CO2 18* 20*   BUN 29* 30*   CREA 5.42* 5.33*   * 191*   CA 9.9 9.7   MG 2.0 1.9   PHOS 4.9* 4.9*     Recent Labs     21  0841 21  0611   AP 88 84   TP 4.9* 4.8*   ALB 2.6* 2.6*   GLOB 2.3 2.2     Recent Labs     21  1724   INR 2.0*   PTP 19.8*      No results for input(s): PHI, PCO2I, PO2I, FIO2I in the last 72 hours. No results for input(s): CPK, CKMB, TROIQ, BNPP in the last 72 hours.     Hemodynamics:   PAP:   CO:     Wedge:   CI:     CVP:    SVR: BP 2: (!) 160/99 (21 1500)     PVR:       Ventilator Settings:  Mode Rate Tidal Volume Pressure FiO2 PEEP   Assist control   420 ml  6 cm H2O 70 % 12 cm H20     Peak airway pressure: 27 cm H2O    Minute ventilation: 12.3 l/min        MEDS: Reviewed    Chest X-Ray:  CXR Results  (Last 48 hours)    None        Multidisciplinary Rounds Completed:  Yes      SPECIAL EQUIPMENT  IHD    DISPOSITION  Stay in ICU    CRITICAL CARE CONSULTANT NOTE  I had a in-person encounter with Manuel Hahn, reviewed and interpreted patient data including events, labs, images, vital signs, I/O's, and examined patient. I have discussed the case and the plan and management of the patient's care with the consulting services, the bedside nurses and the respiratory therapist.      NOTE OF PERSONAL INVOLVEMENT IN CARE   This patient is at high risk for sudden and clinically significant deterioration, which requires the highest level of preparedness to intervene urgently. I participated in the decision-making and personally managed or directed the management of the following life and organ supporting interventions that required my frequent assessment to treat or prevent imminent deterioration. I personally spent 35 minutes of critical care time. This is time spent at patient's bedside actively involved in patient care as well as the coordination of care and discussions with the patient's family. This does not include any procedural time which has been billed separately.     Maria Esther Luciano  [address]  [phone]    9/1/2021

## 2021-09-01 NOTE — PROGRESS NOTES
Transition of Care Plan:     RUR: 54%  Disposition: TBD  Follow up appointments: PCP, Nephrology? DME needed: None  Transportation at [address] transport/ round trip  [place] or means to access home:        IM Medicare Letter: N/A     Caregiver Contact: Brother- Maximus Haider, [phone]  *pt does NOT want his mother contacted*  Discharge Caregiver contacted prior to discharge?      Heart Failure and ESRD, liver failure.     Palliative consulted.      Pt vented in the ICU at this time. LIver failure superimposed on HCV - poor overall prognosis. GI following for bleeding from OG.     CM received call from [facility] ([phone]) seeking pt update during IDR. Left HIPPA compliant VM later in day. Palliative Care met with pt's brother Brenda Rodriguez (pt's verbally designated surrogate decision maker prior to intubation), as well as pt's mother Ann-Marie Neal ('s phone [phone]), and pt's sisters Torrance Goodpasture ([phone]) and Pita, on 8/31/21 to inform family of pt's condition in anticipation of decisions on goals of care. CM remains available to assist w/ support of family and appropriate dc planning.     Yuval Weldon, YG

## 2021-09-01 NOTE — PROGRESS NOTES
Provided pastoral support for this pt's family as they expressed anticipatory grief prior to compassionate withdrawal in [address] [place] [address]. Offered prayer and presence for this family to offer comfort for the family's grief. Assured family of continued prayers and affirmed ongoing availability of support. Denise Joe MDiv.  Staff   Request  Support/Spiritual Care Services via [address] [place] [address]

## 2021-09-01 NOTE — PROGRESS NOTES
Participated in Palliative Care IDT for this pt in 2526. Per team discussion pt's family would benefit from continued supportive presence and availability. CH will remain available to provide pastoral support through compassionate presence in the hope to gain rapport and assess any other support needs that may arise for this pt. Compa Wyatt MDiv.  Staff   Request  Support/Spiritual Care Services via [facility]

## 2021-09-02 NOTE — DEATH NOTE
Death Declaration 03/02/21        [facility] CRITICAL CARE    Pt Name  Charlotte Augustine   Admit date:  8/16/2021   Date and time of death:  09/02/21 @ [address]    Room Number  2526/01    Medical Record Number  [id number] @ [facility]   Age  55 y.o. Date of Birth 1965   PCP Charles Rubio NP   Attending physician Any Spears MD      Code Status  DNR    Patient seen and examined     Mental status   Unresponsive    Pupils Dilated and Fixed    Respiration Nil    Pulse  Absent     Heart Sounds  Absent    Rhythm  Flat line   Family  Notified by Nursing staff    Chaplan Service  Notified by Nursing staff     Death certificate and discharge summary completion remain  Dr. Any Spears MD's responsibility.    Damir Villalta, KENNEDIP                               9/2/2021

## 2021-09-02 NOTE — PROGRESS NOTES
Responded to death in CCU. Reviewed chart prior to visit. Patient's sister was at bedside. She was grieving, but quiet and calm. She indicated she wanted to reach out to other family members; unknown at this time if anyone else will come to hospital.  Offered condolences, assurance of prayer, and advised her of ongoing availability of pastoral support. Consulted with YONY Ford prior to leaving unit.      EDWARDO Mendoza, [facility], Staff [address]    [address] Paging Service  [phone]

## 2021-09-06 NOTE — DISCHARGE SUMMARY
[facility]  DEATH SUMMARY    Name: Lester Harmon   : 1965   MRN: [id number]   Date: 2021      DATE OF ADMISSION: 2021  DATE OF DISCHARGE/DEATH: 2021      ADMISSION DIAGNOSES:  Shortness of breath secondary to pulmonary edema  Acute on chronic systolic and diastolic heart failure  End-stage renal disease on dialysis  Hyperkalemia  Uremic encephalopathy  Metabolic acidosis  Hyponatremia      DISCHARGE DIAGNOSES:   Ventilator dependence  Hepatic Encephalopathy  Pulmonary edema  Shock  GIB  ESRD on HD  Liver failure  Sepsis  Candidemia  Thrombocytopenia  Skin breakdown    BRIEF SUMMARY OF ADMISSION:  Pt was admitted with the following HPI and assessment and plan: Lester Harmon is a 55 y.o.  male who presents with shortness of breath that started yesterday. Patient past medical history of end-stage renal disease on dialysisMonday, Wednesday and Friday, hypertension, hyperlipidemia, GERD, neuropathy. Patient was discharged from the hospital on  secondary to return similar complaints. Patient currently encephalopathic and not able to give us the history but keep complaining about shortness of breath. Not complaining of anything else.       HOSPITAL COURSE:   He was admitted on  with hyperkalemia, shortness of breath and altered mental status. Patient received emergent HD. His K and respiratory failure had improved but this morning patient again was minimally responsive with ongoing hypoglycemia but patient had lost peripheral IV access. He was also found to have empty bottle of oxycodone in his pocket and possible ingestion to explain this acute change in mental status, he was given naracan and then dextrose but he developed respiratory distress. Rapid response was called. Decision was made to intubate him due to severe resp distress and encephalopathy and patient transferred to ICU for further care.   On , he self extubated before  SBT completed.  He became increasing more lethargic over the afternoon but responded well to 2 doses of narcan. VBG showed adequate gas exchange. Methadone and gabapentin held. The next day, he received HD and was transferred to the floor. While on the floor, he had recurrent episodes of hypoglycemia and a worsening lactic acidosis. His blood cultures were positive for candida; no source was identified. He had worsening mental status over this period and lethargy. He developed respiratory distress, was intubated, and transferred back to the ICU. Gastroenterology was consulted. His labs were consistent with progressive liver failure, likely from his underlying HCV exacerbated by ischemia. He was placed on broad spectrum abx and worked up for a secondary bacterial infection. Despite treatment with lactulose and rifaximin, he had no improvement in his mental status. Neurology workup was unremarkable for other causes. During this time, he also developed a slowly worsening anemia. No indication for endoscopy given very poor overall prognosis He then began to have overt bleeding from his oral gastric tube. His coagulopathy worsened. He continued to have bleeding despite transfusions. Palliative care was consulted and the family decided to transition to comfort care due to his grave prognosis. He was compassionately extubated and  later that day.  CT chest/abd.pelvis: Small right-sided pleural effusion with cardiomegaly and coronary artery disease. Moderate centrilobular emphysematous change. No evidence of pulmonary embolism. No aortic aneurysm or dissection. Colonic wall thickening and mild hyperemia of colonic and small bowel mucosa may be related to mild gastroenteritis. No additional acute process is identified in the chest, abdomen or pelvis   Echocardiogram: Estimated LVEF is 40 - 45%. Normal cavity size. Mild concentric hypertrophy. Mildly dilated right ventricle. Borderline low systolic function. Mitral valve non-specific thickening. Mild to moderate mitral valve regurgitation is present. Mild to moderate tricuspid valve regurgitation is present. Mild to moderate pulmonary hypertension. Pulmonary arterial systolic pressure is 40 mmHg. 08/25 US LUE fistula: AV fistula, of the left upper extremity; widely patent with no evidence of hemodynamically significant stenosis. Incidental finding of a patent stent in the axillary region of the AV fistula. No evidence of oli-fistula fluid. 08/28 CTH: no acute intracranial findings      CAUSE OF DEATH:    Liver failure  OTHER DIAGNOSES/CONDITIONS AT TIME OF DEATH:  GI bleed. Shock.  Candidemia         Julio Villalta MD [address] [place] [phone]  [phone]    9/6/2021 10:59 AM

## 2021-09-11 LAB
BACTERIA SPEC CULT: NORMAL
SERVICE CMNT-IMP: NORMAL

## 2021-09-12 LAB
BACTERIA SPEC CULT: NORMAL
SERVICE CMNT-IMP: NORMAL

## 2021-09-15 LAB
HCV LOG10: 5.22 LOG10 IU/ML
HCV NS5 MUT DET ISLT GENOTYP: NORMAL
HCV NS5A DRUG RESISTANCE ASSAY: NORMAL
HEPATITIS C QUANTITATION, 550291: NORMAL IU/ML
REF LAB TEST METHOD: NORMAL
TEST INFORMATION: NORMAL

## 2021-09-23 NOTE — PROGRESS NOTES
PCU SHIFT NURSING NOTE      6190: Bedside shift change report given to Janine Forrest RN (oncoming nurse) by Viky Terrazas RN (offgoing nurse). Report included the following information SBAR, Kardex, Procedure Summary, MAR, Recent Results and Cardiac Rhythm NSR. Shift Summary:   3110: Assumed care of patient. Patient resting quietly in bed. Denies complaints at this time. VSS. Afebrile. Assessment complete. Call light in reach, will monitor. SEE other notes for details of events this AM and MD notification   0730: Pj Osuna RN given bedside report. Patient remains in tripod position, diaphoretic, on 100% NRB mask, ST rate 105 noted per monitor. BP elevated. Pj Osuna RN made aware of the physician notifications this AM and plan of care at this time. Admission Date 5/6/2017   Admission Diagnosis Pulmonary edema   Consults IP CONSULT TO CARDIOLOGY  IP CONSULT TO NEPHROLOGY        Consults   []PT   []OT   []Speech   []Case Management      [] Palliative      Cardiac Monitoring Order   [x]Yes   []No     IV drips   []Yes    Drip:                            Dose:  Drip:                            Dose:  Drip:                            Dose:   [x]No     GI Prophylaxis   []Yes   []No         DVT Prophylaxis   SCDs:             Jc stockings:         [x] Medication   []Contraindicated   []None      Activity Level Activity Level: Up ad brandy     Activity Assistance: No assistance needed   Purposeful Rounding every 1-2 hour? [x]Yes   Ronquillo Score  Total Score: 1   Bed Alarm (If score 3 or >)   []Yes   [] Refused (See signed refusal form in chart)   Jovan Score  Jovan Score: 22   Jovan Score (if score 14 or less)   []PMT consult   []Wound Care consult      []Specialty bed   [] Nutrition consult          Needs prior to discharge:   Home O2 required:    []Yes   []No    If yes, how much O2 required?     Other:    Last Bowel Movement: Last Bowel Movement Date: 05/06/17      Influenza Vaccine Received Flu Vaccine for Current Season (usually Sept-March): Not Flu Season        Pneumonia Vaccine           Diet Active Orders   Diet    DIET CARDIAC Regular      LDAs               Peripheral IV 05/06/17 Left External jugular (Active)   Site Assessment Clean, dry, & intact 5/10/2017 10:58 PM   Phlebitis Assessment 0 5/10/2017 10:58 PM   Infiltration Assessment 0 5/10/2017 10:58 PM   Dressing Status Clean, dry, & intact 5/10/2017 10:58 PM   Dressing Type Transparent 5/10/2017 10:58 PM   Hub Color/Line Status Green;Flushed;Capped 5/10/2017 10:58 PM        Hemodialysis Catheter (Active)   Central Line Being Utilized No 5/10/2017 10:58 PM   Criteria for Appropriate Use Dialysis/apheresis 5/10/2017 10:58 PM   Date Accessed  05/10/17 5/10/2017  4:00 PM   Access Time  0718 5/10/2017  7:30 AM   Site Assessment Clean, dry, & intact 5/10/2017 10:58 PM   Date of Last Dressing Change 05/06/17 5/10/2017 10:58 PM   Dressing Status Clean, dry, & intact 5/10/2017 10:58 PM   Dressing Type Transparent;Tape 5/10/2017 10:58 PM   Proximal Hub Color/Line Status Blue;Capped 5/10/2017 10:58 PM   Distal Hub Color/Line Status Blue;Capped 5/10/2017  4:00 PM                  Urinary Catheter      Intake & Output   Date 05/10/17 0700 - 05/11/17 0659 05/11/17 0700 - 05/12/17 0659   Shift 4235-5930 1674-6294 24 Hour Total 7893-6323 1321-1590 24 Hour Total   I  N  T  A  K  E   P.O. 240 240 480         P. O. 240 240 480       Shift Total  (mL/kg) 240  (3.3) 240  (3.3) 480  (6.6)      O  U  T  P  U  T   Dialysis 3200  3200         NET Fluid Removed (mL) 3200  3200       Shift Total  (mL/kg) 3200  (43.8)  3200  (43.8)      NET -2960 240 -2720      Weight (kg) 73 73 73 73 73 73         Readmission Risk Assessment Tool Score Low Risk            10       Total Score        4 More than 1 Admission in calendar year    4 Patient Insurance is Medicare, Medicaid or Self Pay    2 Charlson Comorbidity Score        Criteria that do not apply:    Relationship with PCP    Patient Living Status Patient Length of Stay > 5       Expected Length of Stay 4d 12h   Actual Length of Stay 5 medical evaluation

## 2022-09-28 ENCOUNTER — APPOINTMENT (OUTPATIENT)
Dept: GENERAL RADIOLOGY | Age: 57
End: 2022-09-28
Attending: EMERGENCY MEDICINE
Payer: COMMERCIAL

## 2022-09-28 ENCOUNTER — HOSPITAL ENCOUNTER (EMERGENCY)
Age: 57
Discharge: HOME | End: 2022-09-28
Attending: EMERGENCY MEDICINE
Payer: COMMERCIAL

## 2022-09-28 VITALS
BODY MASS INDEX: 26.45 KG/M2 | OXYGEN SATURATION: 98 % | RESPIRATION RATE: 18 BRPM | HEIGHT: 67 IN | DIASTOLIC BLOOD PRESSURE: 75 MMHG | SYSTOLIC BLOOD PRESSURE: 131 MMHG | HEART RATE: 71 BPM | TEMPERATURE: 97.8 F

## 2022-09-28 DIAGNOSIS — M25.552 ACUTE HIP PAIN, LEFT: ICD-10-CM

## 2022-09-28 DIAGNOSIS — M25.512 ACUTE PAIN OF LEFT SHOULDER: Primary | ICD-10-CM
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
## 2022-09-28 PROCEDURE — 99283 EMERGENCY DEPT VISIT LOW MDM: CPT

## 2022-09-28 RX ORDER — IBUPROFEN 600 MG/1
600 TABLET ORAL
Qty: 20 TABLET | Refills: 0 | Status: SHIPPED | OUTPATIENT
Start: 2022-09-28

## 2022-09-28 RX ORDER — CYCLOBENZAPRINE HCL 10 MG
10 TABLET ORAL
Qty: 20 TABLET | Refills: 0 | Status: SHIPPED | OUTPATIENT
Start: 2022-09-28
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
## 2023-09-01 NOTE — ROUTINE PROCESS
Can we discuss this further next week End of Shift Note Bedside shift change report given to Kat Gonzalez  (oncoming nurse) by Armen Veras RN (offgoing nurse). Report included the following information SBAR Shift worked:  7-7:30pm  
Shift summary and any significant changes:  
  patient continues to complain of pain mid right abdomen. Nondistended, no bowel movement and no nausea. Has not voided, is a dialysis patient . Receiving morphine 1 mg q 4 prn and started on a lidocaine patch. He is now on midodrine which he was on at [facility]. Blood pressure did dip to systolic 76'B Concerns for physician to address:  pain control and blood pressure control Zone phone for oncoming shift:   na Activity: 
Activity Level: Up with Assistance Number times ambulated in hallways past shift: 0 Number of times OOB to chair past shift: 0 Cardiac:  
Cardiac Monitoring: Yes     
Cardiac Rhythm: Normal sinus rhythm Access:  
Current line(s): PIV Genitourinary:  
Urinary status: oliguric Respiratory:  
O2 Device: Room air Chronic home O2 use?: NO Incentive spirometer at bedside: NO 
  
GI: 
Last Bowel Movement Date: 11/11/20 Current diet:  DIET RENAL Regular DIET ONE TIME MESSAGE Passing flatus: NO Tolerating current diet: YES 
% Diet Eaten: 100 % Pain Management:  
Patient states pain is manageable on current regimen: NO 
 
Skin: 
Jovan Score: 21 Interventions: increase time out of bed Patient Safety: 
Fall Score: Total Score: 2 Interventions: bed/chair alarm and pt to call before getting OOB High Fall Risk: Yes Length of Stay: 
Expected LOS: - - - Actual LOS: 1 Armen Veras RN